# Patient Record
Sex: FEMALE | Race: WHITE | NOT HISPANIC OR LATINO | Employment: OTHER | URBAN - METROPOLITAN AREA
[De-identification: names, ages, dates, MRNs, and addresses within clinical notes are randomized per-mention and may not be internally consistent; named-entity substitution may affect disease eponyms.]

---

## 2017-01-23 ENCOUNTER — APPOINTMENT (OUTPATIENT)
Dept: LAB | Facility: CLINIC | Age: 77
End: 2017-01-23
Payer: MEDICARE

## 2017-01-23 ENCOUNTER — GENERIC CONVERSION - ENCOUNTER (OUTPATIENT)
Dept: OTHER | Facility: OTHER | Age: 77
End: 2017-01-23

## 2017-01-23 ENCOUNTER — TRANSCRIBE ORDERS (OUTPATIENT)
Dept: LAB | Facility: CLINIC | Age: 77
End: 2017-01-23

## 2017-01-23 DIAGNOSIS — I05.9 OTHER AND UNSPECIFIED MITRAL VALVE DISEASES: ICD-10-CM

## 2017-01-23 DIAGNOSIS — Z79.01 LONG TERM (CURRENT) USE OF ANTICOAGULANTS: Primary | ICD-10-CM

## 2017-01-23 DIAGNOSIS — Z95.2 HEART VALVE REPLACED BY TRANSPLANT: ICD-10-CM

## 2017-01-23 LAB
INR PPP: 2.14 (ref 0.86–1.16)
PROTHROMBIN TIME: 23.7 SECONDS (ref 12–14.3)

## 2017-01-23 PROCEDURE — 36415 COLL VENOUS BLD VENIPUNCTURE: CPT | Performed by: NURSE PRACTITIONER

## 2017-01-23 PROCEDURE — 85610 PROTHROMBIN TIME: CPT | Performed by: NURSE PRACTITIONER

## 2017-02-15 ENCOUNTER — ALLSCRIPTS OFFICE VISIT (OUTPATIENT)
Dept: OTHER | Facility: OTHER | Age: 77
End: 2017-02-15

## 2017-02-21 ENCOUNTER — GENERIC CONVERSION - ENCOUNTER (OUTPATIENT)
Dept: OTHER | Facility: OTHER | Age: 77
End: 2017-02-21

## 2017-02-21 ENCOUNTER — APPOINTMENT (OUTPATIENT)
Dept: LAB | Facility: CLINIC | Age: 77
End: 2017-02-21
Payer: MEDICARE

## 2017-02-21 ENCOUNTER — TRANSCRIBE ORDERS (OUTPATIENT)
Dept: LAB | Facility: CLINIC | Age: 77
End: 2017-02-21

## 2017-02-21 DIAGNOSIS — Z95.2 HEART VALVE REPLACED BY TRANSPLANT: ICD-10-CM

## 2017-02-21 DIAGNOSIS — Z79.01 LONG TERM (CURRENT) USE OF ANTICOAGULANTS: ICD-10-CM

## 2017-02-21 DIAGNOSIS — Z79.01 LONG TERM (CURRENT) USE OF ANTICOAGULANTS: Primary | ICD-10-CM

## 2017-02-21 DIAGNOSIS — I05.9 OTHER AND UNSPECIFIED MITRAL VALVE DISEASES: ICD-10-CM

## 2017-02-21 LAB
INR PPP: 2.23 (ref 0.86–1.16)
PROTHROMBIN TIME: 24.4 SECONDS (ref 12–14.3)

## 2017-02-21 PROCEDURE — 85610 PROTHROMBIN TIME: CPT

## 2017-02-21 PROCEDURE — 36415 COLL VENOUS BLD VENIPUNCTURE: CPT

## 2017-02-23 ENCOUNTER — ALLSCRIPTS OFFICE VISIT (OUTPATIENT)
Dept: OTHER | Facility: OTHER | Age: 77
End: 2017-02-23

## 2017-03-15 ENCOUNTER — ALLSCRIPTS OFFICE VISIT (OUTPATIENT)
Dept: OTHER | Facility: OTHER | Age: 77
End: 2017-03-15

## 2017-03-15 DIAGNOSIS — E03.9 HYPOTHYROIDISM: ICD-10-CM

## 2017-03-15 DIAGNOSIS — I48.20 CHRONIC ATRIAL FIBRILLATION (HCC): ICD-10-CM

## 2017-03-23 ENCOUNTER — GENERIC CONVERSION - ENCOUNTER (OUTPATIENT)
Dept: OTHER | Facility: OTHER | Age: 77
End: 2017-03-23

## 2017-03-23 ENCOUNTER — TRANSCRIBE ORDERS (OUTPATIENT)
Dept: LAB | Facility: CLINIC | Age: 77
End: 2017-03-23

## 2017-03-23 ENCOUNTER — APPOINTMENT (OUTPATIENT)
Dept: LAB | Facility: CLINIC | Age: 77
End: 2017-03-23
Payer: MEDICARE

## 2017-03-23 DIAGNOSIS — Z79.01 LONG TERM (CURRENT) USE OF ANTICOAGULANTS: Primary | ICD-10-CM

## 2017-03-23 DIAGNOSIS — I48.20 CHRONIC ATRIAL FIBRILLATION (HCC): ICD-10-CM

## 2017-03-23 DIAGNOSIS — I05.9 OTHER AND UNSPECIFIED MITRAL VALVE DISEASES: ICD-10-CM

## 2017-03-23 DIAGNOSIS — E03.9 HYPOTHYROIDISM: ICD-10-CM

## 2017-03-23 DIAGNOSIS — Z95.2 HEART VALVE REPLACED BY TRANSPLANT: ICD-10-CM

## 2017-03-23 LAB
ALBUMIN SERPL BCP-MCNC: 3.4 G/DL (ref 3.5–5)
ALP SERPL-CCNC: 74 U/L (ref 46–116)
ALT SERPL W P-5'-P-CCNC: 9 U/L (ref 12–78)
ANION GAP SERPL CALCULATED.3IONS-SCNC: 4 MMOL/L (ref 4–13)
AST SERPL W P-5'-P-CCNC: 18 U/L (ref 5–45)
BILIRUB SERPL-MCNC: 1.29 MG/DL (ref 0.2–1)
BUN SERPL-MCNC: 14 MG/DL (ref 5–25)
CALCIUM SERPL-MCNC: 9.4 MG/DL (ref 8.3–10.1)
CHLORIDE SERPL-SCNC: 104 MMOL/L (ref 100–108)
CHOLEST SERPL-MCNC: 163 MG/DL (ref 50–200)
CO2 SERPL-SCNC: 29 MMOL/L (ref 21–32)
CREAT SERPL-MCNC: 1.05 MG/DL (ref 0.6–1.3)
GFR SERPL CREATININE-BSD FRML MDRD: 50.8 ML/MIN/1.73SQ M
GLUCOSE P FAST SERPL-MCNC: 103 MG/DL (ref 65–99)
HDLC SERPL-MCNC: 60 MG/DL (ref 40–60)
INR PPP: 2.14 (ref 0.86–1.16)
LDLC SERPL CALC-MCNC: 78 MG/DL (ref 0–100)
POTASSIUM SERPL-SCNC: 4.4 MMOL/L (ref 3.5–5.3)
PROT SERPL-MCNC: 6.7 G/DL (ref 6.4–8.2)
PROTHROMBIN TIME: 23.7 SECONDS (ref 12–14.3)
SODIUM SERPL-SCNC: 137 MMOL/L (ref 136–145)
TRIGL SERPL-MCNC: 124 MG/DL
TSH SERPL DL<=0.05 MIU/L-ACNC: 2.83 UIU/ML (ref 0.36–3.74)

## 2017-03-23 PROCEDURE — 36415 COLL VENOUS BLD VENIPUNCTURE: CPT | Performed by: NURSE PRACTITIONER

## 2017-03-23 PROCEDURE — 84443 ASSAY THYROID STIM HORMONE: CPT

## 2017-03-23 PROCEDURE — 85610 PROTHROMBIN TIME: CPT | Performed by: NURSE PRACTITIONER

## 2017-03-23 PROCEDURE — 80061 LIPID PANEL: CPT

## 2017-03-23 PROCEDURE — 80053 COMPREHEN METABOLIC PANEL: CPT

## 2017-04-06 ENCOUNTER — GENERIC CONVERSION - ENCOUNTER (OUTPATIENT)
Dept: OTHER | Facility: OTHER | Age: 77
End: 2017-04-06

## 2017-04-07 ENCOUNTER — GENERIC CONVERSION - ENCOUNTER (OUTPATIENT)
Dept: OTHER | Facility: OTHER | Age: 77
End: 2017-04-07

## 2017-05-02 ENCOUNTER — TRANSCRIBE ORDERS (OUTPATIENT)
Dept: LAB | Facility: CLINIC | Age: 77
End: 2017-05-02

## 2017-05-02 ENCOUNTER — GENERIC CONVERSION - ENCOUNTER (OUTPATIENT)
Dept: OTHER | Facility: OTHER | Age: 77
End: 2017-05-02

## 2017-05-02 ENCOUNTER — APPOINTMENT (OUTPATIENT)
Dept: LAB | Facility: CLINIC | Age: 77
End: 2017-05-02
Payer: MEDICARE

## 2017-05-02 DIAGNOSIS — I48.91 ATRIAL FIBRILLATION, UNSPECIFIED TYPE (HCC): ICD-10-CM

## 2017-05-02 DIAGNOSIS — I25.10 ATHEROSCLEROSIS OF NATIVE CORONARY ARTERY OF NATIVE HEART WITHOUT ANGINA PECTORIS: Primary | ICD-10-CM

## 2017-05-02 LAB
ANION GAP SERPL CALCULATED.3IONS-SCNC: 6 MMOL/L (ref 4–13)
BUN SERPL-MCNC: 18 MG/DL (ref 5–25)
CALCIUM SERPL-MCNC: 9.2 MG/DL (ref 8.3–10.1)
CHLORIDE SERPL-SCNC: 103 MMOL/L (ref 100–108)
CO2 SERPL-SCNC: 27 MMOL/L (ref 21–32)
CREAT SERPL-MCNC: 1.02 MG/DL (ref 0.6–1.3)
GFR SERPL CREATININE-BSD FRML MDRD: 52.5 ML/MIN/1.73SQ M
GLUCOSE P FAST SERPL-MCNC: 104 MG/DL (ref 65–99)
INR PPP: 2.11 (ref 0.86–1.16)
POTASSIUM SERPL-SCNC: 4.5 MMOL/L (ref 3.5–5.3)
PROTHROMBIN TIME: 23.9 SECONDS (ref 12.1–14.4)
SODIUM SERPL-SCNC: 136 MMOL/L (ref 136–145)

## 2017-05-02 PROCEDURE — 80048 BASIC METABOLIC PNL TOTAL CA: CPT | Performed by: INTERNAL MEDICINE

## 2017-05-02 PROCEDURE — 85610 PROTHROMBIN TIME: CPT | Performed by: INTERNAL MEDICINE

## 2017-05-02 PROCEDURE — 36415 COLL VENOUS BLD VENIPUNCTURE: CPT | Performed by: INTERNAL MEDICINE

## 2017-05-08 ENCOUNTER — ALLSCRIPTS OFFICE VISIT (OUTPATIENT)
Dept: OTHER | Facility: OTHER | Age: 77
End: 2017-05-08

## 2017-06-08 ENCOUNTER — APPOINTMENT (OUTPATIENT)
Dept: LAB | Facility: CLINIC | Age: 77
End: 2017-06-08
Payer: MEDICARE

## 2017-06-08 ENCOUNTER — GENERIC CONVERSION - ENCOUNTER (OUTPATIENT)
Dept: OTHER | Facility: OTHER | Age: 77
End: 2017-06-08

## 2017-06-08 ENCOUNTER — TRANSCRIBE ORDERS (OUTPATIENT)
Dept: LAB | Facility: CLINIC | Age: 77
End: 2017-06-08

## 2017-06-08 DIAGNOSIS — I05.9 OTHER AND UNSPECIFIED MITRAL VALVE DISEASES: ICD-10-CM

## 2017-06-08 DIAGNOSIS — Z95.2 HEART VALVE REPLACED BY TRANSPLANT: ICD-10-CM

## 2017-06-08 DIAGNOSIS — Z79.01 LONG TERM (CURRENT) USE OF ANTICOAGULANTS: Primary | ICD-10-CM

## 2017-06-08 LAB
INR PPP: 2.75 (ref 0.86–1.16)
PROTHROMBIN TIME: 29.5 SECONDS (ref 12.1–14.4)

## 2017-06-08 PROCEDURE — 36415 COLL VENOUS BLD VENIPUNCTURE: CPT | Performed by: NURSE PRACTITIONER

## 2017-06-08 PROCEDURE — 85610 PROTHROMBIN TIME: CPT | Performed by: NURSE PRACTITIONER

## 2017-06-09 DIAGNOSIS — I05.9 RHEUMATIC MITRAL VALVE DISEASE: ICD-10-CM

## 2017-06-09 DIAGNOSIS — Z79.01 LONG TERM CURRENT USE OF ANTICOAGULANT: ICD-10-CM

## 2017-06-22 ENCOUNTER — APPOINTMENT (OUTPATIENT)
Dept: LAB | Facility: CLINIC | Age: 77
End: 2017-06-22
Payer: MEDICARE

## 2017-06-22 ENCOUNTER — TRANSCRIBE ORDERS (OUTPATIENT)
Dept: LAB | Facility: CLINIC | Age: 77
End: 2017-06-22

## 2017-06-22 DIAGNOSIS — Z79.01 LONG TERM CURRENT USE OF ANTICOAGULANT: ICD-10-CM

## 2017-06-22 DIAGNOSIS — I05.9 RHEUMATIC MITRAL VALVE DISEASE: ICD-10-CM

## 2017-06-22 LAB
INR PPP: 2.63 (ref 0.86–1.16)
PROTHROMBIN TIME: 28.4 SECONDS (ref 12.1–14.4)

## 2017-06-22 PROCEDURE — 36415 COLL VENOUS BLD VENIPUNCTURE: CPT

## 2017-06-22 PROCEDURE — 85610 PROTHROMBIN TIME: CPT

## 2017-06-23 ENCOUNTER — GENERIC CONVERSION - ENCOUNTER (OUTPATIENT)
Dept: OTHER | Facility: OTHER | Age: 77
End: 2017-06-23

## 2017-07-13 ENCOUNTER — TRANSCRIBE ORDERS (OUTPATIENT)
Dept: LAB | Facility: CLINIC | Age: 77
End: 2017-07-13

## 2017-07-13 ENCOUNTER — GENERIC CONVERSION - ENCOUNTER (OUTPATIENT)
Dept: OTHER | Facility: OTHER | Age: 77
End: 2017-07-13

## 2017-07-13 ENCOUNTER — APPOINTMENT (OUTPATIENT)
Dept: LAB | Facility: CLINIC | Age: 77
End: 2017-07-13
Payer: MEDICARE

## 2017-07-13 DIAGNOSIS — Z79.01 LONG TERM CURRENT USE OF ANTICOAGULANT: ICD-10-CM

## 2017-07-13 DIAGNOSIS — I05.9 RHEUMATIC MITRAL VALVE DISEASE: ICD-10-CM

## 2017-07-13 LAB
INR PPP: 2.51 (ref 0.86–1.16)
PROTHROMBIN TIME: 27.4 SECONDS (ref 12.1–14.4)

## 2017-07-13 PROCEDURE — 36415 COLL VENOUS BLD VENIPUNCTURE: CPT

## 2017-07-13 PROCEDURE — 85610 PROTHROMBIN TIME: CPT

## 2017-07-14 DIAGNOSIS — I05.9 RHEUMATIC MITRAL VALVE DISEASE: ICD-10-CM

## 2017-07-14 DIAGNOSIS — Z79.01 LONG TERM CURRENT USE OF ANTICOAGULANT: ICD-10-CM

## 2017-07-14 RX ORDER — FUROSEMIDE 20 MG/1
20 TABLET ORAL EVERY OTHER DAY
COMMUNITY
End: 2018-03-28

## 2017-07-14 RX ORDER — CARVEDILOL 3.12 MG/1
3.12 TABLET ORAL 2 TIMES DAILY WITH MEALS
COMMUNITY
End: 2018-07-02 | Stop reason: SDUPTHER

## 2017-07-14 RX ORDER — WARFARIN SODIUM 2.5 MG/1
TABLET ORAL
COMMUNITY
End: 2018-05-29 | Stop reason: SDUPTHER

## 2017-07-14 RX ORDER — CHOLECALCIFEROL (VITAMIN D3) 125 MCG
2000 CAPSULE ORAL EVERY MORNING
COMMUNITY

## 2017-07-14 RX ORDER — SPIRONOLACTONE 25 MG/1
25 TABLET ORAL EVERY MORNING
COMMUNITY
End: 2018-05-04 | Stop reason: SDUPTHER

## 2017-07-14 RX ORDER — PRIMIDONE 250 MG/1
25 TABLET ORAL
COMMUNITY
End: 2018-03-12

## 2017-07-14 RX ORDER — LEVOTHYROXINE SODIUM 0.03 MG/1
25 TABLET ORAL EVERY MORNING
COMMUNITY
End: 2018-02-27 | Stop reason: SDUPTHER

## 2017-07-14 RX ORDER — LISINOPRIL 10 MG/1
10 TABLET ORAL
COMMUNITY
End: 2018-03-29 | Stop reason: SDUPTHER

## 2017-07-14 RX ORDER — DONEPEZIL HYDROCHLORIDE 5 MG/1
5 TABLET, FILM COATED ORAL
COMMUNITY
End: 2018-01-29 | Stop reason: SDUPTHER

## 2017-07-14 RX ORDER — WARFARIN SODIUM 2 MG/1
TABLET ORAL
COMMUNITY
End: 2019-05-01

## 2017-07-17 ENCOUNTER — ANESTHESIA EVENT (OUTPATIENT)
Dept: GASTROENTEROLOGY | Facility: AMBULARY SURGERY CENTER | Age: 77
End: 2017-07-17
Payer: MEDICARE

## 2017-07-18 ENCOUNTER — HOSPITAL ENCOUNTER (OUTPATIENT)
Facility: AMBULARY SURGERY CENTER | Age: 77
Setting detail: OUTPATIENT SURGERY
Discharge: HOME | End: 2017-07-18
Attending: INTERNAL MEDICINE | Admitting: INTERNAL MEDICINE
Payer: MEDICARE

## 2017-07-18 ENCOUNTER — ANESTHESIA (OUTPATIENT)
Dept: GASTROENTEROLOGY | Facility: AMBULARY SURGERY CENTER | Age: 77
End: 2017-07-18
Payer: MEDICARE

## 2017-07-18 ENCOUNTER — LAB CONVERSION - ENCOUNTER (OUTPATIENT)
Dept: OTHER | Facility: OTHER | Age: 77
End: 2017-07-18

## 2017-07-18 VITALS
SYSTOLIC BLOOD PRESSURE: 125 MMHG | BODY MASS INDEX: 28.66 KG/M2 | WEIGHT: 172 LBS | HEIGHT: 65 IN | OXYGEN SATURATION: 98 % | RESPIRATION RATE: 18 BRPM | HEART RATE: 59 BPM | DIASTOLIC BLOOD PRESSURE: 61 MMHG | TEMPERATURE: 97.8 F

## 2017-07-18 DIAGNOSIS — Z86.010 HISTORY OF COLONIC POLYPS: ICD-10-CM

## 2017-07-18 PROCEDURE — 88305 TISSUE EXAM BY PATHOLOGIST: CPT | Performed by: INTERNAL MEDICINE

## 2017-07-18 RX ORDER — PROPOFOL 10 MG/ML
INJECTION, EMULSION INTRAVENOUS AS NEEDED
Status: DISCONTINUED | OUTPATIENT
Start: 2017-07-18 | End: 2017-07-18 | Stop reason: SURG

## 2017-07-18 RX ORDER — SODIUM CHLORIDE, SODIUM LACTATE, POTASSIUM CHLORIDE, CALCIUM CHLORIDE 600; 310; 30; 20 MG/100ML; MG/100ML; MG/100ML; MG/100ML
125 INJECTION, SOLUTION INTRAVENOUS CONTINUOUS
Status: DISCONTINUED | OUTPATIENT
Start: 2017-07-18 | End: 2017-07-18 | Stop reason: HOSPADM

## 2017-07-18 RX ADMIN — SODIUM CHLORIDE, SODIUM LACTATE, POTASSIUM CHLORIDE, AND CALCIUM CHLORIDE 125 ML/HR: .6; .31; .03; .02 INJECTION, SOLUTION INTRAVENOUS at 08:51

## 2017-07-18 RX ADMIN — LIDOCAINE HYDROCHLORIDE 60 MG: 20 INJECTION, SOLUTION INTRAVENOUS at 08:55

## 2017-07-18 RX ADMIN — PROPOFOL 30 MG: 10 INJECTION, EMULSION INTRAVENOUS at 09:00

## 2017-07-18 RX ADMIN — PROPOFOL 30 MG: 10 INJECTION, EMULSION INTRAVENOUS at 09:05

## 2017-07-18 RX ADMIN — PROPOFOL 30 MG: 10 INJECTION, EMULSION INTRAVENOUS at 09:11

## 2017-07-18 RX ADMIN — PROPOFOL 80 MG: 10 INJECTION, EMULSION INTRAVENOUS at 08:55

## 2017-07-31 ENCOUNTER — GENERIC CONVERSION - ENCOUNTER (OUTPATIENT)
Dept: OTHER | Facility: OTHER | Age: 77
End: 2017-07-31

## 2017-08-01 ENCOUNTER — GENERIC CONVERSION - ENCOUNTER (OUTPATIENT)
Dept: OTHER | Facility: OTHER | Age: 77
End: 2017-08-01

## 2017-08-02 ENCOUNTER — GENERIC CONVERSION - ENCOUNTER (OUTPATIENT)
Dept: OTHER | Facility: OTHER | Age: 77
End: 2017-08-02

## 2017-08-07 ENCOUNTER — ALLSCRIPTS OFFICE VISIT (OUTPATIENT)
Dept: OTHER | Facility: OTHER | Age: 77
End: 2017-08-07

## 2017-08-17 ENCOUNTER — ALLSCRIPTS OFFICE VISIT (OUTPATIENT)
Dept: OTHER | Facility: OTHER | Age: 77
End: 2017-08-17

## 2017-08-18 DIAGNOSIS — Z79.01 LONG TERM CURRENT USE OF ANTICOAGULANT: ICD-10-CM

## 2017-08-18 DIAGNOSIS — M25.572 PAIN IN LEFT ANKLE: ICD-10-CM

## 2017-08-18 DIAGNOSIS — I05.9 RHEUMATIC MITRAL VALVE DISEASE: ICD-10-CM

## 2017-08-22 ENCOUNTER — ALLSCRIPTS OFFICE VISIT (OUTPATIENT)
Dept: OTHER | Facility: OTHER | Age: 77
End: 2017-08-22

## 2017-08-23 ENCOUNTER — ALLSCRIPTS OFFICE VISIT (OUTPATIENT)
Dept: OTHER | Facility: OTHER | Age: 77
End: 2017-08-23

## 2017-08-23 ENCOUNTER — APPOINTMENT (OUTPATIENT)
Dept: RADIOLOGY | Facility: CLINIC | Age: 77
End: 2017-08-23
Payer: MEDICARE

## 2017-08-23 DIAGNOSIS — M25.572 PAIN IN LEFT ANKLE: ICD-10-CM

## 2017-08-23 PROCEDURE — 73610 X-RAY EXAM OF ANKLE: CPT

## 2017-08-24 ENCOUNTER — ALLSCRIPTS OFFICE VISIT (OUTPATIENT)
Dept: OTHER | Facility: OTHER | Age: 77
End: 2017-08-24

## 2017-09-19 ENCOUNTER — TRANSCRIBE ORDERS (OUTPATIENT)
Dept: LAB | Facility: CLINIC | Age: 77
End: 2017-09-19

## 2017-09-19 ENCOUNTER — GENERIC CONVERSION - ENCOUNTER (OUTPATIENT)
Dept: OTHER | Facility: OTHER | Age: 77
End: 2017-09-19

## 2017-09-19 ENCOUNTER — APPOINTMENT (OUTPATIENT)
Dept: LAB | Facility: CLINIC | Age: 77
End: 2017-09-19
Payer: MEDICARE

## 2017-09-19 DIAGNOSIS — Z79.01 LONG TERM CURRENT USE OF ANTICOAGULANT: ICD-10-CM

## 2017-09-19 DIAGNOSIS — I05.9 RHEUMATIC MITRAL VALVE DISEASE: ICD-10-CM

## 2017-09-19 LAB
INR PPP: 2.5 (ref 0.86–1.16)
PROTHROMBIN TIME: 27.3 SECONDS (ref 12.1–14.4)

## 2017-09-19 PROCEDURE — 85610 PROTHROMBIN TIME: CPT

## 2017-09-19 PROCEDURE — 36415 COLL VENOUS BLD VENIPUNCTURE: CPT

## 2017-09-20 ENCOUNTER — GENERIC CONVERSION - ENCOUNTER (OUTPATIENT)
Dept: OTHER | Facility: OTHER | Age: 77
End: 2017-09-20

## 2017-09-22 DIAGNOSIS — Z79.01 LONG TERM CURRENT USE OF ANTICOAGULANT: ICD-10-CM

## 2017-09-22 DIAGNOSIS — I05.9 RHEUMATIC MITRAL VALVE DISEASE: ICD-10-CM

## 2017-09-22 DIAGNOSIS — I48.20 CHRONIC ATRIAL FIBRILLATION (HCC): ICD-10-CM

## 2017-10-10 ENCOUNTER — TRANSCRIBE ORDERS (OUTPATIENT)
Dept: LAB | Facility: CLINIC | Age: 77
End: 2017-10-10

## 2017-10-10 ENCOUNTER — APPOINTMENT (OUTPATIENT)
Dept: LAB | Facility: CLINIC | Age: 77
End: 2017-10-10
Payer: MEDICARE

## 2017-10-10 DIAGNOSIS — Z79.01 LONG TERM CURRENT USE OF ANTICOAGULANT: ICD-10-CM

## 2017-10-10 DIAGNOSIS — I05.9 RHEUMATIC MITRAL VALVE DISEASE: ICD-10-CM

## 2017-10-10 LAB
INR PPP: 2.56 (ref 0.86–1.16)
PROTHROMBIN TIME: 27.8 SECONDS (ref 12.1–14.4)

## 2017-10-10 PROCEDURE — 36415 COLL VENOUS BLD VENIPUNCTURE: CPT

## 2017-10-10 PROCEDURE — 85610 PROTHROMBIN TIME: CPT

## 2017-10-11 ENCOUNTER — GENERIC CONVERSION - ENCOUNTER (OUTPATIENT)
Dept: OTHER | Facility: OTHER | Age: 77
End: 2017-10-11

## 2017-10-23 ENCOUNTER — APPOINTMENT (OUTPATIENT)
Dept: RADIOLOGY | Facility: CLINIC | Age: 77
End: 2017-10-23
Payer: MEDICARE

## 2017-10-23 ENCOUNTER — GENERIC CONVERSION - ENCOUNTER (OUTPATIENT)
Dept: OTHER | Facility: OTHER | Age: 77
End: 2017-10-23

## 2017-10-23 ENCOUNTER — TRANSCRIBE ORDERS (OUTPATIENT)
Dept: RADIOLOGY | Facility: CLINIC | Age: 77
End: 2017-10-23

## 2017-10-23 DIAGNOSIS — M54.50 LOW BACK PAIN: ICD-10-CM

## 2017-10-23 PROCEDURE — 72110 X-RAY EXAM L-2 SPINE 4/>VWS: CPT

## 2017-10-24 ENCOUNTER — GENERIC CONVERSION - ENCOUNTER (OUTPATIENT)
Dept: OTHER | Facility: OTHER | Age: 77
End: 2017-10-24

## 2017-10-26 ENCOUNTER — APPOINTMENT (OUTPATIENT)
Dept: PHYSICAL THERAPY | Facility: CLINIC | Age: 77
End: 2017-10-26
Payer: MEDICARE

## 2017-10-26 PROCEDURE — 97161 PT EVAL LOW COMPLEX 20 MIN: CPT

## 2017-10-26 PROCEDURE — G8979 MOBILITY GOAL STATUS: HCPCS

## 2017-10-26 PROCEDURE — 97110 THERAPEUTIC EXERCISES: CPT

## 2017-10-26 PROCEDURE — G8978 MOBILITY CURRENT STATUS: HCPCS

## 2017-10-27 ENCOUNTER — GENERIC CONVERSION - ENCOUNTER (OUTPATIENT)
Dept: OTHER | Facility: OTHER | Age: 77
End: 2017-10-27

## 2017-10-27 DIAGNOSIS — I48.20 CHRONIC ATRIAL FIBRILLATION (HCC): ICD-10-CM

## 2017-10-27 DIAGNOSIS — I05.9 RHEUMATIC MITRAL VALVE DISEASE: ICD-10-CM

## 2017-10-27 DIAGNOSIS — Z79.01 LONG TERM CURRENT USE OF ANTICOAGULANT: ICD-10-CM

## 2017-10-30 ENCOUNTER — APPOINTMENT (OUTPATIENT)
Dept: PHYSICAL THERAPY | Facility: CLINIC | Age: 77
End: 2017-10-30
Payer: MEDICARE

## 2017-10-30 PROCEDURE — 97110 THERAPEUTIC EXERCISES: CPT

## 2017-10-31 ENCOUNTER — TRANSCRIBE ORDERS (OUTPATIENT)
Dept: LAB | Facility: CLINIC | Age: 77
End: 2017-10-31

## 2017-10-31 ENCOUNTER — GENERIC CONVERSION - ENCOUNTER (OUTPATIENT)
Dept: OTHER | Facility: OTHER | Age: 77
End: 2017-10-31

## 2017-10-31 ENCOUNTER — APPOINTMENT (OUTPATIENT)
Dept: LAB | Facility: CLINIC | Age: 77
End: 2017-10-31
Payer: MEDICARE

## 2017-10-31 DIAGNOSIS — I48.20 CHRONIC ATRIAL FIBRILLATION (HCC): Primary | ICD-10-CM

## 2017-10-31 LAB
INR PPP: 2.97 (ref 0.86–1.16)
PROTHROMBIN TIME: 31.3 SECONDS (ref 12.1–14.4)

## 2017-10-31 PROCEDURE — 85610 PROTHROMBIN TIME: CPT | Performed by: INTERNAL MEDICINE

## 2017-10-31 PROCEDURE — 36415 COLL VENOUS BLD VENIPUNCTURE: CPT | Performed by: INTERNAL MEDICINE

## 2017-11-02 ENCOUNTER — APPOINTMENT (OUTPATIENT)
Dept: PHYSICAL THERAPY | Facility: CLINIC | Age: 77
End: 2017-11-02
Payer: MEDICARE

## 2017-11-02 PROCEDURE — 97110 THERAPEUTIC EXERCISES: CPT

## 2017-11-06 ENCOUNTER — APPOINTMENT (OUTPATIENT)
Dept: PHYSICAL THERAPY | Facility: CLINIC | Age: 77
End: 2017-11-06
Payer: MEDICARE

## 2017-11-06 PROCEDURE — 97110 THERAPEUTIC EXERCISES: CPT

## 2017-11-09 ENCOUNTER — APPOINTMENT (OUTPATIENT)
Dept: PHYSICAL THERAPY | Facility: CLINIC | Age: 77
End: 2017-11-09
Payer: MEDICARE

## 2017-11-09 PROCEDURE — 97110 THERAPEUTIC EXERCISES: CPT

## 2017-11-09 PROCEDURE — 97112 NEUROMUSCULAR REEDUCATION: CPT

## 2017-11-14 ENCOUNTER — APPOINTMENT (OUTPATIENT)
Dept: PHYSICAL THERAPY | Facility: CLINIC | Age: 77
End: 2017-11-14
Payer: MEDICARE

## 2017-11-14 PROCEDURE — 97112 NEUROMUSCULAR REEDUCATION: CPT

## 2017-11-14 PROCEDURE — 97110 THERAPEUTIC EXERCISES: CPT

## 2017-11-16 ENCOUNTER — APPOINTMENT (OUTPATIENT)
Dept: PHYSICAL THERAPY | Facility: CLINIC | Age: 77
End: 2017-11-16
Payer: MEDICARE

## 2017-11-16 PROCEDURE — 97110 THERAPEUTIC EXERCISES: CPT

## 2017-11-21 ENCOUNTER — APPOINTMENT (OUTPATIENT)
Dept: PHYSICAL THERAPY | Facility: CLINIC | Age: 77
End: 2017-11-21
Payer: MEDICARE

## 2017-11-28 ENCOUNTER — APPOINTMENT (OUTPATIENT)
Dept: PHYSICAL THERAPY | Facility: CLINIC | Age: 77
End: 2017-11-28
Payer: MEDICARE

## 2017-11-28 ENCOUNTER — GENERIC CONVERSION - ENCOUNTER (OUTPATIENT)
Dept: OTHER | Facility: OTHER | Age: 77
End: 2017-11-28

## 2017-11-28 ENCOUNTER — APPOINTMENT (OUTPATIENT)
Dept: LAB | Facility: CLINIC | Age: 77
End: 2017-11-28
Payer: MEDICARE

## 2017-11-28 ENCOUNTER — TRANSCRIBE ORDERS (OUTPATIENT)
Dept: LAB | Facility: CLINIC | Age: 77
End: 2017-11-28

## 2017-11-28 DIAGNOSIS — I48.20 CHRONIC ATRIAL FIBRILLATION (HCC): Primary | ICD-10-CM

## 2017-11-28 DIAGNOSIS — I48.20 CHRONIC ATRIAL FIBRILLATION (HCC): ICD-10-CM

## 2017-11-28 LAB
INR PPP: 2.91 (ref 0.86–1.16)
PROTHROMBIN TIME: 30.8 SECONDS (ref 12.1–14.4)

## 2017-11-28 PROCEDURE — 85610 PROTHROMBIN TIME: CPT

## 2017-11-28 PROCEDURE — 97112 NEUROMUSCULAR REEDUCATION: CPT

## 2017-11-28 PROCEDURE — 36415 COLL VENOUS BLD VENIPUNCTURE: CPT

## 2017-11-28 PROCEDURE — 97110 THERAPEUTIC EXERCISES: CPT

## 2017-11-30 ENCOUNTER — APPOINTMENT (OUTPATIENT)
Dept: PHYSICAL THERAPY | Facility: CLINIC | Age: 77
End: 2017-11-30
Payer: MEDICARE

## 2017-11-30 PROCEDURE — 97110 THERAPEUTIC EXERCISES: CPT

## 2017-12-01 DIAGNOSIS — I48.20 CHRONIC ATRIAL FIBRILLATION (HCC): ICD-10-CM

## 2017-12-01 DIAGNOSIS — I05.9 RHEUMATIC MITRAL VALVE DISEASE: ICD-10-CM

## 2017-12-01 DIAGNOSIS — Z79.01 LONG TERM CURRENT USE OF ANTICOAGULANT: ICD-10-CM

## 2017-12-01 DIAGNOSIS — R49.8 OTHER VOICE AND RESONANCE DISORDERS (CODE): ICD-10-CM

## 2017-12-01 DIAGNOSIS — R13.10 DYSPHAGIA: ICD-10-CM

## 2017-12-04 ENCOUNTER — TRANSCRIBE ORDERS (OUTPATIENT)
Dept: LAB | Facility: CLINIC | Age: 77
End: 2017-12-04

## 2017-12-04 ENCOUNTER — ALLSCRIPTS OFFICE VISIT (OUTPATIENT)
Dept: OTHER | Facility: OTHER | Age: 77
End: 2017-12-04

## 2017-12-04 ENCOUNTER — APPOINTMENT (OUTPATIENT)
Dept: PHYSICAL THERAPY | Facility: CLINIC | Age: 77
End: 2017-12-04
Payer: MEDICARE

## 2017-12-04 ENCOUNTER — APPOINTMENT (OUTPATIENT)
Dept: LAB | Facility: CLINIC | Age: 77
End: 2017-12-04
Payer: MEDICARE

## 2017-12-04 DIAGNOSIS — I50.22 CHRONIC SYSTOLIC HEART FAILURE (HCC): Primary | ICD-10-CM

## 2017-12-04 LAB — NT-PROBNP SERPL-MCNC: 1073 PG/ML

## 2017-12-04 PROCEDURE — 36415 COLL VENOUS BLD VENIPUNCTURE: CPT | Performed by: INTERNAL MEDICINE

## 2017-12-04 PROCEDURE — G8980 MOBILITY D/C STATUS: HCPCS

## 2017-12-04 PROCEDURE — 83880 ASSAY OF NATRIURETIC PEPTIDE: CPT | Performed by: INTERNAL MEDICINE

## 2017-12-04 PROCEDURE — 97110 THERAPEUTIC EXERCISES: CPT

## 2017-12-04 PROCEDURE — G8979 MOBILITY GOAL STATUS: HCPCS

## 2017-12-05 ENCOUNTER — GENERIC CONVERSION - ENCOUNTER (OUTPATIENT)
Dept: OTHER | Facility: OTHER | Age: 77
End: 2017-12-05

## 2017-12-05 NOTE — PROGRESS NOTES
Assessment  Assessed    1  Chronic atrial fibrillation (427 31) (I48 2)   2  Anticoagulant long-term use (V58 61) (Z79 01)   3  Chronic right-sided low back pain without sciatica (724 2,338 29) (M54 5,G89 29)   4  Dizziness (780 4) (R42)   5  Heart failure, left systolic, chronic (495 64,455 4) (I50 22)   6  History of mitral valve replacement (V43 3) (Z95 2)   7  Hypothyroidism (244 9) (E03 9)   8  Parkinson's disease (332 0) (G20)   9  Easy fatigability (780 79) (R53 83)   10  Generalized osteoarthritis (715 00) (M15 9)    Plan  Chronic atrial fibrillation    · EKG/ECG- POC; Status:Complete;   Done: 01OWZ9940   Perform: In Office; 419 [id number]; Last Updated By:Shaugfta Rankin; 12/4/2017 9:15:01 AM;Ordered; For:Chronic atrial fibrillation; Ordered By:Luis Daniel Perdomo;  Dizziness    · (1) CBC/ PLT (NO DIFF); Status:Active; Requested for:76Jws9274; Perform:[facility]; OVB:61MTO4868;GAGUADP;EFJXSTJ By:Steffany Perdomo;  Heart failure, left systolic, chronic    · (1) NT- BNP (PRO BRAIN NATRIURETIC PEPTIDE); Status:Active; Requestedfor:09Dsx8445;    Perform:[facility]; OBK:37WDK7088; Ordered;For:Heart failure, left systolic, chronic; Ordered By:Luis Daniel Perdomo;  Heart failure, left systolic, chronic, Hypothyroidism, Parkinson's disease    · (1) COMPREHENSIVE METABOLIC PANEL; Status:Active; Requested for:57Bqy1336; Perform:[facility]; XFY:36FLZ1246;RJDDGXD;ELEANOR:JFPXT failure, left systolic, chronic, Hypothyroidism, Parkinson's disease; Ordered By:Luis Daniel Perdomo;  Hypothyroidism    · (1) LIPID PANEL, FASTING; Status:Active; Requested for:78Yin9359; Perform:[facility]; YCI:64TMO8410;SMJGLXE;HFW:XMPYWMSKGEJCQC; Ordered By:Luis Daniel Perdomo;   · (1) TSH; Status:Active; Requested for:18Feb2018; Perform:[facility]; [facility] By:Steffany Perdomo;   1  Continue present medications   2   Monitor  home blood pressures when dizzy and if consistently low, notify us and we will modify doses of medications  3   Obtain NT-BNP soon and lipids, CBC, CMP, TSH with office visit and rhythm strip in approximately 4 months  Discussion/Summary  Cardiology Discussion Summary Free Text Note Form [facility]:     Resolved dehydration/hypotension and residual mild dizziness as well as resolution of acute kidney injury since 11/18/16  Compensated chronic systolic heart failure with 34% ejection fraction on 7/5/16 MUGA scan/underlying dilated cardiomyopathy but currently with exertional fatigue, exhaustion, and cold sweats, with slightly worsened exertional dyspnea, and occasional nocturnal wheezing   Chronic atrial fibrillation, controlledHistory of mechanical mitral valve replacement September, 1990, with very good oral anticoagulation with target INR of 2  5  Moderately frequent PVCs on otherwise benign Holter monitor December, 201315 6 pound weight loss in approximately 4 yearsDyslipidemiaMild obstructive sleep apneaHistory of asthmaHistory of GERDHistory of benign positional vertigoHistory of Lyme diseaseHypothyroidismHistory of hepatitis CHistory of Parkinson's disease/date dysfunctionPersistent nonproductive cough and postural dizzines , now improvedChronic fatigue and malaise, multifactorialDrug-induced insomniaHistory of fall with subsequent left distal tibial fracture and left ankle sprain with right periorbital ecchymoses and laceration and left wrist sprain on 7/30/17, with no recurrence, possibly related to transient drop in blood pressure with upright position in hot weather after prolonged sitting  Recently diagnosed osteoarthritis of hips and spine  Goals and Barriers: The patient has the current Goals: Optimize chronic heart failure and symptomsof future fall risk from cardiac standpoint  The patent has the current Barriers: Parkinson's diseaseotherwise  Patient's Capacity to Self-Care: Patient is able to Self-Care     Medication SE Review and Pt Understands Tx: Possible side effects of new medications were reviewed with the patient/guardian today  The treatment plan was reviewed with the patient/guardian  The patient/guardian understands and agrees with the treatment plan   Counseling Documentation With Imm: The patient, patient's family was counseled regarding diagnostic results,-- instructions for management,-- risk factor reductions,-- prognosis,-- patient and family education,-- impressions,-- risks and benefits of treatment options,-- importance of compliance with treatment  total time of encounter was 42 minutes minutes-- and-- 40 minutes was spent counseling  Self Referrals:   Self Referrals: Yes   Transitional Care: Co-manage care with PCP/Referring Provider  Chief Complaint  Chief Complaint Free Text Note Form: Patient here for her 4 month follow up visit, ekg and bnp panel  Patient feels tired and cold sweats with mild activity for the past month; mild dizziness when standing  ylm/ma      History of Present Illness  Cardiology HPI Free Text Note Form  [facility]:   79-year-old woman complains of mild lightheadedness and a 1 month history of easy fatigue, cold sweats, and exhaustion with normal activities such as trying to vacuum or making her bed  She also experienced a slight increase in exertional dyspnea and has been noted to have nocturnal wheezing, according to her   She was recently diagnosed as having osteoarthritis of her hips and spine and has been going to physical therapy for the past 1 and half months for that condition  Her therapy sessions are 35 minutes in duration and include 10 minutes on an arm and leg ergometer without undue difficulty  There have been no falls since her last visit  She takes no analgesics for her osteoarthritis and will rarely use a muscle relaxant for arthritic pain  Review of Systems  ROS Reviewed:   ROS reviewed    All other systems negative, except as noted in history of present illness  Active Problems  Problems    1  Anticoagulant long-term use (V58 61) (Z79 01)   2  Benign familial tremor (333 1) (G25 0)   3  Chronic atrial fibrillation (427 31) (I48 2)   4  Chronic hepatitis C (070 54) (B18 2)   5  Chronic right-sided low back pain without sciatica (724 2,338 29) (M54 5,G89 29)   6  Closed avulsion fracture of distal end of fibula, left, initial encounter   7  Closed nondisplaced segmental fracture of shaft of left fibula, initial encounter (823 21) (S82 465A)   8  Contusion of face, sequela (906 3) (S00 83XS)   9  Dizziness (780 4) (R42)   10  Drug induced insomnia (292 85,E980 5) (F19 982)   11  Dupuytren's contracture (728 6) (M72 0)   12  Edema (782 3) (R60 9)   13  Encounter for screening mammogram for malignant neoplasm of breast (V76 12)  (Z12 31)   14  Encounter for therapeutic drug monitoring (V58 83) (Z51 81)   15  Exposure to influenza (V01 79) (Z20 828)   16  Fall (E888 9) (W19 XXXA)   17  Heart failure, left systolic, chronic (814 09,038 0) (I50 22)   18  History of mitral valve replacement (V43 3) (Z95 2)   19  Hypothyroidism (244 9) (E03 9)   20  Left ankle pain (719 47) (M25 572)   21  Memory impairment (780 93) (R41 3)   22  Mitral valve disorder (424 0) (I05 9)   23  Need for influenza vaccination (V04 81) (Z23)   24  Need for prophylactic vaccination and inoculation against influenza (V04 81) (Z23)   25  Need for vaccination with 13-polyvalent pneumococcal conjugate vaccine (V03 82) (Z23)   26  Parkinson's disease (332 0) (Evangelista Finnegan)   27  Peripheral vascular disease (443 9) (I73 9)   28  Seasonal allergies (477 9) (J30 2)   29  Skin lesion (709 9) (L98 9)   30  Sleep disturbance (780 50) (G47 9)   31  Sprain of other ligament of left ankle, initial encounter (845 09) (S93 492A)   32  Tongue lump (784 2) (R22 0)   33  Transient ischemic attack (435 9) (G45 9)   34  Vitamin D insufficiency (268 9) (E55 9)   35   Well adult on routine health check (V70 0) (Z00 00)    Past Medical History  Problems    1  _ (V72 31)   2  Acute maxillary sinusitis (461 0) (J01 00)   3  Acute upper respiratory infection (465 9) (J06 9)   4  History of Acute upper respiratory infection (465 9) (J06 9)   5  Anal fissure (565 0) (K60 2)   6  Ankle pain, unspecified laterality   7  History of Asthma with acute exacerbation (493 92) (J45 901)   8  History of Black tarry stools (578 1) (K92 1)   9  Blepharitis, unspecified laterality   10  Difficulty in walking (719 7) (R26 2)   11  History of breast lump (V13 89) (Z87 898)   12  History of chalazion (V12 49) (Z86 69)   13  History of conjunctivitis (V12 49) (Z86 69)   14  History of upper respiratory infection (V12 09) (Z87 09)   15  Hordeolum externum, unspecified laterality (373 11) (H00 019)   16  Late Effect Of Burn Of Wrist And Hand (906 6)   17  Multiple Nonvenomous Insect Bites (919 4)   18  Rotator cuff tendinitis, unspecified laterality (726 10) (M75 80)   19  Screening for malignant neoplasm of cervix (V76 2) (Z12 4)  Active Problems And Past Medical History Reviewed: The active problems and past medical history were reviewed and updated today  Surgical History  Problems    1  History of Cataract Surgery   2  History of Cholecystectomy   3  History of Hysterectomy   4  History of Mitral Valve Replacement  Surgical History Reviewed: The surgical history was reviewed and updated today  Family History  Brother    1  Family history of arthritis (V17 7) (Z82 61)   2  Family history of Parkinson disease  Family History Reviewed: The family history was reviewed and updated today  Social History  Problems    · Former smoker (Y27 56) (B52 696)   · No alcohol use  Social History Reviewed: The social history was reviewed and is unchanged  Current Meds   1  Carbidopa-Levodopa-Entacapone -200 MG Oral Tablet; take 1 tablet by mouth four times a day;  Therapy: 46YYE7797 to (Evaluate:09Bpp7878)  Requested for: 14Sep2017; Last Rx: 72KXT8203 Ordered   2  Carvedilol 3 125 MG Oral Tablet; TAKE 1 TABLET TWICE DAILY WITH MEALS; Therapy: 92KON7707 to (Rich Chun)  Requested for: 41Mgn7199; Last Rx:61Uhh8373 Ordered   3  Cyclobenzaprine HCl - 10 MG Oral Tablet; 1 tab PO at bedtime as needed; Therapy: 48KVP6679 to (Last FL:58LCK6385)  Requested for: 23Oct2017 Ordered   4  Donepezil HCl - 5 MG Oral Tablet; TAKE 2 5 MG Daily; Therapy: 61FYS2221 to  Requested for: 10Cuf5323 Recorded   5  Furosemide 20 MG Oral Tablet; TAKE 1 TABLET ON MONDAY, WEDNESDAY, FRIDAY AND SUNDAY; Therapy: (Recorded:83Ttk7551) to Recorded   6  Levothyroxine Sodium 25 MCG Oral Tablet; 1 every morning; Therapy: 26GMA0814 to (Last Rx:65Grq5981)  Requested for: 11ERC9120 Ordered   7  Lisinopril 10 MG Oral Tablet; TAKE 1/2 TABLET DAILY; Therapy: 24MAR0962 to (Evaluate:48Oiz4918)  Requested for: 14Oct2017; Last Rx:13Oct2017 Ordered   8  Melatonin 5 MG Oral Capsule; Therapy: 18ALE7799 to (Last Rx:04Oct2017)  Requested for: 15CVH3227 Ordered   9  Primidone 50 MG Oral Tablet; TAKE HALF A TABLET DAILY; Therapy: 70DNG2069 to (Last Rx:16Mar2017)  Requested for: 17IRQ8951 Ordered   10  Spironolactone 25 MG Oral Tablet; Take 1 tablet daily; Therapy: 72JAY8050 to (Renew:19Nov2017)  Requested for: 77DCC0355; Last  Rx:97Sxi4168 Ordered   11  Stalevo 100 -200 MG Oral Tablet; TAKE 1 TABLET 4 TIMES DAILY; Therapy: 42DIL7230 to (Evaluate:81Nfj3429)  Requested for: 22Mar2017; Last  Rx:22Mar2017 Ordered   12  Vitamin D3 2000 UNIT Oral Tablet; TAKE 2000 units daily; Therapy: 14IEG0575 to Recorded   13  Warfarin Sodium 2 MG Oral Tablet; Take 1 tablet daily; Therapy: 92Cyo6135 to (Evaluate:26Mar2018)  Requested for: 24UYE6815; Last  Rx:98Tkh5162 Ordered   14  Warfarin Sodium 2 5 MG Oral Tablet; TAKE 1 TABLET DAILY AS DIRECTED; Therapy: 73CEP6104 to (Evaluate:14Jan2018)  Requested for: 17Aug2017; Last  Rx:17Aug2017 Ordered  Medication List Reviewed:    The medication list was reviewed and updated today  Allergies  Medication    1  No Known Drug Allergies    Vitals  Vital Signs    Recorded: 89BPX3376 09:04AM   Heart Rate 67, Apical   Systolic 400, LUE, Sitting   Diastolic 60, LUE, Sitting   Height 5 ft 5 5 in   Weight 176 lb    BMI Calculated 28 84   BSA Calculated 1 88   O2 Saturation 98, RA     3 1 lb weight loss in approximately 3 months and 15 6 lb weight loss in approximately 4 years   Physical Exam   Constitutional - General appearance: No acute distress, well appearing and well nourished  Eyes - Conjunctiva and Sclera examination: Conjunctiva pink, sclera anicteric  Neck - Neck: Abnormal  -- Prominent right-sided JVD caused by underlying common carotid artery buckling  Pulmonary - Respiratory effort: No signs of respiratory distress  -- Auscultation of lungs: Clear to auscultation  Cardiovascular - Auscultation of heart: Normal rate and rhythm, normal S1 and S2, no murmurs  -- Irregularly irregular cardiac rhythm with unvarying mitral prosthetic clicks and no murmur, gallop, rub -- Pedal pulses: Normal, 2+ bilaterally  -- Difficult to palpate in left foot  -- Examination of extremities for edema and/or varicosities: Normal    Abdomen - Soft  Musculoskeletal - Gait and station: Normal gait  Skin - Skin: Normal without rashes  Skin is warm and well perfused  -- Mild pallor noted  Neurologic - Speech normal  No focal deficits  Psychiatric - Orientation to person, place, and time: Normal       Results/Data  ECG Report: 12/04/2017  fibrillation with controlled ventricular rate  left bundle branch block with secondary ST-T abnormalitiessince 08/02/2017  (1) PT WITH INR 84OKB8501 09:11AM Kun [place] Order Number: [id number]     Test Name Result Flag Reference   INR 2 91 H 0 86-1 16   PT 30 8 seconds H 12 1-14 4     * XR SPINE LUMBAR MINIMUM 4 VIEWS NON INJURY 23Oct2017 11:37AM Shirley Massing Order Number: [id number]     Test Name Result Flag Reference   XR SPINE LUMBAR MINIMUM 4 VIEWS (Report)       LUMBAR SPINE   INDICATION: Lower back pain  Patient fell  COMPARISON: None   VIEWS: AP, lateral, bilateral oblique and coned down projections   IMAGES: 5   FINDINGS:   Alignment is unremarkable  There is no radiographic evidence of acute fracture or destructive osseous lesion  Mild spurring in the lumbar spine  Visualized soft tissues appear unremarkable  IMPRESSION:   Mild spurring  No fracture or malalignment  Workstation performed: [id number]   Signed by:  Estrella Bingham MD  10/24/17       Health Management  Encounter for screening mammogram for malignant neoplasm of breast   Digital Bilateral Screening Mammogram With CAD; every 1 year; Last 69UFR9934; Next FBN:66YXM9378; Overdue    Future Appointments    Date/Time Provider Specialty Site   12/14/2017 02:30 PM MARISELA Anderson   Neurology NEUROLOGY [facility]   Electronically signed by : MARISELA Jackson ; Dec  4 2017 11:46AM EST                       (Author)

## 2017-12-14 ENCOUNTER — GENERIC CONVERSION - ENCOUNTER (OUTPATIENT)
Dept: OTHER | Facility: OTHER | Age: 77
End: 2017-12-14

## 2017-12-20 ENCOUNTER — HOSPITAL ENCOUNTER (OUTPATIENT)
Dept: RADIOLOGY | Facility: HOSPITAL | Age: 77
Discharge: HOME | End: 2017-12-22
Attending: PSYCHIATRY & NEUROLOGY

## 2018-01-03 ENCOUNTER — GENERIC CONVERSION - ENCOUNTER (OUTPATIENT)
Dept: OTHER | Facility: OTHER | Age: 78
End: 2018-01-03

## 2018-01-03 ENCOUNTER — HOSPITAL ENCOUNTER (OUTPATIENT)
Dept: RADIOLOGY | Facility: HOSPITAL | Age: 78
Discharge: HOME | End: 2018-01-03
Attending: PSYCHIATRY & NEUROLOGY
Payer: MEDICARE

## 2018-01-03 DIAGNOSIS — R13.10 SWALLOWING DISORDER: ICD-10-CM

## 2018-01-03 PROCEDURE — 92611 MOTION FLUOROSCOPY/SWALLOW: CPT

## 2018-01-03 PROCEDURE — 74230 X-RAY XM SWLNG FUNCJ C+: CPT

## 2018-01-03 PROCEDURE — G8997 SWALLOW GOAL STATUS: HCPCS

## 2018-01-03 PROCEDURE — G8998 SWALLOW D/C STATUS: HCPCS

## 2018-01-03 PROCEDURE — G8996 SWALLOW CURRENT STATUS: HCPCS

## 2018-01-03 NOTE — PROCEDURES
Video Barium Swallow Study       Patient Name: [facility]  Today's Date: 1/3/2018        Past Medical History     Past Medical History:   Diagnosis Date    Arthritis     osteo joints    CHF, chronic ([address] )     Disease of thyroid gland     Fibromyalgia, primary     History of transfusion 1996    Hx of transient ischemic attack (TIA)     Infectious viral hepatitis     Hep C dx 1996     Lyme carditis     Murmur, cardiac     Parkinsons (HCC)     Seasonal allergies     Urinary frequency         Past Surgical History  Past Surgical History:   Procedure Laterality Date    BREAST SURGERY N/A     benign, calcium    CARDIAC SURGERY  1990    mitral valve replacement    CATARACT EXTRACTION Right 2015    CATARACT EXTRACTION Left 2014    CHEST TUBE INSERTION Right     post pleural effusion    CHOLECYSTECTOMY  2000    lap    HYSTERECTOMY  1973    partial    VA COLSC FLX W/RMVL OF TUMOR POLYP LESION SNARE TQ N/A 7/18/2017    Procedure: COLONOSCOPY and biopsy ;  Surgeon: Eugenie Brady MD;  Location: [place] [address] [facility] LAB; Service: Gastroenterology    SKIN BIOPSY      pre cancerous on face    TONSILLECTOMY             General Information: This 65 y o  female was seen today on an in/outpatient basis  She was referred for a video swallow study by her neurologist, Dr Destiny Munoz, due to abnormal sensation during the swallow  Ms  Bee Wiggins reported that she feels "a tightening" in her throat almost every time she swallows, and occasionally feels it even when she is not trying to swallow  She stated that she feels food and drink gets stuck in her throat, and that she must swallow many times and/or swallow forcefully to make food and drinks move past the "tightened" part  When asked to indicate where she feels the tightening, she pointed to her throat just above the hyoid bone  She denied pain, difficulty breathing, or coughing associated with this tightening sensation       Previous medical history is listed above  Ms Ammy Agustin has participated in speech therapy in the past for voice changes associated with Parkinson's Disease and felt that it was helpful  She eats a Regular/Thin Liquid diet at home, although she sometimes eats softer foods when fatigued       Textures assessed during this study include thin liquids, puree, and regular solids  The study was performed in lateral view        Oral Stage:  Within normal limits with all assessed textures       Pharyngeal Stage:  Hyolaryngeal elevation, epiglottic inversion, and pharyngeal contraction were all timely and coordinated, with adequate strength  With puree, regular solids, and single sips of thin liquids, the pharyngeal swallow was within normal limits, with no laryngeal penetration or aspiration observed  Rapid consecutive sips of thin liquids yielded transient laryngeal penetration during the swallow, with residual coating of the underside of the epiglottis and of the laryngeal vestibule after the swallow  This was successfully cleared with a cued throat clear and swallow  Esophageal Stage:  A cricopharyngeal bar was visualized below the the upper esophageal sphincter at the level of the C4-C5 vertebrae  With all trials of all assessed textures, the bar impeded bolus transit through the proximal esophagus, resulting in partial bolus retention in the proximal esophagus after the swallow  With all trials, the patient endorsed feeling the "tightening" sensation during and after the first swallow  The patient was instructed to continue to swallow until she felt the tightening sensation tamy  Two swallows were required to fully clear thin liquids boluses from the proximal esophagus, and three to five swallows were required to fully clear puree and regular solid boluses  Assessment Summary:  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, which results in partial bolus retention in the proximal esophagus after the swallow   This is likely the source of the patient's experience of a "tightening" sensation in the throat when eating and drinking  The patient was able to successfully clear boluses from the esophagus with the use of additional swallows  Oral and pharyngeal swallow function was grossly within normal limits; transient laryngeal penetration with rapid intake of liquids is a normal finding in a patient of this age  No aspiration was observed with any texture       Recommendations:  1  Continue Regular/Thin Liquid diet  Choose softer foods if desired for ease of intake  2  Sit upright during all intake by mouth  Chew all solids thoroughly  3  Swallow 2-3 times with each bolus to ensure that food and drink have moved past the cricopharyngeal bar  4  If inability to swallow, choking, or vomiting with PO intake occur at any point, follow up with primary care physician  5  Continue followup with neurology for ongoing management of Parkinson's Disease          Results and recommendations were discussed with the patient immediately following the study          Lien Rodriguez [place], [address] [facility]  Speech-Language Pathologist

## 2018-01-05 DIAGNOSIS — Z12.31 ENCOUNTER FOR SCREENING MAMMOGRAM FOR MALIGNANT NEOPLASM OF BREAST: ICD-10-CM

## 2018-01-05 DIAGNOSIS — I48.20 CHRONIC ATRIAL FIBRILLATION (HCC): ICD-10-CM

## 2018-01-05 DIAGNOSIS — M25.552 PAIN IN LEFT HIP: ICD-10-CM

## 2018-01-05 DIAGNOSIS — I05.9 RHEUMATIC MITRAL VALVE DISEASE: ICD-10-CM

## 2018-01-05 DIAGNOSIS — Z79.01 LONG TERM CURRENT USE OF ANTICOAGULANT: ICD-10-CM

## 2018-01-08 ENCOUNTER — ALLSCRIPTS OFFICE VISIT (OUTPATIENT)
Dept: OTHER | Facility: OTHER | Age: 78
End: 2018-01-08

## 2018-01-08 ENCOUNTER — APPOINTMENT (OUTPATIENT)
Dept: RADIOLOGY | Facility: CLINIC | Age: 78
End: 2018-01-08
Payer: MEDICARE

## 2018-01-08 ENCOUNTER — APPOINTMENT (OUTPATIENT)
Dept: LAB | Facility: CLINIC | Age: 78
End: 2018-01-08
Payer: MEDICARE

## 2018-01-08 ENCOUNTER — TRANSCRIBE ORDERS (OUTPATIENT)
Dept: RADIOLOGY | Facility: CLINIC | Age: 78
End: 2018-01-08

## 2018-01-08 ENCOUNTER — TRANSCRIBE ORDERS (OUTPATIENT)
Dept: LAB | Facility: CLINIC | Age: 78
End: 2018-01-08

## 2018-01-08 DIAGNOSIS — M25.552 PAIN IN LEFT HIP: ICD-10-CM

## 2018-01-08 DIAGNOSIS — I48.20 CHRONIC ATRIAL FIBRILLATION (HCC): Primary | ICD-10-CM

## 2018-01-08 DIAGNOSIS — I48.20 CHRONIC ATRIAL FIBRILLATION (HCC): ICD-10-CM

## 2018-01-08 LAB
INR PPP: 2.44 (ref 0.86–1.16)
PROTHROMBIN TIME: 26.8 SECONDS (ref 12.1–14.4)

## 2018-01-08 PROCEDURE — 36415 COLL VENOUS BLD VENIPUNCTURE: CPT

## 2018-01-08 PROCEDURE — 85610 PROTHROMBIN TIME: CPT

## 2018-01-08 PROCEDURE — 73502 X-RAY EXAM HIP UNI 2-3 VIEWS: CPT

## 2018-01-09 ENCOUNTER — GENERIC CONVERSION - ENCOUNTER (OUTPATIENT)
Dept: OTHER | Facility: OTHER | Age: 78
End: 2018-01-09

## 2018-01-09 ENCOUNTER — TRANSCRIBE ORDERS (OUTPATIENT)
Dept: ADMINISTRATIVE | Facility: HOSPITAL | Age: 78
End: 2018-01-09

## 2018-01-09 DIAGNOSIS — M25.552 LEFT HIP PAIN: Primary | ICD-10-CM

## 2018-01-09 NOTE — PROGRESS NOTES
Assessment   1  Hip pain, left (949 45) (P79 974)    Plan   Encounter for screening mammogram for malignant neoplasm of breast    · * MAMMO SCREENING BILATERAL W CAD; Status:Hold For - Scheduling; Requested    for:08Jan2018; Hip pain, left    · XR HIP/PELV 4+ VW LEFT W PELVIS IF PERFORMED; Status:Active; Requested    MICHA:57WSH0966;    · 1 - Bruce Bowman, [address] [facility]  *  Status: Hold For -    Scheduling  Requested for: 63ZVX9610  Care Summary provided  : Yes    Discussion/Summary      Rto prn  try to schedule karen with ortho ASAP  The treatment plan was reviewed with the patient/guardian  The patient/guardian understands and agrees with the treatment plan      Chief Complaint   Pt  c/o severe left hip pain can't bear weight on leg started on Saturday  ck/lpn      History of Present Illness   HPI: 65 y o f seen for acute onset of L hip pain anteriorly with standing or walking x 2 days, no injury reported, no pain at sitting or supine position, tried Tylenol with minimal help      Review of Systems        Constitutional: No fever, no chills, feels well, no tiredness, no recent weight gain or loss  Cardiovascular: no complaints of slow or fast heart rate, no chest pain, no palpitations, no leg claudication or lower extremity edema  Genitourinary: no complaints of dysuria, no incontinence, no pelvic pain, no dysmenorrhea, no vaginal discharge or abnormal vaginal bleeding  Musculoskeletal: as noted in HPI  Integumentary: no complaints of skin rash or lesion, no itching or dry skin, no skin wounds  Neurological: no complaints of headache, no confusion, no numbness or tingling, no dizziness or fainting  Active Problems   1  Anticoagulant long-term use (V58 61) (Z79 01)   2  Benign familial tremor (333 1) (G25 0)   3  Blepharitis (373 00) (H01 009)   4  Chronic atrial fibrillation (427 31) (I48 2)   5  Chronic hepatitis C (070 54) (B18 2)   6   Chronic right-sided low back pain without sciatica (724 2,338 29) (M54 5,G89 29)   7  Closed avulsion fracture of distal end of fibula, left, initial encounter   8  Closed nondisplaced segmental fracture of shaft of left fibula, initial encounter (823 21)     (S82 465A)   9  Contusion of face, sequela (906 3) (S00 83XS)   10  Dizziness (780 4) (R42)   11  Drooling (527 7) (K11 7)   12  Drug induced insomnia (292 85,E980 5) (F19 982)   13  Dupuytren's contracture (728 6) (M72 0)   14  Dysphagia (787 20) (R13 10)   15  Easy fatigability (780 79) (R53 83)   16  Edema (782 3) (R60 9)   17  Encounter for screening mammogram for malignant neoplasm of breast (V76 12)      (Z12 31)   18  Encounter for therapeutic drug monitoring (V58 83) (Z51 81)   19  Exposure to influenza (V01 79) (Z20 828)   20  Fall (E888 9) (W19 XXXA)   21  Generalized osteoarthritis (715 00) (M15 9)   22  Heart failure, left systolic, chronic (849 86,585 8) (I50 22)   23  History of mitral valve replacement (V43 3) (Z95 2)   24  Hypophonia (784 49) (R49 8)   25  Hypothyroidism (244 9) (E03 9)   26  Left ankle pain (719 47) (M25 572)   27  Memory impairment (780 93) (R41 3)   28  Mitral valve disorder (424 0) (I05 9)   29  Need for influenza vaccination (V04 81) (Z23)   30  Need for prophylactic vaccination and inoculation against influenza (V04 81) (Z23)   31  Need for vaccination with 13-polyvalent pneumococcal conjugate vaccine (V03 82) (Z23)   32  Parkinson's disease (332 0) (G20)   33  Peripheral vascular disease (443 9) (I73 9)   34  Seasonal allergies (477 9) (J30 2)   35  Skin lesion (709 9) (L98 9)   36  Sleep disturbance (780 50) (G47 9)   37  Sprain of other ligament of left ankle, initial encounter (845 09) (S93 492A)   38  Tongue lump (784 2) (R22 0)   39  Transient ischemic attack (435 9) (G45 9)   40  Vitamin D insufficiency (268 9) (E55 9)   41  Well adult on routine health check (V70 0) (Z00 00)    Family History   Brother    1   Family history of arthritis (V17 7) (Z82 61)   2  Family history of Parkinson disease    Social History    · Former smoker (I49 83) (T79 827)   · No alcohol use    Surgical History   1  History of Cataract Surgery   2  History of Cholecystectomy   3  History of Hysterectomy   4  History of Mitral Valve Replacement  Surgical History Reviewed: The surgical history was reviewed and updated today  Current Meds    1  Carbidopa-Levodopa-Entacapone -200 MG Oral Tablet; take 1 tablet by mouth     four times a day; Therapy: 50FGV7553 to (Evaluate:39Asd4515)  Requested for: 51Rei4287; Last     Rx:25Xqm8072 Ordered   2  Carvedilol 3 125 MG Oral Tablet; TAKE 1 TABLET TWICE DAILY WITH MEALS; Therapy: 54XAJ7497 to (Justo Deras)  Requested for: 20Zli9199; Last     Rx:64Hcu2377 Ordered   3  Cyclobenzaprine HCl - 10 MG Oral Tablet; 1 tab PO at bedtime as needed; Therapy: 69LYQ2397 to (Last NX:47VWC7573)  Requested for: 23Oct2017 Ordered   4  Donepezil HCl - 5 MG Oral Tablet; TAKE 2 5 MG Daily; Therapy: 69NFW7838 to  Requested for: 67Wzg1064 Recorded   5  Erythromycin 5 MG/GM Ophthalmic Ointment; APPLY 1 RIBBON Bedtime; Therapy: (Recorded:04Wry6225) to Recorded   6  Furosemide 20 MG Oral Tablet; TAKE 1 TABLET ON MONDAY, WEDNESDAY, FRIDAY     AND SUNDAY; Therapy: (Recorded:40Xqw8990) to Recorded   7  Glycopyrrolate 1 MG Oral Tablet; Take 1 tab in evening; Therapy: 64WUE4058 to (Last Rx:04Czt5062)  Requested for: 22BCS2691 Ordered   8  Levothyroxine Sodium 25 MCG Oral Tablet; 1 every morning; Therapy: 34FMZ2462 to (Last Rx:45Zse8756)  Requested for: 28LDI9846 Ordered   9  Lisinopril 10 MG Oral Tablet; TAKE 1/2 TABLET DAILY; Therapy: 13ARS6471 to (Evaluate:71Icf3878)  Requested for: 14Oct2017; Last     Rx:24Lgu8202 Ordered   10  Melatonin 5 MG Oral Capsule; Therapy: 22HRO7094 to (Last Rx:04Oct2017)  Requested for: 26DRV8402 Ordered   11  Primidone 50 MG Oral Tablet; TAKE HALF A TABLET DAILY;       Therapy: 54VMG4696 to (Last Rx:16Mar2017)  Requested for: 99CEW8586 Ordered   12  Spironolactone 25 MG Oral Tablet; Take 1 tablet daily; Therapy: 40GRC9564 to (Renew:19Nov2017)  Requested for: 70OAH3441; Last      Rx:03Lbe0790 Ordered   13  Vitamin D3 2000 UNIT Oral Tablet; TAKE 2000 units daily; Therapy: 08BSX5929 to Recorded   14  Warfarin Sodium 2 MG Oral Tablet; 1 tablet Tues /Thurs /Sat /Sun;      Therapy: (Recorded:87Tzn8163) to Recorded   15  Warfarin Sodium 2 5 MG Oral Tablet; 1 tablet Mon /Wed /Fri;      Therapy: (Recorded:02Kfg6585) to Recorded     The medication list was reviewed and updated today  Allergies   1  No Known Drug Allergies    Vitals    Recorded: 74NIT3907 11:43AM   Temperature 97 8 F, Tympanic   Heart Rate 72, L Radial   Pulse Quality Normal, L Radial   Systolic 912, LUE, Sitting   Diastolic 70, LUE, Sitting   Height 5 ft 5 5 in   Weight 175 lb    BMI Calculated 28 68   BSA Calculated 1 88     Physical Exam        Constitutional      General appearance: No acute distress, well appearing and well nourished  Musculoskeletal      Gait and station: Abnormal   Gait evaluation demonstrated non-weight bearing on the left-- and-- able to stand for a few sec w/ pain  Inspection/palpation of joints, bones, and muscles: Abnormal   Palpation - left hip-- and-- lateral and anterior aspect tenderness           Future Appointments      Date/Time Provider Specialty Site   01/09/2018 02:30 PM Rojas Jane DO Orthopedic Surgery [address] [facility]     Signatures    Electronically signed by : MARISELA Byrne ; Jan 8 2018 12:58PM EST                       (Author)

## 2018-01-09 NOTE — RESULT NOTES
Verified Results  (1) PT WITH INR 26Kne0566 09:36AM Letty Sosa     Test Name Result Flag Reference   INR 2 31 H 0 86-1 16   PT 25 1 seconds H 12 0-14 3       Discussion/Summary   cont current coumadin dosing   repeat in 4 weeks

## 2018-01-09 NOTE — RESULT NOTES
Verified Results  (1) PT WITH INR [id number] 09:18AM Remy Erasmo     Test Name Result Flag Reference   INR 2 11 H 0 86-1 16   PT 23 9 seconds H 12 1-14 4

## 2018-01-10 ENCOUNTER — HOSPITAL ENCOUNTER (OUTPATIENT)
Dept: RADIOLOGY | Facility: HOSPITAL | Age: 78
Discharge: HOME | End: 2018-01-10
Attending: ORTHOPAEDIC SURGERY
Payer: MEDICARE

## 2018-01-10 DIAGNOSIS — M25.552 PAIN IN LEFT HIP: ICD-10-CM

## 2018-01-10 PROCEDURE — 73721 MRI JNT OF LWR EXTRE W/O DYE: CPT

## 2018-01-10 NOTE — RESULT NOTES
Discussion/Summary   Same dose and recheck in one month     Verified Results  (1) PT WITH INR 70Jcf6900 08:06AM Christine Cramer Order Number: [id number]     Test Name Result Flag Reference   INR 2 51 H 0 86-1 16   PT 27 4 seconds H 12 1-14 4

## 2018-01-10 NOTE — RESULT NOTES
Verified Results  (1) PT WITH INR 23Jan2017 08:07AM Bautista Necessary     Test Name Result Flag Reference   INR 2 14 H 0 86-1 16   PT 23 7 seconds H 12 0-14 3       Discussion/Summary   cont current coumadin dose   INR in 4 weeks     Signatures   Electronically signed by : THONG Estrada; Jan 23 2017  1:56PM EST                       (Author)

## 2018-01-11 NOTE — RESULT NOTES
Verified Results  (1) PT WITH INR [id number] 09:39AM Sanjay Scherer     Test Name Result Flag Reference   INR 2 79 H 0 86-1 16   PT 28 9 seconds H 12 0-14 3       Discussion/Summary   cont current dose of coumadin recheck INR in 4 weeks     Signatures   Electronically signed by : THONG Maldonado; Dec  1 2016  4:41PM EST                       (Author)

## 2018-01-11 NOTE — RESULT NOTES
Discussion/Summary   Satisfactory INR  Follow-up as per protocol       Verified Results  (1) PT WITH INR 31Oct2017 09:53AM Clance Minder     Test Name Result Flag Reference   INR 2 97 H 0 86-1 16   PT 31 3 seconds H 12 1-14 4

## 2018-01-11 NOTE — RESULT NOTES
Verified Results  * XR SPINE LUMBAR MINIMUM 4 VIEWS NON INJURY 23Oct2017 11:37AM Shirley Massing Order Number: [id number]     Test Name Result Flag Reference   XR SPINE LUMBAR MINIMUM 4 VIEWS (Report)     LUMBAR SPINE     INDICATION: Lower back pain  Patient fell  COMPARISON: None     VIEWS: AP, lateral, bilateral oblique and coned down projections     IMAGES: 5     FINDINGS:     Alignment is unremarkable  There is no radiographic evidence of acute fracture or destructive osseous lesion  Mild spurring in the lumbar spine  Visualized soft tissues appear unremarkable  IMPRESSION:     Mild spurring  No fracture or malalignment         Workstation performed: [id number]     Signed by:   Celestino Paulino MD   10/24/17

## 2018-01-12 ENCOUNTER — GENERIC CONVERSION - ENCOUNTER (OUTPATIENT)
Dept: OTHER | Facility: OTHER | Age: 78
End: 2018-01-12

## 2018-01-12 VITALS
WEIGHT: 179.1 LBS | SYSTOLIC BLOOD PRESSURE: 120 MMHG | HEART RATE: 66 BPM | HEIGHT: 66 IN | OXYGEN SATURATION: 98 % | DIASTOLIC BLOOD PRESSURE: 80 MMHG | BODY MASS INDEX: 28.78 KG/M2

## 2018-01-12 VITALS
DIASTOLIC BLOOD PRESSURE: 66 MMHG | SYSTOLIC BLOOD PRESSURE: 106 MMHG | HEART RATE: 60 BPM | OXYGEN SATURATION: 99 % | HEIGHT: 65 IN | WEIGHT: 177 LBS | BODY MASS INDEX: 29.49 KG/M2

## 2018-01-12 NOTE — RESULT NOTES
Verified Results  (1) PT WITH INR [id number] 09:53AM Pegge Latimer     Test Name Result Flag Reference   INR 2 77 H 0 86-1 16   PT 27 8 seconds H 11 8-14 1

## 2018-01-12 NOTE — RESULT NOTES
Verified Results  (1) PT WITH INR [id number] 10:29AM [place] Line     Test Name Result Flag Reference   INR 2 73 H 0 86-1 16   PT 28 5 seconds H 12 0-14 3

## 2018-01-12 NOTE — MISCELLANEOUS
Message  Received telephone call on 8/1/17 from physician at [facility], where patient was admitted on 7/30/17 after a fall, resulting in ecchymoses in the right periorbital region and several small lacerations requiring suturing  No unexpected dysrhythmias were uncovered  INR was 2 6  No intracranial hemorrhage on CT  Patient discharged to short-term rehabilitation on 8/1/17, hopefully at a location near to her home  Spoke with Dr Anya Russell        Signatures   Electronically signed by : MARISELA Temple ; Aug  1 2017  8:53AM EST                       (Author)

## 2018-01-12 NOTE — RESULT NOTES
Verified Results  * MAMMO SCREENING BILATERAL W CAD [id number] 01:47PM Addis Landeros Order Number: [id number]    - Patient Instructions: To schedule this appointment, please contact Central Scheduling at [phone]  Do not wear any perfume, powder, lotion or deodorant on breast or underarm area  Please bring your doctors order, referral (if needed) and insurance information with you on the day of the test  Failure to bring this information may result in this test being rescheduled  Arrive 15 minutes prior to your appointment time to register  On the day of your test, please bring any prior mammogram or breast studies with you that were not performed at a [facility]  Failure to bring prior exams may result in your test needing to be rescheduled  Test Name Result Flag Reference   MAMMO SCREENING BILATERAL W CAD (Report)     Patient History:   Took hormonal contraceptives for 10 years beginning at age 24  Patient's BMI is 28 1  Reason for exam: screening (asymptomatic)  Screening     Mammo Screening Bilateral W CAD: November 21, 2016 - Check In #:    [de-identified]   Bilateral MLO, CC, and XCCL view(s) were taken  Technologist: RT Douglas(R)(M)   Prior study comparison: November 10, 2015, bilateral [facility]    screening mammo digital bilat performed at [address] [place] [address]  September 30, 2014, bilateral screening    mammogram, performed at [place] [address]  September 23, 2013, bilateral screening mammogram, performed at    [place] [address]  September 14, 2012, right    breast screening mammogram, performed at [place] [address]  August 25, 2011, bilateral screening mammogram,    performed at [place] [address]  August 5, 2010, bilateral screening mammogram, performed at [place] [address]   July 28, 2009, bilateral screening    mammogram, performed at [address]  The breast tissue is almost entirely fat  Bilateral digital    mammography is performed  No dominant soft tissue mass,    architectural distortion or suspicious calcifications are noted  The skin and nipple contours are within normal limits  Scattered benign appearing calcifications are noted  No evidence    of malignancy  No significant changes when compared to prior    studies  1  No evidence of malignancy  2  No significant change when compared with the prior study  ASSESSMENT: BiRad:2 - Benign     Recommendation:   Routine screening mammogram of both breasts in 1 year  A reminder letter will be scheduled   Analyzed by CAD     8-10% of cancers will be missed on mammography  Management of a    palpable abnormality must be based on clinical grounds  Patients   will be notified of their results via letter from our facility  Accredited by [employer] of Radiology and FDA       Transcription Location: [facility] 98: [id number]     Risk Value(s):   Tyrer-Cuzick 10 Year: 3 091%, Tyrer-Cuzick Lifetime: 3 091%,    Myriad Table: 1 5%, BECK 5 Year: 1 9%, NCI Lifetime: 3 7%   Signed by:   Edel Loera MD   11/21/16       Discussion/Summary   Your mammogram is normal    Rosalie Meneses

## 2018-01-12 NOTE — RESULT NOTES
Message   cont current dose   inr in 4 weeks     Verified Results  (1) PT WITH INR [id number] 08:11AM Moon Hernandez     Test Name Result Flag Reference   INR 2 30 H 0 86-1 16   PT 25 0 seconds H 12 0-14 3

## 2018-01-13 VITALS
TEMPERATURE: 97.8 F | DIASTOLIC BLOOD PRESSURE: 80 MMHG | HEART RATE: 68 BPM | SYSTOLIC BLOOD PRESSURE: 130 MMHG | RESPIRATION RATE: 16 BRPM

## 2018-01-13 VITALS
HEIGHT: 66 IN | DIASTOLIC BLOOD PRESSURE: 70 MMHG | WEIGHT: 179 LBS | BODY MASS INDEX: 28.77 KG/M2 | HEART RATE: 67 BPM | SYSTOLIC BLOOD PRESSURE: 109 MMHG

## 2018-01-13 NOTE — RESULT NOTES
Message   pt to hold coumadin today  decrease dose to 2mg every day   Repaet in 2 weeks,     Verified Results  (1) PT WITH INR [id number] 09:26AM [facility] Florien     Test Name Result Flag Reference   INR 3 29 H 0 86-1 16   PT 32 8 seconds H 12 0-14 3       Signatures   Electronically signed by : THONG Zamora; Sep  7 2016  4:58PM EST                       (Author)

## 2018-01-13 NOTE — RESULT NOTES
Verified Results  (1) PT WITH INR [id number] 09:53AM Adeel Farias     Test Name Result Flag Reference   INR 2 48 H 0 86-1 16   PT 25 6 seconds H 11 8-14 1

## 2018-01-13 NOTE — RESULT NOTES
Verified Results  (1) PT WITH INR [id number] 09:04AM Malaikakayce Wupatricia     Test Name Result Flag Reference   INR 2 36 H 0 86-1 16   PT 25 5 seconds H 12 0-14 3       Discussion/Summary   cont current dose   recheck inr in 1 month     Signatures   Electronically signed by : THONG Carrasco; Dec 14 2016  8:12PM EST                       (Author)

## 2018-01-14 VITALS
SYSTOLIC BLOOD PRESSURE: 100 MMHG | BODY MASS INDEX: 29.03 KG/M2 | DIASTOLIC BLOOD PRESSURE: 60 MMHG | TEMPERATURE: 97.4 F | HEIGHT: 65 IN | RESPIRATION RATE: 16 BRPM | HEART RATE: 84 BPM | WEIGHT: 174.25 LBS

## 2018-01-14 VITALS
BODY MASS INDEX: 29.11 KG/M2 | HEART RATE: 76 BPM | SYSTOLIC BLOOD PRESSURE: 120 MMHG | OXYGEN SATURATION: 93 % | DIASTOLIC BLOOD PRESSURE: 70 MMHG | WEIGHT: 174.7 LBS | HEIGHT: 65 IN

## 2018-01-14 VITALS
HEIGHT: 65 IN | HEART RATE: 65 BPM | BODY MASS INDEX: 29.32 KG/M2 | WEIGHT: 176 LBS | DIASTOLIC BLOOD PRESSURE: 70 MMHG | SYSTOLIC BLOOD PRESSURE: 101 MMHG

## 2018-01-14 VITALS
SYSTOLIC BLOOD PRESSURE: 106 MMHG | WEIGHT: 174 LBS | HEIGHT: 65 IN | BODY MASS INDEX: 28.99 KG/M2 | DIASTOLIC BLOOD PRESSURE: 67 MMHG | HEART RATE: 50 BPM

## 2018-01-14 NOTE — RESULT NOTES
Discussion/Summary   Same dose  Recheck one month as per protocol       Verified Results  (1) PT WITH INR [id number] 09:09AM Marcio [facility] Order Number: [id number]     Test Name Result Flag Reference   INR 2 56 H 0 86-1 16   PT 27 8 seconds H 12 1-14 4

## 2018-01-15 NOTE — RESULT NOTES
Verified Results  (1) PT WITH INR [id number] 09:11AM Beau Wolfgang Order Number: [id number]     Test Name Result Flag Reference   INR 2 91 H 0 86-1 16   PT 30 8 seconds H 12 1-14 4

## 2018-01-15 NOTE — RESULT NOTES
Verified Results  (1) PT WITH INR [id number] 09:27AM Steven Chemo Order Number: [id number]     Test Name Result Flag Reference   INR 2 50 H 0 86-1 16   PT 27 3 seconds H 12 1-14 4

## 2018-01-15 NOTE — RESULT NOTES
Verified Results  (1) PT WITH INR [id number] 08:47AM Regina Pastrana    Order Number: [id number]     Test Name Result Flag Reference   INR 2 87 H 0 86-1 16   Performing Comments: To be done weekly, biweekly, monthly and as directed for  calendar year 2016      Performing Comments: To be done weekly, biweekly, monthly and as directed for  calendar year 2016   PT 29 6 seconds H 12 0-14 3       Signatures   Electronically signed by : Holmes Schilder, APN;  Aug 23 2016  5:03PM EST                       (Author)

## 2018-01-15 NOTE — RESULT NOTES
Verified Results  (1) COMPREHENSIVE METABOLIC PANEL [id number] 70:74WS Toshia Brown    Order Number: [id number]     Test Name Result Flag Reference   SODIUM 137 mmol/L  136-145   POTASSIUM 4 4 mmol/L  3 5-5 3   CHLORIDE 104 mmol/L  100-108   CARBON DIOXIDE 29 mmol/L  21-32   ANION GAP (CALC) 4 mmol/L  4-13   BLOOD UREA NITROGEN 14 mg/dL  5-25   CREATININE 1 05 mg/dL  0 60-1 30   Standardized to IDMS reference method   CALCIUM 9 4 mg/dL  8 3-10 1   BILI, TOTAL 1 29 mg/dL H 0 20-1 00   ALK PHOSPHATAS 74 U/L     ALT (SGPT) 9 U/L L 12-78   AST(SGOT) 18 U/L  5-45   ALBUMIN 3 4 g/dL L 3 5-5 0   TOTAL PROTEIN 6 7 g/dL  6 4-8 2   eGFR Non-African American 50 8 ml/min/1 73sq m     - Patient Instructions: This is a fasting blood test  Water,black tea or black  coffee only after 9:00pm the night before test Drink 2 glasses of water the morning of test - Patient Instructions: This bloodwork is non-fasting  Please drink two glasses of   water morning of bloodwork  National Kidney Disease Education Program recommendations are as follows:  GFR calculation is accurate only with a steady state creatinine  Chronic Kidney disease less than 60 ml/min/1 73 sq  meters  Kidney failure less than 15 ml/min/1 73 sq  meters  GLUCOSE FASTING 103 mg/dL H 65-99     (1) LIPID PANEL, FASTING 23Mar2017 08:59AM Toshia Brown    Order Number: [id number]     Test Name Result Flag Reference   CHOLESTEROL 163 mg/dL     HDL,DIRECT 60 mg/dL  40-60   Specimen collection should occur prior to Metamizole administration due to the potential for falsely depressed results  LDL CHOLESTEROL CALCULATED 78 mg/dL  0-100   - Patient Instructions: This is a fasting blood test  Water,black tea or black  coffee only after 9:00pm the night before test   Drink 2 glasses of water the morning of test     - Patient Instructions:  This is a fasting blood test  Water,black tea or black  coffee only after 9:00pm the night before test Drink 2 glasses of water the morning of test - Patient Instructions: This bloodwork is non-fasting  Please drink two glasses of   water morning of bloodwork  Triglyceride:         Normal              <150 mg/dl       Borderline High    150-199 mg/dl       High               200-499 mg/dl       Very High          >499 mg/dl  Cholesterol:         Desirable        <200 mg/dl      Borderline High  200-239 mg/dl      High             >239 mg/dl  HDL Cholesterol:        High    >59 mg/dL      Low     <41 mg/dL  LDL CALCULATED:    This screening LDL is a calculated result  It does not have the accuracy of the Direct Measured LDL in the monitoring of patients with hyperlipidemia and/or statin therapy  Direct Measure LDL (DYR628) must be ordered separately in these patients  TRIGLYCERIDES 124 mg/dL  <=150   Specimen collection should occur prior to N-Acetylcysteine or Metamizole administration due to the potential for falsely depressed results  (1) TSH [id number] 08:59AM Tenzin Spangler    Order Number: [id number]     Test Name Result Flag Reference   TSH 2 830 uIU/mL  0 358-3 740   - Patient Instructions: This bloodwork is non-fasting  Please drink two glasses of water morning of bloodwork  - Patient Instructions: This is a fasting blood test  Water,black tea or black  coffee only after 9:00pm the night before test Drink 2 glasses of water the morning of test - Patient Instructions: This bloodwork is non-fasting  Please drink two glasses of   water morning of bloodwork  Patients undergoing fluorescein dye angiography may retain small amounts of fluorescein in the body for 48-72 hours post procedure  Samples containing fluorescein can produce falsely depressed TSH values  If the patient had this procedure,a specimen should be resubmitted post fluorescein clearance            The recommended reference ranges for TSH during pregnancy are as follows:  First trimester 0 1 to 2 5 uIU/mL  Second trimester  0 2 to 3 0 uIU/mL  Third trimester 0 3 to 3 0 uIU/m       Discussion/Summary   All labs are normal,   Dr Navarrete

## 2018-01-15 NOTE — RESULT NOTES
Verified Results  (1) PT WITH INR [id number] 10:20AM Helen Bella Order Number: [id number]     Test Name Result Flag Reference   INR 2 63 H 0 86-1 16   PT 28 4 seconds H 12 1-14 4

## 2018-01-16 NOTE — RESULT NOTES
Verified Results  (1) PT WITH INR [id number] 09:48AM Mili Abo     Test Name Result Flag Reference   INR 2 38 H 0 86-1 16   PT 24 8 seconds H 11 8-14 1

## 2018-01-16 NOTE — RESULT NOTES
Verified Results  (1) PT WITH INR 23Mar2017 08:59AM Jewels Bradshaw     Test Name Result Flag Reference   INR 2 14 H 0 86-1 16   PT 23 7 seconds H 12 0-14 3       Discussion/Summary   cont current coumadin dosing   INR in 4 weeks

## 2018-01-16 NOTE — RESULT NOTES
Verified Results  (1) PT WITH INR [id number] 10:19AM Hiral Charito     Test Name Result Flag Reference   INR 2 38 H 0 86-1 16   PT 24 8 seconds H 11 8-14 1

## 2018-01-16 NOTE — RESULT NOTES
Verified Results  Coumadin Flow Sheet [id number] 11:17AM Thomas Swenson     Test Name Result Flag Reference   INR 2 2     Current Dose      2MG DAILY, 2 5MG ON [facility]

## 2018-01-16 NOTE — MISCELLANEOUS
Message  Received telephone call from physician at [facility], Dr Cameron Wright, who stated that Steve Aragon was admitted following a fall with right orbital ecchymoses and superficial lacerations on 7/30/17  There were no unexpected cardiac dysrhythmias  INR was 2 6 with negative CT of brain for hemorrhage  There was no apparent syncope prior to her fall  The plan was to discharge her on 8/1/17 with physical therapy recommendation near her home  We will see her in follow-up        Signatures   Electronically signed by : MARISELA Mas ; Aug  2 2017  8:11AM EST                       (Author)

## 2018-01-17 NOTE — RESULT NOTES
Verified Results  (1) PT WITH INR [id number] 09:22AM [place] Dakins     Test Name Result Flag Reference   INR 2 77 H 0 86-1 16   PT 27 8 seconds H 11 8-14 1

## 2018-01-17 NOTE — RESULT NOTES
Discussion/Summary   cont current dose of coumadin   INR in 4 weeks     Verified Results  (1) PT WITH INR [id number] 09:41AM Leroy Bui     Test Name Result Flag Reference   INR 2 75 H 0 86-1 16   PT 29 5 seconds H 12 1-14 4       Signatures   Electronically signed by : THONG Chinchilla; Jun 8 2017  3:02PM EST                       (Author)

## 2018-01-18 NOTE — PROGRESS NOTES
Chief Complaint  Patient presents for Prevnar 13 injection  Administered into left deltoid without incident  nil/lpn      Active Problems    1  Anticoagulant long-term use (V58 61) (Z79 01)   2  Benign familial tremor (333 1) (G25 0)   3  Chronic hepatitis C (070 54) (B18 2)   4  Dupuytren's contracture (728 6) (M72 0)   5  Edema (782 3) (R60 9)   6  Encounter for screening mammogram for malignant neoplasm of breast (V76 12)   (Z12 31)   7  Encounter for therapeutic drug monitoring (V58 83) (Z51 81)   8  Exposure to influenza (V01 79) (Z20 828)   9  History of mitral valve replacement (V43 3) (Z95 2)   10  Hypothyroidism (244 9) (E03 9)   11  Mitral valve disorder (424 0) (I05 9)   12  Need for influenza vaccination (V04 81) (Z23)   13  Need for prophylactic vaccination and inoculation against influenza (V04 81) (Z23)   14  Parkinson's disease (332 0) (G20)   15  Peripheral vascular disease (443 9) (I73 9)   16  Skin lesion (709 9) (L98 9)   17  Tongue lump (784 2) (R22 0)   18  Transient ischemic attack (435 9) (G45 9)   19  Vitamin D insufficiency (268 9) (E55 9)   20  Well adult on routine health check (V70 0) (Z00 00)    Current Meds   1  Carbidopa-Levodopa-Entacapone -200 MG Oral Tablet; TAKE 1 TABLET 4 TIMES   DAILY; Therapy: 17XLO9431 to Recorded   2  Carvedilol 3 125 MG Oral Tablet; take 2 tabs twice daily with meals; Therapy: 37BPU2342 to Recorded   3  Furosemide 20 MG Oral Tablet; 1 every morning; Therapy: 00ZDK2176 to (Evaluate:73Cmn7257)  Requested for: 84Kvn5030; Last   Rx:94Lla6392 Ordered   4  Levothyroxine Sodium 25 MCG Oral Tablet; 1 every morning; Therapy: 24OTQ3237 to (Last Rx:39Ves9961)  Requested for: 11QXP8202 Ordered   5  Lisinopril 10 MG Oral Tablet; TAKE 1 TABLET AT BEDTIME; Therapy: 84VTU5592 to Recorded   6  Ocuvite Oral Tablet; TAKE 1 TABLET DAILY; Therapy: 40TWS0240 to Recorded   7  Primidone 50 MG Oral Tablet; TAKE HALF A TABLET DAILY;    Therapy: 14SWV7764 to (Last Rx:27Odu3851)  Requested for: 43Nvo2891 Ordered   8  Primidone 50 MG Oral Tablet; take half tab daily; Therapy: (Recorded:31Cmk7530) to Recorded   9  Spironolactone 25 MG Oral Tablet; TAKE 1 TABLET DAILY; Therapy: 35GVH6705 to Recorded   10  Stalevo 100 -200 MG Oral Tablet; TAKE 1 TABLET 4 TIMES DAILY; Therapy: 86IPB3383 to (Evaluate:56Cdh2576)  Requested for: 14Mdz1069; Last    Rx:72Yqa4456 Ordered   11  Vitamin D3 2000 UNIT Oral Tablet; TAKE 2000 units daily; Therapy: 75AXV2581 to Recorded   12  Warfarin Sodium 2 MG Oral Tablet; Take 1 tablet daily; Therapy: 95Cij4927 to (Evaluate:28Zvv0266)  Requested for: 71YMD3909; Last    Rx:09Vjc5602 Ordered   13  Warfarin Sodium 2 5 MG Oral Tablet; TAKE 1 TABLET DAILY AS DIRECTED; Therapy: 33BYZ6575 to (Evaluate:37Piw5588)  Requested for: 64SOK0947; Last    Rx:02Pdj0124 Ordered    Allergies    1  No Known Drug Allergies    Plan  Need for vaccination with 13-polyvalent pneumococcal conjugate vaccine    · Prevnar 13 Intramuscular Suspension    Future Appointments    Date/Time Provider Specialty Site   02/15/2017 10:30 AM MARISELA Figueroa   [facility]   Electronically signed by : Beauford Felty, M D ; Nov 21 2016  1:51PM EST                       (Author)

## 2018-01-18 NOTE — RESULT NOTES
Verified Results  (1) PT WITH INR 28Mar2016 01:36PM [place]     Test Name Result Flag Reference   INR 2 39 H 0 86-1 16   PT 24 9 seconds H 11 8-14 1

## 2018-01-18 NOTE — RESULT NOTES
Message   2 5mg today and every wed, 2 mg other days   INR in 2 weeks     Verified Results  (1) PT WITH INR 12Oct2016 09:03AM Racheal Reynolds     Test Name Result Flag Reference   INR 1 94 H 0 86-1 16   PT 22 0 seconds H 12 0-14 3       Signatures   Electronically signed by : THONG Valencia; Oct 12 2016  3:39PM EST                       (Author)

## 2018-01-18 NOTE — RESULT NOTES
Verified Results  (1) PT WITH INR [id number] 08:44AM Julia Bryan     Test Name Result Flag Reference   INR 2 23 H 0 86-1 16   PT 24 4 seconds H 12 0-14 3       Discussion/Summary   cont current coumadin dosing   INR in 4 weeks     Signatures   Electronically signed by : THONG Sanchez; Feb 21 2017  3:24PM EST                       (Author)

## 2018-01-20 ENCOUNTER — ANTICOAG VISIT (OUTPATIENT)
Dept: OTHER | Facility: HOSPITAL | Age: 78
End: 2018-01-20

## 2018-01-20 DIAGNOSIS — Z95.2 HISTORY OF PROSTHETIC HEART VALVE: ICD-10-CM

## 2018-01-20 DIAGNOSIS — I48.91 ATRIAL FIBRILLATION, UNSPECIFIED TYPE (HCC): ICD-10-CM

## 2018-01-22 ENCOUNTER — GENERIC CONVERSION - ENCOUNTER (OUTPATIENT)
Dept: OTHER | Facility: OTHER | Age: 78
End: 2018-01-22

## 2018-01-22 ENCOUNTER — TRANSCRIBE ORDERS (OUTPATIENT)
Dept: LAB | Facility: CLINIC | Age: 78
End: 2018-01-22

## 2018-01-22 ENCOUNTER — APPOINTMENT (OUTPATIENT)
Dept: LAB | Facility: CLINIC | Age: 78
End: 2018-01-22
Payer: MEDICARE

## 2018-01-22 VITALS
BODY MASS INDEX: 28.28 KG/M2 | DIASTOLIC BLOOD PRESSURE: 60 MMHG | OXYGEN SATURATION: 98 % | HEIGHT: 66 IN | SYSTOLIC BLOOD PRESSURE: 100 MMHG | WEIGHT: 176 LBS | HEART RATE: 67 BPM

## 2018-01-22 VITALS
SYSTOLIC BLOOD PRESSURE: 100 MMHG | WEIGHT: 176 LBS | DIASTOLIC BLOOD PRESSURE: 60 MMHG | BODY MASS INDEX: 28.28 KG/M2 | RESPIRATION RATE: 16 BRPM | TEMPERATURE: 97.3 F | HEIGHT: 66 IN | HEART RATE: 72 BPM

## 2018-01-22 VITALS
HEART RATE: 76 BPM | SYSTOLIC BLOOD PRESSURE: 99 MMHG | DIASTOLIC BLOOD PRESSURE: 65 MMHG | WEIGHT: 179.13 LBS | BODY MASS INDEX: 28.79 KG/M2 | HEIGHT: 66 IN

## 2018-01-22 VITALS
WEIGHT: 175 LBS | HEART RATE: 72 BPM | BODY MASS INDEX: 28.12 KG/M2 | DIASTOLIC BLOOD PRESSURE: 70 MMHG | HEIGHT: 66 IN | SYSTOLIC BLOOD PRESSURE: 120 MMHG | TEMPERATURE: 97.8 F

## 2018-01-22 DIAGNOSIS — I05.9 RHEUMATIC MITRAL VALVE DISEASE: ICD-10-CM

## 2018-01-22 DIAGNOSIS — Z79.01 LONG TERM CURRENT USE OF ANTICOAGULANT: ICD-10-CM

## 2018-01-22 LAB
INR PPP: 2.9 (ref 0.86–1.16)
INR PPP: 2.9 (ref 0.86–1.16)
PROTHROMBIN TIME: 30.7 SECONDS (ref 12.1–14.4)

## 2018-01-22 PROCEDURE — 36415 COLL VENOUS BLD VENIPUNCTURE: CPT

## 2018-01-22 PROCEDURE — 85610 PROTHROMBIN TIME: CPT

## 2018-01-23 NOTE — RESULT NOTES
Discussion/Summary   NT-PRO BNP was much improved compared to old results  Heart status is stable  Followup as per my note on 12/4       Verified Results  (1) NT- BNP (PRO BRAIN NATRIURETIC PEPTIDE) [id number] 11:04AM Earnesteen Jose De Jesus     Test Name Result Flag Reference   NT-PRO BNP 1073 pg/mL H <450

## 2018-01-23 NOTE — RESULT NOTES
Verified Results  (1) PT WITH INR [id number] 12:26PM Cait Mulligan     Test Name Result Flag Reference   INR 2 44 H 0 86-1 16   PT 26 8 seconds H 12 1-14 4

## 2018-01-24 VITALS
WEIGHT: 176.38 LBS | DIASTOLIC BLOOD PRESSURE: 84 MMHG | HEIGHT: 66 IN | HEART RATE: 70 BPM | SYSTOLIC BLOOD PRESSURE: 124 MMHG | BODY MASS INDEX: 28.34 KG/M2

## 2018-01-24 VITALS
HEIGHT: 66 IN | SYSTOLIC BLOOD PRESSURE: 116 MMHG | HEART RATE: 83 BPM | BODY MASS INDEX: 28.28 KG/M2 | DIASTOLIC BLOOD PRESSURE: 70 MMHG | WEIGHT: 176 LBS

## 2018-01-24 NOTE — RESULT NOTES
Verified Results  (1) PT WITH INR [id number] 11:17AM Amauri Line Order Number: [id number]     Test Name Result Flag Reference   INR 2 90 H 0 86-1 16   PT 30 7 seconds H 12 1-14 4

## 2018-01-29 DIAGNOSIS — R41.3 AMNESIA/MEMORY DISORDER: Primary | ICD-10-CM

## 2018-01-29 RX ORDER — DONEPEZIL HYDROCHLORIDE 10 MG/1
TABLET, FILM COATED ORAL
Qty: 30 TABLET | Refills: 4 | Status: SHIPPED | OUTPATIENT
Start: 2018-01-29 | End: 2018-07-22 | Stop reason: SDUPTHER

## 2018-01-31 ENCOUNTER — HOSPITAL ENCOUNTER (OUTPATIENT)
Dept: RADIOLOGY | Facility: CLINIC | Age: 78
Discharge: HOME | End: 2018-01-31
Payer: MEDICARE

## 2018-01-31 DIAGNOSIS — Z12.31 ENCOUNTER FOR SCREENING MAMMOGRAM FOR MALIGNANT NEOPLASM OF BREAST: ICD-10-CM

## 2018-01-31 PROCEDURE — 77067 SCR MAMMO BI INCL CAD: CPT

## 2018-02-05 ENCOUNTER — HOSPITAL ENCOUNTER (OUTPATIENT)
Dept: RADIOLOGY | Facility: HOSPITAL | Age: 78
Discharge: HOME | End: 2018-02-05
Attending: FAMILY MEDICINE
Payer: MEDICARE

## 2018-02-05 DIAGNOSIS — E03.9 HYPOTHYROIDISM, UNSPECIFIED TYPE: ICD-10-CM

## 2018-02-05 DIAGNOSIS — I10 ESSENTIAL HYPERTENSION: Primary | ICD-10-CM

## 2018-02-05 DIAGNOSIS — R92.8 ABNORMAL MAMMOGRAM OF RIGHT BREAST: ICD-10-CM

## 2018-02-05 DIAGNOSIS — R92.8 ABNORMAL MAMMOGRAM: ICD-10-CM

## 2018-02-05 PROCEDURE — 77065 DX MAMMO INCL CAD UNI: CPT

## 2018-02-05 PROCEDURE — 76642 ULTRASOUND BREAST LIMITED: CPT

## 2018-02-06 ENCOUNTER — ANTICOAG VISIT (OUTPATIENT)
Dept: CARDIOLOGY CLINIC | Facility: CLINIC | Age: 78
End: 2018-02-06

## 2018-02-06 ENCOUNTER — APPOINTMENT (OUTPATIENT)
Dept: LAB | Facility: CLINIC | Age: 78
End: 2018-02-06
Payer: MEDICARE

## 2018-02-06 ENCOUNTER — TRANSCRIBE ORDERS (OUTPATIENT)
Dept: LAB | Facility: CLINIC | Age: 78
End: 2018-02-06

## 2018-02-06 DIAGNOSIS — Z95.2 HISTORY OF PROSTHETIC HEART VALVE: ICD-10-CM

## 2018-02-06 DIAGNOSIS — Z79.01 LONG TERM CURRENT USE OF ANTICOAGULANT: ICD-10-CM

## 2018-02-06 DIAGNOSIS — I10 ESSENTIAL HYPERTENSION: ICD-10-CM

## 2018-02-06 DIAGNOSIS — I48.91 ATRIAL FIBRILLATION, UNSPECIFIED TYPE (HCC): ICD-10-CM

## 2018-02-06 DIAGNOSIS — I05.9 RHEUMATIC MITRAL VALVE DISEASE: ICD-10-CM

## 2018-02-06 DIAGNOSIS — E03.9 HYPOTHYROIDISM, UNSPECIFIED TYPE: ICD-10-CM

## 2018-02-06 LAB
INR PPP: 2.98 (ref 0.86–1.16)
PROTHROMBIN TIME: 31.4 SECONDS (ref 12.1–14.4)

## 2018-02-06 PROCEDURE — 85610 PROTHROMBIN TIME: CPT

## 2018-02-06 NOTE — PROGRESS NOTES
Gave dosage to spouse, pt will be having a biopsy and will call dr Anjali Dotson to see if she should hold warfarin  Retest inr in 4 wks

## 2018-02-13 ENCOUNTER — HOSPITAL ENCOUNTER (OUTPATIENT)
Dept: RADIOLOGY | Facility: HOSPITAL | Age: 78
Discharge: HOME | End: 2018-02-13
Payer: MEDICARE

## 2018-02-13 ENCOUNTER — HOSPITAL ENCOUNTER (OUTPATIENT)
Dept: RADIOLOGY | Facility: HOSPITAL | Age: 78
Discharge: HOME | End: 2018-02-13
Attending: FAMILY MEDICINE | Admitting: RADIOLOGY
Payer: MEDICARE

## 2018-02-13 ENCOUNTER — HOSPITAL ENCOUNTER (OUTPATIENT)
Dept: RADIOLOGY | Facility: HOSPITAL | Age: 78
Discharge: HOME | End: 2018-02-13
Attending: FAMILY MEDICINE

## 2018-02-13 VITALS
RESPIRATION RATE: 18 BRPM | SYSTOLIC BLOOD PRESSURE: 120 MMHG | DIASTOLIC BLOOD PRESSURE: 64 MMHG | OXYGEN SATURATION: 98 % | HEART RATE: 67 BPM

## 2018-02-13 DIAGNOSIS — N63.10 BREAST MASS, RIGHT: ICD-10-CM

## 2018-02-13 DIAGNOSIS — R92.8 ABNORMAL MAMMOGRAM OF RIGHT BREAST: ICD-10-CM

## 2018-02-13 PROCEDURE — 88342 IMHCHEM/IMCYTCHM 1ST ANTB: CPT | Performed by: PATHOLOGY

## 2018-02-13 PROCEDURE — 88305 TISSUE EXAM BY PATHOLOGIST: CPT | Performed by: RADIOLOGY

## 2018-02-13 PROCEDURE — 88305 TISSUE EXAM BY PATHOLOGIST: CPT | Performed by: PATHOLOGY

## 2018-02-13 PROCEDURE — 19083 BX BREAST 1ST LESION US IMAG: CPT

## 2018-02-13 PROCEDURE — 88342 IMHCHEM/IMCYTCHM 1ST ANTB: CPT | Performed by: RADIOLOGY

## 2018-02-13 PROCEDURE — 88313 SPECIAL STAINS GROUP 2: CPT | Performed by: PATHOLOGY

## 2018-02-13 PROCEDURE — 88313 SPECIAL STAINS GROUP 2: CPT | Performed by: RADIOLOGY

## 2018-02-13 PROCEDURE — 88341 IMHCHEM/IMCYTCHM EA ADD ANTB: CPT | Performed by: PATHOLOGY

## 2018-02-13 PROCEDURE — 88341 IMHCHEM/IMCYTCHM EA ADD ANTB: CPT | Performed by: RADIOLOGY

## 2018-02-13 RX ORDER — LIDOCAINE HYDROCHLORIDE 10 MG/ML
INJECTION, SOLUTION INFILTRATION; PERINEURAL CODE/TRAUMA/SEDATION MEDICATION
Status: COMPLETED | OUTPATIENT
Start: 2018-02-13 | End: 2018-02-13

## 2018-02-13 RX ORDER — LIDOCAINE HYDROCHLORIDE AND EPINEPHRINE 10; 10 MG/ML; UG/ML
INJECTION, SOLUTION INFILTRATION; PERINEURAL CODE/TRAUMA/SEDATION MEDICATION
Status: COMPLETED | OUTPATIENT
Start: 2018-02-13 | End: 2018-02-13

## 2018-02-13 RX ADMIN — LIDOCAINE HYDROCHLORIDE 5 ML: 10 INJECTION, SOLUTION INFILTRATION; PERINEURAL at 11:11

## 2018-02-13 RX ADMIN — LIDOCAINE HYDROCHLORIDE AND EPINEPHRINE 3 ML: 10; 10 INJECTION, SOLUTION INFILTRATION; PERINEURAL at 11:12

## 2018-02-13 NOTE — SEDATION DOCUMENTATION
Right breast biopsy done  Pt  Tolerated well  Clip marker placed,wing marker  Steristrips and DSD to site  Discharge instructions reviewed with pt  And copy to her  Ice pack provided  No bleeding at site  Area soft

## 2018-02-26 NOTE — MISCELLANEOUS
Message  Spoke with Landry Brennan re: her left hip MRI  We do not see any evidence of a stress fracture or acute pathology  Therefore, she may now bear weight on the left leg and take tylenol for pain  We would like to see her next week for follow up and clinical reevaluation        Signatures   Electronically signed by : Guilford Hammans, PAC; Jan 12 2018  3:49PM EST                       (Author)

## 2018-02-27 DIAGNOSIS — E03.9 HYPOTHYROIDISM, UNSPECIFIED TYPE: Primary | ICD-10-CM

## 2018-02-27 RX ORDER — LEVOTHYROXINE SODIUM 0.03 MG/1
25 TABLET ORAL EVERY MORNING
Qty: 90 TABLET | Refills: 2 | Status: SHIPPED | OUTPATIENT
Start: 2018-02-27 | End: 2018-11-19 | Stop reason: SDUPTHER

## 2018-03-02 DIAGNOSIS — G20 PARKINSON DISEASE (HCC): Primary | ICD-10-CM

## 2018-03-02 RX ORDER — CARBIDOPA, LEVODOPA AND ENTACAPONE 25; 200; 100 MG/1; MG/1; MG/1
TABLET, FILM COATED ORAL
Qty: 120 TABLET | Refills: 4 | Status: SHIPPED | OUTPATIENT
Start: 2018-03-02 | End: 2018-07-22 | Stop reason: SDUPTHER

## 2018-03-11 DIAGNOSIS — R25.1 TREMOR: Primary | ICD-10-CM

## 2018-03-12 RX ORDER — PRIMIDONE 50 MG/1
TABLET ORAL
Qty: 30 TABLET | Refills: 2 | Status: SHIPPED | OUTPATIENT
Start: 2018-03-12 | End: 2018-08-23 | Stop reason: SDUPTHER

## 2018-03-15 ENCOUNTER — APPOINTMENT (OUTPATIENT)
Dept: LAB | Facility: CLINIC | Age: 78
End: 2018-03-15
Payer: MEDICARE

## 2018-03-15 ENCOUNTER — TRANSCRIBE ORDERS (OUTPATIENT)
Dept: LAB | Facility: CLINIC | Age: 78
End: 2018-03-15

## 2018-03-15 DIAGNOSIS — I50.22 CHRONIC SYSTOLIC HEART FAILURE (HCC): ICD-10-CM

## 2018-03-15 DIAGNOSIS — I10 ESSENTIAL HYPERTENSION, MALIGNANT: Primary | ICD-10-CM

## 2018-03-15 DIAGNOSIS — E03.9 MYXEDEMA HEART DISEASE: ICD-10-CM

## 2018-03-15 DIAGNOSIS — I50.22 CHRONIC SYSTOLIC HEART FAILURE (HCC): Primary | ICD-10-CM

## 2018-03-15 DIAGNOSIS — I51.9 MYXEDEMA HEART DISEASE: ICD-10-CM

## 2018-03-15 DIAGNOSIS — I10 ESSENTIAL HYPERTENSION, MALIGNANT: ICD-10-CM

## 2018-03-15 DIAGNOSIS — D68.52 HETEROZYGOUS FOR PROTHROMBIN G20210A MUTATION (HCC): ICD-10-CM

## 2018-03-15 DIAGNOSIS — R42 DIZZINESS AND GIDDINESS: ICD-10-CM

## 2018-03-15 LAB
ALBUMIN SERPL BCP-MCNC: 3.6 G/DL (ref 3.5–5)
ALP SERPL-CCNC: 125 U/L (ref 46–116)
ALT SERPL W P-5'-P-CCNC: 21 U/L (ref 12–78)
ANION GAP SERPL CALCULATED.3IONS-SCNC: 5 MMOL/L (ref 4–13)
AST SERPL W P-5'-P-CCNC: 16 U/L (ref 5–45)
BILIRUB SERPL-MCNC: 0.64 MG/DL (ref 0.2–1)
BUN SERPL-MCNC: 22 MG/DL (ref 5–25)
CALCIUM SERPL-MCNC: 9.1 MG/DL (ref 8.3–10.1)
CHLORIDE SERPL-SCNC: 103 MMOL/L (ref 100–108)
CHOLEST SERPL-MCNC: 151 MG/DL (ref 50–200)
CO2 SERPL-SCNC: 29 MMOL/L (ref 21–32)
CREAT SERPL-MCNC: 1.07 MG/DL (ref 0.6–1.3)
ERYTHROCYTE [DISTWIDTH] IN BLOOD BY AUTOMATED COUNT: 14.2 % (ref 11.6–15.1)
GFR SERPL CREATININE-BSD FRML MDRD: 50 ML/MIN/1.73SQ M
GLUCOSE P FAST SERPL-MCNC: 94 MG/DL (ref 65–99)
HCT VFR BLD AUTO: 38.2 % (ref 34.8–46.1)
HDLC SERPL-MCNC: 56 MG/DL (ref 40–60)
HGB BLD-MCNC: 12.7 G/DL (ref 11.5–15.4)
LDLC SERPL CALC-MCNC: 70 MG/DL (ref 0–100)
MCH RBC QN AUTO: 32.1 PG (ref 26.8–34.3)
MCHC RBC AUTO-ENTMCNC: 33.2 G/DL (ref 31.4–37.4)
MCV RBC AUTO: 97 FL (ref 82–98)
PLATELET # BLD AUTO: 213 THOUSANDS/UL (ref 149–390)
PMV BLD AUTO: 11.9 FL (ref 8.9–12.7)
POTASSIUM SERPL-SCNC: 4.5 MMOL/L (ref 3.5–5.3)
PROT SERPL-MCNC: 6.9 G/DL (ref 6.4–8.2)
RBC # BLD AUTO: 3.96 MILLION/UL (ref 3.81–5.12)
SODIUM SERPL-SCNC: 137 MMOL/L (ref 136–145)
TRIGL SERPL-MCNC: 127 MG/DL
TSH SERPL DL<=0.05 MIU/L-ACNC: 4.47 UIU/ML (ref 0.36–3.74)
WBC # BLD AUTO: 6.53 THOUSAND/UL (ref 4.31–10.16)

## 2018-03-15 PROCEDURE — 80053 COMPREHEN METABOLIC PANEL: CPT

## 2018-03-15 PROCEDURE — 85027 COMPLETE CBC AUTOMATED: CPT

## 2018-03-15 PROCEDURE — 36415 COLL VENOUS BLD VENIPUNCTURE: CPT

## 2018-03-15 PROCEDURE — 84443 ASSAY THYROID STIM HORMONE: CPT

## 2018-03-15 PROCEDURE — 80061 LIPID PANEL: CPT

## 2018-03-19 ENCOUNTER — OFFICE VISIT (OUTPATIENT)
Dept: FAMILY MEDICINE CLINIC | Facility: CLINIC | Age: 78
End: 2018-03-19
Payer: MEDICARE

## 2018-03-19 ENCOUNTER — TRANSCRIBE ORDERS (OUTPATIENT)
Dept: ADMINISTRATIVE | Facility: HOSPITAL | Age: 78
End: 2018-03-19

## 2018-03-19 ENCOUNTER — APPOINTMENT (OUTPATIENT)
Dept: LAB | Facility: CLINIC | Age: 78
End: 2018-03-19
Payer: MEDICARE

## 2018-03-19 ENCOUNTER — ANTICOAG VISIT (OUTPATIENT)
Dept: CARDIOLOGY CLINIC | Facility: CLINIC | Age: 78
End: 2018-03-19

## 2018-03-19 ENCOUNTER — TRANSCRIBE ORDERS (OUTPATIENT)
Dept: LAB | Facility: CLINIC | Age: 78
End: 2018-03-19

## 2018-03-19 ENCOUNTER — HOSPITAL ENCOUNTER (OUTPATIENT)
Dept: RADIOLOGY | Facility: HOSPITAL | Age: 78
Discharge: HOME | End: 2018-03-19
Payer: MEDICARE

## 2018-03-19 VITALS
DIASTOLIC BLOOD PRESSURE: 70 MMHG | TEMPERATURE: 97.7 F | HEART RATE: 68 BPM | SYSTOLIC BLOOD PRESSURE: 118 MMHG | BODY MASS INDEX: 29.66 KG/M2 | WEIGHT: 178 LBS | HEIGHT: 65 IN

## 2018-03-19 DIAGNOSIS — Z95.2 HISTORY OF PROSTHETIC HEART VALVE: ICD-10-CM

## 2018-03-19 DIAGNOSIS — I48.20 CHRONIC ATRIAL FIBRILLATION (HCC): ICD-10-CM

## 2018-03-19 DIAGNOSIS — I48.20 CHRONIC ATRIAL FIBRILLATION (HCC): Primary | ICD-10-CM

## 2018-03-19 DIAGNOSIS — R05.9 COUGH: Primary | ICD-10-CM

## 2018-03-19 DIAGNOSIS — Z86.79 HISTORY OF CHF (CONGESTIVE HEART FAILURE): ICD-10-CM

## 2018-03-19 DIAGNOSIS — R05.9 COUGH: ICD-10-CM

## 2018-03-19 DIAGNOSIS — I48.91 ATRIAL FIBRILLATION, UNSPECIFIED TYPE (HCC): ICD-10-CM

## 2018-03-19 LAB
INR PPP: 2.05 (ref 0.86–1.16)
PROTHROMBIN TIME: 23.3 SECONDS (ref 12.1–14.4)

## 2018-03-19 PROCEDURE — 85610 PROTHROMBIN TIME: CPT

## 2018-03-19 PROCEDURE — 36415 COLL VENOUS BLD VENIPUNCTURE: CPT

## 2018-03-19 PROCEDURE — 99213 OFFICE O/P EST LOW 20 MIN: CPT | Performed by: NURSE PRACTITIONER

## 2018-03-19 PROCEDURE — 71046 X-RAY EXAM CHEST 2 VIEWS: CPT

## 2018-03-19 RX ORDER — CEFUROXIME AXETIL 250 MG/1
250 TABLET ORAL EVERY 12 HOURS SCHEDULED
Qty: 20 TABLET | Refills: 0 | Status: SHIPPED | OUTPATIENT
Start: 2018-03-19 | End: 2018-03-28

## 2018-03-19 RX ORDER — FUROSEMIDE 20 MG/1
40 TABLET ORAL ONCE
Qty: 2 TABLET | Refills: 0 | COMMUNITY
Start: 2018-03-19 | End: 2018-05-04 | Stop reason: SDUPTHER

## 2018-03-19 RX ORDER — BENZONATATE 200 MG/1
200 CAPSULE ORAL 3 TIMES DAILY PRN
Qty: 20 CAPSULE | Refills: 0 | Status: SHIPPED | OUTPATIENT
Start: 2018-03-19 | End: 2018-03-28

## 2018-03-19 NOTE — PATIENT INSTRUCTIONS
Acute Cough   AMBULATORY CARE:   An acute cough  can last up to 3 weeks  Common causes of an acute cough include a cold, allergies, or a lung infection  Seek care immediately if:   · You have trouble breathing or feel short of breath  · You cough up blood, or you see blood in your mucus  · You faint or feel weak or dizzy  · You have chest pain when you cough or take a deep breath  · You have new wheezing  Contact your healthcare provider if:   · You have a fever  · Your cough lasts longer than 4 weeks  · Your symptoms do not improve with treatment  · You have questions or concerns about your condition or care  Treatment:  An acute cough usually goes away on its own  Ask your healthcare provider about medicines you can take to decrease your cough  You may need medicine to stop the cough, decrease swelling in your airways, or help open your airways  Medicine may also be given to help you cough up mucus  If you have an infection caused by bacteria, you may need antibiotics  Manage your symptoms:   · Do not smoke and stay away from others who smoke  Nicotine and other chemicals in cigarettes and cigars can cause lung damage and make your cough worse  Ask your healthcare provider for information if you currently smoke and need help to quit  E-cigarettes or smokeless tobacco still contain nicotine  Talk to your healthcare provider before you use these products  · Drink extra liquids as directed  Liquids will help thin and loosen mucus so you can cough it up  Liquids will also help prevent dehydration  Examples of good liquids to drink include water, fruit juice, and broth  Do not drink liquids that contain caffeine  Caffeine can increase your risk for dehydration  Ask your healthcare provider how much liquid to drink each day  · Rest as directed  Do not do activities that make your cough worse, such as exercise  · Use a humidifier or vaporizer    Use a cool mist humidifier or a vaporizer to increase air moisture in your home  This may make it easier for you to breathe and help decrease your cough  · Eat 2 to 5 mL of honey 2 times each day  Honey can help thin mucus and decrease your cough  · Use cough drops or lozenges  These can help decrease throat irritation and your cough  Follow up with your healthcare provider as directed:  Write down your questions so you remember to ask them during your visits  © 2017 [facility] Information is for End User's use only and may not be sold, redistributed or otherwise used for commercial purposes  All illustrations and images included in CareNotes® are the copyrighted property of A D A M [employer] Inc  or Ralph Luis  The above information is an  only  It is not intended as medical advice for individual conditions or treatments  Talk to your doctor, nurse or pharmacist before following any medical regimen to see if it is safe and effective for you

## 2018-03-19 NOTE — PROGRESS NOTES
Assessment:      Cough    CXR r/o CAP, CHF  Plan:      Antibiotics per medication orders  Antitussives per medication orders  B-agonist inhaler  Call if shortness of breath worsens, blood in sputum, change in character of cough, development of fever or chills, inability to maintain nutrition and hydration  Avoid exposure to tobacco smoke and fumes  Chest x-ray  Follow-up in 3 days, or sooner as needed  Follow up with cardiologist  as scheduled on 3/21/18  Supportive care as discussed  Take extra lasix today  Patient advised to go the ER if symptoms progress as discussed  Subjective:       Dann Perez is a 65 y o  female here for evaluation of a cough  The cough is without wheezing, dyspnea or hemoptysis, productive of clear sputum, worsening over time and is aggravated by reclining position  Onset of symptoms was 2 weeks ago, gradually worsening since that time  Associated symptoms include fever, postnasal drip, sputum production and congestion  Patient does have a history of asthma  Patient has not had recent travel  Patient does not have a history of smoking  Patient  has had a previous chest x-ray  She has a history of CHF  "this feels different"  The following portions of the patient's history were reviewed and updated as appropriate: allergies, current medications, past family history, past medical history, past social history, past surgical history and problem list     Review of Systems  Pertinent items are noted in HPI         Objective:      Oxygen saturation 99% on room air  /70 (BP Location: Left arm, Patient Position: Sitting, Cuff Size: Adult)   Pulse 68   Temp 97 7 °F (36 5 °C)   Ht 5' 5" (1 651 m)   Wt 80 7 kg (178 lb)   BMI 29 62 kg/m²   General appearance: alert and oriented, in no acute distress  Head: Normocephalic, without obvious abnormality, sinuses nontender to percussion  Eyes: no conjunctival pallor  Ears: normal TM's and external ear canals both ears  Nose: clear discharge, turbinates red  Throat: abnormal findings: mild oropharyngeal erythema  Neck: no adenopathy, no carotid bruit, no JVD, supple, symmetrical, trachea midline and thyroid not enlarged, symmetric, no tenderness/mass/nodules  Lungs: Rales in RLL  Heart: irregularly irregular rhythm and normal rate  Abdomen: soft, non-tender; bowel sounds normal; no masses,  no organomegaly  Extremities: extremities normal, warm and well-perfused; no cyanosis, clubbing, or edema  Pulses: 2+ and symmetric  Skin: Skin color, texture, turgor normal  No rashes or lesions  Lymph nodes: Cervical adenopathy: present  Neurologic: Grossly normal

## 2018-03-20 ENCOUNTER — TELEPHONE (OUTPATIENT)
Dept: FAMILY MEDICINE CLINIC | Facility: CLINIC | Age: 78
End: 2018-03-20

## 2018-03-20 NOTE — TELEPHONE ENCOUNTER
Notified Cynthia Magallon that XR was negative for PNA, CHF  She is taking ABX, tesalina paul  Her question is r/t to antibiotic  Wanted to be sure it was as such "I did n't know if it were the same as levothyroxine " Questions answered   Cynthia Magallon verbalized understanding of same

## 2018-03-20 NOTE — TELEPHONE ENCOUNTER
Ambar Lind,     Patient is calling back because she saw you yesterday and wanted to know about her chest x-ray results  She also wanted to know about the medication that you are going to put her on  Please call patient and advise  Thank you

## 2018-03-22 ENCOUNTER — TELEPHONE (OUTPATIENT)
Dept: FAMILY MEDICINE CLINIC | Facility: CLINIC | Age: 78
End: 2018-03-22

## 2018-03-22 NOTE — TELEPHONE ENCOUNTER
Shima Crouch    Patient has been taking antibiotic benzonatate and cefroxineaxetio since Monday  She is still coughing, with  thick, clear mucus  Also her temperature is 100, her normal temp is 97 4  She is also sleeping a lot, very tired and achy  Please advise

## 2018-03-22 NOTE — TELEPHONE ENCOUNTER
Spoke with patient and was advised to come in tomorrow to be re evaluated    She was given an aap for tomorrow morning at 9:15 with Rosana Valdez

## 2018-03-23 ENCOUNTER — OFFICE VISIT (OUTPATIENT)
Dept: FAMILY MEDICINE CLINIC | Facility: CLINIC | Age: 78
End: 2018-03-23
Payer: MEDICARE

## 2018-03-23 VITALS
DIASTOLIC BLOOD PRESSURE: 70 MMHG | SYSTOLIC BLOOD PRESSURE: 120 MMHG | TEMPERATURE: 100.2 F | BODY MASS INDEX: 29.66 KG/M2 | OXYGEN SATURATION: 98 % | WEIGHT: 178 LBS | HEART RATE: 66 BPM | HEIGHT: 65 IN

## 2018-03-23 DIAGNOSIS — J45.21 MILD INTERMITTENT REACTIVE AIRWAY DISEASE WITH WHEEZING WITH ACUTE EXACERBATION: ICD-10-CM

## 2018-03-23 DIAGNOSIS — J06.9 URI, ACUTE: Primary | ICD-10-CM

## 2018-03-23 PROBLEM — I50.22 HEART FAILURE, LEFT SYSTOLIC, CHRONIC (HCC): Status: ACTIVE | Noted: 2017-02-23

## 2018-03-23 PROBLEM — M54.50 CHRONIC RIGHT-SIDED LOW BACK PAIN WITHOUT SCIATICA: Status: ACTIVE | Noted: 2017-10-23

## 2018-03-23 PROBLEM — G89.29 CHRONIC RIGHT-SIDED LOW BACK PAIN WITHOUT SCIATICA: Status: ACTIVE | Noted: 2017-10-23

## 2018-03-23 PROBLEM — M15.9 GENERALIZED OSTEOARTHRITIS: Status: ACTIVE | Noted: 2017-12-04

## 2018-03-23 PROBLEM — R41.3 MEMORY IMPAIRMENT: Status: ACTIVE | Noted: 2017-02-15

## 2018-03-23 PROBLEM — F19.982 DRUG INDUCED INSOMNIA (HCC): Status: ACTIVE | Noted: 2017-05-08

## 2018-03-23 PROCEDURE — 99213 OFFICE O/P EST LOW 20 MIN: CPT | Performed by: NURSE PRACTITIONER

## 2018-03-23 RX ORDER — ALBUTEROL SULFATE 90 UG/1
2 AEROSOL, METERED RESPIRATORY (INHALATION) EVERY 6 HOURS PRN
Qty: 1 INHALER | Refills: 0 | Status: SHIPPED | OUTPATIENT
Start: 2018-03-23 | End: 2018-07-12

## 2018-03-23 RX ORDER — FLUTICASONE PROPIONATE 50 MCG
2 SPRAY, SUSPENSION (ML) NASAL DAILY
Qty: 16 G | Refills: 0 | Status: SHIPPED | OUTPATIENT
Start: 2018-03-23 | End: 2018-03-28

## 2018-03-23 RX ORDER — AZITHROMYCIN 250 MG/1
TABLET, FILM COATED ORAL
Qty: 6 TABLET | Refills: 0 | Status: SHIPPED | OUTPATIENT
Start: 2018-03-23 | End: 2018-03-27

## 2018-03-23 NOTE — PATIENT INSTRUCTIONS
Upper Respiratory Infection   AMBULATORY CARE:   An upper respiratory infection  is also called a common cold  It can affect your nose, throat, ears, and sinuses  Common signs and symptoms include the following:  Cold symptoms are usually worst for the first 3 to 5 days  You may have any of the following:  · Runny or stuffy nose    · Sneezing and coughing    · Sore throat or hoarseness    · Red, watery, and sore eyes    · Fatigue     · Chills and fever    · Headache, body aches, or sore muscles  Seek care immediately if:   · You have chest pain or trouble breathing  Contact your healthcare provider if:   · You have a fever over 102ºF (39°C)  · Your sore throat gets worse or you see white or yellow spots in your throat  · Your symptoms get worse after 3 to 5 days or your cold is not better in 14 days  · You have a rash anywhere on your skin  · You have large, tender lumps in your neck  · You have thick, green or yellow drainage from your nose  · You cough up thick yellow, green, or bloody mucus  · You have vomiting for more than 24 hours and cannot keep fluids down  · You have a bad earache  · You have questions or concerns about your condition or care  Treatment for a cold: There is no cure for the common cold  Colds are caused by viruses and do not get better with antibiotics  Most people get better in 7 to 14 days  You may continue to cough for 2 to 3 weeks  The following may help decrease your symptoms:  · Decongestants  help reduce nasal congestion and help you breathe more easily  If you take decongestant pills, they may make you feel restless or not able to sleep  Do not use decongestant sprays for more than a few days  · Cough suppressants  help reduce coughing  Ask your healthcare provider which type of cough medicine is best for you  · NSAIDs , such as ibuprofen, help decrease swelling, pain, and fever   NSAIDs can cause stomach bleeding or kidney problems in certain people  If you take blood thinner medicine, always ask your healthcare provider if NSAIDs are safe for you  Always read the medicine label and follow directions  · Acetaminophen  decreases pain and fever  It is available without a doctor's order  Ask how much to take and how often to take it  Follow directions  Read the labels of all other medicines you are using to see if they also contain acetaminophen, or ask your doctor or pharmacist  Acetaminophen can cause liver damage if not taken correctly  Do not use more than 4 grams (4,000 milligrams) total of acetaminophen in one day  Manage your cold:   · Rest as much as possible  Slowly start to do more each day  · Drink more liquids as directed  Liquids will help thin and loosen mucus so you can cough it up  Liquids will also help prevent dehydration  Liquids that help prevent dehydration include water, fruit juice, and broth  Do not drink liquids that contain caffeine  Caffeine can increase your risk for dehydration  Ask your healthcare provider how much liquid to drink each day  · Soothe a sore throat  Gargle with warm salt water  This helps your sore throat feel better  Make salt water by dissolving ¼ teaspoon salt in 1 cup warm water  You may also suck on hard candy or throat lozenges  You may use a sore throat spray  · Use a humidifier or vaporizer  Use a cool mist humidifier or a vaporizer to increase air moisture in your home  This may make it easier for you to breathe and help decrease your cough  · Use saline nasal drops as directed  These help relieve congestion  · Apply petroleum-based jelly around the outside of your nostrils  This can decrease irritation from blowing your nose  · Do not smoke  Nicotine and other chemicals in cigarettes and cigars can make your symptoms worse  They can also cause infections such as bronchitis or pneumonia   Ask your healthcare provider for information if you currently smoke and need help to quit  E-cigarettes or smokeless tobacco still contain nicotine  Talk to your healthcare provider before you use these products  Prevent spreading your cold to others:   · Try to stay away from other people during the first 2 to 3 days of your cold when it is more easily spread  · Do not share food or drinks  · Do not share hand towels with household members  · Wash your hands often, especially after you blow your nose  Turn away from other people and cover your mouth and nose with a tissue when you sneeze or cough  Follow up with your healthcare provider as directed:  Write down your questions so you remember to ask them during your visits  © 2017 [facility] Information is for End User's use only and may not be sold, redistributed or otherwise used for commercial purposes  All illustrations and images included in CareNotes® are the copyrighted property of [employer]  or Ralph Luis  The above information is an  only  It is not intended as medical advice for individual conditions or treatments  Talk to your doctor, nurse or pharmacist before following any medical regimen to see if it is safe and effective for you

## 2018-03-23 NOTE — PROGRESS NOTES
Assessment:   URI   Cough  likely reactive airway   CXR negative for chf, pheumonia  Plan:   Antibiotics per medication orders  Antitussives per medication orders  B-agonist, anticholinergic inhaler  Dust proof home  Patient decline oral, inhaled steroid at this time  Call if shortness of breath worsens, blood in sputum, change in character of cough, development of fever or chills, inability to maintain nutrition and hydration  Avoid exposure to tobacco smoke and fumes  Follow-up in 3 days, or sooner as needed  Call on Monday with update if sxs  Follow up with cardiologist     as scheduled on 3/21/18  Supportive care as discussed  Patient advised to go the ER if symptoms progress as discussed  Subjective:       Sandy Raines is a 65 y o  female here for re- evaluation of a cough, uri  The cough is without dyspnea or hemoptysis, worsening since last visit and is aggravated by reclining position  + clear sputum, wheezing  Onset of symptoms was 2 weeks ago, gradually worsening since that time  Associated symptoms include fever, postnasal drip, sputum production and congestion  Patient does have a history of asthma  Patient has not had recent travel  Patient does not have a history of smoking  Patient  has had a previous chest x-ray  She has a history of CHF  "this feels different" Took extra lasix after ov on 3/19 as per recommendation  She noticed no improvement  Taking ceftin as prescribed  She reports recent home renovation which has left the home excessively robert  The following portions of the patient's history were reviewed and updated as appropriate: allergies, current medications, past family history, past medical history, past social history, past surgical history and problem list     Review of Systems  Pertinent items are noted in HPI         Objective:      Oxygen saturation 99% on room air  /70 (BP Location: Left arm, Patient Position: Sitting, Cuff Size: Adult)   Pulse 66   Temp 100 2 °F (37 9 °C) (Oral)   Ht 5' 5" (1 651 m)   Wt 80 7 kg (178 lb)   BMI 29 62 kg/m²   General appearance: alert and oriented, in no acute distress  Head: Normocephalic, without obvious abnormality, sinuses nontender to percussion  Eyes: no conjunctival pallor  Ears: normal TM's and external ear canals both ears  Nose: clear discharge, turbinates red  Throat: abnormal findings: mild oropharyngeal erythema  Neck: no adenopathy, no carotid bruit, no JVD, supple, symmetrical, trachea midline and thyroid not enlarged, symmetric, no tenderness/mass/nodules  Lungs: Rales in RLL  Heart: irregularly irregular rhythm and normal rate  Abdomen: soft, non-tender; bowel sounds normal; no masses,  no organomegaly  Extremities: extremities normal, warm and well-perfused; no cyanosis, clubbing, or edema  Pulses: 2+ and symmetric  Skin: Skin color, texture, turgor normal  No rashes or lesions  Lymph nodes: Cervical adenopathy: present  Neurologic: Grossly normal

## 2018-03-26 ENCOUNTER — TELEPHONE (OUTPATIENT)
Dept: FAMILY MEDICINE CLINIC | Facility: CLINIC | Age: 78
End: 2018-03-26

## 2018-03-28 ENCOUNTER — OFFICE VISIT (OUTPATIENT)
Dept: FAMILY MEDICINE CLINIC | Facility: CLINIC | Age: 78
End: 2018-03-28
Payer: MEDICARE

## 2018-03-28 VITALS
WEIGHT: 175 LBS | DIASTOLIC BLOOD PRESSURE: 60 MMHG | HEIGHT: 65 IN | TEMPERATURE: 97.8 F | BODY MASS INDEX: 29.16 KG/M2 | RESPIRATION RATE: 20 BRPM | OXYGEN SATURATION: 98 % | HEART RATE: 72 BPM | SYSTOLIC BLOOD PRESSURE: 110 MMHG

## 2018-03-28 DIAGNOSIS — R05.9 COUGH: Primary | ICD-10-CM

## 2018-03-28 PROCEDURE — 99213 OFFICE O/P EST LOW 20 MIN: CPT | Performed by: FAMILY MEDICINE

## 2018-03-28 RX ORDER — FLUTICASONE PROPIONATE 50 MCG
1 SPRAY, SUSPENSION (ML) NASAL DAILY
COMMUNITY
End: 2019-01-10

## 2018-03-28 RX ORDER — PREDNISONE 20 MG/1
40 TABLET ORAL DAILY
Qty: 10 TABLET | Refills: 0 | Status: SHIPPED | OUTPATIENT
Start: 2018-03-28 | End: 2018-04-02

## 2018-03-28 RX ORDER — BENZONATATE 200 MG/1
200 CAPSULE ORAL 3 TIMES DAILY PRN
Qty: 20 CAPSULE | Refills: 0 | Status: SHIPPED | OUTPATIENT
Start: 2018-03-28 | End: 2018-07-12

## 2018-03-28 NOTE — PROGRESS NOTES
Demarco Reyna is a 65 y o  female here for evaluation of a cough  Onset of symptoms was 2 weeks ago  Symptoms have been gradually improving since that time  The cough is dry, hoarse and nonproductive and is aggravated by nothing  Associated symptoms include: change in voice, postnasal drip and wheezing  Patient does not have a history of asthma  Patient does not have a history of environmental allergens  Patient has not traveled recently  Patient does not have a history of smoking  Patient has had a previous chest x-ray  was Tx with 2 rounds of Ab, rescue inhaler, cough meds -  Likely had a flu -  First day feeling somewhat better -  Had fever and fatigue before    The following portions of the patient's history were reviewed and updated as appropriate: allergies, current medications, past family history, past medical history, past social history, past surgical history and problem list     Review of Systems  Constitutional: positive for anorexia and fatigue  Ears, nose, mouth, throat, and face: positive for hoarseness and nasal congestion  Respiratory: negative for dyspnea on exertion, hemoptysis, pleurisy/chest pain and wheezing  Cardiovascular: negative  Gastrointestinal: negative  Objective       General appearance: alert and oriented, in no acute distress  Lungs: clear to auscultation bilaterally  Heart: regular rate and rhythm, S1, S2 normal, no murmur, click, rub or gallop  Assessment/Plan     Cough and Influenza  Explained lack of efficacy of antibiotics in viral disease  Antitussives per medication orders  Avoid exposure to tobacco smoke and fumes  Call if shortness of breath worsens, blood in sputum, change in character of cough, development of fever or chills, inability to maintain nutrition and hydration  Avoid exposure to tobacco smoke and fumes    trial of oral prednisone 40 mg a day x 5 days in am

## 2018-03-28 NOTE — PATIENT INSTRUCTIONS
Acute Cough   WHAT YOU NEED TO KNOW:   What is an acute cough? An acute cough can last up to 3 weeks  Common causes of an acute cough include a cold, allergies, or a lung infection  How is the cause of an acute cough diagnosed? Your healthcare provider will examine you and listen to your lungs  Tell your healthcare provider if you cough up any mucus, or have a fever or shortness of breath  Also tell your provider what makes the cough better or worse  Depending on your symptoms, you may need a chest x-ray  A sample of mucus may be collected and tested for infection  How is an acute cough treated? An acute cough usually goes away on its own  Ask your healthcare provider about medicines you can take to decrease your cough  You may need medicine to stop the cough, decrease swelling in your airways, or help open your airways  Medicine may also be given to help you cough up mucus  If you have an infection caused by bacteria, you may need antibiotics  What can I do to manage my cough? · Do not smoke and stay away from others who smoke  Nicotine and other chemicals in cigarettes and cigars can cause lung damage and make your cough worse  Ask your healthcare provider for information if you currently smoke and need help to quit  E-cigarettes or smokeless tobacco still contain nicotine  Talk to your healthcare provider before you use these products  · Drink extra liquids as directed  Liquids will help thin and loosen mucus so you can cough it up  Liquids will also help prevent dehydration  Examples of good liquids to drink include water, fruit juice, and broth  Do not drink liquids that contain caffeine  Caffeine can increase your risk for dehydration  Ask your healthcare provider how much liquid to drink each day  · Rest as directed  Do not do activities that make your cough worse, such as exercise  · Use a humidifier or vaporizer    Use a cool mist humidifier or a vaporizer to increase air moisture in your home  This may make it easier for you to breathe and help decrease your cough  · Eat 2 to 5 mL of honey 2 times each day  Honey can help thin mucus and decrease your cough  · Use cough drops or lozenges  These can help decrease throat irritation and your cough  When should I seek immediate care? · You have trouble breathing or feel short of breath  · You cough up blood, or you see blood in your mucus  · You faint or feel weak or dizzy  · You have chest pain when you cough or take a deep breath  · You have new wheezing  When should I contact my healthcare provider? · You have a fever  · Your cough lasts longer than 4 weeks  · Your symptoms do not improve with treatment  · You have questions or concerns about your condition or care  CARE AGREEMENT:   You have the right to help plan your care  Learn about your health condition and how it may be treated  Discuss treatment options with your caregivers to decide what care you want to receive  You always have the right to refuse treatment  The above information is an  only  It is not intended as medical advice for individual conditions or treatments  Talk to your doctor, nurse or pharmacist before following any medical regimen to see if it is safe and effective for you  © 2017 [employer] [employer]  Information is for End User's use only and may not be sold, redistributed or otherwise used for commercial purposes  All illustrations and images included in CareNotes® are the copyrighted property of A D A M [employer] Inc  or [employer]

## 2018-03-29 DIAGNOSIS — I10 ESSENTIAL HYPERTENSION: Primary | ICD-10-CM

## 2018-03-29 RX ORDER — LISINOPRIL 10 MG/1
TABLET ORAL
Qty: 15 TABLET | Refills: 5 | Status: SHIPPED | OUTPATIENT
Start: 2018-03-29 | End: 2018-06-26 | Stop reason: DRUGHIGH

## 2018-03-30 ENCOUNTER — TELEPHONE (OUTPATIENT)
Dept: FAMILY MEDICINE CLINIC | Facility: CLINIC | Age: 78
End: 2018-03-30

## 2018-03-30 NOTE — TELEPHONE ENCOUNTER
DR Nelson Loza PT WOULD LIKE A CALL BACK FEELING BETTER FROM FLU BUT TEMP IS GOING FROM 95 4- [phone] 2  ALSO PT WOULD LIKE BW RESULTS   NO MORE WHEEZING

## 2018-04-02 ENCOUNTER — APPOINTMENT (OUTPATIENT)
Dept: LAB | Facility: CLINIC | Age: 78
End: 2018-04-02
Payer: MEDICARE

## 2018-04-02 ENCOUNTER — TRANSCRIBE ORDERS (OUTPATIENT)
Dept: LAB | Facility: CLINIC | Age: 78
End: 2018-04-02

## 2018-04-02 DIAGNOSIS — I48.20 CHRONIC ATRIAL FIBRILLATION (HCC): Primary | ICD-10-CM

## 2018-04-02 DIAGNOSIS — I48.20 CHRONIC ATRIAL FIBRILLATION (HCC): ICD-10-CM

## 2018-04-02 LAB
INR PPP: 3.89 (ref 0.86–1.16)
PROTHROMBIN TIME: 38.8 SECONDS (ref 12.1–14.4)

## 2018-04-02 PROCEDURE — 85610 PROTHROMBIN TIME: CPT

## 2018-04-02 PROCEDURE — 36415 COLL VENOUS BLD VENIPUNCTURE: CPT

## 2018-04-03 ENCOUNTER — ANTICOAG VISIT (OUTPATIENT)
Dept: CARDIOLOGY CLINIC | Facility: CLINIC | Age: 78
End: 2018-04-03

## 2018-04-03 DIAGNOSIS — I48.91 ATRIAL FIBRILLATION, UNSPECIFIED TYPE (HCC): ICD-10-CM

## 2018-04-03 DIAGNOSIS — Z95.2 HISTORY OF PROSTHETIC HEART VALVE: ICD-10-CM

## 2018-04-04 ENCOUNTER — OFFICE VISIT (OUTPATIENT)
Dept: CARDIOLOGY CLINIC | Facility: CLINIC | Age: 78
End: 2018-04-04
Payer: MEDICARE

## 2018-04-04 VITALS
HEART RATE: 96 BPM | DIASTOLIC BLOOD PRESSURE: 56 MMHG | SYSTOLIC BLOOD PRESSURE: 100 MMHG | BODY MASS INDEX: 29.16 KG/M2 | HEIGHT: 65 IN | WEIGHT: 175 LBS | OXYGEN SATURATION: 99 %

## 2018-04-04 DIAGNOSIS — Z95.2 H/O MITRAL VALVE REPLACEMENT WITH MECHANICAL VALVE: ICD-10-CM

## 2018-04-04 DIAGNOSIS — I50.22 HEART FAILURE, LEFT SYSTOLIC, CHRONIC (HCC): ICD-10-CM

## 2018-04-04 DIAGNOSIS — I42.0 DILATED CARDIOMYOPATHY (HCC): ICD-10-CM

## 2018-04-04 DIAGNOSIS — E78.5 DYSLIPIDEMIA: ICD-10-CM

## 2018-04-04 DIAGNOSIS — I48.20 CHRONIC ATRIAL FIBRILLATION (HCC): Primary | ICD-10-CM

## 2018-04-04 DIAGNOSIS — M15.9 GENERALIZED OSTEOARTHRITIS: ICD-10-CM

## 2018-04-04 DIAGNOSIS — G20 PARKINSONS (HCC): ICD-10-CM

## 2018-04-04 DIAGNOSIS — E03.9 ACQUIRED HYPOTHYROIDISM: ICD-10-CM

## 2018-04-04 PROCEDURE — 99214 OFFICE O/P EST MOD 30 MIN: CPT | Performed by: INTERNAL MEDICINE

## 2018-04-04 PROCEDURE — 93000 ELECTROCARDIOGRAM COMPLETE: CPT | Performed by: INTERNAL MEDICINE

## 2018-04-04 NOTE — PROGRESS NOTES
Cardiology Progress Note    ASSESSMENT:    1  Compensated chronic systolic heart failure with 34% ejection fraction on 7/5/16 MUGA scan/underlying dilated cardiomyopathy but currently with exertional fatigue, exhaustion, and cold sweats, with slightly worsened exertional dyspnea, and occasional nocturnal wheezing  2  Resolved dehydration/hypotension and residual mild dizziness as well as resolution of acute kidney injury since 11/18/16  3  Chronic atrial fibrillation, controlled  4  History of mechanical mitral valve replacement September, 1990, with very good oral anticoagulation with target INR of 2 5  Latest INR 3 89   5  Moderately frequent PVCs on otherwise benign Holter monitor December, 2013  6  16 6 pound weight loss in approximately 4-1/3 years  7  Dyslipidemia  8  Mild obstructive sleep apnea  9  History of asthma  10  History of GERD  11  History of benign positional vertigo  12  History of Lyme disease  13  Hypothyroidism with slightly elevated TSH level  14  History of hepatitis C  15  History of Parkinson's disease/date dysfunction  16  Persistent nonproductive cough and postural dizzines , now improved, with recent flu-like illness over the past 3 weeks  17  Chronic fatigue and malaise, multifactorial  18  Drug-induced insomnia  19  History of fall with subsequent left distal tibial fracture and left ankle sprain with right periorbital ecchymoses and laceration and left wrist sprain on 7/30/17, with no recurrence, possibly related to transient drop in blood pressure with upright position in hot weather after prolonged sitting  20  Recently diagnosed osteoarthritis of hips and spine  Plan       Patient Instructions     1  Continue present medications  2   Cardiology follow-up in 6 months with full EKG but no labs  HPI    This 65 y o  female  denies new cardiopulmonary and medical symptoms  She still experiences easy fatigue with low level activity and cold sweats    She feels that her tremor associated with her Parkinson's disease may require some medication adjustment  She has not upcoming visit with Dr Ashlee Hart in May, 2018  The patient had a flu like illness approximately 3 weeks ago, for which she received treatment from [facility], including 5 days of prednisone, cough suppressant, now with improving symptoms  She and her  will be cruising to [place] in June, 2018  They also will be visiting [facility] in December, 2017, where her 69-year-old daughter is global  for a [employer]  Her daughter just recently moved to [facility]  Review of Systems    All other systems negative, except as noted in history of present illness    Historical Information   Past Medical History:   Diagnosis Date    Arthritis     osteo joints    CHF, chronic ([address] )     Disease of thyroid gland     Fibromyalgia, primary     History of transfusion 1996    Hx of transient ischemic attack (TIA)     Infectious viral hepatitis     Hep C dx 1996     Lyme carditis     Murmur, cardiac     Parkinsons (HCC)     Seasonal allergies     Urinary frequency      Past Surgical History:   Procedure Laterality Date    BREAST SURGERY N/A     benign, calcium    CARDIAC SURGERY  1990    mitral valve replacement    CATARACT EXTRACTION Right 2015    CATARACT EXTRACTION Left 2014    CHEST TUBE INSERTION Right     post pleural effusion    CHOLECYSTECTOMY  2000    lap    HYSTERECTOMY  1973    partial    MD COLSC FLX W/RMVL OF TUMOR POLYP LESION SNARE TQ N/A 7/18/2017    Procedure: COLONOSCOPY and biopsy ;  Surgeon: Laisha Jolley MD;  [facility];   Service: Gastroenterology    SKIN BIOPSY      pre cancerous on face    TONSILLECTOMY       History   Alcohol Use No     History   Drug Use No     History   Smoking Status    Former Smoker    Packs/day: 0 10    Years: 10 00    Quit date: 1970   Smokeless Tobacco    Never Used Family History:  Family History   Problem Relation Age of Onset    Heart disease Mother      murmur Cardiomegaly age 64    Pneumonia Father     Heart disease Brother      mitrsl valve    Parkinsonism Brother     Lupus Daughter     Diabetes Daughter     Depression Daughter          Meds/Allergies     Prior to Admission medications    Medication Sig Start Date End Date Taking?  Authorizing Provider   carbidopa-levodopa-entacapone (STALEVO) -200 MG per tablet take 1 tablet by mouth four times a day 3/2/18  Yes Hermelindo Benoit MD   carvedilol (COREG) 3 125 mg tablet Take 3 125 mg by mouth 2 (two) times a day with meals   Yes Historical Provider, MD   Cholecalciferol (VITAMIN D3) 2000 units TABS Take 2,000 Units by mouth every morning   Yes Historical Provider, MD   donepezil (ARICEPT) 10 mg tablet TAKE 1/2 TABLET (5MG) TABLET AT BED TIME FOR 4 WEEKS AND THEN IF TOLERATED TAKE 1/2 TABLET (5MG) TAB 1/29/18  Yes Hermelindo Benoit MD   furosemide (LASIX) 20 mg tablet Take 2 tablets (40 mg total) by mouth once for 1 dose 3/19/18  Yes STACY Crocker   levothyroxine 25 mcg tablet Take 1 tablet (25 mcg total) by mouth every morning 2/27/18  Yes Jaime Gutierrez MD   lisinopril (ZESTRIL) 10 mg tablet take 1/2 tablet by mouth once daily 3/29/18  Yes Patrica Heath MD   Multiple Vitamins-Minerals (OCUVITE ADULT 50+ PO) Take by mouth daily with breakfast   Yes Historical Provider, MD   primidone (MYSOLINE) 50 mg tablet take 1/2 tablet by mouth once daily 3/12/18  Yes Hermelindo Benoit MD   spironolactone (ALDACTONE) 25 mg tablet Take 25 mg by mouth every morning Takes half tab= 12 5mg each dose   Yes Historical Provider, MD   warfarin (COUMADIN) 2 mg tablet Take by mouth daily Tues, Thurs, Sat and Sun   Yes Historical Provider, MD   warfarin (COUMADIN) 2 5 mg tablet Take by mouth daily Mon Wed Fri   Yes Historical Provider, MD   albuterol (PROVENTIL HFA,VENTOLIN HFA) 90 mcg/act inhaler Inhale 2 puffs every 6 (six) hours as needed for wheezing 3/23/18   STACY Crocker   benzonatate (TESSALON) 200 MG capsule Take 1 capsule (200 mg total) by mouth 3 (three) times a day as needed for cough 3/28/18   Carlos Eduardo Goldberg MD   fluticasone (FLONASE) 50 mcg/act nasal spray 1 spray into each nostril daily    Historical Provider, MD   Melatonin 5 MG CAPS Take by mouth 10/4/17   Historical Provider, MD   Tiotropium Bromide-Olodaterol (STIOLTO RESPIMAT) 2 5-2 5 MCG/ACT AERS Inhale 2 puffs daily 3/23/18   STACY Salgado   carbidopa-levodopa (SINEMET)  mg per tablet Take 1 tablet by mouth 4 (four) times a day  3/2/18  Historical Provider, MD       No Known Allergies      Vitals:    04/04/18 0803   BP: 100/56   BP Location: Left arm   Patient Position: Sitting   Cuff Size: Standard   Pulse: 96   SpO2: 99%   Weight: 79 4 kg (175 lb)   Height: 5' 5" (1 651 m)       Body mass index is 29 12 kg/m²    1 pound weight loss in approximately 4 months    Physical Exam:    General Appearance:  Alert, cooperative, no distress, appears stated age   Head:  Normocephalic, without obvious abnormality, atraumatic   Eyes:  PERRL, conjunctiva/corneas clear, EOM's intact,   both eyes   Ears:  Normal TM's and external ear canals, both ears   Nose: Nares normal, septum midline, mucosa normal, no drainage or sinus tenderness   Throat: Lips, mucosa, and tongue normal; teeth and gums normal   Neck: Supple, symmetrical, trachea midline, no adenopathy, thyroid: not enlarged, symmetric, no tenderness/mass/nodules, no carotid bruit or JVD   Back:   Symmetric, no curvature, ROM normal, no CVA tenderness   Lungs:   Clear to auscultation bilaterally, respirations unlabored   Chest Wall:  No tenderness or deformity   Heart:  Irregularly irregular cardiac rhythm, under varying mitral valve prosthetic clicks coincident with S1, S2 normal, no murmur, rub or gallop   Abdomen:   Soft, non-tender, bowel sounds active all four quadrants,  no masses, no organomegaly   Extremities: Extremities normal, atraumatic, no cyanosis or edema   Pulses: 2+ and symmetric   Skin: Skin showed normal color, texture, turgor and no rashes or lesions   Lymph nodes: Cervical, supraclavicular, and axillary nodes normal   Neurologic: Normal         Cardiographics    ECG rhythm strip 04/04/2018:    Controlled atrial fibrillation with average ventricular rate 75-90 bpm  Left bundle branch block configuration  Similar to 12/04/2017    Imaging    Chest X-Ray :  Xr Chest Pa & Lateral    Result Date: 3/20/2018  Impression No acute cardiopulmonary disease   Workstation performed: [id number]             Lab Review       INR 04/02/2018:  3 89    TSH level 03/15/2018:  4 47    NT-pro BNP 12/04/2017:    1073, decreased from 2868 one year ago    Lab Results   Component Value Date     03/15/2018    K 4 5 03/15/2018     03/15/2018    CO2 29 03/15/2018    ANIONGAP 5 03/15/2018    BUN 22 03/15/2018    CREATININE 1 07 03/15/2018    GLUCOSE 97 12/14/2016    GLUF 94 03/15/2018    CALCIUM 9 1 03/15/2018    AST 16 03/15/2018    ALT 21 03/15/2018    ALKPHOS 125 (H) 03/15/2018    PROT 6 9 03/15/2018    BILITOT 0 64 03/15/2018    EGFR 50 03/15/2018       Lab Results   Component Value Date    CHOL 151 03/15/2018    CHOL 163 03/23/2017    CHOL 168 12/07/2015     Lab Results   Component Value Date    HDL 56 03/15/2018    HDL 60 03/23/2017    HDL 63 (H) 12/07/2015     Lab Results   Component Value Date    LDLCALC 70 03/15/2018    LDLCALC 78 03/23/2017    LDLCALC 90 12/07/2015     Lab Results   Component Value Date    TRIG 127 03/15/2018    TRIG 124 03/23/2017    TRIG 77 12/07/2015     No components found for: CHOLHDL    Lab Results   Component Value Date    GLUCOSE 97 12/14/2016    CALCIUM 9 1 03/15/2018     03/15/2018    K 4 5 03/15/2018    CO2 29 03/15/2018     03/15/2018    BUN 22 03/15/2018    CREATININE 1 07 03/15/2018           Natalia Burciaga MD

## 2018-04-05 ENCOUNTER — OFFICE VISIT (OUTPATIENT)
Dept: FAMILY MEDICINE CLINIC | Facility: CLINIC | Age: 78
End: 2018-04-05
Payer: MEDICARE

## 2018-04-05 VITALS
SYSTOLIC BLOOD PRESSURE: 114 MMHG | HEIGHT: 65 IN | HEART RATE: 64 BPM | TEMPERATURE: 97.7 F | WEIGHT: 173 LBS | DIASTOLIC BLOOD PRESSURE: 70 MMHG | BODY MASS INDEX: 28.82 KG/M2

## 2018-04-05 DIAGNOSIS — J02.9 PHARYNGITIS, UNSPECIFIED ETIOLOGY: Primary | ICD-10-CM

## 2018-04-05 LAB — S PYO AG THROAT QL: NEGATIVE

## 2018-04-05 PROCEDURE — 99213 OFFICE O/P EST LOW 20 MIN: CPT | Performed by: NURSE PRACTITIONER

## 2018-04-05 PROCEDURE — 87880 STREP A ASSAY W/OPTIC: CPT | Performed by: NURSE PRACTITIONER

## 2018-04-05 NOTE — PROGRESS NOTES
Assessment/Plan:   1  Pharyngitis, unspecified etiology    - POCT rapid strepA  Rapid strep test was negative in the office  Salt water gargles  Lozenges  Tylenol or Ibuprofen as needed  Fluids  Subjective:     Patient ID: Pastora Gordon is a 65 y o  female who presents for sore throat  Sore throat for 3 days  Temp max 98 9  Had flu a few weeks ago  Denies cough, nasal congestion  Did not take any otc meds  Review of Systems   Constitutional: Positive for fever (subjective with temp max 98 9)  HENT: Positive for sore throat  Negative for congestion, ear pain, postnasal drip, sinus pain and sinus pressure  Respiratory: Negative for cough  Neurological: Negative for dizziness and headaches  Objective:   4/5/18 1:16 PM      BP  114/70     Pulse  64     Temp  97 7 F (36 5 C)     Temp src  Temporal     Weight   78 5 kg (173 lb)     Height  5' 5" (1 651 m)         Physical Exam   Constitutional: She is oriented to person, place, and time  She appears well-developed and well-nourished  No distress  HENT:   TMS WNL  Turbinates inflamed  Oropharynx with no erythema or exudate  No sinus tenderness to palpation    (+) PND  Rapid strep negative   Cardiovascular: Normal rate, regular rhythm and normal heart sounds  Pulmonary/Chest: Effort normal and breath sounds normal    Lymphadenopathy:     She has cervical adenopathy (slightly enlarged and slightly tender nodes)  Neurological: She is alert and oriented to person, place, and time  Skin: Skin is warm and dry  Psychiatric: She has a normal mood and affect

## 2018-04-05 NOTE — PATIENT INSTRUCTIONS
Rapid strep test was negative in the office  Salt water gargles  Lozenges  Tylenol as needed  Fluids

## 2018-04-09 ENCOUNTER — ANTICOAG VISIT (OUTPATIENT)
Dept: CARDIOLOGY CLINIC | Facility: CLINIC | Age: 78
End: 2018-04-09

## 2018-04-09 ENCOUNTER — TRANSCRIBE ORDERS (OUTPATIENT)
Dept: LAB | Facility: CLINIC | Age: 78
End: 2018-04-09

## 2018-04-09 ENCOUNTER — APPOINTMENT (OUTPATIENT)
Dept: LAB | Facility: CLINIC | Age: 78
End: 2018-04-09
Payer: MEDICARE

## 2018-04-09 DIAGNOSIS — I48.20 CHRONIC ATRIAL FIBRILLATION (HCC): Primary | ICD-10-CM

## 2018-04-09 DIAGNOSIS — I48.91 ATRIAL FIBRILLATION, UNSPECIFIED TYPE (HCC): ICD-10-CM

## 2018-04-09 DIAGNOSIS — Z95.2 H/O MITRAL VALVE REPLACEMENT WITH MECHANICAL VALVE: ICD-10-CM

## 2018-04-09 LAB
INR PPP: 2.59 (ref 0.86–1.16)
PROTHROMBIN TIME: 28.1 SECONDS (ref 12.1–14.4)

## 2018-04-09 PROCEDURE — 85610 PROTHROMBIN TIME: CPT | Performed by: INTERNAL MEDICINE

## 2018-04-09 PROCEDURE — 36415 COLL VENOUS BLD VENIPUNCTURE: CPT | Performed by: INTERNAL MEDICINE

## 2018-04-23 ENCOUNTER — APPOINTMENT (OUTPATIENT)
Dept: LAB | Facility: CLINIC | Age: 78
End: 2018-04-23
Payer: MEDICARE

## 2018-04-23 ENCOUNTER — ANTICOAG VISIT (OUTPATIENT)
Dept: CARDIOLOGY CLINIC | Facility: CLINIC | Age: 78
End: 2018-04-23

## 2018-04-23 ENCOUNTER — TRANSCRIBE ORDERS (OUTPATIENT)
Dept: LAB | Facility: CLINIC | Age: 78
End: 2018-04-23

## 2018-04-23 DIAGNOSIS — I48.91 ATRIAL FIBRILLATION, UNSPECIFIED TYPE (HCC): ICD-10-CM

## 2018-04-23 DIAGNOSIS — Z95.2 H/O MITRAL VALVE REPLACEMENT WITH MECHANICAL VALVE: ICD-10-CM

## 2018-04-23 DIAGNOSIS — I48.20 CHRONIC ATRIAL FIBRILLATION (HCC): Primary | ICD-10-CM

## 2018-04-23 LAB
INR PPP: 2.66 (ref 0.86–1.16)
PROTHROMBIN TIME: 28.7 SECONDS (ref 12.1–14.4)

## 2018-04-23 PROCEDURE — 85610 PROTHROMBIN TIME: CPT | Performed by: INTERNAL MEDICINE

## 2018-04-23 PROCEDURE — 36415 COLL VENOUS BLD VENIPUNCTURE: CPT | Performed by: INTERNAL MEDICINE

## 2018-05-04 DIAGNOSIS — I50.42 CHRONIC COMBINED SYSTOLIC AND DIASTOLIC HEART FAILURE (HCC): Primary | ICD-10-CM

## 2018-05-04 RX ORDER — SPIRONOLACTONE 50 MG/1
TABLET, FILM COATED ORAL
Qty: 15 TABLET | Refills: 5 | Status: SHIPPED | OUTPATIENT
Start: 2018-05-04 | End: 2018-06-27 | Stop reason: ALTCHOICE

## 2018-05-04 RX ORDER — SPIRONOLACTONE 25 MG/1
TABLET ORAL
Qty: 30 TABLET | Refills: 5 | Status: CANCELLED | OUTPATIENT
Start: 2018-05-04

## 2018-05-04 RX ORDER — FUROSEMIDE 20 MG/1
TABLET ORAL
Qty: 16 TABLET | Refills: 5 | Status: SHIPPED | OUTPATIENT
Start: 2018-05-04 | End: 2018-11-19 | Stop reason: SDUPTHER

## 2018-05-14 ENCOUNTER — TRANSCRIBE ORDERS (OUTPATIENT)
Dept: LAB | Facility: CLINIC | Age: 78
End: 2018-05-14

## 2018-05-14 ENCOUNTER — ANTICOAG VISIT (OUTPATIENT)
Dept: CARDIOLOGY CLINIC | Facility: CLINIC | Age: 78
End: 2018-05-14

## 2018-05-14 ENCOUNTER — APPOINTMENT (OUTPATIENT)
Dept: LAB | Facility: CLINIC | Age: 78
End: 2018-05-14
Payer: MEDICARE

## 2018-05-14 DIAGNOSIS — I48.20 CHRONIC ATRIAL FIBRILLATION (HCC): Primary | ICD-10-CM

## 2018-05-14 DIAGNOSIS — I48.91 ATRIAL FIBRILLATION, UNSPECIFIED TYPE (HCC): ICD-10-CM

## 2018-05-14 DIAGNOSIS — Z95.2 H/O MITRAL VALVE REPLACEMENT WITH MECHANICAL VALVE: ICD-10-CM

## 2018-05-14 LAB
INR PPP: 2.83 (ref 0.86–1.16)
PROTHROMBIN TIME: 30.1 SECONDS (ref 12.1–14.4)

## 2018-05-14 PROCEDURE — 85610 PROTHROMBIN TIME: CPT | Performed by: INTERNAL MEDICINE

## 2018-05-14 PROCEDURE — 36415 COLL VENOUS BLD VENIPUNCTURE: CPT | Performed by: INTERNAL MEDICINE

## 2018-05-16 ENCOUNTER — OFFICE VISIT (OUTPATIENT)
Dept: NEUROLOGY | Facility: CLINIC | Age: 78
End: 2018-05-16
Payer: MEDICARE

## 2018-05-16 VITALS
DIASTOLIC BLOOD PRESSURE: 64 MMHG | HEIGHT: 65 IN | BODY MASS INDEX: 29.82 KG/M2 | SYSTOLIC BLOOD PRESSURE: 108 MMHG | WEIGHT: 179 LBS | HEART RATE: 52 BPM

## 2018-05-16 DIAGNOSIS — R20.2 NUMBNESS AND TINGLING IN RIGHT HAND: ICD-10-CM

## 2018-05-16 DIAGNOSIS — R20.0 NUMBNESS AND TINGLING IN RIGHT HAND: ICD-10-CM

## 2018-05-16 DIAGNOSIS — G20 PARKINSON DISEASE (HCC): Primary | ICD-10-CM

## 2018-05-16 DIAGNOSIS — R41.89 COGNITIVE IMPAIRMENT: ICD-10-CM

## 2018-05-16 PROCEDURE — 99215 OFFICE O/P EST HI 40 MIN: CPT | Performed by: PSYCHIATRY & NEUROLOGY

## 2018-05-16 RX ORDER — MEMANTINE HYDROCHLORIDE 10 MG/1
10 TABLET ORAL DAILY
Qty: 30 TABLET | Refills: 1 | Status: SHIPPED | OUTPATIENT
Start: 2018-06-16 | End: 2018-07-08 | Stop reason: SDUPTHER

## 2018-05-16 RX ORDER — MEMANTINE HYDROCHLORIDE 5 MG/1
5 TABLET ORAL DAILY
Qty: 30 TABLET | Refills: 0 | Status: SHIPPED | OUTPATIENT
Start: 2018-05-16 | End: 2018-07-12

## 2018-05-16 RX ORDER — TRIHEXYPHENIDYL HYDROCHLORIDE 2 MG/1
TABLET ORAL
Qty: 90 TABLET | Refills: 2 | Status: SHIPPED | OUTPATIENT
Start: 2018-05-16 | End: 2018-07-12

## 2018-05-16 NOTE — PROGRESS NOTES
Return NeuroOutpatient Note        Meron Castillo  [id number]  56 y o   1940       Parkinson's Disease        History obtained from:  Patient and      HPI/Subjective:  Mrs Latesha Valdez returns as follow up for Parkinson's disease  Per my previous reports, Her disease is manifested by masked facies, hypophonic voice, bradykinesia  She was diagnosed about 4 years ago  Today she says that over past 2-3 months, she has had more trouble with her balance  Her speech is slower now compared to prior  Her swallowing has also been affected  She feels there's a delay  She now uses cane  still walks without a cane unless if she knows she has long walks  She feels more exhausted  She had a fall about 2 weeks ago and when she tripped over something  She has noted that she' more clumsy now  She doesn't think she notices a difference if she misses a dose of sinemet  She tends to drop things out of her right hand  She can't  objects  She has noted some numbness in right hand  In terms of memory disturbance, she's stable  She says she can't read for more than few minutes  She used to read one book a day  She could only tolerate aricept 5mg daily   She had previously reported memory disturbance  She was placed on aricept in Feb  At 10mg dose, she was getting GI upset so was resumed on 5mg daily  She says her memory is stable  She denies any hallucinations  Denies REM sleep behavior  She also has essential tremors and is on low dose primidone 25mg qhs  She couldn't tolerate higher dose  She does report fatigue but she's on multiple medications that can contribute to fatigue  She's on coumadin for artificial valve since 1990's        Past Medical History:   Diagnosis Date    Arthritis     osteo joints    CHF, chronic (HCC)     Disease of thyroid gland     Drooling     Dysphagia     Fall 05/04/2018    Fibromyalgia, primary     History of transfusion 1996    Hx of transient ischemic attack (TIA)     Hypophonia     Infectious viral hepatitis     Hep C dx 1996     Lyme carditis     Murmur, cardiac     Parkinsons (HCC)     Seasonal allergies     Urinary frequency      Social History     Social History    Marital status: /Civil Union     Spouse name: N/A    Number of children: N/A    Years of education: N/A     Occupational History    Not on file       Social History Main Topics    Smoking status: Former Smoker     Packs/day: 0 10     Years: 10 00     Quit date: 1970    Smokeless tobacco: Never Used    Alcohol use No    Drug use: No    Sexual activity: Not on file     Other Topics Concern    Not on file     Social History Narrative    No narrative on file     Family History   Problem Relation Age of Onset    Heart disease Mother      murmur Cardiomegaly age 64    Pneumonia Father     Heart disease Brother      mitrsl valve    Parkinsonism Brother     Lupus Daughter     Diabetes Daughter     Depression Daughter      No Known Allergies  Current Outpatient Prescriptions on File Prior to Visit   Medication Sig Dispense Refill    albuterol (PROVENTIL HFA,VENTOLIN HFA) 90 mcg/act inhaler Inhale 2 puffs every 6 (six) hours as needed for wheezing 1 Inhaler 0    carbidopa-levodopa-entacapone (STALEVO) -200 MG per tablet take 1 tablet by mouth four times a day 120 tablet 4    carvedilol (COREG) 3 125 mg tablet Take 3 125 mg by mouth 2 (two) times a day with meals      Cholecalciferol (VITAMIN D3) 2000 units TABS Take 2,000 Units by mouth every morning      donepezil (ARICEPT) 10 mg tablet TAKE 1/2 TABLET (5MG) TABLET AT BED TIME FOR 4 WEEKS AND THEN IF TOLERATED TAKE 1/2 TABLET (5MG) TAB 30 tablet 4    furosemide (LASIX) 20 mg tablet take 1 tablet by mouth ON MONDAY, WEDNESDAY, FRIDAY, AND SUNDAY 16 tablet 5    levothyroxine 25 mcg tablet Take 1 tablet (25 mcg total) by mouth every morning 90 tablet 2    lisinopril (ZESTRIL) 10 mg tablet take 1/2 tablet by mouth once daily 15 tablet 5    Multiple Vitamins-Minerals (OCUVITE ADULT 50+ PO) Take by mouth daily with breakfast      primidone (MYSOLINE) 50 mg tablet take 1/2 tablet by mouth once daily 30 tablet 2    spironolactone (ALDACTONE) 50 mg tablet Take 1/2 tablet = 25 milligrams once daily 15 tablet 5    warfarin (COUMADIN) 2 mg tablet Take by mouth daily Tues, Thurs, and Sun       warfarin (COUMADIN) 2 5 mg tablet Take by mouth daily Mon Wed Fri and Sat   benzonatate (TESSALON) 200 MG capsule Take 1 capsule (200 mg total) by mouth 3 (three) times a day as needed for cough 20 capsule 0    fluticasone (FLONASE) 50 mcg/act nasal spray 1 spray into each nostril daily      Melatonin 5 MG CAPS Take by mouth      Tiotropium Bromide-Olodaterol (STIOLTO RESPIMAT) 2 5-2 5 MCG/ACT AERS Inhale 2 puffs daily 1 Inhaler 0    [DISCONTINUED] carbidopa-levodopa (SINEMET)  mg per tablet Take 1 tablet by mouth 4 (four) times a day       No current facility-administered medications on file prior to visit  Review of Systems   Refer to positive review of systems in HPI     Constitutional- No fever  Eyes- No visual change  ENT- Hearing normal  CV- No chest pain  Resp- No Shortness of breath  GI- No diarrhea  - Bladder normal  MS- No Arthritis   Skin- No rash  Psych- No depression  Endo- No DM  Heme- No nodes    Vitals:    05/16/18 1332   BP: 108/64   BP Location: Left arm   Patient Position: Sitting   Pulse: (!) 52   Weight: 81 2 kg (179 lb)   Height: 5' 5" (1 651 m)       PHYSICAL EXAM:  Appearance: No Acute Distress, masked facies*  Ophthalmoscopic: Disc Flat, Normal fundus  Mental status:  Orientation: Awake, Alert, and Orientedx3  Memory: Registation 3/3 Recall 2/3  Attention: normal  Knowledge: good  Language: No aphasia  Speech: No dysarthria  Cranial Nerves:  2 No Visual Defect on Confrontation, Pupils round, equal, reactive to light  3,4,6 Extraocular Movements Intact, no nystagmus  5 Facial Sensation Intact  7 No facial asymmetry  8 Intact hearing  9,10 Palate symmetric, normal gag  11 Good shoulder shrug  12 Tongue Midline  Gait: unsteady at first then can ambulate independently, feels more steady with cane   Coordination: essential tremor in right hand with finger to nose No ataxia with finger to nose testing, and heel to shin  Sensory: Intact, Symmetric to pinprick, light touch, vibration, and joint position  Muscle Tone: mild increase in rigidity in LUE, LLE, no rigidity in right side  Muscle exam:  Arm Right Left Leg Right Left   Deltoid 5/5 5/5 Iliopsoas 5/5 5/5   Biceps 5/5 5/5 Quads 5/5 5/5   Triceps 5/5 5/5 Hamstrings 5/5 5/5   Wrist Extension 5/5 5/5 Ankle Dorsi Flexion 5/5 5/5   Wrist Flexion 5/5 5/5 Ankle Plantar Flexion 5/5 5/5   Interossei 5/5 5/5 Ankle Eversion 5/5 5/5   APB 5/5 5/5 Ankle Inversion 5/5 5/5       Reflexes   RJ BJ TJ KJ AJ Plantars Ayala's   Right 2+ 2+ 2+ 2+ 1+ Downgoing Not present   Left 2+ 2+ 2+ 2+ 1+ Downgoing Not present     Personal review of  Labs:                    Diagnoses and all orders for this visit:      1  Parkinson disease (HCC)  trihexyphenidyl (ARTANE) 2 mg tablet   2  Cognitive impairment  memantine (NAMENDA) 5 mg tablet    memantine (NAMENDA) 10 mg tablet   3  Numbness and tingling in right hand  EMG 1 Limb       Patient's gait is more unsteady compared to previous visits  Will add artane to her regimen  She may resume stalevo qid as prior at this time  Will get EMG of RUE to r/o compressive neuropathy   For dementia, will try to add namenda if she can tolerate  Counseling Documentation:  The patient and/or patient's family were  counseled regarding diagnostic results  Instructions for management,risk factor reductions,prognosis of disease were discussed  Patient and family were educated regarding impressions,risks and benefits of treatment options,importance of compliance with treatment        Total time of encounter:  40 min  More than 50% of the time was used in counseling and/or coordination of care  Extent of counseling and/or coordination of care        MD Nia Mcduffie [facility]  [facility] [address]  [phone]

## 2018-05-18 ENCOUNTER — OFFICE VISIT (OUTPATIENT)
Dept: PODIATRY | Facility: CLINIC | Age: 78
End: 2018-05-18
Payer: MEDICARE

## 2018-05-18 ENCOUNTER — APPOINTMENT (OUTPATIENT)
Dept: RADIOLOGY | Facility: CLINIC | Age: 78
End: 2018-05-18
Payer: MEDICARE

## 2018-05-18 ENCOUNTER — OFFICE VISIT (OUTPATIENT)
Dept: OBGYN CLINIC | Facility: CLINIC | Age: 78
End: 2018-05-18
Payer: MEDICARE

## 2018-05-18 VITALS
HEART RATE: 59 BPM | BODY MASS INDEX: 29.66 KG/M2 | HEIGHT: 65 IN | DIASTOLIC BLOOD PRESSURE: 75 MMHG | RESPIRATION RATE: 16 BRPM | WEIGHT: 178 LBS | SYSTOLIC BLOOD PRESSURE: 120 MMHG

## 2018-05-18 VITALS
BODY MASS INDEX: 29.66 KG/M2 | DIASTOLIC BLOOD PRESSURE: 67 MMHG | HEIGHT: 65 IN | SYSTOLIC BLOOD PRESSURE: 100 MMHG | HEART RATE: 73 BPM | WEIGHT: 178 LBS

## 2018-05-18 DIAGNOSIS — S82.001A CLOSED NONDISPLACED FRACTURE OF RIGHT PATELLA, UNSPECIFIED FRACTURE MORPHOLOGY, INITIAL ENCOUNTER: ICD-10-CM

## 2018-05-18 DIAGNOSIS — M25.561 RIGHT KNEE PAIN, UNSPECIFIED CHRONICITY: ICD-10-CM

## 2018-05-18 DIAGNOSIS — G47.62 NOCTURNAL LEG CRAMPS: ICD-10-CM

## 2018-05-18 DIAGNOSIS — I70.213 ATHEROSCLEROSIS OF NATIVE ARTERY OF BOTH LOWER EXTREMITIES WITH INTERMITTENT CLAUDICATION (HCC): Primary | ICD-10-CM

## 2018-05-18 DIAGNOSIS — B35.1 ONYCHOMYCOSIS: ICD-10-CM

## 2018-05-18 DIAGNOSIS — M79.672 PAIN IN BOTH FEET: ICD-10-CM

## 2018-05-18 DIAGNOSIS — M25.561 RIGHT KNEE PAIN, UNSPECIFIED CHRONICITY: Primary | ICD-10-CM

## 2018-05-18 DIAGNOSIS — M79.671 PAIN IN BOTH FEET: ICD-10-CM

## 2018-05-18 DIAGNOSIS — M77.50 TENDINITIS OF FOOT: ICD-10-CM

## 2018-05-18 PROCEDURE — 99203 OFFICE O/P NEW LOW 30 MIN: CPT | Performed by: PODIATRIST

## 2018-05-18 PROCEDURE — 73562 X-RAY EXAM OF KNEE 3: CPT

## 2018-05-18 PROCEDURE — 99214 OFFICE O/P EST MOD 30 MIN: CPT | Performed by: ORTHOPAEDIC SURGERY

## 2018-05-18 NOTE — PROGRESS NOTES
Assessment/Plan:  1  Right knee pain, unspecified chronicity  XR knee 3 vw right non injury   2  Closed nondisplaced fracture of right patella, unspecified fracture morphology, initial encounter      Patient presents today 2 weeks status post fall in her home over some boxes  She had significant amount of knee pain that was continuing to resolve however she has persistent anterior knee pain that has not completely resolved  After thorough history, clinical exam, and review of her x-rays I do appreciate what appears to be a nondisplaced superior patella fracture of her right knee  She has no extensor mechanism deficit, her strength is 5/5 in her quad without pain reproduced over the patella and her range of motion is up to 120°  She does have significant tenderness palpation over this area of the superior patella with small amount of crepitance  These findings with a suspected radiolucency on the AP projection of her knee in the superior pole of patella suggest nondisplaced patella fracture  She is not require any surgical intervention  Her range of motion is excellent and relatively pain-free therefore I do not even feel the need for bracing at this 0 2 weeks after her injury  I recommended she may continue to be weight-bearing as tolerated, monitor her activities he continue to ambulate with a cane while using Tylenol and ice for pain control  She does have a cruise in 2 weeks and I asked her to modify her activities during her trip  I will see her back in 6 weeks time with repeat x-rays of her right knee  The patient may call the office at anytime if any questions or concerns should arise  Subjective:  Right knee pain    Patient ID: Michael Messer is a 65 y o  female  HPI  Patient is here for evaluation of ongoing anterior right knee pain that has been persistent since a fall onto the anterior portion of her knee approximately 2 weeks ago    She had more diffuse pain throughout her knee previously however this has dissipated and she is left with a significant amount of anterior knee pain  The pain is approximately 5/10 on this best day and is not completely resolved  The pain is present with direct palpation or touching of the anterior knee however she does not have pain with flexion of the knee  She denies any paresthesias down the knee  She denies any swelling within the knee but does have some swelling over the anterior portion of the knee  She is on Coumadin  She denies any other associated injuries after a fall  She is here to have her anterior knee pain evaluated  Review of Systems   Constitutional: Positive for activity change  HENT: Negative  Eyes: Negative  Respiratory: Negative  Cardiovascular: Negative  Gastrointestinal: Negative  Endocrine: Negative  Musculoskeletal: Positive for arthralgias  Skin: Negative  Neurological: Negative  Psychiatric/Behavioral: Negative            Past Medical History:   Diagnosis Date    Anal fissure     Arthritis     osteo joints    Asthma with acute exacerbation     Blepharitis     Breast lump     Chalazion     CHF, chronic (HCC)     Difficulty walking     Disease of thyroid gland     Drooling     Dysphagia     Fall 05/04/2018    Fibromyalgia, primary     History of transfusion 1996    Hordeolum externum     Hx of transient ischemic attack (TIA)     Hypophonia     Infectious viral hepatitis     Hep C dx 1996     Lyme carditis     Murmur, cardiac     Parkinsons (HCC)     Rotator cuff tendinitis     Seasonal allergies     Urinary frequency        Past Surgical History:   Procedure Laterality Date    BREAST SURGERY N/A     benign, calcium    CARDIAC SURGERY  1990    mitral valve replacement    CATARACT EXTRACTION Right 2015    CATARACT EXTRACTION Left 2014    CHEST TUBE INSERTION Right     post pleural effusion    CHOLECYSTECTOMY  2000    lap    HYSTERECTOMY  1973    partial    [place] COLSC FLX W/RMVL OF TUMOR POLYP LESION SNARE TQ N/A 7/18/2017    Procedure: COLONOSCOPY and biopsy ;  Surgeon: Shell Anderson MD;  Location: [facility]; Service: Gastroenterology    SKIN BIOPSY      pre cancerous on face    TONSILLECTOMY         Family History   Problem Relation Age of Onset    Heart disease Mother      murmur Cardiomegaly age 64    Pneumonia Father     Heart disease Brother      mitrsl valve    Parkinsonism Brother     Arthritis Brother     Lupus Daughter     Diabetes Daughter     Depression Daughter        Social History     Occupational History    Not on file       Social History Main Topics    Smoking status: Former Smoker     Packs/day: 0 10     Years: 10 00     Quit date: 1970    Smokeless tobacco: Never Used    Alcohol use No    Drug use: No    Sexual activity: Not on file         Current Outpatient Prescriptions:     albuterol (PROVENTIL HFA,VENTOLIN HFA) 90 mcg/act inhaler, Inhale 2 puffs every 6 (six) hours as needed for wheezing, Disp: 1 Inhaler, Rfl: 0    benzonatate (TESSALON) 200 MG capsule, Take 1 capsule (200 mg total) by mouth 3 (three) times a day as needed for cough, Disp: 20 capsule, Rfl: 0    carbidopa-levodopa-entacapone (STALEVO) -200 MG per tablet, take 1 tablet by mouth four times a day, Disp: 120 tablet, Rfl: 4    carvedilol (COREG) 3 125 mg tablet, Take 3 125 mg by mouth 2 (two) times a day with meals, Disp: , Rfl:     Cholecalciferol (VITAMIN D3) 2000 units TABS, Take 2,000 Units by mouth every morning, Disp: , Rfl:     donepezil (ARICEPT) 10 mg tablet, TAKE 1/2 TABLET (5MG) TABLET AT BED TIME FOR 4 WEEKS AND THEN IF TOLERATED TAKE 1/2 TABLET (5MG) TAB, Disp: 30 tablet, Rfl: 4    fluticasone (FLONASE) 50 mcg/act nasal spray, 1 spray into each nostril daily, Disp: , Rfl:     furosemide (LASIX) 20 mg tablet, take 1 tablet by mouth ON MONDAY, WEDNESDAY, FRIDAY, AND SUNDAY, Disp: 16 tablet, Rfl: 5    levothyroxine 25 mcg tablet, Take 1 tablet (25 mcg total) by mouth every morning, Disp: 90 tablet, Rfl: 2    lisinopril (ZESTRIL) 10 mg tablet, take 1/2 tablet by mouth once daily, Disp: 15 tablet, Rfl: 5    Melatonin 5 MG CAPS, Take by mouth, Disp: , Rfl:     [START ON 6/16/2018] memantine (NAMENDA) 10 mg tablet, Take 1 tablet (10 mg total) by mouth daily Starting June 16th , Disp: 30 tablet, Rfl: 1    memantine (NAMENDA) 5 mg tablet, Take 1 tablet (5 mg total) by mouth daily, Disp: 30 tablet, Rfl: 0    Multiple Vitamins-Minerals (OCUVITE ADULT 50+ PO), Take by mouth daily with breakfast, Disp: , Rfl:     primidone (MYSOLINE) 50 mg tablet, take 1/2 tablet by mouth once daily, Disp: 30 tablet, Rfl: 2    spironolactone (ALDACTONE) 50 mg tablet, Take 1/2 tablet = 25 milligrams once daily, Disp: 15 tablet, Rfl: 5    Tiotropium Bromide-Olodaterol (STIOLTO RESPIMAT) 2 5-2 5 MCG/ACT AERS, Inhale 2 puffs daily, Disp: 1 Inhaler, Rfl: 0    trihexyphenidyl (ARTANE) 2 mg tablet, Take 1 tab twice daily for one week and then take 1 tab three times daily  , Disp: 90 tablet, Rfl: 2    warfarin (COUMADIN) 2 mg tablet, Take by mouth daily Tues, Thurs, and Sun , Disp: , Rfl:     warfarin (COUMADIN) 2 5 mg tablet, Take by mouth daily Mon Wed Fri and Sat  , Disp: , Rfl:     No Known Allergies    Objective:  Vitals:    05/18/18 1354   BP: 100/67   Pulse: 73       Body mass index is 29 62 kg/m²  Right Knee Exam     Tenderness   The patient is experiencing tenderness in the patella  Range of Motion   Extension:  0 normal   Flexion:  120 normal     Tests   Varus: negative  Valgus: negative    Other   Erythema: absent  Scars: absent  Sensation: normal  Pulse: present  Swelling: mild  Other tests: no effusion present    Comments: There is no effusion of the knee however there is mild anterior knee swelling with resolving ecchymosis over the anterior portion of the knee  There is crepitance with palpation over the superior anterior portion of the patella   The crepitance is associated with significant pain on palpation over the anterior portion of the patella  Range of motion is from 0-120 degrees that is without pain over the anterior knee  Extensor mechanism is intact  No anterior knee pain with resisted knee extension, compartments soft, skin intact          Observations     Right Knee   Negative for effusion  Physical Exam   Constitutional: She is oriented to person, place, and time  She appears well-developed  HENT:   Head: Atraumatic  Eyes: EOM are normal    Neck: Neck supple  Cardiovascular: Normal rate  Pulmonary/Chest: Effort normal    Musculoskeletal:        Right knee: She exhibits no effusion  See orthopedic exam   Neurological: She is alert and oriented to person, place, and time  Skin: Skin is warm and dry  Psychiatric: She has a normal mood and affect  Nursing note and vitals reviewed  I have personally reviewed pertinent films in PACS  X-rays of the right knee obtained here in the office today demonstrate a radiolucency in the superior portion of the right patella that is in what appears to be a key still configuration  This is only seen on 1 projection and cannot be visualized on the lateral or sunrise  This may represent a nondisplaced superior patella fracture that is longitudinal in nature and may represent more of an impaction type of fracture  No knee effusion no other intra-articular deformity appreciated  There is mild chondrocalcinosis within the right knee  Karli CISNEROS    Division of Adult Reconstruction  Department of [facility]

## 2018-05-18 NOTE — PROGRESS NOTES
Assessment/Plan:    Discussed stretching  Patient will discuss with medical doctor nighttime cramps discussed possible side effect of Lasix  Discussed possible need for potassium supplementation  Patient does eat bananas frequently  Aseptic debridement and planning of nails x10 and manually and mechanically  Discussed importance of staying well hydrated, patient will take multivitamin  Discussed treatments for bunion hammertoes  Daily foot checks monitor for signs of infection  Follow-up 2 months     Diagnoses and all orders for this visit:    Atherosclerosis of native artery of both lower extremities with intermittent claudication (HCC)  -     VAS OMEGA & waveform analysis, multiple levels; Future    Nocturnal leg cramps    Onychomycosis    Pain in both feet    Tendinitis of foot          Subjective:      Patient ID: Pipo Nogueira is a 65 y o  female  Patient gets cramping the feet and legs once or twice a week  Has been having for the past several months  Often relieved with massage and walking  Patient has history of Parkinson's, CHF, atrial fibrillation  History of falls  History of left ankle sprain  Left ankle is mostly better  Patient also gets cramping in legs after walking more than 1 block relieved with 10 min of rest   Patient gets swelling in feet and lower legs minimal swelling in the rest of the leg  No calf tenderness  Patient has thick painful nails that hurt when walking and wearing shoes          Past Medical History:   Diagnosis Date    Anal fissure     Arthritis     osteo joints    Asthma with acute exacerbation     Blepharitis     Breast lump     Chalazion     CHF, chronic (HCC)     Difficulty walking     Disease of thyroid gland     Drooling     Dysphagia     Fall 05/04/2018    Fibromyalgia, primary     History of transfusion 1996    Hordeolum externum     Hx of transient ischemic attack (TIA)     Hypophonia     Infectious viral hepatitis     Hep C dx 1996     Lyme carditis     Murmur, cardiac     Parkinsons (HCC)     Rotator cuff tendinitis     Seasonal allergies     Urinary frequency          Current Outpatient Prescriptions:     albuterol (PROVENTIL HFA,VENTOLIN HFA) 90 mcg/act inhaler, Inhale 2 puffs every 6 (six) hours as needed for wheezing, Disp: 1 Inhaler, Rfl: 0    benzonatate (TESSALON) 200 MG capsule, Take 1 capsule (200 mg total) by mouth 3 (three) times a day as needed for cough, Disp: 20 capsule, Rfl: 0    carbidopa-levodopa-entacapone (STALEVO) -200 MG per tablet, take 1 tablet by mouth four times a day, Disp: 120 tablet, Rfl: 4    carvedilol (COREG) 3 125 mg tablet, Take 3 125 mg by mouth 2 (two) times a day with meals, Disp: , Rfl:     Cholecalciferol (VITAMIN D3) 2000 units TABS, Take 2,000 Units by mouth every morning, Disp: , Rfl:     donepezil (ARICEPT) 10 mg tablet, TAKE 1/2 TABLET (5MG) TABLET AT BED TIME FOR 4 WEEKS AND THEN IF TOLERATED TAKE 1/2 TABLET (5MG) TAB, Disp: 30 tablet, Rfl: 4    fluticasone (FLONASE) 50 mcg/act nasal spray, 1 spray into each nostril daily, Disp: , Rfl:     furosemide (LASIX) 20 mg tablet, take 1 tablet by mouth ON MONDAY, WEDNESDAY, FRIDAY, AND SUNDAY, Disp: 16 tablet, Rfl: 5    levothyroxine 25 mcg tablet, Take 1 tablet (25 mcg total) by mouth every morning, Disp: 90 tablet, Rfl: 2    lisinopril (ZESTRIL) 10 mg tablet, take 1/2 tablet by mouth once daily, Disp: 15 tablet, Rfl: 5    Melatonin 5 MG CAPS, Take by mouth, Disp: , Rfl:     [START ON 6/16/2018] memantine (NAMENDA) 10 mg tablet, Take 1 tablet (10 mg total) by mouth daily Starting June 16th , Disp: 30 tablet, Rfl: 1    memantine (NAMENDA) 5 mg tablet, Take 1 tablet (5 mg total) by mouth daily, Disp: 30 tablet, Rfl: 0    Multiple Vitamins-Minerals (OCUVITE ADULT 50+ PO), Take by mouth daily with breakfast, Disp: , Rfl:     primidone (MYSOLINE) 50 mg tablet, take 1/2 tablet by mouth once daily, Disp: 30 tablet, Rfl: 2    spironolactone (ALDACTONE) 50 mg tablet, Take 1/2 tablet = 25 milligrams once daily, Disp: 15 tablet, Rfl: 5    Tiotropium Bromide-Olodaterol (STIOLTO RESPIMAT) 2 5-2 5 MCG/ACT AERS, Inhale 2 puffs daily, Disp: 1 Inhaler, Rfl: 0    trihexyphenidyl (ARTANE) 2 mg tablet, Take 1 tab twice daily for one week and then take 1 tab three times daily  , Disp: 90 tablet, Rfl: 2    warfarin (COUMADIN) 2 mg tablet, Take by mouth daily Tues, Thurs, and Sun , Disp: , Rfl:     warfarin (COUMADIN) 2 5 mg tablet, Take by mouth daily Mon Wed Fri and Sat  , Disp: , Rfl:     Past Surgical History:   Procedure Laterality Date    BREAST SURGERY N/A     benign, calcium    CARDIAC SURGERY  1990    mitral valve replacement    CATARACT EXTRACTION Right 2015    CATARACT EXTRACTION Left 2014    CHEST TUBE INSERTION Right     post pleural effusion    CHOLECYSTECTOMY  2000    lap    HYSTERECTOMY  1973    partial    [place] COLSC FLX W/RMVL OF TUMOR POLYP LESION SNARE TQ N/A 7/18/2017    Procedure: COLONOSCOPY and biopsy ;  Surgeon: Ata Mauro MD;  Location: [facility]; Service: Gastroenterology    SKIN BIOPSY      pre cancerous on face    TONSILLECTOMY         No Known Allergies    Patient Active Problem List   Diagnosis    H/O mitral valve replacement with mechanical valve    Atrial fibrillation (HCC) [I48 91]    Benign familial tremor    Chronic [facility] C ([facility] [address] [facility]    Chronic right-sided low back pain without sciatica    Drug induced insomnia ([facility]    Dupuytren's contracture    Generalized osteoarthritis    Heart failure, left systolic, chronic (HCC)    Hypothyroidism    Memory impairment    Mitral valve disorder    Parkinsons ([address]    Peripheral vascular disease ([facility] [address] [facility]    Seasonal allergies    Transient ischemic attack    Vitamin D insufficiency       Review of Systems   Constitutional: Negative  HENT: Negative  Eyes: Negative  Respiratory: Negative  Cardiovascular: Negative  Gastrointestinal: Negative  Endocrine: Negative  Genitourinary: Negative  Musculoskeletal: Negative  Skin: Negative  Allergic/Immunologic: Negative  Neurological: Negative  Hematological: Negative  Psychiatric/Behavioral: Negative  Objective:      /75   Pulse 59   Resp 16   Ht 5' 5" (1 651 m)   Wt 80 7 kg (178 lb)   BMI 29 62 kg/m²          Physical Exam      DP pulses 1/4 bilateral, PT pulses nonpalpable, capillary refill time 6 sec times 10 temperature gradient increased bilateral   Plus one edema bilateral feet and ankles moderate venous stasis  All nails thick discolored dystrophic positive subungual debris positive pain on palpation  Significant bunion bilateral   Hammertoes 2 through 5 bilateral   There is some dorsal displacement of the 2nd and 3rd MPJ left worse than right secondary to bunion  Significant pes planus  Significant equinus bilateral   Muscle strength 5/5 all groups bilateral feet and ankles    No open wound or signs of infection  Vibratory sensation reduced bilateral monofilament sensation within normal limits

## 2018-05-24 ENCOUNTER — HOSPITAL ENCOUNTER (OUTPATIENT)
Dept: RADIOLOGY | Facility: HOSPITAL | Age: 78
Discharge: HOME | End: 2018-05-24
Attending: PODIATRIST
Payer: MEDICARE

## 2018-05-24 DIAGNOSIS — I70.213 ATHEROSCLEROSIS OF NATIVE ARTERY OF BOTH LOWER EXTREMITIES WITH INTERMITTENT CLAUDICATION (HCC): ICD-10-CM

## 2018-05-24 PROCEDURE — 93923 UPR/LXTR ART STDY 3+ LVLS: CPT

## 2018-05-25 PROCEDURE — 93923 UPR/LXTR ART STDY 3+ LVLS: CPT | Performed by: SURGERY

## 2018-05-29 ENCOUNTER — TELEPHONE (OUTPATIENT)
Dept: NEUROLOGY | Facility: CLINIC | Age: 78
End: 2018-05-29

## 2018-05-29 DIAGNOSIS — Z79.01 ANTICOAGULATED: Primary | ICD-10-CM

## 2018-05-29 RX ORDER — WARFARIN SODIUM 2.5 MG/1
TABLET ORAL
Qty: 30 TABLET | Refills: 4 | Status: SHIPPED | OUTPATIENT
Start: 2018-05-29 | End: 2018-09-10 | Stop reason: SDUPTHER

## 2018-05-29 NOTE — TELEPHONE ENCOUNTER
The patient had to discontinue Artane as it made her PD symptoms worse and had throat constriction  She was also walking into walls and "was out of it"

## 2018-06-18 ENCOUNTER — TRANSCRIBE ORDERS (OUTPATIENT)
Dept: LAB | Facility: CLINIC | Age: 78
End: 2018-06-18

## 2018-06-18 ENCOUNTER — ANTICOAG VISIT (OUTPATIENT)
Dept: CARDIOLOGY CLINIC | Facility: CLINIC | Age: 78
End: 2018-06-18

## 2018-06-18 ENCOUNTER — APPOINTMENT (OUTPATIENT)
Dept: LAB | Facility: CLINIC | Age: 78
End: 2018-06-18
Payer: MEDICARE

## 2018-06-18 DIAGNOSIS — I48.91 ATRIAL FIBRILLATION, UNSPECIFIED TYPE (HCC): ICD-10-CM

## 2018-06-18 DIAGNOSIS — Z95.2 H/O MITRAL VALVE REPLACEMENT WITH MECHANICAL VALVE: ICD-10-CM

## 2018-06-18 DIAGNOSIS — I48.20 CHRONIC ATRIAL FIBRILLATION (HCC): Primary | ICD-10-CM

## 2018-06-18 LAB
INR PPP: 2.77 (ref 0.86–1.17)
PROTHROMBIN TIME: 29.3 SECONDS (ref 11.8–14.2)

## 2018-06-18 PROCEDURE — 85610 PROTHROMBIN TIME: CPT | Performed by: INTERNAL MEDICINE

## 2018-06-18 PROCEDURE — 36415 COLL VENOUS BLD VENIPUNCTURE: CPT | Performed by: INTERNAL MEDICINE

## 2018-06-25 ENCOUNTER — TELEPHONE (OUTPATIENT)
Dept: CARDIOLOGY CLINIC | Facility: CLINIC | Age: 78
End: 2018-06-25

## 2018-06-25 DIAGNOSIS — I10 ESSENTIAL HYPERTENSION: Primary | ICD-10-CM

## 2018-06-26 ENCOUNTER — OFFICE VISIT (OUTPATIENT)
Dept: PODIATRY | Facility: CLINIC | Age: 78
End: 2018-06-26
Payer: MEDICARE

## 2018-06-26 VITALS
DIASTOLIC BLOOD PRESSURE: 67 MMHG | SYSTOLIC BLOOD PRESSURE: 117 MMHG | HEIGHT: 65 IN | BODY MASS INDEX: 29.66 KG/M2 | WEIGHT: 178 LBS

## 2018-06-26 DIAGNOSIS — G47.62 NOCTURNAL LEG CRAMPS: ICD-10-CM

## 2018-06-26 DIAGNOSIS — I70.213 ATHEROSCLEROSIS OF NATIVE ARTERY OF BOTH LOWER EXTREMITIES WITH INTERMITTENT CLAUDICATION (HCC): Primary | ICD-10-CM

## 2018-06-26 DIAGNOSIS — B35.1 ONYCHOMYCOSIS: ICD-10-CM

## 2018-06-26 PROCEDURE — 99213 OFFICE O/P EST LOW 20 MIN: CPT | Performed by: PODIATRIST

## 2018-06-26 NOTE — PROGRESS NOTES
Assessment/Plan:    Continue with stretching and multivitamin  Discussed importance of proper hydration  Patient will discuss with medical doctor discussed that cramping could be side effect of medication  Patient given copy arterial studies  Discussed that patient has slightly reduce pressure to the toes around 60 but metatarsal pressure is within normal limits and there is no significant blockage or significant atherosclerosis  Follow-up 2 months       Diagnoses and all orders for this visit:    Atherosclerosis of native artery of both lower extremities with intermittent claudication (HCC)    Nocturnal leg cramps    Onychomycosis          Subjective:      Patient ID: Marianna Enamorado is a 65 y o  female  Patient patient has been stretching and taking multivitamin  Cramps is improved but is still having between 1 and 3 attacks of nocturnal cramping every week          Past Medical History:   Diagnosis Date    Anal fissure     Arthritis     osteo joints    Asthma with acute exacerbation     Blepharitis     Breast lump     Chalazion     CHF, chronic (HCC)     Difficulty walking     Disease of thyroid gland     Drooling     Dysphagia     Fall 05/04/2018    Fibromyalgia, primary     History of transfusion 1996    Hordeolum externum     Hx of transient ischemic attack (TIA)     Hypophonia     Infectious viral hepatitis     Hep C dx 1996     Lyme carditis     Murmur, cardiac     Parkinsons (HCC)     Rotator cuff tendinitis     Seasonal allergies     Urinary frequency          Current Outpatient Prescriptions:     albuterol (PROVENTIL HFA,VENTOLIN HFA) 90 mcg/act inhaler, Inhale 2 puffs every 6 (six) hours as needed for wheezing, Disp: 1 Inhaler, Rfl: 0    benzonatate (TESSALON) 200 MG capsule, Take 1 capsule (200 mg total) by mouth 3 (three) times a day as needed for cough, Disp: 20 capsule, Rfl: 0    carbidopa-levodopa-entacapone (STALEVO) -200 MG per tablet, take 1 tablet by mouth four times a day, Disp: 120 tablet, Rfl: 4    carvedilol (COREG) 3 125 mg tablet, Take 3 125 mg by mouth 2 (two) times a day with meals, Disp: , Rfl:     Cholecalciferol (VITAMIN D3) 2000 units TABS, Take 2,000 Units by mouth every morning, Disp: , Rfl:     donepezil (ARICEPT) 10 mg tablet, TAKE 1/2 TABLET (5MG) TABLET AT BED TIME FOR 4 WEEKS AND THEN IF TOLERATED TAKE 1/2 TABLET (5MG) TAB, Disp: 30 tablet, Rfl: 4    fluticasone (FLONASE) 50 mcg/act nasal spray, 1 spray into each nostril daily, Disp: , Rfl:     furosemide (LASIX) 20 mg tablet, take 1 tablet by mouth ON MONDAY, WEDNESDAY, FRIDAY, AND SUNDAY, Disp: 16 tablet, Rfl: 5    levothyroxine 25 mcg tablet, Take 1 tablet (25 mcg total) by mouth every morning, Disp: 90 tablet, Rfl: 2    Melatonin 5 MG CAPS, Take by mouth, Disp: , Rfl:     memantine (NAMENDA) 10 mg tablet, Take 1 tablet (10 mg total) by mouth daily Starting June 16th , Disp: 30 tablet, Rfl: 1    memantine (NAMENDA) 5 mg tablet, Take 1 tablet (5 mg total) by mouth daily, Disp: 30 tablet, Rfl: 0    Multiple Vitamins-Minerals (OCUVITE ADULT 50+ PO), Take by mouth daily with breakfast, Disp: , Rfl:     primidone (MYSOLINE) 50 mg tablet, take 1/2 tablet by mouth once daily, Disp: 30 tablet, Rfl: 2    spironolactone (ALDACTONE) 50 mg tablet, Take 1/2 tablet = 25 milligrams once daily, Disp: 15 tablet, Rfl: 5    Tiotropium Bromide-Olodaterol (STIOLTO RESPIMAT) 2 5-2 5 MCG/ACT AERS, Inhale 2 puffs daily, Disp: 1 Inhaler, Rfl: 0    trihexyphenidyl (ARTANE) 2 mg tablet, Take 1 tab twice daily for one week and then take 1 tab three times daily  , Disp: 90 tablet, Rfl: 2    warfarin (COUMADIN) 2 mg tablet, Take by mouth daily Tues, Thurs, and Sun , Disp: , Rfl:     warfarin (COUMADIN) 2 5 mg tablet, take 1 tablet by mouth once daily as directed, Disp: 30 tablet, Rfl: 4    Past Surgical History:   Procedure Laterality Date    BREAST SURGERY N/A     benign, calcium    CARDIAC SURGERY 1990    mitral valve replacement    CATARACT EXTRACTION Right 2015    CATARACT EXTRACTION Left 2014    CHEST TUBE INSERTION Right     post pleural effusion    CHOLECYSTECTOMY  2000    lap    HYSTERECTOMY  1973    partial    [place] COLSC FLX W/RMVL OF TUMOR POLYP LESION SNARE TQ N/A 7/18/2017    Procedure: COLONOSCOPY and biopsy ;  Surgeon: Moni San MD;  Location: [place] [address] GI LAB; Service: Gastroenterology    SKIN BIOPSY      pre cancerous on face    TONSILLECTOMY         No Known Allergies    Patient Active Problem List   Diagnosis    H/O mitral valve replacement with mechanical valve    Atrial fibrillation (HCC) [I48 91]    Benign familial tremor    Chronic [facility] C ([facility] [address] [facility]    Chronic right-sided low back pain without sciatica    Drug induced insomnia ([facility] [address] [facility]    Dupuytren's contracture    Generalized osteoarthritis    Heart failure, left systolic, chronic (HCC)    Hypothyroidism    Memory impairment    Mitral valve disorder    Parkinsons ([facility] [address] [facility]    Peripheral vascular disease ([facility] [address] [facility]    Seasonal allergies    Transient ischemic attack    Vitamin D insufficiency       Review of Systems   Constitutional: Negative  HENT: Negative  Eyes: Negative  Respiratory: Negative  Cardiovascular: Negative  Gastrointestinal: Negative  Endocrine: Negative  Genitourinary: Negative  Musculoskeletal: Negative  Skin: Negative  Allergic/Immunologic: Negative  Neurological: Negative  Hematological: Negative  Psychiatric/Behavioral: Negative  Objective:      /67   Ht 5' 5" (1 651 m)   Wt 80 7 kg (178 lb)   BMI 29 62 kg/m²          Physical Exam      DP pulses 1/4 bilateral, PT pulses nonpalpable, capillary refill time 6 sec times 10 temperature gradient increased bilateral   Plus one edema bilateral feet and ankles moderate venous stasis  All nails thick discolored dystrophic positive subungual debris positive pain on palpation    Significant bunion bilateral   Hammertoes 2 through 5 bilateral   There is some dorsal displacement of the 2nd and 3rd MPJ left worse than right secondary to bunion  Significant pes planus  Significant equinus bilateral   Muscle strength 5/5 all groups bilateral feet and ankles  No open wound or signs of infection  Vibratory sensation reduced bilateral monofilament sensation within normal limits  Mild edema  Negative erythema no signs of infection

## 2018-06-27 DIAGNOSIS — I50.22 CHRONIC SYSTOLIC HEART FAILURE (HCC): Primary | ICD-10-CM

## 2018-06-27 RX ORDER — SPIRONOLACTONE 25 MG/1
TABLET ORAL
Qty: 15 TABLET | Refills: 5 | Status: SHIPPED | OUTPATIENT
Start: 2018-06-27 | End: 2018-12-03 | Stop reason: SDUPTHER

## 2018-06-27 RX ORDER — LISINOPRIL 5 MG/1
5 TABLET ORAL DAILY
Qty: 30 TABLET | Refills: 5 | Status: SHIPPED | OUTPATIENT
Start: 2018-06-27 | End: 2018-07-12

## 2018-06-28 ENCOUNTER — TELEPHONE (OUTPATIENT)
Dept: CARDIOLOGY CLINIC | Facility: CLINIC | Age: 78
End: 2018-06-28

## 2018-06-29 ENCOUNTER — APPOINTMENT (OUTPATIENT)
Dept: RADIOLOGY | Facility: CLINIC | Age: 78
End: 2018-06-29
Payer: MEDICARE

## 2018-06-29 ENCOUNTER — OFFICE VISIT (OUTPATIENT)
Dept: OBGYN CLINIC | Facility: CLINIC | Age: 78
End: 2018-06-29
Payer: MEDICARE

## 2018-06-29 VITALS
WEIGHT: 176.6 LBS | HEIGHT: 65 IN | DIASTOLIC BLOOD PRESSURE: 72 MMHG | SYSTOLIC BLOOD PRESSURE: 104 MMHG | HEART RATE: 62 BPM | BODY MASS INDEX: 29.42 KG/M2

## 2018-06-29 DIAGNOSIS — M70.51 SUPRAPATELLAR BURSITIS OF RIGHT KNEE: Primary | ICD-10-CM

## 2018-06-29 DIAGNOSIS — S82.001A CLOSED NONDISPLACED FRACTURE OF RIGHT PATELLA, UNSPECIFIED FRACTURE MORPHOLOGY, INITIAL ENCOUNTER: ICD-10-CM

## 2018-06-29 DIAGNOSIS — S80.01XD CONTUSION OF RIGHT PATELLA, SUBSEQUENT ENCOUNTER: ICD-10-CM

## 2018-06-29 PROCEDURE — 73560 X-RAY EXAM OF KNEE 1 OR 2: CPT

## 2018-06-29 PROCEDURE — 99214 OFFICE O/P EST MOD 30 MIN: CPT | Performed by: ORTHOPAEDIC SURGERY

## 2018-06-29 NOTE — PROGRESS NOTES
Assessment/Plan:  1  Suprapatellar bursitis of right knee     2  Contusion of right patella, subsequent encounter     3  Closed nondisplaced fracture of right patella, unspecified fracture morphology, initial encounter  XR knee 1 or 2 vw right     Patient is here for follow-up regarding her anterior right knee pain  She was recently scheduled for follow-up regarding a suspected nondisplaced patella fracture of the superior pole of patella however she presents today with what appears to be suprapatellar bursitis  It is aseptic in nature  Her x-rays were reviewed and I do not appreciate the radiolucencies in the superior pole that were appreciated on the previous image therefore the finding for likely projectional and she was presenting with a contusion of her patella  It is difficult to determine whether the contusion of her patella is resolving due to the superimposed suprapatellar bursitis  She is weight-bearing without difficulty and range of motion does not bother her  For her bursitis I recommended that she apply compressive Ace wrapping and an ice pack to help the swelling resolve  She may take Tylenol for any pain that she may having as she cannot take anti-inflammatories due to the use of Coumadin  I advised her that these issues generally resolve on their own with activity modification in the above conservative treatment  If her symptoms fail to resolve or in fact worsened she should return to the office for and aspiration of her bursa  I would like to treat this conservatively at this point as I want to avoid the risk of possible hematoma due to her use of Coumadin  She will return to the office on an as-needed basis if her symptoms worsen  The patient may call the office at anytime if any questions or concerns should arise  Subjective:  Right knee follow-up    Patient ID: Meron Castillo is a 65 y o  female  HPI  Patient is here for follow-up regarding her right knee complaints    She states that her pain has not increased however she does have some swelling over the anterior portion of her knee  She denies any recent or new trauma to her knee as she was being treated for a suspected fracture of the patella superior pole  She states she was able to go on her cruise and had minimal activity limitation due to knee pain and she was able to participate freely  She states the area of swelling does have some discomfort with deep palpation  She denies any redness or increased warmth from the area of swelling  She denies any paresthesias in the right lower extremity  Review of Systems   Constitutional: Negative  HENT: Negative  Eyes: Negative  Respiratory: Negative  Cardiovascular: Negative  Gastrointestinal: Negative  Endocrine: Negative  Musculoskeletal: Positive for arthralgias  Skin: Negative  Neurological: Negative  Psychiatric/Behavioral: Negative            Past Medical History:   Diagnosis Date    Anal fissure     Arthritis     osteo joints    Asthma with acute exacerbation     Blepharitis     Breast lump     Chalazion     CHF, chronic (HCC)     Difficulty walking     Disease of thyroid gland     Drooling     Dysphagia     Fall 05/04/2018    Fibromyalgia, primary     History of transfusion 1996    Hordeolum externum     Hx of transient ischemic attack (TIA)     Hypophonia     Infectious viral hepatitis     Hep C dx 1996     Lyme carditis     Murmur, cardiac     Parkinsons (HCC)     Rotator cuff tendinitis     Seasonal allergies     Urinary frequency        Past Surgical History:   Procedure Laterality Date    BREAST SURGERY N/A     benign, calcium    CARDIAC SURGERY  1990    mitral valve replacement    CATARACT EXTRACTION Right 2015    CATARACT EXTRACTION Left 2014    CHEST TUBE INSERTION Right     post pleural effusion    CHOLECYSTECTOMY  2000    lap    HYSTERECTOMY  1973    partial    MI COLSC FLX W/RMVL OF TUMOR POLYP LESION SNARE TQ N/A 7/18/2017    Procedure: COLONOSCOPY and biopsy ;  Surgeon: Tenzin Staples MD;  Location: [facility]; Service: Gastroenterology    SKIN BIOPSY      pre cancerous on face    TONSILLECTOMY         Family History   Problem Relation Age of Onset    Heart disease Mother         murmur Cardiomegaly age 64    Pneumonia Father     Heart disease Brother         mitrsl valve    Parkinsonism Brother     Arthritis Brother     Lupus Daughter     Diabetes Daughter     Depression Daughter        Social History     Occupational History    Not on file       Social History Main Topics    Smoking status: Former Smoker     Packs/day: 0 10     Years: 10 00     Quit date: 1970    Smokeless tobacco: Never Used    Alcohol use No    Drug use: No    Sexual activity: Not on file         Current Outpatient Prescriptions:     albuterol (PROVENTIL HFA,VENTOLIN HFA) 90 mcg/act inhaler, Inhale 2 puffs every 6 (six) hours as needed for wheezing, Disp: 1 Inhaler, Rfl: 0    benzonatate (TESSALON) 200 MG capsule, Take 1 capsule (200 mg total) by mouth 3 (three) times a day as needed for cough, Disp: 20 capsule, Rfl: 0    carbidopa-levodopa-entacapone (STALEVO) -200 MG per tablet, take 1 tablet by mouth four times a day, Disp: 120 tablet, Rfl: 4    carvedilol (COREG) 3 125 mg tablet, Take 3 125 mg by mouth 2 (two) times a day with meals, Disp: , Rfl:     Cholecalciferol (VITAMIN D3) 2000 units TABS, Take 2,000 Units by mouth every morning, Disp: , Rfl:     donepezil (ARICEPT) 10 mg tablet, TAKE 1/2 TABLET (5MG) TABLET AT BED TIME FOR 4 WEEKS AND THEN IF TOLERATED TAKE 1/2 TABLET (5MG) TAB, Disp: 30 tablet, Rfl: 4    fluticasone (FLONASE) 50 mcg/act nasal spray, 1 spray into each nostril daily, Disp: , Rfl:     furosemide (LASIX) 20 mg tablet, take 1 tablet by mouth ON MONDAY, WEDNESDAY, FRIDAY, AND SUNDAY, Disp: 16 tablet, Rfl: 5    levothyroxine 25 mcg tablet, Take 1 tablet (25 mcg total) by mouth every morning, Disp: 90 tablet, Rfl: 2    lisinopril (ZESTRIL) 5 mg tablet, Take 1 tablet (5 mg total) by mouth daily, Disp: 30 tablet, Rfl: 5    Melatonin 5 MG CAPS, Take by mouth, Disp: , Rfl:     memantine (NAMENDA) 10 mg tablet, Take 1 tablet (10 mg total) by mouth daily Starting June 16th , Disp: 30 tablet, Rfl: 1    memantine (NAMENDA) 5 mg tablet, Take 1 tablet (5 mg total) by mouth daily, Disp: 30 tablet, Rfl: 0    Multiple Vitamins-Minerals (OCUVITE ADULT 50+ PO), Take by mouth daily with breakfast, Disp: , Rfl:     primidone (MYSOLINE) 50 mg tablet, take 1/2 tablet by mouth once daily, Disp: 30 tablet, Rfl: 2    spironolactone (ALDACTONE) 25 mg tablet, Take 1/2 tablet by mouth daily  , Disp: 15 tablet, Rfl: 5    Tiotropium Bromide-Olodaterol (STIOLTO RESPIMAT) 2 5-2 5 MCG/ACT AERS, Inhale 2 puffs daily, Disp: 1 Inhaler, Rfl: 0    trihexyphenidyl (ARTANE) 2 mg tablet, Take 1 tab twice daily for one week and then take 1 tab three times daily  , Disp: 90 tablet, Rfl: 2    warfarin (COUMADIN) 2 mg tablet, Take by mouth daily Tues, [place], and Sun , Disp: , Rfl:     warfarin (COUMADIN) 2 5 mg tablet, take 1 tablet by mouth once daily as directed, Disp: 30 tablet, Rfl: 4    No Known Allergies    Objective:  Vitals:    06/29/18 1258   BP: 104/72   Pulse: 62       Body mass index is 29 39 kg/m²  Right Knee Exam     Tenderness   The patient is experiencing tenderness in the patella (Suprapatellar bursa)      Range of Motion   Extension:  0 normal   Flexion:  120 normal     Tests   Raysa:  Medial - negative Lateral - negative  Drawer:       Anterior - negative    Posterior - negative  Varus: negative  Valgus: negative  Patellar Apprehension: negative    Other   Erythema: absent  Scars: absent  Sensation: normal  Pulse: present  Swelling: mild  Other tests: no effusion present    Comments:  Crepitance on AROM and PROM-mild parapatellar  No increased warmth of knee  Knee is stable at 0, 30, 90 degrees  Mild swelling in the suprapatellar bursa with fluid weight present  No erythema or increased warmth around this area of swelling in the bursa  Mild tenderness palpation of the superior pole of patella deep to the bursa          Observations     Right Knee   Negative for effusion  Physical Exam   Constitutional: She is oriented to person, place, and time  She appears well-developed  HENT:   Head: Atraumatic  Eyes: EOM are normal    Neck: Neck supple  Cardiovascular: Normal rate  Pulmonary/Chest: Effort normal    Musculoskeletal:        Right knee: She exhibits no effusion  See orthopedic exam   Neurological: She is alert and oriented to person, place, and time  Skin: Skin is warm and dry  Psychiatric: She has a normal mood and affect  Nursing note and vitals reviewed  I have personally reviewed pertinent films in PACS  X-rays of the right knee reviewed today demonstrate no evidence of previously visualized superior pole radiolucencies  There is degenerative change throughout the knee and no supple anterior knee soft tissue swelling in the area suprapatellar bursa  No lytic or blastic lesions  Chiquita CISNEROS    Division of Adult Reconstruction  Department of [facility]

## 2018-07-02 DIAGNOSIS — I42.0 DILATED CARDIOMYOPATHY (HCC): Primary | ICD-10-CM

## 2018-07-02 RX ORDER — CARVEDILOL 3.12 MG/1
TABLET ORAL
Qty: 60 TABLET | Refills: 5 | Status: SHIPPED | OUTPATIENT
Start: 2018-07-02 | End: 2019-01-14 | Stop reason: SDUPTHER

## 2018-07-03 RX ORDER — LISINOPRIL 5 MG/1
5 TABLET ORAL DAILY
Qty: 30 TABLET | Refills: 5 | Status: SHIPPED | OUTPATIENT
Start: 2018-07-03 | End: 2018-08-14 | Stop reason: SDUPTHER

## 2018-07-06 ENCOUNTER — HOSPITAL ENCOUNTER (OUTPATIENT)
Dept: NEUROLOGY | Facility: HOSPITAL | Age: 78
Discharge: HOME | End: 2018-07-06
Attending: PSYCHIATRY & NEUROLOGY
Payer: MEDICARE

## 2018-07-06 DIAGNOSIS — R20.2 NUMBNESS AND TINGLING IN RIGHT HAND: ICD-10-CM

## 2018-07-06 DIAGNOSIS — M54.12 CERVICAL RADICULOPATHY: Primary | ICD-10-CM

## 2018-07-06 DIAGNOSIS — R20.0 NUMBNESS AND TINGLING IN RIGHT HAND: ICD-10-CM

## 2018-07-06 PROCEDURE — 95909 NRV CNDJ TST 5-6 STUDIES: CPT | Performed by: PSYCHIATRY & NEUROLOGY

## 2018-07-06 PROCEDURE — 95886 MUSC TEST DONE W/N TEST COMP: CPT | Performed by: PSYCHIATRY & NEUROLOGY

## 2018-07-08 DIAGNOSIS — R41.89 COGNITIVE IMPAIRMENT: ICD-10-CM

## 2018-07-09 RX ORDER — MEMANTINE HYDROCHLORIDE 10 MG/1
TABLET ORAL
Qty: 30 TABLET | Refills: 1 | Status: SHIPPED | OUTPATIENT
Start: 2018-07-09 | End: 2018-10-05 | Stop reason: SDUPTHER

## 2018-07-12 ENCOUNTER — APPOINTMENT (EMERGENCY)
Dept: RADIOLOGY | Facility: HOSPITAL | Age: 78
End: 2018-07-12
Payer: MEDICARE

## 2018-07-12 ENCOUNTER — OFFICE VISIT (OUTPATIENT)
Dept: FAMILY MEDICINE CLINIC | Facility: CLINIC | Age: 78
End: 2018-07-12
Payer: MEDICARE

## 2018-07-12 ENCOUNTER — HOSPITAL ENCOUNTER (EMERGENCY)
Facility: HOSPITAL | Age: 78
Discharge: HOME | End: 2018-07-12
Attending: EMERGENCY MEDICINE | Admitting: EMERGENCY MEDICINE
Payer: MEDICARE

## 2018-07-12 VITALS
WEIGHT: 176 LBS | DIASTOLIC BLOOD PRESSURE: 72 MMHG | SYSTOLIC BLOOD PRESSURE: 120 MMHG | RESPIRATION RATE: 16 BRPM | HEIGHT: 65 IN | HEART RATE: 70 BPM | BODY MASS INDEX: 29.32 KG/M2 | TEMPERATURE: 97.8 F

## 2018-07-12 VITALS
OXYGEN SATURATION: 99 % | SYSTOLIC BLOOD PRESSURE: 122 MMHG | TEMPERATURE: 98.7 F | RESPIRATION RATE: 18 BRPM | HEART RATE: 60 BPM | DIASTOLIC BLOOD PRESSURE: 67 MMHG

## 2018-07-12 DIAGNOSIS — R10.9 LEFT FLANK PAIN: Primary | ICD-10-CM

## 2018-07-12 DIAGNOSIS — R31.29 OTHER MICROSCOPIC HEMATURIA: ICD-10-CM

## 2018-07-12 DIAGNOSIS — R10.9 FLANK PAIN: Primary | ICD-10-CM

## 2018-07-12 LAB
ALBUMIN SERPL BCP-MCNC: 3.6 G/DL (ref 3.5–5)
ALP SERPL-CCNC: 95 U/L (ref 46–116)
ALT SERPL W P-5'-P-CCNC: 7 U/L (ref 12–78)
ANION GAP SERPL CALCULATED.3IONS-SCNC: 6 MMOL/L (ref 4–13)
AST SERPL W P-5'-P-CCNC: 13 U/L (ref 5–45)
BASOPHILS # BLD AUTO: 0.02 THOUSANDS/ΜL (ref 0–0.1)
BASOPHILS NFR BLD AUTO: 0 % (ref 0–1)
BILIRUB SERPL-MCNC: 1.1 MG/DL (ref 0.2–1)
BILIRUB UR QL STRIP: NEGATIVE
BUN SERPL-MCNC: 18 MG/DL (ref 5–25)
CALCIUM SERPL-MCNC: 9.4 MG/DL (ref 8.3–10.1)
CHLORIDE SERPL-SCNC: 104 MMOL/L (ref 100–108)
CLARITY UR: CLEAR
CO2 SERPL-SCNC: 28 MMOL/L (ref 21–32)
COLOR UR: ABNORMAL
CREAT SERPL-MCNC: 1.04 MG/DL (ref 0.6–1.3)
EOSINOPHIL # BLD AUTO: 0.02 THOUSAND/ΜL (ref 0–0.61)
EOSINOPHIL NFR BLD AUTO: 0 % (ref 0–6)
ERYTHROCYTE [DISTWIDTH] IN BLOOD BY AUTOMATED COUNT: 14 % (ref 11.6–15.1)
GFR SERPL CREATININE-BSD FRML MDRD: 52 ML/MIN/1.73SQ M
GLUCOSE SERPL-MCNC: 89 MG/DL (ref 65–140)
GLUCOSE UR STRIP-MCNC: NEGATIVE MG/DL
HCT VFR BLD AUTO: 40.3 % (ref 34.8–46.1)
HGB BLD-MCNC: 12.9 G/DL (ref 11.5–15.4)
HGB UR QL STRIP.AUTO: NEGATIVE
IMM GRANULOCYTES # BLD AUTO: 0.02 THOUSAND/UL (ref 0–0.2)
IMM GRANULOCYTES NFR BLD AUTO: 0 % (ref 0–2)
KETONES UR STRIP-MCNC: ABNORMAL MG/DL
LEUKOCYTE ESTERASE UR QL STRIP: NEGATIVE
LYMPHOCYTES # BLD AUTO: 1.58 THOUSANDS/ΜL (ref 0.6–4.47)
LYMPHOCYTES NFR BLD AUTO: 26 % (ref 14–44)
MCH RBC QN AUTO: 31.1 PG (ref 26.8–34.3)
MCHC RBC AUTO-ENTMCNC: 32 G/DL (ref 31.4–37.4)
MCV RBC AUTO: 97 FL (ref 82–98)
MONOCYTES # BLD AUTO: 0.69 THOUSAND/ΜL (ref 0.17–1.22)
MONOCYTES NFR BLD AUTO: 11 % (ref 4–12)
NEUTROPHILS # BLD AUTO: 3.76 THOUSANDS/ΜL (ref 1.85–7.62)
NEUTS SEG NFR BLD AUTO: 63 % (ref 43–75)
NITRITE UR QL STRIP: NEGATIVE
NRBC BLD AUTO-RTO: 0 /100 WBCS
PH UR STRIP.AUTO: 5 [PH] (ref 5–9)
PLATELET # BLD AUTO: 182 THOUSANDS/UL (ref 149–390)
PMV BLD AUTO: 10.9 FL (ref 8.9–12.7)
POTASSIUM SERPL-SCNC: 4.1 MMOL/L (ref 3.5–5.3)
PROT SERPL-MCNC: 7 G/DL (ref 6.4–8.2)
PROT UR STRIP-MCNC: NEGATIVE MG/DL
RBC # BLD AUTO: 4.15 MILLION/UL (ref 3.81–5.12)
SL AMB  POCT GLUCOSE, UA: ABNORMAL
SL AMB LEUKOCYTE ESTERASE,UA: 500
SL AMB POCT BILIRUBIN,UA: 1
SL AMB POCT BLOOD,UA: 50
SL AMB POCT CLARITY,UA: CLEAR
SL AMB POCT COLOR,UA: ABNORMAL
SL AMB POCT KETONES,UA: 15
SL AMB POCT NITRITE,UA: ABNORMAL
SL AMB POCT PH,UA: 5
SL AMB POCT SPECIFIC GRAVITY,UA: 1.02
SL AMB POCT URINE PROTEIN: ABNORMAL
SL AMB POCT UROBILINOGEN: 1
SODIUM SERPL-SCNC: 138 MMOL/L (ref 136–145)
SP GR UR STRIP.AUTO: 1.02 (ref 1–1.03)
UROBILINOGEN UR QL STRIP.AUTO: 0.2 E.U./DL
WBC # BLD AUTO: 6.09 THOUSAND/UL (ref 4.31–10.16)

## 2018-07-12 PROCEDURE — 85025 COMPLETE CBC W/AUTO DIFF WBC: CPT | Performed by: PHYSICIAN ASSISTANT

## 2018-07-12 PROCEDURE — 99214 OFFICE O/P EST MOD 30 MIN: CPT | Performed by: NURSE PRACTITIONER

## 2018-07-12 PROCEDURE — 81003 URINALYSIS AUTO W/O SCOPE: CPT | Performed by: PHYSICIAN ASSISTANT

## 2018-07-12 PROCEDURE — 99284 EMERGENCY DEPT VISIT MOD MDM: CPT

## 2018-07-12 PROCEDURE — 80053 COMPREHEN METABOLIC PANEL: CPT | Performed by: PHYSICIAN ASSISTANT

## 2018-07-12 PROCEDURE — 36415 COLL VENOUS BLD VENIPUNCTURE: CPT | Performed by: PHYSICIAN ASSISTANT

## 2018-07-12 PROCEDURE — 81003 URINALYSIS AUTO W/O SCOPE: CPT | Performed by: NURSE PRACTITIONER

## 2018-07-12 PROCEDURE — 74176 CT ABD & PELVIS W/O CONTRAST: CPT

## 2018-07-12 NOTE — DISCHARGE INSTRUCTIONS
Flank Pain   AMBULATORY CARE:   Flank pain  is felt in the area below your ribcage and above your hip bones, often in the lower back  Your pain may be dull or so severe that you cannot get comfortable  The pain may stay in one area or radiate to another area  It may worsen and lighten in waves  Flank pain is often a sign of problems with your urinary tract, such as a kidney stone or infection  Seek care immediately if:   · You have a fever  · Your heart is fluttering or jumping  · You see blood in your urine  · Your pain radiates into your lower abdomen and genital area  · You have intense pain in your low back next to your spine  · You are much more tired than usual and have no desire to eat  · You have a headache and your muscles jerk  Contact your healthcare provider if:   · You have an upset stomach and are vomiting  · You have to urinate more often, and with urgency  · Your pain worsens or does not improve, and you cannot get comfortable  · You pass a stone when you urinate  · You have questions or concerns about your condition or care  Treatment for flank pain  may include medicine to decrease pain or treat a bacterial infection  Follow up with your healthcare provider as directed:  Write down your questions so you remember to ask them during your visits  © 2017 [address] Information is for End User's use only and may not be sold, redistributed or otherwise used for commercial purposes  All illustrations and images included in CareNotes® are the copyrighted property of [employer]  or [employer]  The above information is an  only  It is not intended as medical advice for individual conditions or treatments  Talk to your doctor, nurse or pharmacist before following any medical regimen to see if it is safe and effective for you

## 2018-07-12 NOTE — PROGRESS NOTES
Chief Complaint   Patient presents with    Back Pain     left side back pain hurts to move around x 2 weeks ago         Patient ID: Marianna Enamorado is a 65 y o  female  New complaint onset two weeks ago patient reports having pain in the left flank area  She states there is also some mild associated pain on the right side of the mid back  She denies any injury prior to onset  She denies any unusual activities prior to onset  She denies any known past history of kidney stone or back problems  She denies any visible blood in the urine  She denies fever chills or stomach upset  She denies gyn or  symptoms of concern at the time of the visit  She has treated this at home with Tylenol without change in the complaint  The current pattern is static since onset  Past Medical History:   Diagnosis Date    Anal fissure     Arthritis     osteo joints    Asthma with acute exacerbation     Blepharitis     Breast lump     Chalazion     CHF, chronic (HCC)     Difficulty walking     Disease of thyroid gland     Drooling     Dysphagia     Fall 05/04/2018    Fibromyalgia, primary     History of transfusion 1996    Hordeolum externum     Hx of transient ischemic attack (TIA)     Hypophonia     Infectious viral hepatitis     Hep C dx 1996     Lyme carditis     Murmur, cardiac     Parkinsons (HCC)     Rotator cuff tendinitis     Seasonal allergies     Urinary frequency        Past Surgical History:   Procedure Laterality Date    BREAST SURGERY N/A     benign, calcium    CARDIAC SURGERY  1990    mitral valve replacement    CATARACT EXTRACTION Right 2015    CATARACT EXTRACTION Left 2014    CHEST TUBE INSERTION Right     post pleural effusion    CHOLECYSTECTOMY  2000    lap    HYSTERECTOMY  1973    partial    VA COLSC FLX W/RMVL OF TUMOR POLYP LESION SNARE TQ N/A 7/18/2017    Procedure: COLONOSCOPY and biopsy ;  Surgeon: Gerber Toure MD;  Location: [facility] GI [facility];   Service: Gastroenterology    SKIN BIOPSY      pre cancerous on face    TONSILLECTOMY         Patient Active Problem List   Diagnosis    H/O mitral valve replacement with mechanical valve    Atrial fibrillation ([facility] ) [I48 [id number]]    Benign familial tremor    Chronic hepatitis C ([facility] )    Chronic right-sided low back pain without sciatica    Drug induced insomnia ([facility] )    Dupuytren's contracture    Generalized osteoarthritis    Heart failure, left systolic, chronic (HCC)    Hypothyroidism    Memory impairment    Mitral valve disorder    Parkinsons ([facility] )    Peripheral vascular disease ([place] [address] )    Seasonal allergies    Transient ischemic attack    Vitamin D insufficiency    Suprapatellar bursitis of right knee    Numbness and tingling in right hand    Left flank pain    Other microscopic hematuria       Family History   Problem Relation Age of Onset    Heart disease Mother         murmur Cardiomegaly age 64    Pneumonia Father     Heart disease Brother         mitrsl valve    Parkinsonism Brother     Arthritis Brother     Lupus Daughter     Diabetes Daughter     Depression Daughter        Immunization History   Administered Date(s) Administered    H1N1, All Formulations 10/26/2009    Influenza Split High Dose Preservative Free IM 09/25/2014, 10/08/2015, 09/26/2016, 08/22/2017    Influenza TIV (IM) 10/04/2005, 10/31/2006, 10/25/2007, 09/13/2008, 09/17/2009, 09/24/2010, 09/17/2012, 10/01/2013    Pneumococcal Conjugate 13-Valent 11/21/2016    Pneumococcal Polysaccharide PPV23 03/19/2007, 09/03/2014    Tdap 06/17/2008       No Known Allergies    Current Outpatient Prescriptions   Medication Sig Dispense Refill    carbidopa-levodopa-entacapone (STALEVO) -200 MG per tablet take 1 tablet by mouth four times a day 120 tablet 4    carvedilol (COREG) 3 125 mg tablet take 1 tablet by mouth twice a day with MEALS 60 tablet 5    Cholecalciferol (VITAMIN D3) 2000 units TABS Take 2,000 Units by mouth every morning      donepezil (ARICEPT) 10 mg tablet TAKE 1/2 TABLET (5MG) TABLET AT BED TIME FOR 4 WEEKS AND THEN IF TOLERATED TAKE 1/2 TABLET (5MG) TAB 30 tablet 4    fluticasone (FLONASE) 50 mcg/act nasal spray 1 spray into each nostril daily      furosemide (LASIX) 20 mg tablet take 1 tablet by mouth ON MONDAY, WEDNESDAY, FRIDAY, AND SUNDAY 16 tablet 5    levothyroxine 25 mcg tablet Take 1 tablet (25 mcg total) by mouth every morning 90 tablet 2    lisinopril (ZESTRIL) 5 mg tablet Take 1 tablet (5 mg total) by mouth daily 30 tablet 5    memantine (NAMENDA) 10 mg tablet take 1 tablet by mouth once daily STARTING JUNE 16 30 tablet 1    Multiple Vitamins-Minerals (OCUVITE ADULT 50+ PO) Take by mouth daily with breakfast      primidone (MYSOLINE) 50 mg tablet take 1/2 tablet by mouth once daily 30 tablet 2    spironolactone (ALDACTONE) 25 mg tablet Take 1/2 tablet by mouth daily  15 tablet 5    warfarin (COUMADIN) 2 mg tablet Take by mouth daily Tues, Thurs, and Sun       warfarin (COUMADIN) 2 5 mg tablet take 1 tablet by mouth once daily as directed 30 tablet 4     No current facility-administered medications for this visit  Social History     Social History    Marital status: /Civil Union     Spouse name: N/A    Number of children: N/A    Years of education: N/A     Social History Main Topics    Smoking status: Former Smoker     Packs/day: 0 10     Years: 10 00     Quit date: 1970    Smokeless tobacco: Never Used    Alcohol use No    Drug use: No    Sexual activity: Not Asked     Other Topics Concern    None     Social History Narrative    None       Review of Systems   Constitutional: Negative  HENT: Negative  Eyes: Negative  Respiratory: Negative  Cardiovascular: Negative  Gastrointestinal: Negative  Genitourinary: Positive for flank pain  Negative for decreased urine volume, frequency, pelvic pain, urgency, vaginal bleeding, vaginal discharge and vaginal pain  Musculoskeletal: Positive for back pain  Objective:    /72 (BP Location: Left arm, Patient Position: Sitting, Cuff Size: Standard)   Pulse 70   Temp 97 8 °F (36 6 °C)   Resp 16   Ht 5' 5" (1 651 m)   Wt 79 8 kg (176 lb)   BMI 29 29 kg/m²        Physical Exam   Constitutional: She is oriented to person, place, and time  She appears well-developed and well-nourished  No distress  HENT:   Head: Normocephalic  Right Ear: External ear normal    Left Ear: External ear normal    Eyes: Conjunctivae are normal  No scleral icterus  Neck: No JVD present  Cardiovascular: Normal rate, regular rhythm and normal heart sounds  Pulmonary/Chest: Effort normal and breath sounds normal    Abdominal: Soft  Bowel sounds are normal  There is tenderness  Left flank and CVA tenderness  Musculoskeletal: She exhibits no edema  Neurological: She is alert and oriented to person, place, and time  Skin: Skin is warm and dry  Psychiatric: She has a normal mood and affect  Assessment/Plan:    No problem-specific Assessment & Plan notes found for this encounter  Diagnoses and all orders for this visit:    Left flank pain  -     POCT urine dip auto non-scope  -     Urine culture  -     Ambulatory Referral to Emergency Medicine; Future    Other microscopic hematuria  -     Urine culture  -     Ambulatory Referral to Emergency Medicine; Future        Unclear etiology for patient's pain with associated hematuria  This requires emergent referral for imaging and further evaluation to rule out renal etiology and/or kidney stone  Patient Instructions   With the flank pain and the blood in your urine you will need to go to the emergency room for further evaluation for a kidney stone or kidney problem    Make an appointment to see me next week after you are evaluated at the [facility]                     STACY Gar

## 2018-07-12 NOTE — PATIENT INSTRUCTIONS
With the flank pain and the blood in your urine you will need to go to the emergency room for further evaluation for a kidney stone or kidney problem    Make an appointment to see me next week after you are evaluated at the hospital

## 2018-07-12 NOTE — ED PROVIDER NOTES
History  Chief Complaint   Patient presents with    Flank Pain     pt presents to the ed with left sided flank pain x 2wks  pt states she has been having hematuria      66 y/o female presenting with left sided flank pain x 2 weeks with pain ranking 7/10 that is nonradiating ranking dull in nature  Worsened with twisting and movement  Though the pain was muscular however has not gone away  Sent from her PCP for hematuria  States she usually goes to the bathroom frequently which is her normal as she is on a diuretic  No changes in urination  Chills without fevers  Has not taken anything for pain today  Denies abdominal pain, weakness, saddle anaesthesia, urinary urgency, hematuria, nausea, vomiting, diarrhea, constipation  Prior to Admission Medications   Prescriptions Last Dose Informant Patient Reported? Taking?    Cholecalciferol (VITAMIN D3) 2000 units TABS  Self Yes No   Sig: Take 2,000 Units by mouth every morning   Multiple Vitamins-Minerals (OCUVITE ADULT 50+ PO)  Self Yes No   Sig: Take by mouth daily with breakfast   carbidopa-levodopa-entacapone (STALEVO) -200 MG per tablet  Self No No   Sig: take 1 tablet by mouth four times a day   carvedilol (COREG) 3 125 mg tablet   No No   Sig: take 1 tablet by mouth twice a day with MEALS   donepezil (ARICEPT) 10 mg tablet  Self No No   Sig: TAKE 1/2 TABLET (5MG) TABLET AT BED TIME FOR 4 WEEKS AND THEN IF TOLERATED TAKE 1/2 TABLET (5MG) TAB   fluticasone (FLONASE) 50 mcg/act nasal spray  Self Yes No   Si spray into each nostril daily   furosemide (LASIX) 20 mg tablet   No No   Sig: take 1 tablet by mouth ON MONDAY, WEDNESDAY, FRIDAY, AND    levothyroxine 25 mcg tablet  Self No No   Sig: Take 1 tablet (25 mcg total) by mouth every morning   lisinopril (ZESTRIL) 5 mg tablet   No No   Sig: Take 1 tablet (5 mg total) by mouth daily   memantine (NAMENDA) 10 mg tablet   No No   Sig: take 1 tablet by mouth once daily STARTING    primidone (MYSOLINE) 50 mg tablet  Self No No   Sig: take 1/2 tablet by mouth once daily   spironolactone (ALDACTONE) 25 mg tablet   No No   Sig: Take 1/2 tablet by mouth daily  warfarin (COUMADIN) 2 mg tablet  Self Yes No   Sig: Take by mouth daily Tues, Thurs, and Sun    warfarin (COUMADIN) 2 5 mg tablet   No No   Sig: take 1 tablet by mouth once daily as directed      Facility-Administered Medications: None       Past Medical History:   Diagnosis Date    Anal fissure     Arthritis     osteo joints    Asthma with acute exacerbation     Blepharitis     Breast lump     Chalazion     CHF, chronic ([facility] [address] [facility]     Difficulty walking     Disease of thyroid gland     Drooling     Dysphagia     Fall 05/04/2018    Fibromyalgia, primary     History of transfusion 1996    Hordeolum externum     Hx of transient ischemic attack (TIA)     Hypophonia     Infectious viral hepatitis     Hep C dx 1996     Lyme carditis     Murmur, cardiac     Parkinsons (HCC)     Rotator cuff tendinitis     Seasonal allergies     Urinary frequency        Past Surgical History:   Procedure Laterality Date    BREAST SURGERY N/A     benign, calcium    CARDIAC SURGERY  1990    mitral valve replacement    CATARACT EXTRACTION Right 2015    CATARACT EXTRACTION Left 2014    CHEST TUBE INSERTION Right     post pleural effusion    CHOLECYSTECTOMY  2000    lap    HYSTERECTOMY  1973    partial    NH COLSC FLX W/RMVL OF TUMOR POLYP LESION SNARE TQ N/A 7/18/2017    Procedure: COLONOSCOPY and biopsy ;  Surgeon: Clarence Graham MD;  Location: [facility] GI LAB;   Service: Gastroenterology    SKIN BIOPSY      pre cancerous on face    TONSILLECTOMY         Family History   Problem Relation Age of Onset    Heart disease Mother         murmur Cardiomegaly age 64    Pneumonia Father     Heart disease Brother         mitrsl valve    Parkinsonism Brother     Arthritis Brother     Lupus Daughter     Diabetes Daughter     Depression Daughter I have reviewed and agree with the history as documented  Social History   Substance Use Topics    Smoking status: Former Smoker     Packs/day: 0 10     Years: 10 00     Quit date: 1970    Smokeless tobacco: Never Used    Alcohol use No        Review of Systems   Constitutional: Negative  Negative for chills, fever and unexpected weight change  Able to walk without difficulty  Denies IV drug use     HENT: Negative  Respiratory: Negative  Negative for cough, chest tightness, shortness of breath and wheezing  Cardiovascular: Negative for chest pain and palpitations  Gastrointestinal: Negative for abdominal pain, constipation, diarrhea, nausea and vomiting  Genitourinary: Positive for flank pain, frequency and hematuria  Negative for difficulty urinating, dyspareunia, dysuria, enuresis, genital sores and urgency  Denies numbness, tingling in the groin  Musculoskeletal: Positive for back pain  Negative for arthralgias, neck pain and neck stiffness  Skin: Negative  Negative for color change  Neurological: Negative  Negative for dizziness, tremors, weakness, light-headedness, numbness and headaches  Denies numbness  Denies shooting pain down arms/ legs  All other systems reviewed and are negative  Physical Exam  Physical Exam   Constitutional: She is oriented to person, place, and time  She appears well-developed and well-nourished  No distress  HENT:   Head: Normocephalic and atraumatic  Right Ear: External ear normal    Left Ear: External ear normal    Nose: Nose normal    Mouth/Throat: Oropharynx is clear and moist    Eyes: Conjunctivae and EOM are normal  Pupils are equal, round, and reactive to light  Neck: Normal range of motion  Neck supple  Cardiovascular: Normal rate, regular rhythm, normal heart sounds and intact distal pulses  Exam reveals no gallop and no friction rub  No murmur heard    Pulmonary/Chest: Effort normal and breath sounds normal  No respiratory distress  She has no wheezes  She has no rales  She exhibits no tenderness  spo2 is 98% indicating adequate oxygenation  Abdominal: Soft  Bowel sounds are normal  She exhibits no distension and no mass  There is no tenderness  There is no rebound and no guarding  No hernia  No pulsations    Musculoskeletal:        Arms:  Neurological: She is alert and oriented to person, place, and time  Skin: Skin is warm and dry  Capillary refill takes less than 2 seconds  She is not diaphoretic  Psychiatric: She has a normal mood and affect  Nursing note and vitals reviewed        Vital Signs  ED Triage Vitals   Temperature Pulse Respirations Blood Pressure SpO2   07/12/18 1218 07/12/18 1218 07/12/18 1218 07/12/18 1218 07/12/18 1218   98 7 °F (37 1 °C) 66 18 116/63 98 %      Temp src Heart Rate Source Patient Position - Orthostatic VS BP Location FiO2 (%)   -- 07/12/18 1218 07/12/18 1445 07/12/18 1445 --    Monitor Lying Left arm       Pain Score       07/12/18 1445       No Pain           Vitals:    07/12/18 1218 07/12/18 1445   BP: 116/63 122/67   Pulse: 66 60   Patient Position - Orthostatic VS:  Lying       Visual Acuity      ED Medications  Medications - No data to display    Diagnostic Studies  Results Reviewed     Procedure Component Value Units Date/Time    UA w Reflex to Microscopic w Reflex to Culture [[id number]]  (Abnormal) Collected:  07/12/18 1257    Lab Status:  Final result Specimen:  Urine from Urine, Clean Catch Updated:  07/12/18 1337     Color, UA Pamella     Clarity, UA Clear     Specific Gravity, UA 1 025     pH, UA 5 0     Leukocytes, UA Negative     Nitrite, UA Negative     Protein, UA Negative mg/dl      Glucose, UA Negative mg/dl      Ketones, UA Trace (A) mg/dl      Urobilinogen, UA 0 2 E U /dl      Bilirubin, UA Negative     Blood, UA Negative    Comprehensive metabolic panel [[id number]]  (Abnormal) Collected:  07/12/18 1258    Lab Status:  Final result Specimen:  Blood from Arm, Right Updated:  07/12/18 1336     Sodium 138 mmol/L      Potassium 4 1 mmol/L      Chloride 104 mmol/L      CO2 28 mmol/L      Anion Gap 6 mmol/L      BUN 18 mg/dL      Creatinine 1 04 mg/dL      Glucose 89 mg/dL      Calcium 9 4 mg/dL      AST 13 U/L      ALT 7 (L) U/L      Alkaline Phosphatase 95 U/L      Total Protein 7 0 g/dL      Albumin 3 6 g/dL      Total Bilirubin 1 10 (H) mg/dL      eGFR 52 ml/min/1 73sq m     Narrative:         National Kidney Disease Education Program recommendations are as follows:  GFR calculation is accurate only with a steady state creatinine  Chronic Kidney disease less than 60 ml/min/1 73 sq  meters  Kidney failure less than 15 ml/min/1 73 sq  meters  CBC and differential [[id number]] Collected:  07/12/18 1257    Lab Status:  Final result Specimen:  Blood from Arm, Right Updated:  07/12/18 1319     WBC 6 09 Thousand/uL      RBC 4 15 Million/uL      Hemoglobin 12 9 g/dL      Hematocrit 40 3 %      MCV 97 fL      MCH 31 1 pg      MCHC 32 0 g/dL      RDW 14 0 %      MPV 10 9 fL      Platelets 635 Thousands/uL      nRBC 0 /100 WBCs      Neutrophils Relative 63 %      Immat GRANS % 0 %      Lymphocytes Relative 26 %      Monocytes Relative 11 %      Eosinophils Relative 0 %      Basophils Relative 0 %      Neutrophils Absolute 3 76 Thousands/µL      Immature Grans Absolute 0 02 Thousand/uL      Lymphocytes Absolute 1 58 Thousands/µL      Monocytes Absolute 0 69 Thousand/µL      Eosinophils Absolute 0 02 Thousand/µL      Basophils Absolute 0 02 Thousands/µL                  CT renal stone study abdomen pelvis wo contrast   Final Result by Brooke Berg DO (07/12 1402)   No CT evidence of renal colic  Incidental findings detailed above           Workstation performed: [id number]                    Procedures  Procedures       Phone Contacts  ED Phone Contact    ED Course                               MDM  Number of Diagnoses or Management Options  Flank pain:   Diagnosis management comments: I discussed with patient results of the lab tests and imaging studies  Patient does not want any medication for the pain  She generally is feeling better  Pain likely muscular  Patient is informed to return to the emergency department for worsening of symptoms and was given proper education regarding their diagnosis and symptoms  Otherwise the patient is informed to follow up with their primary care doctor for re-evaluation  The patient verbalizes understanding and agrees with above assessment and plan  All questions were answered  Please Note: Fluency Direct voice recognition software may have been used in the creation of this document  Wrong words or sound a like substitutions may have occurred due to the inherent limitations of the voice software  Amount and/or Complexity of Data Reviewed  Clinical lab tests: ordered and reviewed  Tests in the radiology section of CPT®: reviewed and ordered  Review and summarize past medical records: yes  Independent visualization of images, tracings, or specimens: yes      CritCare Time    Disposition  Final diagnoses:   Flank pain     Time reflects when diagnosis was documented in both MDM as applicable and the Disposition within this note     Time User Action Codes Description Comment    7/12/2018  2:59 PM Korina Villalta Add [R10 9] Flank pain       ED Disposition     ED Disposition Condition Comment    Discharge  Herminia Crawford discharge to home/self care      Condition at discharge: Good        Follow-up Information     Follow up With Specialties Details Why Contact Info Additional [address] Emergency Department Emergency Medicine Go to If symptoms worsen [address] [facility]  [phone] [facility] [address]    Pinky Herrera MD Family Medicine Schedule an appointment as soon as possible for a visit As needed [address] [facility] [phone] [id number]  [phone]             Discharge Medication List as of 7/12/2018  3:01 PM      CONTINUE these medications which have NOT CHANGED    Details   carbidopa-levodopa-entacapone (STALEVO) -200 MG per tablet take 1 tablet by mouth four times a day, Normal      carvedilol (COREG) 3 125 mg tablet take 1 tablet by mouth twice a day with MEALS, Normal      Cholecalciferol (VITAMIN D3) 2000 units TABS Take 2,000 Units by mouth every morning, Historical Med      donepezil (ARICEPT) 10 mg tablet TAKE 1/2 TABLET (5MG) TABLET AT BED TIME FOR 4 WEEKS AND THEN IF TOLERATED TAKE 1/2 TABLET (5MG) TAB, Normal      fluticasone (FLONASE) 50 mcg/act nasal spray 1 spray into each nostril daily, Historical Med      furosemide (LASIX) 20 mg tablet take 1 tablet by mouth ON MONDAY, WEDNESDAY, FRIDAY, AND SUNDAY, Normal      levothyroxine 25 mcg tablet Take 1 tablet (25 mcg total) by mouth every morning, Starting Tue 2/27/2018, Normal      lisinopril (ZESTRIL) 5 mg tablet Take 1 tablet (5 mg total) by mouth daily, Starting Tue 7/3/2018, Normal      memantine (NAMENDA) 10 mg tablet take 1 tablet by mouth once daily STARTING JUNE 16, Normal      Multiple Vitamins-Minerals (OCUVITE ADULT 50+ PO) Take by mouth daily with breakfast, Historical Med      primidone (MYSOLINE) 50 mg tablet take 1/2 tablet by mouth once daily, Normal      spironolactone (ALDACTONE) 25 mg tablet Take 1/2 tablet by mouth daily  , Normal      !! warfarin (COUMADIN) 2 mg tablet Take by mouth daily Tues, Thurs, and Sun , Historical Med      !! warfarin (COUMADIN) 2 5 mg tablet take 1 tablet by mouth once daily as directed, Normal       !! - Potential duplicate medications found  Please discuss with provider  No discharge procedures on file      ED Provider  Electronically Signed by           Amrit Naidu PA-C  07/12/18 1929

## 2018-07-13 ENCOUNTER — VBI (OUTPATIENT)
Dept: ADMINISTRATIVE | Facility: OTHER | Age: 78
End: 2018-07-13

## 2018-07-13 NOTE — TELEPHONE ENCOUNTER
Patient was sent to the ED from the office by Kitty Lowry - patient has a F/u appt scheduled on 7/19/18

## 2018-07-14 LAB
BACTERIA UR CULT: NORMAL
Lab: NO GROWTH

## 2018-07-16 ENCOUNTER — APPOINTMENT (OUTPATIENT)
Dept: LAB | Facility: CLINIC | Age: 78
End: 2018-07-16
Payer: MEDICARE

## 2018-07-16 ENCOUNTER — TRANSCRIBE ORDERS (OUTPATIENT)
Dept: LAB | Facility: CLINIC | Age: 78
End: 2018-07-16

## 2018-07-16 DIAGNOSIS — I48.20 CHRONIC ATRIAL FIBRILLATION (HCC): Primary | ICD-10-CM

## 2018-07-16 LAB
INR PPP: 2.49 (ref 0.86–1.17)
PROTHROMBIN TIME: 27 SECONDS (ref 11.8–14.2)

## 2018-07-16 PROCEDURE — 36415 COLL VENOUS BLD VENIPUNCTURE: CPT | Performed by: INTERNAL MEDICINE

## 2018-07-16 PROCEDURE — 85610 PROTHROMBIN TIME: CPT | Performed by: INTERNAL MEDICINE

## 2018-07-17 ENCOUNTER — ANTICOAG VISIT (OUTPATIENT)
Dept: CARDIOLOGY CLINIC | Facility: CLINIC | Age: 78
End: 2018-07-17

## 2018-07-17 DIAGNOSIS — Z95.2 H/O MITRAL VALVE REPLACEMENT WITH MECHANICAL VALVE: ICD-10-CM

## 2018-07-17 DIAGNOSIS — I48.91 ATRIAL FIBRILLATION, UNSPECIFIED TYPE (HCC): ICD-10-CM

## 2018-07-19 ENCOUNTER — OFFICE VISIT (OUTPATIENT)
Dept: FAMILY MEDICINE CLINIC | Facility: CLINIC | Age: 78
End: 2018-07-19
Payer: MEDICARE

## 2018-07-19 VITALS
BODY MASS INDEX: 29.66 KG/M2 | SYSTOLIC BLOOD PRESSURE: 120 MMHG | DIASTOLIC BLOOD PRESSURE: 70 MMHG | HEART RATE: 60 BPM | TEMPERATURE: 97.7 F | RESPIRATION RATE: 14 BRPM | WEIGHT: 178 LBS | HEIGHT: 65 IN

## 2018-07-19 DIAGNOSIS — M54.50 ACUTE LEFT-SIDED LOW BACK PAIN WITHOUT SCIATICA: Primary | ICD-10-CM

## 2018-07-19 DIAGNOSIS — R53.1 WEAKNESS: ICD-10-CM

## 2018-07-19 PROBLEM — R10.9 LEFT FLANK PAIN: Status: RESOLVED | Noted: 2018-07-12 | Resolved: 2018-07-19

## 2018-07-19 PROCEDURE — 99214 OFFICE O/P EST MOD 30 MIN: CPT | Performed by: FAMILY MEDICINE

## 2018-07-19 NOTE — PROGRESS NOTES
Chief Complaint   Patient presents with    Follow-up     E/R GENAROW        Patient ID: Lacey Mcgraw is a 65 y o  female  HPI  Pt came to the office 1 wk ago for L flank pain -  Initial UA was + for blood -  Was sent to ER to r/o kidney stone  -  Normal CT -  Pain was positional, feeling better, no therapy tried, pt is trying not to bend     The following portions of the patient's history were reviewed and updated as appropriate: allergies, current medications, past family history, past medical history, past social history, past surgical history and problem list     Review of Systems   Constitutional: Positive for activity change and fatigue  Negative for fever and unexpected weight change  Respiratory: Negative  Cardiovascular: Negative  Genitourinary: Negative  Musculoskeletal: Positive for back pain  Negative for gait problem  Skin: Negative  Neurological: Positive for weakness  Negative for light-headedness, numbness and headaches  Psychiatric/Behavioral: Negative          Current Outpatient Prescriptions   Medication Sig Dispense Refill    carbidopa-levodopa-entacapone (STALEVO) -200 MG per tablet take 1 tablet by mouth four times a day 120 tablet 4    carvedilol (COREG) 3 125 mg tablet take 1 tablet by mouth twice a day with MEALS 60 tablet 5    Cholecalciferol (VITAMIN D3) 2000 units TABS Take 2,000 Units by mouth every morning      donepezil (ARICEPT) 10 mg tablet TAKE 1/2 TABLET (5MG) TABLET AT BED TIME FOR 4 WEEKS AND THEN IF TOLERATED TAKE 1/2 TABLET (5MG) TAB 30 tablet 4    fluticasone (FLONASE) 50 mcg/act nasal spray 1 spray into each nostril daily      furosemide (LASIX) 20 mg tablet take 1 tablet by mouth ON MONDAY, WEDNESDAY, FRIDAY, AND SUNDAY 16 tablet 5    levothyroxine 25 mcg tablet Take 1 tablet (25 mcg total) by mouth every morning 90 tablet 2    lisinopril (ZESTRIL) 5 mg tablet Take 1 tablet (5 mg total) by mouth daily 30 tablet 5    memantine (NAMENDA) 10 mg tablet take 1 tablet by mouth once daily STARTING JUNE 16 30 tablet 1    Multiple Vitamins-Minerals (OCUVITE ADULT 50+ PO) Take by mouth daily with breakfast      primidone (MYSOLINE) 50 mg tablet take 1/2 tablet by mouth once daily 30 tablet 2    spironolactone (ALDACTONE) 25 mg tablet Take 1/2 tablet by mouth daily  15 tablet 5    warfarin (COUMADIN) 2 mg tablet Take by mouth daily Tues, Thurs, and Sun       warfarin (COUMADIN) 2 5 mg tablet take 1 tablet by mouth once daily as directed 30 tablet 4     No current facility-administered medications for this visit  Objective:    /70 (BP Location: Left arm, Patient Position: Sitting, Cuff Size: Standard)   Pulse 60   Temp 97 7 °F (36 5 °C) (Tympanic)   Resp 14   Ht 5' 5" (1 651 m)   Wt 80 7 kg (178 lb)   BMI 29 62 kg/m²        Physical Exam   Constitutional: No distress  Cardiovascular: Normal rate and regular rhythm  Pulmonary/Chest: No respiratory distress  Abdominal: There is no tenderness  Musculoskeletal: She exhibits tenderness  Lumbar back: She exhibits decreased range of motion and spasm  She exhibits no bony tenderness  Back:    Skin: No rash noted  Assessment/Plan:           Diagnoses and all orders for this visit:    Acute left-sided low back pain without sciatica  -     Ambulatory referral to Physical Therapy; Future    Weakness  -     Ambulatory referral to Physical Therapy;  Future      rto sarah Fry MD

## 2018-07-22 DIAGNOSIS — G20 PARKINSON DISEASE (HCC): ICD-10-CM

## 2018-07-22 DIAGNOSIS — R41.3 AMNESIA/MEMORY DISORDER: ICD-10-CM

## 2018-07-23 RX ORDER — DONEPEZIL HYDROCHLORIDE 10 MG/1
TABLET, FILM COATED ORAL
Qty: 30 TABLET | Refills: 3 | Status: SHIPPED | OUTPATIENT
Start: 2018-07-23 | End: 2019-01-10 | Stop reason: DRUGHIGH

## 2018-07-23 RX ORDER — CARBIDOPA, LEVODOPA AND ENTACAPONE 25; 200; 100 MG/1; MG/1; MG/1
TABLET, FILM COATED ORAL
Qty: 120 TABLET | Refills: 4 | Status: SHIPPED | OUTPATIENT
Start: 2018-07-23 | End: 2019-01-04 | Stop reason: SDUPTHER

## 2018-07-25 ENCOUNTER — OFFICE VISIT (OUTPATIENT)
Dept: PODIATRY | Facility: CLINIC | Age: 78
End: 2018-07-25
Payer: MEDICARE

## 2018-07-25 VITALS
SYSTOLIC BLOOD PRESSURE: 108 MMHG | HEIGHT: 65 IN | BODY MASS INDEX: 29.66 KG/M2 | WEIGHT: 178 LBS | DIASTOLIC BLOOD PRESSURE: 70 MMHG

## 2018-07-25 DIAGNOSIS — M79.672 PAIN IN BOTH FEET: ICD-10-CM

## 2018-07-25 DIAGNOSIS — B35.1 ONYCHOMYCOSIS: ICD-10-CM

## 2018-07-25 DIAGNOSIS — M79.671 PAIN IN BOTH FEET: ICD-10-CM

## 2018-07-25 DIAGNOSIS — I70.213 ATHEROSCLEROSIS OF NATIVE ARTERY OF BOTH LOWER EXTREMITIES WITH INTERMITTENT CLAUDICATION (HCC): Primary | ICD-10-CM

## 2018-07-25 DIAGNOSIS — G47.62 NOCTURNAL LEG CRAMPS: ICD-10-CM

## 2018-07-25 PROCEDURE — 99213 OFFICE O/P EST LOW 20 MIN: CPT | Performed by: PODIATRIST

## 2018-07-25 NOTE — PROGRESS NOTES
Assessment/Plan:    Aseptic debridement and planning of nails x10 and manually and mechanically  Continue with stretching and icing  Discussed physical therapy if not resolving    Follow-up 2 months       Diagnoses and all orders for this visit:    Atherosclerosis of native artery of both lower extremities with intermittent claudication (HCC)    Nocturnal leg cramps    Onychomycosis    Pain in both feet          Subjective:      Patient ID: Cb Vick is a 65 y o  female  Patient patient has been stretching and taking multivitamin  Cramps have mostly improved is usually having cramping once or twice a week and says it now only last about a minute and if she stretches her feet it goes away  Patient does have thick painful nails that hurt when wearing shoes  Patient has not noticed any redness or signs of infection          Past Medical History:   Diagnosis Date    Anal fissure     Arthritis     osteo joints    Asthma with acute exacerbation     Blepharitis     Breast lump     Chalazion     CHF, chronic (HCC)     Difficulty walking     Disease of thyroid gland     Drooling     Dysphagia     Fall 05/04/2018    Fibromyalgia, primary     History of transfusion 1996    Hordeolum externum     Hx of transient ischemic attack (TIA)     Hypophonia     Infectious viral hepatitis     Hep C dx 1996     Lyme carditis     Murmur, cardiac     Parkinsons (HCC)     Rotator cuff tendinitis     Seasonal allergies     Urinary frequency          Current Outpatient Prescriptions:     carbidopa-levodopa-entacapone (STALEVO) -200 MG per tablet, take 1 tablet by mouth four times a day, Disp: 120 tablet, Rfl: 4    carvedilol (COREG) 3 125 mg tablet, take 1 tablet by mouth twice a day with MEALS, Disp: 60 tablet, Rfl: 5    Cholecalciferol (VITAMIN D3) 2000 units TABS, Take 2,000 Units by mouth every morning, Disp: , Rfl:     donepezil (ARICEPT) 10 mg tablet, take 1 tablet by mouth at bedtime, Disp: 30 tablet, Rfl: 3    fluticasone (FLONASE) 50 mcg/act nasal spray, 1 spray into each nostril daily, Disp: , Rfl:     furosemide (LASIX) 20 mg tablet, take 1 tablet by mouth ON MONDAY, WEDNESDAY, FRIDAY, AND SUNDAY, Disp: 16 tablet, Rfl: 5    levothyroxine 25 mcg tablet, Take 1 tablet (25 mcg total) by mouth every morning, Disp: 90 tablet, Rfl: 2    lisinopril (ZESTRIL) 5 mg tablet, Take 1 tablet (5 mg total) by mouth daily, Disp: 30 tablet, Rfl: 5    memantine (NAMENDA) 10 mg tablet, take 1 tablet by mouth once daily STARTING JUNE 16, Disp: 30 tablet, Rfl: 1    Multiple Vitamins-Minerals (OCUVITE ADULT 50+ PO), Take by mouth daily with breakfast, Disp: , Rfl:     primidone (MYSOLINE) 50 mg tablet, take 1/2 tablet by mouth once daily, Disp: 30 tablet, Rfl: 2    spironolactone (ALDACTONE) 25 mg tablet, Take 1/2 tablet by mouth daily  , Disp: 15 tablet, Rfl: 5    warfarin (COUMADIN) 2 mg tablet, Take by mouth daily Tues, Thurs, and Sun , Disp: , Rfl:     warfarin (COUMADIN) 2 5 mg tablet, take 1 tablet by mouth once daily as directed, Disp: 30 tablet, Rfl: 4    Past Surgical History:   Procedure Laterality Date    BREAST SURGERY N/A     benign, calcium    CARDIAC SURGERY  1990    mitral valve replacement    CATARACT EXTRACTION Right 2015    CATARACT EXTRACTION Left 2014    CHEST TUBE INSERTION Right     post pleural effusion    CHOLECYSTECTOMY  2000    lap    HYSTERECTOMY  1973    partial    ND COLSC FLX W/RMVL OF TUMOR POLYP LESION SNARE TQ N/A 7/18/2017    Procedure: COLONOSCOPY and biopsy ;  Surgeon: Brady Sparks MD;  Location: [place] [address] GI LAB;   Service: Gastroenterology    SKIN BIOPSY      pre cancerous on face    TONSILLECTOMY         No Known Allergies    Patient Active Problem List   Diagnosis    H/O mitral valve replacement with mechanical valve    Atrial fibrillation (HCC) [I48 91]    Benign familial tremor    Chronic hepatitis C ([facility] [address]    Chronic right-sided low back pain without sciatica    Drug induced insomnia ([facility] [address]    Dupuytren's contracture    Generalized osteoarthritis    Heart failure, left systolic, chronic (HCC)    Hypothyroidism    Memory impairment    Mitral valve disorder    Parkinsons (HCC)    Peripheral vascular disease (HCC)    Seasonal allergies    Transient ischemic attack    Vitamin D insufficiency    Suprapatellar bursitis of right knee    Numbness and tingling in right hand    Other microscopic hematuria       Review of Systems   Constitutional: Negative  HENT: Negative  Eyes: Negative  Respiratory: Negative  Cardiovascular: Negative  Gastrointestinal: Negative  Endocrine: Negative  Genitourinary: Negative  Musculoskeletal: Negative  Skin: Negative  Allergic/Immunologic: Negative  Neurological: Negative  Hematological: Negative  Psychiatric/Behavioral: Negative  Objective:      /70   Ht 5' 5" (1 651 m)   Wt 80 7 kg (178 lb)   BMI 29 62 kg/m²          Physical Exam      DP pulses 1/4 bilateral, PT pulses nonpalpable, capillary refill time 6 sec times 10 temperature gradient increased bilateral   Plus one edema bilateral feet and ankles moderate venous stasis  All nails thick discolored dystrophic positive subungual debris positive pain on palpation  Significant bunion bilateral   Hammertoes 2 through 5 bilateral   There is some dorsal displacement of the 2nd and 3rd MPJ left worse than right secondary to bunion  Significant pes planus  Significant equinus bilateral   Muscle strength 5/5 all groups bilateral feet and ankles  No open wound or signs of infection  Vibratory sensation reduced bilateral monofilament sensation within normal limits  Mild edema  Negative erythema no signs of infection  Nails are thick discolored dystrophic, positive subungual debris, mild pain on palpation mild ingrown bilateral hallux  No erythema no signs of infection

## 2018-08-06 ENCOUNTER — APPOINTMENT (OUTPATIENT)
Dept: LAB | Facility: CLINIC | Age: 78
End: 2018-08-06
Payer: MEDICARE

## 2018-08-06 ENCOUNTER — TRANSCRIBE ORDERS (OUTPATIENT)
Dept: LAB | Facility: CLINIC | Age: 78
End: 2018-08-06

## 2018-08-06 ENCOUNTER — ANTICOAG VISIT (OUTPATIENT)
Dept: CARDIOLOGY CLINIC | Facility: CLINIC | Age: 78
End: 2018-08-06

## 2018-08-06 DIAGNOSIS — I48.20 CHRONIC ATRIAL FIBRILLATION (HCC): Primary | ICD-10-CM

## 2018-08-06 DIAGNOSIS — I48.91 ATRIAL FIBRILLATION, UNSPECIFIED TYPE (HCC): ICD-10-CM

## 2018-08-06 DIAGNOSIS — Z95.2 H/O MITRAL VALVE REPLACEMENT WITH MECHANICAL VALVE: ICD-10-CM

## 2018-08-06 LAB
INR PPP: 3 (ref 0.86–1.17)
PROTHROMBIN TIME: 31.2 SECONDS (ref 11.8–14.2)

## 2018-08-06 PROCEDURE — 36415 COLL VENOUS BLD VENIPUNCTURE: CPT | Performed by: INTERNAL MEDICINE

## 2018-08-06 PROCEDURE — 85610 PROTHROMBIN TIME: CPT | Performed by: INTERNAL MEDICINE

## 2018-08-14 DIAGNOSIS — I10 ESSENTIAL HYPERTENSION: ICD-10-CM

## 2018-08-14 RX ORDER — LISINOPRIL 5 MG/1
5 TABLET ORAL DAILY
Qty: 90 TABLET | Refills: 3 | Status: SHIPPED | OUTPATIENT
Start: 2018-08-14 | End: 2019-01-10 | Stop reason: ALTCHOICE

## 2018-08-23 DIAGNOSIS — R25.1 TREMOR: ICD-10-CM

## 2018-08-24 RX ORDER — PRIMIDONE 50 MG/1
25 TABLET ORAL DAILY
Qty: 30 TABLET | Refills: 1 | Status: SHIPPED | OUTPATIENT
Start: 2018-08-24 | End: 2018-12-03 | Stop reason: SDUPTHER

## 2018-08-27 ENCOUNTER — APPOINTMENT (OUTPATIENT)
Dept: LAB | Facility: CLINIC | Age: 78
End: 2018-08-27
Payer: MEDICARE

## 2018-08-27 ENCOUNTER — TRANSCRIBE ORDERS (OUTPATIENT)
Dept: LAB | Facility: CLINIC | Age: 78
End: 2018-08-27

## 2018-08-27 ENCOUNTER — ANTICOAG VISIT (OUTPATIENT)
Dept: CARDIOLOGY CLINIC | Facility: CLINIC | Age: 78
End: 2018-08-27

## 2018-08-27 DIAGNOSIS — I48.91 ATRIAL FIBRILLATION, UNSPECIFIED TYPE (HCC): ICD-10-CM

## 2018-08-27 DIAGNOSIS — I48.20 CHRONIC ATRIAL FIBRILLATION (HCC): Primary | ICD-10-CM

## 2018-08-27 DIAGNOSIS — Z95.2 H/O MITRAL VALVE REPLACEMENT WITH MECHANICAL VALVE: ICD-10-CM

## 2018-08-27 LAB
INR PPP: 2.35 (ref 0.86–1.17)
PROTHROMBIN TIME: 25.8 SECONDS (ref 11.8–14.2)

## 2018-08-27 PROCEDURE — 85610 PROTHROMBIN TIME: CPT | Performed by: INTERNAL MEDICINE

## 2018-08-27 PROCEDURE — 36415 COLL VENOUS BLD VENIPUNCTURE: CPT | Performed by: INTERNAL MEDICINE

## 2018-09-10 ENCOUNTER — APPOINTMENT (OUTPATIENT)
Dept: LAB | Facility: CLINIC | Age: 78
End: 2018-09-10
Payer: MEDICARE

## 2018-09-10 ENCOUNTER — CLINICAL SUPPORT (OUTPATIENT)
Dept: FAMILY MEDICINE CLINIC | Facility: CLINIC | Age: 78
End: 2018-09-10
Payer: MEDICARE

## 2018-09-10 ENCOUNTER — TRANSCRIBE ORDERS (OUTPATIENT)
Dept: LAB | Facility: CLINIC | Age: 78
End: 2018-09-10

## 2018-09-10 ENCOUNTER — ANTICOAG VISIT (OUTPATIENT)
Dept: CARDIOLOGY CLINIC | Facility: CLINIC | Age: 78
End: 2018-09-10

## 2018-09-10 DIAGNOSIS — I48.91 ATRIAL FIBRILLATION, UNSPECIFIED TYPE (HCC): ICD-10-CM

## 2018-09-10 DIAGNOSIS — Z23 NEED FOR INFLUENZA VACCINATION: Primary | ICD-10-CM

## 2018-09-10 DIAGNOSIS — I48.20 CHRONIC ATRIAL FIBRILLATION (HCC): Primary | ICD-10-CM

## 2018-09-10 DIAGNOSIS — Z95.2 S/P MVR (MITRAL VALVE REPLACEMENT): Primary | ICD-10-CM

## 2018-09-10 DIAGNOSIS — Z79.01 ANTICOAGULATED: ICD-10-CM

## 2018-09-10 DIAGNOSIS — Z95.2 H/O MITRAL VALVE REPLACEMENT WITH MECHANICAL VALVE: ICD-10-CM

## 2018-09-10 LAB
INR PPP: 2.94 (ref 0.86–1.17)
PROTHROMBIN TIME: 30.7 SECONDS (ref 11.8–14.2)

## 2018-09-10 PROCEDURE — 85610 PROTHROMBIN TIME: CPT | Performed by: INTERNAL MEDICINE

## 2018-09-10 PROCEDURE — 36415 COLL VENOUS BLD VENIPUNCTURE: CPT | Performed by: INTERNAL MEDICINE

## 2018-09-10 PROCEDURE — G0008 ADMIN INFLUENZA VIRUS VAC: HCPCS | Performed by: FAMILY MEDICINE

## 2018-09-10 PROCEDURE — 90662 IIV NO PRSV INCREASED AG IM: CPT | Performed by: FAMILY MEDICINE

## 2018-09-11 RX ORDER — WARFARIN SODIUM 2.5 MG/1
TABLET ORAL
Qty: 30 TABLET | Refills: 11 | Status: SHIPPED | OUTPATIENT
Start: 2018-09-11 | End: 2019-05-01

## 2018-09-26 ENCOUNTER — OFFICE VISIT (OUTPATIENT)
Dept: PODIATRY | Facility: CLINIC | Age: 78
End: 2018-09-26
Payer: MEDICARE

## 2018-09-26 VITALS — BODY MASS INDEX: 29.66 KG/M2 | HEIGHT: 65 IN | WEIGHT: 178 LBS

## 2018-09-26 DIAGNOSIS — M79.672 PAIN IN BOTH FEET: ICD-10-CM

## 2018-09-26 DIAGNOSIS — M79.671 PAIN IN BOTH FEET: ICD-10-CM

## 2018-09-26 DIAGNOSIS — I70.213 ATHEROSCLEROSIS OF NATIVE ARTERY OF BOTH LOWER EXTREMITIES WITH INTERMITTENT CLAUDICATION (HCC): Primary | ICD-10-CM

## 2018-09-26 DIAGNOSIS — B35.1 ONYCHOMYCOSIS: ICD-10-CM

## 2018-09-26 PROCEDURE — 11721 DEBRIDE NAIL 6 OR MORE: CPT | Performed by: PODIATRIST

## 2018-10-01 ENCOUNTER — ANTICOAG VISIT (OUTPATIENT)
Dept: CARDIOLOGY CLINIC | Facility: CLINIC | Age: 78
End: 2018-10-01

## 2018-10-01 ENCOUNTER — APPOINTMENT (OUTPATIENT)
Dept: LAB | Facility: CLINIC | Age: 78
End: 2018-10-01
Payer: MEDICARE

## 2018-10-01 ENCOUNTER — TRANSCRIBE ORDERS (OUTPATIENT)
Dept: LAB | Facility: CLINIC | Age: 78
End: 2018-10-01

## 2018-10-01 DIAGNOSIS — Z95.2 H/O MITRAL VALVE REPLACEMENT WITH MECHANICAL VALVE: ICD-10-CM

## 2018-10-01 DIAGNOSIS — Z95.2 S/P MITRAL VALVE REPLACEMENT: Primary | ICD-10-CM

## 2018-10-01 DIAGNOSIS — I48.91 ATRIAL FIBRILLATION, UNSPECIFIED TYPE (HCC): ICD-10-CM

## 2018-10-01 DIAGNOSIS — I48.20 CHRONIC ATRIAL FIBRILLATION (HCC): Primary | ICD-10-CM

## 2018-10-01 LAB
INR PPP: 3 (ref 0.86–1.17)
PROTHROMBIN TIME: 31.2 SECONDS (ref 11.8–14.2)

## 2018-10-01 PROCEDURE — 85610 PROTHROMBIN TIME: CPT | Performed by: INTERNAL MEDICINE

## 2018-10-01 PROCEDURE — 36415 COLL VENOUS BLD VENIPUNCTURE: CPT | Performed by: INTERNAL MEDICINE

## 2018-10-03 ENCOUNTER — OFFICE VISIT (OUTPATIENT)
Dept: CARDIOLOGY CLINIC | Facility: CLINIC | Age: 78
End: 2018-10-03
Payer: MEDICARE

## 2018-10-03 VITALS
HEART RATE: 66 BPM | WEIGHT: 178 LBS | SYSTOLIC BLOOD PRESSURE: 114 MMHG | HEIGHT: 65 IN | BODY MASS INDEX: 29.66 KG/M2 | OXYGEN SATURATION: 97 % | DIASTOLIC BLOOD PRESSURE: 70 MMHG

## 2018-10-03 DIAGNOSIS — I42.0 DILATED CARDIOMYOPATHY (HCC): ICD-10-CM

## 2018-10-03 DIAGNOSIS — R53.82 CHRONIC FATIGUE AND MALAISE: ICD-10-CM

## 2018-10-03 DIAGNOSIS — Z79.01 ON CONTINUOUS ORAL ANTICOAGULATION: ICD-10-CM

## 2018-10-03 DIAGNOSIS — G20 PARKINSON'S DISEASE (HCC): ICD-10-CM

## 2018-10-03 DIAGNOSIS — R53.81 CHRONIC FATIGUE AND MALAISE: ICD-10-CM

## 2018-10-03 DIAGNOSIS — I48.20 ATRIAL FIBRILLATION, CHRONIC (HCC): ICD-10-CM

## 2018-10-03 DIAGNOSIS — E03.9 ACQUIRED HYPOTHYROIDISM: ICD-10-CM

## 2018-10-03 DIAGNOSIS — I49.3 ASYMPTOMATIC PVCS: ICD-10-CM

## 2018-10-03 DIAGNOSIS — M16.0 PRIMARY OSTEOARTHRITIS OF BOTH HIPS: ICD-10-CM

## 2018-10-03 DIAGNOSIS — I50.22 CHRONIC SYSTOLIC HEART FAILURE (HCC): Primary | ICD-10-CM

## 2018-10-03 DIAGNOSIS — G47.33 OBSTRUCTIVE SLEEP APNEA: ICD-10-CM

## 2018-10-03 DIAGNOSIS — Z95.2 H/O PROSTHETIC MITRAL VALVE: ICD-10-CM

## 2018-10-03 DIAGNOSIS — K21.9 GERD WITHOUT ESOPHAGITIS: ICD-10-CM

## 2018-10-03 DIAGNOSIS — E78.5 DYSLIPIDEMIA: ICD-10-CM

## 2018-10-03 PROCEDURE — 99214 OFFICE O/P EST MOD 30 MIN: CPT | Performed by: INTERNAL MEDICINE

## 2018-10-03 PROCEDURE — 93000 ELECTROCARDIOGRAM COMPLETE: CPT | Performed by: INTERNAL MEDICINE

## 2018-10-03 NOTE — PATIENT INSTRUCTIONS
1   Obtain updated TSH, lipids, CMP, CBC in near future and call patient regarding results  2   See Dr Efrain Mauro for Parkinson's follow-up in discussion about sleep disordered breathing and possible sleep apnea screening or do this through primary care physician  3   If no other obvious cause is found for fatigue, consider trial of Entresto instead of current lisinopril to see if that helps her fatigue  4   Avoid use of diuretic on flight to [place] in December and rest for the 1st several days while there  5   Cardiology follow-up in 6 months with EKG

## 2018-10-03 NOTE — PROGRESS NOTES
Cardiology Progress Note    ASSESSMENT:    1  Compensated chronic systolic heart failure with 34% ejection fraction on 7/5/16 MUGA scan/underlying dilated cardiomyopathy but currently with worsening  exertional fatigue, exhaustion, and cold sweats, with slightly worsened exertional dyspnea, and occasional nocturnal wheezing  2  Resolved dehydration/hypotension and residual mild dizziness as well as resolution of acute kidney injury since 11/18/16  3  Chronic atrial fibrillation, controlled and chronic left bundle-branch block  4  History of mechanical mitral valve replacement September, 1990, with very good oral anticoagulation with target INR of 2 5  Latest INR 3 00   5  Moderately frequent PVCs on otherwise benign Holter monitor December, 2013  6  13 6 pound weight loss in approximately 4 83 years  7  Dyslipidemia  8  Mild obstructive sleep apnea is suspected  9  History of asthma  10  History of GERD  11  History of benign positional vertigo  12  History of Lyme disease  13  Hypothyroidism with slightly elevated TSH level, which could contribute to extreme fatigue  14  History of hepatitis C  15  History of Parkinson's disease/date dysfunction with slowly progressive symptoms  16  Resolved nonproductive cough and postural dizziness with  flu-like illness over the past 7 months ago  17  Chronic fatigue and malaise, multifactorial  18  Drug-induced insomnia  19  History of fall with subsequent left distal tibial fracture and left ankle sprain with right periorbital ecchymoses and laceration and left wrist sprain on 7/30/17, with no recurrence, possibly related to transient drop in blood pressure with upright position in hot weather after prolonged sitting  21  Previously diagnosed osteoarthritis of hips and spine and bursitis of right knee  Plan       Patient Instructions     1  Obtain updated TSH, lipids, CMP, CBC in near future and our office will call patient regarding results    2   See Dr Román Terry for Parkinson's follow-up and discussion about sleep disordered breathing and possible sleep apnea screening or do this through primary care physician  3   If no other obvious cause is found for fatigue, consider trial of Entresto instead of current lisinopril to see if that helps her fatigue  4   Avoid use of diuretic on flight to [place] in December and rest for the 1st several days while there  5   Cardiology follow-up in 6 months with EKG  HPI    This 65 y o  female  denies new cardiopulmonary and medical symptoms  She complains of exhaustion that she grades at 4 5-5/10 most days with occasional worse days where her exhaustion is 7/10 severity  She also complains of taking interrupted sleep, where she sleeps for several hours and then wakes up again for about 4 hours, falling back to sleep at 7:00 a m  She is unable to tell us if she has periods of apnea or nocturnal gasping for breath  Her  states that she does snore prominently when she is sleeping  She does complain of slowly progressive gait dysfunction, decreasing mobility of her face, and a lessening mental acuity  She attributes this to her known Parkinson's disease  She and her  will be visiting their daughter in [facility] with flight departing on 12/16/2018 and returning on 12/28/2018  This patient had an emergency department visit at [address] [place] [address] on 07/12/2018 with left flank pain for 2 weeks, felt to be musculoskeletal in origin  The patient was also evaluated on 06/29/2018 by Dr Meme Olson with diagnosis of right knee bursitis    Review of Systems    All other systems negative, except as noted in history of present illness    Historical Information   Past Medical History:   Diagnosis Date    Anal fissure     Arthritis     osteo joints    Asthma with acute exacerbation     Blepharitis     Breast lump     Chalazion     CHF, chronic (HCC)     Difficulty walking     Disease of thyroid gland     Drooling     Dysphagia     Fall 05/04/2018    Fibromyalgia, primary     History of transfusion 1996    Hordeolum externum     Hx of transient ischemic attack (TIA)     Hypophonia     Infectious viral hepatitis     Hep C dx 1996     Lyme carditis     Murmur, cardiac     Parkinsons (HCC)     Rotator cuff tendinitis     Seasonal allergies     Urinary frequency      Past Surgical History:   Procedure Laterality Date    BREAST SURGERY N/A     benign, calcium    CARDIAC SURGERY  1990    mitral valve replacement    CATARACT EXTRACTION Right 2015    CATARACT EXTRACTION Left 2014    CHEST TUBE INSERTION Right     post pleural effusion    CHOLECYSTECTOMY  2000    lap    HYSTERECTOMY  1973    partial    IN COLSC FLX W/RMVL OF TUMOR POLYP LESION SNARE TQ N/A 7/18/2017    Procedure: COLONOSCOPY and biopsy ;  Surgeon: Eric Orantes MD;  Location: [place] [facility] [address] [facility]; Service: Gastroenterology    SKIN BIOPSY      pre cancerous on face    TONSILLECTOMY       History   Alcohol Use No     History   Drug Use No     History   Smoking Status    Former Smoker    Packs/day: 0 10    Years: 10 00    Quit date: 1970   Smokeless Tobacco    Never Used       Family History:  Family History   Problem Relation Age of Onset    Heart disease Mother         murmur Cardiomegaly age 64    Pneumonia Father     Heart disease Brother         mitrsl valve    Parkinsonism Brother     Arthritis Brother     Lupus Daughter     Diabetes Daughter     Depression Daughter          Meds/Allergies     Prior to Admission medications    Medication Sig Start Date End Date Taking?  Authorizing Provider   carbidopa-levodopa-entacapone (STALEVO) -200 MG per tablet take 1 tablet by mouth four times a day 7/23/18  Yes Clemente Guzman MD   carvedilol (COREG) 3 125 mg tablet take 1 tablet by mouth twice a day with MEALS 7/2/18  Yes Chirag Barreto MD   Cholecalciferol (VITAMIN D3) 2000 units TABS Take 2,000 Units by mouth every morning Yes Historical Provider, MD   donepezil (ARICEPT) 10 mg tablet take 1 tablet by mouth at bedtime 7/23/18  Yes Bello Diego MD   fluticasone (FLONASE) 50 mcg/act nasal spray 1 spray into each nostril daily   Yes Historical Provider, MD   furosemide (LASIX) 20 mg tablet take 1 tablet by mouth ON MONDAY, WEDNESDAY, [address] [place] [address], AND SUNDAY 5/4/18  Yes Bren Forbes MD   levothyroxine 25 mcg tablet Take 1 tablet (25 mcg total) by mouth every morning 2/27/18  Yes Eliel Hoover MD   lisinopril (ZESTRIL) 5 mg tablet Take 1 tablet (5 mg total) by mouth daily 8/14/18  Yes Bren Forbes MD   memantine [facility]) 10 mg tablet take 1 tablet by mouth once daily STARTING JUNE 16 7/9/18  Yes Bello Diego MD   Multiple Vitamins-Minerals (OCUVITE ADULT 50+ PO) Take by mouth daily with breakfast   Yes Historical Provider, MD   primidone (MYSOLINE) 50 mg tablet Take 0 5 tablets (25 mg total) by mouth daily 8/24/18  Yes Bello Diego MD   spironolactone (ALDACTONE) 25 mg tablet Take 1/2 tablet by mouth daily  6/27/18  Yes Bren Forbes MD   warfarin (COUMADIN) 2 mg tablet Take by mouth daily Tues, Thurs, and Sun    Yes Historical Provider, MD   warfarin (COUMADIN) 2 5 mg tablet TAKE 1 TABLET DAILY BY MOUTH OR AS ORDERED BY PHYSICIAN  9/11/18  Yes Bren Forbes MD   carbidopa-levodopa (SINEMET)  mg per tablet Take 1 tablet by mouth 4 (four) times a day  3/2/18  Historical Provider, MD       No Known Allergies      Vitals:    10/03/18 1417   BP: 114/70   BP Location: Left arm   Patient Position: Sitting   Cuff Size: Standard   Pulse: 66   SpO2: 97%   Weight: 80 7 kg (178 lb)   Height: 5' 5" (1 651 m)       Body mass index is 29 62 kg/m²    3 pound weight gain in approximately 6 months    Physical Exam:    General Appearance:  Alert, cooperative, no distress, appears stated age   Head:  Normocephalic, without obvious abnormality, atraumatic   Eyes:  PERRL, conjunctiva/corneas clear, EOM's intact,   both eyes   Ears: Normal TM's and external ear canals, both ears   Nose: Nares normal, septum midline, mucosa normal, no drainage or sinus tenderness   Throat: Lips, mucosa, and tongue normal; teeth and gums normal   Neck: Supple, symmetrical, trachea midline, no adenopathy, thyroid: not enlarged, symmetric, no tenderness/mass/nodules, no carotid bruit or JVD   Back:   Symmetric, no curvature, ROM normal, no CVA tenderness   Lungs:   Clear to auscultation bilaterally, respirations unlabored   Chest Wall:  No tenderness or deformity   Heart:  Regular rate and rhythm, S1, S2 normal, no murmur, rub or gallop   Abdomen:   Soft, non-tender, bowel sounds active all four quadrants,  no masses, no organomegaly   Extremities: Extremities normal, atraumatic, no cyanosis or edema   Pulses: 2+ and symmetric   Skin: Skin showed normal color, texture, turgor and no rashes or lesions   Lymph nodes: Cervical, supraclavicular, and axillary nodes normal   Neurologic: Normal         Cardiographics    ECG 10/03/2018 :    Controlled atrial fibrillation  Left bundle branch block  No significant change from 12/04/2017    Imaging    Chest X-Ray :  Xr Chest Pa & Lateral    Result Date: 3/20/2018  Impression No acute cardiopulmonary disease   Workstation performed: [id number]             Lab Review     Summary of laboratory data:    INR 10/01/2018-3 00   TSH 03/15/2018 -4 47  Normal lipid panel 03/15/2018    07/12/2018-normal CBC, CMP, UA    Lab Results   Component Value Date     07/12/2018    K 4 1 07/12/2018     07/12/2018    CO2 28 07/12/2018    ANIONGAP 9 8 (L) 12/07/2015    BUN 18 07/12/2018    CREATININE 1 04 07/12/2018    GLUCOSE 91 12/07/2015    GLUF 94 03/15/2018    CALCIUM 9 4 07/12/2018    AST 13 07/12/2018    ALT 7 (L) 07/12/2018    ALKPHOS 95 07/12/2018    PROT 6 9 12/07/2015    BILITOT 1 6 (H) 12/07/2015    EGFR 52 07/12/2018       Lab Results   Component Value Date    CHOL 168 12/07/2015     Lab Results   Component Value Date    HDL 56 03/15/2018    HDL 60 03/23/2017    HDL 63 (H) 12/07/2015     Lab Results   Component Value Date    LDLCALC 70 03/15/2018    LDLCALC 78 03/23/2017    LDLCALC 90 12/07/2015     Lab Results   Component Value Date    TRIG 127 03/15/2018    TRIG 124 03/23/2017    TRIG 77 12/07/2015     No components found for: CHOLHDL    Lab Results   Component Value Date    GLUCOSE 91 12/07/2015    CALCIUM 9 4 07/12/2018     07/12/2018    K 4 1 07/12/2018    CO2 28 07/12/2018     07/12/2018    BUN 18 07/12/2018    CREATININE 1 04 07/12/2018           Yin Rubi MD

## 2018-10-05 DIAGNOSIS — R41.89 COGNITIVE IMPAIRMENT: ICD-10-CM

## 2018-10-05 RX ORDER — MEMANTINE HYDROCHLORIDE 10 MG/1
TABLET ORAL
Qty: 30 TABLET | Refills: 0 | Status: SHIPPED | OUTPATIENT
Start: 2018-10-05 | End: 2018-11-07 | Stop reason: SDUPTHER

## 2018-10-08 ENCOUNTER — TRANSCRIBE ORDERS (OUTPATIENT)
Dept: ADMINISTRATIVE | Facility: HOSPITAL | Age: 78
End: 2018-10-08

## 2018-10-08 ENCOUNTER — APPOINTMENT (OUTPATIENT)
Dept: LAB | Facility: CLINIC | Age: 78
End: 2018-10-08
Payer: MEDICARE

## 2018-10-08 DIAGNOSIS — E78.5 DYSLIPIDEMIA: ICD-10-CM

## 2018-10-08 DIAGNOSIS — R53.81 CHRONIC FATIGUE AND MALAISE: ICD-10-CM

## 2018-10-08 DIAGNOSIS — I50.22 CHRONIC SYSTOLIC HEART FAILURE (HCC): ICD-10-CM

## 2018-10-08 DIAGNOSIS — E03.9 ACQUIRED HYPOTHYROIDISM: ICD-10-CM

## 2018-10-08 DIAGNOSIS — R53.82 CHRONIC FATIGUE AND MALAISE: ICD-10-CM

## 2018-10-08 LAB
ALBUMIN SERPL BCP-MCNC: 3.5 G/DL (ref 3.5–5)
ALP SERPL-CCNC: 92 U/L (ref 46–116)
ALT SERPL W P-5'-P-CCNC: 8 U/L (ref 12–78)
ANION GAP SERPL CALCULATED.3IONS-SCNC: 5 MMOL/L (ref 4–13)
AST SERPL W P-5'-P-CCNC: 12 U/L (ref 5–45)
BILIRUB SERPL-MCNC: 1.29 MG/DL (ref 0.2–1)
BUN SERPL-MCNC: 18 MG/DL (ref 5–25)
CALCIUM SERPL-MCNC: 9.1 MG/DL (ref 8.3–10.1)
CHLORIDE SERPL-SCNC: 102 MMOL/L (ref 100–108)
CHOLEST SERPL-MCNC: 148 MG/DL (ref 50–200)
CO2 SERPL-SCNC: 27 MMOL/L (ref 21–32)
CREAT SERPL-MCNC: 0.96 MG/DL (ref 0.6–1.3)
ERYTHROCYTE [DISTWIDTH] IN BLOOD BY AUTOMATED COUNT: 14.1 % (ref 11.6–15.1)
GFR SERPL CREATININE-BSD FRML MDRD: 56 ML/MIN/1.73SQ M
GLUCOSE P FAST SERPL-MCNC: 95 MG/DL (ref 65–99)
HCT VFR BLD AUTO: 38.8 % (ref 34.8–46.1)
HDLC SERPL-MCNC: 50 MG/DL (ref 40–60)
HGB BLD-MCNC: 12.3 G/DL (ref 11.5–15.4)
LDLC SERPL CALC-MCNC: 66 MG/DL (ref 0–100)
MCH RBC QN AUTO: 31.5 PG (ref 26.8–34.3)
MCHC RBC AUTO-ENTMCNC: 31.7 G/DL (ref 31.4–37.4)
MCV RBC AUTO: 100 FL (ref 82–98)
NONHDLC SERPL-MCNC: 98 MG/DL
PLATELET # BLD AUTO: 189 THOUSANDS/UL (ref 149–390)
PMV BLD AUTO: 11.9 FL (ref 8.9–12.7)
POTASSIUM SERPL-SCNC: 4.4 MMOL/L (ref 3.5–5.3)
PROT SERPL-MCNC: 6.4 G/DL (ref 6.4–8.2)
RBC # BLD AUTO: 3.9 MILLION/UL (ref 3.81–5.12)
SODIUM SERPL-SCNC: 134 MMOL/L (ref 136–145)
TRIGL SERPL-MCNC: 162 MG/DL
TSH SERPL DL<=0.05 MIU/L-ACNC: 2.67 UIU/ML (ref 0.36–3.74)
WBC # BLD AUTO: 6.76 THOUSAND/UL (ref 4.31–10.16)

## 2018-10-08 PROCEDURE — 36415 COLL VENOUS BLD VENIPUNCTURE: CPT

## 2018-10-08 PROCEDURE — 84443 ASSAY THYROID STIM HORMONE: CPT

## 2018-10-08 PROCEDURE — 80053 COMPREHEN METABOLIC PANEL: CPT

## 2018-10-08 PROCEDURE — 80061 LIPID PANEL: CPT

## 2018-10-08 PROCEDURE — 85027 COMPLETE CBC AUTOMATED: CPT

## 2018-10-09 ENCOUNTER — TELEPHONE (OUTPATIENT)
Dept: CARDIOLOGY CLINIC | Facility: CLINIC | Age: 78
End: 2018-10-09

## 2018-10-29 ENCOUNTER — APPOINTMENT (OUTPATIENT)
Dept: LAB | Facility: CLINIC | Age: 78
End: 2018-10-29
Payer: MEDICARE

## 2018-10-30 ENCOUNTER — ANTICOAG VISIT (OUTPATIENT)
Dept: CARDIOLOGY CLINIC | Facility: CLINIC | Age: 78
End: 2018-10-30

## 2018-10-30 DIAGNOSIS — Z95.2 H/O MITRAL VALVE REPLACEMENT WITH MECHANICAL VALVE: ICD-10-CM

## 2018-10-30 DIAGNOSIS — I48.91 ATRIAL FIBRILLATION, UNSPECIFIED TYPE (HCC): ICD-10-CM

## 2018-11-07 DIAGNOSIS — R41.89 COGNITIVE IMPAIRMENT: ICD-10-CM

## 2018-11-07 RX ORDER — MEMANTINE HYDROCHLORIDE 10 MG/1
TABLET ORAL
Qty: 30 TABLET | Refills: 0 | Status: SHIPPED | OUTPATIENT
Start: 2018-11-07 | End: 2018-12-03 | Stop reason: SDUPTHER

## 2018-11-19 DIAGNOSIS — I50.42 CHRONIC COMBINED SYSTOLIC AND DIASTOLIC HEART FAILURE (HCC): ICD-10-CM

## 2018-11-19 DIAGNOSIS — E03.9 HYPOTHYROIDISM, UNSPECIFIED TYPE: ICD-10-CM

## 2018-11-19 RX ORDER — LEVOTHYROXINE SODIUM 0.03 MG/1
TABLET ORAL
Qty: 90 TABLET | Refills: 2 | Status: SHIPPED | OUTPATIENT
Start: 2018-11-19 | End: 2019-09-04 | Stop reason: SDUPTHER

## 2018-11-20 RX ORDER — FUROSEMIDE 20 MG/1
TABLET ORAL
Qty: 16 TABLET | Refills: 5 | Status: SHIPPED | OUTPATIENT
Start: 2018-11-20 | End: 2019-05-28 | Stop reason: SDUPTHER

## 2018-11-26 ENCOUNTER — APPOINTMENT (OUTPATIENT)
Dept: LAB | Facility: CLINIC | Age: 78
End: 2018-11-26
Payer: MEDICARE

## 2018-11-26 ENCOUNTER — ANTICOAG VISIT (OUTPATIENT)
Dept: CARDIOLOGY CLINIC | Facility: CLINIC | Age: 78
End: 2018-11-26

## 2018-11-26 ENCOUNTER — TRANSCRIBE ORDERS (OUTPATIENT)
Dept: ADMINISTRATIVE | Facility: HOSPITAL | Age: 78
End: 2018-11-26

## 2018-11-26 DIAGNOSIS — I05.9 RHEUMATIC MITRAL VALVE DISEASE: ICD-10-CM

## 2018-11-26 DIAGNOSIS — Z95.2 S/P MITRAL VALVE REPLACEMENT: ICD-10-CM

## 2018-11-26 DIAGNOSIS — I48.91 ATRIAL FIBRILLATION, UNSPECIFIED TYPE (HCC): ICD-10-CM

## 2018-11-26 DIAGNOSIS — Z95.2 H/O MITRAL VALVE REPLACEMENT WITH MECHANICAL VALVE: ICD-10-CM

## 2018-11-26 DIAGNOSIS — Z79.01 LONG TERM CURRENT USE OF ANTICOAGULANT: ICD-10-CM

## 2018-11-26 LAB
INR PPP: 2.83 (ref 0.86–1.17)
PROTHROMBIN TIME: 29.8 SECONDS (ref 11.8–14.2)

## 2018-11-26 PROCEDURE — 36415 COLL VENOUS BLD VENIPUNCTURE: CPT

## 2018-11-26 PROCEDURE — 85610 PROTHROMBIN TIME: CPT

## 2018-12-03 DIAGNOSIS — R25.1 TREMOR: ICD-10-CM

## 2018-12-03 DIAGNOSIS — R41.89 COGNITIVE IMPAIRMENT: ICD-10-CM

## 2018-12-03 DIAGNOSIS — I50.22 CHRONIC SYSTOLIC HEART FAILURE (HCC): ICD-10-CM

## 2018-12-03 RX ORDER — SPIRONOLACTONE 25 MG/1
TABLET ORAL
Qty: 15 TABLET | Refills: 5 | Status: SHIPPED | OUTPATIENT
Start: 2018-12-03 | End: 2019-01-18 | Stop reason: SDUPTHER

## 2018-12-03 RX ORDER — PRIMIDONE 50 MG/1
TABLET ORAL
Qty: 30 TABLET | Refills: 1 | Status: SHIPPED | OUTPATIENT
Start: 2018-12-03 | End: 2019-05-15 | Stop reason: SDUPTHER

## 2018-12-03 RX ORDER — MEMANTINE HYDROCHLORIDE 10 MG/1
TABLET ORAL
Qty: 30 TABLET | Refills: 0 | Status: SHIPPED | OUTPATIENT
Start: 2018-12-03 | End: 2019-01-04 | Stop reason: SDUPTHER

## 2018-12-05 ENCOUNTER — OFFICE VISIT (OUTPATIENT)
Dept: PODIATRY | Facility: CLINIC | Age: 78
End: 2018-12-05
Payer: MEDICARE

## 2018-12-05 DIAGNOSIS — B35.1 ONYCHOMYCOSIS: Primary | ICD-10-CM

## 2018-12-05 DIAGNOSIS — I70.213 ATHEROSCLEROSIS OF NATIVE ARTERY OF BOTH LOWER EXTREMITIES WITH INTERMITTENT CLAUDICATION (HCC): ICD-10-CM

## 2018-12-05 DIAGNOSIS — M79.671 PAIN IN BOTH FEET: ICD-10-CM

## 2018-12-05 DIAGNOSIS — M79.672 PAIN IN BOTH FEET: ICD-10-CM

## 2018-12-05 PROCEDURE — 11721 DEBRIDE NAIL 6 OR MORE: CPT | Performed by: PODIATRIST

## 2018-12-05 NOTE — PROGRESS NOTES
Assessment/Plan:    Aseptic debridement and planning of nails x10 and manually and mechanically    Daily foot checks monitor for signs of infection  Continue with stretching and multivitamin  Follow-up 2 months     Diagnoses and all orders for this visit:    Onychomycosis    Atherosclerosis of native artery of both lower extremities with intermittent claudication (HCC)    Pain in both feet          Subjective:      Patient ID: Daniel Marroquin is a 77 y o  female  Patient patient chief complaint of thick painful nails that hurt when walking wearing shoes    Patient has not noticed any redness or signs of infection        Past Medical History:   Diagnosis Date    Anal fissure     Arthritis     osteo joints    Asthma with acute exacerbation     Blepharitis     Breast lump     Chalazion     CHF, chronic (HCC)     Difficulty walking     Disease of thyroid gland     Drooling     Dysphagia     Fall 05/04/2018    Fibromyalgia, primary     History of transfusion 1996    Hordeolum externum     Hx of transient ischemic attack (TIA)     Hypophonia     Infectious viral hepatitis     Hep C dx 1996     Lyme carditis     Murmur, cardiac     Parkinsons (HCC)     Rotator cuff tendinitis     Seasonal allergies     Urinary frequency          Current Outpatient Prescriptions:     carbidopa-levodopa-entacapone (STALEVO) -200 MG per tablet, take 1 tablet by mouth four times a day, Disp: 120 tablet, Rfl: 4    carvedilol (COREG) 3 125 mg tablet, take 1 tablet by mouth twice a day with MEALS, Disp: 60 tablet, Rfl: 5    Cholecalciferol (VITAMIN D3) 2000 units TABS, Take 2,000 Units by mouth every morning, Disp: , Rfl:     donepezil (ARICEPT) 10 mg tablet, take 1 tablet by mouth at bedtime, Disp: 30 tablet, Rfl: 3    fluticasone (FLONASE) 50 mcg/act nasal spray, 1 spray into each nostril daily, Disp: , Rfl:     furosemide (LASIX) 20 mg tablet, take 1 tablet by mouth ON MONDAY, WEDNESDAY, FRIDAY AND SUNDAY, Disp: 16 tablet, Rfl: 5    levothyroxine 25 mcg tablet, take 1 tablet by mouth every morning, Disp: 90 tablet, Rfl: 2    lisinopril (ZESTRIL) 5 mg tablet, Take 1 tablet (5 mg total) by mouth daily, Disp: 90 tablet, Rfl: 3    memantine (NAMENDA) 10 mg tablet, take 1 tablet by mouth once daily, Disp: 30 tablet, Rfl: 0    Multiple Vitamins-Minerals (OCUVITE ADULT 50+ PO), Take by mouth daily with breakfast, Disp: , Rfl:     primidone (MYSOLINE) 50 mg tablet, TAKE 1/2 TABLET BY MOUTH DAILY, Disp: 30 tablet, Rfl: 1    spironolactone (ALDACTONE) 25 mg tablet, TAKE 1/2 TABLET BY MOUTH DAILY, Disp: 15 tablet, Rfl: 5    warfarin (COUMADIN) 2 mg tablet, Take by mouth daily Tues, Thurs, and Sun , Disp: , Rfl:     warfarin (COUMADIN) 2 5 mg tablet, TAKE 1 TABLET DAILY BY MOUTH OR AS ORDERED BY PHYSICIAN , Disp: 30 tablet, Rfl: 11    Past Surgical History:   Procedure Laterality Date    BREAST SURGERY N/A     benign, calcium    CARDIAC SURGERY  1990    mitral valve replacement    CATARACT EXTRACTION Right 2015    CATARACT EXTRACTION Left 2014    CHEST TUBE INSERTION Right     post pleural effusion    CHOLECYSTECTOMY  2000    lap    HYSTERECTOMY  1973    partial    FL COLSC FLX W/RMVL OF TUMOR POLYP LESION SNARE TQ N/A 7/18/2017    Procedure: COLONOSCOPY and biopsy ;  Surgeon: Marta Rock MD;  Location: [facility];   Service: Gastroenterology    SKIN BIOPSY      pre cancerous on face    TONSILLECTOMY      US GUIDED BREAST BIOPSY RIGHT COMPLETE Right 2/13/2018       No Known Allergies    Patient Active Problem List   Diagnosis    H/O mitral valve replacement with mechanical valve    Atrial fibrillation (HCC) [I48 91]    Benign familial tremor    Chronic hepatitis C ([address] )    Chronic right-sided low back pain without sciatica    Drug induced insomnia ([address] )    Dupuytren's contracture    Generalized osteoarthritis    Heart failure, left systolic, chronic ([address] )    Hypothyroidism    Memory impairment    Mitral valve disorder    Parkinsons (HCC)    Peripheral vascular disease (HCC)    Seasonal allergies    Transient ischemic attack    Vitamin D insufficiency    Suprapatellar bursitis of right knee    Numbness and tingling in right hand    Other microscopic hematuria       Review of Systems   Constitutional: Negative  HENT: Negative  Eyes: Negative  Respiratory: Negative  Cardiovascular: Negative  Gastrointestinal: Negative  Endocrine: Negative  Genitourinary: Negative  Musculoskeletal: Negative  Skin: Negative  Allergic/Immunologic: Negative  Neurological: Negative  Hematological: Negative  Psychiatric/Behavioral: Negative  Objective: There were no vitals taken for this visit  Physical Exam      DP pulses 1/4 bilateral, PT pulses nonpalpable, capillary refill time 6 sec times 10 temperature gradient increased bilateral   Plus one edema bilateral feet and ankles moderate venous stasis  All nails thick discolored dystrophic positive subungual debris positive pain on palpation  Significant bunion bilateral   Hammertoes 2 through 5 bilateral   There is some dorsal displacement of the 2nd and 3rd MPJ left worse than right secondary to bunion  Significant pes planus  Significant equinus bilateral   Muscle strength 5/5 all groups bilateral feet and ankles  No open wound or signs of infection  Vibratory sensation reduced bilateral monofilament sensation within normal limits  Mild edema  Negative erythema no signs of infection  Nails are thick discolored dystrophic, positive subungual debris, mild pain on palpation mild ingrown bilateral hallux  No erythema no signs of infection        Maceration resolved  Improving xerosis  No open wounds or signs of infection

## 2018-12-11 ENCOUNTER — ANTICOAG VISIT (OUTPATIENT)
Dept: CARDIOLOGY CLINIC | Facility: CLINIC | Age: 78
End: 2018-12-11

## 2018-12-11 ENCOUNTER — APPOINTMENT (OUTPATIENT)
Dept: LAB | Facility: CLINIC | Age: 78
End: 2018-12-11
Payer: MEDICARE

## 2018-12-11 ENCOUNTER — TRANSCRIBE ORDERS (OUTPATIENT)
Dept: LAB | Facility: CLINIC | Age: 78
End: 2018-12-11

## 2018-12-11 DIAGNOSIS — I48.91 ATRIAL FIBRILLATION, UNSPECIFIED TYPE (HCC): ICD-10-CM

## 2018-12-11 DIAGNOSIS — Z95.2 S/P MITRAL VALVE REPLACEMENT: ICD-10-CM

## 2018-12-11 DIAGNOSIS — Z95.2 H/O MITRAL VALVE REPLACEMENT WITH MECHANICAL VALVE: ICD-10-CM

## 2018-12-11 LAB
INR PPP: 1.51 (ref 0.86–1.17)
PROTHROMBIN TIME: 18.3 SECONDS (ref 11.8–14.2)

## 2018-12-11 PROCEDURE — 85610 PROTHROMBIN TIME: CPT

## 2018-12-11 PROCEDURE — 36415 COLL VENOUS BLD VENIPUNCTURE: CPT

## 2018-12-31 ENCOUNTER — APPOINTMENT (OUTPATIENT)
Dept: LAB | Facility: CLINIC | Age: 78
End: 2018-12-31
Payer: MEDICARE

## 2018-12-31 ENCOUNTER — OFFICE VISIT (OUTPATIENT)
Dept: FAMILY MEDICINE CLINIC | Facility: CLINIC | Age: 78
End: 2018-12-31
Payer: MEDICARE

## 2018-12-31 ENCOUNTER — TRANSCRIBE ORDERS (OUTPATIENT)
Dept: LAB | Facility: CLINIC | Age: 78
End: 2018-12-31

## 2018-12-31 VITALS
HEART RATE: 68 BPM | WEIGHT: 180 LBS | BODY MASS INDEX: 29.99 KG/M2 | SYSTOLIC BLOOD PRESSURE: 130 MMHG | RESPIRATION RATE: 18 BRPM | TEMPERATURE: 97.2 F | DIASTOLIC BLOOD PRESSURE: 70 MMHG | HEIGHT: 65 IN

## 2018-12-31 DIAGNOSIS — R26.89 BALANCE DISORDER: ICD-10-CM

## 2018-12-31 DIAGNOSIS — R06.02 SOB (SHORTNESS OF BREATH): ICD-10-CM

## 2018-12-31 DIAGNOSIS — R05.9 COUGH: Primary | ICD-10-CM

## 2018-12-31 LAB
ALBUMIN SERPL BCP-MCNC: 3.6 G/DL (ref 3.5–5)
ALP SERPL-CCNC: 94 U/L (ref 46–116)
ALT SERPL W P-5'-P-CCNC: 11 U/L (ref 12–78)
ANION GAP SERPL CALCULATED.3IONS-SCNC: 5 MMOL/L (ref 4–13)
AST SERPL W P-5'-P-CCNC: 14 U/L (ref 5–45)
BILIRUB SERPL-MCNC: 0.85 MG/DL (ref 0.2–1)
BUN SERPL-MCNC: 17 MG/DL (ref 5–25)
CALCIUM SERPL-MCNC: 9.4 MG/DL (ref 8.3–10.1)
CHLORIDE SERPL-SCNC: 105 MMOL/L (ref 100–108)
CO2 SERPL-SCNC: 29 MMOL/L (ref 21–32)
CREAT SERPL-MCNC: 1.11 MG/DL (ref 0.6–1.3)
ERYTHROCYTE [DISTWIDTH] IN BLOOD BY AUTOMATED COUNT: 13.4 % (ref 11.6–15.1)
GFR SERPL CREATININE-BSD FRML MDRD: 47 ML/MIN/1.73SQ M
GLUCOSE SERPL-MCNC: 88 MG/DL (ref 65–140)
HCT VFR BLD AUTO: 41.1 % (ref 34.8–46.1)
HGB BLD-MCNC: 13 G/DL (ref 11.5–15.4)
MCH RBC QN AUTO: 31.2 PG (ref 26.8–34.3)
MCHC RBC AUTO-ENTMCNC: 31.6 G/DL (ref 31.4–37.4)
MCV RBC AUTO: 99 FL (ref 82–98)
PLATELET # BLD AUTO: 183 THOUSANDS/UL (ref 149–390)
PMV BLD AUTO: 12.5 FL (ref 8.9–12.7)
POTASSIUM SERPL-SCNC: 4.1 MMOL/L (ref 3.5–5.3)
PROT SERPL-MCNC: 6.7 G/DL (ref 6.4–8.2)
RBC # BLD AUTO: 4.17 MILLION/UL (ref 3.81–5.12)
SODIUM SERPL-SCNC: 139 MMOL/L (ref 136–145)
TSH SERPL DL<=0.05 MIU/L-ACNC: 2.3 UIU/ML (ref 0.36–3.74)
WBC # BLD AUTO: 7.51 THOUSAND/UL (ref 4.31–10.16)

## 2018-12-31 PROCEDURE — 36415 COLL VENOUS BLD VENIPUNCTURE: CPT

## 2018-12-31 PROCEDURE — 80053 COMPREHEN METABOLIC PANEL: CPT

## 2018-12-31 PROCEDURE — 99214 OFFICE O/P EST MOD 30 MIN: CPT | Performed by: FAMILY MEDICINE

## 2018-12-31 PROCEDURE — 85027 COMPLETE CBC AUTOMATED: CPT

## 2018-12-31 PROCEDURE — 84443 ASSAY THYROID STIM HORMONE: CPT

## 2018-12-31 RX ORDER — CEPHALEXIN 500 MG/1
500 CAPSULE ORAL EVERY 8 HOURS SCHEDULED
Qty: 21 CAPSULE | Refills: 0 | Status: SHIPPED | OUTPATIENT
Start: 2018-12-31 | End: 2019-01-07

## 2019-01-01 NOTE — PROGRESS NOTES
Chief Complaint   Patient presents with   Derald Fulling Like Symptoms        Patient ID: Pamella Aguilera is a 77 y o  female  HPI  Pt is seeing for cold symptoms x 2 days, no fever, tried Robitussin with no help, feeling tired and more stiff recently -  Fell twice in 2 m period -  Did not see neurologist yet     The following portions of the patient's history were reviewed and updated as appropriate: allergies, current medications, past family history, past medical history, past social history, past surgical history and problem list     Review of Systems   Constitutional: Negative  HENT: Positive for congestion  Respiratory: Positive for cough  Negative for chest tightness, shortness of breath and wheezing  Gastrointestinal: Negative          Current Outpatient Prescriptions   Medication Sig Dispense Refill    carbidopa-levodopa-entacapone (STALEVO) -200 MG per tablet take 1 tablet by mouth four times a day 120 tablet 4    carvedilol (COREG) 3 125 mg tablet take 1 tablet by mouth twice a day with MEALS 60 tablet 5    Cholecalciferol (VITAMIN D3) 2000 units TABS Take 2,000 Units by mouth every morning      donepezil (ARICEPT) 10 mg tablet take 1 tablet by mouth at bedtime 30 tablet 3    fluticasone (FLONASE) 50 mcg/act nasal spray 1 spray into each nostril daily      furosemide (LASIX) 20 mg tablet take 1 tablet by mouth ON MONDAY, WEDNESDAY, FRIDAY AND SUNDAY 16 tablet 5    levothyroxine 25 mcg tablet take 1 tablet by mouth every morning 90 tablet 2    lisinopril (ZESTRIL) 5 mg tablet Take 1 tablet (5 mg total) by mouth daily 90 tablet 3    memantine (NAMENDA) 10 mg tablet take 1 tablet by mouth once daily 30 tablet 0    Multiple Vitamins-Minerals (OCUVITE ADULT 50+ PO) Take by mouth daily with breakfast      primidone (MYSOLINE) 50 mg tablet TAKE 1/2 TABLET BY MOUTH DAILY 30 tablet 1    spironolactone (ALDACTONE) 25 mg tablet TAKE 1/2 TABLET BY MOUTH DAILY 15 tablet 5    warfarin (COUMADIN) 2 mg tablet Take by mouth daily Tues, Thurs, and Sun       warfarin (COUMADIN) 2 5 mg tablet TAKE 1 TABLET DAILY BY MOUTH OR AS ORDERED BY PHYSICIAN  30 tablet 11     No current facility-administered medications for this visit  Objective:    /70 (BP Location: Left arm, Patient Position: Sitting, Cuff Size: Large)   Pulse 68   Temp (!) 97 2 °F (36 2 °C) (Tympanic)   Resp 18   Ht 5' 5" (1 651 m)   Wt 81 6 kg (180 lb)   BMI 29 95 kg/m²        Physical Exam   Constitutional: No distress  HENT:   Right Ear: Tympanic membrane normal    Left Ear: Tympanic membrane normal    Mouth/Throat: No oropharyngeal exudate  Pulmonary/Chest: Effort normal  No respiratory distress  She has no wheezes  She has no rales  Assessment/Plan:         Diagnoses and all orders for this visit:    Cough  -     cephalexin (KEFLEX) 500 mg capsule; Take 1 capsule (500 mg total) by mouth every 8 (eight) hours for 7 days    Balance disorder    Will see neurologist   SOB (shortness of breath)  -     Comprehensive metabolic panel; Future  -     CBC; Future  -     TSH, 3rd generation;  Future        rto prn                     Sukhjinder Whaley MD

## 2019-01-02 ENCOUNTER — TELEPHONE (OUTPATIENT)
Dept: FAMILY MEDICINE CLINIC | Facility: CLINIC | Age: 79
End: 2019-01-02

## 2019-01-02 NOTE — TELEPHONE ENCOUNTER
----- Message from Javan Valadez MD sent at 1/1/2019 10:38 AM EST -----  Pl, advise pt - all labs are normal    Patient advised

## 2019-01-04 ENCOUNTER — TRANSCRIBE ORDERS (OUTPATIENT)
Dept: LAB | Facility: CLINIC | Age: 79
End: 2019-01-04

## 2019-01-04 ENCOUNTER — APPOINTMENT (OUTPATIENT)
Dept: LAB | Facility: CLINIC | Age: 79
End: 2019-01-04
Payer: MEDICARE

## 2019-01-04 DIAGNOSIS — R41.89 COGNITIVE IMPAIRMENT: ICD-10-CM

## 2019-01-04 DIAGNOSIS — G20 PARKINSON DISEASE (HCC): ICD-10-CM

## 2019-01-04 DIAGNOSIS — Z95.2 S/P MITRAL VALVE REPLACEMENT: ICD-10-CM

## 2019-01-04 LAB
INR PPP: 3.02 (ref 0.86–1.17)
PROTHROMBIN TIME: 31.3 SECONDS (ref 11.8–14.2)

## 2019-01-04 PROCEDURE — 85610 PROTHROMBIN TIME: CPT

## 2019-01-04 PROCEDURE — 36415 COLL VENOUS BLD VENIPUNCTURE: CPT

## 2019-01-04 RX ORDER — CARBIDOPA, LEVODOPA AND ENTACAPONE 25; 200; 100 MG/1; MG/1; MG/1
1 TABLET, FILM COATED ORAL 4 TIMES DAILY
Qty: 120 TABLET | Refills: 3 | Status: SHIPPED | OUTPATIENT
Start: 2019-01-04 | End: 2019-05-15 | Stop reason: SDUPTHER

## 2019-01-04 RX ORDER — MEMANTINE HYDROCHLORIDE 10 MG/1
TABLET ORAL
Qty: 90 TABLET | Refills: 1 | Status: SHIPPED | OUTPATIENT
Start: 2019-01-04 | End: 2019-07-05 | Stop reason: SDUPTHER

## 2019-01-07 ENCOUNTER — ANTICOAG VISIT (OUTPATIENT)
Dept: CARDIOLOGY CLINIC | Facility: CLINIC | Age: 79
End: 2019-01-07

## 2019-01-07 DIAGNOSIS — I48.91 ATRIAL FIBRILLATION, UNSPECIFIED TYPE (HCC): ICD-10-CM

## 2019-01-07 DIAGNOSIS — Z95.2 H/O MITRAL VALVE REPLACEMENT WITH MECHANICAL VALVE: ICD-10-CM

## 2019-01-10 ENCOUNTER — TELEPHONE (OUTPATIENT)
Dept: CARDIOLOGY CLINIC | Facility: CLINIC | Age: 79
End: 2019-01-10

## 2019-01-10 ENCOUNTER — OFFICE VISIT (OUTPATIENT)
Dept: CARDIOLOGY CLINIC | Facility: CLINIC | Age: 79
End: 2019-01-10
Payer: MEDICARE

## 2019-01-10 VITALS
OXYGEN SATURATION: 99 % | BODY MASS INDEX: 30.16 KG/M2 | WEIGHT: 181 LBS | HEIGHT: 65 IN | SYSTOLIC BLOOD PRESSURE: 108 MMHG | DIASTOLIC BLOOD PRESSURE: 62 MMHG | HEART RATE: 67 BPM

## 2019-01-10 DIAGNOSIS — I49.3 ASYMPTOMATIC PVCS: ICD-10-CM

## 2019-01-10 DIAGNOSIS — G20 PARKINSON'S DISEASE (HCC): ICD-10-CM

## 2019-01-10 DIAGNOSIS — Z79.01 ON CONTINUOUS ORAL ANTICOAGULATION: ICD-10-CM

## 2019-01-10 DIAGNOSIS — I42.0 DILATED CARDIOMYOPATHY (HCC): ICD-10-CM

## 2019-01-10 DIAGNOSIS — I50.22 CHRONIC SYSTOLIC HEART FAILURE (HCC): ICD-10-CM

## 2019-01-10 DIAGNOSIS — R06.00 DYSPNEA ON EXERTION: Primary | ICD-10-CM

## 2019-01-10 DIAGNOSIS — E03.9 ACQUIRED HYPOTHYROIDISM: ICD-10-CM

## 2019-01-10 DIAGNOSIS — E78.2 MIXED DYSLIPIDEMIA: ICD-10-CM

## 2019-01-10 DIAGNOSIS — I48.20 CHRONIC ATRIAL FIBRILLATION (HCC): ICD-10-CM

## 2019-01-10 DIAGNOSIS — Z95.2 HISTORY OF MITRAL VALVE REPLACEMENT WITH MECHANICAL VALVE: ICD-10-CM

## 2019-01-10 PROCEDURE — 99214 OFFICE O/P EST MOD 30 MIN: CPT | Performed by: INTERNAL MEDICINE

## 2019-01-10 PROCEDURE — 93000 ELECTROCARDIOGRAM COMPLETE: CPT | Performed by: INTERNAL MEDICINE

## 2019-01-10 RX ORDER — DONEPEZIL HYDROCHLORIDE 10 MG/1
10 TABLET, FILM COATED ORAL
COMMUNITY
End: 2019-03-20 | Stop reason: SDUPTHER

## 2019-01-10 NOTE — PATIENT INSTRUCTIONS
1  Discontinue lisinopril  2  Begin Entresto  24-26 milligrams twice daily, using samples provided  3  Return in 2 weeks or less for adjustment of Entresto dosage  4  Continue current medications unchanged, otherwise

## 2019-01-10 NOTE — PROGRESS NOTES
Cardiology Progress Note    ASSESSMENT:    1  Suboptimum control of chronic systolic heart failure with 34% ejection fraction on 7/5/16 MUGA scan/underlying dilated cardiomyopathy but currently with worsening  exertional fatigue, dyspnea on exertion, exhaustion, and cold sweats,  and occasional nocturnal wheezing  2  Resolved dehydration/hypotension and residual mild dizziness as well as resolution of acute kidney injury since 11/18/16  3  Chronic atrial fibrillation, controlled and chronic left bundle-branch block  4  History of mechanical mitral valve replacement September, 1990, with very good oral anticoagulation with target INR of 2 5   Latest INR 3 02   5  Moderately frequent PVCs on otherwise benign Holter monitor December, 2013  6  10 6 pound weight loss in approximately 5 years  7  Dyslipidemia, mixed, and controlled  8  Mild obstructive sleep apnea is suspected  9  History of asthma  10  History of GERD  11  History of benign positional vertigo  12  History of Lyme disease  13  Hypothyroidism with slightly elevated TSH level, which could contribute to extreme fatigue  14  History of hepatitis C  15  History of Parkinson's disease/date dysfunction with slowly progressive symptoms  16  Resolved nonproductive cough and postural dizziness with  flu-like illness 10 months ago  17  Chronic fatigue and malaise, multifactorial  18  Drug-induced insomnia  19  History of fall with subsequent left distal tibial fracture and left ankle sprain with right periorbital ecchymoses and laceration and left wrist sprain on 7/30/17, with no recurrence, possibly related to transient drop in blood pressure with upright position in hot weather after prolonged sitting  21  Previously diagnosed osteoarthritis of hips and spine and bursitis of right knee  Plan       Patient Instructions     1  Discontinue lisinopril  2  Begin Entresto  24-26 milligrams twice daily, using samples provided    3  Return in 2 weeks or less for adjustment of Entresto dosage  4  Continue current medications unchanged, otherwise  5  Consider follow-up of NT-proBNP to compare with 12/04/2017 level of 1,073  HPI    This 77 y o  female  denies new cardiopulmonary and medical symptoms  She experiences dyspnea on exertion when walking 3/4 to 1 block, especially if there is a slight incline  There has been no chest pain, palpitations, syncope, edema, cough, wheezing, sputum production, fever, chills  Her trip to [place] in late December was unremarkable except for the above-mentioned exertional dyspnea  Review of Systems    All other systems negative, except as noted in history of present illness    Historical Information   Past Medical History:   Diagnosis Date    Anal fissure     Arthritis     osteo joints    Asthma with acute exacerbation     Blepharitis     Breast lump     Chalazion     CHF, chronic ([address] )     Difficulty walking     Disease of thyroid gland     Drooling     Dysphagia     Fall 05/04/2018    Fibromyalgia, primary     History of transfusion 1996    Hordeolum externum     Hx of transient ischemic attack (TIA)     Hypophonia     Infectious viral hepatitis     Hep C dx 1996     Lyme carditis     Murmur, cardiac     Parkinsons (HCC)     Rotator cuff tendinitis     Seasonal allergies     Urinary frequency      Past Surgical History:   Procedure Laterality Date    BREAST SURGERY N/A     benign, calcium    CARDIAC SURGERY  1990    mitral valve replacement    CATARACT EXTRACTION Right 2015    CATARACT EXTRACTION Left 2014    CHEST TUBE INSERTION Right     post pleural effusion    CHOLECYSTECTOMY  2000    lap    HYSTERECTOMY  1973    partial    DC COLSC FLX W/RMVL OF TUMOR POLYP LESION SNARE TQ N/A 7/18/2017    Procedure: COLONOSCOPY and biopsy ;  Surgeon: Sathya Brar MD;  Location: [facility];   Service: Gastroenterology    SKIN BIOPSY      pre cancerous on face    TONSILLECTOMY      US GUIDED BREAST BIOPSY RIGHT COMPLETE Right 2/13/2018     History   Alcohol Use No     History   Drug Use No     History   Smoking Status    Former Smoker    Packs/day: 0 10    Years: 10 00    Quit date: 1970   Smokeless Tobacco    Never Used       Family History:  Family History   Problem Relation Age of Onset    Heart disease Mother         murmur Cardiomegaly age 64    Pneumonia Father     Heart disease Brother         mitrsl valve    Parkinsonism Brother     Arthritis Brother     Lupus Daughter     Diabetes Daughter     Depression Daughter          Meds/Allergies     Prior to Admission medications    Medication Sig Start Date End Date Taking?  Authorizing Provider   carbidopa-levodopa-entacapone (STALEVO) -200 MG per tablet Take 1 tablet by mouth 4 (four) times a day 1/4/19  Yes Elías Garcia MD   carvedilol (COREG) 3 125 mg tablet take 1 tablet by mouth twice a day with MEALS 7/2/18  Yes Harman Emmanuel MD   Cholecalciferol (VITAMIN D3) 2000 units TABS Take 2,000 Units by mouth every morning   Yes Historical Provider, MD   donepezil (ARICEPT) 10 mg tablet Take 5 mg by mouth daily at bedtime   Yes Historical Provider, MD   furosemide (LASIX) 20 mg tablet take 1 tablet by mouth ON [place], FRIDAY AND SUNDAY 11/20/18  Yes Harman Emmanuel MD   levothyroxine 25 mcg tablet take 1 tablet by mouth every morning 11/19/18  Yes Nika Michelle MD   memantine (NAMENDA) 10 mg tablet take 1 tablet by mouth once daily 1/4/19  Yes Elías Garcia MD   Multiple Vitamins-Minerals (OCUVITE ADULT 50+ PO) Take by mouth daily with breakfast   Yes Historical Provider, MD   primidone (MYSOLINE) 50 mg tablet TAKE 1/2 TABLET BY MOUTH DAILY 12/3/18  Yes Elías Garcia MD   spironolactone (ALDACTONE) 25 mg tablet TAKE 1/2 TABLET BY MOUTH DAILY 12/3/18  Yes Harman Emmanuel MD   warfarin (COUMADIN) 2 mg tablet Take by mouth daily Tues, Thurs, and Sun    Yes Historical Provider, MD   warfarin (COUMADIN) 2 5 mg tablet TAKE 1 TABLET DAILY BY MOUTH OR AS ORDERED BY PHYSICIAN  9/11/18  Yes Susan Garcia MD   donepezil (ARICEPT) 10 mg tablet take 1 tablet by mouth at bedtime 7/23/18 1/10/19 Yes Vlad Willett MD   lisinopril (ZESTRIL) 5 mg tablet Take 1 tablet (5 mg total) by mouth daily 8/14/18 1/10/19 Yes Susan Garcia MD   sacubitril-valsartan (ENTRESTO) 24-26 MG TABS Take 1 tablet by mouth 2 (two) times a day 1/10/19   Susan Garcia MD   carbidopa-levodopa (SINEMET)  mg per tablet Take 1 tablet by mouth 4 (four) times a day  3/2/18  Historical Provider, MD   fluticasone (FLONASE) 50 mcg/act nasal spray 1 spray into each nostril daily  1/10/19  Historical Provider, MD       No Known Allergies      Vitals:    01/10/19 0801   BP: 108/62   BP Location: Right arm   Patient Position: Sitting   Cuff Size: Standard   Pulse: 67   SpO2: 99%   Weight: 82 1 kg (181 lb)   Height: 5' 4 5" (1 638 m)       Body mass index is 30 59 kg/m²    3 pound weight gain in 3+ months    Physical Exam:    General Appearance:  Alert, cooperative, no distress, appears stated age and is mildly obese   Head:  Normocephalic, without obvious abnormality, atraumatic   Eyes:  PERRL, conjunctiva/corneas clear, EOM's intact,   both eyes   Ears:  Normal TM's and external ear canals, both ears   Nose: Nares normal, septum midline, mucosa normal, no drainage or sinus tenderness   Throat: Lips, mucosa, and tongue normal; teeth and gums normal   Neck: Supple, symmetrical, trachea midline, no adenopathy, thyroid: not enlarged, symmetric, no tenderness/mass/nodules, no carotid bruit or JVD   Back:   Symmetric, no curvature, ROM normal, no CVA tenderness   Lungs:   Clear to auscultation bilaterally, respirations unlabored   Chest Wall:  No tenderness or deformity   Heart:  Irregularly irregular cardiac rhythm, S1, S2 normal, no murmur, rub or gallop   Abdomen:   Soft, non-tender, bowel sounds active all four quadrants,  no masses, no organomegaly and mildly obese Extremities: Extremities normal, atraumatic, no cyanosis or edema   Pulses: 2+ and symmetric   Skin: Skin showed normal color, texture, turgor and no rashes or lesions   Lymph nodes: Cervical, supraclavicular, and axillary nodes normal   Neurologic: Normal         Cardiographics    ECG 01/10/2019 :    Controlled atrial fibrillation  Left bundle branch block with secondary repolarization abnormalities  Similar to 10/03/2018    Imaging    Chest X-Ray :  Xr Chest Pa & Lateral    Result Date: 3/20/2018  Impression No acute cardiopulmonary disease   Workstation performed: [id number]             Lab Review       Lab Results   Component Value Date    SODIUM 139 12/31/2018    K 4 1 12/31/2018     12/31/2018    CO2 29 12/31/2018    ANIONGAP 9 8 (L) 12/07/2015    BUN 17 12/31/2018    CREATININE 1 11 12/31/2018    GLUCOSE 91 12/07/2015    GLUF 95 10/08/2018    CALCIUM 9 4 12/31/2018    AST 14 12/31/2018    ALT 11 (L) 12/31/2018    ALKPHOS 94 12/31/2018    PROT 6 9 12/07/2015    BILITOT 1 6 (H) 12/07/2015    EGFR 47 12/31/2018   INR 01/04/2019:  3 02  CBC 12/31/2018:  Normal  TSH 12/31/2018:  2 300  Lab Results   Component Value Date    CHOLESTEROL 148 10/08/2018    CHOLESTEROL 151 03/15/2018    CHOLESTEROL 163 03/23/2017     Lab Results   Component Value Date    HDL 50 10/08/2018    HDL 56 03/15/2018    HDL 60 03/23/2017     No results found for: LDLCHOLEST  Lab Results   Component Value Date    LDLCALC 66 10/08/2018    LDLCALC 70 03/15/2018    LDLCALC 78 03/23/2017     No components found for: [facility]  Lab Results   Component Value Date    TRIG 162 (H) 10/08/2018    TRIG 127 03/15/2018    TRIG 124 03/23/2017         Lab Results   Component Value Date    GLUCOSE 91 12/07/2015    CALCIUM 9 4 12/31/2018     12/07/2015    K 4 1 12/31/2018    CO2 29 12/31/2018     12/31/2018    BUN 17 12/31/2018    CREATININE 1 11 12/31/2018           Rachel Ta MD

## 2019-01-11 ENCOUNTER — TELEPHONE (OUTPATIENT)
Dept: FAMILY MEDICINE CLINIC | Facility: CLINIC | Age: 79
End: 2019-01-11

## 2019-01-11 DIAGNOSIS — Z12.31 ENCOUNTER FOR SCREENING MAMMOGRAM FOR MALIGNANT NEOPLASM OF BREAST: Primary | ICD-10-CM

## 2019-01-14 DIAGNOSIS — I42.0 DILATED CARDIOMYOPATHY (HCC): ICD-10-CM

## 2019-01-14 RX ORDER — CARVEDILOL 3.12 MG/1
TABLET ORAL
Qty: 60 TABLET | Refills: 5 | Status: SHIPPED | OUTPATIENT
Start: 2019-01-14 | End: 2019-02-28 | Stop reason: SINTOL

## 2019-01-15 ENCOUNTER — TELEPHONE (OUTPATIENT)
Dept: FAMILY MEDICINE CLINIC | Facility: CLINIC | Age: 79
End: 2019-01-15

## 2019-01-16 ENCOUNTER — OFFICE VISIT (OUTPATIENT)
Dept: NEUROLOGY | Facility: CLINIC | Age: 79
End: 2019-01-16
Payer: MEDICARE

## 2019-01-16 VITALS
BODY MASS INDEX: 29.99 KG/M2 | WEIGHT: 180 LBS | DIASTOLIC BLOOD PRESSURE: 64 MMHG | HEIGHT: 65 IN | HEART RATE: 57 BPM | SYSTOLIC BLOOD PRESSURE: 98 MMHG

## 2019-01-16 DIAGNOSIS — R41.89 COGNITIVE DECLINE: ICD-10-CM

## 2019-01-16 DIAGNOSIS — G20 PARKINSON DISEASE (HCC): Primary | ICD-10-CM

## 2019-01-16 PROCEDURE — 99214 OFFICE O/P EST MOD 30 MIN: CPT | Performed by: PSYCHIATRY & NEUROLOGY

## 2019-01-16 NOTE — PROGRESS NOTES
Return NeuroOutpatient Note        Gene Tee  [id number]  52 y o   1940       Parkinson's Disease        History obtained from:  Patient     HPI/Subjective:    Mrs Anne-Marie Ramirez returns as follow up for Parkinson's disease  Per my previous reports, her disease is manifested by masked facies, hypophonic voice, bradykinesia  She was diagnosed about 4 5 years ago  Her speech is slower now compared to prior  Her swallowing has been affected over time but is not significant  She now uses cane  still walks without a cane unless if she knows she has long walks  Since august of 2017, she hasn't had falls  In terms of memory disturbance, she's stable  She says she can't read for more than few minutes  She used to read one book a day  Some of it is also due to vision problems  She's now on aricept and namenda combination  Lately she has been having memory lapses as well where she couldn't think of some names       She denies any hallucinations  Denies REM sleep behavior  She also has essential tremors and was tried on primidone but couldn't tolerate it  Patient went to [place] during Dick time  She gets sob if has to walk a distance  After being placed Entresto she feels more energetic  In July she had EMG of RUE which was suggestive of mild median mononeuropathy and suggestive of cervical radiculopathy       Past Medical History:   Diagnosis Date    Anal fissure     Arthritis     osteo joints    Asthma with acute exacerbation     Blepharitis     Breast lump     Chalazion     CHF, chronic (HCC)     Difficulty walking     Disease of thyroid gland     Drooling     Dysphagia     Fall 05/04/2018    Fibromyalgia, primary     History of transfusion 1996    Hordeolum externum     Hx of transient ischemic attack (TIA)     Hypophonia     Infectious viral hepatitis     Hep C dx 1996     Lyme carditis     Murmur, cardiac     Parkinsons (HCC)     Rotator cuff tendinitis     Seasonal allergies  Urinary frequency      Social History     Social History    Marital status: /Civil Union     Spouse name: N/A    Number of children: N/A    Years of education: N/A     Occupational History    Not on file       Social History Main Topics    Smoking status: Former Smoker     Packs/day: 0 10     Years: 10 00     Quit date: 1970    Smokeless tobacco: Never Used    Alcohol use No    Drug use: No    Sexual activity: Not on file     Other Topics Concern    Not on file     Social History Narrative    No narrative on file     Family History   Problem Relation Age of Onset    Heart disease Mother         murmur Cardiomegaly age 64    Pneumonia Father     Heart disease Brother         mitrsl valve    Parkinsonism Brother     Arthritis Brother     Lupus Daughter     Diabetes Daughter     Depression Daughter      No Known Allergies  Current Outpatient Prescriptions on File Prior to Visit   Medication Sig Dispense Refill    carbidopa-levodopa-entacapone (STALEVO) -200 MG per tablet Take 1 tablet by mouth 4 (four) times a day 120 tablet 3    carvedilol (COREG) 3 125 mg tablet take 1 tablet by mouth twice a day with meals 60 tablet 5    Cholecalciferol (VITAMIN D3) 2000 units TABS Take 2,000 Units by mouth every morning      donepezil (ARICEPT) 10 mg tablet Take 10 mg by mouth daily at bedtime        furosemide (LASIX) 20 mg tablet take 1 tablet by mouth ON MONDAY, WEDNESDAY, FRIDAY AND SUNDAY 16 tablet 5    levothyroxine 25 mcg tablet take 1 tablet by mouth every morning 90 tablet 2    memantine (NAMENDA) 10 mg tablet take 1 tablet by mouth once daily 90 tablet 1    Multiple Vitamins-Minerals (OCUVITE ADULT 50+ PO) Take by mouth daily with breakfast      sacubitril-valsartan (ENTRESTO) 24-26 MG TABS Take 1 tablet by mouth 2 (two) times a day 28 tablet 0    spironolactone (ALDACTONE) 25 mg tablet TAKE 1/2 TABLET BY MOUTH DAILY 15 tablet 5    warfarin (COUMADIN) 2 5 mg tablet TAKE 1 TABLET DAILY BY MOUTH OR AS ORDERED BY PHYSICIAN  (Patient taking differently: Take 2 5 mg by mouth daily TAKE 1 TABLET DAILY BY MOUTH OR AS ORDERED BY PHYSICIAN  ) 30 tablet 11    primidone (MYSOLINE) 50 mg tablet TAKE 1/2 TABLET BY MOUTH DAILY (Patient not taking: Reported on 1/16/2019) 30 tablet 1    warfarin (COUMADIN) 2 mg tablet Take by mouth daily Tues, Thurs, and Sun       [DISCONTINUED] carbidopa-levodopa (SINEMET)  mg per tablet Take 1 tablet by mouth 4 (four) times a day       No current facility-administered medications on file prior to visit  Review of Systems   Refer to positive review of systems in HPI  Review of Systems    Constitutional- No fever  Eyes- No visual change  ENT- Hearing normal  CV- No chest pain  Resp- No Shortness of breath  GI- No diarrhea  - Bladder normal  MS- No Arthritis   Skin- No rash  Psych- No depression  Endo- No DM  Heme- No nodes    Vitals:    01/16/19 1040   BP: 98/64   BP Location: Left arm   Patient Position: Sitting   Cuff Size: Standard   Pulse: 57   Weight: 81 6 kg (180 lb)   Height: 5' 4 5" (1 638 m)       PHYSICAL EXAM:  Appearance: No Acute Distress, masked facies*  Ophthalmoscopic: Disc Flat, Normal fundus  Mental status:  Orientation: Awake, Alert, and Orientedx3  Memory: Registation 3/3 Recall 2/3  Attention: normal  Knowledge: good  Language: No aphasia  Speech: No dysarthria  Cranial Nerves:  2 No Visual Defect on Confrontation, Pupils round, equal, reactive to light  3,4,6 Extraocular Movements Intact, no nystagmus  5 Facial Sensation Intact  7 No facial asymmetry  8 Intact hearing  9,10 Palate symmetric, normal gag  11 Good shoulder shrug  12 Tongue Midline  Gait: uses cane, hesitant walking without holding onto something   Coordination: essential tremor in right hand     Sensory: Intact, Symmetric to pinprick, light touch, vibration, and joint position  Muscle Tone: increased rigidity in LUE and both legs              Muscle exam:  Arm Right Left Leg Right Left   Deltoid 5/5 5/5 Iliopsoas 5/5 5/5   Biceps 5/5 5/5 Quads 5/5 5/5   Triceps 5/5 5/5 Hamstrings 5/5 5/5   Wrist Extension 5/5 5/5 Ankle Dorsi Flexion 5/5 5/5   Wrist Flexion 5/5 5/5 Ankle Plantar Flexion 5/5 5/5   Interossei 5/5 5/5 Ankle Eversion 5/5 5/5   APB 5/5 5/5 Ankle Inversion 5/5 5/5       Reflexes   RJ BJ TJ KJ AJ Plantars Ayala's   Right 2+ 2+ 2+ 2+ 2+ Downgoing Not present   Left 2+ 2+ 2+ 2+ 2+ Downgoing Not present     Personal review of  Labs:                  Diagnoses and all orders for this visit:        1  Parkinson disease [facility])  Ambulatory referral to Physical Therapy   2  Cognitive decline           Patient appears stable but slowly declining  Discussed typical course of disease     Will refer her to PT  Will resume stalevo 4x daily  Will resume aricept/namenda            Total time of encounter:  30 min  More than 50% of the time was used in counseling and/or coordination of care  Extent of counseling and/or coordination of care        Eden Farfan MD  [facility]  [address]  [address]  [phone]

## 2019-01-18 ENCOUNTER — OFFICE VISIT (OUTPATIENT)
Dept: CARDIOLOGY CLINIC | Facility: CLINIC | Age: 79
End: 2019-01-18
Payer: MEDICARE

## 2019-01-18 VITALS
DIASTOLIC BLOOD PRESSURE: 60 MMHG | BODY MASS INDEX: 30.21 KG/M2 | HEART RATE: 56 BPM | SYSTOLIC BLOOD PRESSURE: 100 MMHG | HEIGHT: 65 IN | OXYGEN SATURATION: 99 % | WEIGHT: 181.3 LBS

## 2019-01-18 DIAGNOSIS — Z95.2 H/O MITRAL VALVE REPLACEMENT WITH MECHANICAL VALVE: ICD-10-CM

## 2019-01-18 DIAGNOSIS — I49.3 ASYMPTOMATIC PVCS: ICD-10-CM

## 2019-01-18 DIAGNOSIS — I50.42 CHRONIC COMBINED SYSTOLIC AND DIASTOLIC HEART FAILURE (HCC): Primary | ICD-10-CM

## 2019-01-18 DIAGNOSIS — I42.0 NONISCHEMIC DILATED CARDIOMYOPATHY (HCC): ICD-10-CM

## 2019-01-18 DIAGNOSIS — I50.22 CHRONIC SYSTOLIC HEART FAILURE (HCC): ICD-10-CM

## 2019-01-18 DIAGNOSIS — I44.7 LEFT BUNDLE BRANCH BLOCK (LBBB): ICD-10-CM

## 2019-01-18 DIAGNOSIS — Z79.01 ON CONTINUOUS ORAL ANTICOAGULATION: ICD-10-CM

## 2019-01-18 DIAGNOSIS — I48.20 ATRIAL FIBRILLATION, CHRONIC (HCC): ICD-10-CM

## 2019-01-18 DIAGNOSIS — E78.2 MIXED DYSLIPIDEMIA: ICD-10-CM

## 2019-01-18 PROCEDURE — 99214 OFFICE O/P EST MOD 30 MIN: CPT | Performed by: INTERNAL MEDICINE

## 2019-01-18 RX ORDER — SPIRONOLACTONE 25 MG/1
12.5 TABLET ORAL DAILY
Qty: 15 TABLET | Refills: 5 | Status: SHIPPED | OUTPATIENT
Start: 2019-01-18 | End: 2019-02-16 | Stop reason: SDUPTHER

## 2019-01-18 NOTE — PATIENT INSTRUCTIONS
1  Patient to run through remaining Entresto 24-26 milligrams b i d  for 5 days and then switch to 49-51 milligram b i d   2  Patient was given samples of Entresto 49-51 milligrams with 2 week supply provided  3  Follow-up will be in about 1 and half weeks for up titration of Entresto  4  For recurrent near-syncope, patient was instructed to call, at which time we will consider possible discontinuation or reduction in dose of  furosemide, spironolactone, carvedilol, as well as possible discontinuation or reduction in dose of Entresto

## 2019-01-18 NOTE — PROGRESS NOTES
Cardiology Progress Note    ASSESSMENT:    1  Suboptimum control of chronic systolic heart failure with 34% ejection fraction on 7/5/16 MUGA scan/underlying dilated cardiomyopathy but currently with worsening  exertional fatigue, dyspnea on exertion, exhaustion, and cold sweats,  and occasional nocturnal wheezing  Initially improved on starting dose of Entresto, but had vasovagal type near syncope on 01/18/2019  Cannot rule out hypotensive adverse drug effect, but not confirmed by examination  2  Resolved dehydration/hypotension and residual mild dizziness as well as resolution of acute kidney injury since 11/18/16  3  Chronic atrial fibrillation, controlled and chronic left bundle-branch block  4  History of mechanical mitral valve replacement September, 1990, with very good oral anticoagulation with target INR of 2 5   Latest INR 3 02   5  Moderately frequent PVCs on otherwise benign Holter monitor December, 2013  6  10 6 pound weight loss in approximately 5 years  7  Dyslipidemia, mixed, and controlled  8  Mild obstructive sleep apnea is suspected  9  History of asthma  10  History of GERD  11  History of benign positional vertigo  12  History of Lyme disease  13  Hypothyroidism with slightly elevated TSH level, which could contribute to extreme fatigue  14  History of hepatitis C  15  History of Parkinson's disease/date dysfunction with slowly progressive symptoms  16  Resolved nonproductive cough and postural dizziness with  flu-like illness 10 months ago  17  Chronic fatigue and malaise, multifactorial  18  Drug-induced insomnia  19  History of fall with subsequent left distal tibial fracture and left ankle sprain with right periorbital ecchymoses and laceration and left wrist sprain on 7/30/17, with no recurrence, possibly related to transient drop in blood pressure with upright position in hot weather after prolonged sitting    20  Previously diagnosed osteoarthritis of hips and spine and bursitis of right knee        Plan       Patient Instructions     1  Patient to run through remaining Entresto 24-26 milligrams b i d  for 5 days and then switch to 49-51 milligram b i d   2  Patient was given samples of Entresto 49-51 milligrams with 2 week supply provided  3  Follow-up will be in about 1 and half weeks for up titration of Entresto  4  For recurrent near-syncope, patient was instructed to call, at which time we will consider possible discontinuation or reduction in dose of  furosemide, spironolactone, carvedilol, as well as possible discontinuation or reduction in dose of Entresto  HPI    This 77 y o  female  initially did very well with the low-dose Entresto begun on 01/10/2018 with increased energy level, less fatigue and dyspnea  Upon awakening today, she felt unwell and shaky  She took a warm shower using a shower chair and approximately 0 5 hour later while in the kitchen using a crock pot, she experienced near syncope with nausea and diaphoresis as well as pallor  She was assisted by her daughter, who prevented her from falling and eased her to his sofa, where her blood pressure was 108/60  She has had no similar episodes prior to today      Review of Systems    All other systems negative, except as noted in history of present illness    Historical Information   Past Medical History:   Diagnosis Date    Anal fissure     Arthritis     osteo joints    Asthma with acute exacerbation     Blepharitis     Breast lump     Chalazion     CHF, chronic (HCC)     Difficulty walking     Disease of thyroid gland     Drooling     Dysphagia     Fall 05/04/2018    Fibromyalgia, primary     History of transfusion 1996    Hordeolum externum     Hx of transient ischemic attack (TIA)     Hypophonia     Infectious viral hepatitis     Hep C dx 1996     Lyme carditis     Murmur, cardiac     Parkinsons (HCC)     Rotator cuff tendinitis     Seasonal allergies     Urinary frequency      Past Surgical History:   Procedure Laterality Date    BREAST SURGERY N/A     benign, calcium    CARDIAC SURGERY  1990    mitral valve replacement    CATARACT EXTRACTION Right 2015    CATARACT EXTRACTION Left 2014    CHEST TUBE INSERTION Right     post pleural effusion    CHOLECYSTECTOMY  2000    lap    HYSTERECTOMY  1973    partial    MN COLSC FLX W/RMVL OF TUMOR POLYP LESION SNARE TQ N/A 7/18/2017    Procedure: COLONOSCOPY and biopsy ;  Surgeon: Flor Torres MD;  Location: [place] [address] [facility]; Service: Gastroenterology    SKIN BIOPSY      pre cancerous on face    TONSILLECTOMY      US GUIDED BREAST BIOPSY RIGHT COMPLETE Right 2/13/2018     History   Alcohol Use No     History   Drug Use No     History   Smoking Status    Former Smoker    Packs/day: 0 10    Years: 10 00    Quit date: 1970   Smokeless Tobacco    Never Used       Family History:  Family History   Problem Relation Age of Onset    Heart disease Mother         murmur Cardiomegaly age 64    Pneumonia Father     Heart disease Brother         mitrsl valve    Parkinsonism Brother     Arthritis Brother     Lupus Daughter     Diabetes Daughter     Depression Daughter          Meds/Allergies     Prior to Admission medications    Medication Sig Start Date End Date Taking?  Authorizing Provider   carbidopa-levodopa-entacapone (STALEVO) -200 MG per tablet Take 1 tablet by mouth 4 (four) times a day 1/4/19  Yes Lloyd Romano MD   carvedilol (COREG) 3 125 mg tablet take 1 tablet by mouth twice a day with meals 1/14/19  Yes Noris Fletcher MD   Cholecalciferol (VITAMIN D3) 2000 units TABS Take 2,000 Units by mouth every morning   Yes Historical Provider, MD   donepezil (ARICEPT) 10 mg tablet Take 10 mg by mouth daily at bedtime     Yes Historical Provider, MD   furosemide (LASIX) 20 mg tablet take 1 tablet by mouth ON [place] , FRIDAY AND SUNDAY 11/20/18  Yes Noris Fletcher MD   levothyroxine 25 mcg tablet take 1 tablet by mouth every morning 11/19/18  Yes Cintia Arango MD   memantine [facility]) 10 mg tablet take 1 tablet by mouth once daily 1/4/19  Yes Teddy Palma MD   Multiple Vitamins-Minerals (OCUVITE ADULT 50+ PO) Take by mouth daily with breakfast   Yes Historical Provider, MD   primidone (MYSOLINE) 50 mg tablet TAKE 1/2 TABLET BY MOUTH DAILY 12/3/18  Yes Teddy Palma MD   sacubitril-valsartan (ENTRESTO) 24-26 MG TABS Take 1 tablet by mouth 2 (two) times a day 1/10/19  Yes Olvin Moore MD   spironolactone (ALDACTONE) 25 mg tablet Take 0 5 tablets (12 5 mg total) by mouth daily 1/18/19  Yes Olvin Moore MD   warfarin (COUMADIN) 2 5 mg tablet TAKE 1 TABLET DAILY BY MOUTH OR AS ORDERED BY PHYSICIAN  Patient taking differently: Take 2 5 mg by mouth daily TAKE 1 TABLET DAILY BY MOUTH OR AS ORDERED BY PHYSICIAN  9/11/18  Yes Olvin Moore MD   spironolactone (ALDACTONE) 25 mg tablet TAKE 1/2 TABLET BY MOUTH DAILY 12/3/18 1/18/19 Yes Olvin Moore MD   warfarin (COUMADIN) 2 mg tablet Take by mouth daily Sudhir Hanson and Sun     Historical Provider, MD   carbidopa-levodopa (SINEMET)  mg per tablet Take 1 tablet by mouth 4 (four) times a day  3/2/18  Historical Provider, MD       No Known Allergies      Vitals:    01/18/19 1346   BP: 100/60   BP Location: Right arm   Patient Position: Sitting   Cuff Size: Large   Pulse: 56   SpO2: 99%   Weight: 82 2 kg (181 lb 4 8 oz)   Height: 5' 4 5" (1 638 m)       Body mass index is 30 64 kg/m²    Standing blood pressure, right upper extremity, standard cuff:  /60 at end of exam    Physical Exam:    General Appearance:  Alert, cooperative, no distress, appears stated age   Head:  Normocephalic, without obvious abnormality, atraumatic   Eyes:  PERRL, conjunctiva/corneas clear, EOM's intact,   both eyes   Ears:  Normal TM's and external ear canals, both ears   Nose: Nares normal, septum midline, mucosa normal, no drainage or sinus tenderness   Throat: Lips, mucosa, and tongue normal; teeth and gums normal   Neck: Supple, symmetrical, trachea midline, no adenopathy, thyroid: not enlarged, symmetric, no tenderness/mass/nodules, no carotid bruit or JVD   Back:   Symmetric, no curvature, ROM normal, no CVA tenderness   Lungs:   Clear to auscultation bilaterally, respirations unlabored   Chest Wall:  No tenderness or deformity   Heart:  Regular rate and rhythm, S1, S2 normal, no murmur, rub or gallop   Abdomen:   Soft, non-tender, bowel sounds active all four quadrants,  no masses, no organomegaly   Extremities: Extremities normal, atraumatic, no cyanosis or edema   Pulses: 2+ and symmetric   Skin: Skin showed normal color, texture, turgor and no rashes or lesions   Lymph nodes: Cervical, supraclavicular, and axillary nodes normal   Neurologic: Normal         Cardiographics    ECG  :  Not applicable    Imaging    Chest X-Ray :  Xr Chest Pa & Lateral    Result Date: 3/20/2018  Impression No acute cardiopulmonary disease   Workstation performed: [id number]             Lab Review       Lab Results   Component Value Date    SODIUM 139 12/31/2018    K 4 1 12/31/2018     12/31/2018    CO2 29 12/31/2018    ANIONGAP 9 8 (L) 12/07/2015    BUN 17 12/31/2018    CREATININE 1 11 12/31/2018    GLUCOSE 91 12/07/2015    GLUF 95 10/08/2018    CALCIUM 9 4 12/31/2018    AST 14 12/31/2018    ALT 11 (L) 12/31/2018    ALKPHOS 94 12/31/2018    PROT 6 9 12/07/2015    BILITOT 1 6 (H) 12/07/2015    EGFR 47 12/31/2018       Lab Results   Component Value Date    CHOLESTEROL 148 10/08/2018    CHOLESTEROL 151 03/15/2018    CHOLESTEROL 163 03/23/2017     Lab Results   Component Value Date    HDL 50 10/08/2018    HDL 56 03/15/2018    HDL 60 03/23/2017     No results found for: LDLCHOLEST  Lab Results   Component Value Date    LDLCALC 66 10/08/2018    LDLCALC 70 03/15/2018    LDLCALC 78 03/23/2017     No components found for: [facility]  Lab Results   Component Value Date    TRIG 162 (H) 10/08/2018    TRIG 127 03/15/2018    TRIG 124 03/23/2017         Lab Results   Component Value Date    GLUCOSE 91 12/07/2015    CALCIUM 9 4 12/31/2018     12/07/2015    K 4 1 12/31/2018    CO2 29 12/31/2018     12/31/2018    BUN 17 12/31/2018    CREATININE 1 11 12/31/2018           Shaila Viramontes MD

## 2019-01-30 ENCOUNTER — EVALUATION (OUTPATIENT)
Dept: PHYSICAL THERAPY | Facility: CLINIC | Age: 79
End: 2019-01-30
Payer: MEDICARE

## 2019-01-30 DIAGNOSIS — G20 PARKINSON DISEASE (HCC): Primary | ICD-10-CM

## 2019-01-30 DIAGNOSIS — Z95.2 H/O MITRAL VALVE REPLACEMENT WITH MECHANICAL VALVE: ICD-10-CM

## 2019-01-30 PROCEDURE — G8978 MOBILITY CURRENT STATUS: HCPCS | Performed by: PHYSICAL THERAPIST

## 2019-01-30 PROCEDURE — 97112 NEUROMUSCULAR REEDUCATION: CPT | Performed by: PHYSICAL THERAPIST

## 2019-01-30 PROCEDURE — G8979 MOBILITY GOAL STATUS: HCPCS | Performed by: PHYSICAL THERAPIST

## 2019-01-30 PROCEDURE — 97163 PT EVAL HIGH COMPLEX 45 MIN: CPT

## 2019-01-30 NOTE — PROGRESS NOTES
PT Evaluation     Today's date: 2019  Patient name: Quincy Christy  : 1940  MRN: [id number]  Referring provider: Eric Bustamante MD  Dx:   Encounter Diagnosis     ICD-10-CM    1  Parkinson disease ([address] ) Yesica Saleh Ambulatory referral to Physical Therapy   2  H/O mitral valve replacement with mechanical valve Z95 2                   Assessment  Assessment details: Patient is a 77 y o  female who presents to skilled PT for with gait and balance deficits secondary to diagnosis of Parkinsons  Patient displays decreased LE muscle strength with overall grade of 3+/5  Patient balance scores are as follows: 25/56 LAUREANO, 27 94 seconds TUG with SPC, 10 Meter walk test 1 72 ft/sec with SPC with overall results noting high risk for falls per  Patient endurance scores are as follows; 174 feet with 2 minute walk test with SPC, 45 seconds with 5 x sit to stand test with overall results noting decrease functional endurance and cardio capacity per  Patient subjective report notes the following functional limitation with dressing and performing ADLs such as cooking  Patient will benefit from skilled PT to address noted impairments and functional limitations they are causing with overall goal to return patient to highest level possible with reduced risk for falls  Please contact me if you have any questions or recommendations  Thank you for the referral and the opportunity to share in [id number]  Patient verbalized understanding of POC  Impairments: abnormal gait, activity intolerance, impaired balance and impaired physical strength  Understanding of Dx/Px/POC: excellent   Prognosis: good    Goals  ST weeks  1  Patient will improve her overall LE strength from 3+/5 to to 4/5   2  Patient will improve her overall LAUREANO score from 25/56 to 31/56  3  Patient will improve her TUG score from 27 94 seconds to 27 35 seconds  4  Patient will improve her 5 x Sit to Stand score from 45 seconds to 42 7 seconds      LT weeks  1  Patient will be independent with her HEP  2  Patient will be able to independently put her shoes on in the morning  3  Patient will be able to walk 660 feet during 6 Minute Walk Test to be able to walk 1 block  Plan  Patient would benefit from: PT eval and skilled physical therapy  Planned therapy interventions: abdominal trunk stabilization, balance, gait training, home exercise program, therapeutic exercise, therapeutic activities, strengthening, stretching, patient education, postural training, neuromuscular re-education and manual therapy  Frequency: 2x week  Plan of Care beginning date: 2019  Plan of Care expiration date: 3/27/2019  Treatment plan discussed with: patient        Subjective Evaluation    History of Present Illness  Mechanism of injury: Patient is a 77 y o  female who arrives to outpatient PT with a diagnosis of Parkinson's  Patient notes that she is beginning to shuffle and takes more hesitant steps  She reports that she has Congestive Heart Disease that is being treated with Entresto    Pain  No pain reported    Social Support  Steps to enter house: no  Stairs in house: no   Lives in: condominium  Lives with: spouse (Temporarily adult daughter)    Employment status: not working  Treatments  No previous or current treatments  Patient Goals  Patient goals for therapy: increased strength, improved balance, increased motion and independence with ADLs/IADLs          Objective     Static Posture     Comments  Static Balance Testing      LAUREANO/56    Strength/Myotome Testing     Left Hip   Planes of Motion   Flexion: 3+  Extension: 3+  Abduction: 3+  Adduction: 3+    Right Hip   Planes of Motion   Flexion: 3+  Extension: 3+  Abduction: 3+  Adduction: 3+    Left Knee   Flexion: 4-  Prone flexion: 4-    Right Knee   Flexion: 4-  Prone flexion: 4    Left Ankle/Foot   Dorsiflexion: 3+  Plantar flexion: 3+    Right Ankle/Foot   Dorsiflexion: 3+  Plantar flexion: 3+    Ambulation Comments   Dynamic Endurance Testing    2 Minute Walk Test  174 feet (with SPC)    Dynamic Balance/Falls Risk Testing    TUG  27 94 seconds (with SPC)    10 Meter Walk Test  33 ft/19 2 sec = 1 72 ft/sec (with SPC)      Gait Deviations: shuffling gait, freezing episodes  Decrease stride length, step height and [place]  Functional Assessment        Comments  Endurance:  5x Sit to Stand:45 seconds      Flowsheet Rows      Most Recent Value   PT/OT G-Codes   Current Score  40   Projected Score  0          Precautions:  has a past medical history of Anal fissure; Arthritis; Asthma with acute exacerbation; Blepharitis; Breast lump; Chalazion; CHF, chronic ([address] ); Difficulty walking; Disease of thyroid gland; Drooling; Dysphagia; Fall (05/04/2018); Fibromyalgia, primary; History of transfusion (1996); Hordeolum externum; transient ischemic attack (TIA); Hypophonia; Infectious viral hepatitis; Lyme carditis; Murmur, cardiac; Parkinsons ([facility] [address] [facility] ); Rotator cuff tendinitis; Seasonal allergies; and Urinary frequency  Daily Treatment Diary     Manual  1/30                                                                               NMR/TE  Standing hip abd, flexion and ext 3 sec iso hold end range, 2 sets, 10 reps  FTEC: eye closed 30 seconds

## 2019-01-31 ENCOUNTER — OFFICE VISIT (OUTPATIENT)
Dept: CARDIOLOGY CLINIC | Facility: CLINIC | Age: 79
End: 2019-01-31
Payer: MEDICARE

## 2019-01-31 VITALS
SYSTOLIC BLOOD PRESSURE: 90 MMHG | OXYGEN SATURATION: 98 % | DIASTOLIC BLOOD PRESSURE: 50 MMHG | WEIGHT: 180 LBS | BODY MASS INDEX: 29.99 KG/M2 | HEART RATE: 64 BPM | HEIGHT: 65 IN

## 2019-01-31 DIAGNOSIS — I44.7 LEFT BUNDLE BRANCH BLOCK (LBBB): ICD-10-CM

## 2019-01-31 DIAGNOSIS — Z95.2 H/O MITRAL VALVE REPLACEMENT WITH MECHANICAL VALVE: ICD-10-CM

## 2019-01-31 DIAGNOSIS — I50.42 CHRONIC COMBINED SYSTOLIC AND DIASTOLIC HEART FAILURE (HCC): Primary | ICD-10-CM

## 2019-01-31 DIAGNOSIS — I49.3 ASYMPTOMATIC PVCS: ICD-10-CM

## 2019-01-31 DIAGNOSIS — E78.2 MIXED DYSLIPIDEMIA: ICD-10-CM

## 2019-01-31 DIAGNOSIS — Z79.01 ON CONTINUOUS ORAL ANTICOAGULATION: ICD-10-CM

## 2019-01-31 DIAGNOSIS — I42.0 NONISCHEMIC DILATED CARDIOMYOPATHY (HCC): ICD-10-CM

## 2019-01-31 DIAGNOSIS — E03.9 ACQUIRED HYPOTHYROIDISM: ICD-10-CM

## 2019-01-31 DIAGNOSIS — I48.20 ATRIAL FIBRILLATION, CHRONIC (HCC): ICD-10-CM

## 2019-01-31 PROCEDURE — 99214 OFFICE O/P EST MOD 30 MIN: CPT | Performed by: INTERNAL MEDICINE

## 2019-01-31 NOTE — PROGRESS NOTES
Cardiology Progress Note    ASSESSMENT:    1  Improving control of chronic systolic heart failure with 34% ejection fraction on 7/5/16 MUGA scan/underlying dilated cardiomyopathy but currently with lessening exertional fatigue, dyspnea on exertion, and resolution currently of exhaustion and cold sweats,  as well as occasional nocturnal wheezing  Initially improved on starting dose of Entresto, but had vasovagal type near syncope on 01/18/2019 with no recurrence  Cannot rule out hypotensive adverse drug effect, but not confirmed by examination  2  Resolved dehydration/hypotension and residual mild dizziness as well as resolution of acute kidney injury since 11/18/16  3  Chronic atrial fibrillation, controlled and chronic left bundle-branch block  4  History of mechanical mitral valve replacement September, 1990, with very good oral anticoagulation with target INR of 2 5   Latest INR 3 02   5  Moderately frequent PVCs on otherwise benign Holter monitor December, 2013  6  11 9 pound weight loss in approximately 5 years  7  Dyslipidemia, mixed, and controlled  8  Mild obstructive sleep apnea is suspected  9  History of asthma  10  History of GERD  11  History of benign positional vertigo  12  History of Lyme disease  13  Hypothyroidism with slightly elevated TSH level, which could contribute to extreme fatigue  14  History of hepatitis C  15  History of Parkinson's disease/date dysfunction with slowly progressive symptoms and now just starting physical therapy for this  16  Resolved nonproductive cough and postural dizziness with  flu-like illness 10-1/2 months ago  17  Chronic fatigue and malaise, multifactorial  18  Drug-induced insomnia  19   History of fall with subsequent left distal tibial fracture and left ankle sprain with right periorbital ecchymoses and laceration and left wrist sprain on 7/30/17, with no recurrence, possibly related to transient drop in blood pressure with upright position in hot weather after prolonged sitting  20  Previously diagnosed osteoarthritis of hips and spine and bursitis of right knee  Plan       Patient Instructions     1  Trial of  Entresto  milligrams b i d  with 10 day prescription in to refill sent to local pharmacy  2  To contact us if excessive lightheadedness occurs and will consider reduction in concomitant medication or in Entresto dose  3  Follow-up in approximately 1 month with BMP  4  Patient will double dose of Entresto 49-51 milligrams b i d , until those tablets are gone  HPI    This 77 y o  female  denies new cardiopulmonary and medical symptoms  She has a much better energy level on Entresto and has had no further near-syncope but is unable to stand for prolonged periods  She has had no further nausea, diaphoresis, or pallor  Her  notices that her color has improved  She is just starting physical therapy for her Parkinson's disease        Review of Systems    All other systems negative, except as noted in history of present illness    Historical Information   Past Medical History:   Diagnosis Date    Anal fissure     Arthritis     osteo joints    Asthma with acute exacerbation     Blepharitis     Breast lump     Chalazion     CHF, chronic (HCC)     Difficulty walking     Disease of thyroid gland     Drooling     Dysphagia     Fall 05/04/2018    Fibromyalgia, primary     History of transfusion 1996    Hordeolum externum     Hx of transient ischemic attack (TIA)     Hypophonia     Infectious viral hepatitis     Hep C dx 1996     Lyme carditis     Murmur, cardiac     Parkinsons (HCC)     Rotator cuff tendinitis     Seasonal allergies     Urinary frequency      Past Surgical History:   Procedure Laterality Date    BREAST SURGERY N/A     benign, calcium    CARDIAC SURGERY  1990    mitral valve replacement    CATARACT EXTRACTION Right 2015    CATARACT EXTRACTION Left 2014    CHEST TUBE INSERTION Right post pleural effusion    CHOLECYSTECTOMY  2000    lap    HYSTERECTOMY  1973    partial    AR COLSC FLX W/RMVL OF TUMOR POLYP LESION SNARE TQ N/A 7/18/2017    Procedure: COLONOSCOPY and biopsy ;  Surgeon: Brenda Troy MD;  Location: [facility] [place] [facility]; Service: Gastroenterology    SKIN BIOPSY      pre cancerous on face    TONSILLECTOMY      US GUIDED BREAST BIOPSY RIGHT COMPLETE Right 2/13/2018     History   Alcohol Use No     History   Drug Use No     History   Smoking Status    Former Smoker    Packs/day: 0 10    Years: 10 00    Quit date: 1970   Smokeless Tobacco    Never Used       Family History:  Family History   Problem Relation Age of Onset    Heart disease Mother         murmur Cardiomegaly age 64    Pneumonia Father     Heart disease Brother         mitrsl valve    Parkinsonism Brother     Arthritis Brother     Lupus Daughter     Diabetes Daughter     Depression Daughter          Meds/Allergies     Prior to Admission medications    Medication Sig Start Date End Date Taking?  Authorizing Provider   carbidopa-levodopa-entacapone (STALEVO) -200 MG per tablet Take 1 tablet by mouth 4 (four) times a day 1/4/19  Yes Jeff King MD   carvedilol (COREG) 3 125 mg tablet take 1 tablet by mouth twice a day with meals 1/14/19  Yes Capo Jaquez MD   Cholecalciferol (VITAMIN D3) 2000 units TABS Take 2,000 Units by mouth every morning   Yes Historical Provider, MD   donepezil (ARICEPT) 10 mg tablet Take 10 mg by mouth daily at bedtime     Yes Historical Provider, MD   furosemide (LASIX) 20 mg tablet take 1 tablet by mouth ON Electa Holes, FRIDAY AND SUNDAY 11/20/18  Yes Capo Jaquez MD   levothyroxine 25 mcg tablet take 1 tablet by mouth every morning 11/19/18  Yes Sarai Mcmahon MD   memantine [facility]) 10 mg tablet take 1 tablet by mouth once daily 1/4/19  Yes Jeff King MD   Multiple Vitamins-Minerals (OCUVITE ADULT 50+ PO) Take by mouth daily with breakfast   Yes Historical Provider, MD   primidone (MYSOLINE) 50 mg tablet TAKE 1/2 TABLET BY MOUTH DAILY 12/3/18  Yes Trice Obrien MD   spironolactone (ALDACTONE) 25 mg tablet Take 0 5 tablets (12 5 mg total) by mouth daily 1/18/19  Yes Megan Short MD   warfarin (COUMADIN) 2 mg tablet Take by mouth daily Tues, Thurs, and Sun    Yes Historical Provider, MD   warfarin (COUMADIN) 2 5 mg tablet TAKE 1 TABLET DAILY BY MOUTH OR AS ORDERED BY PHYSICIAN  Patient taking differently: Take 2 5 mg by mouth daily TAKE 1 TABLET DAILY BY MOUTH OR AS ORDERED BY PHYSICIAN  9/11/18  Yes Megan Short MD   sacubitril-valsartan (ENTRESTO) 49-51 MG TABS Take 1 tablet by mouth 2 (two) times a day  1/31/19 Yes Historical Provider, MD   sacubitril-valsartan (ENTRESTO)  MG TABS Take 1 tablet by mouth 2 (two) times a day for 10 days 1/31/19 2/10/19  Megan Short MD   carbidopa-levodopa (SINEMET)  mg per tablet Take 1 tablet by mouth 4 (four) times a day  3/2/18  Historical Provider, MD   sacubitril-valsartan (ENTRESTO) 24-26 MG TABS Take 1 tablet by mouth 2 (two) times a day  Patient not taking: Reported on 1/31/2019  1/10/19 1/31/19  Megan Short MD       No Known Allergies      Vitals:    01/31/19 1359 01/31/19 1401   BP: (!) 88/58 90/50   BP Location: Left arm Right arm   Patient Position: Sitting Sitting   Cuff Size: Standard Standard   Pulse:  64   SpO2: 98%    Weight: 81 6 kg (180 lb)    Height: 5' 4 5" (1 638 m)        Body mass index is 30 42 kg/m²    1 3 pound weight loss compared to last visit    Physical Exam:    General Appearance:  Alert, cooperative, no distress, appears stated age and is mildly obese   Head:  Normocephalic, without obvious abnormality, atraumatic   Eyes:  PERRL, conjunctiva/corneas clear, EOM's intact,   both eyes   Ears:  Normal TM's and external ear canals, both ears   Nose: Nares normal, septum midline, mucosa normal, no drainage or sinus tenderness   Throat: Lips, mucosa, and tongue normal; teeth and gums normal   Neck: Supple, symmetrical, trachea midline, no adenopathy, thyroid: not enlarged, symmetric, no tenderness/mass/nodules, no carotid bruit or JVD   Back:   Symmetric, no curvature, ROM normal, no CVA tenderness   Lungs:   Clear to auscultation bilaterally, respirations unlabored   Chest Wall:  No tenderness or deformity   Heart:  Regular rate and rhythm, S1, S2 normal, no murmur, rub or gallop with unvarying mitral valve prosthetic clicks   Abdomen:   Soft, non-tender, bowel sounds active all four quadrants,  no masses, no organomegaly and mildly obese   Extremities: Extremities normal, atraumatic, no cyanosis or edema   Pulses: 2+ and symmetric   Skin: Skin showed normal color, texture, turgor and no rashes or lesions   Lymph nodes: Cervical, supraclavicular, and axillary nodes normal   Neurologic: Normal         Cardiographics    ECG  :  Not applicable    Imaging    Chest X-Ray :  Xr Chest Pa & Lateral    Result Date: 3/20/2018  Impression No acute cardiopulmonary disease   Workstation performed: [id number]             Lab Review       Lab Results   Component Value Date    SODIUM 139 12/31/2018    K 4 1 12/31/2018     12/31/2018    CO2 29 12/31/2018    ANIONGAP 9 8 (L) 12/07/2015    BUN 17 12/31/2018    CREATININE 1 11 12/31/2018    GLUCOSE 91 12/07/2015    GLUF 95 10/08/2018    CALCIUM 9 4 12/31/2018    AST 14 12/31/2018    ALT 11 (L) 12/31/2018    ALKPHOS 94 12/31/2018    PROT 6 9 12/07/2015    BILITOT 1 6 (H) 12/07/2015    EGFR 47 12/31/2018       Lab Results   Component Value Date    CHOLESTEROL 148 10/08/2018    CHOLESTEROL 151 03/15/2018    CHOLESTEROL 163 03/23/2017     Lab Results   Component Value Date    HDL 50 10/08/2018    HDL 56 03/15/2018    HDL 60 03/23/2017     No results found for: LDLCHOLEST  Lab Results   Component Value Date    LDLCALC 66 10/08/2018    LDLCALC 70 03/15/2018    LDLCALC 78 03/23/2017     No components found for: [facility]  Lab Results   Component Value Date    TRIG 162 (H) 10/08/2018    TRIG 127 03/15/2018    TRIG 124 03/23/2017         Lab Results   Component Value Date    GLUCOSE 91 12/07/2015    CALCIUM 9 4 12/31/2018     12/07/2015    K 4 1 12/31/2018    CO2 29 12/31/2018     12/31/2018    BUN 17 12/31/2018    CREATININE 1 11 12/31/2018           Ayesha Fitzgerald MD

## 2019-01-31 NOTE — PATIENT INSTRUCTIONS
1  Trial of  Entresto  milligrams b i d  with 10 day prescription in to refill sent to local pharmacy  2  To contact us if excessive lightheadedness occurs and will consider reduction in concomitant medication or in Entresto dose  3  Follow-up in approximately 1 month with GENE

## 2019-02-04 ENCOUNTER — OFFICE VISIT (OUTPATIENT)
Dept: PHYSICAL THERAPY | Facility: CLINIC | Age: 79
End: 2019-02-04
Payer: MEDICARE

## 2019-02-04 DIAGNOSIS — G20 PARKINSON DISEASE (HCC): Primary | ICD-10-CM

## 2019-02-04 PROCEDURE — 97112 NEUROMUSCULAR REEDUCATION: CPT | Performed by: PHYSICAL THERAPIST

## 2019-02-04 NOTE — PROGRESS NOTES
Daily Note     Today's date: 2019  Patient name: Georgeana Medicine  : 1940  MRN: [id number]  Referring provider: Nadine Wood MD  Dx:   Encounter Diagnosis     ICD-10-CM    1  Parkinson disease ([facility] [address] ) G20                   Subjective: Patient reports that she has been feeling "pretty good" after her last session  She arrives to PT today using Rollator  Objective: See treatment diary below    BIG Exercises  - Floor to Ceiling: 10 reps  - Side to Side Reach: 10 reps each  - Forward Step (adapted): 10 reps each  - Side Step (adapted): 10 reps each  - Backward Step (adapted): 10 reps each  - Rock and Reach (adapted): 10 reps each  - Big Twist (adapted): 10 reps each  - Sit to Stand: 10 reps    HEP: BIG exercises (19)    Assessment: Patient was able to tolerate treatment session well today with initiation of exercise program  She was challenged with exercises and required use of adapted exercises to complete them  Focus on decreased intensity due to increase in fatigue frequently  She demonstrated decreased weight shift anteriorly and posteriorly during rock and reach that improved with PT verbal cues  Patient will continue to benefit from skilled outpatient PT services to improve her strength, balance, and mobility in order to maximize her function  Plan: Continue per plan of care

## 2019-02-06 ENCOUNTER — OFFICE VISIT (OUTPATIENT)
Dept: PHYSICAL THERAPY | Facility: CLINIC | Age: 79
End: 2019-02-06
Payer: MEDICARE

## 2019-02-06 DIAGNOSIS — G20 PARKINSON DISEASE (HCC): Primary | ICD-10-CM

## 2019-02-06 PROCEDURE — 97110 THERAPEUTIC EXERCISES: CPT | Performed by: PHYSICAL THERAPIST

## 2019-02-06 PROCEDURE — 97112 NEUROMUSCULAR REEDUCATION: CPT | Performed by: PHYSICAL THERAPIST

## 2019-02-06 NOTE — PROGRESS NOTES
Daily Note     Today's date: 2019  Patient name: Iris Bhatt  : 1940  MRN: [id number]  Referring provider: Paco Taylor MD  Dx:   Encounter Diagnosis     ICD-10-CM    1  Parkinson disease ([facility] [address] ) G20                   Subjective: Patient reports that she was very tired after her session yesterday and notes "I couldn't move I was so tired "      Objective: See treatment diary below    BIG Exercises  - Floor to Ceiling: 10 reps  - Side to Side Reach: 10 reps each  - Sit to Stand: 10 reps    TE  - Standing Hip 3 Way: 10 reps each, 3 sec hold  - Heel/Toe Raises: 2 sets, 10 reps  - 6" Hurdles (2 UE support): Fwd, Lat 2 cycles each        Not Performed This Session  - Forward Step (adapted): 10 reps each  - Side Step (adapted): 10 reps each  - Backward Step (adapted): 10 reps each  - Rock and Reach (adapted): 10 reps each  - Big Twist (adapted): 10 reps each    HEP: BIG exercises (19)    Assessment: Patient was able to tolerate treatment session fair today due to increased fatigue upon arrival  She required verbal and tactile cues during hip 3 way exercise and rukhsana negotiation to avoid compensatory lateral lean and hip ER  Continued focus on decreased intensity and more rest breaks due to increase in fatigue frequently  She demonstrated use of momentum during heel/toe raises that improved with PT verbal cues  Patient will continue to benefit from skilled outpatient PT services to improve her strength, balance, and mobility in order to maximize her function  Plan: Continue per plan of care  Add more standing TE's

## 2019-02-11 ENCOUNTER — OFFICE VISIT (OUTPATIENT)
Dept: PHYSICAL THERAPY | Facility: CLINIC | Age: 79
End: 2019-02-11
Payer: MEDICARE

## 2019-02-11 DIAGNOSIS — G20 PARKINSON DISEASE (HCC): Primary | ICD-10-CM

## 2019-02-11 PROCEDURE — 97112 NEUROMUSCULAR REEDUCATION: CPT | Performed by: PHYSICAL THERAPIST

## 2019-02-11 PROCEDURE — 97110 THERAPEUTIC EXERCISES: CPT | Performed by: PHYSICAL THERAPIST

## 2019-02-11 NOTE — PROGRESS NOTES
Daily Note     Today's date: 2019  Patient name: Pamela Reyna  : 1940  MRN: [id number]  Referring provider: Evie Fernando MD  Dx:   Encounter Diagnosis     ICD-10-CM    1  Parkinson disease ([facility] [address] ) G20                   Subjective: Patient reports that she was very tired after her session yesterday and notes "I couldn't move I was so tired "      Objective: See treatment diary below    BIG Exercises  - Floor to Ceiling: 10 reps  - Side to Side Reach: 2 sets, 5 reps  - Sit to Stand: 10 reps     TE  - Standing Hip 3 Way: 10 reps each, 3 sec hold  - Heel/Toe Raises: 2 sets, 10 reps    - 6" Hurdles (2 UE support): Fwd, Lat 2 cycles each        Not Performed This Session  - Forward Step (adapted): 10 reps each  - Side Step (adapted): 10 reps each  - Backward Step (adapted): 10 reps each  - Rock and Reach (adapted): 10 reps each  - Big Twist (adapted): 10 reps each    HEP: BIG exercises (19)    Assessment: Patient was able to tolerate treatment session fair today due to increased fatigue upon arrival  She required verbal and tactile cues during hip 3 way exercise and rukhsana negotiation to avoid compensatory lateral lean and hip ER  Continued focus on decreased intensity and more rest breaks due to increase in fatigue frequently  She demonstrated use of momentum during heel/toe raises that improved with PT verbal cues  Patient will continue to benefit from skilled outpatient PT services to improve her strength, balance, and mobility in order to maximize her function  Plan: Continue per plan of care  Add more standing TE's

## 2019-02-13 ENCOUNTER — OFFICE VISIT (OUTPATIENT)
Dept: PODIATRY | Facility: CLINIC | Age: 79
End: 2019-02-13
Payer: MEDICARE

## 2019-02-13 ENCOUNTER — OFFICE VISIT (OUTPATIENT)
Dept: PHYSICAL THERAPY | Facility: CLINIC | Age: 79
End: 2019-02-13
Payer: MEDICARE

## 2019-02-13 ENCOUNTER — ANTICOAG VISIT (OUTPATIENT)
Dept: CARDIOLOGY CLINIC | Facility: CLINIC | Age: 79
End: 2019-02-13

## 2019-02-13 ENCOUNTER — TRANSCRIBE ORDERS (OUTPATIENT)
Dept: ADMINISTRATIVE | Facility: HOSPITAL | Age: 79
End: 2019-02-13

## 2019-02-13 ENCOUNTER — APPOINTMENT (OUTPATIENT)
Dept: LAB | Facility: CLINIC | Age: 79
End: 2019-02-13
Payer: MEDICARE

## 2019-02-13 DIAGNOSIS — G20 PARKINSON DISEASE (HCC): Primary | ICD-10-CM

## 2019-02-13 DIAGNOSIS — Z95.2 H/O MITRAL VALVE REPLACEMENT WITH MECHANICAL VALVE: ICD-10-CM

## 2019-02-13 DIAGNOSIS — I48.91 ATRIAL FIBRILLATION, UNSPECIFIED TYPE (HCC): ICD-10-CM

## 2019-02-13 DIAGNOSIS — Z95.2 HEART VALVE REPLACED BY TRANSPLANT: Primary | ICD-10-CM

## 2019-02-13 DIAGNOSIS — M79.672 PAIN IN BOTH FEET: ICD-10-CM

## 2019-02-13 DIAGNOSIS — M79.671 PAIN IN BOTH FEET: ICD-10-CM

## 2019-02-13 DIAGNOSIS — B35.1 ONYCHOMYCOSIS: Primary | ICD-10-CM

## 2019-02-13 DIAGNOSIS — I70.213 ATHEROSCLEROSIS OF NATIVE ARTERY OF BOTH LOWER EXTREMITIES WITH INTERMITTENT CLAUDICATION (HCC): ICD-10-CM

## 2019-02-13 DIAGNOSIS — Z95.2 HEART VALVE REPLACED BY TRANSPLANT: ICD-10-CM

## 2019-02-13 LAB
INR PPP: 3.05 (ref 0.86–1.17)
PROTHROMBIN TIME: 31.6 SECONDS (ref 11.8–14.2)

## 2019-02-13 PROCEDURE — 85610 PROTHROMBIN TIME: CPT

## 2019-02-13 PROCEDURE — 97110 THERAPEUTIC EXERCISES: CPT | Performed by: PHYSICAL THERAPIST

## 2019-02-13 PROCEDURE — 97112 NEUROMUSCULAR REEDUCATION: CPT | Performed by: PHYSICAL THERAPIST

## 2019-02-13 PROCEDURE — 11721 DEBRIDE NAIL 6 OR MORE: CPT | Performed by: PODIATRIST

## 2019-02-13 PROCEDURE — 36415 COLL VENOUS BLD VENIPUNCTURE: CPT

## 2019-02-13 NOTE — PROGRESS NOTES
Daily Note     Today's date: 2019  Patient name: Joe Rodriguez  : 1940  MRN: [id number]  Referring provider: Isabella Julio MD  Dx:   Encounter Diagnosis     ICD-10-CM    1  Parkinson disease ([address] ) G20    2  H/O mitral valve replacement with mechanical valve Z95 2      Start Time: 1015  Stop Time: 1100  Total time in clinic (min): 45 minutes     Subjective:  She reports "I feel alert not tired today  I just feel off"  She states that she felt good after last session however when she took her nap she fell asleep for a long period of time  Objective: See treatment diary below     - 91/57 mmHg L arm seated at the start of treatment session     BIG Exercises  - Floor to Ceiling: 10 reps  - Side to Side Reach: 2 sets, 5 reps  - Sit to Stand: 2 sets, 5 reps   - Forward Step (adapted; in Solo step): 10 reps each   - Side Step (adapted; in Solo step): 10 reps each  - Backward Step (adapted; Solo step): 10 reps each  -Forward walking in Solo Step 75 feet x2; verbal cues for picking up feet     Not Performed This Session:    - Rock and Reach (adapted): 10 reps each  - Big Twist (adapted): 10 reps each  - Standing Hip 3 Way: 10 reps each, 3 sec hold  - Heel/Toe Raises: 2 sets, 10 reps  - 6" Hurdles (2 UE support): Fwd, Lat 2 cycles each    HEP: BIG exercises (19)    Assessment: Patient was able to tolerate treatment session fair today and reported fatigue at the arrival to her session today  She was able to complete the addition of standing balance exercises in the Solo-Step with intermittent loss of balance but was able to regain using a stepping balance reaction  Will continue to focus on increasing exercise tolerance to improve overall endurance which will allow Renée Liu to navigate her environment safety  Patient did well with ambulating in the Solo Step with no AD and benefited from verbal cue of picking up her feet for better toe clearance   Patient will continue to benefit from skilled outpatient PT services to improve her strength, balance, and mobility in order to maximize her function  Plan: Continue per plan of care  Continue to progress per patient is able

## 2019-02-13 NOTE — PROGRESS NOTES
Assessment/Plan:    Aseptic debridement and planning of nails x10 and manually and mechanically    Daily foot checks monitor for signs of infection    Follow-up 3 months     Diagnoses and all orders for this visit:    Onychomycosis    Atherosclerosis of native artery of both lower extremities with intermittent claudication (HCC)    Pain in both feet          Subjective:      Patient ID: Delroy Whatley is a 77 y o  female  Patient patient chief complaint of thick painful nails that hurt when walking wearing shoes    Patient has not noticed any redness or signs of infection      Past Medical History:   Diagnosis Date    Anal fissure     Arthritis     osteo joints    Asthma with acute exacerbation     Blepharitis     Breast lump     Chalazion     CHF, chronic (HCC)     Difficulty walking     Disease of thyroid gland     Drooling     Dysphagia     Fall 05/04/2018    Fibromyalgia, primary     History of transfusion 1996    Hordeolum externum     Hx of transient ischemic attack (TIA)     Hypophonia     Infectious viral hepatitis     Hep C dx 1996     Lyme carditis     Murmur, cardiac     Parkinsons (HCC)     Rotator cuff tendinitis     Seasonal allergies     Urinary frequency          Current Outpatient Medications:     carbidopa-levodopa-entacapone (STALEVO) -200 MG per tablet, Take 1 tablet by mouth 4 (four) times a day, Disp: 120 tablet, Rfl: 3    carvedilol (COREG) 3 125 mg tablet, take 1 tablet by mouth twice a day with meals, Disp: 60 tablet, Rfl: 5    Cholecalciferol (VITAMIN D3) 2000 units TABS, Take 2,000 Units by mouth every morning, Disp: , Rfl:     donepezil (ARICEPT) 10 mg tablet, Take 10 mg by mouth daily at bedtime  , Disp: , Rfl:     furosemide (LASIX) 20 mg tablet, take 1 tablet by mouth ON MONDAY, WEDNESDAY, FRIDAY AND SUNDAY, Disp: 16 tablet, Rfl: 5    levothyroxine 25 mcg tablet, take 1 tablet by mouth every morning, Disp: 90 tablet, Rfl: 2    memantine (NAMENDA) 10 mg tablet, take 1 tablet by mouth once daily, Disp: 90 tablet, Rfl: 1    Multiple Vitamins-Minerals (OCUVITE ADULT 50+ PO), Take by mouth daily with breakfast, Disp: , Rfl:     primidone (MYSOLINE) 50 mg tablet, TAKE 1/2 TABLET BY MOUTH DAILY, Disp: 30 tablet, Rfl: 1    sacubitril-valsartan (ENTRESTO)  MG TABS, Take 1 tablet by mouth 2 (two) times a day for 10 days, Disp: 20 tablet, Rfl: 2    spironolactone (ALDACTONE) 25 mg tablet, Take 0 5 tablets (12 5 mg total) by mouth daily, Disp: 15 tablet, Rfl: 5    warfarin (COUMADIN) 2 mg tablet, Take by mouth daily Tues, Thurs, and Sun , Disp: , Rfl:     warfarin (COUMADIN) 2 5 mg tablet, TAKE 1 TABLET DAILY BY MOUTH OR AS ORDERED BY PHYSICIAN  (Patient taking differently: Take 2 5 mg by mouth daily TAKE 1 TABLET DAILY BY MOUTH OR AS ORDERED BY PHYSICIAN  ), Disp: 30 tablet, Rfl: 11    Past Surgical History:   Procedure Laterality Date    BREAST SURGERY N/A     benign, calcium    CARDIAC SURGERY  1990    mitral valve replacement    CATARACT EXTRACTION Right 2015    CATARACT EXTRACTION Left 2014    CHEST TUBE INSERTION Right     post pleural effusion    CHOLECYSTECTOMY  2000    lap    HYSTERECTOMY  1973    partial    GA COLSC FLX W/RMVL OF TUMOR POLYP LESION SNARE TQ N/A 7/18/2017    Procedure: COLONOSCOPY and biopsy ;  Surgeon: Anjelica Colon MD;  Location: [facility];   Service: Gastroenterology    SKIN BIOPSY      pre cancerous on face    TONSILLECTOMY      US GUIDED BREAST BIOPSY RIGHT COMPLETE Right 2/13/2018       No Known Allergies    Patient Active Problem List   Diagnosis    H/O mitral valve replacement with mechanical valve    Atrial fibrillation (HCC) [I48 91]    Benign familial tremor    Chronic hepatitis C ([address] )    Chronic right-sided low back pain without sciatica    Drug induced insomnia ([facility] [address] )    Dupuytren's contracture    Generalized osteoarthritis    Heart failure, left systolic, chronic (HCC)    Hypothyroidism    Memory impairment    Mitral valve disorder    Parkinsons ([facility] [address] )    Peripheral vascular disease ([facility] )    Seasonal allergies    Transient ischemic attack    Vitamin D insufficiency    Suprapatellar bursitis of right knee    Numbness and tingling in right hand    Other microscopic hematuria       Review of Systems   Constitutional: Negative  HENT: Negative  Eyes: Negative  Respiratory: Negative  Cardiovascular: Negative  Gastrointestinal: Negative  Endocrine: Negative  Genitourinary: Negative  Musculoskeletal: Negative  Skin: Negative  Allergic/Immunologic: Negative  Neurological: Negative  Hematological: Negative  Psychiatric/Behavioral: Negative  Objective: There were no vitals taken for this visit  Physical Exam      DP pulses 1/4 bilateral, PT pulses nonpalpable, capillary refill time 6 sec times 10 temperature gradient increased bilateral   Plus one edema bilateral feet and ankles moderate venous stasis  All nails thick discolored dystrophic positive subungual debris positive pain on palpation  Significant bunion bilateral   Hammertoes 2 through 5 bilateral   There is some dorsal displacement of the 2nd and 3rd MPJ left worse than right secondary to bunion  Significant pes planus  Significant equinus bilateral   Muscle strength 5/5 all groups bilateral feet and ankles  No open wound or signs of infection  Vibratory sensation reduced bilateral monofilament sensation within normal limits  Mild edema  Negative erythema no signs of infection  Nails are thick discolored dystrophic, positive subungual debris, mild pain on palpation mild ingrown bilateral hallux  No erythema no signs of infection       Skin is clear no redness or signs of infection

## 2019-02-15 ENCOUNTER — APPOINTMENT (OUTPATIENT)
Dept: LAB | Facility: CLINIC | Age: 79
End: 2019-02-15
Payer: MEDICARE

## 2019-02-15 DIAGNOSIS — I50.42 CHRONIC COMBINED SYSTOLIC AND DIASTOLIC HEART FAILURE (HCC): ICD-10-CM

## 2019-02-15 LAB
ANION GAP SERPL CALCULATED.3IONS-SCNC: 5 MMOL/L (ref 4–13)
BUN SERPL-MCNC: 22 MG/DL (ref 5–25)
CALCIUM SERPL-MCNC: 9.6 MG/DL (ref 8.3–10.1)
CHLORIDE SERPL-SCNC: 108 MMOL/L (ref 100–108)
CO2 SERPL-SCNC: 25 MMOL/L (ref 21–32)
CREAT SERPL-MCNC: 1.05 MG/DL (ref 0.6–1.3)
GFR SERPL CREATININE-BSD FRML MDRD: 51 ML/MIN/1.73SQ M
GLUCOSE P FAST SERPL-MCNC: 105 MG/DL (ref 65–99)
POTASSIUM SERPL-SCNC: 4.2 MMOL/L (ref 3.5–5.3)
SODIUM SERPL-SCNC: 138 MMOL/L (ref 136–145)

## 2019-02-15 PROCEDURE — 80048 BASIC METABOLIC PNL TOTAL CA: CPT

## 2019-02-15 PROCEDURE — 36415 COLL VENOUS BLD VENIPUNCTURE: CPT

## 2019-02-16 DIAGNOSIS — I50.22 CHRONIC SYSTOLIC HEART FAILURE (HCC): ICD-10-CM

## 2019-02-18 ENCOUNTER — OFFICE VISIT (OUTPATIENT)
Dept: PHYSICAL THERAPY | Facility: CLINIC | Age: 79
End: 2019-02-18
Payer: MEDICARE

## 2019-02-18 DIAGNOSIS — G20 PARKINSON DISEASE (HCC): Primary | ICD-10-CM

## 2019-02-18 PROCEDURE — 97112 NEUROMUSCULAR REEDUCATION: CPT | Performed by: PHYSICAL THERAPIST

## 2019-02-18 PROCEDURE — 97110 THERAPEUTIC EXERCISES: CPT | Performed by: PHYSICAL THERAPIST

## 2019-02-18 RX ORDER — SPIRONOLACTONE 25 MG/1
TABLET ORAL
Qty: 45 TABLET | Refills: 3 | Status: SHIPPED | OUTPATIENT
Start: 2019-02-18 | End: 2019-02-28 | Stop reason: SINTOL

## 2019-02-18 NOTE — PROGRESS NOTES
Daily Note     Today's date: 2019  Patient name: Pamella Aguilera  : 1940  MRN: [id number]  Referring provider: Hadley Skiff, MD  Dx:   Encounter Diagnosis     ICD-10-CM    1  Parkinson disease ([address] ) G20      Start Time: 101  Stop Time: 1104  Total time in clinic (min): 45 minutes     Subjective:  She reports feeling fine after her last treatment session with no complaints of soreness  She reports she will be visiting her cardiologist on   Objective: See treatment diary below     - 111/68 mmHg R arm seated at the start of treatment session   - 96/57 mmHg R arm seated at the start of treatment session     BIG Exercises  - Floor to Ceiling: 10 reps  - Side to Side Reach: 2 sets, 5 reps  - Sit to Stand: 10 reps   - Forward Step (adapted; in Solo step): 10 reps each side   - Side Step (adapted; in Solo step): 10 reps each side   - Backward Step (adapted; Solo step): 10 reps each side   - Rock and Reach (adapted; in [employer] Step): 12 reps each    -Forward walking in Solo Step x175 ft total; verbal cues for picking up feet   -Backwards walking in Solo Step 12 ft x3    Not Performed This Session:      - Big Twist (adapted): 10 reps each  - Standing Hip 3 Way: 10 reps each, 3 sec hold  - Heel/Toe Raises: 2 sets, 10 reps  - 6" Hurdles (2 UE support): Fwd, Lat 2 cycles each      Assessment: Patient was able to tolerated treatment session well today with minimal reports of fatigue  She was able to complete 2 standing exercises in a row with the Solo Step prior to requiring a sitting rest break demonstrating improving endurance  Minimal loss of balance with forward/backward/lateral stepping exercises in Solo Step  Minimal verbal cues required today for foot clearance during ambulation in Solo Step  Took patient's blood pressure at the conclusion of today's session with slight drop in BP noted   Patient had no complaints of fatigue or dizziness and verbalized agreement that she felt comfortable walking with her rollator to her car  Patient will continue to benefit from skilled outpatient PT services to improve her strength, balance, and mobility in order to maximize her function  Plan: Continue per plan of care  Continue to progress per patient is able

## 2019-02-19 ENCOUNTER — TELEPHONE (OUTPATIENT)
Dept: CARDIOLOGY CLINIC | Facility: CLINIC | Age: 79
End: 2019-02-19

## 2019-02-19 NOTE — TELEPHONE ENCOUNTER
I spoke to patient  She is doing very well on this new medications however blood pressure has been between   She is on minimal other medication like low-dose Coreg, low-dose Aldactone  One option can be that we can discontinue Coreg and start her on metoprolol 12 5 twice a day  I will not do it quickly let Dr darden gave come back he can review the chart and decide on it  I spoke to the patient for now she will continue same medications

## 2019-02-20 ENCOUNTER — TELEPHONE (OUTPATIENT)
Dept: CARDIOLOGY CLINIC | Facility: CLINIC | Age: 79
End: 2019-02-20

## 2019-02-20 ENCOUNTER — OFFICE VISIT (OUTPATIENT)
Dept: PHYSICAL THERAPY | Facility: CLINIC | Age: 79
End: 2019-02-20
Payer: MEDICARE

## 2019-02-20 ENCOUNTER — HOSPITAL ENCOUNTER (OUTPATIENT)
Dept: RADIOLOGY | Facility: HOSPITAL | Age: 79
Discharge: HOME | End: 2019-02-20
Attending: FAMILY MEDICINE
Payer: MEDICARE

## 2019-02-20 DIAGNOSIS — G20 PARKINSON DISEASE (HCC): Primary | ICD-10-CM

## 2019-02-20 DIAGNOSIS — Z12.31 ENCOUNTER FOR SCREENING MAMMOGRAM FOR MALIGNANT NEOPLASM OF BREAST: ICD-10-CM

## 2019-02-20 PROCEDURE — 97110 THERAPEUTIC EXERCISES: CPT | Performed by: PHYSICAL THERAPIST

## 2019-02-20 PROCEDURE — 77067 SCR MAMMO BI INCL CAD: CPT

## 2019-02-20 PROCEDURE — 97112 NEUROMUSCULAR REEDUCATION: CPT | Performed by: PHYSICAL THERAPIST

## 2019-02-20 NOTE — PROGRESS NOTES
Daily Note     Today's date: 2019  Patient name: Eliel Reyes  : 1940  MRN: [id number]  Referring provider: Sara Armstrong MD  Dx:   Encounter Diagnosis     ICD-10-CM    1  Parkinson disease ([facility] [address] ) G20                  Subjective:  Pt reports doing well today but notes continues BP issues at times  Objective: See treatment diary below    - 104/61 mmhg seated start of session   - 107 / 66 mmhg seated post half of session   - 103 / 64 mmhg Seated post session  BIG Exercises  - Floor to Ceiling: 10 reps  - Side to Side Reach: 2 sets, 5 reps  - Sit to Stand: 10 reps   - Forward Step : 10 reps each side   - Side Step  10 reps each side   - Backward Step 10 reps each side   - Rock and Reach : 12 reps each  - big twist 10 reps each     - Gait with focus on arm swing 150 feet no loss of balance           Assessment: Patient was able to tolerated treatment session well today with minimal reports of fatigue  BP stayed around 104 to 110 / 64 to 66  Improvement in tolerance to exercises this date and better recall    Patient will continue to benefit from skilled outpatient PT services to improve her strength, balance, and mobility in order to maximize her function  Plan: Continue per plan of care  Continue to progress per patient is able

## 2019-02-21 ENCOUNTER — TELEPHONE (OUTPATIENT)
Dept: FAMILY MEDICINE CLINIC | Facility: CLINIC | Age: 79
End: 2019-02-21

## 2019-02-21 NOTE — TELEPHONE ENCOUNTER
Patient advised to HOLD Carvedilol and will follow up with a progress; will keep a follow up appointment

## 2019-02-21 NOTE — TELEPHONE ENCOUNTER
----- Message from Girma Ferrera MD sent at 2/21/2019  9:07 AM EST -----  Pl, advise pt -  Normal mammogram

## 2019-02-25 ENCOUNTER — OFFICE VISIT (OUTPATIENT)
Dept: PHYSICAL THERAPY | Facility: CLINIC | Age: 79
End: 2019-02-25
Payer: MEDICARE

## 2019-02-25 ENCOUNTER — TELEPHONE (OUTPATIENT)
Dept: CARDIOLOGY CLINIC | Facility: CLINIC | Age: 79
End: 2019-02-25

## 2019-02-25 DIAGNOSIS — G20 PARKINSON DISEASE (HCC): Primary | ICD-10-CM

## 2019-02-25 PROCEDURE — 97530 THERAPEUTIC ACTIVITIES: CPT | Performed by: PHYSICAL THERAPIST

## 2019-02-25 PROCEDURE — 97110 THERAPEUTIC EXERCISES: CPT | Performed by: PHYSICAL THERAPIST

## 2019-02-25 NOTE — TELEPHONE ENCOUNTER
Spoke with patient; BP continues to run low at home Auto BP read 80/57 before PT and 99/63 with auto read at PT today  Holding the Carvedilol as instructed  Some lightheadedness  Appt  Thursday

## 2019-02-25 NOTE — PROGRESS NOTES
Daily Note     Today's date: 2019  Patient name: Gene Tee  : 1940  MRN: [id number]  Referring provider: Nader Dutta MD  Dx:   Encounter Diagnosis     ICD-10-CM    1  Parkinson disease ([facility] [address] ) G20                  Subjective:  Pt reports feeling very light headed today, felt good over the weekend  Objective: See treatment diary below    - 80/57 initial BP  - 93/59 BP after leg lifts reclined supine  - 103 / 63 BP after Kegel   - 108/ 65 post abdominal binder   - 99 /63 Post walking to and from lobby 300 feet with abdominal binder     Supine exercises:  Reclined at 45 deg  Lag ift 20 reps BL   kegel 15 reps 5 sec hold 2 sets   straight leg raise 2 sets, 10 reps   Bridges 2 sets, 10 reps     - Gait with focus on arm swing 150 feet x 2no loss of balance           Assessment: Pt tolerated session fair with focus on BP checking due to severe drop  Lowest was 80/57 with of 108/65 with abdominal binder  education on using binder and leg and arm raises to increase BP  As well as increase in blood volume  Patient will continue to benefit from skilled outpatient PT services to improve her strength, balance, and mobility in order to maximize her function  Plan: Continue per plan of care  Continue to progress per patient is able

## 2019-02-27 ENCOUNTER — OFFICE VISIT (OUTPATIENT)
Dept: PHYSICAL THERAPY | Facility: CLINIC | Age: 79
End: 2019-02-27
Payer: MEDICARE

## 2019-02-27 DIAGNOSIS — G20 PARKINSON DISEASE (HCC): Primary | ICD-10-CM

## 2019-02-27 PROCEDURE — 97110 THERAPEUTIC EXERCISES: CPT | Performed by: PHYSICAL THERAPIST

## 2019-02-27 PROCEDURE — 97530 THERAPEUTIC ACTIVITIES: CPT | Performed by: PHYSICAL THERAPIST

## 2019-02-27 NOTE — PROGRESS NOTES
Daily Note     Today's date: 2019  Patient name: Jakob Reeder  : 1940  MRN: [id number]  Referring provider: Aziza Farah MD  Dx:   Encounter Diagnosis     ICD-10-CM    1  Parkinson disease ([facility] [address] ) G20        Start Time: 1005  Stop Time:   Total time in clinic (min): 42 minutes     Subjective:  Pt reports mild lightheadedness  She has an appointment with her cardiologist tomorrow  Objective: See treatment diary below    -101/60 mmHg L arm, 64 bpm, 100% O2 saturation seated start of session with abdominal binder on   - 103/61 mmHg L arm, after seated exercises   - 93/63 mmHg L arm, at the conclusion of session today    BIG Exercises  - Floor to Ceiling: 10 reps  - Side to Side Reach: 2 sets, 5 reps    *leg pumps between*    - Sit to Stand: 10 reps   - Forward Step: 10 reps each side   - Side Step: 10 reps each side   - Backward Step 10 reps each side   - Rock and Reach : 12 reps each  - Big twist 10 reps each       Assessment: Patient was able to tolerate treatment session well today  She required intermittent rest breaks throughout treatment session due to fatigue  Her BP stayed around 100-93 today as it was monitored throughout session  She was able to recall the exercises well today requiring verbal cues for from only 25% of the time  She displayed the most difficulty with rotation in standing to right and left side  Patient will continue to benefit from skilled outpatient PT services to improve her strength, balance, and mobility in order to maximize her function  Plan: Continue per plan of care  Continue to progress per patient is able

## 2019-02-28 ENCOUNTER — OFFICE VISIT (OUTPATIENT)
Dept: CARDIOLOGY CLINIC | Facility: CLINIC | Age: 79
End: 2019-02-28
Payer: MEDICARE

## 2019-02-28 VITALS
WEIGHT: 184 LBS | HEART RATE: 69 BPM | OXYGEN SATURATION: 99 % | HEIGHT: 65 IN | DIASTOLIC BLOOD PRESSURE: 54 MMHG | SYSTOLIC BLOOD PRESSURE: 92 MMHG | BODY MASS INDEX: 30.66 KG/M2

## 2019-02-28 DIAGNOSIS — Z79.01 ON CONTINUOUS ORAL ANTICOAGULATION: ICD-10-CM

## 2019-02-28 DIAGNOSIS — I42.0 NONISCHEMIC DILATED CARDIOMYOPATHY (HCC): ICD-10-CM

## 2019-02-28 DIAGNOSIS — I50.22 CHRONIC SYSTOLIC HEART FAILURE (HCC): ICD-10-CM

## 2019-02-28 DIAGNOSIS — E78.2 MIXED DYSLIPIDEMIA: ICD-10-CM

## 2019-02-28 DIAGNOSIS — I50.42 CHRONIC COMBINED SYSTOLIC AND DIASTOLIC HEART FAILURE (HCC): Primary | ICD-10-CM

## 2019-02-28 DIAGNOSIS — Z95.2 H/O MITRAL VALVE REPLACEMENT WITH MECHANICAL VALVE: ICD-10-CM

## 2019-02-28 DIAGNOSIS — E03.9 ACQUIRED HYPOTHYROIDISM: ICD-10-CM

## 2019-02-28 DIAGNOSIS — I44.7 LEFT BUNDLE BRANCH BLOCK (LBBB): ICD-10-CM

## 2019-02-28 DIAGNOSIS — I49.3 ASYMPTOMATIC PVCS: ICD-10-CM

## 2019-02-28 DIAGNOSIS — I48.20 ATRIAL FIBRILLATION, CHRONIC (HCC): ICD-10-CM

## 2019-02-28 PROCEDURE — 99214 OFFICE O/P EST MOD 30 MIN: CPT | Performed by: INTERNAL MEDICINE

## 2019-02-28 NOTE — PROGRESS NOTES
Cardiology Progress Note    ASSESSMENT:       1  Improving control of chronic combined systolic and diastolic heart failure with 34% ejection fraction on 7/5/16 MUGA scan/underlying dilated cardiomyopathy but currently with lessening exertional fatigue, dyspnea on exertion, and resolution currently of exhaustion and cold sweats,  as well as occasional nocturnal wheezing   Initially improved on starting dose of Entresto, but had vasovagal type near syncope on 01/18/2019 with no recurrence  Patient with frequent lightheadedness and somewhat low blood pressures in the 90s  2  Resolved dehydration/hypotension and residual mild dizziness as well as resolution of acute kidney injury since 11/18/16  3  Chronic atrial fibrillation, controlled and chronic left bundle-branch block  4  History of mechanical mitral valve replacement September, 1990, with very good oral anticoagulation with target INR of 2 5   Latest INR 3 05   5  Moderately frequent PVCs on otherwise benign Holter monitor December, 2013  6  7 9 pound weight loss in approximately 5 years  7  Dyslipidemia, mixed, and controlled  8  Mild obstructive sleep apnea is suspected  9  History of asthma  10  History of GERD  11  History of benign positional vertigo  12  History of Lyme disease  13  Hypothyroidism with slightly elevated TSH level, which could contribute to extreme fatigue  14  History of hepatitis C  15  History of Parkinson's disease/date dysfunction with slowly progressive symptoms and now just starting physical therapy for this  16  Resolved nonproductive cough and postural dizziness with  flu-like illness 10-1/2 months ago  17  Chronic fatigue and malaise, multifactorial  18  Drug-induced insomnia  19   History of fall with subsequent left distal tibial fracture and left ankle sprain with right periorbital ecchymoses and laceration and left wrist sprain on 7/30/17, with no recurrence, possibly related to transient drop in blood pressure with upright position in hot weather after prolonged sitting  20  Previously diagnosed osteoarthritis of hips and spine and bursitis of right knee  Plan       Patient Instructions     1  Discontinue spironolactone  2  Stay off carvedilol  3  Continue Entresto 97/103 milligrams twice a day with 2 week supply and refills sent to local pharmacy  4  Consider decrease in dose of Entresto to 49-51 milligrams b i d  if the above measures do not resolve the lightheadedness  5  Recommended measuring blood pressure with an anaeroid sphygmomanometer and stethoscope, using arm elevation, clenching and unclenching of fist, pumping cuff up to 250 mmHg and then lowering arm to heart level  to measure blood pressure more accurately  HPI    This 77 y o  female  has a much better energy level on Entresto but is having frequent episodes of lightheadedness and some correlation with low blood pressure readings  At a physical therapy session on 02/25/2019 her initial blood pressure was 80/57 with follow-up blood pressures of 93/59 after leg lifts, 1 108/65 after abdominal binder and 99/63 after walking to and from lobby with abdominal binder  The patient has been holding carvedilol for the past week or so  She denies any cardiopulmonary symptoms        Review of Systems    All other systems negative, except as noted in history of present illness    Historical Information   Past Medical History:   Diagnosis Date    Anal fissure     Arthritis     osteo joints    Asthma with acute exacerbation     Blepharitis     Breast lump     Chalazion     CHF, chronic (HCC)     Difficulty walking     Disease of thyroid gland     Drooling     Dysphagia     Fall 05/04/2018    Fibromyalgia, primary     History of transfusion 1996    Hordeolum externum     Hx of transient ischemic attack (TIA)     Hypophonia     Infectious viral hepatitis     Hep C dx 1996     Lyme carditis     Murmur, cardiac     Parkinsons ([address] )     Rotator cuff tendinitis     Seasonal allergies     Urinary frequency      Past Surgical History:   Procedure Laterality Date    BREAST BIOPSY Left 2018    benign    BREAST SURGERY N/A     benign, calcium    CARDIAC SURGERY      mitral valve replacement    CATARACT EXTRACTION Right 2015    CATARACT EXTRACTION Left 2014    CHEST TUBE INSERTION Right     post pleural effusion    CHOLECYSTECTOMY  2000    lap    HYSTERECTOMY  1973    partial    WI COLSC FLX W/RMVL OF TUMOR POLYP LESION SNARE TQ N/A 2017    Procedure: COLONOSCOPY and biopsy ;  Surgeon: Alvaro Kohli MD;  Location: [facility]; Service: Gastroenterology    SKIN BIOPSY      pre cancerous on face    TONSILLECTOMY      US GUIDED BREAST BIOPSY RIGHT COMPLETE Right 2018     Social History     Substance and Sexual Activity   Alcohol Use No     Social History     Substance and Sexual Activity   Drug Use No     Social History     Tobacco Use   Smoking Status Former Smoker    Packs/day: 0 10    Years: 10 00    Pack years: 1 00    Last attempt to quit: Halina Martinez Years since quittin 1   Smokeless Tobacco Never Used       Family History:  Family History   Problem Relation Age of Onset    Heart disease Mother         murmur Cardiomegaly age 64    Pneumonia Father     Heart disease Brother         mitrsl valve    Parkinsonism Brother     Arthritis Brother     Lupus Daughter     Diabetes Daughter     Depression Daughter          Meds/Allergies     Prior to Admission medications    Medication Sig Start Date End Date Taking?  Authorizing Provider   carbidopa-levodopa-entacapone (STALEVO) -200 MG per tablet Take 1 tablet by mouth 4 (four) times a day 19  Yes Rach Harkins MD   Cholecalciferol (VITAMIN D3) 2000 units TABS Take 2,000 Units by mouth every morning   Yes Historical Provider, MD   donepezil (ARICEPT) 10 mg tablet Take 10 mg by mouth daily at bedtime     Yes Historical Provider, MD   furosemide (LASIX) 20 mg tablet take 1 tablet by mouth ON , FRIDAY AND 18  Yes Sebastian Mai MD   levothyroxine 25 mcg tablet take 1 tablet by mouth every morning 18  Yes Ken Corea MD   memantine [facility]) 10 mg tablet take 1 tablet by mouth once daily 19  Yes Haily Montilla MD   Multiple Vitamins-Minerals (OCUVITE ADULT 50+ PO) Take by mouth daily with breakfast   Yes Historical Provider, MD   primidone (MYSOLINE) 50 mg tablet TAKE 1/2 TABLET BY MOUTH DAILY 12/3/18  Yes Haily Montilla MD   sacubitril-valsartan (ENTRESTO)  MG TABS Take 1 tablet by mouth 2 (two) times a day 19  Yes Sebastian Mai MD   warfarin (COUMADIN) 2 5 mg tablet TAKE 1 TABLET DAILY BY MOUTH OR AS ORDERED BY PHYSICIAN  Patient taking differently: Take 2 5 mg by mouth daily TAKE 1 TABLET DAILY BY MOUTH OR AS ORDERED BY PHYSICIAN  18  Yes Sebastian Mai MD   sacubitril-valsartan (ENTRESTO)  MG TABS Take 1 tablet by mouth 2 (two) times a day for 10 days 19 Yes Sebastian Mai MD   spironolactone (ALDACTONE) 25 mg tablet TAKE 1/2 TABLET BY MOUTH ONCE DAILY 19 Yes Sebastian Mai MD   warfarin (COUMADIN) 2 mg tablet Take by mouth daily [place], [place], and [place]     Historical Provider, MD   carbidopa-levodopa (SINEMET)  mg per tablet Take 1 tablet by mouth 4 (four) times a day  3/2/18  Historical Provider, MD   carvedilol (COREG) 3 125 mg tablet take 1 tablet by mouth twice a day with meals  Patient not taking: Reported on 2019  Sebastian Mai MD       No Known Allergies      Vitals:    19 1300   BP: 92/54   BP Location: Left arm   Patient Position: Sitting   Cuff Size: Standard   Pulse: 69   SpO2: 99%   Weight: 83 5 kg (184 lb)   Height: 5' 4 5" (1 638 m)       Body mass index is 31 1 kg/m²    4 pound weight gain in 1 month  End of exam right upper extremity manual blood pressure with standard cuff, standin/73    Physical Exam:    General Appearance:  Alert, cooperative, no distress, appears stated age   Head:  Normocephalic, without obvious abnormality, atraumatic   Eyes:  PERRL, conjunctiva/corneas clear, EOM's intact,   both eyes   Ears:  Normal TM's and external ear canals, both ears   Nose: Nares normal, septum midline, mucosa normal, no drainage or sinus tenderness   Throat: Lips, mucosa, and tongue normal; teeth and gums normal   Neck: Supple, symmetrical, trachea midline, no adenopathy, thyroid: not enlarged, symmetric, no tenderness/mass/nodules, no carotid bruit or JVD   Back:   Symmetric, no curvature, ROM normal, no CVA tenderness   Lungs:   Clear to auscultation bilaterally, respirations unlabored   Chest Wall:  No tenderness or deformity   Heart:  Irregularly irregular cardiac rhythm, S1, S2 normal, no murmur, rub or gallop; prosthetic mitral valve closure sounds heard on a consistent basis  Abdomen:   Soft, non-tender, bowel sounds active all four quadrants,  no masses, no organomegaly   Extremities: Extremities normal, atraumatic, no cyanosis or edema   Pulses: 2+ and symmetric   Skin: Skin showed normal color, texture, turgor and no rashes or lesions   Lymph nodes: Cervical, supraclavicular, and axillary nodes normal   Neurologic: Normal         Cardiographics    ECG  :  Not applicable    Imaging    Chest X-Ray :  Xr Chest Pa & Lateral    Result Date: 3/20/2018  Impression No acute cardiopulmonary disease   Workstation performed: [id number]             Lab Review       Lab Results   Component Value Date    SODIUM 138 02/15/2019    K 4 2 02/15/2019     02/15/2019    CO2 25 02/15/2019    ANIONGAP 9 8 (L) 12/07/2015    BUN 22 02/15/2019    CREATININE 1 05 02/15/2019    GLUCOSE 91 12/07/2015    GLUF 105 (H) 02/15/2019    CALCIUM 9 6 02/15/2019    AST 14 12/31/2018    ALT 11 (L) 12/31/2018    ALKPHOS 94 12/31/2018    PROT 6 9 12/07/2015    BILITOT 1 6 (H) 12/07/2015    EGFR 51 02/15/2019       Lab Results Component Value Date    CHOLESTEROL 148 10/08/2018    CHOLESTEROL 151 03/15/2018    CHOLESTEROL 163 03/23/2017     Lab Results   Component Value Date    HDL 50 10/08/2018    HDL 56 03/15/2018    HDL 60 03/23/2017     No results found for: LDLCHOLEST  Lab Results   Component Value Date    LDLCALC 66 10/08/2018    LDLCALC 70 03/15/2018    LDLCALC 78 03/23/2017     No components found for: [facility]  Lab Results   Component Value Date    TRIG 162 (H) 10/08/2018    TRIG 127 03/15/2018    TRIG 124 03/23/2017         Lab Results   Component Value Date    GLUCOSE 91 12/07/2015    CALCIUM 9 6 02/15/2019     12/07/2015    K 4 2 02/15/2019    CO2 25 02/15/2019     02/15/2019    BUN 22 02/15/2019    CREATININE 1 05 02/15/2019           Capo Jaquez MD

## 2019-02-28 NOTE — PATIENT INSTRUCTIONS
1  Discontinue spironolactone  2  Stay off carvedilol  3  Continue Entresto 97/103 milligrams twice a day with 2 week supply and refills sent to local pharmacy  4  Consider decrease in dose of Entresto to 49-51 milligrams b i d  If the above measures do not resolve the lightheadedness  5  Recommended measuring blood pressure with an anaeroid sphygmomanometer and stethoscope, using arm elevation, clenching and unclenching of fist, pumping cuff up to 250 mmHg and then lowering arm to heart level  to measure blood pressure more accurately

## 2019-03-04 ENCOUNTER — APPOINTMENT (OUTPATIENT)
Dept: PHYSICAL THERAPY | Facility: CLINIC | Age: 79
End: 2019-03-04
Payer: MEDICARE

## 2019-03-05 ENCOUNTER — OFFICE VISIT (OUTPATIENT)
Dept: PHYSICAL THERAPY | Facility: CLINIC | Age: 79
End: 2019-03-05
Payer: MEDICARE

## 2019-03-05 DIAGNOSIS — G20 PARKINSON DISEASE (HCC): Primary | ICD-10-CM

## 2019-03-05 PROCEDURE — 97110 THERAPEUTIC EXERCISES: CPT | Performed by: PHYSICAL THERAPIST

## 2019-03-05 PROCEDURE — 97530 THERAPEUTIC ACTIVITIES: CPT | Performed by: PHYSICAL THERAPIST

## 2019-03-05 NOTE — PROGRESS NOTES
Daily Note     Today's date: 3/5/2019  Patient name: Stoney Castro  : 1940  MRN: [id number]  Referring provider: Latoya Jeter MD  Dx:   Encounter Diagnosis     ICD-10-CM    1  Parkinson disease ([address] ) [address]        Start Time: 1433  Stop Time: 1514  Total time in clinic (min): 41 minutes  Subjective:  Patient states she visited her cardiologist and they have adjusted her medications  She reports he would like the therapist's to take her blood pressure manually due to her hx of chronic a-fib  Objective: See treatment diary below    -100/55 mmHg L arm, seated start of session (manual)   - 105/55 mmHg L arm, standing at start of session (manual)   -120/55 mmHg L arm, seated at the conclusion of the session (manual)     BIG like Exercises  - Floor to Ceiling: 10 reps each side   - Side to Side Reach: 10 reps each side  - Sit to Stand: 10 reps     - Forward Step with 6" rukhsana: 10 reps each side   - Side Step with 6" rukhsana: 10 reps each side   - Backward Step 10 reps each side   - Big twist 10 reps each     -Big stepping 100 ft continuous with SBG-CGA for safety     Assessment: Patient was able to tolerate progression of using a rukhsana with BIG like exercises today! She continues to require intermittment rest breaks between exercises due to fatigue  She was able to complete big walking for 100 ft continuously today with excellent reciprocal arm swing  Patient stated that she felt momentum helped to keep her going  No complaints at the conclusion of session  Patient will continue to benefit from skilled outpatient PT services to improve her strength, balance, and mobility in order to maximize her function  Plan: Continue per plan of care  Continue to progress per patient is able

## 2019-03-07 ENCOUNTER — OFFICE VISIT (OUTPATIENT)
Dept: PHYSICAL THERAPY | Facility: CLINIC | Age: 79
End: 2019-03-07
Payer: MEDICARE

## 2019-03-07 DIAGNOSIS — G20 PARKINSON DISEASE (HCC): Primary | ICD-10-CM

## 2019-03-07 PROCEDURE — 97110 THERAPEUTIC EXERCISES: CPT | Performed by: PHYSICAL THERAPIST

## 2019-03-07 PROCEDURE — 97112 NEUROMUSCULAR REEDUCATION: CPT | Performed by: PHYSICAL THERAPIST

## 2019-03-07 NOTE — PROGRESS NOTES
Daily Note     Today's date: 3/7/2019  Patient name: Delroy hWatley  : 1940  MRN: [id number]  Referring provider: Wilner Mann MD  Dx:   Encounter Diagnosis     ICD-10-CM    1  Parkinson disease ([facility] [address] ) Yamile Singh        Start Time: 1103  Stop Time: 1146  Total time in clinic (min): 43 minutes     Subjective: Patient states she felt well after last session  No complaints of lightheadedness  Objective: See treatment diary below    -100/50 mmHg L arm, seated start of session (manual)   - 101/57 mmHg L arm, standing at start of session (manual)   -108/57 mmHg L arm, seated at the conclusion of the session (manual)     BIG like Exercises  - Floor to Ceiling: 10 reps each side   - Side to Side Reach: 10 reps each side  - Sit to Stand: 10 reps     - Forward Step with 6" rukhsana: 10 reps each side   - Side Step with 6" rukhsana: 10 reps each side   - Backward Step 10 reps each side       -Big stepping 18 ftx4 continuous with 1# at wrists and therapist assisting with canes to assist with reciprocal arm swing     Assessment: Patient did well with her session today and tolerated increased time in standing position with limited reports of lightheadedness  She did report feeling slightly more off-balance then previously  Trialed reciprocal arm swing with #1 weights at wrists with big steps however patient had difficulty coordinating opposite arm and leg  Benefited from utilization of canes and therapist aiding with sequencing of arm swing  No complaints at the conclusion of session  Patient will continue to benefit from skilled outpatient PT services to improve her strength, balance, and mobility in order to maximize her function  Plan: Continue per plan of care  Trial more standing breaks next visit instead of seated

## 2019-03-12 ENCOUNTER — APPOINTMENT (OUTPATIENT)
Dept: PHYSICAL THERAPY | Facility: CLINIC | Age: 79
End: 2019-03-12
Payer: MEDICARE

## 2019-03-14 ENCOUNTER — OFFICE VISIT (OUTPATIENT)
Dept: PHYSICAL THERAPY | Facility: CLINIC | Age: 79
End: 2019-03-14
Payer: MEDICARE

## 2019-03-14 DIAGNOSIS — G20 PARKINSON DISEASE (HCC): Primary | ICD-10-CM

## 2019-03-14 PROCEDURE — 97110 THERAPEUTIC EXERCISES: CPT | Performed by: PHYSICAL THERAPIST

## 2019-03-14 PROCEDURE — 97112 NEUROMUSCULAR REEDUCATION: CPT | Performed by: PHYSICAL THERAPIST

## 2019-03-14 NOTE — PROGRESS NOTES
Daily Note     Today's date: 3/14/2019  Patient name: Anais Pradhan  : 1940  MRN: [id number]  Referring provider: Alisha Alicea MD  Dx:   Encounter Diagnosis     ICD-10-CM    1  Parkinson disease ([facility] [address] ) G20      Start Time: 1100  Stop Time: 1143  Total time in clinic (min): 43 minutes    Subjective: Patient states she is feeling better but has spend the past few days seated more then usual  Patient expressed that she would like to increase her endurance with walking and stair negotiation tolerance to prepare for her trip to [place] in August      Objective: See treatment diary below    -109/55 mmHg L arm, seated start of session (manual)   - 110/55 mmHg L arm, standing at start of session (manual)   -115/57 mmHg L arm, seated at the conclusion of the session (manual)     BIG like Exercises  - Floor to Ceiling: 10 reps each side   - Side to Side Reach: 10 reps each side  - Sit to Stand: 10 reps     - Forward Step with 6" rukhsana: 10 reps each side   - Side Step with 6" rukhsana: 10 reps each side   - Backward Step 10 reps each side     -Stair negotiation up with Big like Stepping (reciprocal arm swing) 2x3 cycles   -Big stepping across agility ladder x6 cycles; heavy verbal cues for sequencing reciprocal arm swing   -Cone stacking with trunk rotation x9 R to L x9 L to R; trialed standing on foam however regressed to firm    Assessment:   Patient did great during her session today and continues to tolerate increased time with standing exercises  No reports of light-headedness but minor complaints of feeling off-balance  Continues to require heavy verbal cues for reciprocal arm swing with walking however no cues required for stepping up stairs  Good trunk rotation and balance noted with cone stacking activity  Patient will continue to benefit from skilled outpatient PT services to improve her strength, balance, and mobility in order to maximize her function  Plan: Continue per plan of care   Continue more standing breaks next visit instead of seated

## 2019-03-19 ENCOUNTER — OFFICE VISIT (OUTPATIENT)
Dept: PHYSICAL THERAPY | Facility: CLINIC | Age: 79
End: 2019-03-19
Payer: MEDICARE

## 2019-03-19 ENCOUNTER — TRANSCRIBE ORDERS (OUTPATIENT)
Dept: ADMINISTRATIVE | Facility: HOSPITAL | Age: 79
End: 2019-03-19

## 2019-03-19 ENCOUNTER — APPOINTMENT (OUTPATIENT)
Dept: LAB | Facility: CLINIC | Age: 79
End: 2019-03-19
Payer: MEDICARE

## 2019-03-19 ENCOUNTER — ANTICOAG VISIT (OUTPATIENT)
Dept: CARDIOLOGY CLINIC | Facility: CLINIC | Age: 79
End: 2019-03-19

## 2019-03-19 DIAGNOSIS — G20 PARKINSON DISEASE (HCC): Primary | ICD-10-CM

## 2019-03-19 DIAGNOSIS — I48.91 ATRIAL FIBRILLATION, UNSPECIFIED TYPE (HCC): ICD-10-CM

## 2019-03-19 DIAGNOSIS — Z95.2 H/O MITRAL VALVE REPLACEMENT WITH MECHANICAL VALVE: ICD-10-CM

## 2019-03-19 PROCEDURE — 97112 NEUROMUSCULAR REEDUCATION: CPT | Performed by: PHYSICAL THERAPIST

## 2019-03-19 PROCEDURE — G8979 MOBILITY GOAL STATUS: HCPCS | Performed by: PHYSICAL THERAPIST

## 2019-03-19 PROCEDURE — G8978 MOBILITY CURRENT STATUS: HCPCS | Performed by: PHYSICAL THERAPIST

## 2019-03-19 PROCEDURE — 97110 THERAPEUTIC EXERCISES: CPT | Performed by: PHYSICAL THERAPIST

## 2019-03-19 PROCEDURE — 97164 PT RE-EVAL EST PLAN CARE: CPT | Performed by: PHYSICAL THERAPIST

## 2019-03-19 NOTE — PROGRESS NOTES
PT Re-Evaluation     Today's date: 3/19/2019  Patient name: Ghislaine Mariee  : 1940  MRN: [id number]  Referring provider: Blue Machado MD  Dx:   Encounter Diagnosis     ICD-10-CM    1  Parkinson disease ([facility] [address] ) G20                   Assessment  Assessment details: Patient is a 77 y o  female who presents to skilled PT for with gait and balance deficits secondary to diagnosis of Parkinsons  Patient displays increase LE muscle strength with overall grade of 4-/5  Patient balance scores are as follows: 37/56 LAUREANO, 12 seconds TUG with SPC, 10 Meter walk test 3 3ft/sec with SPC with overall results of higher risk for falls per LAUREANO and Gait speed, low risk per TUG  Patient endurance scores are as follows; 650 feet with 6 MWT, 19 seconds with 5 x sit to stand test with overall results noting decrease functional endurance and cardio capacity per 6 minute walk test and 5 x sit to stand test but measure improvement  Patient subjective continues to note challenge with longer duration amb and dynamic balance  Patient will continue to benefit from skilled PT to enhance functional mobility to highest level possible  Good recalls with current trial of LSVT exercises but unable to tolerate full program   Impairments: abnormal gait, activity intolerance, impaired balance and impaired physical strength  Understanding of Dx/Px/POC: excellent   Prognosis: good    Goals  ST weeks  1  Patient will improve her overall LE strength from 3+/5 to to 4/5  Partly MET   2  Patient will improve her overall LAUREANO score from 25/56 to 31/56  MET   3  Patient will improve her TUG score from 27 94 seconds to 27 35 seconds  MET   4  Patient will improve her 5 x Sit to Stand score from 45 seconds to 42 7 seconds  MET     LT weeks  1  Patient will be independent with her HEP  MET  2  Patient will be able to independently put her shoes on in the morning    3  Patient will be able to walk 660 feet during 6 Minute Walk Test to be able to walk 1 block MET  Plan  Patient would benefit from: PT eval and skilled physical therapy  Planned therapy interventions: abdominal trunk stabilization, balance, gait training, home exercise program, therapeutic exercise, therapeutic activities, strengthening, stretching, patient education, postural training, neuromuscular re-education and manual therapy  Frequency: 2x week  Plan of Care beginning date: 3/19/2019  Plan of Care expiration date: 2019  Treatment plan discussed with: patient        Subjective Evaluation    History of Present Illness  Mechanism of injury: Patient is a 77 y o  female who arrives to outpatient PT with a diagnosis of Parkinson's  Patient notes that she is beginning to shuffle and takes more hesitant steps  She reports that she has Congestive Heart Disease that is being treated with Entresto    Pain  No pain reported    Social Support  Steps to enter house: no  Stairs in house: no   Lives in: condominium  Lives with: spouse (Temporarily adult daughter)    Employment status: not working  Treatments  No previous or current treatments  Patient Goals  Patient goals for therapy: increased strength, improved balance, increased motion and independence with ADLs/IADLs          Objective     Static Posture     Comments    LAUREANO/56    IL: 37/56    Strength/Myotome Testing     Left Hip   Planes of Motion   Flexion: 3+  Extension: 3+  Abduction: 3+  Adduction: 3+    Right Hip   Planes of Motion   Flexion: 3+  Extension: 3+  Abduction: 3+  Adduction: 3+    Left Knee   Flexion: 4-  Prone flexion: 4-    Right Knee   Flexion: 4-  Prone flexion: 4    Left Ankle/Foot   Dorsiflexion: 3+  Plantar flexion: 3+    Right Ankle/Foot   Dorsiflexion: 3+  Plantar flexion: 3+    Ambulation     Comments   Dynamic Endurance Testing    2 Minute Walk Test  174 feet (with SPC)    Rollator: 650 feet     Dynamic Balance/Falls Risk Testing    TUG  27 94 seconds (with SPC)    IL: 12 seconds     10 Meter Walk Test  33 ft/19 2 sec = 1 72 ft/sec (with SPC)    PA: 33/10 seconds= 3 3 ft    Gait Deviations: shuffling gait, freezing episodes  Decrease stride length, step height and [place]  Functional Assessment        Comments  Endurance:  5x Sit to Stand:45 seconds    PA:  19 seconds           Precautions:  has a past medical history of Anal fissure, Arthritis, Asthma with acute exacerbation, Blepharitis, Breast lump, Chalazion, CHF, chronic ([facility] [address] ), Difficulty walking, Disease of thyroid gland, Drooling, Dysphagia, Fall (05/04/2018), Fibromyalgia, primary, History of transfusion (1996), Hordeolum externum, transient ischemic attack (TIA), Hypophonia, Infectious viral hepatitis, Lyme carditis, Murmur, cardiac, Parkinsons (HCC), Rotator cuff tendinitis, Seasonal allergies, and Urinary frequency      Daily Treatment Diary     BIG like Exercises  - Floor to Ceiling: 10 reps each side   - Side to Side Reach: 10 reps each side  - Sit to Stand: 10 reps     - Forward Step with 6" rukhsana: 10 reps each side   - Side Step with 6" rukhsana: 10 reps each side   - Backward Step 10 reps each side

## 2019-03-20 DIAGNOSIS — G30.9 ALZHEIMER'S DEMENTIA WITHOUT BEHAVIORAL DISTURBANCE, UNSPECIFIED TIMING OF DEMENTIA ONSET (HCC): Primary | ICD-10-CM

## 2019-03-20 DIAGNOSIS — F02.80 ALZHEIMER'S DEMENTIA WITHOUT BEHAVIORAL DISTURBANCE, UNSPECIFIED TIMING OF DEMENTIA ONSET (HCC): Primary | ICD-10-CM

## 2019-03-20 RX ORDER — DONEPEZIL HYDROCHLORIDE 5 MG/1
5 TABLET, FILM COATED ORAL
Qty: 90 TABLET | Refills: 1 | Status: SHIPPED | OUTPATIENT
Start: 2019-03-20 | End: 2019-09-12 | Stop reason: SDUPTHER

## 2019-03-21 ENCOUNTER — OFFICE VISIT (OUTPATIENT)
Dept: PHYSICAL THERAPY | Facility: CLINIC | Age: 79
End: 2019-03-21
Payer: MEDICARE

## 2019-03-21 DIAGNOSIS — G20 PARKINSON DISEASE (HCC): Primary | ICD-10-CM

## 2019-03-21 PROCEDURE — 97112 NEUROMUSCULAR REEDUCATION: CPT

## 2019-03-21 PROCEDURE — 97530 THERAPEUTIC ACTIVITIES: CPT

## 2019-03-21 NOTE — PROGRESS NOTES
Daily Note     Today's date: 3/21/2019  Patient name: Josselyn Moulton  : 1940  MRN: [id number]  Referring provider: Tati Bond MD  Dx:   Encounter Diagnosis     ICD-10-CM    1  Parkinson disease ([facility] [address] ) Dre Members      Start Time:   Stop Time: 161  Total time in clinic (min): 45 minutes    Subjective: Patient states she is feeling better but has spend the past few days seated more then usual  Patient expressed that she would like to increase her endurance with walking and stair negotiation tolerance to prepare for her trip to [place] in August      Objective: See treatment diary below    -110/62 mmHg L arm, seated start of session (manual), beginning of session  -115/57 mmHg L arm, seated at the conclusion of the session (manual)     BIG like Exercises  - Floor to Ceiling: 10 reps each side, 10 second hold   - Side to Side Reach: 10 reps each side, 10 second hold  - Sit to Stand: 10 reps     - Forward Step with 9" rukhsana: 10 reps each side   - Side Step with 9" rukhsana: 10 reps each side   - Backward Step with 9" rukhsana: 10 reps each side     -Stair negotiation up with Large amplitude stepping, reciprocal arm movement, 2 minutes on, 2 minutes off, 7 cycles, 6 cycles, 6 cycles      Assessment:   Patient did great during her session today, continues to present with fatigue during sessions and requires seated rest breaks  Patient attempted interval training for large amplitude walking today and ascending and descending steps today, patient challenged with endurance today, noted consistent performance with activities today  Patient improved but also had challenges with stepping over rukhsana due to decreases in step height and amplitude  Patient will continue to benefit from skilled outpatient PT services to improve her strength, balance, and mobility in order to maximize her function  Plan: Continue per plan of care  Continue more standing breaks next visit instead of seated

## 2019-03-26 ENCOUNTER — OFFICE VISIT (OUTPATIENT)
Dept: PHYSICAL THERAPY | Facility: CLINIC | Age: 79
End: 2019-03-26
Payer: MEDICARE

## 2019-03-26 DIAGNOSIS — G20 PARKINSON DISEASE (HCC): Primary | ICD-10-CM

## 2019-03-26 PROCEDURE — 97110 THERAPEUTIC EXERCISES: CPT | Performed by: PHYSICAL THERAPIST

## 2019-03-26 PROCEDURE — 97112 NEUROMUSCULAR REEDUCATION: CPT | Performed by: PHYSICAL THERAPIST

## 2019-03-26 NOTE — PROGRESS NOTES
Daily Note     Today's date: 3/26/2019  Patient name: Jakob Reeder  : 1940  MRN: [id number]  Referring provider: Aziza Farah MD  Dx:   Encounter Diagnosis     ICD-10-CM    1  Parkinson disease ([facility] [address] ) G20                 Subjective: Patient states here dizziness is better and feels it's not an issue  Wants to improve standing endurance and mobility due to trip in Aug to [place]        Objective: See treatment diary below    -111/63 mmHg L arm, seated start of session (manual), beginning of session  -113/59 mmHg L arm, seated at the conclusion of the session (manual)        PWR Core Four movements- Standing  -PWR Up- 5 reps   -PWR Rock- 5 reps bilaterally   -PWR Twist- 5 reps bilaterally   -PWR Step- 5 reps bilaterally with drumming on floor with cones     Flow: PWR Up, PWR Rock, PWR Twist, PWR Step 3 in a row with cone hitting     PWR Core Four movements- seated:  PWR Up- 5 reps   PWR Rock- 5 reps BL   PWR Twist - 5 BL  PWR Step - 5 reps BL with drumming on floor with cones     Flow: PWR Up, Rock Twist and step 3 in a row with cone hitting     Rocking and swaying with towel toss with right to left and left to right  10 feet 5 cycles       Assessment:   Patient continues to tolerate progression in POC with attempting PWR program today  Patient demonstrated an increase in amplitude in standing today as well as with cuing  Trunk rotation limited today in all positions  patient requires continuous cuing to improve posture as well as stepping amplitude  challenged with towel grab with rocking/swaying with loss of balance 2 times  Patient would benefit from continued skilled therapy to progress LSVT BIG and PWR program and progress balance and coordination during functional tasks      Plan: Continue per plan of care  Continue more standing breaks next visit instead of seated

## 2019-04-01 ENCOUNTER — OFFICE VISIT (OUTPATIENT)
Dept: PHYSICAL THERAPY | Facility: CLINIC | Age: 79
End: 2019-04-01
Payer: MEDICARE

## 2019-04-01 DIAGNOSIS — G20 PARKINSON DISEASE (HCC): Primary | ICD-10-CM

## 2019-04-01 PROCEDURE — 97110 THERAPEUTIC EXERCISES: CPT | Performed by: PHYSICAL THERAPIST

## 2019-04-01 PROCEDURE — 97112 NEUROMUSCULAR REEDUCATION: CPT | Performed by: PHYSICAL THERAPIST

## 2019-04-04 ENCOUNTER — OFFICE VISIT (OUTPATIENT)
Dept: PHYSICAL THERAPY | Facility: CLINIC | Age: 79
End: 2019-04-04
Payer: MEDICARE

## 2019-04-04 DIAGNOSIS — G20 PARKINSON DISEASE (HCC): Primary | ICD-10-CM

## 2019-04-04 PROCEDURE — 97110 THERAPEUTIC EXERCISES: CPT | Performed by: PHYSICAL THERAPIST

## 2019-04-04 PROCEDURE — 97112 NEUROMUSCULAR REEDUCATION: CPT | Performed by: PHYSICAL THERAPIST

## 2019-04-05 ENCOUNTER — OFFICE VISIT (OUTPATIENT)
Dept: CARDIOLOGY CLINIC | Facility: CLINIC | Age: 79
End: 2019-04-05
Payer: MEDICARE

## 2019-04-05 VITALS
SYSTOLIC BLOOD PRESSURE: 108 MMHG | DIASTOLIC BLOOD PRESSURE: 64 MMHG | WEIGHT: 186 LBS | HEIGHT: 65 IN | BODY MASS INDEX: 30.99 KG/M2 | OXYGEN SATURATION: 98 % | HEART RATE: 80 BPM

## 2019-04-05 DIAGNOSIS — I48.20 ATRIAL FIBRILLATION, CHRONIC (HCC): ICD-10-CM

## 2019-04-05 DIAGNOSIS — I95.1 ORTHOSTATIC HYPOTENSION: ICD-10-CM

## 2019-04-05 DIAGNOSIS — E03.9 ACQUIRED HYPOTHYROIDISM: ICD-10-CM

## 2019-04-05 DIAGNOSIS — Z95.2 HISTORY OF MITRAL VALVE REPLACEMENT WITH MECHANICAL VALVE: ICD-10-CM

## 2019-04-05 DIAGNOSIS — R53.82 CHRONIC FATIGUE AND MALAISE: ICD-10-CM

## 2019-04-05 DIAGNOSIS — I50.42 CHRONIC COMBINED SYSTOLIC AND DIASTOLIC HEART FAILURE (HCC): Primary | ICD-10-CM

## 2019-04-05 DIAGNOSIS — E78.2 MIXED DYSLIPIDEMIA: ICD-10-CM

## 2019-04-05 DIAGNOSIS — I44.7 LEFT BUNDLE BRANCH BLOCK (LBBB): ICD-10-CM

## 2019-04-05 DIAGNOSIS — I42.0 NONISCHEMIC DILATED CARDIOMYOPATHY (HCC): ICD-10-CM

## 2019-04-05 DIAGNOSIS — I49.3 ASYMPTOMATIC PVCS: ICD-10-CM

## 2019-04-05 DIAGNOSIS — G47.33 OBSTRUCTIVE SLEEP APNEA: ICD-10-CM

## 2019-04-05 DIAGNOSIS — Z79.01 ON CONTINUOUS ORAL ANTICOAGULATION: ICD-10-CM

## 2019-04-05 DIAGNOSIS — G20 PARKINSON'S DISEASE (HCC): ICD-10-CM

## 2019-04-05 DIAGNOSIS — R53.81 CHRONIC FATIGUE AND MALAISE: ICD-10-CM

## 2019-04-05 PROCEDURE — 99214 OFFICE O/P EST MOD 30 MIN: CPT | Performed by: INTERNAL MEDICINE

## 2019-04-08 ENCOUNTER — OFFICE VISIT (OUTPATIENT)
Dept: PHYSICAL THERAPY | Facility: CLINIC | Age: 79
End: 2019-04-08
Payer: MEDICARE

## 2019-04-08 DIAGNOSIS — G20 PARKINSON DISEASE (HCC): Primary | ICD-10-CM

## 2019-04-08 PROCEDURE — 97110 THERAPEUTIC EXERCISES: CPT | Performed by: PHYSICAL THERAPIST

## 2019-04-08 PROCEDURE — 97112 NEUROMUSCULAR REEDUCATION: CPT | Performed by: PHYSICAL THERAPIST

## 2019-04-11 ENCOUNTER — OFFICE VISIT (OUTPATIENT)
Dept: PHYSICAL THERAPY | Facility: CLINIC | Age: 79
End: 2019-04-11
Payer: MEDICARE

## 2019-04-11 DIAGNOSIS — G20 PARKINSON DISEASE (HCC): Primary | ICD-10-CM

## 2019-04-11 PROCEDURE — 97116 GAIT TRAINING THERAPY: CPT

## 2019-04-11 PROCEDURE — 97112 NEUROMUSCULAR REEDUCATION: CPT

## 2019-04-11 PROCEDURE — 97110 THERAPEUTIC EXERCISES: CPT

## 2019-04-12 ENCOUNTER — APPOINTMENT (OUTPATIENT)
Dept: LAB | Facility: CLINIC | Age: 79
End: 2019-04-12
Payer: MEDICARE

## 2019-04-15 ENCOUNTER — OFFICE VISIT (OUTPATIENT)
Dept: PHYSICAL THERAPY | Facility: CLINIC | Age: 79
End: 2019-04-15
Payer: MEDICARE

## 2019-04-15 ENCOUNTER — ANTICOAG VISIT (OUTPATIENT)
Dept: CARDIOLOGY CLINIC | Facility: CLINIC | Age: 79
End: 2019-04-15

## 2019-04-15 DIAGNOSIS — G20 PARKINSON DISEASE (HCC): Primary | ICD-10-CM

## 2019-04-15 DIAGNOSIS — I48.91 ATRIAL FIBRILLATION, UNSPECIFIED TYPE (HCC): ICD-10-CM

## 2019-04-15 DIAGNOSIS — Z95.2 H/O MITRAL VALVE REPLACEMENT WITH MECHANICAL VALVE: ICD-10-CM

## 2019-04-15 PROCEDURE — 97112 NEUROMUSCULAR REEDUCATION: CPT

## 2019-04-18 ENCOUNTER — OFFICE VISIT (OUTPATIENT)
Dept: PHYSICAL THERAPY | Facility: CLINIC | Age: 79
End: 2019-04-18
Payer: MEDICARE

## 2019-04-18 DIAGNOSIS — G20 PARKINSON DISEASE (HCC): Primary | ICD-10-CM

## 2019-04-18 PROCEDURE — 97112 NEUROMUSCULAR REEDUCATION: CPT

## 2019-04-22 ENCOUNTER — OFFICE VISIT (OUTPATIENT)
Dept: PHYSICAL THERAPY | Facility: CLINIC | Age: 79
End: 2019-04-22
Payer: MEDICARE

## 2019-04-22 DIAGNOSIS — G20 PARKINSON DISEASE (HCC): Primary | ICD-10-CM

## 2019-04-22 PROCEDURE — G8978 MOBILITY CURRENT STATUS: HCPCS

## 2019-04-22 PROCEDURE — G8979 MOBILITY GOAL STATUS: HCPCS

## 2019-04-22 PROCEDURE — 97164 PT RE-EVAL EST PLAN CARE: CPT

## 2019-04-24 ENCOUNTER — OFFICE VISIT (OUTPATIENT)
Dept: PODIATRY | Facility: CLINIC | Age: 79
End: 2019-04-24
Payer: MEDICARE

## 2019-04-24 VITALS — SYSTOLIC BLOOD PRESSURE: 116 MMHG | RESPIRATION RATE: 16 BRPM | HEART RATE: 57 BPM | DIASTOLIC BLOOD PRESSURE: 77 MMHG

## 2019-04-24 DIAGNOSIS — I70.213 ATHEROSCLEROSIS OF NATIVE ARTERY OF BOTH LOWER EXTREMITIES WITH INTERMITTENT CLAUDICATION (HCC): ICD-10-CM

## 2019-04-24 DIAGNOSIS — B35.1 ONYCHOMYCOSIS: Primary | ICD-10-CM

## 2019-04-24 DIAGNOSIS — M79.672 PAIN IN BOTH FEET: ICD-10-CM

## 2019-04-24 DIAGNOSIS — M79.671 PAIN IN BOTH FEET: ICD-10-CM

## 2019-04-24 PROCEDURE — 11721 DEBRIDE NAIL 6 OR MORE: CPT | Performed by: PODIATRIST

## 2019-04-25 ENCOUNTER — OFFICE VISIT (OUTPATIENT)
Dept: PHYSICAL THERAPY | Facility: CLINIC | Age: 79
End: 2019-04-25
Payer: MEDICARE

## 2019-04-25 DIAGNOSIS — G20 PARKINSON DISEASE (HCC): Primary | ICD-10-CM

## 2019-04-25 PROCEDURE — 97112 NEUROMUSCULAR REEDUCATION: CPT

## 2019-04-25 PROCEDURE — 97530 THERAPEUTIC ACTIVITIES: CPT

## 2019-04-25 PROCEDURE — 97110 THERAPEUTIC EXERCISES: CPT

## 2019-04-26 ENCOUNTER — ANTICOAG VISIT (OUTPATIENT)
Dept: CARDIOLOGY CLINIC | Facility: CLINIC | Age: 79
End: 2019-04-26

## 2019-04-26 ENCOUNTER — APPOINTMENT (OUTPATIENT)
Dept: LAB | Facility: CLINIC | Age: 79
End: 2019-04-26
Payer: MEDICARE

## 2019-04-26 DIAGNOSIS — Z95.2 H/O MITRAL VALVE REPLACEMENT WITH MECHANICAL VALVE: ICD-10-CM

## 2019-04-26 DIAGNOSIS — I48.91 ATRIAL FIBRILLATION, UNSPECIFIED TYPE (HCC): ICD-10-CM

## 2019-04-29 ENCOUNTER — APPOINTMENT (OUTPATIENT)
Dept: PHYSICAL THERAPY | Facility: CLINIC | Age: 79
End: 2019-04-29
Payer: MEDICARE

## 2019-05-01 DIAGNOSIS — Z79.01 ANTICOAGULATED: ICD-10-CM

## 2019-05-01 DIAGNOSIS — Z95.2 S/P MVR (MITRAL VALVE REPLACEMENT): ICD-10-CM

## 2019-05-01 DIAGNOSIS — I50.42 CHRONIC COMBINED SYSTOLIC AND DIASTOLIC HEART FAILURE (HCC): ICD-10-CM

## 2019-05-01 RX ORDER — WARFARIN SODIUM 2.5 MG/1
TABLET ORAL
Qty: 135 TABLET | Refills: 3 | Status: SHIPPED | OUTPATIENT
Start: 2019-05-01 | End: 2020-01-06 | Stop reason: SDUPTHER

## 2019-05-02 ENCOUNTER — OFFICE VISIT (OUTPATIENT)
Dept: PHYSICAL THERAPY | Facility: CLINIC | Age: 79
End: 2019-05-02
Payer: MEDICARE

## 2019-05-02 DIAGNOSIS — G20 PARKINSON DISEASE (HCC): Primary | ICD-10-CM

## 2019-05-02 PROCEDURE — 97530 THERAPEUTIC ACTIVITIES: CPT

## 2019-05-02 PROCEDURE — 97110 THERAPEUTIC EXERCISES: CPT

## 2019-05-02 PROCEDURE — 97112 NEUROMUSCULAR REEDUCATION: CPT

## 2019-05-07 ENCOUNTER — OFFICE VISIT (OUTPATIENT)
Dept: PHYSICAL THERAPY | Facility: CLINIC | Age: 79
End: 2019-05-07
Payer: MEDICARE

## 2019-05-07 DIAGNOSIS — G20 PARKINSON DISEASE (HCC): Primary | ICD-10-CM

## 2019-05-07 PROCEDURE — 97112 NEUROMUSCULAR REEDUCATION: CPT

## 2019-05-07 PROCEDURE — 97530 THERAPEUTIC ACTIVITIES: CPT

## 2019-05-07 PROCEDURE — 97110 THERAPEUTIC EXERCISES: CPT

## 2019-05-09 ENCOUNTER — OFFICE VISIT (OUTPATIENT)
Dept: OBGYN CLINIC | Facility: CLINIC | Age: 79
End: 2019-05-09
Payer: MEDICARE

## 2019-05-09 ENCOUNTER — OFFICE VISIT (OUTPATIENT)
Dept: PHYSICAL THERAPY | Facility: CLINIC | Age: 79
End: 2019-05-09
Payer: MEDICARE

## 2019-05-09 ENCOUNTER — APPOINTMENT (OUTPATIENT)
Dept: RADIOLOGY | Facility: CLINIC | Age: 79
End: 2019-05-09
Payer: MEDICARE

## 2019-05-09 VITALS
BODY MASS INDEX: 31.16 KG/M2 | HEART RATE: 79 BPM | WEIGHT: 187 LBS | SYSTOLIC BLOOD PRESSURE: 98 MMHG | DIASTOLIC BLOOD PRESSURE: 63 MMHG | HEIGHT: 65 IN

## 2019-05-09 DIAGNOSIS — S83.412A SPRAIN OF MEDIAL COLLATERAL LIGAMENT OF LEFT KNEE, INITIAL ENCOUNTER: ICD-10-CM

## 2019-05-09 DIAGNOSIS — G20 PARKINSON DISEASE (HCC): Primary | ICD-10-CM

## 2019-05-09 DIAGNOSIS — M25.562 LEFT KNEE PAIN, UNSPECIFIED CHRONICITY: ICD-10-CM

## 2019-05-09 DIAGNOSIS — M70.52 PES ANSERINUS BURSITIS OF LEFT KNEE: ICD-10-CM

## 2019-05-09 DIAGNOSIS — M25.562 LEFT KNEE PAIN, UNSPECIFIED CHRONICITY: Primary | ICD-10-CM

## 2019-05-09 DIAGNOSIS — M17.12 PRIMARY OSTEOARTHRITIS OF LEFT KNEE: ICD-10-CM

## 2019-05-09 PROCEDURE — 73562 X-RAY EXAM OF KNEE 3: CPT

## 2019-05-09 PROCEDURE — 99214 OFFICE O/P EST MOD 30 MIN: CPT | Performed by: ORTHOPAEDIC SURGERY

## 2019-05-09 PROCEDURE — 97112 NEUROMUSCULAR REEDUCATION: CPT

## 2019-05-10 ENCOUNTER — ANTICOAG VISIT (OUTPATIENT)
Dept: CARDIOLOGY CLINIC | Facility: CLINIC | Age: 79
End: 2019-05-10

## 2019-05-10 ENCOUNTER — TELEPHONE (OUTPATIENT)
Dept: OBGYN CLINIC | Facility: HOSPITAL | Age: 79
End: 2019-05-10

## 2019-05-10 ENCOUNTER — APPOINTMENT (OUTPATIENT)
Dept: LAB | Facility: CLINIC | Age: 79
End: 2019-05-10
Payer: MEDICARE

## 2019-05-10 DIAGNOSIS — I48.91 ATRIAL FIBRILLATION, UNSPECIFIED TYPE (HCC): ICD-10-CM

## 2019-05-10 DIAGNOSIS — Z95.2 H/O MITRAL VALVE REPLACEMENT WITH MECHANICAL VALVE: ICD-10-CM

## 2019-05-14 ENCOUNTER — APPOINTMENT (OUTPATIENT)
Dept: PHYSICAL THERAPY | Facility: CLINIC | Age: 79
End: 2019-05-14
Payer: MEDICARE

## 2019-05-15 DIAGNOSIS — R25.1 TREMOR: ICD-10-CM

## 2019-05-15 DIAGNOSIS — G20 PARKINSON DISEASE (HCC): ICD-10-CM

## 2019-05-15 RX ORDER — CARBIDOPA, LEVODOPA AND ENTACAPONE 25; 200; 100 MG/1; MG/1; MG/1
TABLET, FILM COATED ORAL
Qty: 120 TABLET | Refills: 0 | Status: SHIPPED | OUTPATIENT
Start: 2019-05-15 | End: 2019-05-16 | Stop reason: SDUPTHER

## 2019-05-15 RX ORDER — PRIMIDONE 50 MG/1
TABLET ORAL
Qty: 45 TABLET | Refills: 0 | Status: SHIPPED | OUTPATIENT
Start: 2019-05-15 | End: 2019-05-16 | Stop reason: SDUPTHER

## 2019-05-16 ENCOUNTER — OFFICE VISIT (OUTPATIENT)
Dept: PHYSICAL THERAPY | Facility: CLINIC | Age: 79
End: 2019-05-16
Payer: MEDICARE

## 2019-05-16 ENCOUNTER — OFFICE VISIT (OUTPATIENT)
Dept: NEUROLOGY | Facility: CLINIC | Age: 79
End: 2019-05-16
Payer: MEDICARE

## 2019-05-16 VITALS
HEART RATE: 70 BPM | HEIGHT: 65 IN | WEIGHT: 187 LBS | SYSTOLIC BLOOD PRESSURE: 112 MMHG | BODY MASS INDEX: 31.16 KG/M2 | DIASTOLIC BLOOD PRESSURE: 62 MMHG

## 2019-05-16 DIAGNOSIS — M25.562 CHRONIC PAIN OF LEFT KNEE: ICD-10-CM

## 2019-05-16 DIAGNOSIS — G25.0 TREMOR, ESSENTIAL: ICD-10-CM

## 2019-05-16 DIAGNOSIS — R25.1 TREMOR: ICD-10-CM

## 2019-05-16 DIAGNOSIS — G47.52 SLEEP BEHAVIOR DISORDER, REM: ICD-10-CM

## 2019-05-16 DIAGNOSIS — G20 PARKINSON DISEASE (HCC): Primary | ICD-10-CM

## 2019-05-16 DIAGNOSIS — G89.29 CHRONIC PAIN OF LEFT KNEE: ICD-10-CM

## 2019-05-16 DIAGNOSIS — G31.84 AMNESTIC MCI (MILD COGNITIVE IMPAIRMENT WITH MEMORY LOSS): ICD-10-CM

## 2019-05-16 PROCEDURE — 99214 OFFICE O/P EST MOD 30 MIN: CPT | Performed by: PSYCHIATRY & NEUROLOGY

## 2019-05-16 PROCEDURE — 97112 NEUROMUSCULAR REEDUCATION: CPT

## 2019-05-16 RX ORDER — CARBIDOPA, LEVODOPA AND ENTACAPONE 25; 200; 100 MG/1; MG/1; MG/1
1 TABLET, FILM COATED ORAL 4 TIMES DAILY
Qty: 120 TABLET | Refills: 3 | Status: SHIPPED | OUTPATIENT
Start: 2019-05-16 | End: 2019-10-18 | Stop reason: SDUPTHER

## 2019-05-16 RX ORDER — PRIMIDONE 50 MG/1
25 TABLET ORAL DAILY
Qty: 45 TABLET | Refills: 1 | Status: SHIPPED | OUTPATIENT
Start: 2019-05-16 | End: 2020-02-06 | Stop reason: SDUPTHER

## 2019-05-20 ENCOUNTER — OFFICE VISIT (OUTPATIENT)
Dept: PHYSICAL THERAPY | Facility: CLINIC | Age: 79
End: 2019-05-20
Payer: MEDICARE

## 2019-05-20 DIAGNOSIS — G20 PARKINSON DISEASE (HCC): Primary | ICD-10-CM

## 2019-05-20 PROCEDURE — 97112 NEUROMUSCULAR REEDUCATION: CPT

## 2019-05-22 ENCOUNTER — OFFICE VISIT (OUTPATIENT)
Dept: OBGYN CLINIC | Facility: CLINIC | Age: 79
End: 2019-05-22
Payer: MEDICARE

## 2019-05-22 VITALS
WEIGHT: 188 LBS | HEIGHT: 65 IN | BODY MASS INDEX: 31.32 KG/M2 | DIASTOLIC BLOOD PRESSURE: 69 MMHG | SYSTOLIC BLOOD PRESSURE: 110 MMHG | HEART RATE: 85 BPM

## 2019-05-22 DIAGNOSIS — M25.462 EFFUSION OF LEFT KNEE: Primary | ICD-10-CM

## 2019-05-22 DIAGNOSIS — M25.562 ACUTE PAIN OF LEFT KNEE: ICD-10-CM

## 2019-05-22 DIAGNOSIS — M17.12 PRIMARY OSTEOARTHRITIS OF LEFT KNEE: ICD-10-CM

## 2019-05-22 PROCEDURE — 99213 OFFICE O/P EST LOW 20 MIN: CPT | Performed by: ORTHOPAEDIC SURGERY

## 2019-05-22 PROCEDURE — 20610 DRAIN/INJ JOINT/BURSA W/O US: CPT | Performed by: ORTHOPAEDIC SURGERY

## 2019-05-22 RX ORDER — TRIAMCINOLONE ACETONIDE 40 MG/ML
80 INJECTION, SUSPENSION INTRA-ARTICULAR; INTRAMUSCULAR
Status: COMPLETED | OUTPATIENT
Start: 2019-05-22 | End: 2019-05-22

## 2019-05-22 RX ORDER — BUPIVACAINE HYDROCHLORIDE 5 MG/ML
6 INJECTION, SOLUTION EPIDURAL; INTRACAUDAL
Status: COMPLETED | OUTPATIENT
Start: 2019-05-22 | End: 2019-05-22

## 2019-05-22 RX ADMIN — BUPIVACAINE HYDROCHLORIDE 6 ML: 5 INJECTION, SOLUTION EPIDURAL; INTRACAUDAL at 11:29

## 2019-05-22 RX ADMIN — TRIAMCINOLONE ACETONIDE 80 MG: 40 INJECTION, SUSPENSION INTRA-ARTICULAR; INTRAMUSCULAR at 11:29

## 2019-05-23 ENCOUNTER — OFFICE VISIT (OUTPATIENT)
Dept: PHYSICAL THERAPY | Facility: CLINIC | Age: 79
End: 2019-05-23
Payer: MEDICARE

## 2019-05-23 DIAGNOSIS — G20 PARKINSON DISEASE (HCC): Primary | ICD-10-CM

## 2019-05-23 PROCEDURE — 97112 NEUROMUSCULAR REEDUCATION: CPT | Performed by: PHYSICAL THERAPIST

## 2019-05-23 PROCEDURE — G8979 MOBILITY GOAL STATUS: HCPCS | Performed by: PHYSICAL THERAPIST

## 2019-05-23 PROCEDURE — G8978 MOBILITY CURRENT STATUS: HCPCS | Performed by: PHYSICAL THERAPIST

## 2019-05-28 ENCOUNTER — OFFICE VISIT (OUTPATIENT)
Dept: PHYSICAL THERAPY | Facility: CLINIC | Age: 79
End: 2019-05-28
Payer: MEDICARE

## 2019-05-28 DIAGNOSIS — I50.42 CHRONIC COMBINED SYSTOLIC AND DIASTOLIC HEART FAILURE (HCC): ICD-10-CM

## 2019-05-28 DIAGNOSIS — G20 PARKINSON DISEASE (HCC): Primary | ICD-10-CM

## 2019-05-28 PROCEDURE — 97112 NEUROMUSCULAR REEDUCATION: CPT | Performed by: PHYSICAL THERAPIST

## 2019-05-28 RX ORDER — FUROSEMIDE 20 MG/1
20 TABLET ORAL
Qty: 48 TABLET | Refills: 3 | Status: SHIPPED | OUTPATIENT
Start: 2019-05-28 | End: 2019-05-29 | Stop reason: SDUPTHER

## 2019-05-29 DIAGNOSIS — I50.42 CHRONIC COMBINED SYSTOLIC AND DIASTOLIC HEART FAILURE (HCC): ICD-10-CM

## 2019-05-29 RX ORDER — FUROSEMIDE 20 MG/1
TABLET ORAL
Qty: 51 TABLET | Refills: 3 | Status: SHIPPED | OUTPATIENT
Start: 2019-05-29 | End: 2019-07-09 | Stop reason: DRUGHIGH

## 2019-05-30 ENCOUNTER — APPOINTMENT (OUTPATIENT)
Dept: PHYSICAL THERAPY | Facility: CLINIC | Age: 79
End: 2019-05-30
Payer: MEDICARE

## 2019-05-30 DIAGNOSIS — G20 PARKINSON DISEASE (HCC): Primary | ICD-10-CM

## 2019-05-31 ENCOUNTER — APPOINTMENT (OUTPATIENT)
Dept: LAB | Facility: CLINIC | Age: 79
End: 2019-05-31
Payer: MEDICARE

## 2019-05-31 ENCOUNTER — ANTICOAG VISIT (OUTPATIENT)
Dept: CARDIOLOGY CLINIC | Facility: CLINIC | Age: 79
End: 2019-05-31

## 2019-05-31 ENCOUNTER — TELEPHONE (OUTPATIENT)
Dept: OBGYN CLINIC | Facility: CLINIC | Age: 79
End: 2019-05-31

## 2019-05-31 DIAGNOSIS — I48.91 ATRIAL FIBRILLATION, UNSPECIFIED TYPE (HCC): ICD-10-CM

## 2019-05-31 DIAGNOSIS — Z95.2 H/O MITRAL VALVE REPLACEMENT WITH MECHANICAL VALVE: ICD-10-CM

## 2019-06-03 ENCOUNTER — APPOINTMENT (OUTPATIENT)
Dept: PHYSICAL THERAPY | Facility: CLINIC | Age: 79
End: 2019-06-03
Payer: MEDICARE

## 2019-06-05 ENCOUNTER — OFFICE VISIT (OUTPATIENT)
Dept: OBGYN CLINIC | Facility: CLINIC | Age: 79
End: 2019-06-05
Payer: MEDICARE

## 2019-06-05 VITALS
HEIGHT: 65 IN | WEIGHT: 183.4 LBS | BODY MASS INDEX: 30.56 KG/M2 | DIASTOLIC BLOOD PRESSURE: 69 MMHG | HEART RATE: 67 BPM | SYSTOLIC BLOOD PRESSURE: 103 MMHG

## 2019-06-05 DIAGNOSIS — M25.562 CHRONIC PAIN OF LEFT KNEE: ICD-10-CM

## 2019-06-05 DIAGNOSIS — M17.12 PRIMARY OSTEOARTHRITIS OF LEFT KNEE: Primary | ICD-10-CM

## 2019-06-05 DIAGNOSIS — G89.29 CHRONIC PAIN OF LEFT KNEE: ICD-10-CM

## 2019-06-05 PROCEDURE — 99213 OFFICE O/P EST LOW 20 MIN: CPT | Performed by: ORTHOPAEDIC SURGERY

## 2019-06-06 ENCOUNTER — OFFICE VISIT (OUTPATIENT)
Dept: CARDIOLOGY CLINIC | Facility: CLINIC | Age: 79
End: 2019-06-06
Payer: MEDICARE

## 2019-06-06 ENCOUNTER — OFFICE VISIT (OUTPATIENT)
Dept: PHYSICAL THERAPY | Facility: CLINIC | Age: 79
End: 2019-06-06
Payer: MEDICARE

## 2019-06-06 VITALS
HEART RATE: 64 BPM | WEIGHT: 186 LBS | SYSTOLIC BLOOD PRESSURE: 100 MMHG | DIASTOLIC BLOOD PRESSURE: 72 MMHG | BODY MASS INDEX: 30.99 KG/M2 | OXYGEN SATURATION: 99 % | HEIGHT: 65 IN

## 2019-06-06 DIAGNOSIS — R53.81 CHRONIC FATIGUE AND MALAISE: ICD-10-CM

## 2019-06-06 DIAGNOSIS — G20 PARKINSON'S DISEASE (HCC): ICD-10-CM

## 2019-06-06 DIAGNOSIS — I49.3 ASYMPTOMATIC PVCS: ICD-10-CM

## 2019-06-06 DIAGNOSIS — I44.7 LEFT BUNDLE BRANCH BLOCK (LBBB): ICD-10-CM

## 2019-06-06 DIAGNOSIS — E03.9 ACQUIRED HYPOTHYROIDISM: ICD-10-CM

## 2019-06-06 DIAGNOSIS — I42.0 NONISCHEMIC DILATED CARDIOMYOPATHY (HCC): ICD-10-CM

## 2019-06-06 DIAGNOSIS — G20 PARKINSON DISEASE (HCC): Primary | ICD-10-CM

## 2019-06-06 DIAGNOSIS — R53.82 CHRONIC FATIGUE AND MALAISE: ICD-10-CM

## 2019-06-06 DIAGNOSIS — Z95.2 H/O MITRAL VALVE REPLACEMENT WITH MECHANICAL VALVE: ICD-10-CM

## 2019-06-06 DIAGNOSIS — Z79.01 ON CONTINUOUS ORAL ANTICOAGULATION: ICD-10-CM

## 2019-06-06 DIAGNOSIS — M17.12 PRIMARY OSTEOARTHRITIS OF LEFT KNEE: ICD-10-CM

## 2019-06-06 DIAGNOSIS — E78.2 MIXED DYSLIPIDEMIA: ICD-10-CM

## 2019-06-06 DIAGNOSIS — I50.42 CHRONIC COMBINED SYSTOLIC AND DIASTOLIC HEART FAILURE (HCC): Primary | ICD-10-CM

## 2019-06-06 DIAGNOSIS — I48.20 ATRIAL FIBRILLATION, CHRONIC (HCC): ICD-10-CM

## 2019-06-06 PROCEDURE — 99214 OFFICE O/P EST MOD 30 MIN: CPT | Performed by: INTERNAL MEDICINE

## 2019-06-06 PROCEDURE — 97112 NEUROMUSCULAR REEDUCATION: CPT | Performed by: PHYSICAL THERAPIST

## 2019-06-06 RX ORDER — SENNOSIDES 8.6 MG
650 CAPSULE ORAL 3 TIMES DAILY
Qty: 100 TABLET | Refills: 0
Start: 2019-06-06

## 2019-06-07 ENCOUNTER — APPOINTMENT (OUTPATIENT)
Dept: LAB | Facility: CLINIC | Age: 79
End: 2019-06-07
Payer: MEDICARE

## 2019-06-07 ENCOUNTER — TELEPHONE (OUTPATIENT)
Dept: CARDIOLOGY CLINIC | Facility: CLINIC | Age: 79
End: 2019-06-07

## 2019-06-07 ENCOUNTER — ANTICOAG VISIT (OUTPATIENT)
Dept: CARDIOLOGY CLINIC | Facility: CLINIC | Age: 79
End: 2019-06-07

## 2019-06-07 DIAGNOSIS — Z95.2 H/O MITRAL VALVE REPLACEMENT WITH MECHANICAL VALVE: ICD-10-CM

## 2019-06-07 DIAGNOSIS — I50.42 CHRONIC COMBINED SYSTOLIC AND DIASTOLIC HEART FAILURE (HCC): ICD-10-CM

## 2019-06-07 DIAGNOSIS — I48.91 ATRIAL FIBRILLATION, UNSPECIFIED TYPE (HCC): ICD-10-CM

## 2019-06-07 DIAGNOSIS — E78.2 MIXED DYSLIPIDEMIA: ICD-10-CM

## 2019-06-07 LAB
ALBUMIN SERPL BCP-MCNC: 3.6 G/DL (ref 3.5–5)
ALP SERPL-CCNC: 111 U/L (ref 46–116)
ALT SERPL W P-5'-P-CCNC: 8 U/L (ref 12–78)
ANION GAP SERPL CALCULATED.3IONS-SCNC: 4 MMOL/L (ref 4–13)
AST SERPL W P-5'-P-CCNC: 14 U/L (ref 5–45)
BILIRUB SERPL-MCNC: 0.83 MG/DL (ref 0.2–1)
BUN SERPL-MCNC: 23 MG/DL (ref 5–25)
CALCIUM SERPL-MCNC: 9.6 MG/DL (ref 8.3–10.1)
CHLORIDE SERPL-SCNC: 111 MMOL/L (ref 100–108)
CHOLEST SERPL-MCNC: 168 MG/DL (ref 50–200)
CO2 SERPL-SCNC: 29 MMOL/L (ref 21–32)
CREAT SERPL-MCNC: 0.96 MG/DL (ref 0.6–1.3)
GFR SERPL CREATININE-BSD FRML MDRD: 56 ML/MIN/1.73SQ M
GLUCOSE P FAST SERPL-MCNC: 94 MG/DL (ref 65–99)
HDLC SERPL-MCNC: 59 MG/DL (ref 40–60)
LDLC SERPL CALC-MCNC: 85 MG/DL (ref 0–100)
NONHDLC SERPL-MCNC: 109 MG/DL
POTASSIUM SERPL-SCNC: 4.1 MMOL/L (ref 3.5–5.3)
PROT SERPL-MCNC: 6.3 G/DL (ref 6.4–8.2)
SODIUM SERPL-SCNC: 144 MMOL/L (ref 136–145)
TRIGL SERPL-MCNC: 119 MG/DL

## 2019-06-07 PROCEDURE — 80061 LIPID PANEL: CPT

## 2019-06-07 PROCEDURE — 80053 COMPREHEN METABOLIC PANEL: CPT

## 2019-06-10 ENCOUNTER — OFFICE VISIT (OUTPATIENT)
Dept: PHYSICAL THERAPY | Facility: CLINIC | Age: 79
End: 2019-06-10
Payer: MEDICARE

## 2019-06-10 DIAGNOSIS — G20 PARKINSON DISEASE (HCC): Primary | ICD-10-CM

## 2019-06-10 PROCEDURE — 97110 THERAPEUTIC EXERCISES: CPT | Performed by: PHYSICAL THERAPIST

## 2019-06-10 PROCEDURE — 97112 NEUROMUSCULAR REEDUCATION: CPT | Performed by: PHYSICAL THERAPIST

## 2019-06-13 ENCOUNTER — OFFICE VISIT (OUTPATIENT)
Dept: PHYSICAL THERAPY | Facility: CLINIC | Age: 79
End: 2019-06-13
Payer: MEDICARE

## 2019-06-13 DIAGNOSIS — G20 PARKINSON DISEASE (HCC): Primary | ICD-10-CM

## 2019-06-13 PROCEDURE — 97112 NEUROMUSCULAR REEDUCATION: CPT | Performed by: PHYSICAL THERAPIST

## 2019-06-13 PROCEDURE — 97110 THERAPEUTIC EXERCISES: CPT | Performed by: PHYSICAL THERAPIST

## 2019-06-17 ENCOUNTER — APPOINTMENT (OUTPATIENT)
Dept: PHYSICAL THERAPY | Facility: CLINIC | Age: 79
End: 2019-06-17
Payer: MEDICARE

## 2019-06-21 ENCOUNTER — ANTICOAG VISIT (OUTPATIENT)
Dept: CARDIOLOGY CLINIC | Facility: CLINIC | Age: 79
End: 2019-06-21

## 2019-06-21 ENCOUNTER — APPOINTMENT (OUTPATIENT)
Dept: LAB | Facility: CLINIC | Age: 79
End: 2019-06-21
Payer: MEDICARE

## 2019-06-21 ENCOUNTER — OFFICE VISIT (OUTPATIENT)
Dept: PHYSICAL THERAPY | Facility: CLINIC | Age: 79
End: 2019-06-21
Payer: MEDICARE

## 2019-06-21 DIAGNOSIS — G20 PARKINSON DISEASE (HCC): Primary | ICD-10-CM

## 2019-06-21 DIAGNOSIS — Z95.2 H/O MITRAL VALVE REPLACEMENT WITH MECHANICAL VALVE: ICD-10-CM

## 2019-06-21 DIAGNOSIS — I48.91 ATRIAL FIBRILLATION, UNSPECIFIED TYPE (HCC): ICD-10-CM

## 2019-06-21 PROCEDURE — 97112 NEUROMUSCULAR REEDUCATION: CPT | Performed by: PHYSICAL THERAPIST

## 2019-06-22 ENCOUNTER — HOSPITAL ENCOUNTER (OUTPATIENT)
Dept: NON INVASIVE DIAGNOSTICS | Facility: HOSPITAL | Age: 79
Discharge: HOME | End: 2019-06-22
Attending: INTERNAL MEDICINE
Payer: MEDICARE

## 2019-06-22 DIAGNOSIS — I44.7 LEFT BUNDLE BRANCH BLOCK (LBBB): ICD-10-CM

## 2019-06-22 DIAGNOSIS — I50.42 CHRONIC COMBINED SYSTOLIC AND DIASTOLIC HEART FAILURE (HCC): ICD-10-CM

## 2019-06-22 DIAGNOSIS — I42.0 NONISCHEMIC DILATED CARDIOMYOPATHY (HCC): ICD-10-CM

## 2019-06-22 DIAGNOSIS — I48.20 ATRIAL FIBRILLATION, CHRONIC (HCC): ICD-10-CM

## 2019-06-22 DIAGNOSIS — Z95.2 H/O MITRAL VALVE REPLACEMENT WITH MECHANICAL VALVE: ICD-10-CM

## 2019-06-22 PROCEDURE — 93306 TTE W/DOPPLER COMPLETE: CPT

## 2019-06-24 ENCOUNTER — OFFICE VISIT (OUTPATIENT)
Dept: PHYSICAL THERAPY | Facility: CLINIC | Age: 79
End: 2019-06-24
Payer: MEDICARE

## 2019-06-24 DIAGNOSIS — G20 PARKINSON DISEASE (HCC): Primary | ICD-10-CM

## 2019-06-24 PROCEDURE — 93306 TTE W/DOPPLER COMPLETE: CPT | Performed by: INTERNAL MEDICINE

## 2019-06-24 PROCEDURE — G8979 MOBILITY GOAL STATUS: HCPCS

## 2019-06-24 PROCEDURE — G8978 MOBILITY CURRENT STATUS: HCPCS

## 2019-06-24 PROCEDURE — 97530 THERAPEUTIC ACTIVITIES: CPT

## 2019-06-26 ENCOUNTER — TELEPHONE (OUTPATIENT)
Dept: CARDIOLOGY CLINIC | Facility: CLINIC | Age: 79
End: 2019-06-26

## 2019-06-27 ENCOUNTER — OFFICE VISIT (OUTPATIENT)
Dept: PHYSICAL THERAPY | Facility: CLINIC | Age: 79
End: 2019-06-27
Payer: MEDICARE

## 2019-06-27 DIAGNOSIS — G20 PARKINSON DISEASE (HCC): Primary | ICD-10-CM

## 2019-06-27 PROCEDURE — 97112 NEUROMUSCULAR REEDUCATION: CPT

## 2019-06-28 ENCOUNTER — APPOINTMENT (OUTPATIENT)
Dept: LAB | Facility: CLINIC | Age: 79
End: 2019-06-28
Payer: MEDICARE

## 2019-06-28 ENCOUNTER — ANTICOAG VISIT (OUTPATIENT)
Dept: CARDIOLOGY CLINIC | Facility: CLINIC | Age: 79
End: 2019-06-28

## 2019-06-28 DIAGNOSIS — Z95.2 H/O MITRAL VALVE REPLACEMENT WITH MECHANICAL VALVE: ICD-10-CM

## 2019-06-28 DIAGNOSIS — I48.91 ATRIAL FIBRILLATION, UNSPECIFIED TYPE (HCC): ICD-10-CM

## 2019-06-28 LAB
INR PPP: 2.67 (ref 0.84–1.19)
PROTHROMBIN TIME: 27.9 SECONDS (ref 11.6–14.5)

## 2019-06-28 PROCEDURE — 36415 COLL VENOUS BLD VENIPUNCTURE: CPT

## 2019-06-28 PROCEDURE — 85610 PROTHROMBIN TIME: CPT

## 2019-07-02 ENCOUNTER — OFFICE VISIT (OUTPATIENT)
Dept: PHYSICAL THERAPY | Facility: CLINIC | Age: 79
End: 2019-07-02
Payer: MEDICARE

## 2019-07-02 DIAGNOSIS — G20 PARKINSON DISEASE (HCC): Primary | ICD-10-CM

## 2019-07-02 PROCEDURE — 97112 NEUROMUSCULAR REEDUCATION: CPT

## 2019-07-02 NOTE — PROGRESS NOTES
Daily Note     Today's date: 2019  Patient name: Britney Jaramillo  : 1940  MRN: [id number]  Referring provider: Radha Salazar MD  Dx:   Encounter Diagnosis     ICD-10-CM    1  Parkinson disease ([address] ) Evangelista Finnegan        Start Time:   Stop Time: 1209  Total time in clinic (min): 40 minutes     Subjective: Pt reports to skilled PT with rollator walker, desires HEP for home  Objective: See treatment diary below  LSVT Maximal daily exercises  -Forward Reach- 10 reps of 10 second holds bilaterally performed  -Sideways Reach- 10 reps of 10 second holds bilaterally performed  -Forward Step- 10 reps No UE support bilaterally performed  -Sideways Step- 10 reps No UE support bilaterally performed  -Backwards Step- 10 reps No UE support bilaterally performed  -Rock and Reach- 10 reps No UE support bilaterally performed  -Reach and Twist- 10 reps No UE support bilaterally performed  -Sit to Stand, no UE- 20 reps, big amplitude, with scarves       Assessment:   Pt was able to progress to No UE support this visit, with LOB noted, use of wall behind her to maintain balance  Patient tolerated session well today, re demonstrating LSVT exercises with following of manual and PT cues  Patient challenged with memory of exercises and required viewing of manual as well as her abilities to mirror treating therapist  Patient required verbal and tactile cuing 50% of the time for performance  Patient instructed to continue to perform program 1x/day with 1 UE support, and patient instructed to not perform exercises at home if she feels unsafe  Patient verbalized understanding  Patient requires skilled PT to maximize function with her DX of PD  Plan: Continue per plan of care

## 2019-07-05 ENCOUNTER — APPOINTMENT (OUTPATIENT)
Dept: LAB | Facility: CLINIC | Age: 79
End: 2019-07-05
Payer: MEDICARE

## 2019-07-05 ENCOUNTER — ANTICOAG VISIT (OUTPATIENT)
Dept: CARDIOLOGY CLINIC | Facility: CLINIC | Age: 79
End: 2019-07-05

## 2019-07-05 ENCOUNTER — TRANSCRIBE ORDERS (OUTPATIENT)
Dept: LAB | Facility: CLINIC | Age: 79
End: 2019-07-05

## 2019-07-05 DIAGNOSIS — Z95.2 H/O MITRAL VALVE REPLACEMENT WITH MECHANICAL VALVE: ICD-10-CM

## 2019-07-05 DIAGNOSIS — I48.91 ATRIAL FIBRILLATION, UNSPECIFIED TYPE (HCC): ICD-10-CM

## 2019-07-05 DIAGNOSIS — R41.89 COGNITIVE IMPAIRMENT: ICD-10-CM

## 2019-07-05 RX ORDER — MEMANTINE HYDROCHLORIDE 10 MG/1
10 TABLET ORAL DAILY
Qty: 90 TABLET | Refills: 1 | Status: SHIPPED | OUTPATIENT
Start: 2019-07-05 | End: 2020-01-06 | Stop reason: SDUPTHER

## 2019-07-09 ENCOUNTER — OFFICE VISIT (OUTPATIENT)
Dept: CARDIOLOGY CLINIC | Facility: CLINIC | Age: 79
End: 2019-07-09
Payer: MEDICARE

## 2019-07-09 ENCOUNTER — OFFICE VISIT (OUTPATIENT)
Dept: PHYSICAL THERAPY | Facility: CLINIC | Age: 79
End: 2019-07-09
Payer: MEDICARE

## 2019-07-09 VITALS
HEIGHT: 65 IN | OXYGEN SATURATION: 98 % | HEART RATE: 82 BPM | DIASTOLIC BLOOD PRESSURE: 68 MMHG | WEIGHT: 183 LBS | BODY MASS INDEX: 30.49 KG/M2 | SYSTOLIC BLOOD PRESSURE: 104 MMHG

## 2019-07-09 DIAGNOSIS — I44.7 LEFT BUNDLE BRANCH BLOCK (LBBB): ICD-10-CM

## 2019-07-09 DIAGNOSIS — G20 PARKINSON DISEASE (HCC): Primary | ICD-10-CM

## 2019-07-09 DIAGNOSIS — I42.0 NONISCHEMIC DILATED CARDIOMYOPATHY (HCC): ICD-10-CM

## 2019-07-09 DIAGNOSIS — Z79.01 ON CONTINUOUS ORAL ANTICOAGULATION: ICD-10-CM

## 2019-07-09 DIAGNOSIS — R53.82 CHRONIC FATIGUE AND MALAISE: ICD-10-CM

## 2019-07-09 DIAGNOSIS — I50.42 CHRONIC COMBINED SYSTOLIC AND DIASTOLIC HEART FAILURE (HCC): Primary | ICD-10-CM

## 2019-07-09 DIAGNOSIS — I49.3 ASYMPTOMATIC PVCS: ICD-10-CM

## 2019-07-09 DIAGNOSIS — I48.91 ATRIAL FIBRILLATION, UNSPECIFIED TYPE (HCC): ICD-10-CM

## 2019-07-09 DIAGNOSIS — R60.0 EDEMA OF LEFT LOWER EXTREMITY: ICD-10-CM

## 2019-07-09 DIAGNOSIS — E03.9 ACQUIRED HYPOTHYROIDISM: ICD-10-CM

## 2019-07-09 DIAGNOSIS — M17.12 PRIMARY OSTEOARTHRITIS OF LEFT KNEE: ICD-10-CM

## 2019-07-09 DIAGNOSIS — E78.2 MIXED DYSLIPIDEMIA: ICD-10-CM

## 2019-07-09 DIAGNOSIS — R53.81 CHRONIC FATIGUE AND MALAISE: ICD-10-CM

## 2019-07-09 DIAGNOSIS — G20 PARKINSON'S DISEASE (HCC): ICD-10-CM

## 2019-07-09 DIAGNOSIS — Z95.2 H/O MITRAL VALVE REPLACEMENT WITH MECHANICAL VALVE: ICD-10-CM

## 2019-07-09 PROCEDURE — 99214 OFFICE O/P EST MOD 30 MIN: CPT | Performed by: INTERNAL MEDICINE

## 2019-07-09 PROCEDURE — 97112 NEUROMUSCULAR REEDUCATION: CPT | Performed by: PHYSICAL THERAPIST

## 2019-07-09 RX ORDER — FUROSEMIDE 40 MG/1
40 TABLET ORAL DAILY
Qty: 90 TABLET | Refills: 3 | Status: SHIPPED | OUTPATIENT
Start: 2019-07-09 | End: 2019-10-17 | Stop reason: DRUGHIGH

## 2019-07-09 NOTE — PATIENT INSTRUCTIONS
1  Patient to have venous duplex scan with reflux assessment for exclusion of venous reflux in DVT of left lower extremity  2  Refill for new strength of furosemide 40 milligrams daily was sent to local pharmacy with 90 day supply and 3 refills  3  Recommended left lower extremity support hosiery and elevation, especially during air or prolonged auto travel  4  Continue other medication as currently using  5  Cardiology follow-up approximately 3 months with EKG  Will also check BMP at that time

## 2019-07-09 NOTE — PROGRESS NOTES
Cardiology Progress Note    ASSESSMENT:       1  Improved bilateral pretibial/ankle/pedal edema with onset approximately 6 weeks ago without change in weight or other evidence of heart failure  Residual left ankle and pedal edema despite diuresis  Consider left lower extremity venous reflux or DVT  2  Chronic combined systolic and diastolic heart failure with 50-55% LVEF and normal MVR function on 06/22/2019 transthoracic echocardiogram   34% ejection fraction on 7/5/16 MUGA scan/underlying nonischemic dilated cardiomyopathy but currently with less exertional fatigue, dyspnea on exertion, and resolution currently of exhaustion and cold sweats,  as well as occasional nocturnal wheezing   Initially improved on starting dose of Entresto, but had vasovagal type near syncope on 01/18/2019 with no recurrence   Patient with less frequent lightheadedness and  low blood pressures in the 90s-100's  2  Resolved dehydration/hypotension and residual mild dizziness as well as resolution of acute kidney injury since 11/18/16  3  Chronic atrial fibrillation, controlled, and chronic left bundle-branch block  4  History of mechanical mitral valve replacement September, 1990, with very good oral anticoagulation with target INR of 2 5   Latest INR 2 66 on 07/05/2019   5  Moderately frequent asymptomatic PVCs on otherwise benign Holter monitor December, 2013  6  8 9 pound weight loss in approximately 5+ years  7  Controlled mixed dyslipidemia  8  Mild obstructive sleep apnea previously suspected  9  History of asthma  10  History of GERD  11  History of benign positional vertigo  12  History of Lyme disease  13  Hypothyroidism with slightly elevated TSH level, which could contribute to previous extreme fatigue  14  History of hepatitis C  15  History of Parkinson's disease/gait dysfunction with slowly progressive symptoms and now on  physical therapy plus home exercise for this    16  Resolved nonproductive cough and postural dizziness with  flu-like illness nearly 1 year ago  17  Chronic fatigue and malaise, multifactorial, improving  18  Drug-induced insomnia  19  History of fall with subsequent left distal tibial fracture and left ankle sprain with right periorbital ecchymoses and laceration and left wrist sprain on 7/30/17, with no recurrence, possibly related to transient drop in blood pressure with upright position in hot weather after prolonged sitting  Plan       Patient Instructions     1  Patient to have venous duplex scan with reflux assessment for exclusion of venous reflux in DVT of left lower extremity  2  Refill for new strength of furosemide 40 milligrams daily was sent to local pharmacy with 90 day supply and 3 refills  3  Recommended left lower extremity support hosiery and elevation, especially during air or prolonged auto travel  4  Continue other medication as currently using  5  Cardiology follow-up approximately 3 months with EKG  Will also check BMP at that time  HPI    This 77 y o  female  denies new cardiopulmonary and medical symptoms  The patient and her  will be traveling from 08/18 through 08/29/2019 to [place] to tour and visit with her daughter  She complains of residual left ankle and pedal edema despite using 40 milligrams of furosemide on a daily basis  Her right ankle and pedal edema has largely resolved  She is seen in follow-up of bilateral pretibial ankle and pedal edema, chronic combined systolic and diastolic heart failure with nonischemic dilated cardiomyopathy, chronic atrial fibrillation, mechanical mitral valve replacement, oral anticoagulation, asymptomatic PVCs, mixed dyslipidemia        Review of Systems    All other systems negative, except as noted in history of present illness    Historical Information   Past Medical History:   Diagnosis Date    Anal fissure     Arthritis     osteo joints    Asthma with acute exacerbation     Blepharitis     Breast lump     Chalazion     CHF, chronic (HCC)     Difficulty walking     Disease of thyroid gland     Drooling     Dysphagia     Fall 2018    Fibromyalgia, primary     History of transfusion     Hordeolum externum     Hx of transient ischemic attack (TIA)     Hypophonia     Infectious viral hepatitis     Hep C dx      Lyme carditis     Murmur, cardiac     Parkinsons (HCC)     Rotator cuff tendinitis     Seasonal allergies     Urinary frequency      Past Surgical History:   Procedure Laterality Date    BREAST BIOPSY Left 2018    benign    BREAST SURGERY N/A     benign, calcium    CARDIAC SURGERY      mitral valve replacement    CATARACT EXTRACTION Right 2015    CATARACT EXTRACTION Left 2014    CHEST TUBE INSERTION Right     post pleural effusion    CHOLECYSTECTOMY  2000    lap    HYSTERECTOMY  1973    partial    NH COLSC FLX W/RMVL OF TUMOR POLYP LESION SNARE TQ N/A 2017    Procedure: COLONOSCOPY and biopsy ;  Surgeon: Devante Souza MD;  Location: [place] [address] [facility];   Service: Gastroenterology    SKIN BIOPSY      pre cancerous on face    TONSILLECTOMY      US GUIDED BREAST BIOPSY RIGHT COMPLETE Right 2018     Social History     Substance and Sexual Activity   Alcohol Use No     Social History     Substance and Sexual Activity   Drug Use No     Social History     Tobacco Use   Smoking Status Former Smoker    Packs/day: 0 10    Years: 10 00    Pack years: 1 00    Last attempt to quit: Minerva Reese Years since quittin 5   Smokeless Tobacco Never Used       Family History:  Family History   Problem Relation Age of Onset    Heart disease Mother         murmur Cardiomegaly age 64    Pneumonia Father     Heart disease Brother         mitrsl valve    Parkinsonism Brother     Arthritis Brother     Lupus Daughter     Diabetes Daughter     Depression Daughter          Meds/Allergies     Prior to Admission medications    Medication Sig Start Date End Date Taking?  Authorizing Provider   acetaminophen (TYLENOL) 650 mg CR tablet Take 1 tablet (650 mg total) by mouth 3 (three) times a day 6/6/19  Yes Fanny Galvez MD   carbidopa-levodopa-entacapone (STALEVO) -200 MG per tablet Take 1 tablet by mouth 4 (four) times a day 5/16/19  Yes Kaylah Charlton MD   Cholecalciferol (VITAMIN D3) 2000 units TABS Take 2,000 Units by mouth every morning   Yes Historical Provider, MD   donepezil (ARICEPT) 5 mg tablet Take 1 tablet (5 mg total) by mouth daily at bedtime 3/20/19  Yes Kaylah Charlton MD   levothyroxine 25 mcg tablet take 1 tablet by mouth every morning 11/19/18  Yes Soraya Cherry MD   memantine (NAMENDA) 10 mg tablet Take 1 tablet (10 mg total) by mouth daily 7/5/19  Yes Kaylah Charlton MD   Multiple Vitamins-Minerals (OCUVITE ADULT 50+ PO) Take by mouth daily with breakfast   Yes Historical Provider, MD   primidone (MYSOLINE) 50 mg tablet Take 0 5 tablets (25 mg total) by mouth daily 5/16/19  Yes Kaylah Charlton MD   sacubitril-valsartan (ENTRESTO)  MG TABS Take 1 tablet by mouth 2 (two) times a day 5/1/19  Yes Fanny Galvez MD   warfarin (COUMADIN) 2 5 mg tablet TAKE 1 TO 1 1/2 TABLETS  DAILY BY MOUTH OR AS ORDERED BY PHYSICIAN  5/1/19  Yes Fanny Galvez MD   furosemide (LASIX) 20 mg tablet TAKE 1 TABLET BY MOUTH FOUR TIMES A WEEK(MONDAY, WEDNESDAY, FRIDAY, SUNDAY)  Patient taking differently: 40 mg daily 5/29/19 7/9/19 Yes Fanny Galvez MD   diclofenac sodium (VOLTAREN) 1 % Apply 2 g topically 4 (four) times a day  Patient not taking: Reported on 5/22/2019 5/9/19   Edson Pendleton DO   furosemide (LASIX) 40 mg tablet Take 1 tablet (40 mg total) by mouth daily 7/9/19   Fanny Galvez MD   carbidopa-levodopa (SINEMET)  mg per tablet Take 1 tablet by mouth 4 (four) times a day  3/2/18  Historical Provider, MD       No Known Allergies      Vitals:    07/09/19 0854   BP: 104/68   BP Location: Left arm   Patient Position: Sitting   Cuff Size: Standard Pulse: 82   SpO2: 98%   Weight: 83 kg (183 lb)   Height: 5' 4 5" (1 638 m)       Body mass index is 30 93 kg/m²  3 pound weight loss in just over 1 month  Physical Exam:    General Appearance:  Alert, cooperative, no distress, appears stated age and is mildly obese   Head:  Normocephalic, without obvious abnormality, atraumatic   Eyes:  PERRL, conjunctiva/corneas clear, EOM's intact,   both eyes   Ears:  Normal TM's and external ear canals, both ears   Nose: Nares normal, septum midline, mucosa normal, no drainage or sinus tenderness   Throat: Lips, mucosa, and tongue normal; teeth and gums normal   Neck: Supple, symmetrical, trachea midline, no adenopathy, thyroid: not enlarged, symmetric, no tenderness/mass/nodules, no carotid bruit or JVD   Back:   Symmetric, no curvature, ROM normal, no CVA tenderness   Lungs:   Clear to auscultation bilaterally, respirations unlabored   Chest Wall:  No tenderness or deformity   Heart:  Irregularly irregular cardiac rhythm, S1 coincides with mechanical mitral valve opening click with S2 normal, no murmur, rub or gallop   Abdomen:   Soft, non-tender, bowel sounds active all four quadrants,  no masses, no organomegaly and mildly obese   Extremities: Extremities normal, atraumatic, no cyanosis with 1-2 + left ankle and pedal and trace right ankle and pedal edema   Pulses: 2+ and symmetric   Skin: Skin showed normal color, texture, turgor and no rashes or lesions   Lymph nodes: Cervical, supraclavicular, and axillary nodes normal   Neurologic: Normal         Cardiographics    ECG  :  Not applicable    Imaging    Chest X-Ray :  No Chest XR results available for this patient      Transthoracic echocardiogram 06/22/2019:    50-55% LVEF with mild LVH  Moderate to marked LAE  Normal mechanical mitral valve prosthesis with trace to 1+ MR  1+ AI  1+ TR with normal PA systolic pressure      Lab Review       Lab Results   Component Value Date    SODIUM 144 06/07/2019    K 4 1 06/07/2019  (H) 06/07/2019    CO2 29 06/07/2019    ANIONGAP 9 8 (L) 12/07/2015    BUN 23 06/07/2019    CREATININE 0 96 06/07/2019    GLUCOSE 91 12/07/2015    GLUF 94 06/07/2019    CALCIUM 9 6 06/07/2019    AST 14 06/07/2019    ALT 8 (L) 06/07/2019    ALKPHOS 111 06/07/2019    PROT 6 9 12/07/2015    BILITOT 1 6 (H) 12/07/2015    EGFR 56 06/07/2019   INR 07/05/2019:  2 66    Lab Results   Component Value Date    CHOLESTEROL 168 06/07/2019    CHOLESTEROL 148 10/08/2018    CHOLESTEROL 151 03/15/2018     Lab Results   Component Value Date    HDL 59 06/07/2019    HDL 50 10/08/2018    HDL 56 03/15/2018     No results found for: LDLCHOLEST  Lab Results   Component Value Date    LDLCALC 85 06/07/2019    LDLCALC 66 10/08/2018    LDLCALC 70 03/15/2018     No components found for: [facility]  Lab Results   Component Value Date    TRIG 119 06/07/2019    TRIG 162 (H) 10/08/2018    TRIG 127 03/15/2018         Lab Results   Component Value Date    GLUCOSE 91 12/07/2015    CALCIUM 9 6 06/07/2019     12/07/2015    K 4 1 06/07/2019    CO2 29 06/07/2019     (H) 06/07/2019    BUN 23 06/07/2019    CREATININE 0 96 06/07/2019           Bj Carrillo MD

## 2019-07-10 NOTE — PROGRESS NOTES
Daily Note     Today's date: 19  Patient name: Adam Truong  : 1940  MRN: [id number]  Referring provider: Lazaro Kirk MD  Dx:   Encounter Diagnosis     ICD-10-CM    1  Parkinson disease ([address] ) G20                    Subjective: Pt reports to skilled PT with rollator walker, desires HEP for home  Objective: See treatment diary below  LSVT Maximal daily exercises  -Forward Reach- 10 reps of 10 second holds bilaterally performed  -Sideways Reach- 10 reps of 10 second holds bilaterally performed  -Forward Step- 10 reps No UE support bilaterally performed  -Sideways Step- 10 reps No UE support bilaterally performed  -Backwards Step- 10 reps No UE support bilaterally performed  -Rock and Reach- 10 reps No UE support bilaterally performed  -Reach and Twist- 10 reps No UE support bilaterally performed  -Sit to Stand, no UE- 20 reps, big amplitude, with scarves     Speed walking 40 feet counting steps 30 steps to 24 steps FWD 6 laps   Speed walking backwards 26 steps final 3 times       Assessment:   Pt had improvement in gait speed training with no AD  Pt would like to focus on that and continue to included that    Patient verbalized understanding  Patient requires skilled PT to maximize function with her DX of PD  Plan: Continue per plan of care    Speed walking with counting steps

## 2019-07-11 ENCOUNTER — OFFICE VISIT (OUTPATIENT)
Dept: PHYSICAL THERAPY | Facility: CLINIC | Age: 79
End: 2019-07-11
Payer: MEDICARE

## 2019-07-11 DIAGNOSIS — G20 PARKINSON DISEASE (HCC): Primary | ICD-10-CM

## 2019-07-11 PROCEDURE — 97112 NEUROMUSCULAR REEDUCATION: CPT

## 2019-07-11 NOTE — PROGRESS NOTES
Daily Note     Today's date: 19  Patient name: Hector Cm  : 1940  MRN: [id number]  Referring provider: Kvng Fong MD  Dx:   Encounter Diagnosis     ICD-10-CM    1  Parkinson disease ([facility] [address] ) Sharifa Persaud        Start Time: 46  Stop Time: 1200  Total time in clinic (min): 46 minutes     Subjective: Pt reports to skilled PT with rollator walker, desires HEP for home  Reports no updates upon arrival       Objective: See treatment diary below  LSVT Maximal daily exercises  -Forward Reach- 10 reps of 10 second holds bilaterally performed  -Sideways Reach- 10 reps of 10 second holds bilaterally performed  -Forward Step- 10 reps No UE support bilaterally performed  -Sideways Step- 10 reps No UE support bilaterally performed  -Backwards Step- 10 reps No UE support bilaterally performed  -Rock and Reach- 10 reps No UE support bilaterally performed  -Reach and Twist- 10 reps No UE support bilaterally performed  -Sit to Stand, no UE- 20 reps, big amplitude, with scarves     Speed walking 40 feet counting steps- forward- 22 steps, 20 steps, 19 steps, 19 steps  Speed walking backwards- 40 feet- 50 steps, 38 steps, 36 steps, 35 steps      Assessment:   Patient tolerated treatment well, able to redemonstrate her LSVT exercises well today with 25% cuing today  Patient improving step height today with her activities  Patient continues to demonstrate retropulsion today with posterior stepping and walking, close supervision required to prevent posterior LOB  Patient requires skilled PT to maximize function with her DX of PD  Plan: Continue per plan of care    Speed walking with counting steps

## 2019-07-16 ENCOUNTER — HOSPITAL ENCOUNTER (OUTPATIENT)
Dept: RADIOLOGY | Facility: HOSPITAL | Age: 79
Discharge: HOME | End: 2019-07-16
Attending: INTERNAL MEDICINE
Payer: MEDICARE

## 2019-07-16 ENCOUNTER — APPOINTMENT (OUTPATIENT)
Dept: PHYSICAL THERAPY | Facility: CLINIC | Age: 79
End: 2019-07-16
Payer: MEDICARE

## 2019-07-16 DIAGNOSIS — R60.0 EDEMA OF LEFT LOWER EXTREMITY: ICD-10-CM

## 2019-07-16 PROCEDURE — 93971 EXTREMITY STUDY: CPT

## 2019-07-17 ENCOUNTER — TELEPHONE (OUTPATIENT)
Dept: CARDIOLOGY CLINIC | Facility: CLINIC | Age: 79
End: 2019-07-17

## 2019-07-17 PROCEDURE — 93971 EXTREMITY STUDY: CPT | Performed by: SURGERY

## 2019-07-17 NOTE — TELEPHONE ENCOUNTER
----- Message from Terri Francisco MD sent at 7/17/2019  4:40 PM EDT -----  Venous Doppler study did not show reflux or clots in the left leg and was normal   Suggest support hosiery and leg elevation when possible  Please call the patient and let her know

## 2019-07-17 NOTE — TELEPHONE ENCOUNTER
S/w pt made aware - will get the hosiery and elevated her legs    She still would like to understand why her legs are swelling - advise

## 2019-07-18 ENCOUNTER — OFFICE VISIT (OUTPATIENT)
Dept: PHYSICAL THERAPY | Facility: CLINIC | Age: 79
End: 2019-07-18
Payer: MEDICARE

## 2019-07-18 DIAGNOSIS — G20 PARKINSON DISEASE (HCC): Primary | ICD-10-CM

## 2019-07-18 PROCEDURE — 97112 NEUROMUSCULAR REEDUCATION: CPT

## 2019-07-18 NOTE — PROGRESS NOTES
Daily Note     Today's date: 19  Patient name: Pastora Gordon  : 1940  MRN: [id number]  Referring provider: Anthony Lobo MD  Dx:   Encounter Diagnosis     ICD-10-CM    1  Parkinson disease ([facility] [address] ) Nick Swan        Start Time: 1115  Stop Time: 1145  Total time in clinic (min): 30 minutes     Subjective: Pt reports to skilled PT with rollator walker, desires HEP for home  Reports no updates upon arrival       Objective: See treatment diary below  LSVT Maximal daily exercises  -Forward Reach- 10 reps of 10 second holds bilaterally performed  -Sideways Reach- 10 reps of 10 second holds bilaterally performed  -Forward Step- 10 reps No UE support bilaterally performed  -Sideways Step- 10 reps No UE support bilaterally performed  -Backwards Step- 10 reps No UE support bilaterally performed  -Rock and Reach- 10 reps No UE support bilaterally performed  -Reach and Twist- 10 reps No UE support bilaterally performed  -Sit to Stand, no UE- 20 reps, big amplitude, with scarves     Speed walking 40 feet counting steps- forward- 23 steps, 22 steps, 19 steps, 18 steps  Speed walking backwards- 40 feet- 45 steps, 37 steps, 35 steps, 34 steps    4 square stepping- 6" hurdles- 10 cycles CW, 10 cycles  Colored [place] tapping- 5 minutes to music      Assessment:   Patient tolerated treatment well, able to redemonstrate her LSVT exercises well today with 10% cuing from treating therapist  Patient able to perform exercises independently with usage of Exercise packet  Anticipated discharge next session  Patient requires skilled PT to maximize function with her DX of PD  Plan: Continue per plan of care   Discharge Next session

## 2019-07-18 NOTE — TELEPHONE ENCOUNTER
Diagnosis by exclusion is lymphedema  She can do look that up on the Internet for more detailed explanation  Let her know

## 2019-07-19 ENCOUNTER — APPOINTMENT (OUTPATIENT)
Dept: LAB | Facility: CLINIC | Age: 79
End: 2019-07-19
Payer: MEDICARE

## 2019-07-19 ENCOUNTER — ANTICOAG VISIT (OUTPATIENT)
Dept: CARDIOLOGY CLINIC | Facility: CLINIC | Age: 79
End: 2019-07-19

## 2019-07-19 DIAGNOSIS — Z95.2 H/O MITRAL VALVE REPLACEMENT WITH MECHANICAL VALVE: ICD-10-CM

## 2019-07-19 DIAGNOSIS — I48.91 ATRIAL FIBRILLATION, UNSPECIFIED TYPE (HCC): ICD-10-CM

## 2019-07-23 ENCOUNTER — APPOINTMENT (OUTPATIENT)
Dept: PHYSICAL THERAPY | Facility: CLINIC | Age: 79
End: 2019-07-23
Payer: MEDICARE

## 2019-07-25 ENCOUNTER — APPOINTMENT (OUTPATIENT)
Dept: PHYSICAL THERAPY | Facility: CLINIC | Age: 79
End: 2019-07-25
Payer: MEDICARE

## 2019-07-25 ENCOUNTER — OFFICE VISIT (OUTPATIENT)
Dept: PHYSICAL THERAPY | Facility: CLINIC | Age: 79
End: 2019-07-25
Payer: MEDICARE

## 2019-07-25 DIAGNOSIS — G20 PARKINSON DISEASE (HCC): Primary | ICD-10-CM

## 2019-07-25 PROCEDURE — G8979 MOBILITY GOAL STATUS: HCPCS

## 2019-07-25 PROCEDURE — G8980 MOBILITY D/C STATUS: HCPCS

## 2019-07-25 PROCEDURE — 97530 THERAPEUTIC ACTIVITIES: CPT

## 2019-07-25 NOTE — PROGRESS NOTES
PT Re-Evaluation/Discharge    Today's date: 2019  Patient name: Cb Vick  : 1940  MRN: [id number]  Referring provider: Jr Michael MD  Dx:   Encounter Diagnosis     ICD-10-CM    1  Parkinson disease ([facility] [address] ) G20        Start Time: 1400  Stop Time: 1430  Total time in clinic (min): 30 minutes     Assessment  Assessment details: Patient is a 77 y o  female who presents to skilled PT for with gait and balance deficits secondary to diagnosis of Parkinsons  Patient has reached her maximal level of function, improving all outcome measures assessed with her least restrictive assistive device, advised to continue to use rollator in the community  Patient challenged with her endurance, but improving in her outcome measures  Patient advised to continue her LSVT exercises today and into the future  Patient will be discharged from skilled PT today due to her functional status  Prognosis: good    Goals  ST weeks  1  Patient will improve her overall LE strength from 3+/5 to to 4/5  MET   2  Patient will improve her overall LAUREANO score from 25/56 to 31/56  MET   3  Patient will improve her TUG score from 27 94 seconds to 27 35 seconds  MET   4  Patient will improve her 5 x Sit to Stand score from 45 seconds to 42 7 seconds  MET     LT weeks  1  Patient will be independent with her HEP  MET  2  Patient will be able to independently put her shoes on in the morning - MET  3  Patient will be able to walk 660 feet during 6 Minute Walk Test to be able to walk 1 block- MET    New Goals- Established 2019  1  Patient will complete a full mCTSIB in 8 weeks in order to formally assess her static balance on multiple surfaces  MET  2  Patient will score a 45/56 or better on the LAUREANO balance test to improve her static balance capabilities  MET  3  Patient will improve her DGI score to 19/24 or better to improve her dynamic balance capabilities  NOT MET  4   Patient will complete all balance assessments without SPC in order to improve her confidence and endurance  MET  5  Patient will improve her 6 minute walk test distance by 100 feet with rollator in 8 weeks in order to maximize her community ambulation distance and endurance  MET    Plan  Discharge today  Treatment plan discussed with: patient      Subjective Evaluation    History of Present Illness  Mechanism of injury: Patient reports that she has been noting that her physical status has been feeling "pretty good"  States that her L knee is back to normal  Patient states that she is ready for a home exercise program, planned d/c in two weeks  Patient reports she is feeling pretty good about her function       Pain  L knee pain/discomfort     Social Support  Steps to enter house: no  Stairs in house: no   Lives in: condominium  Lives with: spouse (Temporarily adult daughter)    Employment status: not working  Treatments  No previous or current treatments  Patient Goals  Patient goals for therapy: increased strength, improved balance, increased motion and independence with ADLs/IADLs        Objective     Static Posture     Comments    LAUREANO/56    AZ: 37/56    Progress Note: 2019    Laureano/56  Dynamic Gait Index:     Progress Noted 19   Koki Forrester: 42/56  CTSIB: with close supervision   Firm, eyes open: 30 seconds   Firm, eyes closed: 16 seconds   Foam, eyes open: 25 seconds  Foam, eyes closed: 12 seconds      Strength/Myotome Testing     Left Hip   Planes of Motion   Flexion: 4  Extension: 4  Abduction: 4  Adduction: 4    Right Hip   Planes of Motion   Flexion: 4  Extension: 4  Abduction: 4  Adduction: 4    Left Knee   Flexion: 4  Prone flexion: 4    Right Knee   Flexion: 4  Prone flexion: 4    Left Ankle/Foot   Dorsiflexion: 4  Plantar flexion: 4    Right Ankle/Foot   Dorsiflexion: 4  Plantar flexion: 4    Ambulation     Comments   Dynamic Endurance Testing    2 Minute Walk Test  174 feet (with SPC)    Rollator: 650 feet     Dynamic Balance/Falls Risk Testing    TUG  27 94 seconds (with SPC)    VA: 12 seconds     10 Meter Walk Test  33 ft/19 2 sec = 1 72 ft/sec (with SPC)    VA: 33/10 seconds= 3 3 ft  Gait Deviations: shuffling gait, freezing episodes  Decrease stride length, step height and [place]  Progress Note- 2019    2 minute walk test- 250 feet  6 minute walk test- 800 feet    Tug Test- 10 52 seconds with SPC, 10 88 seconds no AD    10 meter walk test- 10 meters/8 35 seconds with SPC= 1 20 meters/second  10 meter walk test- 10 meters/9 88 seconds without SPC= 1 01 meters/second    Gait Deviations: shuffling gait, freezing episodes  Decrease stride length, step height and [place]  Progress Note (19)    -2 minute walk test: 300 ft   -6 minute walk test: 800 ft   Gait Deviations: improvement in stride length and step height, still noted [place]    Tug Test- 16 seconds with SPC     10 meter walk test- 10 meters/12 1  seconds with SPC=   82 meters/second  10 meter walk test- 10 meters/10 2 seconds without rollator =  98 meters/second    Progress Note (19)    -2 minute walk test: 400 ft with SPC  -6 minute walk test: 900 ft with SPC  Gait Deviations: improvement in stride length and step height, still noted [place]    Tug Test- 8 46 seconds with SPC  Tug Test- 9 65 seconds with rollator  Tug Test- 8 85 seconds with no AD     10 meter walk test- 10 meters/ 9 88 seconds with SPC= 1 01 meters/second  10 meter walk test- 10 meters/ 10 05 seconds without AD=   99 meters/second    Functional Assessment    Comments  Endurance:  5x Sit to Stand:45 seconds    VA:  19 seconds     Progress note: 2019  5x sit to stand- 12 63 seconds  30 second sit to stand- 12 reps     Progress note: 19  5x sit to stand- 13 seconds  30 second sit to stand- 10 reps     2019  5x sit to stand- 11 2 seconds  30 second sit to stand- 13 reps     2019- Static and dynamic balance  Kumar/56  MCTSIB: with close supervision   Firm, eyes open: 30 seconds   Firm, eyes closed: 20 seconds   Foam, eyes open: 30 seconds  Foam, eyes closed: 20 seconds  DGI- 2019- Static and dynamic balance  Kumar/56  MCTSIB: with close supervision   Firm, eyes open: 30 seconds   Firm, eyes closed: 30 seconds   Foam, eyes open: 30 seconds  Foam, eyes closed: 30 seconds  I-     5x sit to stand- 8 79 seconds  30 second sit to stand- 15 reps     Tug Test- 8 33 seconds with SPC  Tug Test- 9 15 seconds with rollator  Tug Test- 8 89 seconds with no AD     10 meter walk test- 10 meters/ 9 15 seconds with SPC= 1 09 meters/second  10 meter walk test- 10 meters/ 9 85 seconds without AD= 1 02 meters/second       -2 minute walk test: 500 ft with SPC  -6 minute walk test: 1000 ft with SPC  Gait Deviations: improvement in stride length and step height, still noted [place]      Precautions:  has a past medical history of Anal fissure, Arthritis, Asthma with acute exacerbation, Blepharitis, Breast lump, Chalazion, CHF, chronic ([address] ), Difficulty walking, Disease of thyroid gland, Drooling, Dysphagia, Fall (2018), Fibromyalgia, primary, History of transfusion (), Hordeolum externum, transient ischemic attack (TIA), Hypophonia, Infectious viral hepatitis, Lyme carditis, Murmur, cardiac, Parkinsons (HCC), Rotator cuff tendinitis, Seasonal allergies, and Urinary frequency      Daily Treatment Diary

## 2019-07-30 ENCOUNTER — APPOINTMENT (OUTPATIENT)
Dept: PHYSICAL THERAPY | Facility: CLINIC | Age: 79
End: 2019-07-30
Payer: MEDICARE

## 2019-08-07 ENCOUNTER — OFFICE VISIT (OUTPATIENT)
Dept: PODIATRY | Facility: CLINIC | Age: 79
End: 2019-08-07
Payer: MEDICARE

## 2019-08-07 DIAGNOSIS — M79.672 PAIN IN BOTH FEET: ICD-10-CM

## 2019-08-07 DIAGNOSIS — I70.213 ATHEROSCLEROSIS OF NATIVE ARTERY OF BOTH LOWER EXTREMITIES WITH INTERMITTENT CLAUDICATION (HCC): Primary | ICD-10-CM

## 2019-08-07 DIAGNOSIS — M79.671 PAIN IN BOTH FEET: ICD-10-CM

## 2019-08-07 DIAGNOSIS — B35.1 ONYCHOMYCOSIS: ICD-10-CM

## 2019-08-07 PROCEDURE — 11721 DEBRIDE NAIL 6 OR MORE: CPT | Performed by: PODIATRIST

## 2019-08-07 NOTE — PROGRESS NOTES
Assessment/Plan:    Aseptic debridement and planning of nails x10 and manually and mechanically    Patient to apply antifungal powder between toes daily    Daily foot checks monitor for signs of infection    Follow-up 3 months     Diagnoses and all orders for this visit:    Atherosclerosis of native artery of both lower extremities with intermittent claudication (HCC)    Onychomycosis    Pain in both feet          Subjective:      Patient ID: Caitlin Jansen is a 77 y o  female  Patient patient chief complaint of thick painful nails that hurt when walking wearing shoes    Patient has not noticed any redness or signs of infection        Past Medical History:   Diagnosis Date    Anal fissure     Arthritis     osteo joints    Asthma with acute exacerbation     Blepharitis     Breast lump     Chalazion     CHF, chronic (HCC)     Difficulty walking     Disease of thyroid gland     Drooling     Dysphagia     Fall 05/04/2018    Fibromyalgia, primary     History of transfusion 1996    Hordeolum externum     Hx of transient ischemic attack (TIA)     Hypophonia     Infectious viral hepatitis     Hep C dx 1996     Lyme carditis     Murmur, cardiac     Parkinsons (HCC)     Rotator cuff tendinitis     Seasonal allergies     Urinary frequency          Current Outpatient Medications:     acetaminophen (TYLENOL) 650 mg CR tablet, Take 1 tablet (650 mg total) by mouth 3 (three) times a day, Disp: 100 tablet, Rfl: 0    carbidopa-levodopa-entacapone (STALEVO) -200 MG per tablet, Take 1 tablet by mouth 4 (four) times a day, Disp: 120 tablet, Rfl: 3    Cholecalciferol (VITAMIN D3) 2000 units TABS, Take 2,000 Units by mouth every morning, Disp: , Rfl:     diclofenac sodium (VOLTAREN) 1 %, Apply 2 g topically 4 (four) times a day (Patient not taking: Reported on 5/22/2019), Disp: 1 Tube, Rfl: 1    donepezil (ARICEPT) 5 mg tablet, Take 1 tablet (5 mg total) by mouth daily at bedtime, Disp: 90 tablet, Rfl: 1    furosemide (LASIX) 40 mg tablet, Take 1 tablet (40 mg total) by mouth daily, Disp: 90 tablet, Rfl: 3    levothyroxine 25 mcg tablet, take 1 tablet by mouth every morning, Disp: 90 tablet, Rfl: 2    memantine (NAMENDA) 10 mg tablet, Take 1 tablet (10 mg total) by mouth daily, Disp: 90 tablet, Rfl: 1    Multiple Vitamins-Minerals (OCUVITE ADULT 50+ PO), Take by mouth daily with breakfast, Disp: , Rfl:     primidone (MYSOLINE) 50 mg tablet, Take 0 5 tablets (25 mg total) by mouth daily, Disp: 45 tablet, Rfl: 1    sacubitril-valsartan (ENTRESTO)  MG TABS, Take 1 tablet by mouth 2 (two) times a day, Disp: 60 tablet, Rfl: 5    warfarin (COUMADIN) 2 5 mg tablet, TAKE 1 TO 1 1/2 TABLETS  DAILY BY MOUTH OR AS ORDERED BY PHYSICIAN , Disp: 135 tablet, Rfl: 3    Past Surgical History:   Procedure Laterality Date    BREAST BIOPSY Left 2018    benign    BREAST SURGERY N/A     benign, calcium    CARDIAC SURGERY  1990    mitral valve replacement    CATARACT EXTRACTION Right 2015    CATARACT EXTRACTION Left 2014    CHEST TUBE INSERTION Right     post pleural effusion    CHOLECYSTECTOMY  2000    lap    HYSTERECTOMY  1973    partial    NM COLSC FLX W/RMVL OF TUMOR POLYP LESION SNARE TQ N/A 7/18/2017    Procedure: COLONOSCOPY and biopsy ;  Surgeon: Sekou Alvarado MD;  Location: [facility];   Service: Gastroenterology    SKIN BIOPSY      pre cancerous on face    TONSILLECTOMY      US GUIDED BREAST BIOPSY RIGHT COMPLETE Right 2/13/2018       No Known Allergies    Patient Active Problem List   Diagnosis    H/O mitral valve replacement with mechanical valve    Atrial fibrillation (HCC) [I48 91]    Benign familial tremor    Chronic [facility]    Chronic right-sided low back pain without sciatica    Drug induced insomnia ([facility]    Dupuytren's contracture    Generalized osteoarthritis    Heart failure, left systolic, chronic (HCC)    Hypothyroidism    Memory impairment    Mitral valve disorder    Parkinsons ([facility]    Peripheral vascular disease ([facility] )    Seasonal allergies    Transient ischemic attack    Vitamin D insufficiency    Suprapatellar bursitis of right knee    Numbness and tingling in right hand    Other microscopic hematuria       Review of Systems   Constitutional: Negative  HENT: Negative  Eyes: Negative  Respiratory: Negative  Cardiovascular: Negative  Gastrointestinal: Negative  Endocrine: Negative  Genitourinary: Negative  Musculoskeletal: Negative  Skin: Negative  Allergic/Immunologic: Negative  Neurological: Negative  Hematological: Negative  Psychiatric/Behavioral: Negative  Objective: There were no vitals taken for this visit  Physical Exam      DP pulses 1/4 bilateral, PT pulses nonpalpable, capillary refill time 6 sec times 10 temperature gradient increased bilateral   Plus one edema bilateral feet and ankles moderate venous stasis  All nails thick discolored dystrophic positive subungual debris positive pain on palpation  Significant bunion bilateral   Hammertoes 2 through 5 bilateral   There is some dorsal displacement of the 2nd and 3rd MPJ left worse than right secondary to bunion  Significant pes planus  Significant equinus bilateral   Muscle strength 5/5 all groups bilateral feet and ankles  No open wound or signs of infection  Vibratory sensation reduced bilateral monofilament sensation within normal limits  Mild edema  Negative erythema no signs of infection  Nails are thick discolored dystrophic, positive subungual debris, mild pain on palpatio  Patient has a slow an mildly abducted gait    No instability on gait exam  Moderate xerosis plantar feet bilat

## 2019-08-09 ENCOUNTER — ANTICOAG VISIT (OUTPATIENT)
Dept: CARDIOLOGY CLINIC | Facility: CLINIC | Age: 79
End: 2019-08-09

## 2019-08-09 ENCOUNTER — APPOINTMENT (OUTPATIENT)
Dept: LAB | Facility: CLINIC | Age: 79
End: 2019-08-09
Payer: MEDICARE

## 2019-08-09 DIAGNOSIS — Z95.2 H/O MITRAL VALVE REPLACEMENT WITH MECHANICAL VALVE: ICD-10-CM

## 2019-08-09 DIAGNOSIS — I48.91 ATRIAL FIBRILLATION, UNSPECIFIED TYPE (HCC): ICD-10-CM

## 2019-08-30 ENCOUNTER — APPOINTMENT (OUTPATIENT)
Dept: RADIOLOGY | Facility: CLINIC | Age: 79
End: 2019-08-30
Payer: MEDICARE

## 2019-08-30 ENCOUNTER — TELEPHONE (OUTPATIENT)
Dept: FAMILY MEDICINE CLINIC | Facility: CLINIC | Age: 79
End: 2019-08-30

## 2019-08-30 ENCOUNTER — OFFICE VISIT (OUTPATIENT)
Dept: FAMILY MEDICINE CLINIC | Facility: CLINIC | Age: 79
End: 2019-08-30
Payer: MEDICARE

## 2019-08-30 VITALS
HEART RATE: 80 BPM | RESPIRATION RATE: 16 BRPM | SYSTOLIC BLOOD PRESSURE: 108 MMHG | TEMPERATURE: 97.9 F | DIASTOLIC BLOOD PRESSURE: 68 MMHG | HEIGHT: 65 IN | WEIGHT: 192 LBS | BODY MASS INDEX: 31.99 KG/M2

## 2019-08-30 DIAGNOSIS — R05.9 COUGH: Primary | ICD-10-CM

## 2019-08-30 DIAGNOSIS — J40 BRONCHITIS: ICD-10-CM

## 2019-08-30 DIAGNOSIS — R22.43 LOCALIZED SWELLING OF BOTH LOWER LEGS: ICD-10-CM

## 2019-08-30 PROCEDURE — 71046 X-RAY EXAM CHEST 2 VIEWS: CPT

## 2019-08-30 PROCEDURE — 99214 OFFICE O/P EST MOD 30 MIN: CPT | Performed by: FAMILY MEDICINE

## 2019-08-30 RX ORDER — LEVOFLOXACIN 500 MG/1
500 TABLET, FILM COATED ORAL EVERY 24 HOURS
Qty: 10 TABLET | Refills: 0 | Status: SHIPPED | OUTPATIENT
Start: 2019-08-30 | End: 2019-09-09

## 2019-08-30 RX ORDER — ALBUTEROL SULFATE 90 UG/1
2 AEROSOL, METERED RESPIRATORY (INHALATION) EVERY 4 HOURS PRN
Qty: 1 INHALER | Refills: 0 | Status: SHIPPED | OUTPATIENT
Start: 2019-08-30 | End: 2020-01-31 | Stop reason: ALTCHOICE

## 2019-08-30 NOTE — TELEPHONE ENCOUNTER
Spoke with patient, advised her that CXR showed no pneumonia but she needs to continue with the antibiotic  Patient given Warfarin dose of 2 5mg every other day per Dr Tracy Harding  Also patient advised to stop taking the Furosemide   Pharmacy called to say that Levoquin interacts with Warfarin, Dr Tracy Harding approved to take both medications as directed    ----- Message from Elaine Kincaid MD sent at 8/30/2019  3:08 PM EDT -----  Pl, advise pt -  CXR did not show pneumonia -  Cont with AB as discussed at the visit

## 2019-08-30 NOTE — PROGRESS NOTES
Chief Complaint   Patient presents with   Carlena Cease Like Symptoms        Patient ID: Ibrahima Luo is a 77 y o  female  HPI  Pt is seeing for cough, chest congestion, wheezing, worsening at night, started 1 wk ago while visiting [place] -  Was seeing by a doctor there and Dx with " early pneumonia" clinically -  Started on Penicillin 4 times a day  -  Minimally better -  Able to eat, no N/V, has chronic legs swelling over last few months -  Was seeing by cardiologist -  On lasix 40 mg a a day and using compression stocking -  Not better  -  Negative Doppler few m ago     The following portions of the patient's history were reviewed and updated as appropriate: allergies, current medications, past family history, past medical history, past social history, past surgical history and problem list     Review of Systems   Constitutional: Positive for activity change and fatigue  Negative for appetite change, chills and fever  HENT: Negative  Respiratory: Positive for cough, shortness of breath (with exertion ) and wheezing  Negative for chest tightness  Cardiovascular: Positive for leg swelling  Negative for chest pain and palpitations  Gastrointestinal: Negative  Genitourinary: Negative  Psychiatric/Behavioral: Negative          Current Outpatient Medications   Medication Sig Dispense Refill    acetaminophen (TYLENOL) 650 mg CR tablet Take 1 tablet (650 mg total) by mouth 3 (three) times a day 100 tablet 0    carbidopa-levodopa-entacapone (STALEVO) -200 MG per tablet Take 1 tablet by mouth 4 (four) times a day 120 tablet 3    Cholecalciferol (VITAMIN D3) 2000 units TABS Take 2,000 Units by mouth every morning      donepezil (ARICEPT) 5 mg tablet Take 1 tablet (5 mg total) by mouth daily at bedtime 90 tablet 1    furosemide (LASIX) 40 mg tablet Take 1 tablet (40 mg total) by mouth daily 90 tablet 3    levothyroxine 25 mcg tablet take 1 tablet by mouth every morning 90 tablet 2    memantine (NAMENDA) 10 mg tablet Take 1 tablet (10 mg total) by mouth daily 90 tablet 1    Multiple Vitamins-Minerals (OCUVITE ADULT 50+ PO) Take by mouth daily with breakfast      primidone (MYSOLINE) 50 mg tablet Take 0 5 tablets (25 mg total) by mouth daily 45 tablet 1    sacubitril-valsartan (ENTRESTO)  MG TABS Take 1 tablet by mouth 2 (two) times a day 60 tablet 5    warfarin (COUMADIN) 2 5 mg tablet TAKE 1 TO 1 1/2 TABLETS  DAILY BY MOUTH OR AS ORDERED BY PHYSICIAN  135 tablet 3    diclofenac sodium (VOLTAREN) 1 % Apply 2 g topically 4 (four) times a day (Patient not taking: Reported on 5/22/2019) 1 Tube 1     No current facility-administered medications for this visit  Objective:    /68 (BP Location: Right arm, Patient Position: Sitting, Cuff Size: Extra-Large)   Pulse 80   Temp 97 9 °F (36 6 °C) (Tympanic)   Resp 16   Ht 5' 4 5" (1 638 m)   Wt 87 1 kg (192 lb)   BMI 32 45 kg/m²        Physical Exam   Constitutional: No distress  Cardiovascular: Normal rate and regular rhythm  No murmur heard  Pulmonary/Chest: Effort normal  No respiratory distress  She has wheezes in the right upper field, the right middle field, the right lower field, the left upper field, the left middle field and the left lower field  She has no rales  Musculoskeletal: She exhibits edema (+ 3 edema b/l LE )  Using a walker                Assessment/Plan:         Diagnoses and all orders for this visit:    Cough  -     levofloxacin (LEVAQUIN) 500 mg tablet; Take 1 tablet (500 mg total) by mouth every 24 hours for 10 days  -     albuterol (PROVENTIL HFA,VENTOLIN HFA) 90 mcg/act inhaler; Inhale 2 puffs every 4 (four) hours as needed for wheezing or shortness of breath  -     XR chest pa & lateral; Future  Was advised to decrease Coumadin to 10 mg a wk -  2 5 mg every other day for the duration of Levaquin therapy   Bronchitis  -     levofloxacin (LEVAQUIN) 500 mg tablet;  Take 1 tablet (500 mg total) by mouth every 24 hours for 10 days  -     albuterol (PROVENTIL HFA,VENTOLIN HFA) 90 mcg/act inhaler; Inhale 2 puffs every 4 (four) hours as needed for wheezing or shortness of breath  -     XR chest pa & lateral; Future    Localized swelling of both lower legs  -     Ambulatory referral to Vascular Surgery; Future            BMI Counseling: Body mass index is 32 45 kg/m²  Discussed the patient's BMI with her  The BMI is above average  BMI counseling and education was provided to the patient  Nutrition recommendations include reducing portion sizes, decreasing overall calorie intake, 3-5 servings of fruits/vegetables daily, reducing fast food intake, consuming healthier snacks and decreasing soda and/or juice intake  Exercise recommendations include exercising 3-5 times per week         rto in 4 days           Grayson Runner, MD

## 2019-09-03 ENCOUNTER — OFFICE VISIT (OUTPATIENT)
Dept: FAMILY MEDICINE CLINIC | Facility: CLINIC | Age: 79
End: 2019-09-03
Payer: MEDICARE

## 2019-09-03 VITALS
WEIGHT: 192 LBS | HEIGHT: 65 IN | TEMPERATURE: 98 F | HEART RATE: 88 BPM | RESPIRATION RATE: 18 BRPM | SYSTOLIC BLOOD PRESSURE: 124 MMHG | BODY MASS INDEX: 31.99 KG/M2 | DIASTOLIC BLOOD PRESSURE: 68 MMHG

## 2019-09-03 DIAGNOSIS — M79.89 LEG SWELLING: ICD-10-CM

## 2019-09-03 DIAGNOSIS — R05.9 COUGH: Primary | ICD-10-CM

## 2019-09-03 PROCEDURE — 99214 OFFICE O/P EST MOD 30 MIN: CPT | Performed by: FAMILY MEDICINE

## 2019-09-04 ENCOUNTER — HOSPITAL ENCOUNTER (OUTPATIENT)
Dept: RADIOLOGY | Facility: HOSPITAL | Age: 79
Discharge: HOME | End: 2019-09-04
Attending: FAMILY MEDICINE
Payer: MEDICARE

## 2019-09-04 ENCOUNTER — TELEPHONE (OUTPATIENT)
Dept: FAMILY MEDICINE CLINIC | Facility: CLINIC | Age: 79
End: 2019-09-04

## 2019-09-04 DIAGNOSIS — E03.9 HYPOTHYROIDISM, UNSPECIFIED TYPE: ICD-10-CM

## 2019-09-04 DIAGNOSIS — M79.89 LEG SWELLING: ICD-10-CM

## 2019-09-04 DIAGNOSIS — I50.42 CHRONIC COMBINED SYSTOLIC AND DIASTOLIC HEART FAILURE (HCC): ICD-10-CM

## 2019-09-04 PROCEDURE — 93971 EXTREMITY STUDY: CPT | Performed by: SURGERY

## 2019-09-04 PROCEDURE — 93971 EXTREMITY STUDY: CPT

## 2019-09-04 RX ORDER — SACUBITRIL AND VALSARTAN 97; 103 MG/1; MG/1
TABLET, FILM COATED ORAL
Qty: 180 TABLET | Refills: 3 | Status: SHIPPED | OUTPATIENT
Start: 2019-09-04 | End: 2020-09-08 | Stop reason: SDUPTHER

## 2019-09-04 RX ORDER — LEVOTHYROXINE SODIUM 0.03 MG/1
TABLET ORAL
Qty: 90 TABLET | Refills: 0 | Status: SHIPPED | OUTPATIENT
Start: 2019-09-04 | End: 2020-02-11 | Stop reason: SDUPTHER

## 2019-09-04 NOTE — PROGRESS NOTES
Chief Complaint   Patient presents with    Follow-up    Cough        Patient ID: Gerald Narvaez is a 77 y o  female  HPI  Pt is seeing for f/u Bronchitis - CXR is negative - started on Levaquin and Ventolin  -  Chest congestion resolved, cough improved, now has more sinus congestion     The following portions of the patient's history were reviewed and updated as appropriate: allergies, current medications, past family history, past medical history, past social history, past surgical history and problem list     Review of Systems   Constitutional: Positive for fatigue  Negative for chills and fever  HENT: Positive for congestion and sinus pressure  Negative for sore throat  Respiratory: Negative  Cardiovascular: Positive for leg swelling (worsening after recent trip to [facility] -  on Coumadin -  had negative venous doppler 2 m ago - L leg swelling is worse than R, does not use compression stockings)  Negative for chest pain and palpitations  Gastrointestinal: Negative          Current Outpatient Medications   Medication Sig Dispense Refill    acetaminophen (TYLENOL) 650 mg CR tablet Take 1 tablet (650 mg total) by mouth 3 (three) times a day 100 tablet 0    albuterol (PROVENTIL HFA,VENTOLIN HFA) 90 mcg/act inhaler Inhale 2 puffs every 4 (four) hours as needed for wheezing or shortness of breath 1 Inhaler 0    carbidopa-levodopa-entacapone (STALEVO) -200 MG per tablet Take 1 tablet by mouth 4 (four) times a day 120 tablet 3    Cholecalciferol (VITAMIN D3) 2000 units TABS Take 2,000 Units by mouth every morning      diclofenac sodium (VOLTAREN) 1 % Apply 2 g topically 4 (four) times a day 1 Tube 1    donepezil (ARICEPT) 5 mg tablet Take 1 tablet (5 mg total) by mouth daily at bedtime 90 tablet 1    furosemide (LASIX) 40 mg tablet Take 1 tablet (40 mg total) by mouth daily 90 tablet 3    levofloxacin (LEVAQUIN) 500 mg tablet Take 1 tablet (500 mg total) by mouth every 24 hours for 10 days 10 tablet 0    levothyroxine 25 mcg tablet take 1 tablet by mouth every morning 90 tablet 2    memantine (NAMENDA) 10 mg tablet Take 1 tablet (10 mg total) by mouth daily 90 tablet 1    Multiple Vitamins-Minerals (OCUVITE ADULT 50+ PO) Take by mouth daily with breakfast      primidone (MYSOLINE) 50 mg tablet Take 0 5 tablets (25 mg total) by mouth daily 45 tablet 1    sacubitril-valsartan (ENTRESTO)  MG TABS Take 1 tablet by mouth 2 (two) times a day 60 tablet 5    warfarin (COUMADIN) 2 5 mg tablet TAKE 1 TO 1 1/2 TABLETS  DAILY BY MOUTH OR AS ORDERED BY PHYSICIAN  135 tablet 3     No current facility-administered medications for this visit  Objective:    /68 (BP Location: Right arm, Patient Position: Sitting, Cuff Size: Large)   Pulse 88   Temp 98 °F (36 7 °C) (Tympanic)   Resp 18   Ht 5' 4 5" (1 638 m)   Wt 87 1 kg (192 lb)   BMI 32 45 kg/m²        Physical Exam   Cardiovascular: Normal rate and regular rhythm  Pulmonary/Chest: Effort normal and breath sounds normal  No respiratory distress  She has no wheezes  She has no rales  Musculoskeletal: She exhibits edema (+ 3 LLE up to mid caf level, + 2 edema RLE to distal calf level )  Assessment/Plan:         Diagnoses and all orders for this visit:    Cough  Was advised to finish Levaquin  Improved   Leg swelling  -     VAS lower limb venous duplex study, unilateral/limited;  Future  Has f/u with vascular surgeon in 3 wks     will restarted Lasix  ( ws holding b/o fatigue)     rto prn                     Bernie Menendez MD

## 2019-09-04 NOTE — TELEPHONE ENCOUNTER
----- Message from Silvino Zepeda MD sent at 9/4/2019 12:07 PM EDT -----  Pl, advise pt -  Normal doppler test -  Cont with Furosemide and compression stockings

## 2019-09-05 ENCOUNTER — APPOINTMENT (OUTPATIENT)
Dept: LAB | Facility: CLINIC | Age: 79
End: 2019-09-05
Payer: MEDICARE

## 2019-09-05 ENCOUNTER — ANTICOAG VISIT (OUTPATIENT)
Dept: CARDIOLOGY CLINIC | Facility: CLINIC | Age: 79
End: 2019-09-05

## 2019-09-05 DIAGNOSIS — I48.91 ATRIAL FIBRILLATION, UNSPECIFIED TYPE (HCC): ICD-10-CM

## 2019-09-05 DIAGNOSIS — Z95.2 H/O MITRAL VALVE REPLACEMENT WITH MECHANICAL VALVE: ICD-10-CM

## 2019-09-09 ENCOUNTER — APPOINTMENT (OUTPATIENT)
Dept: LAB | Facility: CLINIC | Age: 79
End: 2019-09-09
Payer: MEDICARE

## 2019-09-10 ENCOUNTER — ANTICOAG VISIT (OUTPATIENT)
Dept: CARDIOLOGY CLINIC | Facility: CLINIC | Age: 79
End: 2019-09-10

## 2019-09-10 DIAGNOSIS — Z95.2 H/O MITRAL VALVE REPLACEMENT WITH MECHANICAL VALVE: ICD-10-CM

## 2019-09-10 DIAGNOSIS — I48.91 ATRIAL FIBRILLATION, UNSPECIFIED TYPE (HCC): ICD-10-CM

## 2019-09-12 ENCOUNTER — TELEPHONE (OUTPATIENT)
Dept: CARDIOLOGY CLINIC | Facility: CLINIC | Age: 79
End: 2019-09-12

## 2019-09-12 DIAGNOSIS — G30.9 ALZHEIMER'S DEMENTIA WITHOUT BEHAVIORAL DISTURBANCE, UNSPECIFIED TIMING OF DEMENTIA ONSET (HCC): ICD-10-CM

## 2019-09-12 DIAGNOSIS — F02.80 ALZHEIMER'S DEMENTIA WITHOUT BEHAVIORAL DISTURBANCE, UNSPECIFIED TIMING OF DEMENTIA ONSET (HCC): ICD-10-CM

## 2019-09-12 DIAGNOSIS — I50.42 CHRONIC COMBINED SYSTOLIC AND DIASTOLIC HEART FAILURE (HCC): Primary | ICD-10-CM

## 2019-09-12 RX ORDER — DONEPEZIL HYDROCHLORIDE 5 MG/1
TABLET, FILM COATED ORAL
Qty: 90 TABLET | Refills: 0 | Status: SHIPPED | OUTPATIENT
Start: 2019-09-12 | End: 2019-12-11 | Stop reason: SDUPTHER

## 2019-09-12 NOTE — TELEPHONE ENCOUNTER
Spoke to patient by telephone  Ordered BMP to be done no later than tomorrow morning and asked patient to hold Entresto for the rest of today and to resume it tomorrow morning if she feels better  Also recommended an earlier than scheduled appointment with me if she is still not feeling well by tomorrow

## 2019-09-12 NOTE — TELEPHONE ENCOUNTER
Patient calling for this refill  Please review and sign if appropriate  She states she has enough medication for tonight only

## 2019-09-12 NOTE — TELEPHONE ENCOUNTER
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

## 2019-09-12 NOTE — TELEPHONE ENCOUNTER
James from [address] [place] [address] called to f/u on aricept refill request  Rx pending   Pls sign off       thanks

## 2019-09-13 ENCOUNTER — APPOINTMENT (OUTPATIENT)
Dept: LAB | Facility: CLINIC | Age: 79
End: 2019-09-13
Payer: MEDICARE

## 2019-09-13 DIAGNOSIS — I50.42 CHRONIC COMBINED SYSTOLIC AND DIASTOLIC HEART FAILURE (HCC): ICD-10-CM

## 2019-09-13 LAB
ANION GAP SERPL CALCULATED.3IONS-SCNC: 5 MMOL/L (ref 4–13)
BUN SERPL-MCNC: 17 MG/DL (ref 5–25)
CALCIUM SERPL-MCNC: 9.6 MG/DL (ref 8.3–10.1)
CHLORIDE SERPL-SCNC: 110 MMOL/L (ref 100–108)
CO2 SERPL-SCNC: 25 MMOL/L (ref 21–32)
CREAT SERPL-MCNC: 1.02 MG/DL (ref 0.6–1.3)
GFR SERPL CREATININE-BSD FRML MDRD: 52 ML/MIN/1.73SQ M
GLUCOSE P FAST SERPL-MCNC: 103 MG/DL (ref 65–99)
POTASSIUM SERPL-SCNC: 4 MMOL/L (ref 3.5–5.3)
SODIUM SERPL-SCNC: 140 MMOL/L (ref 136–145)

## 2019-09-13 PROCEDURE — 36415 COLL VENOUS BLD VENIPUNCTURE: CPT

## 2019-09-13 PROCEDURE — 80048 BASIC METABOLIC PNL TOTAL CA: CPT

## 2019-09-16 ENCOUNTER — TELEPHONE (OUTPATIENT)
Dept: CARDIOLOGY CLINIC | Facility: CLINIC | Age: 79
End: 2019-09-16

## 2019-09-16 NOTE — TELEPHONE ENCOUNTER
----- Message from Ricardo Springer MD sent at 9/13/2019  4:46 PM EDT -----  Notify patient that basic metabolic panel today was satisfactory with no significant changes from before

## 2019-09-26 ENCOUNTER — TELEPHONE (OUTPATIENT)
Dept: VASCULAR SURGERY | Facility: CLINIC | Age: 79
End: 2019-09-26

## 2019-09-26 NOTE — TELEPHONE ENCOUNTER
[x] 10/25/19 9:00 AM [address], [place] [[id number]] VAS [facility] [[id number]] CONSULT [id number]       Left message for patient to reschedule   Provider out

## 2019-09-27 ENCOUNTER — ANTICOAG VISIT (OUTPATIENT)
Dept: CARDIOLOGY CLINIC | Facility: CLINIC | Age: 79
End: 2019-09-27

## 2019-09-27 ENCOUNTER — APPOINTMENT (OUTPATIENT)
Dept: LAB | Facility: CLINIC | Age: 79
End: 2019-09-27
Payer: MEDICARE

## 2019-09-27 DIAGNOSIS — I48.91 ATRIAL FIBRILLATION, UNSPECIFIED TYPE (HCC): ICD-10-CM

## 2019-09-27 DIAGNOSIS — Z95.2 H/O MITRAL VALVE REPLACEMENT WITH MECHANICAL VALVE: ICD-10-CM

## 2019-10-01 NOTE — TELEPHONE ENCOUNTER
sw pt she would like to cx her appointment at this time as  She has had a second opinion and feels there is no need

## 2019-10-02 ENCOUNTER — CLINICAL SUPPORT (OUTPATIENT)
Dept: FAMILY MEDICINE CLINIC | Facility: CLINIC | Age: 79
End: 2019-10-02
Payer: MEDICARE

## 2019-10-02 DIAGNOSIS — Z23 NEEDS FLU SHOT: Primary | ICD-10-CM

## 2019-10-02 PROCEDURE — G0008 ADMIN INFLUENZA VIRUS VAC: HCPCS

## 2019-10-02 PROCEDURE — 90662 IIV NO PRSV INCREASED AG IM: CPT

## 2019-10-03 ENCOUNTER — OFFICE VISIT (OUTPATIENT)
Dept: NEUROLOGY | Facility: CLINIC | Age: 79
End: 2019-10-03
Payer: MEDICARE

## 2019-10-03 VITALS
DIASTOLIC BLOOD PRESSURE: 64 MMHG | BODY MASS INDEX: 30.66 KG/M2 | HEART RATE: 72 BPM | SYSTOLIC BLOOD PRESSURE: 98 MMHG | HEIGHT: 65 IN | WEIGHT: 184 LBS

## 2019-10-03 DIAGNOSIS — G20 PARKINSON'S DISEASE (HCC): Primary | ICD-10-CM

## 2019-10-03 DIAGNOSIS — R53.81 PHYSICAL DECONDITIONING: ICD-10-CM

## 2019-10-03 DIAGNOSIS — G31.84 MCI (MILD COGNITIVE IMPAIRMENT): ICD-10-CM

## 2019-10-03 PROCEDURE — 99214 OFFICE O/P EST MOD 30 MIN: CPT | Performed by: PSYCHIATRY & NEUROLOGY

## 2019-10-03 RX ORDER — TRIHEXYPHENIDYL HYDROCHLORIDE 2 MG/1
2 TABLET ORAL
Qty: 90 TABLET | Refills: 2 | Status: SHIPPED | OUTPATIENT
Start: 2019-10-03 | End: 2019-11-20 | Stop reason: ALTCHOICE

## 2019-10-03 NOTE — PROGRESS NOTES
Return NeuroOutpatient Note        Donald Ruiz  [id number]  26 y o   1940       Parkinson's Disease (Voice change, drooling, trouble swallowing) and Leg Swelling        History obtained from:  Patient     HPI/Subjective:    Mrs Samantha Machado returns as follow up for Parkinson's disease  Per my previous reports, her disease is manifested by masked facies, hypophonic voice, bradykinesia  She was diagnosed about 6 years ago  Her speech does get slower each visit  Her swallowing has been affected over time but is not significant  She's on stalevo 1 tab qid  In terms of memory disturbance, she's stable  She says she can't read for more than few minutes  She used to read one book a day  Some of it is also due to vision problems  She's now on aricept and namenda combination  Her memory has remained stable  Over all, patient's balance is getting worse  She can mange with cane  At last visit, knee pain was affecting her stability  She takes only tylenol prn  Her voice is getting softer  She doesn't qualify for PT until next year  Denies falls  Patient does report drooling but only at night and it's not affecting as much  She denies any hallucinations  Denies delusions  She does now flail a little in her sleep for past 2 months  She also has essential tremors, she is on primidone 50mg nightly  After being placed Entresto she feels more energetic  In July she had EMG of RUE which was suggestive of mild median mononeuropathy and suggestive of cervical radiculopathy           Past Medical History:   Diagnosis Date    Anal fissure     Arthritis     osteo joints    Asthma with acute exacerbation     Blepharitis     Breast lump     Chalazion     CHF, chronic (HCC)     Difficulty walking     Disease of thyroid gland     Drooling     Dysphagia     Fall 05/04/2018    Fibromyalgia, primary     History of transfusion 1996    Hordeolum externum     Hx of transient ischemic attack (TIA)     Hypophonia     Infectious viral hepatitis     Hep C dx      Lyme carditis     Murmur, cardiac     Parkinsons (HCC)     Rotator cuff tendinitis     Seasonal allergies     Urinary frequency      Social History     Socioeconomic History    Marital status: /Civil Union     Spouse name: Not on file    Number of children: Not on file    Years of education: Not on file    Highest education level: Not on file   Occupational History    Not on file   Social Needs    Financial resource strain: Not on file    Food insecurity:     Worry: Not on file     Inability: Not on file    Transportation needs:     Medical: Not on file     Non-medical: Not on file   Tobacco Use    Smoking status: Former Smoker     Packs/day: 0 10     Years: 10 00     Pack years: 1 00     Last attempt to quit: 1970     Years since quittin 7    Smokeless tobacco: Never Used   Substance and Sexual Activity    Alcohol use: No    Drug use: No    Sexual activity: Not on file   Lifestyle    Physical activity:     Days per week: Not on file     Minutes per session: Not on file    Stress: Not on file   Relationships    Social connections:     Talks on phone: Not on file     Gets together: Not on file     Attends [employer] service: Not on file     Active member of club or organization: Not on file     Attends meetings of clubs or organizations: Not on file     Relationship status: Not on file    Intimate partner violence:     Fear of current or ex partner: Not on file     Emotionally abused: Not on file     Physically abused: Not on file     Forced sexual activity: Not on file   Other Topics Concern    Not on file   Social History Narrative    Not on file     Family History   Problem Relation Age of Onset    Heart disease Mother         murmur Cardiomegaly age 64    Pneumonia Father     Heart disease Brother         mitrsl valve    Parkinsonism Brother     Arthritis Brother     Lupus Daughter     Diabetes Daughter  Depression Daughter      No Known Allergies  Current Outpatient Medications on File Prior to Visit   Medication Sig Dispense Refill    acetaminophen (TYLENOL) 650 mg CR tablet Take 1 tablet (650 mg total) by mouth 3 (three) times a day (Patient taking differently: Take 650 mg by mouth every 8 (eight) hours as needed ) 100 tablet 0    carbidopa-levodopa-entacapone (STALEVO) -200 MG per tablet Take 1 tablet by mouth 4 (four) times a day 120 tablet 3    Cholecalciferol (VITAMIN D3) 2000 units TABS Take 2,000 Units by mouth every morning      donepezil (ARICEPT) 5 mg tablet TAKE 1 TABLET BY MOUTH EVERY NIGHT AT BEDTIME 90 tablet 0    ENTRESTO  MG TABS TAKE 1 TABLET BY MOUTH TWICE DAILY 180 tablet 3    furosemide (LASIX) 40 mg tablet Take 1 tablet (40 mg total) by mouth daily (Patient taking differently: Take 40 mg by mouth as needed ) 90 tablet 3    levothyroxine 25 mcg tablet TAKE 1 TABLET BY MOUTH EVERY MORNING 90 tablet 0    memantine (NAMENDA) 10 mg tablet Take 1 tablet (10 mg total) by mouth daily 90 tablet 1    Multiple Vitamins-Minerals (OCUVITE ADULT 50+ PO) Take by mouth daily with breakfast      primidone (MYSOLINE) 50 mg tablet Take 0 5 tablets (25 mg total) by mouth daily 45 tablet 1    warfarin (COUMADIN) 2 5 mg tablet TAKE 1 TO 1 1/2 TABLETS  DAILY BY MOUTH OR AS ORDERED BY PHYSICIAN  135 tablet 3    albuterol (PROVENTIL HFA,VENTOLIN HFA) 90 mcg/act inhaler Inhale 2 puffs every 4 (four) hours as needed for wheezing or shortness of breath (Patient not taking: Reported on 10/3/2019) 1 Inhaler 0    diclofenac sodium (VOLTAREN) 1 % Apply 2 g topically 4 (four) times a day (Patient not taking: Reported on 10/3/2019) 1 Tube 1    [DISCONTINUED] carbidopa-levodopa (SINEMET)  mg per tablet Take 1 tablet by mouth 4 (four) times a day       No current facility-administered medications on file prior to visit  Review of Systems   Refer to positive review of systems in HPI  Review of Systems   Musculoskeletal: Positive for arthralgias and joint swelling  Neurological: Positive for weakness  All other systems reviewed and are negative  Vitals:    10/03/19 1538   BP: 98/64   BP Location: Left arm   Patient Position: Sitting   Cuff Size: Adult   Pulse: 72   Weight: 83 5 kg (184 lb)   Height: 5' 4 5" (1 638 m)       PHYSICAL EXAM:  Appearance: No Acute Distress, masked facies*  Ophthalmoscopic: Disc Flat, Normal fundus  Mental status:  Orientation: Awake, Alert, and Orientedx3  Memory: Registation 3/3 Recall 2/3  Attention: normal  Knowledge: good  Language: No aphasia  Speech: No dysarthria  Cranial Nerves:  2 No Visual Defect on Confrontation, Pupils round, equal, reactive to light  3,4,6 Extraocular Movements Intact, no nystagmus  5 Facial Sensation Intact  7 No facial asymmetry  8 Intact hearing  9,10 Palate symmetric, normal gag  11 Good shoulder shrug  12 Tongue Midline  Gait: uses cane, hesitant walking without holding onto something   Coordination: essential tremor in right hand  Sensory: Intact, Symmetric to pinprick, light touch, vibration, and joint position  Muscle Tone: mild increase in rigidity in RLE  Muscle exam:  Arm Right Left Leg Right Left   Deltoid 5/5 5/5 Iliopsoas 5/5 5/5   Biceps 5/5 5/5 Quads 5/5 5/5   Triceps 5/5 5/5 Hamstrings 5/5 5/5   Wrist Extension 5/5 5/5 Ankle Dorsi Flexion 5/5 5/5   Wrist Flexion 5/5 5/5 Ankle Plantar Flexion 5/5 5/5   Interossei 5/5 5/5 Ankle Eversion 5/5 5/5   APB 5/5 5/5 Ankle Inversion 5/5 5/5       Reflexes   RJ BJ TJ KJ AJ Plantars Ayala's   Right 2+ 2+ 2+ 2+ 2+ Downgoing Not present   Left 2+ 2+ 2+ 2+ 2+ Downgoing Not present     Personal review of  Labs:                  Diagnoses and all orders for this visit:        1  Parkinson's disease ([address] )  trihexyphenidyl (ARTANE) 2 mg tablet       Clinically patient doesn't appear worse compared to previous visit  She has however declined over past year  Patient is still social and communicates well but disease is slowly progressive  Will add artane to her regimen to see if it improves her mobility  Will resume stalevo 4x daily  Will resume aricept/namenda  She's on 12 5mg primidone nightly for ET  Encourage activity               Total time of encounter:  30 min  More than 50% of the time was used in counseling and/or coordination of care  Extent of counseling and/or coordination of care        Timmy Gonzalez MD  [facility]  [address] [place] [address]  [phone]

## 2019-10-11 ENCOUNTER — ANTICOAG VISIT (OUTPATIENT)
Dept: CARDIOLOGY CLINIC | Facility: CLINIC | Age: 79
End: 2019-10-11

## 2019-10-11 ENCOUNTER — TRANSCRIBE ORDERS (OUTPATIENT)
Dept: ADMINISTRATIVE | Facility: HOSPITAL | Age: 79
End: 2019-10-11

## 2019-10-11 ENCOUNTER — APPOINTMENT (OUTPATIENT)
Dept: LAB | Facility: CLINIC | Age: 79
End: 2019-10-11
Payer: MEDICARE

## 2019-10-11 DIAGNOSIS — I48.91 ATRIAL FIBRILLATION, UNSPECIFIED TYPE (HCC): ICD-10-CM

## 2019-10-11 DIAGNOSIS — Z95.2 HEART VALVE REPLACED BY TRANSPLANT: ICD-10-CM

## 2019-10-11 DIAGNOSIS — Z95.2 HEART VALVE REPLACED BY TRANSPLANT: Primary | ICD-10-CM

## 2019-10-11 DIAGNOSIS — Z95.2 H/O MITRAL VALVE REPLACEMENT WITH MECHANICAL VALVE: ICD-10-CM

## 2019-10-11 LAB
INR PPP: 3.72 (ref 0.84–1.19)
PROTHROMBIN TIME: 36.3 SECONDS (ref 11.6–14.5)

## 2019-10-11 PROCEDURE — 36415 COLL VENOUS BLD VENIPUNCTURE: CPT

## 2019-10-11 PROCEDURE — 85610 PROTHROMBIN TIME: CPT

## 2019-10-15 ENCOUNTER — TELEPHONE (OUTPATIENT)
Dept: NEUROLOGY | Facility: CLINIC | Age: 79
End: 2019-10-15

## 2019-10-15 NOTE — TELEPHONE ENCOUNTER
Called [facility]- Spoke with Trent Benson- he ran medication and it went through  I called patient to advise her the medication has been approved and the pharmacy is aware

## 2019-10-15 NOTE — TELEPHONE ENCOUNTER
Prior Auth  For  Artane 2mg started on CMM  Sent to [facility]  Medication has been Approved  10/15/19-10/14/2020  Form scanned into Media

## 2019-10-17 ENCOUNTER — OFFICE VISIT (OUTPATIENT)
Dept: CARDIOLOGY CLINIC | Facility: CLINIC | Age: 79
End: 2019-10-17
Payer: MEDICARE

## 2019-10-17 VITALS
HEART RATE: 72 BPM | OXYGEN SATURATION: 100 % | BODY MASS INDEX: 31.99 KG/M2 | WEIGHT: 192 LBS | DIASTOLIC BLOOD PRESSURE: 58 MMHG | SYSTOLIC BLOOD PRESSURE: 110 MMHG | HEIGHT: 65 IN

## 2019-10-17 DIAGNOSIS — I50.42 CHRONIC COMBINED SYSTOLIC AND DIASTOLIC HEART FAILURE (HCC): ICD-10-CM

## 2019-10-17 DIAGNOSIS — G20 PARKINSON'S DISEASE (HCC): ICD-10-CM

## 2019-10-17 DIAGNOSIS — Z79.01 ON CONTINUOUS ORAL ANTICOAGULATION: ICD-10-CM

## 2019-10-17 DIAGNOSIS — R60.0 EDEMA OF BOTH LEGS: Primary | ICD-10-CM

## 2019-10-17 DIAGNOSIS — I42.0 NONISCHEMIC DILATED CARDIOMYOPATHY (HCC): ICD-10-CM

## 2019-10-17 DIAGNOSIS — I44.7 LEFT BUNDLE BRANCH BLOCK (LBBB): ICD-10-CM

## 2019-10-17 DIAGNOSIS — I48.20 CHRONIC ATRIAL FIBRILLATION (HCC): ICD-10-CM

## 2019-10-17 DIAGNOSIS — I42.0 CARDIOMYOPATHY, DILATED, NONISCHEMIC (HCC): ICD-10-CM

## 2019-10-17 DIAGNOSIS — Z95.2 H/O MITRAL VALVE REPLACEMENT WITH MECHANICAL VALVE: ICD-10-CM

## 2019-10-17 PROCEDURE — 99214 OFFICE O/P EST MOD 30 MIN: CPT | Performed by: INTERNAL MEDICINE

## 2019-10-17 PROCEDURE — 93000 ELECTROCARDIOGRAM COMPLETE: CPT | Performed by: INTERNAL MEDICINE

## 2019-10-17 RX ORDER — FUROSEMIDE 40 MG/1
TABLET ORAL
Qty: 90 TABLET | Refills: 3
Start: 2019-10-17 | End: 2020-05-19 | Stop reason: SDUPTHER

## 2019-10-17 RX ORDER — FUROSEMIDE 40 MG/1
20 TABLET ORAL DAILY
COMMUNITY
End: 2019-10-17 | Stop reason: DRUGHIGH

## 2019-10-17 RX ORDER — FUROSEMIDE 40 MG/1
20 TABLET ORAL DAILY
Qty: 90 TABLET | Refills: 3
Start: 2019-10-17 | End: 2019-10-17 | Stop reason: DRUGHIGH

## 2019-10-17 NOTE — PATIENT INSTRUCTIONS
1  Increase furosemide to 40 milligrams dosage and take one tablet by mouth daily until weight loss of 5-10 lbs as occurred, then can resume use 4-5 days per week  Schedule administration to be done when patient can be near toilet facilities for 4-6 hours after taking furosemide  2  Let us know if weight loss does not occur  3  Continue all other medication as previously used  4  Cardiology follow-up approximately six weeks to follow-up on weight and edema

## 2019-10-17 NOTE — PROGRESS NOTES
Office Cardiology Progress Note  Josue Aguirre [de-identified] y o  female MRN: [id number]  10/17/19  11:55 AM      ASSESSMENT:       1  Worsened  bilateral pretibial/ankle/pedal edema with onset approximately three months  ago with 9 pound weight gain in 3+ months with no other evidence of heart failure  This coincides with reduction in maintenance dose of furosemide    2  Chronic combined systolic and diastolic heart failure with 50-55% LVEF and normal MVR function on 06/22/2019 transthoracic echocardiogram   34% ejection fraction on 7/5/16 MUGA scan/underlying nonischemic dilated cardiomyopathy but currently with less exertional fatigue, dyspnea on exertion, and resolution currently of exhaustion and cold sweats,  as well as occasional nocturnal wheezing   Initially improved on starting dose of Entresto, but had vasovagal type near syncope on 01/18/2019 with no recurrence   Patient with less frequent lightheadedness and  low blood pressures in the 90s-100's  2  Resolved dehydration/hypotension and residual mild dizziness as well as resolution of acute kidney injury since 11/18/16  3  Chronic atrial fibrillation, controlled, and chronic left bundle-branch block with no EKG change today  4  History of mechanical mitral valve replacement September, 1990, with very good oral anticoagulation with target INR of 2 5   Latest INR 3 72 on  10/11/2019   5  Moderately frequent asymptomatic PVCs on otherwise benign Holter monitor December, 2013  6  8 9 pound weight loss in approximately 5+ years, but recent weight gain of 9 lbs in 3+ months  7  Controlled mixed dyslipidemia  8  Mild obstructive sleep apnea previously suspected  9  History of asthma  10  History of GERD  11  History of benign positional vertigo  12  History of Lyme disease  13  Hypothyroidism with slightly elevated TSH level, which could contribute to previous extreme fatigue  14  History of hepatitis C  15   History of Parkinson's disease/gait dysfunction with slowly progressive symptoms and previously on  physical therapy plus home exercise for this  16  Resolved nonproductive cough and postural dizziness with  flu-like illness nearly 1-1/4 years ago  17  Chronic fatigue and malaise, multifactorial, improving  18  Drug-induced insomnia  19  History of fall with subsequent left distal tibial fracture and left ankle sprain with right periorbital ecchymoses and laceration and left wrist sprain on 7/30/17, with no recurrence, possibly related to transient drop in blood pressure with upright position in hot weather after prolonged sitting  Plan       Patient Instructions     1  Increase furosemide to 40 milligrams dosage and take one tablet by mouth daily until weight loss of 5-10 lbs has occurred, then can resume use 4-5 days per week  Schedule administration to be done when patient can be near toilet facilities for 4-6 hours after taking furosemide  2  Let us know if weight loss does not occur  3  Continue all other medication as previously used  4  Cardiology follow-up approximately six weeks to follow-up on weight and edema  HPI    This [de-identified] y o  female  denies new  medical symptoms  Beginning approximately three months ago, the patient had noted a gradual increase in weight two about 186 lbs followed more recently by a rapid increase in weight to current level with associated worsening of bilateral lower extremity edema  She complains of slowly progressive Parkinson's disease symptoms but denies dyspnea, orthopnea, paroxysmal nocturnal dyspnea, palpitations, chest pain, syncope, dizziness, cough, wheezing, sputum production, fever, chills  Her daughter is moving out of their home after a 10 months stay, as she has now expanded her client base  as a free rory  sufficiently to obtain her own residence  The patient and her  do admit to eating out occasionally, especially [employer] food, but otherwise use a low-sodium diet at home      The patient is seen in follow-up of the below listed diagnoses  Encounter Diagnoses   Name Primary?  Edema of both legs Yes    Chronic combined systolic and diastolic heart failure (HCC)     Cardiomyopathy, dilated, nonischemic (HCC)     Atrial fibrillation, unspecified type ([facility] )     H/O mitral valve replacement with mechanical valve     Nonischemic dilated cardiomyopathy (HCC)     Left bundle branch block (LBBB)     On continuous oral anticoagulation     Parkinson's disease ([facility] )         Review of Systems    All other systems negative, except as noted in history of present illness    Historical Information   Past Medical History:   Diagnosis Date    Anal fissure     Arthritis     osteo joints    Asthma with acute exacerbation     Blepharitis     Breast lump     Chalazion     CHF, chronic ([facility] )     Difficulty walking     Disease of thyroid gland     Drooling     Dysphagia     Fall 05/04/2018    Fibromyalgia, primary     History of transfusion 1996    Hordeolum externum     Hx of transient ischemic attack (TIA)     Hypophonia     Infectious viral hepatitis     Hep C dx 1996     Lyme carditis     Murmur, cardiac     Parkinsons (HCC)     Rotator cuff tendinitis     Seasonal allergies     Urinary frequency      Past Surgical History:   Procedure Laterality Date    BREAST BIOPSY Left 2018    benign    BREAST SURGERY N/A     benign, calcium    CARDIAC SURGERY  1990    mitral valve replacement    CATARACT EXTRACTION Right 2015    CATARACT EXTRACTION Left 2014    CHEST TUBE INSERTION Right     post pleural effusion    CHOLECYSTECTOMY  2000    lap    HYSTERECTOMY  1973    partial    AL COLSC FLX W/RMVL OF TUMOR POLYP LESION SNARE TQ N/A 7/18/2017    Procedure: COLONOSCOPY and biopsy ;  Surgeon: Melo Guerrero MD;  [facility];   Service: Gastroenterology    SKIN BIOPSY      pre cancerous on face    TONSILLECTOMY      US GUIDED BREAST BIOPSY RIGHT COMPLETE Right 2018     Social History     Substance and Sexual Activity   Alcohol Use No     Social History     Substance and Sexual Activity   Drug Use No     Social History     Tobacco Use   Smoking Status Former Smoker    Packs/day: 0 10    Years: 10 00    Pack years: 1 00    Last attempt to quit: [place] Years since quittin 8   Smokeless Tobacco Never Used       Family History:  Family History   Problem Relation Age of Onset    Heart disease Mother         murmur Cardiomegaly age 64    Pneumonia Father     Heart disease Brother         mitrsl valve    Parkinsonism Brother     Arthritis Brother     Lupus Daughter     Diabetes Daughter     Depression Daughter          Meds/Allergies     Prior to Admission medications    Medication Sig Start Date End Date Taking? Authorizing Provider   acetaminophen (TYLENOL) 650 mg CR tablet Take 1 tablet (650 mg total) by mouth 3 (three) times a day  Patient taking differently: Take 650 mg by mouth every 8 (eight) hours as needed  19  Yes Dariela Mckoy MD   albuterol (PROVENTIL HFA,VENTOLIN HFA) 90 mcg/act inhaler Inhale 2 puffs every 4 (four) hours as needed for wheezing or shortness of breath 19  Yes Gama Mcclure MD   carbidopa-levodopa-entacapone (STALEVO) -200 MG per tablet Take 1 tablet by mouth 4 (four) times a day 19  Yes Glo Jiménez MD   Cholecalciferol (VITAMIN D3) 2000 units TABS Take 2,000 Units by mouth every morning   Yes Historical Provider, MD   donepezil (ARICEPT) 5 mg tablet TAKE 1 TABLET BY MOUTH EVERY NIGHT AT BEDTIME 19  Yes Amy Ayala PA-C   ENTRESTO  MG TABS TAKE 1 TABLET BY MOUTH TWICE DAILY 19  Yes Dariela Mckoy MD   furosemide (LASIX) 40 mg tablet Take one tablet by mouth daily until weight loss of 5-10 lbs as occurred, then can resume use 4-5 days per week  Schedule administration to be done when patient can be near toilet facilities for 4-6 hours   10/17/19  Yes Dariela Mckoy MD levothyroxine 25 mcg tablet TAKE 1 TABLET BY MOUTH EVERY MORNING 9/4/19  Yes Krzysztof Guillermo MD   memantine (NAMENDA) 10 mg tablet Take 1 tablet (10 mg total) by mouth daily 7/5/19  Yes Rojas Lizama MD   Multiple Vitamins-Minerals (OCUVITE ADULT 50+ PO) Take by mouth daily with breakfast   Yes Historical Provider, MD   primidone (MYSOLINE) 50 mg tablet Take 0 5 tablets (25 mg total) by mouth daily 5/16/19  Yes Rojas Lizama MD   warfarin (COUMADIN) 2 5 mg tablet TAKE 1 TO 1 1/2 TABLETS  DAILY BY MOUTH OR AS ORDERED BY PHYSICIAN  5/1/19  Yes Alvaro Turner MD   furosemide (LASIX) 40 mg tablet Take 1 tablet (40 mg total) by mouth daily  Patient taking differently: Take 40 mg by mouth as needed  7/9/19 10/17/19 Yes Alvaro Turner MD   furosemide (LASIX) 40 mg tablet Take 20 mg by mouth daily  10/17/19 Yes Historical Provider, MD   furosemide (LASIX) 40 mg tablet Take 0 5 tablets (20 mg total) by mouth daily 10/17/19 10/17/19 Yes Alvaro Turner MD   trihexyphenidyl (ARTANE) 2 mg tablet Take 1 tablet (2 mg total) by mouth 3 (three) times a day with meals  Patient not taking: Reported on 10/17/2019 10/3/19   Rojas Lizama MD   carbidopa-levodopa (SINEMET)  mg per tablet Take 1 tablet by mouth 4 (four) times a day  3/2/18  Historical Provider, MD   diclofenac sodium (VOLTAREN) 1 % Apply 2 g topically 4 (four) times a day  Patient not taking: Reported on 10/3/2019 5/9/19 10/17/19  Liv Barrera, DO       No Known Allergies      Vitals:    10/17/19 1053   BP: 110/58   BP Location: Left arm   Patient Position: Sitting   Cuff Size: Standard   Pulse: 72   SpO2: 100%   Weight: 87 1 kg (192 lb)   Height: 5' 4 5" (1 638 m)       Body mass index is 32 45 kg/m²  9 pound weight gain in 3+ months      Physical Exam:    General Appearance:  Alert, cooperative, no distress, appears stated age   Head:  Normocephalic, without obvious abnormality, atraumatic   Eyes:  PERRL, conjunctiva/corneas clear, EOM's intact,   both eyes   Ears:  Normal TM's and external ear canals, both ears   Nose: Nares normal, septum midline, mucosa normal, no drainage or sinus tenderness   Throat: Lips, mucosa, and tongue normal; teeth and gums normal   Neck: Supple, symmetrical, trachea midline, no adenopathy, thyroid: not enlarged, symmetric, no tenderness/mass/nodules, no carotid bruit or JVD   Back:   Symmetric, no curvature, ROM normal, no CVA tenderness   Lungs:   Clear to auscultation bilaterally, respirations unlabored   Chest Wall:  No tenderness or deformity   Heart:  Irregularly irregular cardiac rhythm, S1, S2 normal, no murmur, rub or gallop  Unvarying mitral valve prosthetic clicks are heard  Abdomen:   Soft, non-tender, bowel sounds active all four quadrants,  no masses, no organomegaly with mild obesity noted  Extremities: Extremities normal, atraumatic, no cyanosis with 2+ bilateral pitting pedal ankle and pretibial edema   Pulses: 2+ and symmetric   Skin: Skin showed normal color, texture, turgor and no rashes or lesions   Lymph nodes: Cervical, supraclavicular, and axillary nodes normal   Neurologic: Normal         Cardiographics    ECG 10/17/19:    Controlled atrial fibrillation  Left bundle branch block with secondary repolarization changes  Low QRS voltage  No significant change from 01/10/2019    IMAGING:    Xr Chest Pa & Lateral    Result Date: 8/30/2019  Impression No acute cardiopulmonary disease   Workstation performed: [id number]     Venous duplex scan 09/04/2019:     No evidence of acute or chronic DVT or superficial venous thrombosis  Normal responses to augmentation  Enlarged lymph node in left inguinal area  Pulsatile waveforms in venous system      LAB REVIEW:      Lab Results   Component Value Date    SODIUM 140 09/13/2019    K 4 0 09/13/2019     (H) 09/13/2019    CO2 25 09/13/2019    ANIONGAP 9 8 (L) 12/07/2015    BUN 17 09/13/2019    CREATININE 1 02 09/13/2019    GLUCOSE 91 12/07/2015    GLUF 103 (H) 09/13/2019    CALCIUM 9 6 09/13/2019    AST 14 06/07/2019    ALT 8 (L) 06/07/2019    ALKPHOS 111 06/07/2019    PROT 6 9 12/07/2015    BILITOT 1 6 (H) 12/07/2015    EGFR 52 09/13/2019     Lab Results   Component Value Date    CHOLESTEROL 168 06/07/2019    CHOLESTEROL 148 10/08/2018    CHOLESTEROL 151 03/15/2018     Lab Results   Component Value Date    HDL 59 06/07/2019    HDL 50 10/08/2018    HDL 56 03/15/2018     No results found for: LDLCHOLEST  Lab Results   Component Value Date    LDLCALC 85 06/07/2019    LDLCALC 66 10/08/2018    LDLCALC 70 03/15/2018     No components found for: [facility]  Lab Results   Component Value Date    TRIG 119 06/07/2019    TRIG 162 (H) 10/08/2018    TRIG 127 03/15/2018     INR 10/11/2019:  3 72          Michele Chester MD

## 2019-10-18 DIAGNOSIS — G20 PARKINSON DISEASE (HCC): ICD-10-CM

## 2019-10-18 RX ORDER — CARBIDOPA, LEVODOPA AND ENTACAPONE 25; 200; 100 MG/1; MG/1; MG/1
1 TABLET, FILM COATED ORAL 4 TIMES DAILY
Qty: 120 TABLET | Refills: 3 | Status: SHIPPED | OUTPATIENT
Start: 2019-10-18 | End: 2019-10-22 | Stop reason: SDUPTHER

## 2019-10-22 DIAGNOSIS — G20 PARKINSON DISEASE (HCC): ICD-10-CM

## 2019-10-22 RX ORDER — CARBIDOPA, LEVODOPA AND ENTACAPONE 25; 200; 100 MG/1; MG/1; MG/1
TABLET, FILM COATED ORAL
Qty: 360 TABLET | Refills: 3 | Status: SHIPPED | OUTPATIENT
Start: 2019-10-22 | End: 2020-02-24 | Stop reason: SDUPTHER

## 2019-10-25 ENCOUNTER — TRANSCRIBE ORDERS (OUTPATIENT)
Dept: LAB | Facility: CLINIC | Age: 79
End: 2019-10-25

## 2019-10-25 ENCOUNTER — APPOINTMENT (OUTPATIENT)
Dept: LAB | Facility: CLINIC | Age: 79
End: 2019-10-25
Payer: MEDICARE

## 2019-10-25 ENCOUNTER — ANTICOAG VISIT (OUTPATIENT)
Dept: CARDIOLOGY CLINIC | Facility: CLINIC | Age: 79
End: 2019-10-25

## 2019-10-25 DIAGNOSIS — Z95.2 H/O MITRAL VALVE REPLACEMENT WITH MECHANICAL VALVE: ICD-10-CM

## 2019-10-25 DIAGNOSIS — I48.20 CHRONIC ATRIAL FIBRILLATION (HCC): ICD-10-CM

## 2019-10-25 DIAGNOSIS — Z95.2 HEART VALVE REPLACED BY TRANSPLANT: Primary | ICD-10-CM

## 2019-10-25 LAB
INR PPP: 5.03 (ref 0.84–1.19)
PROTHROMBIN TIME: 46.1 SECONDS (ref 11.6–14.5)

## 2019-10-25 PROCEDURE — 36415 COLL VENOUS BLD VENIPUNCTURE: CPT | Performed by: INTERNAL MEDICINE

## 2019-10-25 PROCEDURE — 85610 PROTHROMBIN TIME: CPT | Performed by: INTERNAL MEDICINE

## 2019-10-30 ENCOUNTER — APPOINTMENT (OUTPATIENT)
Dept: LAB | Facility: CLINIC | Age: 79
End: 2019-10-30
Payer: MEDICARE

## 2019-10-30 ENCOUNTER — TRANSCRIBE ORDERS (OUTPATIENT)
Dept: ADMINISTRATIVE | Facility: HOSPITAL | Age: 79
End: 2019-10-30

## 2019-10-30 ENCOUNTER — ANTICOAG VISIT (OUTPATIENT)
Dept: CARDIOLOGY CLINIC | Facility: CLINIC | Age: 79
End: 2019-10-30

## 2019-10-30 DIAGNOSIS — Z95.2 HEART VALVE REPLACED BY TRANSPLANT: ICD-10-CM

## 2019-10-30 DIAGNOSIS — Z95.2 H/O MITRAL VALVE REPLACEMENT WITH MECHANICAL VALVE: ICD-10-CM

## 2019-10-30 DIAGNOSIS — Z95.2 HEART VALVE REPLACED BY TRANSPLANT: Primary | ICD-10-CM

## 2019-10-30 DIAGNOSIS — I48.20 CHRONIC ATRIAL FIBRILLATION (HCC): ICD-10-CM

## 2019-10-30 LAB
INR PPP: 3.05 (ref 0.84–1.19)
PROTHROMBIN TIME: 31 SECONDS (ref 11.6–14.5)

## 2019-10-30 PROCEDURE — 36415 COLL VENOUS BLD VENIPUNCTURE: CPT

## 2019-10-30 PROCEDURE — 85610 PROTHROMBIN TIME: CPT

## 2019-11-06 ENCOUNTER — ANTICOAG VISIT (OUTPATIENT)
Dept: CARDIOLOGY CLINIC | Facility: CLINIC | Age: 79
End: 2019-11-06

## 2019-11-06 ENCOUNTER — TRANSCRIBE ORDERS (OUTPATIENT)
Dept: ADMINISTRATIVE | Facility: HOSPITAL | Age: 79
End: 2019-11-06

## 2019-11-06 ENCOUNTER — APPOINTMENT (OUTPATIENT)
Dept: LAB | Facility: CLINIC | Age: 79
End: 2019-11-06
Payer: MEDICARE

## 2019-11-06 DIAGNOSIS — Z95.2 H/O MITRAL VALVE REPLACEMENT WITH MECHANICAL VALVE: ICD-10-CM

## 2019-11-06 DIAGNOSIS — Z95.2 HEART VALVE REPLACED BY TRANSPLANT: Primary | ICD-10-CM

## 2019-11-06 DIAGNOSIS — Z95.2 HEART VALVE REPLACED BY TRANSPLANT: ICD-10-CM

## 2019-11-06 DIAGNOSIS — I48.20 CHRONIC ATRIAL FIBRILLATION (HCC): ICD-10-CM

## 2019-11-06 LAB
INR PPP: 2.37 (ref 0.84–1.19)
PROTHROMBIN TIME: 25.4 SECONDS (ref 11.6–14.5)

## 2019-11-06 PROCEDURE — 36415 COLL VENOUS BLD VENIPUNCTURE: CPT

## 2019-11-06 PROCEDURE — 85610 PROTHROMBIN TIME: CPT

## 2019-11-13 DIAGNOSIS — I50.42 CHRONIC COMBINED SYSTOLIC AND DIASTOLIC HEART FAILURE (HCC): ICD-10-CM

## 2019-11-14 RX ORDER — SACUBITRIL AND VALSARTAN 97; 103 MG/1; MG/1
TABLET, FILM COATED ORAL
Qty: 180 TABLET | Refills: 3 | Status: SHIPPED | OUTPATIENT
Start: 2019-11-14 | End: 2019-11-20 | Stop reason: SDUPTHER

## 2019-11-20 ENCOUNTER — OFFICE VISIT (OUTPATIENT)
Dept: CARDIOLOGY CLINIC | Facility: CLINIC | Age: 79
End: 2019-11-20
Payer: MEDICARE

## 2019-11-20 ENCOUNTER — TRANSCRIBE ORDERS (OUTPATIENT)
Dept: ADMINISTRATIVE | Facility: HOSPITAL | Age: 79
End: 2019-11-20

## 2019-11-20 ENCOUNTER — APPOINTMENT (OUTPATIENT)
Dept: LAB | Facility: CLINIC | Age: 79
End: 2019-11-20
Payer: MEDICARE

## 2019-11-20 ENCOUNTER — ANTICOAG VISIT (OUTPATIENT)
Dept: CARDIOLOGY CLINIC | Facility: CLINIC | Age: 79
End: 2019-11-20

## 2019-11-20 VITALS
HEIGHT: 65 IN | HEART RATE: 78 BPM | DIASTOLIC BLOOD PRESSURE: 62 MMHG | BODY MASS INDEX: 31.82 KG/M2 | WEIGHT: 191 LBS | OXYGEN SATURATION: 99 % | SYSTOLIC BLOOD PRESSURE: 108 MMHG

## 2019-11-20 DIAGNOSIS — Z95.2 H/O MITRAL VALVE REPLACEMENT WITH MECHANICAL VALVE: ICD-10-CM

## 2019-11-20 DIAGNOSIS — I49.3 ASYMPTOMATIC PVCS: ICD-10-CM

## 2019-11-20 DIAGNOSIS — I48.20 CHRONIC ATRIAL FIBRILLATION (HCC): ICD-10-CM

## 2019-11-20 DIAGNOSIS — R60.0 EDEMA OF BOTH LEGS: Primary | ICD-10-CM

## 2019-11-20 DIAGNOSIS — Z79.01 ON CONTINUOUS ORAL ANTICOAGULATION: ICD-10-CM

## 2019-11-20 DIAGNOSIS — E78.2 MIXED DYSLIPIDEMIA: ICD-10-CM

## 2019-11-20 DIAGNOSIS — I44.7 LEFT BUNDLE BRANCH BLOCK (LBBB): ICD-10-CM

## 2019-11-20 DIAGNOSIS — Z95.2 HEART VALVE REPLACED BY TRANSPLANT: ICD-10-CM

## 2019-11-20 DIAGNOSIS — I50.42 CHRONIC COMBINED SYSTOLIC AND DIASTOLIC HEART FAILURE (HCC): ICD-10-CM

## 2019-11-20 DIAGNOSIS — G20 PARKINSON'S DISEASE (HCC): ICD-10-CM

## 2019-11-20 DIAGNOSIS — E03.9 ACQUIRED HYPOTHYROIDISM: ICD-10-CM

## 2019-11-20 DIAGNOSIS — I42.0 CARDIOMYOPATHY, DILATED, NONISCHEMIC (HCC): ICD-10-CM

## 2019-11-20 DIAGNOSIS — Z95.2 HEART VALVE REPLACED BY TRANSPLANT: Primary | ICD-10-CM

## 2019-11-20 LAB
INR PPP: 3.65 (ref 0.84–1.19)
PROTHROMBIN TIME: 35.8 SECONDS (ref 11.6–14.5)

## 2019-11-20 PROCEDURE — 36415 COLL VENOUS BLD VENIPUNCTURE: CPT

## 2019-11-20 PROCEDURE — 85610 PROTHROMBIN TIME: CPT

## 2019-11-20 PROCEDURE — 99214 OFFICE O/P EST MOD 30 MIN: CPT | Performed by: INTERNAL MEDICINE

## 2019-11-20 NOTE — PATIENT INSTRUCTIONS
1  For now, continue current management  2  We discussed the possible risks of trying to eliminate the lower extremity swelling  3  In the absence of any respiratory distress, will continue current management  4  Next office follow-up will be approximately two months with BMP

## 2019-11-20 NOTE — PROGRESS NOTES
Office Cardiology Progress Note  Tom Rock [de-identified] y o  female MRN: [id number]  11/20/19  12:18 PM      ASSESSMENT:    1  Slightly improved   bilateral pretibial/ankle/pedal edema with onset approximately 4 months  ago with 8 pound weight gain in 4+ months with no other evidence of heart failure  This coincides with reduction in maintenance dose of furosemide, which is now being taken daily       2  Chronic combined systolic and diastolic heart failure LDAN 24-94% LVEF and normal MVR function on 06/22/2019 transthoracic echocardiogram   34% ejection fraction on 7/5/16 MUGA scan/underlying nonischemic dilated cardiomyopathy but currently with less exertional fatigue, dyspnea on exertion, and recent recurrence of exhaustion and exertional cold sweats,  with suppression of occasional nocturnal wheezing   Initially improved on starting dose of Entresto, but had vasovagal type near syncope on 01/18/2019 with no recurrence   Patient with less frequent lightheadedness and  low blood pressures in the 90s-100's  2  Resolved dehydration/hypotension and residual mild dizziness as well as resolution of acute kidney injury since 11/18/16  3  Chronic atrial fibrillation, controlled, and chronic left bundle-branch block with no EKG change today  4  History of mechanical mitral valve replacement September, 1990, with very good oral anticoagulation with target INR of 2 5   Latest INR 2 37 on 11/06/2019   5  Moderately frequent asymptomatic PVCs on otherwise benign Holter monitor December, 2013   6  9 9 pound weight loss in approximately 5+ years, but recent weight gain of 8 lbs in 4+ months  7  Controlled mixed dyslipidemia  8  Mild obstructive sleep apnea previously suspected  9  History of asthma  10  History of GERD  11  History of benign positional vertigo with recurrence last month and again three days ago  12  History of Lyme disease    15  Hypothyroidism with slightly elevated TSH level, which could contribute to previous extreme fatigue  14  History of hepatitis C   15  History of Parkinson's disease/gait dysfunction with slowly progressive symptoms and previously on  physical therapy plus home exercise for this  16  Resolved nonproductive cough and postural dizziness with  flu-like illness nearly 1-1/4 years ago  17  Chronic fatigue and malaise, multifactorial, improving  18  Drug-induced insomnia  19  History of fall with subsequent left distal tibial fracture and left ankle sprain with right periorbital ecchymoses and laceration and left wrist sprain on 7/30/17, with no recurrence, possibly related to transient drop in blood pressure with upright position in hot weather after prolonged sitting  Plan       Patient Instructions     1  For now, continue current management  2  We discussed the possible risks of trying to eliminate the lower extremity swelling  3  In the absence of any respiratory distress, will continue current management  4  Next office follow-up will be approximately two months with GENE MORENO    This [de-identified] y o  female  denies new cardiopulmonary and medical symptoms  She states she is feeling pretty well but admits to continued lower extremity edema  She is now taking her furosemide on a daily basis  Her  had noticed recent onset of exertional cold sweats  There has been no dyspnea, dizziness, syncope, palpitations, chest discomfort, PND, orthopnea, cough, sputum, wheezing, fever, chills  The patient is also seen in follow-up of the below listed diagnoses  Encounter Diagnoses   Name Primary?     Edema of both legs Yes    Chronic combined systolic and diastolic heart failure (HCC)     Cardiomyopathy, dilated, nonischemic (HCC)     H/O mitral valve replacement with mechanical valve     Chronic atrial fibrillation     Left bundle branch block (LBBB)     On continuous oral anticoagulation     Parkinson's disease ([address] )     Asymptomatic PVCs     Mixed dyslipidemia     Acquired hypothyroidism         Review of Systems    All other systems negative, except as noted in history of present illness    Historical Information   Past Medical History:   Diagnosis Date    Anal fissure     Arthritis     osteo joints    Asthma with acute exacerbation     Blepharitis     Breast lump     Chalazion     CHF, chronic ([address] )     Difficulty walking     Disease of thyroid gland     Drooling     Dysphagia     Fall 2018    Fibromyalgia, primary     History of transfusion     Hordeolum externum     Hx of transient ischemic attack (TIA)     Hypophonia     Infectious viral hepatitis     Hep C dx      Lyme carditis     Murmur, cardiac     Parkinsons (HCC)     Rotator cuff tendinitis     Seasonal allergies     Urinary frequency      Past Surgical History:   Procedure Laterality Date    BREAST BIOPSY Left 2018    benign    BREAST SURGERY N/A     benign, calcium    CARDIAC SURGERY      mitral valve replacement    CATARACT EXTRACTION Right 2015    CATARACT EXTRACTION Left 2014    CHEST TUBE INSERTION Right     post pleural effusion    CHOLECYSTECTOMY      lap    HYSTERECTOMY  1973    partial    RI COLSC FLX W/RMVL OF TUMOR POLYP LESION SNARE TQ N/A 2017    Procedure: COLONOSCOPY and biopsy ;  Surgeon: Janelle Welch MD;  Location: [facility];   Service: Gastroenterology    SKIN BIOPSY      pre cancerous on face    TONSILLECTOMY      US GUIDED BREAST BIOPSY RIGHT COMPLETE Right 2018     Social History     Substance and Sexual Activity   Alcohol Use No     Social History     Substance and Sexual Activity   Drug Use No     Social History     Tobacco Use   Smoking Status Former Smoker    Packs/day: 0 10    Years: 10 00    Pack years: 1 00    Last attempt to quit: Randi Mcginnis Years since quittin 9   Smokeless Tobacco Never Used       Family History:  Family History   Problem Relation Age of Onset    Heart disease Mother         murmur Cardiomegaly age 64    Pneumonia Father     Heart disease Brother         mitrsl valve    Parkinsonism Brother     Arthritis Brother     Lupus Daughter     Diabetes Daughter     Depression Daughter          Meds/Allergies     Prior to Admission medications    Medication Sig Start Date End Date Taking? Authorizing Provider   acetaminophen (TYLENOL) 650 mg CR tablet Take 1 tablet (650 mg total) by mouth 3 (three) times a day  Patient taking differently: Take 650 mg by mouth every 8 (eight) hours as needed  6/6/19  Yes Selena Agarwal MD   albuterol (PROVENTIL HFA,VENTOLIN HFA) 90 mcg/act inhaler Inhale 2 puffs every 4 (four) hours as needed for wheezing or shortness of breath 8/30/19  Yes Nick Dorantes MD   carbidopa-levodopa-entacapone (STALEVO) -200 MG per tablet TAKE 1 TABLET BY MOUTH FOUR TIMES DAILY 10/22/19  Yes Smitha Carlin MD   Cholecalciferol (VITAMIN D3) 2000 units TABS Take 2,000 Units by mouth every morning   Yes Historical Provider, MD   donepezil (ARICEPT) 5 mg tablet TAKE 1 TABLET BY MOUTH EVERY NIGHT AT BEDTIME 9/12/19  Yes Amy Ayala PA-C   ENTRESTO  MG TABS TAKE 1 TABLET BY MOUTH TWICE DAILY 9/4/19  Yes Selena Agarwal MD   furosemide (LASIX) 40 mg tablet Take one tablet by mouth daily until weight loss of 5-10 lbs as occurred, then can resume use 4-5 days per week  Schedule administration to be done when patient can be near toilet facilities for 4-6 hours   10/17/19  Yes Selena Agarwal MD   levothyroxine 25 mcg tablet TAKE 1 TABLET BY MOUTH EVERY MORNING 9/4/19  Yes Nick Dorantes MD   memantine [facility]) 10 mg tablet Take 1 tablet (10 mg total) by mouth daily 7/5/19  Yes Smitha Carlin MD   Multiple Vitamins-Minerals (OCUVITE ADULT 50+ PO) Take by mouth daily with breakfast   Yes Historical Provider, MD   primidone (MYSOLINE) 50 mg tablet Take 0 5 tablets (25 mg total) by mouth daily 5/16/19  Yes Smitha Carlin MD   warfarin (COUMADIN) 2 5 mg tablet TAKE 1 TO 1 1/2 TABLETS  DAILY BY MOUTH OR AS ORDERED BY PHYSICIAN  5/1/19  Yes Martha Clark MD   carbidopa-levodopa (SINEMET)  mg per tablet Take 1 tablet by mouth 4 (four) times a day  3/2/18  Historical Provider, MD   ENTRESTO  MG TABS TAKE 1 TABLET BY MOUTH TWICE DAILY  Patient not taking: Reported on 11/20/2019 11/14/19 11/20/19  Martha Clark MD   trihexyphenidyl (ARTANE) 2 mg tablet Take 1 tablet (2 mg total) by mouth 3 (three) times a day with meals  Patient not taking: Reported on 10/17/2019 10/3/19 11/20/19  Carolyn Chamberlain MD       No Known Allergies      Vitals:    11/20/19 0924   BP: 108/62   BP Location: Left arm   Patient Position: Sitting   Cuff Size: Standard   Pulse: 78   SpO2: 99%   Weight: 86 6 kg (191 lb)   Height: 5' 4 5" (1 638 m)       Body mass index is 32 28 kg/m²  1 pound weight loss in approximately one month  Physical Exam:    General Appearance:  Alert, cooperative, no distress, appears stated age and is mildly obese     Head:  Normocephalic, without obvious abnormality, atraumatic   Eyes:  PERRL, conjunctiva/corneas clear, EOM's intact,   both eyes   Ears:  Normal TM's and external ear canals, both ears   Nose: Nares normal, septum midline, mucosa normal, no drainage or sinus tenderness   Throat: Lips, mucosa, and tongue normal; teeth and gums normal   Neck: Supple, symmetrical, trachea midline, no adenopathy, thyroid: not enlarged, symmetric, no tenderness/mass/nodules, no carotid bruit or JVD   Back:   Symmetric, no curvature, ROM normal, no CVA tenderness   Lungs:   Clear to auscultation bilaterally, respirations unlabored   Chest Wall:  No tenderness or deformity   Heart:  Irregularly irregular cardiac  rhythm,  S2 normal, no murmur, rub or gallop with appropriate mitral prosthetic valve clicks coinciding with S1    Abdomen:   Soft, non-tender, bowel sounds active all four quadrants,  no masses, no organomegaly and mildly obese   Extremities: Extremities normal, atraumatic, no cyanosis with left greater than right pretibial edema, estimated at 2+  Pulses: 1+ and symmetric   Skin: Skin showed normal color, texture, turgor and no rashes or lesions   Lymph nodes: Cervical, supraclavicular, and axillary nodes normal   Neurologic: Normal         Cardiographics    ECG:    Not applicable    IMAGING:    Xr Chest Pa & Lateral    Result Date: 8/30/2019  Impression No acute cardiopulmonary disease   Workstation performed: [id number]             LAB REVIEW:      Lab Results   Component Value Date    SODIUM 140 09/13/2019    K 4 0 09/13/2019     (H) 09/13/2019    CO2 25 09/13/2019    ANIONGAP 9 8 (L) 12/07/2015    BUN 17 09/13/2019    CREATININE 1 02 09/13/2019    GLUCOSE 91 12/07/2015    GLUF 103 (H) 09/13/2019    CALCIUM 9 6 09/13/2019    AST 14 06/07/2019    ALT 8 (L) 06/07/2019    ALKPHOS 111 06/07/2019    PROT 6 9 12/07/2015    BILITOT 1 6 (H) 12/07/2015    EGFR 52 09/13/2019     Lab Results   Component Value Date    CHOLESTEROL 168 06/07/2019    CHOLESTEROL 148 10/08/2018    CHOLESTEROL 151 03/15/2018     Lab Results   Component Value Date    HDL 59 06/07/2019    HDL 50 10/08/2018    HDL 56 03/15/2018       Lab Results   Component Value Date    LDLCALC 85 06/07/2019    LDLCALC 66 10/08/2018    LDLCALC 70 03/15/2018       Lab Results   Component Value Date    TRIG 119 06/07/2019    TRIG 162 (H) 10/08/2018    TRIG 127 03/15/2018               Jayda Gonzalez MD

## 2019-12-05 ENCOUNTER — APPOINTMENT (OUTPATIENT)
Dept: LAB | Facility: CLINIC | Age: 79
End: 2019-12-05
Payer: MEDICARE

## 2019-12-05 ENCOUNTER — TRANSCRIBE ORDERS (OUTPATIENT)
Dept: ADMINISTRATIVE | Facility: HOSPITAL | Age: 79
End: 2019-12-05

## 2019-12-05 DIAGNOSIS — Z95.2 HEART VALVE REPLACED BY TRANSPLANT: ICD-10-CM

## 2019-12-05 DIAGNOSIS — Z95.2 HEART VALVE REPLACED BY TRANSPLANT: Primary | ICD-10-CM

## 2019-12-05 LAB
INR PPP: 2.55 (ref 0.84–1.19)
PROTHROMBIN TIME: 26.9 SECONDS (ref 11.6–14.5)

## 2019-12-05 PROCEDURE — 36415 COLL VENOUS BLD VENIPUNCTURE: CPT

## 2019-12-05 PROCEDURE — 85610 PROTHROMBIN TIME: CPT

## 2019-12-06 ENCOUNTER — ANTICOAG VISIT (OUTPATIENT)
Dept: CARDIOLOGY CLINIC | Facility: CLINIC | Age: 79
End: 2019-12-06

## 2019-12-06 DIAGNOSIS — Z95.2 H/O MITRAL VALVE REPLACEMENT WITH MECHANICAL VALVE: ICD-10-CM

## 2019-12-06 DIAGNOSIS — I48.20 CHRONIC ATRIAL FIBRILLATION (HCC): ICD-10-CM

## 2019-12-11 DIAGNOSIS — F02.80 ALZHEIMER'S DEMENTIA WITHOUT BEHAVIORAL DISTURBANCE, UNSPECIFIED TIMING OF DEMENTIA ONSET (HCC): ICD-10-CM

## 2019-12-11 DIAGNOSIS — G30.9 ALZHEIMER'S DEMENTIA WITHOUT BEHAVIORAL DISTURBANCE, UNSPECIFIED TIMING OF DEMENTIA ONSET (HCC): ICD-10-CM

## 2019-12-11 RX ORDER — DONEPEZIL HYDROCHLORIDE 5 MG/1
5 TABLET, FILM COATED ORAL
Qty: 90 TABLET | Refills: 0 | Status: SHIPPED | OUTPATIENT
Start: 2019-12-11 | End: 2020-03-05 | Stop reason: SDUPTHER

## 2019-12-11 NOTE — TELEPHONE ENCOUNTER
Patient called RX line requesting a refill on her Donepezil 5 mg for a 90 day supply  Patient is schedule to see you on 1/15/2020  Please sign of if agreeable

## 2019-12-13 ENCOUNTER — OFFICE VISIT (OUTPATIENT)
Dept: FAMILY MEDICINE CLINIC | Facility: CLINIC | Age: 79
End: 2019-12-13
Payer: MEDICARE

## 2019-12-13 VITALS
RESPIRATION RATE: 18 BRPM | HEART RATE: 72 BPM | TEMPERATURE: 97.4 F | WEIGHT: 188.6 LBS | DIASTOLIC BLOOD PRESSURE: 50 MMHG | OXYGEN SATURATION: 97 % | BODY MASS INDEX: 31.42 KG/M2 | HEIGHT: 65 IN | SYSTOLIC BLOOD PRESSURE: 100 MMHG

## 2019-12-13 DIAGNOSIS — J06.9 ACUTE URI: Primary | ICD-10-CM

## 2019-12-13 PROCEDURE — 99213 OFFICE O/P EST LOW 20 MIN: CPT | Performed by: NURSE PRACTITIONER

## 2019-12-13 RX ORDER — GUAIFENESIN 400 MG/1
400 TABLET ORAL EVERY 8 HOURS PRN
COMMUNITY
Start: 2019-12-13 | End: 2020-01-15 | Stop reason: ALTCHOICE

## 2019-12-13 NOTE — PROGRESS NOTES
Assessment:      viral upper respiratory illness    The case discussed with patient using patient centered shared decision making  The patient was counseled regarding instructions for management,-- risk factor reductions,-- prognosis,-- impressions,-- risks and benefits of treatment options,-- importance of compliance with treatment  I have reviewed the instructions with the patient, answering all questions to her satisfaction  Plan:      Discussed diagnosis and treatment of URI  Discussed the importance of avoiding unnecessary antibiotic therapy  Suggested symptomatic OTC remedies  Nasal saline spray for congestion  Follow up as needed  Call in 4 days if symptoms aren't resolving  Follow up in 1 week or as needed  Subjective:       History was provided by the patient  Twan Khan is a [de-identified] y o  female who presents for evaluation of symptoms of a URI  Symptoms include coryza, dry cough, post nasal drip and sinus and nasal congestion  Onset of symptoms was 1 days ago, unchanged since that time  Associated symptoms include lightheadedness, low grade fever, purulent nasal discharge, shortness of breath, vertigo and wheezing  She is drinking moderate amounts of fluids  Evaluation to date: none  Treatment to date: none  The following portions of the patient's history were reviewed and updated as appropriate: allergies, current medications, past family history, past medical history, past social history, past surgical history and problem list     Review of Systems  Pertinent items are noted in HPI        Objective:      /50 (BP Location: Left arm, Patient Position: Sitting, Cuff Size: Large)   Pulse 72   Temp (!) 97 4 °F (36 3 °C)   Resp 18   Ht 5' 4 5" (1 638 m)   Wt 85 5 kg (188 lb 9 6 oz)   SpO2 97%   BMI 31 87 kg/m²   General appearance: alert and oriented, in no acute distress  Head: Normocephalic, without obvious abnormality, atraumatic, sinuses nontender to percussion  Ears: normal TM's and external ear canals both ears  Nose: no discharge, turbinates red  Throat: lips, mucosa, and tongue normal; teeth and gums normal  Lungs: clear to auscultation bilaterally  Heart: regular rate and rhythm, S1, S2 normal, no murmur, click, rub or gallop  Pulses: 2+ and symmetric  Lymph nodes: Cervical, supraclavicular, and axillary nodes normal

## 2019-12-13 NOTE — PATIENT INSTRUCTIONS
Upper Respiratory Infection   AMBULATORY CARE:   An upper respiratory infection  is also called a common cold  It can affect your nose, throat, ears, and sinuses  Common signs and symptoms include the following:  Cold symptoms are usually worst for the first 3 to 5 days  You may have any of the following:  · Runny or stuffy nose    · Sneezing and coughing    · Sore throat or hoarseness    · Red, watery, and sore eyes    · Fatigue     · Chills and fever    · Headache, body aches, or sore muscles  Seek care immediately if:   · You have chest pain or trouble breathing  Contact your healthcare provider if:   · You have a fever over 102ºF (39°C)  · Your sore throat gets worse or you see white or yellow spots in your throat  · Your symptoms get worse after 3 to 5 days or your cold is not better in 14 days  · You have a rash anywhere on your skin  · You have large, tender lumps in your neck  · You have thick, green or yellow drainage from your nose  · You cough up thick yellow, green, or bloody mucus  · You have vomiting for more than 24 hours and cannot keep fluids down  · You have a bad earache  · You have questions or concerns about your condition or care  Treatment for a cold: There is no cure for the common cold  Colds are caused by viruses and do not get better with antibiotics  Most people get better in 7 to 14 days  You may continue to cough for 2 to 3 weeks  The following may help decrease your symptoms:  · Decongestants  help reduce nasal congestion and help you breathe more easily  If you take decongestant pills, they may make you feel restless or not able to sleep  Do not use decongestant sprays for more than a few days  · Cough suppressants  help reduce coughing  Ask your healthcare provider which type of cough medicine is best for you  · NSAIDs , such as ibuprofen, help decrease swelling, pain, and fever   NSAIDs can cause stomach bleeding or kidney problems in certain people  If you take blood thinner medicine, always ask your healthcare provider if NSAIDs are safe for you  Always read the medicine label and follow directions  · Acetaminophen  decreases pain and fever  It is available without a doctor's order  Ask how much to take and how often to take it  Follow directions  Read the labels of all other medicines you are using to see if they also contain acetaminophen, or ask your doctor or pharmacist  Acetaminophen can cause liver damage if not taken correctly  Do not use more than 4 grams (4,000 milligrams) total of acetaminophen in one day  Manage your cold:   · Rest as much as possible  Slowly start to do more each day  · Drink more liquids as directed  Liquids will help thin and loosen mucus so you can cough it up  Liquids will also help prevent dehydration  Liquids that help prevent dehydration include water, fruit juice, and broth  Do not drink liquids that contain caffeine  Caffeine can increase your risk for dehydration  Ask your healthcare provider how much liquid to drink each day  · Soothe a sore throat  Gargle with warm salt water  This helps your sore throat feel better  Make salt water by dissolving ¼ teaspoon salt in 1 cup warm water  You may also suck on hard candy or throat lozenges  You may use a sore throat spray  · Use a humidifier or vaporizer  Use a cool mist humidifier or a vaporizer to increase air moisture in your home  This may make it easier for you to breathe and help decrease your cough  · Use saline nasal drops as directed  These help relieve congestion  · Apply petroleum-based jelly around the outside of your nostrils  This can decrease irritation from blowing your nose  · Do not smoke  Nicotine and other chemicals in cigarettes and cigars can make your symptoms worse  They can also cause infections such as bronchitis or pneumonia   Ask your healthcare provider for information if you currently smoke and need help to quit  E-cigarettes or smokeless tobacco still contain nicotine  Talk to your healthcare provider before you use these products  Prevent spreading your cold to others:   · Try to stay away from other people during the first 2 to 3 days of your cold when it is more easily spread  · Do not share food or drinks  · Do not share hand towels with household members  · Wash your hands often, especially after you blow your nose  Turn away from other people and cover your mouth and nose with a tissue when you sneeze or cough  Follow up with your healthcare provider as directed:  Write down your questions so you remember to ask them during your visits  © 2017 [facility] Information is for End User's use only and may not be sold, redistributed or otherwise used for commercial purposes  All illustrations and images included in CareNotes® are the copyrighted property of [employer]  or [employer]  The above information is an  only  It is not intended as medical advice for individual conditions or treatments  Talk to your doctor, nurse or pharmacist before following any medical regimen to see if it is safe and effective for you

## 2019-12-19 ENCOUNTER — TRANSCRIBE ORDERS (OUTPATIENT)
Dept: ADMINISTRATIVE | Facility: HOSPITAL | Age: 79
End: 2019-12-19

## 2019-12-19 ENCOUNTER — APPOINTMENT (OUTPATIENT)
Dept: LAB | Facility: CLINIC | Age: 79
End: 2019-12-19
Payer: MEDICARE

## 2019-12-19 DIAGNOSIS — Z95.2 HEART VALVE REPLACED BY TRANSPLANT: ICD-10-CM

## 2019-12-19 DIAGNOSIS — Z95.2 HEART VALVE REPLACED BY TRANSPLANT: Primary | ICD-10-CM

## 2019-12-19 LAB
INR PPP: 3.71 (ref 0.84–1.19)
PROTHROMBIN TIME: 36.2 SECONDS (ref 11.6–14.5)

## 2019-12-19 PROCEDURE — 85610 PROTHROMBIN TIME: CPT

## 2019-12-19 PROCEDURE — 36415 COLL VENOUS BLD VENIPUNCTURE: CPT

## 2019-12-20 ENCOUNTER — ANTICOAG VISIT (OUTPATIENT)
Dept: CARDIOLOGY CLINIC | Facility: CLINIC | Age: 79
End: 2019-12-20

## 2019-12-20 DIAGNOSIS — I48.20 CHRONIC ATRIAL FIBRILLATION (HCC): ICD-10-CM

## 2019-12-20 DIAGNOSIS — Z95.2 H/O MITRAL VALVE REPLACEMENT WITH MECHANICAL VALVE: ICD-10-CM

## 2020-01-02 ENCOUNTER — TRANSCRIBE ORDERS (OUTPATIENT)
Dept: ADMINISTRATIVE | Facility: HOSPITAL | Age: 80
End: 2020-01-02

## 2020-01-02 ENCOUNTER — APPOINTMENT (OUTPATIENT)
Dept: LAB | Facility: CLINIC | Age: 80
End: 2020-01-02
Payer: MEDICARE

## 2020-01-02 ENCOUNTER — ANTICOAG VISIT (OUTPATIENT)
Dept: CARDIOLOGY CLINIC | Facility: CLINIC | Age: 80
End: 2020-01-02

## 2020-01-02 DIAGNOSIS — Z95.2 HEART VALVE REPLACED BY TRANSPLANT: Primary | ICD-10-CM

## 2020-01-02 DIAGNOSIS — Z95.2 H/O MITRAL VALVE REPLACEMENT WITH MECHANICAL VALVE: ICD-10-CM

## 2020-01-02 DIAGNOSIS — Z95.2 HEART VALVE REPLACED BY TRANSPLANT: ICD-10-CM

## 2020-01-02 DIAGNOSIS — I48.20 CHRONIC ATRIAL FIBRILLATION (HCC): ICD-10-CM

## 2020-01-02 LAB
INR PPP: 2.84 (ref 0.84–1.19)
PROTHROMBIN TIME: 29.3 SECONDS (ref 11.6–14.5)

## 2020-01-02 PROCEDURE — 85610 PROTHROMBIN TIME: CPT

## 2020-01-02 PROCEDURE — 36415 COLL VENOUS BLD VENIPUNCTURE: CPT

## 2020-01-06 ENCOUNTER — TELEPHONE (OUTPATIENT)
Dept: CARDIOLOGY CLINIC | Facility: CLINIC | Age: 80
End: 2020-01-06

## 2020-01-06 DIAGNOSIS — Z79.01 ANTICOAGULATED: ICD-10-CM

## 2020-01-06 DIAGNOSIS — Z95.2 S/P MVR (MITRAL VALVE REPLACEMENT): Primary | ICD-10-CM

## 2020-01-06 DIAGNOSIS — R41.89 COGNITIVE IMPAIRMENT: ICD-10-CM

## 2020-01-06 RX ORDER — MEMANTINE HYDROCHLORIDE 10 MG/1
10 TABLET ORAL DAILY
Qty: 90 TABLET | Refills: 1 | Status: SHIPPED | OUTPATIENT
Start: 2020-01-06 | End: 2020-07-06 | Stop reason: SDUPTHER

## 2020-01-06 NOTE — TELEPHONE ENCOUNTER
Patient called the med refill line requesting a refill on her Namenda 10mg which she is taking 1 tab daily  Has a f/u appointment schedule with Dr Langston on 1/15/2020

## 2020-01-07 RX ORDER — WARFARIN SODIUM 2.5 MG/1
TABLET ORAL
Qty: 125 TABLET | Refills: 3 | Status: SHIPPED | OUTPATIENT
Start: 2020-01-07 | End: 2020-09-08 | Stop reason: SDUPTHER

## 2020-01-15 ENCOUNTER — OFFICE VISIT (OUTPATIENT)
Dept: NEUROLOGY | Facility: CLINIC | Age: 80
End: 2020-01-15
Payer: MEDICARE

## 2020-01-15 VITALS
SYSTOLIC BLOOD PRESSURE: 100 MMHG | DIASTOLIC BLOOD PRESSURE: 56 MMHG | BODY MASS INDEX: 30.49 KG/M2 | HEART RATE: 72 BPM | WEIGHT: 183 LBS | HEIGHT: 65 IN

## 2020-01-15 DIAGNOSIS — G20 PARKINSON'S DISEASE (HCC): Primary | ICD-10-CM

## 2020-01-15 DIAGNOSIS — K11.7 DROOLING: ICD-10-CM

## 2020-01-15 DIAGNOSIS — R25.9 MIXED ACTION AND RESTING TREMOR: ICD-10-CM

## 2020-01-15 DIAGNOSIS — R49.8 HYPOPHONIA: ICD-10-CM

## 2020-01-15 DIAGNOSIS — R13.12 DYSPHAGIA, OROPHARYNGEAL PHASE: ICD-10-CM

## 2020-01-15 PROCEDURE — 99215 OFFICE O/P EST HI 40 MIN: CPT | Performed by: PSYCHIATRY & NEUROLOGY

## 2020-01-15 RX ORDER — AMANTADINE HYDROCHLORIDE 100 MG/1
CAPSULE, GELATIN COATED ORAL
Qty: 60 CAPSULE | Refills: 3 | Status: SHIPPED | OUTPATIENT
Start: 2020-01-15 | End: 2020-01-31 | Stop reason: ALTCHOICE

## 2020-01-15 RX ORDER — GLYCOPYRROLATE 1 MG/1
1 TABLET ORAL DAILY
Qty: 30 TABLET | Refills: 2 | Status: SHIPPED | OUTPATIENT
Start: 2020-01-15 | End: 2020-01-31 | Stop reason: ALTCHOICE

## 2020-01-15 NOTE — PROGRESS NOTES
Return NeuroOutpatient Note        Ashley Romero  [id number]  19 y o   1940       Parkinson's Disease; Voice change (swallowing issues); Difficulty Walking; and Fatigue        History obtained from:  Patient and      HPI/Subjective:  Mrs Jeremi Lovelace returns as follow up for Parkinson's disease  Per my previous reports, her disease is manifested by masked facies, hypophonic voice, bradykinesia  Patient's speech was first affected  She was diagnosed about 7 years ago  Patient was initially following with Dr Ky Walker  Her speech does get slower each visit  Her swallowing has been affected over time but is not significant  She's on stalevo 1 tab qid  Patient has been slowly declining over past 2 years  Lately she has been noticing more drooling from excessive secretion  She ambulates with cane  She becomes very unsteady but hasn't had a fall  In terms of memory disturbance, she's stable  She says she can't read for more than few minutes  She used to read one book a day  Some of it is also due to vision problems  She's now on aricept and namenda combination  Her memory has remained stable  Still  denies any hallucinations  Denies delusions  She does now flail a little in her sleep for past 2 months  For essential tremors, she is on primidone 50mg nightly        After being placed Entresto she feels more energetic       In July she had EMG of RUE which was suggestive of mild median mononeuropathy and suggestive of cervical radiculopathy         Past Medical History:   Diagnosis Date    Anal fissure     Arthritis     osteo joints    Asthma with acute exacerbation     Blepharitis     Breast lump     Chalazion     CHF, chronic (HCC)     Difficulty walking     Disease of thyroid gland     Drooling     Dysphagia     Fall 05/04/2018    Fibromyalgia, primary     History of transfusion 1996    Hordeolum externum     Hx of transient ischemic attack (TIA)     Hypophonia     Infectious viral hepatitis     Hep C dx      Lyme carditis     Murmur, cardiac     Parkinsons (HCC)     Rotator cuff tendinitis     Seasonal allergies     Urinary frequency      Social History     Socioeconomic History    Marital status: /Civil Union     Spouse name: Not on file    Number of children: Not on file    Years of education: Not on file    Highest education level: Not on file   Occupational History    Not on file   Social Needs    Financial resource strain: Not on file    Food insecurity:     Worry: Not on file     Inability: Not on file    Transportation needs:     Medical: Not on file     Non-medical: Not on file   Tobacco Use    Smoking status: Former Smoker     Packs/day: 0 10     Years: 10 00     Pack years: 1 00     Last attempt to quit: 1970     Years since quittin 0    Smokeless tobacco: Never Used   Substance and Sexual Activity    Alcohol use: No    Drug use: No    Sexual activity: Not on file   Lifestyle    Physical activity:     Days per week: Not on file     Minutes per session: Not on file    Stress: Not on file   Relationships    Social connections:     Talks on phone: Not on file     Gets together: Not on file     Attends Alevism service: Not on file     Active member of club or organization: Not on file     Attends meetings of clubs or organizations: Not on file     Relationship status: Not on file    Intimate partner violence:     Fear of current or ex partner: Not on file     Emotionally abused: Not on file     Physically abused: Not on file     Forced sexual activity: Not on file   Other Topics Concern    Not on file   Social History Narrative    Not on file     Family History   Problem Relation Age of Onset    Heart disease Mother         murmur Cardiomegaly age 64    Pneumonia Father     Heart disease Brother         mitrsl valve    Parkinsonism Brother     Arthritis Brother     Lupus Daughter     Diabetes Daughter     Depression Daughter Allergies   Allergen Reactions    Artane [Trihexyphenidyl] Other (See Comments)     Felt "disconnected", "out if it", evelyn  Did not feel right      Current Outpatient Medications on File Prior to Visit   Medication Sig Dispense Refill    acetaminophen (TYLENOL) 650 mg CR tablet Take 1 tablet (650 mg total) by mouth 3 (three) times a day (Patient taking differently: Take 650 mg by mouth every 8 (eight) hours as needed ) 100 tablet 0    carbidopa-levodopa-entacapone (STALEVO) -200 MG per tablet TAKE 1 TABLET BY MOUTH FOUR TIMES DAILY 360 tablet 3    Cholecalciferol (VITAMIN D3) 2000 units TABS Take 2,000 Units by mouth every morning      donepezil (ARICEPT) 5 mg tablet Take 1 tablet (5 mg total) by mouth daily at bedtime 90 tablet 0    ENTRESTO  MG TABS TAKE 1 TABLET BY MOUTH TWICE DAILY 180 tablet 3    furosemide (LASIX) 40 mg tablet Take one tablet by mouth daily until weight loss of 5-10 lbs as occurred, then can resume use 4-5 days per week  Schedule administration to be done when patient can be near toilet facilities for 4-6 hours  90 tablet 3    levothyroxine 25 mcg tablet TAKE 1 TABLET BY MOUTH EVERY MORNING 90 tablet 0    memantine (NAMENDA) 10 mg tablet Take 1 tablet (10 mg total) by mouth daily 90 tablet 1    Multiple Vitamins-Minerals (OCUVITE ADULT 50+ PO) Take by mouth daily with breakfast      primidone (MYSOLINE) 50 mg tablet Take 0 5 tablets (25 mg total) by mouth daily 45 tablet 1    warfarin (COUMADIN) 2 5 mg tablet TAKE 1 TO 1 1/2 TABLETS  DAILY BY MOUTH OR AS ORDERED BY PHYSICIAN   125 tablet 3    albuterol (PROVENTIL HFA,VENTOLIN HFA) 90 mcg/act inhaler Inhale 2 puffs every 4 (four) hours as needed for wheezing or shortness of breath (Patient not taking: Reported on 12/13/2019) 1 Inhaler 0    [DISCONTINUED] carbidopa-levodopa (SINEMET)  mg per tablet Take 1 tablet by mouth 4 (four) times a day      [DISCONTINUED] guaiFENesin 400 mg Take 1 tablet (400 mg total) by mouth every 8 (eight) hours as needed for cough (Patient not taking: Reported on 1/15/2020)       No current facility-administered medications on file prior to visit  Review of Systems   Refer to positive review of systems in HPI     Review of Systems    Constitutional- No fever  Eyes- No visual change  ENT- Hearing normal  CV- No chest pain  Resp- No Shortness of breath  GI- No diarrhea  - Bladder normal  MS- No Arthritis   Skin- No rash  Psych- No depression  Endo- No DM  Heme- No nodes    Vitals:    01/15/20 1125   BP: 100/56   BP Location: Right arm   Patient Position: Sitting   Cuff Size: Adult   Pulse: 72   Weight: 83 kg (183 lb)   Height: 5' 4 5" (1 638 m)       PHYSICAL EXAM:  Appearance: No Acute Distress, +masked facies  Ophthalmoscopic: Disc Flat, Normal fundus  Mental status:  Orientation: Awake, Alert, and Orientedx3  Memory: Registation 3/3 Recall 2/3  Attention: normal  Knowledge: good  Language: No aphasia  Speech: + hypophonia   Cranial Nerves:  2 No Visual Defect on Confrontation, Pupils round, equal, reactive to light  3,4,6 Extraocular Movements Intact, no nystagmus  5 Facial Sensation Intact  7 No facial asymmetry  8 Intact hearing  9,10 Palate symmetric, normal gag  11 Good shoulder shrug  12 Tongue Midline  Gait: uses cane, bradykinesia,  turning makes her unsteady, mary,retropulsion+  Coordination: No ataxia with finger to nose testing, and difficulty with heel to shin  Chin tremor+  Sensory: Intact, Symmetric to pinprick, light touch, vibration, and joint position  Muscle Tone: mild rigidity in LUE, LE heavy but rigidity is rather mild              Muscle exam:  Arm Right Left Leg Right Left   Deltoid 5/5 5/5 Iliopsoas 5/5 5/5   Biceps 5/5 5/5 Quads 5/5 5/5   Triceps 5/5 5/5 Hamstrings 5/5 5/5   Wrist Extension 5/5 5/5 Ankle Dorsi Flexion 5/5 5/5   Wrist Flexion 5/5 5/5 Ankle Plantar Flexion 5/5 5/5   Interossei 5/5 5/5 Ankle Eversion 5/5 5/5   APB 5/5 5/5 Ankle Inversion 5/5 5/5       Reflexes:  Grossly intact       Personal review of  Labs:                  Diagnoses and all orders for this visit:        1  Parkinson's disease ([address] )  amantadine (SYMMETREL) 100 mg capsule    FL barium swallow video w speech    Ambulatory referral to Neurology   2  Drooling  glycopyrrolate (ROBINUL) 1 mg tablet    FL barium swallow video w speech   3  Hypophonia  Ambulatory referral to Speech Therapy   4  Mixed action and resting tremor     5  Dysphagia, oropharyngeal phase   FL barium swallow video w speech         Patient is slowly deteriorating  This could be typical progression of her disease but will try to make some adjustments to medications to see if her mobility improves  Discussed that available medications do not work well on non motor symptoms  She is to resume stalevo qid  Will add amantadine upto 100mg bid  For drooling, will add robinul  Will obtain MBBS  She will likely need modified diet consistency  Will refer her for speech therapy  Her cognition is rather intact but there is slight decline compared to her prior baseline  She's still doing well at game scrabble               Total time of encounter:  40 min  More than 50% of the time was used in counseling and/or coordination of care  Extent of counseling and/or coordination of care        MD [facility]  [address]  [address]  [phone]

## 2020-01-17 ENCOUNTER — TELEPHONE (OUTPATIENT)
Dept: NEUROLOGY | Facility: CLINIC | Age: 80
End: 2020-01-17

## 2020-01-17 NOTE — TELEPHONE ENCOUNTER
Fyi:     Pt called and states that she plan to start amantadine today and she will start robinul in 1 week  States that she does not want to start it at the same time in case she develops side effects

## 2020-01-20 NOTE — TELEPHONE ENCOUNTER
FYI:    Patient informed of message  Patient states that she has not started glycopyrrolate and will wait a couple days before starting that medication

## 2020-01-20 NOTE — TELEPHONE ENCOUNTER
Patient started on amantadine 2 days ago and states she believe she is getting a reaction  She states that she is very dizzy and shaky and that believes that her mouth is swollen  She states she is having trouble talking, but there is no issues with enunciating over the phone  Patient states that she is having dry mouth as well  States she is "shaky"  She states she started with some slight dizziness and shakiness yesterday, but today symptoms were increased  Patient took BP while on phone with nurse  BP Sitting 102/68 pulse 62 Standing 84/50 pulse 78    Patient appears to have some orthostatic hypotension at this time  Advised patient to hold medication until I speak with you  Please advise      CB# [phone]

## 2020-01-24 ENCOUNTER — APPOINTMENT (OUTPATIENT)
Dept: LAB | Facility: CLINIC | Age: 80
End: 2020-01-24
Payer: MEDICARE

## 2020-01-24 DIAGNOSIS — I50.42 CHRONIC COMBINED SYSTOLIC AND DIASTOLIC HEART FAILURE (HCC): ICD-10-CM

## 2020-01-24 LAB
ANION GAP SERPL CALCULATED.3IONS-SCNC: 1 MMOL/L (ref 4–13)
BUN SERPL-MCNC: 24 MG/DL (ref 5–25)
CALCIUM SERPL-MCNC: 9.6 MG/DL (ref 8.3–10.1)
CHLORIDE SERPL-SCNC: 108 MMOL/L (ref 100–108)
CO2 SERPL-SCNC: 29 MMOL/L (ref 21–32)
CREAT SERPL-MCNC: 1.07 MG/DL (ref 0.6–1.3)
GFR SERPL CREATININE-BSD FRML MDRD: 49 ML/MIN/1.73SQ M
GLUCOSE P FAST SERPL-MCNC: 103 MG/DL (ref 65–99)
POTASSIUM SERPL-SCNC: 3.8 MMOL/L (ref 3.5–5.3)
SODIUM SERPL-SCNC: 138 MMOL/L (ref 136–145)

## 2020-01-24 PROCEDURE — 80048 BASIC METABOLIC PNL TOTAL CA: CPT

## 2020-01-24 PROCEDURE — 36415 COLL VENOUS BLD VENIPUNCTURE: CPT

## 2020-01-28 ENCOUNTER — HOSPITAL ENCOUNTER (OUTPATIENT)
Dept: RADIOLOGY | Facility: HOSPITAL | Age: 80
Discharge: HOME | End: 2020-01-28
Attending: PSYCHIATRY & NEUROLOGY
Payer: MEDICARE

## 2020-01-28 DIAGNOSIS — G20 PARKINSON'S DISEASE (HCC): ICD-10-CM

## 2020-01-28 DIAGNOSIS — K11.7 DROOLING: ICD-10-CM

## 2020-01-28 DIAGNOSIS — R13.12 DYSPHAGIA, OROPHARYNGEAL PHASE: ICD-10-CM

## 2020-01-28 PROCEDURE — 74230 X-RAY XM SWLNG FUNCJ C+: CPT

## 2020-01-28 PROCEDURE — 92611 MOTION FLUOROSCOPY/SWALLOW: CPT

## 2020-01-28 NOTE — PROCEDURES
Video Swallow Study      Patient Name: Elzbieta Pineda  OKFBF'I Date: 1/28/2020        Past Medical History  Past Medical History:   Diagnosis Date    Anal fissure     Arthritis     osteo joints    Asthma with acute exacerbation     Blepharitis     Breast lump     Chalazion     CHF, chronic ([address] )     Difficulty walking     Disease of thyroid gland     Drooling     Dysphagia     Fall 05/04/2018    Fibromyalgia, primary     History of transfusion 1996    Hordeolum externum     Hx of transient ischemic attack (TIA)     Hypophonia     Infectious viral hepatitis     Hep C dx 1996     Lyme carditis     Murmur, cardiac     Parkinsons (HCC)     Rotator cuff tendinitis     Seasonal allergies     Urinary frequency         Past Surgical History  Past Surgical History:   Procedure Laterality Date    BREAST BIOPSY Left 2018    benign    BREAST SURGERY N/A     benign, calcium    CARDIAC SURGERY  1990    mitral valve replacement    CATARACT EXTRACTION Right 2015    CATARACT EXTRACTION Left 2014    CHEST TUBE INSERTION Right     post pleural effusion    CHOLECYSTECTOMY  2000    lap    HYSTERECTOMY  1973    partial    UT COLSC FLX W/RMVL OF TUMOR POLYP LESION SNARE TQ N/A 7/18/2017    Procedure: COLONOSCOPY and biopsy ;  Surgeon: Sadaf Pavon MD;  Location: [facility]; Service: Gastroenterology    SKIN BIOPSY      pre cancerous on face    TONSILLECTOMY      US GUIDED BREAST BIOPSY RIGHT COMPLETE Right 2/13/2018         General Information:    [de-identified] yo female referred to [facility]  for a VBS by Dr Andria Moses for dysphagia w/  c/o progressive decline over past 2 years per MD, increased drooling from excessive secretions  Pt stated she was started on a medication for the drooling, and although the drooling has lessened, she has other side effects such as bloody noses and constipation which she will talk to Dr Andria Moses about     Pt reported occas choking on liquids and "hesitation" after swallowing before good goes down  Pt also c/o a "thickness" in her throat, but does have a hx of GERD  Cognition:  WNL    Speech/Swallow Mech: Oral motor movements appeared  WFL; Dentition was  natural; Respiratory Status: WFL on RA;   Current diet: regular w/ thin liquids, several meds w/ water  Prior VBS 1/3/18  (c/o similar symptoms as currently): 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, which results in partial bolus retention in the proximal esophagus after the swallow  This is likely the source of the patient's experience of a "tightening" sensation in the throat when eating and drinking  The patient was able to successfully clear boluses from the esophagus with the use of additional swallows  Oral and pharyngeal swallow function was grossly within normal limits; transient laryngeal penetration with rapid intake of liquids is a normal finding in a patient of this age  No aspiration was observed with any texture  Pt was seen in radiology for a Video Barium Swallow Study, seated in the upright position and viewed laterally with the following consistencies: puree, soft/solid, hard solid, HTL, NTL, thin liquids, barium pill w/ water  Results are as follows:     **Images are available for review on PACS          Oral Stage: WNL   pt w/  Adequate bolus retrieval of all consistencies; mastication, manipulation and transfer were WNL  Good oral control of liquids  No oral residue noted   Pharyngeal Stage: WNL   Swallow initiation was timely with complete epiglottic inversion, airway closure, and hyolaryngeal excursion  No pharyngeal retention, laryngeal penetration or aspiration was observed  Esophageal Stage:   informally assessed  A cricopharyngeal bar was not present, however there was delayed clearance from the cervical esophagus w/ foods and thick liquids moreso than thin liquids, ? Intermittent esophageal spasm  Otherwise, no overt abnormality was noted and all materials cleared into the stomach w/o incident  Assessment Summary:   Oral and pharyngeal stages of swallowing appeared WNL  Questionable esophageal spasm in the cervical esophagus with delayed clearance of foods and thick liquids       Diagnosis/Prognosis:            Recommendations:   cont Regular diet w/ thin liquids   chew food thoroughly, eat slowly  Alternate food and liquids  meds as tolerated     April Ghanshyam MAYKEL Atkins CCC-SLP  Speech Patholgist  [place] license # [facility] [id number]  [address] [place] [address] license # [id number]  Available via [employer]

## 2020-01-28 NOTE — PROGRESS NOTES
Speech Language Pathology Evaluation  Today's date: 2020  Patient name: Jareth Horton    : 1940        Referring provider: Dax Olivas MD  Dx:   Encounter Diagnosis     ICD-10-CM    1  Parkinson's disease ([address] ) Silvia Barros        Start Time:   Stop Time: 353  Total time in clinic (min): 60 minutes    Subjective Comments: The patient is an [de-identified]year old female seen for a LSVT voice evaluation  The patient reports periods of low voicing and difficulty sustaining voice in noisy environments  She was diagnosed with Parkinson's disease approximately 7 years ago  Her goal for therapy is to speak louder and with greater clarity  Of note, a recent MBS was normal  Her , Didi Emery, attended today's evaluation  Safety Measures: Fall risk secondary to Parkinson's     Reason for Referral:Change in vocal quality  Prior Functional Status:Communication effective and appropriate in all situations  Medical History significant for:   Past Medical History:   Diagnosis Date    Anal fissure     Arthritis     osteo joints    Asthma with acute exacerbation     Blepharitis     Breast lump     Chalazion     CHF, chronic (HCC)     Difficulty walking     Disease of thyroid gland     Drooling     Dysphagia     Fall 2018    Fibromyalgia, primary     History of transfusion     Hordeolum externum     Hx of transient ischemic attack (TIA)     Hypophonia     Infectious viral hepatitis     Hep C dx      Lyme carditis     Murmur, cardiac     Parkinsons (HCC)     Rotator cuff tendinitis     Seasonal allergies     Urinary frequency      Clinically Complex Situations:Degerative medical condition - Parkinson's     Hearing:Not Tested  Vision:Other Wears glasses     Medication List:   Current Outpatient Medications   Medication Sig Dispense Refill    acetaminophen (TYLENOL) 650 mg CR tablet Take 1 tablet (650 mg total) by mouth 3 (three) times a day (Patient taking differently: Take 650 mg by mouth every 8 (eight) hours as needed ) 100 tablet 0    albuterol (PROVENTIL HFA,VENTOLIN HFA) 90 mcg/act inhaler Inhale 2 puffs every 4 (four) hours as needed for wheezing or shortness of breath (Patient not taking: Reported on 12/13/2019) 1 Inhaler 0    amantadine (SYMMETREL) 100 mg capsule Start with 1 capsule daily for 2 weeks and if tolerated take 1 capsule twice daily  60 capsule 3    carbidopa-levodopa-entacapone (STALEVO) -200 MG per tablet TAKE 1 TABLET BY MOUTH FOUR TIMES DAILY 360 tablet 3    Cholecalciferol (VITAMIN D3) 2000 units TABS Take 2,000 Units by mouth every morning      donepezil (ARICEPT) 5 mg tablet Take 1 tablet (5 mg total) by mouth daily at bedtime 90 tablet 0    ENTRESTO  MG TABS TAKE 1 TABLET BY MOUTH TWICE DAILY 180 tablet 3    furosemide (LASIX) 40 mg tablet Take one tablet by mouth daily until weight loss of 5-10 lbs as occurred, then can resume use 4-5 days per week  Schedule administration to be done when patient can be near toilet facilities for 4-6 hours  90 tablet 3    glycopyrrolate (ROBINUL) 1 mg tablet Take 1 tablet (1 mg total) by mouth daily 30 tablet 2    levothyroxine 25 mcg tablet TAKE 1 TABLET BY MOUTH EVERY MORNING 90 tablet 0    memantine (NAMENDA) 10 mg tablet Take 1 tablet (10 mg total) by mouth daily 90 tablet 1    Multiple Vitamins-Minerals (OCUVITE ADULT 50+ PO) Take by mouth daily with breakfast      primidone (MYSOLINE) 50 mg tablet Take 0 5 tablets (25 mg total) by mouth daily 45 tablet 1    warfarin (COUMADIN) 2 5 mg tablet TAKE 1 TO 1 1/2 TABLETS  DAILY BY MOUTH OR AS ORDERED BY PHYSICIAN  125 tablet 3     No current facility-administered medications for this visit  Allergies: Allergies   Allergen Reactions    Artane [Trihexyphenidyl] Other (See Comments)     Felt "disconnected", "out if it", shaky   Did not feel right      Primary Language: English  Preferred Language: [address] at home with   Current Education status: Not in school  Highest Level of Education: college  Current / Prior Services being received: Speech Therapy Outpatient rehab - remote past, greater than 4 years ago  Mental Status: Alert  Behavior Status:Cooperative  Communication Modalities: Verbal  Recent Speech/ Language therapy:None  Rehabilitation Prognosis:Good rehab potential to reach the established goals      [facility] (LSVT) LOUD assessment:    Sound Level Meter-to-Mouth Distance: 50 cm throughout testing    Maximum Duration of Sustained Vowel Phonation (/ah/):   Average MDP 5 3 sec    Average Intensity 62 9 dB SPL       Maximum Fundamental Frequency Range:   Highest Pitch 494 0 Hz - 65 2 dB SPL   Lowest Pitch 188 0 Hz - 64 9 dB SLP   Average Intensity 65 05  dB SPL     Reading of Words:   Average Intensity  63 3 dB SPL     Reading of Phrases:   Average Intensity  63 7 dB SPL     Conversational Monologue:   Average Intensity  63 3 dB SPL     *Data gathered from reading and conversation tasks revealed vocal SPL levels that may significantly reduce patients speech intelligibility and communicative effectiveness in her functional living environment  **STIMULABILITY TESTING TO [address] LOUD! **    Maximum Duration of Sustained Vowel Phonation (/ah/):   Average MDP 14 7 sec    Average Intensity 68 7 dB SPL       Maximum Fundamental Frequency Range:   Highest Pitch 407 7 Hz - 70 7   Lowest Pitch 180 0 Hz - 68 3   Average Intensity 69 5 dB SPL     Reading of Phrases:   Average Intensity  68 5 dB SPL     *Patient demonstrated vocal improvement with loudness and quality in all tasks when stimulated  Voice Handicap Index (VHI): The VHI is a list of 30 statements that many people have used to describe their voices and the effects of their voices on their lives   Patient indicated how frequently she has the same experience using a rating point scale (never = 0, almost never = 1, sometimes = 2, almost always = 3, and always = 4)  Results were as follows:    Subscale: Score: Self-Perceived Impairment Level:   Physical 21/40    Emotional 8/40    Functional 19/40         TOTAL 48/120 Moderate       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, characterized by reduced loudness, monotone pitch, and a hoarse vocal quality, which contributed to an overall decrease in speech intelligibility  During conversation, speech intelligibility is reduced 35% of the time  Based on stimulability testing, patient is an excellent candidate for the LSVT LOUD program  Articulation was excellent throughout  Vocal quality was good and the patient did not manifest vocal hyperfunction duirng any tasks  Significant mandibular termor noted during prolongation of "ah" and during high and low pitch exercises  Pitch was variable due to termor, which was featured prominently during vocal prolongation exercises but  was not present during reading or conversational speech  The patient was noticeably SOB when reading tasks were combined with prompts to "go loud " This was largely mitigated by giving the patient breaks when reading  Patient would benefit from skilled speech/voice therapy (LSVT LOUD program) in the outpatient setting 4x/week for 4 weeks (consisting of 16 sessions)  Prognosis for improvement is good based on motivation, stimulability, and family support  Goals  Goal 1:   Patient will use strategies and techniques associated with LOUD program with 90% accuracy or higher    Goal 2:   Patient will produce words and phrases with appropriate vocal volume (70db) with 90% accuracy or higher  Goal 3:   Patient will read paragraphs with appropriate vocal volume with 90% accuracy or higher  Goal 4:   Patient will engage in conversational speech with appropriate vocal volume (70db)  with 90% accuracy or higher        Long Term Goals:       1   Improve vocal volume   2  Improve intelligibility of speech        Impressions/ Recommendations  Impressions:  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, characterized by reduced loudness, monotone pitch, and a hoarse vocal quality, which contributed to an overall decrease in speech intelligibility  During conversation, speech intelligibility is reduced 35% of the time  Based on stimulability testing, patient is an excellent candidate for the LSVT LOUD program  Articulation was excellent throughout  Vocal quality was good and the patient did not manifest vocal hyperfunction duirng any tasks  Significant mandibular termor noted during prolongation of "ah" and during high and low pitch exercises  Pitch was variable due to termor, which  was  featured prominently during vocal prolongation exercises but was not present during reading or conversational speech  The patient was noticeably SOB when reading tasks were combined with prompts to "go loud " This was largely mitigated by giving the patient breaks when reading  Patient advised of difficulty scheduling at present due to high clinic volume and patient limitations in terms of days and times  Patient is willing to wait until therapy slots are available  Will set plan of care for 3 months in order to reflect possibility of delayed start  Recommendations:   Patients would benefit from: LSVT LOUD program   Frequency:4x weekly   Duration:Other 3 months  Intervention certification VZTM:1940  Intervention certification UA:31/22/1854  Intervention Comments: Majo Orta participated well with today's evaluation

## 2020-01-29 ENCOUNTER — ANTICOAG VISIT (OUTPATIENT)
Dept: CARDIOLOGY CLINIC | Facility: CLINIC | Age: 80
End: 2020-01-29

## 2020-01-29 ENCOUNTER — APPOINTMENT (OUTPATIENT)
Dept: LAB | Facility: CLINIC | Age: 80
End: 2020-01-29
Payer: MEDICARE

## 2020-01-29 ENCOUNTER — TRANSCRIBE ORDERS (OUTPATIENT)
Dept: ADMINISTRATIVE | Facility: HOSPITAL | Age: 80
End: 2020-01-29

## 2020-01-29 ENCOUNTER — EVALUATION (OUTPATIENT)
Dept: SPEECH THERAPY | Facility: CLINIC | Age: 80
End: 2020-01-29
Payer: MEDICARE

## 2020-01-29 DIAGNOSIS — Z95.2 HEART VALVE REPLACED BY TRANSPLANT: Primary | ICD-10-CM

## 2020-01-29 DIAGNOSIS — G20 PARKINSON'S DISEASE (HCC): Primary | ICD-10-CM

## 2020-01-29 DIAGNOSIS — Z95.2 H/O MITRAL VALVE REPLACEMENT WITH MECHANICAL VALVE: ICD-10-CM

## 2020-01-29 DIAGNOSIS — I48.20 CHRONIC ATRIAL FIBRILLATION (HCC): ICD-10-CM

## 2020-01-29 DIAGNOSIS — Z95.2 HEART VALVE REPLACED BY TRANSPLANT: ICD-10-CM

## 2020-01-29 LAB
INR PPP: 2.94 (ref 0.84–1.19)
PROTHROMBIN TIME: 30.1 SECONDS (ref 11.6–14.5)

## 2020-01-29 PROCEDURE — 36415 COLL VENOUS BLD VENIPUNCTURE: CPT

## 2020-01-29 PROCEDURE — 85610 PROTHROMBIN TIME: CPT

## 2020-01-29 PROCEDURE — 92524 BEHAVRAL QUALIT ANALYS VOICE: CPT | Performed by: SPEECH-LANGUAGE PATHOLOGIST

## 2020-01-31 ENCOUNTER — OFFICE VISIT (OUTPATIENT)
Dept: CARDIOLOGY CLINIC | Facility: CLINIC | Age: 80
End: 2020-01-31
Payer: MEDICARE

## 2020-01-31 VITALS
DIASTOLIC BLOOD PRESSURE: 62 MMHG | WEIGHT: 185 LBS | HEIGHT: 65 IN | SYSTOLIC BLOOD PRESSURE: 116 MMHG | BODY MASS INDEX: 30.82 KG/M2 | HEART RATE: 68 BPM | OXYGEN SATURATION: 97 %

## 2020-01-31 DIAGNOSIS — Z79.01 ON CONTINUOUS ORAL ANTICOAGULATION: ICD-10-CM

## 2020-01-31 DIAGNOSIS — R60.0 EDEMA OF BOTH LEGS: ICD-10-CM

## 2020-01-31 DIAGNOSIS — I49.3 ASYMPTOMATIC PVCS: ICD-10-CM

## 2020-01-31 DIAGNOSIS — I48.20 CHRONIC ATRIAL FIBRILLATION (HCC): ICD-10-CM

## 2020-01-31 DIAGNOSIS — E78.2 MIXED DYSLIPIDEMIA: ICD-10-CM

## 2020-01-31 DIAGNOSIS — Z95.2 H/O MITRAL VALVE REPLACEMENT WITH MECHANICAL VALVE: ICD-10-CM

## 2020-01-31 DIAGNOSIS — I44.7 LEFT BUNDLE BRANCH BLOCK (LBBB): ICD-10-CM

## 2020-01-31 DIAGNOSIS — I50.42 CHRONIC COMBINED SYSTOLIC AND DIASTOLIC HEART FAILURE (HCC): Primary | ICD-10-CM

## 2020-01-31 DIAGNOSIS — E03.9 ACQUIRED HYPOTHYROIDISM: ICD-10-CM

## 2020-01-31 DIAGNOSIS — G20 PARKINSON'S DISEASE (HCC): ICD-10-CM

## 2020-01-31 DIAGNOSIS — I42.0 CARDIOMYOPATHY, DILATED, NONISCHEMIC (HCC): ICD-10-CM

## 2020-01-31 PROCEDURE — 99214 OFFICE O/P EST MOD 30 MIN: CPT | Performed by: INTERNAL MEDICINE

## 2020-01-31 NOTE — PROGRESS NOTES
Office Cardiology Progress Note  Becki Varela [de-identified] y o  female MRN: [id number]  01/31/20  8:44 AM      ASSESSMENT:    1  Continued improvement in  bilateral pretibial/ankle/pedal edema with onset approximately 6+ months  ago with 8 pound weight gain in 4+ months and now with 6 pound weight loss in approximately 2+ months with no other evidence of heart failure   This coincides with increase in dose of furosemide, which is now being taken daily       2  Chronic combined systolic and diastolic heart failure LQFZ 05-93% LVEF and normal MVR function on 06/22/2019 transthoracic echocardiogram   34% ejection fraction on 7/5/16 MUGA scan/underlying nonischemic dilated cardiomyopathy but currently with less exertional fatigue, dyspnea on exertion, and recent resolution  of exhaustion and exertional cold sweats,  with suppression of occasional nocturnal wheezing   Initially improved on starting dose of Entresto, but had vasovagal type near syncope on 01/18/2019 with no recurrence   Patient with less frequent lightheadedness and  low blood pressures in the 90s-100's  2  Resolved dehydration/hypotension and residual mild dizziness as well as resolution of acute kidney injury since 11/18/16  3  Chronic atrial fibrillation, controlled, and chronic left bundle-branch block with no EKG change previously  4  History of mechanical mitral valve replacement September, 1990, with very good oral anticoagulation with target INR of 2 5   Latest INR 2 94 on 01/29/2020   5  Moderately frequent asymptomatic PVCs on otherwise benign Holter monitor December, 2013   6  15 9 pound weight loss in approximately 5 2+ years, but recent weight loss of  six  lbs in 2+ months  7  Controlled mixed dyslipidemia  8  Mild obstructive sleep apnea previously suspected  9  History of asthma  10  History of GERD  11  History of benign positional vertigo with recurrence 3 months ago and again 2+ months ago  12  History of Lyme disease    13  Hypothyroidism with slightly elevated TSH level, which could contribute to previous extreme fatigue  14  History of hepatitis C   15  History of Parkinson's disease/gait dysfunction with slowly progressive symptoms and previously on  physical therapy plus home exercise for this  16  Resolved nonproductive cough and postural dizziness with  flu-like illness nearly 1 45 years ago  17  Chronic fatigue and malaise, multifactorial, improving  18  Drug-induced insomnia  19  History of fall with subsequent left distal tibial fracture and left ankle sprain with right periorbital ecchymoses and laceration and left wrist sprain on 7/30/17, with no recurrence, possibly related to transient drop in blood pressure with upright position in hot weather after prolonged sitting  20  Mildly impaired fasting glucose  Plan       Patient Instructions     1  Continue present medications  2  Cardiology follow-up in 3-4 months with EKG, BMP  HPI    This [de-identified] y o  female  denies new cardiopulmonary and medical symptoms  Overall, she is feeling quite well  She has noted improvement in her leg edema and is no longer experiencing cold sweats with exertion  She is using her furosemide daily  She and her  have encouraged some unexpected expenses because of a sprinkler system of leak, need for new dental implants, and air conditioning repairs  Her  keeps busy  building model ships  He also tutors twice a week at [employer]  in writing  She is seen in follow-up of the below listed diagnoses  Encounter Diagnoses   Name Primary?     Chronic combined systolic and diastolic heart failure (HCC) Yes    Chronic atrial fibrillation     H/O mitral valve replacement with mechanical valve     On continuous oral anticoagulation     Edema of both legs     Cardiomyopathy, dilated, nonischemic (HCC)     Left bundle branch block (LBBB)     Parkinson's disease ([facility] [address] )     Mixed dyslipidemia     Asymptomatic PVCs     Acquired hypothyroidism         Review of Systems    All other systems negative, except as noted in history of present illness    Historical Information   Past Medical History:   Diagnosis Date    Anal fissure     Arthritis     osteo joints    Asthma with acute exacerbation     Blepharitis     Breast lump     Chalazion     CHF, chronic (HCC)     Difficulty walking     Disease of thyroid gland     Drooling     Dysphagia     Fall 2018    Fibromyalgia, primary     History of transfusion     Hordeolum externum     Hx of transient ischemic attack (TIA)     Hypophonia     Infectious viral hepatitis     Hep C dx      Lyme carditis     Murmur, cardiac     Parkinsons (HCC)     Rotator cuff tendinitis     Seasonal allergies     Urinary frequency      Past Surgical History:   Procedure Laterality Date    BREAST BIOPSY Left 2018    benign    BREAST SURGERY N/A     benign, calcium    CARDIAC SURGERY      mitral valve replacement    CATARACT EXTRACTION Right 2015    CATARACT EXTRACTION Left 2014    CHEST TUBE INSERTION Right     post pleural effusion    CHOLECYSTECTOMY      lap    HYSTERECTOMY  1973    partial    WI COLSC FLX W/RMVL OF TUMOR POLYP LESION SNARE TQ N/A 2017    Procedure: COLONOSCOPY and biopsy ;  Surgeon: Arnold Langford MD;  Location: [facility] GI LAB;   Service: Gastroenterology    SKIN BIOPSY      pre cancerous on face    TONSILLECTOMY      US GUIDED BREAST BIOPSY RIGHT COMPLETE Right 2018     Social History     Substance and Sexual Activity   Alcohol Use No     Social History     Substance and Sexual Activity   Drug Use No     Social History     Tobacco Use   Smoking Status Former Smoker    Packs/day: 0 10    Years: 10 00    Pack years: 1 00    Last attempt to quit: Artemio Johnson Years since quittin 1   Smokeless Tobacco Never Used       Family History:  Family History   Problem Relation Age of Onset    Heart disease Mother         murmur Cardiomegaly age 64    Pneumonia Father     Heart disease Brother         mitrsl valve    Parkinsonism Brother     Arthritis Brother     Lupus Daughter     Diabetes Daughter     Depression Daughter          Meds/Allergies     Prior to Admission medications    Medication Sig Start Date End Date Taking? Authorizing Provider   acetaminophen (TYLENOL) 650 mg CR tablet Take 1 tablet (650 mg total) by mouth 3 (three) times a day  Patient taking differently: Take 650 mg by mouth every 8 (eight) hours as needed  6/6/19  Yes Michele Lux MD   carbidopa-levodopa-entacapone (STALEVO) -200 MG per tablet TAKE 1 TABLET BY MOUTH FOUR TIMES DAILY 10/22/19  Yes Natanael Nevarez MD   Cholecalciferol (VITAMIN D3) 2000 units TABS Take 2,000 Units by mouth every morning   Yes Historical Provider, MD   donepezil (ARICEPT) 5 mg tablet Take 1 tablet (5 mg total) by mouth daily at bedtime 12/11/19  Yes Natanael Nevarez MD   ENTRESTO  MG TABS TAKE 1 TABLET BY MOUTH TWICE DAILY 9/4/19  Yes Michele Lux MD   furosemide (LASIX) 40 mg tablet Take one tablet by mouth daily until weight loss of 5-10 lbs as occurred, then can resume use 4-5 days per week  Schedule administration to be done when patient can be near toilet facilities for 4-6 hours   10/17/19  Yes Michele Lux MD   levothyroxine 25 mcg tablet TAKE 1 TABLET BY MOUTH EVERY MORNING 9/4/19  Yes Indiana Henderson MD   [facility]) 10 mg tablet Take 1 tablet (10 mg total) by mouth daily 1/6/20  Yes Natanael Nevarez MD   Multiple Vitamins-Minerals (OCUVITE ADULT 50+ PO) Take by mouth daily with breakfast   Yes Historical Provider, MD   primidone (MYSOLINE) 50 mg tablet Take 0 5 tablets (25 mg total) by mouth daily 5/16/19  Yes Natanael Nevarez MD   warfarin (COUMADIN) 2 5 mg tablet TAKE 1 TO 1 1/2 TABLETS  DAILY BY MOUTH OR AS ORDERED BY PHYSICIAN  1/7/20  Yes Michele Lux MD   albuterol (PROVENTIL HFA,VENTOLIN HFA) 90 mcg/act inhaler Inhale 2 puffs every 4 (four) hours as needed for wheezing or shortness of breath  Patient not taking: Reported on 1/31/2020 8/30/19 1/31/20  Clarence Rowe MD   amantadine (SYMMETREL) 100 mg capsule Start with 1 capsule daily for 2 weeks and if tolerated take 1 capsule twice daily  Patient not taking: Reported on 1/31/2020 1/15/20 1/31/20  David Doe MD   carbidopa-levodopa (SINEMET)  mg per tablet Take 1 tablet by mouth 4 (four) times a day  3/2/18  Historical Provider, MD   glycopyrrolate (ROBINUL) 1 mg tablet Take 1 tablet (1 mg total) by mouth daily  Patient not taking: Reported on 1/31/2020 1/15/20 1/31/20  David Doe MD       Allergies   Allergen Reactions    Amantadine Tongue Swelling    Artane [Trihexyphenidyl] Other (See Comments)     Felt "disconnected", "out if it", shaky  Did not feel right     Glycopyrrolate      Nose bleeds         Vitals:    01/31/20 0759   BP: 116/62   BP Location: Left arm   Patient Position: Sitting   Cuff Size: Standard   Pulse: 68   SpO2: 97%   Weight: 83 9 kg (185 lb)   Height: 5' 4 5" (1 638 m)       Body mass index is 31 26 kg/m²    6 pound weight loss in approximately 2+ months    Physical Exam:    General Appearance:  Alert, cooperative, no distress, appears stated age   Head:  Normocephalic, without obvious abnormality, atraumatic   Eyes:  PERRL, conjunctiva/corneas clear, EOM's intact,   both eyes   Ears:  Normal TM's and external ear canals, both ears   Nose: Nares normal, septum midline, mucosa normal, no drainage or sinus tenderness   Throat: Lips, mucosa, and tongue normal; teeth and gums normal   Neck: Supple, symmetrical, trachea midline, no adenopathy, thyroid: not enlarged, symmetric, no tenderness/mass/nodules, no carotid bruit or JVD   Back:   Symmetric, no curvature, ROM normal, no CVA tenderness   Lungs:   Clear to auscultation bilaterally, respirations unlabored   Chest Wall:  No tenderness or deformity   Heart:  Irregularly irregular cardiac rhythm, [id number] with mitral valve prosthetic clicks, S2 normal, no murmur, rub or gallop   Abdomen:   Soft, non-tender, bowel sounds active all four quadrants,  no masses, no organomegaly   Extremities: Extremities normal, atraumatic, no cyanosis with 1+ left more than right pretibial edema and no significant pitting edema of ankle or feet  edema   Pulses: 2+ and symmetric   Skin: Skin showed normal color, texture, turgor and no rashes or lesions   Lymph nodes: Cervical, supraclavicular, and axillary nodes normal   Neurologic: Normal         Cardiographics    ECG:    Not applicable    IMAGING:    Xr Chest Pa & Lateral    Result Date: 8/30/2019  Impression No acute cardiopulmonary disease   Workstation performed: [id number]             LAB REVIEW:      Lab Results   Component Value Date    SODIUM 138 01/24/2020    K 3 8 01/24/2020     01/24/2020    CO2 29 01/24/2020    ANIONGAP 9 8 (L) 12/07/2015    BUN 24 01/24/2020    CREATININE 1 07 01/24/2020    GLUCOSE 91 12/07/2015    GLUF 103 (H) 01/24/2020    CALCIUM 9 6 01/24/2020    AST 14 06/07/2019    ALT 8 (L) 06/07/2019    ALKPHOS 111 06/07/2019    PROT 6 9 12/07/2015    BILITOT 1 6 (H) 12/07/2015    EGFR 49 01/24/2020     Lab Results   Component Value Date    CHOLESTEROL 168 06/07/2019    CHOLESTEROL 148 10/08/2018    CHOLESTEROL 151 03/15/2018     Lab Results   Component Value Date    HDL 59 06/07/2019    HDL 50 10/08/2018    HDL 56 03/15/2018     No results found for: LDLCHOLEST  Lab Results   Component Value Date    LDLCALC 85 06/07/2019    LDLCALC 66 10/08/2018    LDLCALC 70 03/15/2018     No components found for: [facility]  Lab Results   Component Value Date    TRIG 119 06/07/2019    TRIG 162 (H) 10/08/2018    TRIG 127 03/15/2018     INR 01/29/2020:  2 94          Dewayne Rodriguez MD no

## 2020-02-06 DIAGNOSIS — R25.1 TREMOR: ICD-10-CM

## 2020-02-06 RX ORDER — PRIMIDONE 50 MG/1
25 TABLET ORAL DAILY
Qty: 45 TABLET | Refills: 1 | Status: SHIPPED | OUTPATIENT
Start: 2020-02-06 | End: 2020-08-03

## 2020-02-06 NOTE — TELEPHONE ENCOUNTER
Medication refill check list    Correct patient? yes   Correct medication name, dose, and pill size? yes   Correct provider? yes   Last and Next appt  scheduled? Yes, last date 01/15/20 & next date 05/20/20   Right pharmacy listed? yes   Correct quantity for 30 or 90 days? yes   Is the patient out of refills? When was it last prescribed? Yes, last date 05/16/19   Directions match what the patient says they are taking?  yes   Enough refills? (none for controlled substances, 1 year for routine medications) yes

## 2020-02-11 DIAGNOSIS — E03.9 HYPOTHYROIDISM, UNSPECIFIED TYPE: ICD-10-CM

## 2020-02-11 RX ORDER — LEVOTHYROXINE SODIUM 0.03 MG/1
25 TABLET ORAL EVERY MORNING
Qty: 90 TABLET | Refills: 0 | Status: SHIPPED | OUTPATIENT
Start: 2020-02-11 | End: 2020-05-06

## 2020-02-12 ENCOUNTER — TELEPHONE (OUTPATIENT)
Dept: FAMILY MEDICINE CLINIC | Facility: CLINIC | Age: 80
End: 2020-02-12

## 2020-02-12 ENCOUNTER — TRANSCRIBE ORDERS (OUTPATIENT)
Dept: ADMINISTRATIVE | Facility: HOSPITAL | Age: 80
End: 2020-02-12

## 2020-02-12 DIAGNOSIS — Z12.31 SCREENING MAMMOGRAM, ENCOUNTER FOR: Primary | ICD-10-CM

## 2020-02-12 NOTE — TELEPHONE ENCOUNTER
Dr Sydnie Abel pt would like to have a script for mammo pls put in pt chart   Pt is having done at [facility]

## 2020-02-24 DIAGNOSIS — G20 PARKINSON DISEASE (HCC): ICD-10-CM

## 2020-02-24 RX ORDER — CARBIDOPA, LEVODOPA AND ENTACAPONE 25; 200; 100 MG/1; MG/1; MG/1
1 TABLET, FILM COATED ORAL 4 TIMES DAILY
Qty: 360 TABLET | Refills: 3 | Status: SHIPPED | OUTPATIENT
Start: 2020-02-24 | End: 2021-03-15

## 2020-02-24 NOTE — TELEPHONE ENCOUNTER
Pt called RX line for med refill request for carbidopa-levodopa-entacapone -200 mg  Pt takes 1 tab po 4x a day  Rx to be sent to [facility]   Pt last ov 1/15/2020 next f/u 5/20/2020

## 2020-02-26 ENCOUNTER — HOSPITAL ENCOUNTER (OUTPATIENT)
Dept: RADIOLOGY | Facility: HOSPITAL | Age: 80
Discharge: HOME | End: 2020-02-26
Attending: FAMILY MEDICINE
Payer: MEDICARE

## 2020-02-26 DIAGNOSIS — Z12.31 SCREENING MAMMOGRAM, ENCOUNTER FOR: ICD-10-CM

## 2020-02-26 PROCEDURE — 77067 SCR MAMMO BI INCL CAD: CPT

## 2020-02-27 ENCOUNTER — ANTICOAG VISIT (OUTPATIENT)
Dept: CARDIOLOGY CLINIC | Facility: CLINIC | Age: 80
End: 2020-02-27

## 2020-02-27 ENCOUNTER — APPOINTMENT (OUTPATIENT)
Dept: LAB | Facility: CLINIC | Age: 80
End: 2020-02-27
Payer: MEDICARE

## 2020-02-27 ENCOUNTER — TRANSCRIBE ORDERS (OUTPATIENT)
Dept: ADMINISTRATIVE | Facility: HOSPITAL | Age: 80
End: 2020-02-27

## 2020-02-27 ENCOUNTER — TELEPHONE (OUTPATIENT)
Dept: FAMILY MEDICINE CLINIC | Facility: CLINIC | Age: 80
End: 2020-02-27

## 2020-02-27 DIAGNOSIS — Z95.2 HEART VALVE REPLACED BY TRANSPLANT: Primary | ICD-10-CM

## 2020-02-27 DIAGNOSIS — Z95.2 H/O MITRAL VALVE REPLACEMENT WITH MECHANICAL VALVE: ICD-10-CM

## 2020-02-27 DIAGNOSIS — I48.20 CHRONIC ATRIAL FIBRILLATION (HCC): ICD-10-CM

## 2020-02-27 DIAGNOSIS — Z95.2 HEART VALVE REPLACED BY TRANSPLANT: ICD-10-CM

## 2020-02-27 LAB
INR PPP: 2.62 (ref 0.84–1.19)
PROTHROMBIN TIME: 27.5 SECONDS (ref 11.6–14.5)

## 2020-02-27 PROCEDURE — 36415 COLL VENOUS BLD VENIPUNCTURE: CPT

## 2020-02-27 PROCEDURE — 85610 PROTHROMBIN TIME: CPT

## 2020-02-27 NOTE — TELEPHONE ENCOUNTER
----- Message from Anita Mederos MD sent at 2/27/2020  8:24 AM EST -----  Pl, advise pt -  Normal mammogram

## 2020-03-05 DIAGNOSIS — F02.80 ALZHEIMER'S DEMENTIA WITHOUT BEHAVIORAL DISTURBANCE, UNSPECIFIED TIMING OF DEMENTIA ONSET (HCC): ICD-10-CM

## 2020-03-05 DIAGNOSIS — G30.9 ALZHEIMER'S DEMENTIA WITHOUT BEHAVIORAL DISTURBANCE, UNSPECIFIED TIMING OF DEMENTIA ONSET (HCC): ICD-10-CM

## 2020-03-05 RX ORDER — DONEPEZIL HYDROCHLORIDE 5 MG/1
5 TABLET, FILM COATED ORAL
Qty: 90 TABLET | Refills: 0 | Status: SHIPPED | OUTPATIENT
Start: 2020-03-05 | End: 2020-06-02 | Stop reason: SDUPTHER

## 2020-04-03 ENCOUNTER — TELEPHONE (OUTPATIENT)
Dept: CARDIOLOGY CLINIC | Facility: CLINIC | Age: 80
End: 2020-04-03

## 2020-04-04 DIAGNOSIS — I50.42 CHRONIC COMBINED SYSTOLIC AND DIASTOLIC HEART FAILURE (HCC): ICD-10-CM

## 2020-04-06 RX ORDER — FUROSEMIDE 20 MG/1
TABLET ORAL
Qty: 16 TABLET | Refills: 5 | Status: SHIPPED | OUTPATIENT
Start: 2020-04-06 | End: 2020-07-02

## 2020-04-08 ENCOUNTER — APPOINTMENT (OUTPATIENT)
Dept: LAB | Facility: CLINIC | Age: 80
End: 2020-04-08
Payer: MEDICARE

## 2020-04-08 ENCOUNTER — ANTICOAG VISIT (OUTPATIENT)
Dept: CARDIOLOGY CLINIC | Facility: CLINIC | Age: 80
End: 2020-04-08

## 2020-04-08 ENCOUNTER — TRANSCRIBE ORDERS (OUTPATIENT)
Dept: LAB | Facility: CLINIC | Age: 80
End: 2020-04-08

## 2020-04-08 DIAGNOSIS — I50.42 CHRONIC COMBINED SYSTOLIC AND DIASTOLIC HEART FAILURE (HCC): ICD-10-CM

## 2020-04-08 DIAGNOSIS — Z95.2 H/O MITRAL VALVE REPLACEMENT WITH MECHANICAL VALVE: ICD-10-CM

## 2020-04-08 DIAGNOSIS — Z95.2 HEART VALVE REPLACED BY TRANSPLANT: Primary | ICD-10-CM

## 2020-04-08 DIAGNOSIS — I48.20 CHRONIC ATRIAL FIBRILLATION (HCC): ICD-10-CM

## 2020-04-08 LAB
ANION GAP SERPL CALCULATED.3IONS-SCNC: 4 MMOL/L (ref 4–13)
BUN SERPL-MCNC: 22 MG/DL (ref 5–25)
CALCIUM SERPL-MCNC: 9.5 MG/DL (ref 8.3–10.1)
CHLORIDE SERPL-SCNC: 107 MMOL/L (ref 100–108)
CO2 SERPL-SCNC: 28 MMOL/L (ref 21–32)
CREAT SERPL-MCNC: 1.11 MG/DL (ref 0.6–1.3)
GFR SERPL CREATININE-BSD FRML MDRD: 47 ML/MIN/1.73SQ M
GLUCOSE P FAST SERPL-MCNC: 104 MG/DL (ref 65–99)
INR PPP: 3.4 (ref 0.84–1.19)
POTASSIUM SERPL-SCNC: 4.1 MMOL/L (ref 3.5–5.3)
PROTHROMBIN TIME: 33.8 SECONDS (ref 11.6–14.5)
SODIUM SERPL-SCNC: 139 MMOL/L (ref 136–145)

## 2020-04-08 PROCEDURE — 36415 COLL VENOUS BLD VENIPUNCTURE: CPT

## 2020-04-08 PROCEDURE — 85610 PROTHROMBIN TIME: CPT

## 2020-04-08 PROCEDURE — 80048 BASIC METABOLIC PNL TOTAL CA: CPT

## 2020-04-10 ENCOUNTER — TELEPHONE (OUTPATIENT)
Dept: CARDIOLOGY CLINIC | Facility: CLINIC | Age: 80
End: 2020-04-10

## 2020-05-06 DIAGNOSIS — E03.9 HYPOTHYROIDISM, UNSPECIFIED TYPE: ICD-10-CM

## 2020-05-06 RX ORDER — LEVOTHYROXINE SODIUM 0.03 MG/1
TABLET ORAL
Qty: 90 TABLET | Refills: 0 | Status: SHIPPED | OUTPATIENT
Start: 2020-05-06 | End: 2020-07-31 | Stop reason: SDUPTHER

## 2020-05-13 ENCOUNTER — APPOINTMENT (OUTPATIENT)
Dept: LAB | Facility: CLINIC | Age: 80
End: 2020-05-13
Payer: MEDICARE

## 2020-05-13 ENCOUNTER — TRANSCRIBE ORDERS (OUTPATIENT)
Dept: LAB | Facility: CLINIC | Age: 80
End: 2020-05-13

## 2020-05-13 ENCOUNTER — ANTICOAG VISIT (OUTPATIENT)
Dept: CARDIOLOGY CLINIC | Facility: CLINIC | Age: 80
End: 2020-05-13

## 2020-05-13 DIAGNOSIS — Z95.2 H/O MITRAL VALVE REPLACEMENT WITH MECHANICAL VALVE: ICD-10-CM

## 2020-05-13 DIAGNOSIS — I48.20 CHRONIC ATRIAL FIBRILLATION (HCC): ICD-10-CM

## 2020-05-13 DIAGNOSIS — Z95.2 S/P MVR (MITRAL VALVE REPLACEMENT): Primary | ICD-10-CM

## 2020-05-13 LAB
INR PPP: 3.63 (ref 0.84–1.19)
PROTHROMBIN TIME: 35.6 SECONDS (ref 11.6–14.5)

## 2020-05-13 PROCEDURE — 36415 COLL VENOUS BLD VENIPUNCTURE: CPT

## 2020-05-13 PROCEDURE — 85610 PROTHROMBIN TIME: CPT

## 2020-05-19 ENCOUNTER — TELEPHONE (OUTPATIENT)
Dept: CARDIOLOGY CLINIC | Facility: CLINIC | Age: 80
End: 2020-05-19

## 2020-05-19 DIAGNOSIS — I50.42 CHRONIC COMBINED SYSTOLIC AND DIASTOLIC HEART FAILURE (HCC): ICD-10-CM

## 2020-05-19 DIAGNOSIS — R60.0 EDEMA OF BOTH LEGS: ICD-10-CM

## 2020-05-19 RX ORDER — FUROSEMIDE 40 MG/1
TABLET ORAL
Qty: 90 TABLET | Refills: 3
Start: 2020-05-19 | End: 2020-05-21 | Stop reason: SDUPTHER

## 2020-05-20 ENCOUNTER — TELEMEDICINE (OUTPATIENT)
Dept: NEUROLOGY | Facility: CLINIC | Age: 80
End: 2020-05-20
Payer: MEDICARE

## 2020-05-20 VITALS
BODY MASS INDEX: 31.84 KG/M2 | SYSTOLIC BLOOD PRESSURE: 84 MMHG | DIASTOLIC BLOOD PRESSURE: 48 MMHG | WEIGHT: 188.4 LBS | HEART RATE: 73 BPM

## 2020-05-20 DIAGNOSIS — G20 PARKINSON'S DISEASE (HCC): ICD-10-CM

## 2020-05-20 DIAGNOSIS — G47.9 SLEEP DISTURBANCES: Primary | ICD-10-CM

## 2020-05-20 DIAGNOSIS — G31.84 MCI (MILD COGNITIVE IMPAIRMENT): ICD-10-CM

## 2020-05-20 DIAGNOSIS — I95.1 ORTHOSTATIC HYPOTENSION: ICD-10-CM

## 2020-05-20 PROCEDURE — 99443 PR PHYS/QHP TELEPHONE EVALUATION 21-30 MIN: CPT | Performed by: PSYCHIATRY & NEUROLOGY

## 2020-05-21 ENCOUNTER — TELEPHONE (OUTPATIENT)
Dept: CARDIOLOGY CLINIC | Facility: CLINIC | Age: 80
End: 2020-05-21

## 2020-05-21 DIAGNOSIS — R60.0 EDEMA OF BOTH LEGS: ICD-10-CM

## 2020-05-21 DIAGNOSIS — I50.42 CHRONIC COMBINED SYSTOLIC AND DIASTOLIC HEART FAILURE (HCC): ICD-10-CM

## 2020-05-21 RX ORDER — FUROSEMIDE 40 MG/1
TABLET ORAL
Qty: 90 TABLET | Refills: 3 | Status: CANCELLED
Start: 2020-05-21

## 2020-05-21 RX ORDER — FUROSEMIDE 40 MG/1
TABLET ORAL
Qty: 90 TABLET | Refills: 1 | Status: SHIPPED | OUTPATIENT
Start: 2020-05-21 | End: 2020-05-21 | Stop reason: SDUPTHER

## 2020-05-21 RX ORDER — FUROSEMIDE 40 MG/1
40 TABLET ORAL DAILY
Qty: 90 TABLET | Refills: 1 | Status: SHIPPED | OUTPATIENT
Start: 2020-05-21 | End: 2021-05-04

## 2020-05-22 ENCOUNTER — OFFICE VISIT (OUTPATIENT)
Dept: CARDIOLOGY CLINIC | Facility: CLINIC | Age: 80
End: 2020-05-22
Payer: MEDICARE

## 2020-05-22 VITALS
OXYGEN SATURATION: 99 % | TEMPERATURE: 97.6 F | HEART RATE: 74 BPM | WEIGHT: 189 LBS | SYSTOLIC BLOOD PRESSURE: 120 MMHG | HEIGHT: 65 IN | BODY MASS INDEX: 31.49 KG/M2 | DIASTOLIC BLOOD PRESSURE: 80 MMHG

## 2020-05-22 DIAGNOSIS — G45.9 TRANSIENT ISCHEMIC ATTACK: ICD-10-CM

## 2020-05-22 DIAGNOSIS — I48.20 CHRONIC ATRIAL FIBRILLATION (HCC): ICD-10-CM

## 2020-05-22 DIAGNOSIS — I50.42 CHRONIC COMBINED SYSTOLIC AND DIASTOLIC HEART FAILURE (HCC): ICD-10-CM

## 2020-05-22 DIAGNOSIS — Z95.2 H/O MITRAL VALVE REPLACEMENT WITH MECHANICAL VALVE: ICD-10-CM

## 2020-05-22 DIAGNOSIS — G20 PARKINSONS (HCC): ICD-10-CM

## 2020-05-22 DIAGNOSIS — E03.9 ACQUIRED HYPOTHYROIDISM: ICD-10-CM

## 2020-05-22 PROCEDURE — 4040F PNEUMOC VAC/ADMIN/RCVD: CPT | Performed by: INTERNAL MEDICINE

## 2020-05-22 PROCEDURE — 3074F SYST BP LT 130 MM HG: CPT | Performed by: INTERNAL MEDICINE

## 2020-05-22 PROCEDURE — 3079F DIAST BP 80-89 MM HG: CPT | Performed by: INTERNAL MEDICINE

## 2020-05-22 PROCEDURE — 99214 OFFICE O/P EST MOD 30 MIN: CPT | Performed by: INTERNAL MEDICINE

## 2020-05-22 PROCEDURE — 1160F RVW MEDS BY RX/DR IN RCRD: CPT | Performed by: INTERNAL MEDICINE

## 2020-05-22 PROCEDURE — 93000 ELECTROCARDIOGRAM COMPLETE: CPT | Performed by: INTERNAL MEDICINE

## 2020-05-22 PROCEDURE — 1036F TOBACCO NON-USER: CPT | Performed by: INTERNAL MEDICINE

## 2020-05-22 PROCEDURE — 3008F BODY MASS INDEX DOCD: CPT | Performed by: INTERNAL MEDICINE

## 2020-05-27 ENCOUNTER — APPOINTMENT (OUTPATIENT)
Dept: LAB | Facility: CLINIC | Age: 80
End: 2020-05-27
Payer: MEDICARE

## 2020-05-27 ENCOUNTER — ANTICOAG VISIT (OUTPATIENT)
Dept: CARDIOLOGY CLINIC | Facility: CLINIC | Age: 80
End: 2020-05-27

## 2020-05-27 DIAGNOSIS — G20 PARKINSONS (HCC): ICD-10-CM

## 2020-05-27 DIAGNOSIS — I48.20 CHRONIC ATRIAL FIBRILLATION (HCC): ICD-10-CM

## 2020-05-27 DIAGNOSIS — Z95.2 H/O MITRAL VALVE REPLACEMENT WITH MECHANICAL VALVE: ICD-10-CM

## 2020-05-27 DIAGNOSIS — E03.9 ACQUIRED HYPOTHYROIDISM: ICD-10-CM

## 2020-05-27 DIAGNOSIS — I50.42 CHRONIC COMBINED SYSTOLIC AND DIASTOLIC HEART FAILURE (HCC): ICD-10-CM

## 2020-05-27 DIAGNOSIS — G45.9 TRANSIENT ISCHEMIC ATTACK: ICD-10-CM

## 2020-06-01 ENCOUNTER — TELEPHONE (OUTPATIENT)
Dept: NEUROLOGY | Facility: CLINIC | Age: 80
End: 2020-06-01

## 2020-06-02 DIAGNOSIS — G30.9 ALZHEIMER'S DEMENTIA WITHOUT BEHAVIORAL DISTURBANCE, UNSPECIFIED TIMING OF DEMENTIA ONSET (HCC): ICD-10-CM

## 2020-06-02 DIAGNOSIS — F02.80 ALZHEIMER'S DEMENTIA WITHOUT BEHAVIORAL DISTURBANCE, UNSPECIFIED TIMING OF DEMENTIA ONSET (HCC): ICD-10-CM

## 2020-06-02 RX ORDER — DONEPEZIL HYDROCHLORIDE 5 MG/1
5 TABLET, FILM COATED ORAL
Qty: 90 TABLET | Refills: 1 | Status: SHIPPED | OUTPATIENT
Start: 2020-06-02 | End: 2020-10-19 | Stop reason: SDUPTHER

## 2020-06-03 ENCOUNTER — OFFICE VISIT (OUTPATIENT)
Dept: NEUROLOGY | Facility: CLINIC | Age: 80
End: 2020-06-03
Payer: MEDICARE

## 2020-06-03 VITALS
DIASTOLIC BLOOD PRESSURE: 60 MMHG | HEIGHT: 65 IN | HEART RATE: 63 BPM | SYSTOLIC BLOOD PRESSURE: 119 MMHG | WEIGHT: 189.5 LBS | TEMPERATURE: 98.2 F | BODY MASS INDEX: 31.57 KG/M2

## 2020-06-03 DIAGNOSIS — G20 PARKINSON'S DISEASE (HCC): ICD-10-CM

## 2020-06-03 DIAGNOSIS — G20 PARKINSONS (HCC): Primary | ICD-10-CM

## 2020-06-03 PROCEDURE — 4040F PNEUMOC VAC/ADMIN/RCVD: CPT | Performed by: PSYCHIATRY & NEUROLOGY

## 2020-06-03 PROCEDURE — 3078F DIAST BP <80 MM HG: CPT | Performed by: PSYCHIATRY & NEUROLOGY

## 2020-06-03 PROCEDURE — 3008F BODY MASS INDEX DOCD: CPT | Performed by: PSYCHIATRY & NEUROLOGY

## 2020-06-03 PROCEDURE — 3074F SYST BP LT 130 MM HG: CPT | Performed by: PSYCHIATRY & NEUROLOGY

## 2020-06-03 PROCEDURE — 1160F RVW MEDS BY RX/DR IN RCRD: CPT | Performed by: PSYCHIATRY & NEUROLOGY

## 2020-06-03 PROCEDURE — 99215 OFFICE O/P EST HI 40 MIN: CPT | Performed by: PSYCHIATRY & NEUROLOGY

## 2020-06-03 PROCEDURE — 1036F TOBACCO NON-USER: CPT | Performed by: PSYCHIATRY & NEUROLOGY

## 2020-06-08 ENCOUNTER — ANTICOAG VISIT (OUTPATIENT)
Dept: CARDIOLOGY CLINIC | Facility: CLINIC | Age: 80
End: 2020-06-08

## 2020-06-08 ENCOUNTER — TRANSCRIBE ORDERS (OUTPATIENT)
Dept: LAB | Facility: CLINIC | Age: 80
End: 2020-06-08

## 2020-06-08 ENCOUNTER — LAB (OUTPATIENT)
Dept: LAB | Facility: CLINIC | Age: 80
End: 2020-06-08
Payer: MEDICARE

## 2020-06-08 DIAGNOSIS — Z95.2 S/P MVR (MITRAL VALVE REPLACEMENT): ICD-10-CM

## 2020-06-08 DIAGNOSIS — I50.42 CHRONIC COMBINED SYSTOLIC AND DIASTOLIC HEART FAILURE (HCC): ICD-10-CM

## 2020-06-08 DIAGNOSIS — I51.9 MYXEDEMA HEART DISEASE: ICD-10-CM

## 2020-06-08 DIAGNOSIS — I48.20 CHRONIC ATRIAL FIBRILLATION (HCC): ICD-10-CM

## 2020-06-08 DIAGNOSIS — I50.42 CHRONIC COMBINED SYSTOLIC AND DIASTOLIC HEART FAILURE (HCC): Primary | ICD-10-CM

## 2020-06-08 DIAGNOSIS — G45.9 INTERMITTENT CEREBRAL ISCHEMIA: ICD-10-CM

## 2020-06-08 DIAGNOSIS — D68.52 HETEROZYGOUS FOR PROTHROMBIN G20210A MUTATION (HCC): ICD-10-CM

## 2020-06-08 DIAGNOSIS — Z95.2 H/O MITRAL VALVE REPLACEMENT WITH MECHANICAL VALVE: ICD-10-CM

## 2020-06-08 DIAGNOSIS — Z95.2 HEART VALVE REPLACED BY TRANSPLANT: ICD-10-CM

## 2020-06-08 DIAGNOSIS — E03.9 MYXEDEMA HEART DISEASE: ICD-10-CM

## 2020-06-08 LAB
ANION GAP SERPL CALCULATED.3IONS-SCNC: 2 MMOL/L (ref 4–13)
BUN SERPL-MCNC: 17 MG/DL (ref 5–25)
CALCIUM SERPL-MCNC: 9.7 MG/DL (ref 8.3–10.1)
CHLORIDE SERPL-SCNC: 109 MMOL/L (ref 100–108)
CO2 SERPL-SCNC: 30 MMOL/L (ref 21–32)
CREAT SERPL-MCNC: 1.13 MG/DL (ref 0.6–1.3)
GFR SERPL CREATININE-BSD FRML MDRD: 46 ML/MIN/1.73SQ M
GLUCOSE P FAST SERPL-MCNC: 113 MG/DL (ref 65–99)
INR PPP: 2.81 (ref 0.84–1.19)
POTASSIUM SERPL-SCNC: 4.3 MMOL/L (ref 3.5–5.3)
PROTHROMBIN TIME: 29.1 SECONDS (ref 11.6–14.5)
SODIUM SERPL-SCNC: 141 MMOL/L (ref 136–145)

## 2020-06-08 PROCEDURE — 85610 PROTHROMBIN TIME: CPT

## 2020-06-08 PROCEDURE — 80048 BASIC METABOLIC PNL TOTAL CA: CPT

## 2020-06-08 PROCEDURE — 36415 COLL VENOUS BLD VENIPUNCTURE: CPT

## 2020-06-09 ENCOUNTER — TELEPHONE (OUTPATIENT)
Dept: CARDIOLOGY CLINIC | Facility: CLINIC | Age: 80
End: 2020-06-09

## 2020-06-29 ENCOUNTER — ANTICOAG VISIT (OUTPATIENT)
Dept: CARDIOLOGY CLINIC | Facility: CLINIC | Age: 80
End: 2020-06-29

## 2020-06-29 ENCOUNTER — APPOINTMENT (OUTPATIENT)
Dept: LAB | Facility: CLINIC | Age: 80
End: 2020-06-29
Payer: MEDICARE

## 2020-06-29 DIAGNOSIS — I48.20 CHRONIC ATRIAL FIBRILLATION (HCC): ICD-10-CM

## 2020-06-29 DIAGNOSIS — Z95.2 H/O MITRAL VALVE REPLACEMENT WITH MECHANICAL VALVE: ICD-10-CM

## 2020-07-02 ENCOUNTER — OFFICE VISIT (OUTPATIENT)
Dept: FAMILY MEDICINE CLINIC | Facility: CLINIC | Age: 80
End: 2020-07-02
Payer: MEDICARE

## 2020-07-02 VITALS
HEART RATE: 72 BPM | SYSTOLIC BLOOD PRESSURE: 110 MMHG | DIASTOLIC BLOOD PRESSURE: 70 MMHG | BODY MASS INDEX: 31.32 KG/M2 | WEIGHT: 188 LBS | RESPIRATION RATE: 16 BRPM | TEMPERATURE: 98.2 F | HEIGHT: 65 IN

## 2020-07-02 DIAGNOSIS — Z78.0 POSTMENOPAUSAL: ICD-10-CM

## 2020-07-02 DIAGNOSIS — I48.20 CHRONIC ATRIAL FIBRILLATION (HCC): ICD-10-CM

## 2020-07-02 DIAGNOSIS — Z00.00 MEDICARE ANNUAL WELLNESS VISIT, SUBSEQUENT: ICD-10-CM

## 2020-07-02 DIAGNOSIS — I50.22 HEART FAILURE, LEFT SYSTOLIC, CHRONIC (HCC): ICD-10-CM

## 2020-07-02 DIAGNOSIS — E03.9 HYPOTHYROIDISM, UNSPECIFIED TYPE: Primary | ICD-10-CM

## 2020-07-02 DIAGNOSIS — R73.01 IMPAIRED FASTING GLUCOSE: ICD-10-CM

## 2020-07-02 DIAGNOSIS — R60.9 EDEMA, UNSPECIFIED TYPE: ICD-10-CM

## 2020-07-02 PROCEDURE — 1160F RVW MEDS BY RX/DR IN RCRD: CPT | Performed by: FAMILY MEDICINE

## 2020-07-02 PROCEDURE — G0439 PPPS, SUBSEQ VISIT: HCPCS | Performed by: FAMILY MEDICINE

## 2020-07-02 PROCEDURE — 3078F DIAST BP <80 MM HG: CPT | Performed by: FAMILY MEDICINE

## 2020-07-02 PROCEDURE — 99214 OFFICE O/P EST MOD 30 MIN: CPT | Performed by: FAMILY MEDICINE

## 2020-07-02 PROCEDURE — 1170F FXNL STATUS ASSESSED: CPT | Performed by: FAMILY MEDICINE

## 2020-07-02 PROCEDURE — 3074F SYST BP LT 130 MM HG: CPT | Performed by: FAMILY MEDICINE

## 2020-07-02 PROCEDURE — 1123F ACP DISCUSS/DSCN MKR DOCD: CPT | Performed by: FAMILY MEDICINE

## 2020-07-02 PROCEDURE — 4040F PNEUMOC VAC/ADMIN/RCVD: CPT | Performed by: FAMILY MEDICINE

## 2020-07-02 PROCEDURE — 1036F TOBACCO NON-USER: CPT | Performed by: FAMILY MEDICINE

## 2020-07-02 PROCEDURE — 1125F AMNT PAIN NOTED PAIN PRSNT: CPT | Performed by: FAMILY MEDICINE

## 2020-07-02 PROCEDURE — 3008F BODY MASS INDEX DOCD: CPT | Performed by: FAMILY MEDICINE

## 2020-07-02 NOTE — PROGRESS NOTES
Chief Complaint   Patient presents with    Medicare Wellness Visit    Follow-up     chronic conditions         Patient ID: Angela Napoles is a [de-identified] y o  female  HPI      The following portions of the patient's history were reviewed and updated as appropriate: allergies, current medications, past family history, past medical history, past social history, past surgical history and problem list     Review of Systems   Constitutional: Positive for fatigue (chronic)  Negative for activity change, fever and unexpected weight change  Respiratory: Negative  Cardiovascular: Positive for leg swelling  Negative for chest pain and palpitations  Genitourinary: Negative  Musculoskeletal: Positive for back pain  Negative for gait problem  Using a cane    Skin: Negative  Neurological: Positive for weakness  Negative for light-headedness, numbness and headaches  Psychiatric/Behavioral: Negative  Current Outpatient Medications   Medication Sig Dispense Refill    acetaminophen (TYLENOL) 650 mg CR tablet Take 1 tablet (650 mg total) by mouth 3 (three) times a day (Patient taking differently: Take 650 mg by mouth every 8 (eight) hours as needed ) 100 tablet 0    carbidopa-levodopa-entacapone (STALEVO) -200 MG per tablet Take 1 tablet by mouth 4 (four) times a day 360 tablet 3    Cholecalciferol (VITAMIN D3) 2000 units TABS Take 2,000 Units by mouth every morning      donepezil (ARICEPT) 5 mg tablet Take 1 tablet (5 mg total) by mouth daily at bedtime 90 tablet 1    ENTRESTO  MG TABS TAKE 1 TABLET BY MOUTH TWICE DAILY 180 tablet 3    furosemide (LASIX) 40 mg tablet Take 1 tablet (40 mg total) by mouth daily Take one tablet by mouth daily until weight loss of 5-10 lbs as occurred, then can resume use 4-5 days per week  Schedule administration to be done when patient can be near toilet facilities for 4-6 hours   90 tablet 1    levothyroxine 25 mcg tablet take 1 tablet by mouth every morning 90 tablet 0    memantine (NAMENDA) 10 mg tablet Take 1 tablet (10 mg total) by mouth daily 90 tablet 1    Multiple Vitamins-Minerals (OCUVITE ADULT 50+ PO) Take by mouth daily with breakfast      primidone (MYSOLINE) 50 mg tablet Take 0 5 tablets (25 mg total) by mouth daily 45 tablet 1    warfarin (COUMADIN) 2 5 mg tablet TAKE 1 TO 1 1/2 TABLETS  DAILY BY MOUTH OR AS ORDERED BY PHYSICIAN  125 tablet 3     No current facility-administered medications for this visit  Objective:    /70 (BP Location: Right arm, Patient Position: Sitting, Cuff Size: Large)   Pulse 72   Temp 98 2 °F (36 8 °C) (Tympanic)   Resp 16   Ht 5' 4 5" (1 638 m)   Wt 85 3 kg (188 lb)   BMI 31 77 kg/m²        Physical Exam   Constitutional: She is oriented to person, place, and time  No distress  Cardiovascular: Normal rate and regular rhythm  No murmur heard  Pulmonary/Chest: Effort normal and breath sounds normal  No respiratory distress  She has no wheezes  She has no rales  Abdominal: Soft  There is no tenderness  Musculoskeletal: She exhibits edema  Neurological: She is alert and oriented to person, place, and time  No cranial nerve deficit  Skin: No pallor  Psychiatric: She has a normal mood and affect  Labs in chart were reviewed  Assessment/Plan:         Diagnoses and all orders for this visit:    Hypothyroidism, unspecified type  -     Lipid panel; Future  -     TSH, 3rd generation; Future    Heart failure, left systolic, chronic (HCC)  -     Lipid panel; Future  F/u with cardiologist   Chronic atrial fibrillation ([facility] [address] )  -     Lipid panel; Future    Impaired fasting glucose  -     Comprehensive metabolic panel; Future  -     Hemoglobin A1C; Future    Postmenopausal  -     DXA bone density spine hip and pelvis; Future    Edema, unspecified type    Medicare annual wellness visit, subsequent        Shingrix advised     BMI Counseling: Body mass index is 31 77 kg/m²   Discussed the patient's BMI with her  The BMI is above normal  Nutrition recommendations include reducing portion sizes, decreasing overall calorie intake, 3-5 servings of fruits/vegetables daily and reducing fast food intake  Exercise recommendations include exercising 3-5 times per week       rto in 6 m             America Olson MD

## 2020-07-02 NOTE — PATIENT INSTRUCTIONS
Medicare Preventive Visit Patient Instructions  Thank you for completing your Welcome to Medicare Visit or Medicare Annual Wellness Visit today  Your next wellness visit will be due in one year (7/2/2021)  The screening/preventive services that you may require over the next 5-10 years are detailed below  Some tests may not apply to you based off risk factors and/or age  Screening tests ordered at today's visit but not completed yet may show as past due  Also, please note that scanned in results may not display below  Preventive Screenings:  Service Recommendations Previous Testing/Comments   Colorectal Cancer Screening  * Colonoscopy    * Fecal Occult Blood Test (FOBT)/Fecal Immunochemical Test (FIT)  * Fecal DNA/Cologuard Test  * Flexible Sigmoidoscopy Age: 54-65 years old   Colonoscopy: every 10 years (may be performed more frequently if at higher risk)  OR  FOBT/FIT: every 1 year  OR  Cologuard: every 3 years  OR  Sigmoidoscopy: every 5 years  Screening may be recommended earlier than age 48 if at higher risk for colorectal cancer  Also, an individualized decision between you and your healthcare provider will decide whether screening between the ages of 74-80 would be appropriate  Colonoscopy: Not on file  FOBT/FIT: Not on file  Cologuard: Not on file  Sigmoidoscopy: Not on file         Breast Cancer Screening Age: 36 years old  Frequency: every 1-2 years  Not required if history of left and right mastectomy Mammogram: 02/26/2020       Cervical Cancer Screening Between the ages of 21-29, pap smear recommended once every 3 years  Between the ages of 33-67, can perform pap smear with HPV co-testing every 5 years     Recommendations may differ for women with a history of total hysterectomy, cervical cancer, or abnormal pap smears in past  Pap Smear: Not on file       Hepatitis C Screening Once for adults born between [facility]  More frequently in patients at high risk for Hepatitis C Hep C Antibody: Not on file       Diabetes Screening 1-2 times per year if you're at risk for diabetes or have pre-diabetes Fasting glucose: 113 mg/dL   A1C: No results in last 5 years       Cholesterol Screening Once every 5 years if you don't have a lipid disorder  May order more often based on risk factors  Lipid panel: 06/07/2019         Other Preventive Screenings Covered by Medicare:  1  Abdominal Aortic Aneurysm (AAA) Screening: covered once if your at risk  You're considered to be at risk if you have a family history of AAA  2  Lung Cancer Screening: covers low dose CT scan once per year if you meet all of the following conditions: (1) Age 50-69; (2) No signs or symptoms of lung cancer; (3) Current smoker or have quit smoking within the last 15 years; (4) You have a tobacco smoking history of at least 30 pack years (packs per day multiplied by number of years you smoked); (5) You get a written order from a healthcare provider  3  Glaucoma Screening: covered annually if you're considered high risk: (1) You have diabetes OR (2) Family history of glaucoma OR (3)  aged 48 and older OR (3)  American aged 72 and older  3  Osteoporosis Screening: covered every 2 years if you meet one of the following conditions: (1) You're estrogen deficient and at risk for osteoporosis based off medical history and other findings; (2) Have a vertebral abnormality; (3) On glucocorticoid therapy for more than 3 months; (4) Have primary hyperparathyroidism; (5) On osteoporosis medications and need to assess response to drug therapy  · Last bone density test (DXA Scan): Not on file  5  HIV Screening: covered annually if you're between the age of 12-76  Also covered annually if you are younger than 13 and older than 72 with risk factors for HIV infection  For pregnant patients, it is covered up to 3 times per pregnancy      Immunizations:  Immunization Recommendations   Influenza Vaccine Annual influenza vaccination during flu season is recommended for all persons aged >= 6 months who do not have contraindications   Pneumococcal Vaccine (Prevnar and Pneumovax)  * Prevnar = PCV13  * Pneumovax = PPSV23   Adults 25-60 years old: 1-3 doses may be recommended based on certain risk factors  Adults 72 years old: Prevnar (PCV13) vaccine recommended followed by Pneumovax (PPSV23) vaccine  If already received PPSV23 since turning 65, then PCV13 recommended at least one year after PPSV23 dose  Hepatitis B Vaccine 3 dose series if at intermediate or high risk (ex: diabetes, end stage renal disease, liver disease)   Tetanus (Td) Vaccine - COST NOT COVERED BY MEDICARE PART B Following completion of primary series, a booster dose should be given every 10 years to maintain immunity against tetanus  Td may also be given as tetanus wound prophylaxis  Tdap Vaccine - COST NOT COVERED BY MEDICARE PART B Recommended at least once for all adults  For pregnant patients, recommended with each pregnancy  Shingles Vaccine (Shingrix) - COST NOT COVERED BY MEDICARE PART B  2 shot series recommended in those aged 48 and above     Health Maintenance Due:      Topic Date Due    Hepatitis C Screening  Discontinued     Immunizations Due:      Topic Date Due    Influenza Vaccine  07/01/2020     Advance Directives   What are advance directives? Advance directives are legal documents that state your wishes and plans for medical care  These plans are made ahead of time in case you lose your ability to make decisions for yourself  Advance directives can apply to any medical decision, such as the treatments you want, and if you want to donate organs  What are the types of advance directives? There are many types of advance directives, and each state has rules about how to use them  You may choose a combination of any of the following:  · Living will: This is a written record of the treatment you want   You can also choose which treatments you do not want, which to limit, and which to stop at a certain time  This includes surgery, medicine, IV fluid, and tube feedings  · Durable power of  for healthcare [facility]): This is a written record that states who you want to make healthcare choices for you when you are unable to make them for yourself  This person, called a proxy, is usually a family member or a friend  You may choose more than 1 proxy  · Do not resuscitate (DNR) order:  A DNR order is used in case your heart stops beating or you stop breathing  It is a request not to have certain forms of treatment, such as CPR  A DNR order may be included in other types of advance directives  · Medical directive: This covers the care that you want if you are in a coma, near death, or unable to make decisions for yourself  You can list the treatments you want for each condition  Treatment may include pain medicine, surgery, blood transfusions, dialysis, IV or tube feedings, and a ventilator (breathing machine)  · Values history: This document has questions about your views, beliefs, and how you feel and think about life  This information can help others choose the care that you would choose  Why are advance directives important? An advance directive helps you control your care  Although spoken wishes may be used, it is better to have your wishes written down  Spoken wishes can be misunderstood, or not followed  Treatments may be given even if you do not want them  An advance directive may make it easier for your family to make difficult choices about your care  Weight Management   Why it is important to manage your weight:  Being overweight increases your risk of health conditions such as heart disease, high blood pressure, type 2 diabetes, and certain types of cancer  It can also increase your risk for osteoarthritis, sleep apnea, and other respiratory problems  Aim for a slow, steady weight loss   Even a small amount of weight loss can lower your risk of health problems  How to lose weight safely:  A safe and healthy way to lose weight is to eat fewer calories and get regular exercise  You can lose up about 1 pound a week by decreasing the number of calories you eat by 500 calories each day  Healthy meal plan for weight management:  A healthy meal plan includes a variety of foods, contains fewer calories, and helps you stay healthy  A healthy meal plan includes the following:  · Eat whole-grain foods more often  A healthy meal plan should contain fiber  Fiber is the part of grains, fruits, and vegetables that is not broken down by your body  Whole-grain foods are healthy and provide extra fiber in your diet  Some examples of whole-grain foods are whole-wheat breads and pastas, oatmeal, brown rice, and bulgur  · Eat a variety of vegetables every day  Include dark, leafy greens such as spinach, kale, gaston greens, and mustard greens  Eat yellow and orange vegetables such as carrots, sweet potatoes, and winter squash  · Eat a variety of fruits every day  Choose fresh or canned fruit (canned in its own juice or light syrup) instead of juice  Fruit juice has very little or no fiber  · Eat low-fat dairy foods  Drink fat-free (skim) milk or 1% milk  Eat fat-free yogurt and low-fat cottage cheese  Try low-fat cheeses such as mozzarella and other reduced-fat cheeses  · Choose meat and other protein foods that are low in fat  Choose beans or other legumes such as split peas or lentils  Choose fish, skinless poultry (chicken or turkey), or lean cuts of red meat (beef or pork)  Before you cook meat or poultry, cut off any visible fat  · Use less fat and oil  Try baking foods instead of frying them  Add less fat, such as margarine, sour cream, regular salad dressing and mayonnaise to foods  Eat fewer high-fat foods  Some examples of high-fat foods include french fries, doughnuts, ice cream, and cakes  · Eat fewer sweets    Limit foods and drinks that are high in sugar  This includes candy, cookies, regular soda, and sweetened drinks  Exercise:  Exercise at least 30 minutes per day on most days of the week  Some examples of exercise include walking, biking, dancing, and swimming  You can also fit in more physical activity by taking the stairs instead of the elevator or parking farther away from stores  Ask your healthcare provider about the best exercise plan for you  © Copyright [employer] 2018 Information is for End User's use only and may not be sold, redistributed or otherwise used for commercial purposes   All illustrations and images included in CareNotes® are the copyrighted property of [employer]  or [address] [place] [address]

## 2020-07-02 NOTE — PROGRESS NOTES
Assessment and Plan:     Problem List Items Addressed This Visit        Endocrine    Hypothyroidism - Primary    Relevant Orders    Lipid panel    TSH, 3rd generation       Cardiovascular and Mediastinum    Atrial fibrillation ([facility] [address] ) [I48 91]    Relevant Orders    Lipid panel    Heart failure, left systolic, chronic (HCC)    Relevant Orders    Lipid panel      Other Visit Diagnoses     Impaired fasting glucose        Relevant Orders    Comprehensive metabolic panel    Hemoglobin A1C    Postmenopausal        Relevant Orders    DXA bone density spine hip and pelvis    Edema, unspecified type               Preventive health issues were discussed with patient, and age appropriate screening tests were ordered as noted in patient's After Visit Summary  Personalized health advice and appropriate referrals for health education or preventive services given if needed, as noted in patient's After Visit Summary       History of Present Illness:     Patient presents for Medicare Annual Wellness visit    Patient Care Team:  Beauford Felty, MD as PCP - General  Beauford Felty, MD Therisa Gunner, MD Merritt Delton, MD Arlena Brush, MD Arlena Brush, MD as Endoscopist     Problem List:     Patient Active Problem List   Diagnosis    H/O mitral valve replacement with mechanical valve    Atrial fibrillation ([address] ) [I48 91]    Chronic hepatitis C ([address] )    Chronic right-sided low back pain without sciatica    Drug induced insomnia ([address] )    Dupuytren's contracture    Generalized osteoarthritis    Heart failure, left systolic, chronic ([address] )    Hypothyroidism    Memory impairment    Mitral valve disorder    Parkinsons ([address] )    Peripheral vascular disease ([address] )    Seasonal allergies    Transient ischemic attack    Vitamin D insufficiency    Suprapatellar bursitis of right knee    Numbness and tingling in right hand    Other microscopic hematuria      Past Medical and Surgical History:     Past Medical History:   Diagnosis Date    Anal fissure     Arthritis     osteo joints    Asthma with acute exacerbation     Blepharitis     Breast lump     Chalazion     CHF, chronic (HCC)     Difficulty walking     Disease of thyroid gland     Drooling     Dysphagia     Fall 05/04/2018    Fibromyalgia, primary     Glaucoma     Normal pressure    History of transfusion 1996    Hordeolum externum     Hx of transient ischemic attack (TIA)     Hypophonia     Infectious viral hepatitis     Hep C dx 1996     Lyme carditis     Murmur, cardiac     Parkinsons (HCC)     Rotator cuff tendinitis     Seasonal allergies     Urinary frequency      Past Surgical History:   Procedure Laterality Date    BREAST BIOPSY Right 2018    benign    BREAST CYST EXCISION Left     benign    BREAST SURGERY N/A     benign, calcium    CARDIAC SURGERY  1990    mitral valve replacement    CATARACT EXTRACTION Right 2015    CATARACT EXTRACTION Left 2014    CHEST TUBE INSERTION Right     post pleural effusion    CHOLECYSTECTOMY  2000    lap    HYSTERECTOMY  1973    partial    KS COLSC FLX W/RMVL OF TUMOR POLYP LESION SNARE TQ N/A 7/18/2017    Procedure: COLONOSCOPY and biopsy ;  Surgeon: Marei Pope MD;  Location: [place] [address] GI LAB;   Service: Gastroenterology    SKIN BIOPSY      pre cancerous on face    TONSILLECTOMY      US GUIDED BREAST BIOPSY RIGHT COMPLETE Right 2/13/2018      Family History:     Family History   Problem Relation Age of Onset    Heart disease Mother         murmur Cardiomegaly age 64    Pneumonia Father     Heart disease Brother         mitrsl valve    Parkinsonism Brother     Arthritis Brother     Lupus Daughter     Diabetes Daughter     Depression Daughter       Social History:        Social History     Socioeconomic History    Marital status: /Civil Union     Spouse name: Not on file    Number of children: Not on file    Years of education: Not on file    Highest education level: Not on file   Occupational History    Not on file   Social Needs    Financial resource strain: Not on file    Food insecurity:     Worry: Not on file     Inability: Not on file    Transportation needs:     Medical: Not on file     Non-medical: Not on file   Tobacco Use    Smoking status: Former Smoker     Packs/day: 0 10     Years: 10 00     Pack years: 1 00     Last attempt to quit: 1970     Years since quittin 5    Smokeless tobacco: Never Used   Substance and Sexual Activity    Alcohol use: No    Drug use: No    Sexual activity: Not on file   Lifestyle    Physical activity:     Days per week: Not on file     Minutes per session: Not on file    Stress: Not on file   Relationships    Social connections:     Talks on phone: Not on file     Gets together: Not on file     Attends Denominational service: Not on file     Active member of club or organization: Not on file     Attends meetings of clubs or organizations: Not on file     Relationship status: Not on file    Intimate partner violence:     Fear of current or ex partner: Not on file     Emotionally abused: Not on file     Physically abused: Not on file     Forced sexual activity: Not on file   Other Topics Concern    Not on file   Social History Narrative    Not on file      Medications and Allergies:     Current Outpatient Medications   Medication Sig Dispense Refill    acetaminophen (TYLENOL) 650 mg CR tablet Take 1 tablet (650 mg total) by mouth 3 (three) times a day (Patient taking differently: Take 650 mg by mouth every 8 (eight) hours as needed ) 100 tablet 0    carbidopa-levodopa-entacapone (STALEVO) -200 MG per tablet Take 1 tablet by mouth 4 (four) times a day 360 tablet 3    Cholecalciferol (VITAMIN D3) 2000 units TABS Take 2,000 Units by mouth every morning      donepezil (ARICEPT) 5 mg tablet Take 1 tablet (5 mg total) by mouth daily at bedtime 90 tablet 1    ENTRESTO  MG TABS TAKE 1 TABLET BY MOUTH TWICE DAILY 180 tablet 3    furosemide (LASIX) 40 mg tablet Take 1 tablet (40 mg total) by mouth daily Take one tablet by mouth daily until weight loss of 5-10 lbs as occurred, then can resume use 4-5 days per week  Schedule administration to be done when patient can be near toilet facilities for 4-6 hours  90 tablet 1    levothyroxine 25 mcg tablet take 1 tablet by mouth every morning 90 tablet 0    memantine (NAMENDA) 10 mg tablet Take 1 tablet (10 mg total) by mouth daily 90 tablet 1    Multiple Vitamins-Minerals (OCUVITE ADULT 50+ PO) Take by mouth daily with breakfast      primidone (MYSOLINE) 50 mg tablet Take 0 5 tablets (25 mg total) by mouth daily 45 tablet 1    warfarin (COUMADIN) 2 5 mg tablet TAKE 1 TO 1 1/2 TABLETS  DAILY BY MOUTH OR AS ORDERED BY PHYSICIAN  125 tablet 3     No current facility-administered medications for this visit  Allergies   Allergen Reactions    Amantadine Tongue Swelling    Artane [Trihexyphenidyl] Other (See Comments)     Felt "disconnected", "out if it", shaky   Did not feel right     Glycopyrrolate      Nose bleeds    Pollen Extract       Immunizations:     Immunization History   Administered Date(s) Administered    H1N1, All Formulations 10/26/2009    Influenza Split High Dose Preservative Free IM 09/25/2014, 10/08/2015, 09/26/2016, 08/22/2017    Influenza TIV (IM) 10/04/2005, 10/31/2006, 10/25/2007, 09/13/2008, 09/17/2009, 09/24/2010, 09/17/2012, 10/01/2013    Influenza, high dose seasonal 0 5 mL 09/10/2018, 10/02/2019    Pneumococcal Conjugate 13-Valent 11/21/2016    Pneumococcal Polysaccharide PPV23 03/19/2007, 09/03/2014    Tdap 06/17/2008      Health Maintenance:         Topic Date Due    Hepatitis C Screening  Discontinued         Topic Date Due    Influenza Vaccine  07/01/2020      Medicare Health Risk Assessment:     /70 (BP Location: Right arm, Patient Position: Sitting, Cuff Size: Large)   Pulse 72   Temp 98 2 °F (36 8 °C) (Tympanic)   Resp 16   Ht 5' 4 5" (1 638 m)   Wt 85 3 kg (188 lb)   BMI 31 77 kg/m²      Margo Chambers is here for her Subsequent Wellness visit  Health Risk Assessment:   Patient rates overall health as fair  Patient feels that their physical health rating is slightly worse  Eyesight was rated as slightly worse  Hearing was rated as same  Patient feels that their emotional and mental health rating is slightly worse  Pain experienced in the last 7 days has been some  Patient's pain rating has been 5/10  Patient states that she has experienced no weight loss or gain in last 6 months  Depression Screening:   PHQ-2 Score: 2      Fall Risk Screening: In the past year, patient has experienced: no history of falling in past year      Urinary Incontinence Screening:   Patient has leaked urine accidently in the last six months  Home Safety:  Patient has trouble with stairs inside or outside of their home  Patient has working smoke alarms and has working carbon monoxide detector  Home safety hazards include: none  Nutrition:   Current diet is Regular and Limited junk food  Medications:   Patient is currently taking over-the-counter supplements  OTC medications include: see medication list  Patient is able to manage medications  Activities of Daily Living (ADLs)/Instrumental Activities of Daily Living (IADLs):   Walk and transfer into and out of bed and chair?: Yes  Dress and groom yourself?: Yes    Bathe or shower yourself?: Yes    Feed yourself? Yes  Do your laundry/housekeeping?: Yes  Manage your money, pay your bills and track your expenses?: Yes  Make your own meals?: Yes    Do your own shopping?: No    Previous Hospitalizations:   Any hospitalizations or ED visits within the last 12 months?: No      Advance Care Planning:   Living will: Yes    Durable POA for healthcare:  Yes    Advanced directive: Yes      Cognitive Screening:   Provider or family/friend/caregiver concerned regarding cognition?: No    PREVENTIVE SCREENINGS Cardiovascular Screening:    General: Screening Current    Due for: Lipid Panel      Diabetes Screening:     General: Screening Current    Due for: Blood Glucose      Colorectal Cancer Screening:     General: Screening Not Indicated      Breast Cancer Screening:     General: Screening Current      Cervical Cancer Screening:    General: Screening Not Indicated      Osteoporosis Screening:      Due for: DXA Axial      Abdominal Aortic Aneurysm (AAA) Screening:        General: Screening Not Indicated      Lung Cancer Screening:     General: Screening Not Indicated      Hepatitis C Screening:    General: Screening Not Indicated and History Hepatitis C      Ken Corea MD

## 2020-07-06 ENCOUNTER — OFFICE VISIT (OUTPATIENT)
Dept: CARDIOLOGY CLINIC | Facility: CLINIC | Age: 80
End: 2020-07-06
Payer: MEDICARE

## 2020-07-06 VITALS
TEMPERATURE: 98.2 F | OXYGEN SATURATION: 99 % | DIASTOLIC BLOOD PRESSURE: 52 MMHG | SYSTOLIC BLOOD PRESSURE: 108 MMHG | HEIGHT: 65 IN | HEART RATE: 71 BPM | BODY MASS INDEX: 31.49 KG/M2 | WEIGHT: 189 LBS

## 2020-07-06 DIAGNOSIS — I49.3 ASYMPTOMATIC PVCS: ICD-10-CM

## 2020-07-06 DIAGNOSIS — E78.2 MIXED DYSLIPIDEMIA: ICD-10-CM

## 2020-07-06 DIAGNOSIS — Z95.2 S/P MVR (MITRAL VALVE REPLACEMENT): ICD-10-CM

## 2020-07-06 DIAGNOSIS — I48.20 CHRONIC ATRIAL FIBRILLATION (HCC): ICD-10-CM

## 2020-07-06 DIAGNOSIS — Z79.01 ON CONTINUOUS ORAL ANTICOAGULATION: ICD-10-CM

## 2020-07-06 DIAGNOSIS — I44.7 LEFT BUNDLE BRANCH BLOCK (LBBB): ICD-10-CM

## 2020-07-06 DIAGNOSIS — I42.0 CARDIOMYOPATHY, DILATED, NONISCHEMIC (HCC): ICD-10-CM

## 2020-07-06 DIAGNOSIS — E03.9 ACQUIRED HYPOTHYROIDISM: ICD-10-CM

## 2020-07-06 DIAGNOSIS — R60.0 EDEMA OF BOTH LEGS: ICD-10-CM

## 2020-07-06 DIAGNOSIS — G20 PARKINSON'S DISEASE (HCC): ICD-10-CM

## 2020-07-06 DIAGNOSIS — R41.89 COGNITIVE IMPAIRMENT: ICD-10-CM

## 2020-07-06 DIAGNOSIS — I50.42 CHRONIC COMBINED SYSTOLIC AND DIASTOLIC HEART FAILURE (HCC): Primary | ICD-10-CM

## 2020-07-06 PROCEDURE — 1036F TOBACCO NON-USER: CPT | Performed by: INTERNAL MEDICINE

## 2020-07-06 PROCEDURE — 3074F SYST BP LT 130 MM HG: CPT | Performed by: INTERNAL MEDICINE

## 2020-07-06 PROCEDURE — 3008F BODY MASS INDEX DOCD: CPT | Performed by: INTERNAL MEDICINE

## 2020-07-06 PROCEDURE — 93000 ELECTROCARDIOGRAM COMPLETE: CPT | Performed by: INTERNAL MEDICINE

## 2020-07-06 PROCEDURE — 1170F FXNL STATUS ASSESSED: CPT | Performed by: INTERNAL MEDICINE

## 2020-07-06 PROCEDURE — 4040F PNEUMOC VAC/ADMIN/RCVD: CPT | Performed by: INTERNAL MEDICINE

## 2020-07-06 PROCEDURE — 99214 OFFICE O/P EST MOD 30 MIN: CPT | Performed by: INTERNAL MEDICINE

## 2020-07-06 PROCEDURE — 3078F DIAST BP <80 MM HG: CPT | Performed by: INTERNAL MEDICINE

## 2020-07-06 PROCEDURE — 1160F RVW MEDS BY RX/DR IN RCRD: CPT | Performed by: INTERNAL MEDICINE

## 2020-07-06 RX ORDER — MEMANTINE HYDROCHLORIDE 10 MG/1
10 TABLET ORAL DAILY
Qty: 90 TABLET | Refills: 3 | Status: SHIPPED | OUTPATIENT
Start: 2020-07-06 | End: 2021-06-29

## 2020-07-06 NOTE — PROGRESS NOTES
Office Cardiology Progress Note  Dilshad Zuñiga [de-identified] y o  female MRN: [id number]  07/06/20  3:25 PM      ASSESSMENT:    1  Continued improvement in  bilateral pretibial/ankle/pedal edema with onset approximately 6+ months  ago with 12 pound weight gain in 9+ months and now with 2 pound weight loss in approximately 7+ months with no other evidence of heart failure   This coincides with decrease in dose of furosemide, which is now being taken four days a week       2  Chronic combined systolic and diastolic heart failure MPHV 57-16% LVEF and normal MVR function on 06/22/2019 transthoracic echocardiogram   34% ejection fraction on 7/5/16 MUGA scan/underlying nonischemic dilated cardiomyopathy but currently with less exertional fatigue, dyspnea on exertion, and recent resolution  of exhaustion and exertional cold sweats,  with suppression of occasional nocturnal wheezing   Initially improved on starting dose of Entresto, but had vasovagal type near syncope on 01/18/2019 with no recurrence   Patient with less frequent lightheadedness and  low blood pressures in the 90s-100's  2  Resolved dehydration/hypotension and residual mild dizziness as well as resolution of acute kidney injury since 11/18/16  3  Chronic atrial fibrillation, controlled, and chronic left bundle-branch block with no EKG changes in approximately nine months  4  History of mechanical mitral valve replacement September, 1990, with very good oral anticoagulation with target INR of 2 5-3 5   Latest INR 3 36 on 06/29/2020   5  Moderately frequent asymptomatic PVCs and otherwise benign Holter monitor December, 2013   6  11 9 pound weight loss in approximately 5 6+ years, but recent weight loss of  2  lbs in 7+ months  7  Controlled mixed dyslipidemia  8  Mild obstructive sleep apnea, previously suspected  9  History of asthma  10  History of GERD  11  History of benign positional vertigo with recurrence 8 months ago and again 7+ months ago    12  History of Lyme disease  15  Hypothyroidism with slightly elevated TSH level, which could contribute to previous extreme fatigue  14  History of hepatitis C   15  History of Parkinson's disease/gait dysfunction with slowly progressive symptoms and previously on  physical therapy plus home exercise for this  16  Resolved nonproductive cough and postural dizziness with  flu-like illness nearly 1 85 years ago  17  Chronic fatigue and malaise, multifactorial, improving  18  Drug-induced insomnia  19  History of fall with subsequent left distal tibial fracture and left ankle sprain with right periorbital ecchymoses and laceration and left wrist sprain on 7/30/17, with no recurrence, possibly related to transient drop in blood pressure with upright position in hot weather after prolonged sitting  20  Mildly impaired fasting glucose  Plan       Patient Instructions     1  Continue current regimen  2  Cardiology follow-up approximately four months with rhythm strip, lipids, CMP  HPI    This [de-identified] y o  female  denies new cardiopulmonary and medical symptoms  Her leg swelling is stable with no increase in dyspnea and occasional dizziness associated with low blood pressure  None of this has changed significantly  She saw Dr Fernie Leblanc of [address] [place] [address] Neurology for a 2nd opinion regarding her Parkinson's disease on 06/03/2020 and will be following up with him on 08/03/2020  She has been relatively sedentary and has been staying at home during the pandemic  She is seen in follow-up of the below listed diagnoses  Encounter Diagnoses   Name Primary?     Chronic combined systolic and diastolic heart failure (HCC) Yes    Cardiomyopathy, dilated, nonischemic (HCC)     S/P MVR (mitral valve replacement)     Chronic atrial fibrillation (HCC)     Acquired hypothyroidism     Edema of both legs     On continuous oral anticoagulation     Left bundle branch block (LBBB)     [facility]     Mixed dyslipidemia     Asymptomatic PVCs         Review of Systems    All other systems negative, except as noted in history of present illness    Historical Information   Past Medical History:   Diagnosis Date    Anal fissure     Arthritis     osteo joints    Asthma with acute exacerbation     Blepharitis     Breast lump     Chalazion     CHF, chronic (HCC)     Difficulty walking     Disease of thyroid gland     Drooling     Dysphagia     Fall 2018    Fibromyalgia, primary     Glaucoma     Normal pressure    History of transfusion     Hordeolum externum     Hx of transient ischemic attack (TIA)     Hypophonia     Infectious viral hepatitis     Hep C dx      Lyme carditis     Murmur, cardiac     Parkinsons (HCC)     Rotator cuff tendinitis     Seasonal allergies     Urinary frequency      Past Surgical History:   Procedure Laterality Date    BREAST BIOPSY Right 2018    benign    BREAST CYST EXCISION Left     benign    BREAST SURGERY N/A     benign, calcium    CARDIAC SURGERY      mitral valve replacement    CATARACT EXTRACTION Right 2015    CATARACT EXTRACTION Left 2014    CHEST TUBE INSERTION Right     post pleural effusion    CHOLECYSTECTOMY      lap    HYSTERECTOMY  1973    partial    ME COLSC FLX W/RMVL OF TUMOR POLYP LESION SNARE TQ N/A 2017    Procedure: COLONOSCOPY and biopsy ;  Surgeon: Brandin Burch MD;  Location: [place] [address] [facility] LAB;   Service: Gastroenterology    SKIN BIOPSY      pre cancerous on face    TONSILLECTOMY      US GUIDED BREAST BIOPSY RIGHT COMPLETE Right 2018     Social History     Substance and Sexual Activity   Alcohol Use No     Social History     Substance and Sexual Activity   Drug Use No     Social History     Tobacco Use   Smoking Status Former Smoker    Packs/day: 0 10    Years: 10 00    Pack years: 1 00    Last attempt to quit: Jameel Hartley Years since quittin 5   Smokeless Tobacco Never Used       Family History:  Family History   Problem Relation Age of Onset    Heart disease Mother         murmur Cardiomegaly age 64    Pneumonia Father     Heart disease Brother         mitrsl valve    Parkinsonism Brother     Arthritis Brother     Lupus Daughter     Diabetes Daughter     Depression Daughter          Meds/Allergies     Prior to Admission medications    Medication Sig Start Date End Date Taking? Authorizing Provider   acetaminophen (TYLENOL) 650 mg CR tablet Take 1 tablet (650 mg total) by mouth 3 (three) times a day  Patient taking differently: Take 650 mg by mouth every 8 (eight) hours as needed  6/6/19  Yes Olga Mcintyre MD   carbidopa-levodopa-entacapone (STALEVO) -200 MG per tablet Take 1 tablet by mouth 4 (four) times a day 2/24/20  Yes Lucy Adair MD   Cholecalciferol (VITAMIN D3) 2000 units TABS Take 2,000 Units by mouth every morning   Yes Historical Provider, MD   donepezil (ARICEPT) 5 mg tablet Take 1 tablet (5 mg total) by mouth daily at bedtime 6/2/20  Yes STACY Choi   ENTRESTO  MG TABS TAKE 1 TABLET BY MOUTH TWICE DAILY 9/4/19  Yes Olga Mcintyre MD   furosemide (LASIX) 40 mg tablet Take 1 tablet (40 mg total) by mouth daily Take one tablet by mouth daily until weight loss of 5-10 lbs as occurred, then can resume use 4-5 days per week  Schedule administration to be done when patient can be near toilet facilities for 4-6 hours   5/21/20  Yes Pau Bradford MD   levothyroxine 25 mcg tablet take 1 tablet by mouth every morning 5/6/20  Yes Ke Davey MD   memantine [facility]) 10 mg tablet Take 1 tablet (10 mg total) by mouth daily 7/6/20  Yes Ángel Tovar MD   Multiple Vitamins-Minerals (OCUVITE ADULT 50+ PO) Take by mouth daily with breakfast   Yes Historical Provider, MD   warfarin (COUMADIN) 2 5 mg tablet TAKE 1 TO 1 1/2 TABLETS  DAILY BY MOUTH OR AS ORDERED BY PHYSICIAN  1/7/20  Yes Olga Mcintyre MD   primidone (MYSOLINE) 50 mg tablet Take 0 5 tablets (25 mg total) by mouth daily  Patient not taking: Reported on 7/6/2020 2/6/20   Lloyd Romano MD   carbidopa-levodopa (SINEMET)  mg per tablet Take 1 tablet by mouth 4 (four) times a day  3/2/18  Historical Provider, MD   memantine (NAMENDA) 10 mg tablet Take 1 tablet (10 mg total) by mouth daily 1/6/20 7/6/20  Lloyd Romano MD       Allergies   Allergen Reactions    Amantadine Tongue Swelling    Artane [Trihexyphenidyl] Other (See Comments)     Felt "disconnected", "out if it", shaky  Did not feel right     Glycopyrrolate      Nose bleeds    Pollen Extract          Vitals:    07/06/20 1420   BP: 108/52   BP Location: Left arm   Patient Position: Sitting   Cuff Size: Large   Pulse: 71   Temp: 98 2 °F (36 8 °C)   TempSrc: Temporal   SpO2: 99%   Weight: 85 7 kg (189 lb)   Height: 5' 4 5" (1 638 m)       Body mass index is 31 94 kg/m²    4 pound weight gain in approximately 5+ months but no change since 05/22/2020    Physical Exam:    General Appearance:  Alert, cooperative, no distress, appears stated age   Head:  Normocephalic, without obvious abnormality, atraumatic   Eyes:  PERRL, conjunctiva/corneas clear, EOM's intact,   both eyes   Ears:  Normal TM's and external ear canals, both ears   Nose: Nares normal, septum midline, mucosa normal, no drainage or sinus tenderness   Throat: Lips, mucosa, and tongue normal; teeth and gums normal   Neck: Supple, symmetrical, trachea midline, no adenopathy, thyroid: not enlarged, symmetric, no tenderness/mass/nodules, no carotid bruit or JVD   Back:   Symmetric, no curvature, ROM normal, no CVA tenderness   Lungs:   Clear to auscultation bilaterally, respirations unlabored   Chest Wall:  No tenderness or deformity   Heart:  Regular rate and rhythm, S1, S2 normal, no murmur, rub or gallop   Abdomen:   Soft, non-tender, bowel sounds active all four quadrants,  no masses, no organomegaly   Extremities: Extremities normal, atraumatic, no cyanosis with trace-1+ bilateral ankle edema and no significant pedal or pretibial edema  Pulses: 1+ and symmetric   Skin: Skin showed normal color, texture, turgor and no rashes or lesions   Lymph nodes: Cervical, supraclavicular, and axillary nodes normal   Neurologic: Normal         Cardiographics    ECG 07/06/20:    Controlled atrial fibrillation with average ventricular rate 71 bpm  Left bundle branch block with secondary ST-T abnormalities  Unchanged from 10/17/2019    IMAGING:    Xr Chest Pa & Lateral    Result Date: 8/30/2019  Impression No acute cardiopulmonary disease   Workstation performed: [id number]             LAB REVIEW:      Lab Results   Component Value Date    SODIUM 141 06/08/2020    K 4 3 06/08/2020     (H) 06/08/2020    CO2 30 06/08/2020    ANIONGAP 9 8 (L) 12/07/2015    BUN 17 06/08/2020    CREATININE 1 13 06/08/2020    GLUCOSE 91 12/07/2015    GLUF 113 (H) 06/08/2020    CALCIUM 9 7 06/08/2020    AST 14 06/07/2019    ALT 8 (L) 06/07/2019    ALKPHOS 111 06/07/2019    PROT 6 9 12/07/2015    BILITOT 1 6 (H) 12/07/2015    EGFR 46 06/08/2020     Lab Results   Component Value Date    CHOLESTEROL 168 06/07/2019    CHOLESTEROL 148 10/08/2018    CHOLESTEROL 151 03/15/2018     Lab Results   Component Value Date    HDL 59 06/07/2019    HDL 50 10/08/2018    HDL 56 03/15/2018     No results found for: LDLCHOLEST  Lab Results   Component Value Date    LDLCALC 85 06/07/2019    LDLCALC 66 10/08/2018    LDLCALC 70 03/15/2018     No components found for: [facility]  Lab Results   Component Value Date    TRIG 119 06/07/2019    TRIG 162 (H) 10/08/2018    TRIG 127 03/15/2018       INR 06/29/2020:  3 36        Olga Mcintyre MD

## 2020-07-06 NOTE — TELEPHONE ENCOUNTER
Patient called medication refill line requesting 90 day supply of Namenda 10 mg, last filled 1/6/2020  Last visit 6/3/2020 and next visit 8/3/2020  Reorder entered for your review, thank you

## 2020-07-06 NOTE — PATIENT INSTRUCTIONS
1  Continue current regimen  2  Cardiology follow-up approximately four months with rhythm strip, lipids, CMP

## 2020-07-20 ENCOUNTER — APPOINTMENT (OUTPATIENT)
Dept: LAB | Facility: CLINIC | Age: 80
End: 2020-07-20
Payer: MEDICARE

## 2020-07-20 ENCOUNTER — ANTICOAG VISIT (OUTPATIENT)
Dept: CARDIOLOGY CLINIC | Facility: CLINIC | Age: 80
End: 2020-07-20

## 2020-07-20 DIAGNOSIS — I48.20 CHRONIC ATRIAL FIBRILLATION (HCC): ICD-10-CM

## 2020-07-20 DIAGNOSIS — R73.01 IMPAIRED FASTING GLUCOSE: ICD-10-CM

## 2020-07-20 DIAGNOSIS — E03.9 HYPOTHYROIDISM, UNSPECIFIED TYPE: ICD-10-CM

## 2020-07-20 DIAGNOSIS — Z95.2 H/O MITRAL VALVE REPLACEMENT WITH MECHANICAL VALVE: ICD-10-CM

## 2020-07-20 DIAGNOSIS — I50.22 HEART FAILURE, LEFT SYSTOLIC, CHRONIC (HCC): ICD-10-CM

## 2020-07-20 LAB
ALBUMIN SERPL BCP-MCNC: 3.4 G/DL (ref 3.5–5)
ALP SERPL-CCNC: 118 U/L (ref 46–116)
ALT SERPL W P-5'-P-CCNC: 10 U/L (ref 12–78)
ANION GAP SERPL CALCULATED.3IONS-SCNC: 5 MMOL/L (ref 4–13)
AST SERPL W P-5'-P-CCNC: 16 U/L (ref 5–45)
BILIRUB SERPL-MCNC: 0.86 MG/DL (ref 0.2–1)
BUN SERPL-MCNC: 17 MG/DL (ref 5–25)
CALCIUM SERPL-MCNC: 9.8 MG/DL (ref 8.3–10.1)
CHLORIDE SERPL-SCNC: 108 MMOL/L (ref 100–108)
CHOLEST SERPL-MCNC: 195 MG/DL (ref 50–200)
CO2 SERPL-SCNC: 27 MMOL/L (ref 21–32)
CREAT SERPL-MCNC: 0.92 MG/DL (ref 0.6–1.3)
EST. AVERAGE GLUCOSE BLD GHB EST-MCNC: 123 MG/DL
GFR SERPL CREATININE-BSD FRML MDRD: 59 ML/MIN/1.73SQ M
GLUCOSE P FAST SERPL-MCNC: 106 MG/DL (ref 65–99)
HBA1C MFR BLD: 5.9 %
HDLC SERPL-MCNC: 53 MG/DL
LDLC SERPL CALC-MCNC: 115 MG/DL (ref 0–100)
NONHDLC SERPL-MCNC: 142 MG/DL
POTASSIUM SERPL-SCNC: 4.2 MMOL/L (ref 3.5–5.3)
PROT SERPL-MCNC: 6.6 G/DL (ref 6.4–8.2)
SODIUM SERPL-SCNC: 140 MMOL/L (ref 136–145)
TRIGL SERPL-MCNC: 137 MG/DL
TSH SERPL DL<=0.05 MIU/L-ACNC: 4.8 UIU/ML (ref 0.36–3.74)

## 2020-07-20 PROCEDURE — 83036 HEMOGLOBIN GLYCOSYLATED A1C: CPT

## 2020-07-20 PROCEDURE — 80061 LIPID PANEL: CPT

## 2020-07-20 PROCEDURE — 84443 ASSAY THYROID STIM HORMONE: CPT

## 2020-07-20 PROCEDURE — 80053 COMPREHEN METABOLIC PANEL: CPT

## 2020-07-31 DIAGNOSIS — E03.9 HYPOTHYROIDISM, UNSPECIFIED TYPE: ICD-10-CM

## 2020-07-31 RX ORDER — LEVOTHYROXINE SODIUM 0.05 MG/1
25 TABLET ORAL EVERY MORNING
Qty: 30 TABLET | Refills: 3 | Status: SHIPPED | OUTPATIENT
Start: 2020-07-31 | End: 2020-07-31 | Stop reason: SDUPTHER

## 2020-07-31 RX ORDER — LEVOTHYROXINE SODIUM 0.05 MG/1
50 TABLET ORAL EVERY MORNING
Qty: 30 TABLET | Refills: 3 | Status: SHIPPED | OUTPATIENT
Start: 2020-07-31 | End: 2020-12-28

## 2020-08-03 ENCOUNTER — ANTICOAG VISIT (OUTPATIENT)
Dept: CARDIOLOGY CLINIC | Facility: CLINIC | Age: 80
End: 2020-08-03

## 2020-08-03 ENCOUNTER — APPOINTMENT (OUTPATIENT)
Dept: LAB | Facility: CLINIC | Age: 80
End: 2020-08-03
Payer: MEDICARE

## 2020-08-03 ENCOUNTER — OFFICE VISIT (OUTPATIENT)
Dept: NEUROLOGY | Facility: CLINIC | Age: 80
End: 2020-08-03
Payer: MEDICARE

## 2020-08-03 VITALS
TEMPERATURE: 97.6 F | DIASTOLIC BLOOD PRESSURE: 68 MMHG | BODY MASS INDEX: 31.59 KG/M2 | WEIGHT: 189.6 LBS | HEIGHT: 65 IN | SYSTOLIC BLOOD PRESSURE: 90 MMHG

## 2020-08-03 DIAGNOSIS — Z95.2 H/O MITRAL VALVE REPLACEMENT WITH MECHANICAL VALVE: ICD-10-CM

## 2020-08-03 DIAGNOSIS — I48.20 CHRONIC ATRIAL FIBRILLATION (HCC): ICD-10-CM

## 2020-08-03 DIAGNOSIS — G20 PARKINSON'S DISEASE (HCC): Primary | ICD-10-CM

## 2020-08-03 PROCEDURE — 3078F DIAST BP <80 MM HG: CPT | Performed by: PSYCHIATRY & NEUROLOGY

## 2020-08-03 PROCEDURE — 4040F PNEUMOC VAC/ADMIN/RCVD: CPT | Performed by: PSYCHIATRY & NEUROLOGY

## 2020-08-03 PROCEDURE — 1036F TOBACCO NON-USER: CPT | Performed by: PSYCHIATRY & NEUROLOGY

## 2020-08-03 PROCEDURE — 1160F RVW MEDS BY RX/DR IN RCRD: CPT | Performed by: PSYCHIATRY & NEUROLOGY

## 2020-08-03 PROCEDURE — 3008F BODY MASS INDEX DOCD: CPT | Performed by: PSYCHIATRY & NEUROLOGY

## 2020-08-03 PROCEDURE — 3074F SYST BP LT 130 MM HG: CPT | Performed by: PSYCHIATRY & NEUROLOGY

## 2020-08-03 PROCEDURE — 99214 OFFICE O/P EST MOD 30 MIN: CPT | Performed by: PSYCHIATRY & NEUROLOGY

## 2020-08-03 NOTE — PATIENT INSTRUCTIONS
Parkinson's Disease Resources     Helpful web sites   -  www DooBop  org   -  www parkinson  org ([employer])   -  www [employer] ([address] [place]) - Order the "Every Victory Counts" augustina under the "resources" tab  Or visit [facility] org/EV or call [phone]  - [facility]  org/resources/parkinsons-exercise-essentials   - Contact the [employer] for an "Aware in care kit" @ [phone] (this is a kit to take with you if you ever have to go to the hospital)   - www pmdalliance  org  -  parkinsons  carolyn edu/exercise_videos  html  - [facility] Exercise helpline: [phone]) [phone]  - [employer]    - Check out "The [address]" for help paying for your medications: www tafcares  org

## 2020-08-03 NOTE — PROGRESS NOTES
Assessment/Plan:    Parkinson's disease [facility])  23-year-old woman with Parkinson's disease presents in follow-up  She did not notice any change in her symptoms with the discontinuation of primidone  We discussed different options regarding medication management but agreed to keep her medicines unchanged for the time being  The patient has low blood pressure which will likely limit our ability to go up on her dopaminergic medications  We discussed the possibility of trying an MAO B inhibitor or Kathy Fritter however they are hesitant regarding financial toxicity  Provided them with resources for online Parkinson's exercises  Suggested they get back in touch with speech therapy for possible online Loud program        Diagnoses and all orders for this visit:    Parkinson's disease ([facility] [address] )        Subjective:     Patient ID: Ghislaine Mariee is a [de-identified] y o  female  I had the pleasure of seeing your patient, Ghislaine Mariee in the 2020 [facility] at the [facility]  Ghislaine Mariee is an [de-identified]year-old woman with hep C, heart failure and afib who presents to establish care for Parkinson's disease, symptom onset around 2012 with reduced range in her singing voice  She is levodopa responsive  Symptoms are left>right  Previously followed with Dr Trevor Arboleda  Current medications and timing:  - stalevo 25/100/200 1 tab 4 times per day 7 11 3 QHS  - aricept 5mg    - memantine 10mg daily     Prior trials:  - Primidone not effective   - artane: very poor reactive     Interval history:  Overall things seem about the same  Balance is getting worse  Voice is softer  Signed up for [employer] but it was canceled  Some close calls but no falls  Usually uses a cane, walker for distances  Driving issues: hasnt driven in years  POA: Filed, getting updated now       Regarding medication complications:  Wearing off: no   Dyskinesias: no       The following portions of the patient's history were reviewed and updated as appropriate: allergies, current medications, past family history, past medical history, past social history, past surgical history and problem list       Objective:  BP 90/68   Temp 97 6 °F (36 4 °C)   Ht 5' 4 5"   Wt 86 kg (189 lb 9 6 oz)   BMI 32 04 kg/m²     Physical Exam    Neurological Exam     GENERAL MEDICAL EXAMINATION:  General appearance: alert, in no apparent distress  Appropriately dressed and groomed  Conversing and interacting appropriately  Eyes: Sclera are non-injected  Ears, nose, Mouth, Throat: Mucous membranes are moist    Resp: Breathing comfortably on RA   Musculoskeletal: No evidence of deformities  No contractures  No Edema  Skin: No visible rashes  Warm and well perfused  Psych: normal and appropriate affect     Mental Status:  Alert and oriented to person place and time  Able to relate history without difficulty  Attentive to conversation  Language is fluent and appropriate with normal prosody  There were no paraphasic errors  Speech was not dysarthric  Able to follow both midline and appendicular commands       General Neurology examination:   UPDRS motor:                              Time since last dose:  2     Speech  0     Facial Expression  1     Rigidity - Neck  0     Rigidity - Upper Extremity (Right)  0     Rigidity - Upper Extremity (Left)   1     Rigidity - Lower Extremity (Right)  0     Rigidity - Lower Extremity (Left)   0     Finger Taps (Right)   1     Finger Taps (Left)   2     Hand Movement (Right)  1     Hand Movement (Left)   3     Pronation/Supination (Right)  1     Pronation/Supination (Left)   3     Toe Tapping (Right) 0     Toe Tapping (Left) 2     Leg Agility (Right)  0     Leg Agility (Left)   1     Arising from Chair   2     Gait   2 Off balance appearing     Freezing of Gait 0     Postural Stability        Posture 1     Global spontaneity of movement 0     Postural Tremor (Right) 0     Postural Tremor (Left) 0     Kinetic Tremor (Right)  0 Kinetic Tremor (Left)  1     Rest tremor amplitude RUE 0     Rest tremor amplitude LUE 0     Rest tremor amplitude RLE 0     Reset tremor amplitude LLE 0     Lip/Jaw Tremor  0     Consistency of tremor 0     Motor Exam Total:          Dysmetria: none on FNF  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   Dystonia: left foot when walking     Stance: narrow-based and stable   Stride: slowed speed, reduced and assymetric stride length, step height  Can walk with no assistive device  Arm swing: reduced on the right   Turn: stable, 4-5 steps       Review of Systems   Constitutional: Negative  Negative for appetite change and fever  HENT: Positive for trouble swallowing (at times)  Negative for hearing loss, tinnitus and voice change  Eyes: Negative  Negative for photophobia and pain  Respiratory: Negative  Negative for shortness of breath  Cardiovascular: Negative  Negative for palpitations  Gastrointestinal: Negative  Negative for nausea and vomiting  Endocrine: Negative  Negative for cold intolerance  Genitourinary: Negative  Negative for dysuria, frequency and urgency  Musculoskeletal: Positive for gait problem (balance and difficulty walking)  Negative for myalgias and neck pain  Fingers locking in place   Skin: Negative  Negative for rash  Neurological: Positive for tremors, speech difficulty (difficulty with speech ), weakness and numbness (at times)  Negative for dizziness, seizures, syncope, facial asymmetry, light-headedness and headaches  Hematological: Negative  Does not bruise/bleed easily  Psychiatric/Behavioral: Negative  Negative for confusion, hallucinations and sleep disturbance  The above ROS was reviewed and updated       Maicol Horvath MD  Medical Director   [facility]  Movement and Memory Specialist       Current Outpatient Medications on File Prior to Visit   Medication Sig Dispense Refill    acetaminophen (TYLENOL) 650 mg CR tablet Take 1 tablet (650 mg total) by mouth 3 (three) times a day (Patient taking differently: Take 650 mg by mouth every 8 (eight) hours as needed ) 100 tablet 0    carbidopa-levodopa-entacapone (STALEVO) -200 MG per tablet Take 1 tablet by mouth 4 (four) times a day 360 tablet 3    Cholecalciferol (VITAMIN D3) 2000 units TABS Take 2,000 Units by mouth every morning      donepezil (ARICEPT) 5 mg tablet Take 1 tablet (5 mg total) by mouth daily at bedtime 90 tablet 1    ENTRESTO  MG TABS TAKE 1 TABLET BY MOUTH TWICE DAILY 180 tablet 3    furosemide (LASIX) 40 mg tablet Take 1 tablet (40 mg total) by mouth daily Take one tablet by mouth daily until weight loss of 5-10 lbs as occurred, then can resume use 4-5 days per week  Schedule administration to be done when patient can be near toilet facilities for 4-6 hours  90 tablet 1    levothyroxine 50 mcg tablet Take 1 tablet (50 mcg total) by mouth every morning 30 tablet 3    memantine (NAMENDA) 10 mg tablet Take 1 tablet (10 mg total) by mouth daily 90 tablet 3    Multiple Vitamins-Minerals (OCUVITE ADULT 50+ PO) Take by mouth daily with breakfast      warfarin (COUMADIN) 2 5 mg tablet TAKE 1 TO 1 1/2 TABLETS  DAILY BY MOUTH OR AS ORDERED BY PHYSICIAN  125 tablet 3    [DISCONTINUED] carbidopa-levodopa (SINEMET)  mg per tablet Take 1 tablet by mouth 4 (four) times a day      [DISCONTINUED] primidone (MYSOLINE) 50 mg tablet Take 0 5 tablets (25 mg total) by mouth daily (Patient not taking: Reported on 7/6/2020) 45 tablet 1     No current facility-administered medications on file prior to visit

## 2020-08-03 NOTE — ASSESSMENT & PLAN NOTE
79-year-old woman with Parkinson's disease presents in follow-up  She did not notice any change in her symptoms with the discontinuation of primidone  We discussed different options regarding medication management but agreed to keep her medicines unchanged for the time being  The patient has low blood pressure which will likely limit our ability to go up on her dopaminergic medications  We discussed the possibility of trying an MAO B inhibitor or Florette Riling however they are hesitant regarding financial toxicity  Provided them with resources for online Parkinson's exercises    Suggested they get back in touch with speech therapy for possible online Loud program

## 2020-08-13 ENCOUNTER — HOSPITAL ENCOUNTER (OUTPATIENT)
Dept: RADIOLOGY | Facility: HOSPITAL | Age: 80
Discharge: HOME | End: 2020-08-13
Attending: FAMILY MEDICINE
Payer: MEDICARE

## 2020-08-13 DIAGNOSIS — Z78.0 POSTMENOPAUSAL: ICD-10-CM

## 2020-08-13 PROCEDURE — 77080 DXA BONE DENSITY AXIAL: CPT

## 2020-08-17 ENCOUNTER — APPOINTMENT (OUTPATIENT)
Dept: LAB | Facility: CLINIC | Age: 80
End: 2020-08-17
Payer: MEDICARE

## 2020-08-17 ENCOUNTER — ANTICOAG VISIT (OUTPATIENT)
Dept: CARDIOLOGY CLINIC | Facility: CLINIC | Age: 80
End: 2020-08-17

## 2020-08-17 DIAGNOSIS — Z95.2 H/O MITRAL VALVE REPLACEMENT WITH MECHANICAL VALVE: ICD-10-CM

## 2020-08-17 DIAGNOSIS — I48.20 CHRONIC ATRIAL FIBRILLATION (HCC): ICD-10-CM

## 2020-08-19 ENCOUNTER — TELEMEDICINE (OUTPATIENT)
Dept: FAMILY MEDICINE CLINIC | Facility: CLINIC | Age: 80
End: 2020-08-19
Payer: MEDICARE

## 2020-08-19 DIAGNOSIS — G89.29 CHRONIC LOW BACK PAIN, UNSPECIFIED BACK PAIN LATERALITY, UNSPECIFIED WHETHER SCIATICA PRESENT: ICD-10-CM

## 2020-08-19 DIAGNOSIS — M85.80 OSTEOPENIA WITH HIGH RISK OF FRACTURE: Primary | ICD-10-CM

## 2020-08-19 DIAGNOSIS — M54.50 CHRONIC LOW BACK PAIN, UNSPECIFIED BACK PAIN LATERALITY, UNSPECIFIED WHETHER SCIATICA PRESENT: ICD-10-CM

## 2020-08-19 PROCEDURE — 1036F TOBACCO NON-USER: CPT | Performed by: FAMILY MEDICINE

## 2020-08-19 PROCEDURE — 1160F RVW MEDS BY RX/DR IN RCRD: CPT | Performed by: FAMILY MEDICINE

## 2020-08-19 PROCEDURE — 3078F DIAST BP <80 MM HG: CPT | Performed by: FAMILY MEDICINE

## 2020-08-19 PROCEDURE — G2012 BRIEF CHECK IN BY MD/QHP: HCPCS | Performed by: FAMILY MEDICINE

## 2020-08-19 PROCEDURE — 3074F SYST BP LT 130 MM HG: CPT | Performed by: FAMILY MEDICINE

## 2020-08-19 PROCEDURE — 4040F PNEUMOC VAC/ADMIN/RCVD: CPT | Performed by: FAMILY MEDICINE

## 2020-08-19 RX ORDER — ALENDRONATE SODIUM 70 MG/1
70 TABLET ORAL
Qty: 12 TABLET | Refills: 3 | Status: SHIPPED | OUTPATIENT
Start: 2020-08-19 | End: 2021-07-20

## 2020-08-19 NOTE — PROGRESS NOTES
Virtual Brief Visit    Assessment/Plan:    Problem List Items Addressed This Visit     None      Visit Diagnoses     Osteopenia with high risk of fracture    -  Primary    Relevant Medications    alendronate (Fosamax) 70 mg tablet    Chronic low back pain, unspecified back pain laterality, unspecified whether sciatica present        Relevant Orders    Ambulatory referral to Orthopedic Surgery                Reason for visit is   Chief Complaint   Patient presents with    Follow-up     DXA    Back Pain        Encounter provider America Olson MD    Provider located at [address] [place] [id number]    Recent Visits  No visits were found meeting these conditions  Showing recent visits within past 7 days and meeting all other requirements     Today's Visits  Date Type Provider Dept   08/19/20 Telemedicine America Olson  [facility] today's visits and meeting all other requirements     Future Appointments  No visits were found meeting these conditions  Showing future appointments within next 150 days and meeting all other requirements        After connecting through telephone, the patient was identified by name and date of birth  Dann Perez was informed that this is a telemedicine visit and that the visit is being conducted through telephone  My office door was closed  No one else was in the room     She acknowledged consent and understanding of privacy and security of the platform  The patient has agreed to participate and understands she can discontinue the visit at any time  It was my intent to perform this visit via video technology but the patient was not able to do a video connection so the visit was completed via audio telephone only  Patient is aware this is a billable service  Subjective    Dann Perez is a [de-identified] y o  female     Pt is seeing for abnormal DXA scan -  Showed osteopenia with high risk fracture - never been Tx with Fosamax in the past - also states has low back pain for several months -  Worsening recently -  No injury        Past Medical History:   Diagnosis Date    Anal fissure     Arthritis     osteo joints    Asthma with acute exacerbation     Blepharitis     Breast lump     Chalazion     CHF, chronic (HCC)     Difficulty walking     Disease of thyroid gland     Drooling     Dysphagia     Fall 05/04/2018    Fibromyalgia, primary     Glaucoma     Normal pressure    History of transfusion 1996    Hordeolum externum     Hx of transient ischemic attack (TIA)     Hypophonia     Infectious viral hepatitis     Hep C dx 1996     Lyme carditis     Murmur, cardiac     Parkinsons (HCC)     Rotator cuff tendinitis     Seasonal allergies     Urinary frequency        Past Surgical History:   Procedure Laterality Date    BREAST BIOPSY Right 2018    benign    BREAST CYST EXCISION Left     benign    BREAST SURGERY N/A     benign, calcium    CARDIAC SURGERY  1990    mitral valve replacement    CATARACT EXTRACTION Right 2015    CATARACT EXTRACTION Left 2014    CHEST TUBE INSERTION Right     post pleural effusion    CHOLECYSTECTOMY  2000    lap    HYSTERECTOMY  1973    partial    AR COLSC FLX W/RMVL OF TUMOR POLYP LESION SNARE TQ N/A 7/18/2017    Procedure: COLONOSCOPY and biopsy ;  Surgeon: Rigoberto Valdez MD;  Location: [facility];   Service: Gastroenterology    SKIN BIOPSY      pre cancerous on face    TONSILLECTOMY      US GUIDED BREAST BIOPSY RIGHT COMPLETE Right 2/13/2018       Current Outpatient Medications   Medication Sig Dispense Refill    acetaminophen (TYLENOL) 650 mg CR tablet Take 1 tablet (650 mg total) by mouth 3 (three) times a day (Patient taking differently: Take 650 mg by mouth every 8 (eight) hours as needed ) 100 tablet 0    alendronate (Fosamax) 70 mg tablet Take 1 tablet (70 mg total) by mouth every 7 days 12 tablet 3    carbidopa-levodopa-entacapone (STALEVO) -200 MG per tablet Take 1 tablet by mouth 4 (four) times a day 360 tablet 3    Cholecalciferol (VITAMIN D3) 2000 units TABS Take 2,000 Units by mouth every morning      donepezil (ARICEPT) 5 mg tablet Take 1 tablet (5 mg total) by mouth daily at bedtime 90 tablet 1    ENTRESTO  MG TABS TAKE 1 TABLET BY MOUTH TWICE DAILY 180 tablet 3    furosemide (LASIX) 40 mg tablet Take 1 tablet (40 mg total) by mouth daily Take one tablet by mouth daily until weight loss of 5-10 lbs as occurred, then can resume use 4-5 days per week  Schedule administration to be done when patient can be near toilet facilities for 4-6 hours  90 tablet 1    levothyroxine 50 mcg tablet Take 1 tablet (50 mcg total) by mouth every morning 30 tablet 3    memantine (NAMENDA) 10 mg tablet Take 1 tablet (10 mg total) by mouth daily 90 tablet 3    Multiple Vitamins-Minerals (OCUVITE ADULT 50+ PO) Take by mouth daily with breakfast      warfarin (COUMADIN) 2 5 mg tablet TAKE 1 TO 1 1/2 TABLETS  DAILY BY MOUTH OR AS ORDERED BY PHYSICIAN  125 tablet 3     No current facility-administered medications for this visit  Allergies   Allergen Reactions    Amantadine Tongue Swelling    Artane [Trihexyphenidyl] Other (See Comments)     Felt "disconnected", "out if it", shaky  Did not feel right     Glycopyrrolate      Nose bleeds    Pollen Extract        Review of Systems   Constitutional: Positive for fatigue  Respiratory: Negative  Cardiovascular: Negative  Gastrointestinal: Negative  Genitourinary: Negative  Musculoskeletal: Positive for back pain and gait problem  There were no vitals filed for this visit  I spent 5 minutes directly with the patient during this visit    VIRTUAL VISIT DISCLAIMER    Shara Cherry acknowledges that she has consented to an online visit or consultation   She understands that the online visit is based solely on information provided by her, and that, in the absence of a face-to-face physical evaluation by the physician, the diagnosis she receives is both limited and provisional in terms of accuracy and completeness  This is not intended to replace a full medical face-to-face evaluation by the physician  Quincy Christy understands and accepts these terms

## 2020-08-21 ENCOUNTER — OFFICE VISIT (OUTPATIENT)
Dept: OBGYN CLINIC | Facility: CLINIC | Age: 80
End: 2020-08-21
Payer: MEDICARE

## 2020-08-21 ENCOUNTER — APPOINTMENT (OUTPATIENT)
Dept: RADIOLOGY | Facility: CLINIC | Age: 80
End: 2020-08-21
Payer: MEDICARE

## 2020-08-21 VITALS
TEMPERATURE: 98 F | HEART RATE: 67 BPM | BODY MASS INDEX: 31.25 KG/M2 | SYSTOLIC BLOOD PRESSURE: 106 MMHG | DIASTOLIC BLOOD PRESSURE: 66 MMHG | WEIGHT: 187.6 LBS | HEIGHT: 65 IN

## 2020-08-21 DIAGNOSIS — M54.50 LUMBOSACRAL PAIN: ICD-10-CM

## 2020-08-21 DIAGNOSIS — M25.552 LEFT HIP PAIN: ICD-10-CM

## 2020-08-21 DIAGNOSIS — M53.3 CHRONIC LEFT SI JOINT PAIN: Primary | ICD-10-CM

## 2020-08-21 DIAGNOSIS — G89.29 CHRONIC LEFT SI JOINT PAIN: Primary | ICD-10-CM

## 2020-08-21 PROCEDURE — 3074F SYST BP LT 130 MM HG: CPT | Performed by: ORTHOPAEDIC SURGERY

## 2020-08-21 PROCEDURE — 99214 OFFICE O/P EST MOD 30 MIN: CPT | Performed by: ORTHOPAEDIC SURGERY

## 2020-08-21 PROCEDURE — 3008F BODY MASS INDEX DOCD: CPT | Performed by: ORTHOPAEDIC SURGERY

## 2020-08-21 PROCEDURE — 1160F RVW MEDS BY RX/DR IN RCRD: CPT | Performed by: ORTHOPAEDIC SURGERY

## 2020-08-21 PROCEDURE — 4040F PNEUMOC VAC/ADMIN/RCVD: CPT | Performed by: ORTHOPAEDIC SURGERY

## 2020-08-21 PROCEDURE — 1036F TOBACCO NON-USER: CPT | Performed by: ORTHOPAEDIC SURGERY

## 2020-08-21 PROCEDURE — 73502 X-RAY EXAM HIP UNI 2-3 VIEWS: CPT

## 2020-08-21 PROCEDURE — 3078F DIAST BP <80 MM HG: CPT | Performed by: ORTHOPAEDIC SURGERY

## 2020-08-21 NOTE — PROGRESS NOTES
Assessment/Plan:  1  Chronic left SI joint pain  XR hip/pelv 2-3 vws left if performed    Ambulatory referral to Physical Therapy   2  Lumbosacral pain  Ambulatory referral to Orthopedic Surgery    Ambulatory referral to Physical Therapy     Patient is here today for evaluation of left hip pain  On thorough history, clinical exam, review of her x-rays I do not feel that this is a hip problem but rather an issue with her lumbar sacral spine and her left SI joint  She does have some degenerative changes in the partially visualized lumbosacral spine and SI joints of her pelvis  We discussed treatment options for this  I recommended that she pursue physical therapy to address her lumbosacral spine and her left SI joint  She should continue using a cane  If this fails to provide significant relief of her discomfort in addition to the use of Tylenol we will make a referral to our spine and pain colleagues to address her lumbosacral spine pain and her left SI joint pain  She is agreeable to this  Will see her back in approximately 3 months to follow-up on her progress    Subjective:  Left hip pain    Patient ID: Elías Chiara is a [de-identified] y o  female  HPI  Patient is 79-year-old female here for evaluation of ongoing intermittent the posterior left hip pain  She states that she gets a pain over the left posterior hip in the region of her SI joint if she is standing for more than 10 minutes  She states that the pain is dull in nature and actually causes her to feel nauseous  The pain limits her and causes her to have to sit down  When she sits down the rest the pain release  The pain is exacerbated by bending backwards  She denies any radiation to the buttocks or down the leg  She denies any weakness in the lower extremities  She denies any recent fall or injury  Pain can reach 8/10 on the pain scale    The pain is not constant she has taken Tylenol to help relieve the discomfort when she does experience a period she is currently using a cane for ambulation  Review of Systems   Constitutional: Positive for activity change  HENT: Negative  Eyes: Negative  Respiratory: Negative  Cardiovascular: Negative  Gastrointestinal: Negative  Endocrine: Negative  Musculoskeletal: Positive for arthralgias, back pain and gait problem  Skin: Negative  Psychiatric/Behavioral: Negative  Past Medical History:   Diagnosis Date    Anal fissure     Arthritis     osteo joints    Asthma with acute exacerbation     Blepharitis     Breast lump     Chalazion     CHF, chronic (HCC)     Difficulty walking     Disease of thyroid gland     Drooling     Dysphagia     Fall 05/04/2018    Fibromyalgia, primary     Glaucoma     Normal pressure    History of transfusion 1996    Hordeolum externum     Hx of transient ischemic attack (TIA)     Hypophonia     Infectious viral hepatitis     Hep C dx 1996     Lyme carditis     Murmur, cardiac     Parkinsons (HCC)     Rotator cuff tendinitis     Seasonal allergies     Urinary frequency        Past Surgical History:   Procedure Laterality Date    BREAST BIOPSY Right 2018    benign    BREAST CYST EXCISION Left     benign    BREAST SURGERY N/A     benign, calcium    CARDIAC SURGERY  1990    mitral valve replacement    CATARACT EXTRACTION Right 2015    CATARACT EXTRACTION Left 2014    CHEST TUBE INSERTION Right     post pleural effusion    CHOLECYSTECTOMY  2000    lap    HYSTERECTOMY  1973    partial    FL COLSC FLX W/RMVL OF TUMOR POLYP LESION SNARE TQ N/A 7/18/2017    Procedure: COLONOSCOPY and biopsy ;  Surgeon: Branden Peterson MD;  Location: [place] [address] GI [facility];   Service: Gastroenterology    SKIN BIOPSY      pre cancerous on face    TONSILLECTOMY      US GUIDED BREAST BIOPSY RIGHT COMPLETE Right 2/13/2018       Family History   Problem Relation Age of Onset    Heart disease Mother         murmur Cardiomegaly age 64    Pneumonia Father  Heart disease Brother         mitrsl valve    Parkinsonism Brother     Arthritis Brother     Lupus Daughter     Diabetes Daughter     Depression Daughter        Social History     Occupational History    Not on file   Tobacco Use    Smoking status: Former Smoker     Packs/day: 0 10     Years: 10 00     Pack years: 1 00     Last attempt to quit: 1970     Years since quittin 6    Smokeless tobacco: Never Used   Substance and Sexual Activity    Alcohol use: No    Drug use: No    Sexual activity: Not Currently         Current Outpatient Medications:     acetaminophen (TYLENOL) 650 mg CR tablet, Take 1 tablet (650 mg total) by mouth 3 (three) times a day (Patient taking differently: Take 650 mg by mouth every 8 (eight) hours as needed ), Disp: 100 tablet, Rfl: 0    alendronate (Fosamax) 70 mg tablet, Take 1 tablet (70 mg total) by mouth every 7 days, Disp: 12 tablet, Rfl: 3    carbidopa-levodopa-entacapone (STALEVO) -200 MG per tablet, Take 1 tablet by mouth 4 (four) times a day, Disp: 360 tablet, Rfl: 3    Cholecalciferol (VITAMIN D3) 2000 units TABS, Take 2,000 Units by mouth every morning, Disp: , Rfl:     donepezil (ARICEPT) 5 mg tablet, Take 1 tablet (5 mg total) by mouth daily at bedtime, Disp: 90 tablet, Rfl: 1    ENTRESTO  MG TABS, TAKE 1 TABLET BY MOUTH TWICE DAILY, Disp: 180 tablet, Rfl: 3    furosemide (LASIX) 40 mg tablet, Take 1 tablet (40 mg total) by mouth daily Take one tablet by mouth daily until weight loss of 5-10 lbs as occurred, then can resume use 4-5 days per week  Schedule administration to be done when patient can be near toilet facilities for 4-6 hours  , Disp: 90 tablet, Rfl: 1    levothyroxine 50 mcg tablet, Take 1 tablet (50 mcg total) by mouth every morning, Disp: 30 tablet, Rfl: 3    memantine (NAMENDA) 10 mg tablet, Take 1 tablet (10 mg total) by mouth daily, Disp: 90 tablet, Rfl: 3    Multiple Vitamins-Minerals (OCUVITE ADULT 50+ PO), Take by mouth daily with breakfast, Disp: , Rfl:     warfarin (COUMADIN) 2 5 mg tablet, TAKE 1 TO 1 1/2 TABLETS  DAILY BY MOUTH OR AS ORDERED BY PHYSICIAN , Disp: 125 tablet, Rfl: 3    Allergies   Allergen Reactions    Amantadine Tongue Swelling    Artane [Trihexyphenidyl] Other (See Comments)     Felt "disconnected", "out if it", shaky  Did not feel right     Glycopyrrolate      Nose bleeds    Pollen Extract        Objective:  Vitals:    08/21/20 0904   BP: 106/66   Pulse: 67   Temp: 98 °F (36 7 °C)       Body mass index is 31 7 kg/m²  Left Hip Exam   Left hip exam is normal     Comments:  Tenderness palpation left SI joint left-sided lumbar paraspinals  Pain is exacerbated the lumbosacral spine as well as in the left SI joint on extension  No significant pain with forward flexion of the lumbar spine  Positive pelvic compression test to the left SI joint  Physical Exam  Vitals signs and nursing note reviewed  Constitutional:       Appearance: She is well-developed  HENT:      Head: Atraumatic  Neck:      Musculoskeletal: Neck supple  Cardiovascular:      Rate and Rhythm: Normal rate  Pulmonary:      Effort: Pulmonary effort is normal    Musculoskeletal:      Comments: See orthopedic exam   Skin:     General: Skin is warm and dry  Neurological:      Mental Status: She is alert and oriented to person, place, and time  I have personally reviewed pertinent films in PACS  X-rays obtained here today of the left hip and pelvis demonstrate mild osteoarthritis of left hip  There is still greater than 50% joint space remaining  In the partially visualized lumbar spine she does have degenerative changes present  Joseph CISNEROS    Division of Adult Reconstruction  Department of Orthopaedic Surgery  [facility]

## 2020-09-02 ENCOUNTER — EVALUATION (OUTPATIENT)
Dept: PHYSICAL THERAPY | Facility: CLINIC | Age: 80
End: 2020-09-02
Payer: MEDICARE

## 2020-09-02 DIAGNOSIS — M53.3 CHRONIC LEFT SI JOINT PAIN: Primary | ICD-10-CM

## 2020-09-02 DIAGNOSIS — G89.29 CHRONIC LEFT SI JOINT PAIN: Primary | ICD-10-CM

## 2020-09-02 DIAGNOSIS — G20 PARKINSON'S DISEASE (HCC): ICD-10-CM

## 2020-09-02 DIAGNOSIS — M54.50 LUMBOSACRAL PAIN: ICD-10-CM

## 2020-09-02 PROCEDURE — 97163 PT EVAL HIGH COMPLEX 45 MIN: CPT

## 2020-09-02 NOTE — PROGRESS NOTES
PT Evaluation     Today's date: 2020  Patient name: Elías Guadarrama  : 1940  MRN: [id number]  Referring provider: Anika Munoz DO  Dx:   Encounter Diagnosis     ICD-10-CM    1  Chronic left SI joint pain  M53 3 PT plan of care cert/re-cert    E55 22    2  Lumbosacral pain  M54 5 PT plan of care cert/re-cert   3  Parkinson's disease ([address] )  G20 PT plan of care cert/re-cert       Start Time: 845  Stop Time: 930  Total time in clinic (min): 45 minutes    Assessment  Assessment details: Patient is a [de-identified] y o  female who presents to skilled PT for with gait and balance deficits secondary to diagnosis of Parkinsons as well as generations of pain from low back pain  Patient displays decreased LE muscle strength with overall grade of 3+/5 grossly  Patient balance scores are indicative for high risk for falls per LAUREANO, TUG, Gait speed, 5x sit to stand  Patient endurance scores are indicative of decreased cardiovascular endurance with overall results noting decrease functional endurance and cardio capacity per 6 minute walk test and 30 second sit to stand test  Patient subjective report notes the following functional limitation with dressing and performing ADLs such as cooking due to generation of low back pain  Patient's back and sacral pain may be from her sedentary life style as well as decrease in muscular strength, patient also reports a PMH of osteopenia  Patient will benefit from skilled PT to address noted impairments and functional limitations they are causing with overall goal to return patient to highest level possible with reduced risk for falls  Please contact me if you have any questions or recommendations  Thank you for the referral and the opportunity to share in [facility]      Patient verbalized understanding of POC  Impairments: abnormal coordination, abnormal gait, abnormal or restricted ROM, abnormal movement, activity intolerance, impaired balance, impaired physical strength, lacks appropriate home exercise program, pain with function and poor body mechanics  Understanding of Dx/Px/POC: excellent   Prognosis: good    Goals  ST weeks  1  Patient will improve her overall LE strength from 3+/5 to to 4/5   2  Patient will improve her overall LAUREANO score from 46/56  3  Patient will improve her TUG score and improve by 10 seconds  4  Patient will improve her 5 x Sit to Stand score and improve by 10 secondsseconds  LT weeks  1  Patient will be independent with her HEP  2  Patient will be able to independently put her shoes on in the morning  3  Patient will be able to walk 1000 feet during 6 Minute Walk Test to be able to walk 1 block  4  Patient will report a decrease in her low back pain and overall improvement in function  Plan  Patient would benefit from: PT eval and skilled physical therapy  Planned therapy interventions: abdominal trunk stabilization, balance, gait training, home exercise program, therapeutic exercise, therapeutic activities, strengthening, stretching, patient education, postural training, neuromuscular re-education and manual therapy  Frequency: 2x week  Duration in weeks: 12  Plan of Care beginning date: 2020  Plan of Care expiration date: 2020  Treatment plan discussed with: patient        Subjective Evaluation    History of Present Illness  Mechanism of injury: Patient is a [de-identified] y o  female who arrives to outpatient PT with a diagnosis of Parkinson's with PMH noted  Patient also reports an increase in hip and low back pain  Patient notes that she is beginning to shuffle and takes more hesitant steps  She reports that she has Congestive Heart Disease with medication  Patient is seeing Dr Eyad Francois, notes changes with her symptoms, near falls noted  Using rollator most of the time  Has SPC at home             Recurrent probem    Quality of life: excellent    Pain  Current pain ratin  At best pain rating: 3  At worst pain ratin  Location: Lower back pain  Quality: dull ache  Relieving factors: rest, relaxation, support and change in position  Aggravating factors: standing, walking, running and lifting  Progression: worsening    Social Support  Steps to enter house: no  Stairs in house: no   Lives in: condominium  Lives with: spouse (Temporarily adult daughter)    Employment status: not working  Hand dominance: right      Diagnostic Tests  No diagnostic tests performed  Treatments  No previous or current treatments  Patient Goals  Patient goals for therapy: increased strength, improved balance, increased motion and independence with ADLs/IADLs  Patient goal: Patient's goal is to improve her back pain and overall function  Objective     Strength/Myotome Testing     Left Hip   Planes of Motion   Flexion: 3+  Extension: 3+  Abduction: 3+  Adduction: 3+  External rotation: 3+  Internal rotation: 3+    Right Hip   Planes of Motion   Flexion: 3+  Extension: 3+  Abduction: 3+  Adduction: 3+    Left Knee   Flexion: 4-  Prone flexion: 4-  Extension: 4-    Right Knee   Flexion: 4-  Prone flexion: 4  Extension: 4-    Left Ankle/Foot   Dorsiflexion: 3+  Plantar flexion: 3+    Right Ankle/Foot   Dorsiflexion: 3+  Plantar flexion: 3+    Ambulation     Comments         Gait Deviations: shuffling gait, freezing episodes  Decrease stride length, step height and [place]       Functional Assessment        Comments  Outcome measures  LAUREANO second sit to stand- 7 reps no UE  5x Sit to Stand: 18 25 no UE  TUG- 20 84 seconds with Rollator, 17 05 seconds without Rollator  Gait Speed- 10 meters/9 85 with rollator= 1 01 meters/second  6 minute walk test- 750 feet with rollator   mCTSIB- FTEO firm- 30 seconds, FTEC firm- 2 5 seconds, FTEO Foam- 3 seconds, FTEC Foam- 1 seconds  Limits of stability testing- LOB immediately with anterior and posterior LOB      Flowsheet Rows      Most Recent Value   PT/OT G-Codes   Current Score  40   Projected Score  54 Precautions:  has a past medical history of Anal fissure, Arthritis, Asthma with acute exacerbation, Blepharitis, Breast lump, Chalazion, CHF, chronic ([address] ), Difficulty walking, Disease of thyroid gland, Drooling, Dysphagia, Fall (05/04/2018), Fibromyalgia, primary, Glaucoma, History of transfusion (1996), Hordeolum externum, transient ischemic attack (TIA), Hypophonia, Infectious viral hepatitis, Lyme carditis, Murmur, cardiac, Parkinsons ([facility] [address] ), Rotator cuff tendinitis, Seasonal allergies, and Urinary frequency  Daily Treatment Diary     Manual  1/30                                                                               NMR/TE  Standing hip abd, flexion and ext 3 sec iso hold end range, 2 sets, 10 reps  FTEC: eye closed 30 seconds

## 2020-09-04 ENCOUNTER — OFFICE VISIT (OUTPATIENT)
Dept: PHYSICAL THERAPY | Facility: CLINIC | Age: 80
End: 2020-09-04
Payer: MEDICARE

## 2020-09-04 DIAGNOSIS — G89.29 CHRONIC LEFT SI JOINT PAIN: Primary | ICD-10-CM

## 2020-09-04 DIAGNOSIS — M53.3 CHRONIC LEFT SI JOINT PAIN: Primary | ICD-10-CM

## 2020-09-04 DIAGNOSIS — G20 PARKINSON'S DISEASE (HCC): ICD-10-CM

## 2020-09-04 DIAGNOSIS — M54.50 LUMBOSACRAL PAIN: ICD-10-CM

## 2020-09-04 PROCEDURE — 97530 THERAPEUTIC ACTIVITIES: CPT

## 2020-09-04 PROCEDURE — 97110 THERAPEUTIC EXERCISES: CPT

## 2020-09-04 NOTE — PROGRESS NOTES
Daily Note     Today's date: 2020  Patient name: Nitza Shelton  : 1940  MRN: [id number]  Referring provider: Bry Ghosh MD  Dx:   Encounter Diagnosis     ICD-10-CM    1  Chronic left SI joint pain  M53 3     G89 29    2  Lumbosacral pain  M54 5    3  Parkinson's disease ([facility] [address] )  G20        Start Time: 830  Stop Time: 915  Total time in clinic (min): 45 minutes    Subjective: Patient challenged with being winded, patient reported that she felt better after activity for her IE  Objective: See treatment diary below  -LSVT Forward reach  -LVST Lateral reach  -LSVT Forward step  -LSVT Lateral step    -Hip abduction- 20 reps, 3 second holds  -Hip Extension- 20 reps, 3 second holds  -Heel raises- 20 reps, 3 second holds  -Step Taps- Forward and laterally- 20 reps, 3 second holds  -Step Ups- 8" steps- 20 reps, 3 second holds, 2 UE supports forward and laterally     Assessment: Patient tolerated treatment well today, patient challenged with her endurance today and bradykinesia of each intervention  Patient reported minimal low back pain today during session  Plan: Continue per plan of care  Progress treatment as tolerated

## 2020-09-08 ENCOUNTER — APPOINTMENT (OUTPATIENT)
Dept: LAB | Facility: CLINIC | Age: 80
End: 2020-09-08
Payer: MEDICARE

## 2020-09-08 ENCOUNTER — ANTICOAG VISIT (OUTPATIENT)
Dept: CARDIOLOGY CLINIC | Facility: CLINIC | Age: 80
End: 2020-09-08

## 2020-09-08 DIAGNOSIS — Z95.2 H/O MITRAL VALVE REPLACEMENT WITH MECHANICAL VALVE: ICD-10-CM

## 2020-09-08 DIAGNOSIS — I50.42 CHRONIC COMBINED SYSTOLIC AND DIASTOLIC HEART FAILURE (HCC): ICD-10-CM

## 2020-09-08 DIAGNOSIS — I48.20 CHRONIC ATRIAL FIBRILLATION (HCC): ICD-10-CM

## 2020-09-08 DIAGNOSIS — Z95.2 S/P MVR (MITRAL VALVE REPLACEMENT): Primary | ICD-10-CM

## 2020-09-08 DIAGNOSIS — Z79.01 ANTICOAGULATED: ICD-10-CM

## 2020-09-08 RX ORDER — WARFARIN SODIUM 2.5 MG/1
TABLET ORAL
Qty: 90 TABLET | Refills: 3 | Status: SHIPPED | OUTPATIENT
Start: 2020-09-08 | End: 2021-10-07

## 2020-09-08 RX ORDER — SACUBITRIL AND VALSARTAN 97; 103 MG/1; MG/1
1 TABLET, FILM COATED ORAL 2 TIMES DAILY
Qty: 180 TABLET | Refills: 3 | Status: SHIPPED | OUTPATIENT
Start: 2020-09-08 | End: 2021-09-14

## 2020-09-09 ENCOUNTER — OFFICE VISIT (OUTPATIENT)
Dept: PHYSICAL THERAPY | Facility: CLINIC | Age: 80
End: 2020-09-09
Payer: MEDICARE

## 2020-09-09 DIAGNOSIS — G20 PARKINSON'S DISEASE (HCC): ICD-10-CM

## 2020-09-09 DIAGNOSIS — G89.29 CHRONIC LEFT SI JOINT PAIN: Primary | ICD-10-CM

## 2020-09-09 DIAGNOSIS — M54.50 LUMBOSACRAL PAIN: ICD-10-CM

## 2020-09-09 DIAGNOSIS — M53.3 CHRONIC LEFT SI JOINT PAIN: Primary | ICD-10-CM

## 2020-09-09 PROCEDURE — 97530 THERAPEUTIC ACTIVITIES: CPT

## 2020-09-09 PROCEDURE — 97110 THERAPEUTIC EXERCISES: CPT

## 2020-09-09 NOTE — PROGRESS NOTES
Daily Note     Today's date: 2020  Patient name: Delbert Marcos  : 1940  MRN: [id number]  Referring provider: Pasha Koo MD  Dx:   Encounter Diagnosis     ICD-10-CM    1  Chronic left SI joint pain  M53 3     G89 29    2  Lumbosacral pain  M54 5    3  Parkinson's disease ([facility] [address] )  Gissell Vaughn        Start Time: 46  Stop Time: 0930  Total time in clinic (min): 45 minutes    Subjective: Patient reported that she was sore after her first appointment  Went for a 1/3 mile walk  Objective: See treatment diary below  Each LSVT intervention  10 reps for 10 second holds bilaterally     -LSVT Forward reach  -LVST Lateral reach  -LSVT Forward step  -LSVT Lateral step  -LSVT Backward Step   -LSVT Rock and reach forward    -Hip abduction- 20 reps, 3 second holds  -Hip Extension- 20 reps, 3 second holds  -Heel raises- 20 reps, 3 second holds  -Hip Flexion- 20 reps, 3 second holds  -Step Taps- Forward and laterally- 20 reps, 3 second holds  -Step Ups- 8" steps- 20 reps, 3 second holds, 2 UE supports forward and laterally     Assessment: Patient tolerated treatment well today, patient challenged with her endurance today and bradykinesia of each intervention, progressed interventions and endurance with added LSVT Interventions  Patient reported minimal low back pain today during session  Plan: Continue per plan of care  Progress treatment as tolerated

## 2020-09-11 ENCOUNTER — OFFICE VISIT (OUTPATIENT)
Dept: PHYSICAL THERAPY | Facility: CLINIC | Age: 80
End: 2020-09-11
Payer: MEDICARE

## 2020-09-11 DIAGNOSIS — G89.29 CHRONIC LEFT SI JOINT PAIN: Primary | ICD-10-CM

## 2020-09-11 DIAGNOSIS — M53.3 CHRONIC LEFT SI JOINT PAIN: Primary | ICD-10-CM

## 2020-09-11 DIAGNOSIS — M54.50 LUMBOSACRAL PAIN: ICD-10-CM

## 2020-09-11 DIAGNOSIS — G20 PARKINSON'S DISEASE (HCC): ICD-10-CM

## 2020-09-11 PROCEDURE — 97110 THERAPEUTIC EXERCISES: CPT

## 2020-09-11 PROCEDURE — 97530 THERAPEUTIC ACTIVITIES: CPT

## 2020-09-11 PROCEDURE — 97112 NEUROMUSCULAR REEDUCATION: CPT

## 2020-09-11 NOTE — PROGRESS NOTES
Daily Note     Today's date: 2020  Patient name: Joe Rodriguez  : 1940  MRN: [id number]  Referring provider: Isabella Julio MD  Dx:   Encounter Diagnosis     ICD-10-CM    1  Chronic left SI joint pain  M53 3     G89 29    2  Lumbosacral pain  M54 5    3  Parkinson's disease ([facility] [address] )  G20        Start Time: 830  Stop Time: 915  Total time in clinic (min): 45 minutes    Subjective: Patient reported that she was sore after her first appointment  Went for a 1/3 mile walk  Objective: See treatment diary below  Each LSVT intervention  10 reps for 10 second holds bilaterally     2" hurdles to step over for each  -LSVT Forward reach  -LVST Lateral reach  -LSVT Forward step  -LSVT Lateral step  -LSVT Backward Step   -LSVT Rock and reach forward  -LSVT Twist and Reach  -BIG sit to stand    -Hip abduction- 20 reps, 3 second holds  -Hip Extension- 20 reps, 3 second holds, 2 UE support  -Heel raises- 20 reps, 3 second holds  -Hip Flexion- 20 reps, 3 second holds  -Step Taps- Forward and laterally- 20 reps, 3 second holds, 12"  -Step Ups- 8" steps- 20 reps, 3 second holds, 2 UE supports forward and laterally   -Hurdles- forward- 6" hurdles, 10 cycles of 10 feet    Vitals  BP- 101/65  SPO2- 98%  Pulse- 78 bpm    Assessment: Patient tolerated treatment well today, patient was slightly fatigued during her session today and required rest breaks, vitals above, patient educated on taking rest today and to seek medical attention if she feels she is not improving, patient verbalized understanding  Patient progressed today with her overall interventions today  Patient reported minimal low back pain today during session  Plan: Continue per plan of care  Progress treatment as tolerated

## 2020-09-16 ENCOUNTER — OFFICE VISIT (OUTPATIENT)
Dept: PHYSICAL THERAPY | Facility: CLINIC | Age: 80
End: 2020-09-16
Payer: MEDICARE

## 2020-09-16 DIAGNOSIS — M54.50 LUMBOSACRAL PAIN: ICD-10-CM

## 2020-09-16 DIAGNOSIS — M53.3 CHRONIC LEFT SI JOINT PAIN: ICD-10-CM

## 2020-09-16 DIAGNOSIS — G20 PARKINSON'S DISEASE (HCC): Primary | ICD-10-CM

## 2020-09-16 DIAGNOSIS — G89.29 CHRONIC LEFT SI JOINT PAIN: ICD-10-CM

## 2020-09-16 PROCEDURE — 97530 THERAPEUTIC ACTIVITIES: CPT

## 2020-09-16 PROCEDURE — 97112 NEUROMUSCULAR REEDUCATION: CPT

## 2020-09-16 PROCEDURE — 97110 THERAPEUTIC EXERCISES: CPT

## 2020-09-16 NOTE — PROGRESS NOTES
Daily Note     Today's date: 2020  Patient name: Tony Granados  : 1940  MRN: [id number]  Referring provider: Iraida Pearson MD  Dx:   Encounter Diagnosis     ICD-10-CM    1  Parkinson's disease ([facility] [address] )  G20    2  Chronic left SI joint pain  M53 3     G89 29    3  Lumbosacral pain  M54 5        Start Time: 0845  Stop Time: 09  Total time in clinic (min): 45 minutes    Subjective: Patient reports that she is feeling much better today, patient challenged with endurance and continues to report an increase in back pain  Objective: See treatment diary below  Each LSVT intervention  10 reps for 10 second holds bilaterally, use of HEP sheet today    2" hurdles to step over for each  -LSVT Forward reach  -LVST Lateral reach  -LSVT Forward step  -LSVT Lateral step  -LSVT Backward Step   -LSVT Rock and reach forward  -LSVT Twist and Reach  -BIG sit to stand    Performed independently today, no rukhsana for home performance    -Hip abduction- 20 reps, 3 second holds, foam underneath  -Hip Extension- 20 reps, 3 second holds, 2 UE support, foam underneath  -Heel raises- 20 reps, 3 second holds, foam underneath   -Hip Flexion- 20 reps, 3 second holds, foam underneath  -Step Taps- Forward and laterally- 20 reps, 3 second holds, 12", from foam   -Step Ups- 8" steps- 20 reps, 3 second holds, 2 UE supports forward and laterally from foam   -Hurdles- forward- 6" hurdles, 10 cycles of 10 feet    Assessment: Patient tolerated treatment well today, progressed with foam  Patient progressed today with her overall interventions today  Patient tolerated LSVT exercises well, patient challenged with posterior walking and movements, patient given HEP of LSVT, patient advised to monitor movements and safety  Patient reported minimal low back pain today during session  Plan: Continue per plan of care  Progress treatment as tolerated

## 2020-09-18 ENCOUNTER — OFFICE VISIT (OUTPATIENT)
Dept: PHYSICAL THERAPY | Facility: CLINIC | Age: 80
End: 2020-09-18
Payer: MEDICARE

## 2020-09-18 ENCOUNTER — EVALUATION (OUTPATIENT)
Dept: SPEECH THERAPY | Facility: CLINIC | Age: 80
End: 2020-09-18
Payer: MEDICARE

## 2020-09-18 DIAGNOSIS — R49.9 VOICE DISORDER: ICD-10-CM

## 2020-09-18 DIAGNOSIS — G20 PARKINSON'S DISEASE (HCC): Primary | ICD-10-CM

## 2020-09-18 PROCEDURE — 97112 NEUROMUSCULAR REEDUCATION: CPT

## 2020-09-18 PROCEDURE — 97116 GAIT TRAINING THERAPY: CPT

## 2020-09-18 PROCEDURE — 92524 BEHAVRAL QUALIT ANALYS VOICE: CPT | Performed by: SPEECH-LANGUAGE PATHOLOGIST

## 2020-09-18 NOTE — PROGRESS NOTES
Speech Language Pathology Evaluation  Today's date: 2020  Patient name: Eliel Reyes    : 1940        Referring provider: Muna Mathias MD  Dx: No diagnosis found  Subjective Comments: The patient is an [de-identified]year old female seen for a LSVT voice evaluation  The patient reports periods of low voicing and difficulty sustaining voice in noisy environments  She was diagnosed with Parkinson's disease approximately 7 years ago  Her goal for therapy is to speak louder and with greater clarity  Safety Measures: Parkinson's disease, at risk for falls, attends PT for balance issues       Reason for Referral:Change in vocal quality  Prior Functional Status:Communication effective and appropriate in all situations  Medical History significant for:   Past Medical History:   Diagnosis Date    Anal fissure     Arthritis     osteo joints    Asthma with acute exacerbation     Blepharitis     Breast lump     Chalazion     CHF, chronic (HCC)     Difficulty walking     Disease of thyroid gland     Drooling     Dysphagia     Fall 2018    Fibromyalgia, primary     Glaucoma     Normal pressure    History of transfusion     Hordeolum externum     Hx of transient ischemic attack (TIA)     Hypophonia     Infectious viral hepatitis     Hep C dx      Lyme carditis     Murmur, cardiac     Parkinsons (HCC)     Rotator cuff tendinitis     Seasonal allergies     Urinary frequency      Clinically Complex Situations:Previous therapy to address similar deficits    Hearing:Not Tested  Vision:Other Wears glasses  Medication List:   Current Outpatient Medications   Medication Sig Dispense Refill    acetaminophen (TYLENOL) 650 mg CR tablet Take 1 tablet (650 mg total) by mouth 3 (three) times a day (Patient taking differently: Take 650 mg by mouth every 8 (eight) hours as needed ) 100 tablet 0    alendronate (Fosamax) 70 mg tablet Take 1 tablet (70 mg total) by mouth every 7 days 12 tablet 3    carbidopa-levodopa-entacapone (STALEVO) -200 MG per tablet Take 1 tablet by mouth 4 (four) times a day 360 tablet 3    Cholecalciferol (VITAMIN D3) 2000 units TABS Take 2,000 Units by mouth every morning      donepezil (ARICEPT) 5 mg tablet Take 1 tablet (5 mg total) by mouth daily at bedtime 90 tablet 1    furosemide (LASIX) 40 mg tablet Take 1 tablet (40 mg total) by mouth daily Take one tablet by mouth daily until weight loss of 5-10 lbs as occurred, then can resume use 4-5 days per week  Schedule administration to be done when patient can be near toilet facilities for 4-6 hours  90 tablet 1    levothyroxine 50 mcg tablet Take 1 tablet (50 mcg total) by mouth every morning 30 tablet 3    memantine (NAMENDA) 10 mg tablet Take 1 tablet (10 mg total) by mouth daily 90 tablet 3    Multiple Vitamins-Minerals (OCUVITE ADULT 50+ PO) Take by mouth daily with breakfast      sacubitril-valsartan (Entresto)  MG TABS Take 1 tablet by mouth 2 (two) times a day 180 tablet 3    warfarin (COUMADIN) 2 5 mg tablet TAKE 1/2 TO 1 TABLET  DAILY BY MOUTH OR AS ORDERED BY PHYSICIAN  90 tablet 3     No current facility-administered medications for this visit  Allergies: Allergies   Allergen Reactions    Amantadine Tongue Swelling    Artane [Trihexyphenidyl] Other (See Comments)     Felt "disconnected", "out if it", shaky  Did not feel right     Glycopyrrolate      Nose bleeds    Pollen Extract      Primary Language: English  Preferred Language: [id number] at home with   Current Education status: Not in school     Highest Level of Education: college  Current / Prior Services being received: Speech Therapy Outpatient rehab - remote past, greater than 4 years ago       Mental Status: Alert  Behavior Status:Cooperative  Communication Modalities: Verbal  Recent Speech/ Language therapy:None  Rehabilitation Prognosis:Good rehab potential to reach the established goals    [facility] Voice Treatment (VT) LOUD assessment:    Sound Level Meter-to-Mouth Distance: 50 cm throughout testing    Maximum Duration of Sustained Vowel Phonation (/ah/):   Average MDP 7 9 sec    Average Intensity 63 9 dB SPL       Maximum Fundamental Frequency Range:   Highest Pitch 429 0 Hz   Lowest Pitch 195 5 Hz   Average Intensity 65 5 dB SPL for High Pitch  63 6 dB SPL for Low Pitch     Reading of Words:   Average Intensity 63 6 dB SPL     Reading of Phrases:   Average Intensity 63 9 dB SPL     Conversational Monologue:   Average Intensity 65 0 dB SPL     *Data gathered from reading and conversation tasks revealed vocal SPL levels that may significantly reduce patients speech intelligibility and communicative effectiveness in her functional living environment  **STIMULABILITY TESTING TO [address] LOUD! **    Maximum Duration of Sustained Vowel Phonation (/ah/):   Average MDP 9 3 sec    Average Intensity 69 3 dB SPL       Maximum Fundamental Frequency Range:   Highest Pitch 374 5 Hz   Lowest Pitch 178 3 Hz   Average Intensity 71 2 dB SPL for Highest Pitch  69 9 db SPL for Lowest Pitch     Reading of Phrases:   Average Intensity 69 9 dB SPL     *Patient demonstrated vocal improvement with loudness and quality in all tasks when stimulated  Voice Handicap Index (VHI): The VHI is a list of 30 statements that many people have used to describe their voices and the effects of their voices on their lives  Patient indicated how frequently she has the same experience using a rating point scale (never = 0, almost never = 1, sometimes = 2, almost always = 3, and always = 4)    Results were as follows:    Subscale: Score: Self-Perceived Impairment Level:   Physical 19/40 Moderate   Functional 17/40 Moderate   Emotional 6/40 Mild        TOTAL 42/120 Moderate     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, characterized by reduced loudness, monotone pitch, which contributed to an overall decrease in speech intelligibility  During conversation, speech intelligibility is reduced 35% of the time  Based on stimulability testing, patient is an good candidate for the LSVT LOUD program  Articulation was excellent throughout  Vocal quality was good and the patient did not manifest vocal hyperfunction duirng any tasks  The patient was mildly  SOB when reading tasks were combined with prompts to "go loud " This was largely mitigated by giving the patient breaks when reading        Patient would benefit from skilled speech/voice therapy (LSVT LOUD program) in the outpatient setting 4x/week for 4 weeks (consisting of 16 sessions)  However, due to the patients concerns with COVD 19, she will be seen 1 to 2 times per week, with visits consisting of in-person sessions and virtual visits  Prognosis for improvement is good based on motivation, stimulability, and family support  Goals  Goal 1:   Patient will use strategies and techniques similar to LOUD program with 90% accuracy or higher    Goal 2:   Patient will produce words and phrases with appropriate vocal volume (70db) with 90% accuracy or higher  Goal 3:   Patient will read paragraphs with appropriate vocal volume with 90% accuracy or higher  Goal 4:   Patient will engage in conversational speech with appropriate vocal volume (70db)  with 90% accuracy or higher       Recommendations:   Patients would benefit from: LSVT LOUD program   Frequency:1-2x weekly   Duration:Other 3 months     Intervention certification [id number]  Intervention certification DZ:63/[id number]  Intervention Comments: Leonides Salazar participated well with today's assessment

## 2020-09-18 NOTE — PROGRESS NOTES
Daily Note     Today's date: 2020  Patient name: Aaron Cody  : 1940  MRN: [id number]  Referring provider: Angela Laurent MD  Dx:   Encounter Diagnosis     ICD-10-CM    1  Parkinson's disease ([facility] [address] )  G20                   Subjective: Patient reports her back has been feeling good "so far" this morning  She stated today "is just a little unsteady morning " She continues to report she struggles with her endurance  Objective: See treatment diary below  Each LSVT intervention  10 reps for 10 second holds bilaterally, use of HEP sheet today  Intensity: 8/10    6" hurdles to step over for each  -LSVT Forward reach  -LVST Lateral reach  -LSVT Forward step - 1 UE on chair for support  -LSVT Lateral step - 1 UE on chair for support  -LSVT Backward Step - 1 UE on chair for support  -LSVT Rock and reach forward - 1 UE on chair for support  -LSVT Twist and Reach - 1 UE on chair for support  -BIG sit to stand    No rukhsana for home performance    - BIG walking w/ Rollator: 8 laps, 40 ft  - Fwd/bwd ambulation w/ Rollator to sit in chair: 3 reps        Not Today:  -Hip abduction- 20 reps, 3 second holds, foam underneath  -Hip Extension- 20 reps, 3 second holds, 2 UE support, foam underneath  -Heel raises- 20 reps, 3 second holds, foam underneath   -Hip Flexion- 20 reps, 3 second holds, foam underneath  -Step Taps- Forward and laterally- 20 reps, 3 second holds, 12", from foam   -Step Ups- 8" steps- 20 reps, 3 second holds, 2 UE supports forward and laterally from foam   -Hurdles- forward- 6" hurdles, 10 cycles of 10 feet    Assessment: Patient able to tolerate treatment session well today with progression of rukhsana height to 6"  Patient with instability noted during standing exercises and performed all with 1 UE support on chair  She reported minimal back pain throughout session today   She will continue to benefit from skilled outpatient PT in order to maximize her function and promote improved safety with ambulation  Plan: Continue per plan of care  Progress treatment as tolerated

## 2020-09-23 ENCOUNTER — OFFICE VISIT (OUTPATIENT)
Dept: PHYSICAL THERAPY | Facility: CLINIC | Age: 80
End: 2020-09-23
Payer: MEDICARE

## 2020-09-23 DIAGNOSIS — G20 PARKINSON'S DISEASE (HCC): Primary | ICD-10-CM

## 2020-09-23 DIAGNOSIS — G89.29 CHRONIC LEFT SI JOINT PAIN: ICD-10-CM

## 2020-09-23 DIAGNOSIS — M54.50 LUMBOSACRAL PAIN: ICD-10-CM

## 2020-09-23 DIAGNOSIS — M53.3 CHRONIC LEFT SI JOINT PAIN: ICD-10-CM

## 2020-09-23 PROCEDURE — 97112 NEUROMUSCULAR REEDUCATION: CPT

## 2020-09-23 PROCEDURE — 97110 THERAPEUTIC EXERCISES: CPT

## 2020-09-23 NOTE — PROGRESS NOTES
Daily Note     Today's date: 2020  Patient name: Jennifer Osei  : 1940  MRN: [id number]  Referring provider: Alfonzo Case MD  Dx:   Encounter Diagnosis     ICD-10-CM    1  Parkinson's disease ([facility] [address] )  G20    2  Chronic left SI joint pain  M53 3     G89 29    3  Lumbosacral pain  M54 5        Start Time: 0845  Stop Time: 09  Total time in clinic (min): 45 minutes    Subjective: Patient reports her back has been feeling good "so far" this morning  She stated today "is just a little unsteady morning " She continues to report she struggles with her endurance  Objective: See treatment diary below  Each LSVT intervention  10 reps for 10 second holds bilaterally, use of HEP sheet today  Intensity: 8/10      -LSVT Forward reach  -LVST Lateral reach  -LSVT Forward step - 1 UE on chair for support  -LSVT Lateral step - 1 UE on chair for support  -LSVT Backward Step - 1 UE on chair for support  -LSVT Rock and reach forward - 1 UE on chair for support  -LSVT Twist and Reach - 1 UE on chair for support  -BIG sit to stand    Performed with UE support  -Hip abduction- 20 reps, 3 second holds, foam underneath   -Hip Extension- 20 reps, 3 second holds, 2 UE support, foam underneath  -Heel raises- 20 reps, 3 second holds, foam underneath   -Hip Flexion- 20 reps, 3 second holds, foam underneath  -Step Taps- Forward and laterally- 20 reps, 3 second holds, 12", from foam   -Step Ups- 8" steps- 20 reps, 3 second holds, 2 UE supports forward and laterally from foam   -Hurdles- forward- 6" hurdles, 10 cycles of 10 feet    Assessment: Patient required guarding with rocking and reaching movements  She demo good activity tolerance throughout  Fatigue noted      Plan: Continue per plan of care  Progress treatment as tolerated

## 2020-09-25 ENCOUNTER — APPOINTMENT (OUTPATIENT)
Dept: PHYSICAL THERAPY | Facility: CLINIC | Age: 80
End: 2020-09-25
Payer: MEDICARE

## 2020-09-28 ENCOUNTER — ANTICOAG VISIT (OUTPATIENT)
Dept: CARDIOLOGY CLINIC | Facility: CLINIC | Age: 80
End: 2020-09-28

## 2020-09-28 ENCOUNTER — APPOINTMENT (OUTPATIENT)
Dept: LAB | Facility: CLINIC | Age: 80
End: 2020-09-28
Payer: MEDICARE

## 2020-09-28 DIAGNOSIS — I48.20 CHRONIC ATRIAL FIBRILLATION (HCC): ICD-10-CM

## 2020-09-28 DIAGNOSIS — Z95.2 H/O MITRAL VALVE REPLACEMENT WITH MECHANICAL VALVE: ICD-10-CM

## 2020-09-29 ENCOUNTER — LAB (OUTPATIENT)
Dept: LAB | Facility: CLINIC | Age: 80
End: 2020-09-29
Payer: MEDICARE

## 2020-09-29 DIAGNOSIS — E03.9 HYPOTHYROIDISM, UNSPECIFIED TYPE: ICD-10-CM

## 2020-09-29 LAB — TSH SERPL DL<=0.05 MIU/L-ACNC: 2.12 UIU/ML (ref 0.36–3.74)

## 2020-09-29 PROCEDURE — 84443 ASSAY THYROID STIM HORMONE: CPT

## 2020-09-29 PROCEDURE — 36415 COLL VENOUS BLD VENIPUNCTURE: CPT

## 2020-09-30 ENCOUNTER — OFFICE VISIT (OUTPATIENT)
Dept: PHYSICAL THERAPY | Facility: CLINIC | Age: 80
End: 2020-09-30
Payer: MEDICARE

## 2020-09-30 DIAGNOSIS — G20 PARKINSON'S DISEASE (HCC): Primary | ICD-10-CM

## 2020-09-30 PROCEDURE — 97112 NEUROMUSCULAR REEDUCATION: CPT | Performed by: PHYSICAL THERAPIST

## 2020-09-30 NOTE — PROGRESS NOTES
Daily Note   IE: 2020  Visit 7/10    Today's date: 2020  Patient name: Dwayne Sanches  : 1940  MRN: [id number]  Referring provider: Abdulkadir Christina MD  Dx:   Encounter Diagnosis     ICD-10-CM    1  Parkinson's disease ([facility] [address] )  G20                   Subjective: Patient reports she had her flu shot yesterday so LUE feels heavy/sore  Objective: See treatment diary below  Each LSVT intervention  10 reps for 10 second holds bilaterally, use of HEP sheet today  Intensity: 8/10    2# ankle weights for all:  -LSVT Forward reach  -LVST Lateral reach  -LSVT Forward step - 0 UE support   -LSVT Lateral step - 0 UE support  -LSVT Backward Step - 0 UE support   -LSVT Rock and reach forward - 0 UE support  -LSVT Twist and Reach - 1 UE support on chair  -BIG sit to stand    Performed with 1 UE support:  -Hip abduction- 20 reps, 3 second holds, foam underneath   -Hip Extension- 20 reps, 3 second holds, foam underneath  -Heel raises- 20 reps, 3 second holds, foam underneath   -Hip Flexion- 20 reps, 3 second holds, foam underneath  -Step Taps- Forward and laterally- 20 reps, 3 second holds, 12", from foam      Assessment: Added 2# ankle weights today for all exercises  Pt able to perform all LSVT exercises without UE support, but therapist was guarding for all standing ones  Requires 1 UE support for foam exercises  She demo good activity tolerance throughout  Fatigue noted post session  She requires continued skilled PT to reduce fall risk and maximize function  Plan: Continue per plan of care  Progress treatment as tolerated

## 2020-10-02 ENCOUNTER — OFFICE VISIT (OUTPATIENT)
Dept: PHYSICAL THERAPY | Facility: CLINIC | Age: 80
End: 2020-10-02
Payer: MEDICARE

## 2020-10-02 DIAGNOSIS — G20 PARKINSON'S DISEASE (HCC): Primary | ICD-10-CM

## 2020-10-02 PROCEDURE — 97112 NEUROMUSCULAR REEDUCATION: CPT | Performed by: PHYSICAL THERAPIST

## 2020-10-02 PROCEDURE — 97530 THERAPEUTIC ACTIVITIES: CPT | Performed by: PHYSICAL THERAPIST

## 2020-10-05 ENCOUNTER — APPOINTMENT (OUTPATIENT)
Dept: LAB | Facility: CLINIC | Age: 80
End: 2020-10-05
Payer: MEDICARE

## 2020-10-06 ENCOUNTER — ANTICOAG VISIT (OUTPATIENT)
Dept: CARDIOLOGY CLINIC | Facility: CLINIC | Age: 80
End: 2020-10-06

## 2020-10-06 DIAGNOSIS — Z95.2 H/O MITRAL VALVE REPLACEMENT WITH MECHANICAL VALVE: ICD-10-CM

## 2020-10-06 DIAGNOSIS — I48.20 CHRONIC ATRIAL FIBRILLATION (HCC): ICD-10-CM

## 2020-10-07 ENCOUNTER — OFFICE VISIT (OUTPATIENT)
Dept: PHYSICAL THERAPY | Facility: CLINIC | Age: 80
End: 2020-10-07
Payer: MEDICARE

## 2020-10-07 DIAGNOSIS — M53.3 CHRONIC LEFT SI JOINT PAIN: ICD-10-CM

## 2020-10-07 DIAGNOSIS — M54.50 LUMBOSACRAL PAIN: ICD-10-CM

## 2020-10-07 DIAGNOSIS — G20 PARKINSON'S DISEASE (HCC): Primary | ICD-10-CM

## 2020-10-07 DIAGNOSIS — G89.29 CHRONIC LEFT SI JOINT PAIN: ICD-10-CM

## 2020-10-07 PROCEDURE — 97530 THERAPEUTIC ACTIVITIES: CPT

## 2020-10-07 PROCEDURE — 97112 NEUROMUSCULAR REEDUCATION: CPT

## 2020-10-09 ENCOUNTER — OFFICE VISIT (OUTPATIENT)
Dept: PHYSICAL THERAPY | Facility: CLINIC | Age: 80
End: 2020-10-09
Payer: MEDICARE

## 2020-10-09 DIAGNOSIS — G89.29 CHRONIC LEFT SI JOINT PAIN: ICD-10-CM

## 2020-10-09 DIAGNOSIS — M54.50 LUMBOSACRAL PAIN: ICD-10-CM

## 2020-10-09 DIAGNOSIS — M53.3 CHRONIC LEFT SI JOINT PAIN: ICD-10-CM

## 2020-10-09 DIAGNOSIS — G20 PARKINSON'S DISEASE (HCC): Primary | ICD-10-CM

## 2020-10-09 PROCEDURE — 97530 THERAPEUTIC ACTIVITIES: CPT

## 2020-10-09 PROCEDURE — 97110 THERAPEUTIC EXERCISES: CPT

## 2020-10-09 PROCEDURE — 97112 NEUROMUSCULAR REEDUCATION: CPT

## 2020-10-14 ENCOUNTER — OFFICE VISIT (OUTPATIENT)
Dept: PHYSICAL THERAPY | Facility: CLINIC | Age: 80
End: 2020-10-14
Payer: MEDICARE

## 2020-10-14 DIAGNOSIS — G89.29 CHRONIC LEFT SI JOINT PAIN: ICD-10-CM

## 2020-10-14 DIAGNOSIS — M54.50 LUMBOSACRAL PAIN: ICD-10-CM

## 2020-10-14 DIAGNOSIS — G20 PARKINSON'S DISEASE (HCC): Primary | ICD-10-CM

## 2020-10-14 DIAGNOSIS — M53.3 CHRONIC LEFT SI JOINT PAIN: ICD-10-CM

## 2020-10-14 PROCEDURE — 97530 THERAPEUTIC ACTIVITIES: CPT

## 2020-10-14 PROCEDURE — 97112 NEUROMUSCULAR REEDUCATION: CPT

## 2020-10-16 ENCOUNTER — TELEMEDICINE (OUTPATIENT)
Dept: SPEECH THERAPY | Facility: CLINIC | Age: 80
End: 2020-10-16
Payer: MEDICARE

## 2020-10-16 ENCOUNTER — APPOINTMENT (OUTPATIENT)
Dept: PHYSICAL THERAPY | Facility: CLINIC | Age: 80
End: 2020-10-16
Payer: MEDICARE

## 2020-10-16 ENCOUNTER — TELEMEDICINE (OUTPATIENT)
Dept: PHYSICAL THERAPY | Facility: CLINIC | Age: 80
End: 2020-10-16
Payer: MEDICARE

## 2020-10-16 DIAGNOSIS — M53.3 CHRONIC LEFT SI JOINT PAIN: ICD-10-CM

## 2020-10-16 DIAGNOSIS — G89.29 CHRONIC LEFT SI JOINT PAIN: ICD-10-CM

## 2020-10-16 DIAGNOSIS — R49.9 VOICE DISORDER: ICD-10-CM

## 2020-10-16 DIAGNOSIS — G20 PARKINSON'S DISEASE (HCC): Primary | ICD-10-CM

## 2020-10-16 DIAGNOSIS — M54.50 LUMBOSACRAL PAIN: ICD-10-CM

## 2020-10-16 PROCEDURE — 97530 THERAPEUTIC ACTIVITIES: CPT

## 2020-10-16 PROCEDURE — 97110 THERAPEUTIC EXERCISES: CPT

## 2020-10-16 PROCEDURE — 92507 TX SP LANG VOICE COMM INDIV: CPT | Performed by: SPEECH-LANGUAGE PATHOLOGIST

## 2020-10-19 DIAGNOSIS — G30.9 ALZHEIMER'S DEMENTIA WITHOUT BEHAVIORAL DISTURBANCE, UNSPECIFIED TIMING OF DEMENTIA ONSET (HCC): ICD-10-CM

## 2020-10-19 DIAGNOSIS — F02.80 ALZHEIMER'S DEMENTIA WITHOUT BEHAVIORAL DISTURBANCE, UNSPECIFIED TIMING OF DEMENTIA ONSET (HCC): ICD-10-CM

## 2020-10-19 RX ORDER — DONEPEZIL HYDROCHLORIDE 5 MG/1
5 TABLET, FILM COATED ORAL
Qty: 90 TABLET | Refills: 3 | Status: SHIPPED | OUTPATIENT
Start: 2020-10-19 | End: 2021-02-25 | Stop reason: SDUPTHER

## 2020-10-21 ENCOUNTER — TELEMEDICINE (OUTPATIENT)
Dept: PHYSICAL THERAPY | Facility: CLINIC | Age: 80
End: 2020-10-21
Payer: MEDICARE

## 2020-10-21 DIAGNOSIS — G89.29 CHRONIC LEFT SI JOINT PAIN: ICD-10-CM

## 2020-10-21 DIAGNOSIS — M53.3 CHRONIC LEFT SI JOINT PAIN: ICD-10-CM

## 2020-10-21 DIAGNOSIS — M54.50 LUMBOSACRAL PAIN: ICD-10-CM

## 2020-10-21 DIAGNOSIS — G20 PARKINSON'S DISEASE (HCC): Primary | ICD-10-CM

## 2020-10-21 PROCEDURE — 97530 THERAPEUTIC ACTIVITIES: CPT

## 2020-10-21 PROCEDURE — 97110 THERAPEUTIC EXERCISES: CPT

## 2020-10-23 ENCOUNTER — TELEMEDICINE (OUTPATIENT)
Dept: SPEECH THERAPY | Facility: CLINIC | Age: 80
End: 2020-10-23
Payer: MEDICARE

## 2020-10-23 ENCOUNTER — TELEMEDICINE (OUTPATIENT)
Dept: PHYSICAL THERAPY | Facility: CLINIC | Age: 80
End: 2020-10-23
Payer: MEDICARE

## 2020-10-23 DIAGNOSIS — G20 PARKINSON'S DISEASE (HCC): Primary | ICD-10-CM

## 2020-10-23 DIAGNOSIS — M54.50 LUMBOSACRAL PAIN: ICD-10-CM

## 2020-10-23 DIAGNOSIS — R49.9 VOICE DISORDER: ICD-10-CM

## 2020-10-23 DIAGNOSIS — G89.29 CHRONIC LEFT SI JOINT PAIN: ICD-10-CM

## 2020-10-23 DIAGNOSIS — M53.3 CHRONIC LEFT SI JOINT PAIN: ICD-10-CM

## 2020-10-23 PROCEDURE — 97110 THERAPEUTIC EXERCISES: CPT

## 2020-10-23 PROCEDURE — 97530 THERAPEUTIC ACTIVITIES: CPT

## 2020-10-23 PROCEDURE — 97112 NEUROMUSCULAR REEDUCATION: CPT

## 2020-10-23 PROCEDURE — 92507 TX SP LANG VOICE COMM INDIV: CPT | Performed by: SPEECH-LANGUAGE PATHOLOGIST

## 2020-10-27 ENCOUNTER — ANTICOAG VISIT (OUTPATIENT)
Dept: CARDIOLOGY CLINIC | Facility: CLINIC | Age: 80
End: 2020-10-27

## 2020-10-27 ENCOUNTER — LAB (OUTPATIENT)
Dept: LAB | Facility: CLINIC | Age: 80
End: 2020-10-27
Payer: MEDICARE

## 2020-10-27 DIAGNOSIS — Z95.2 H/O MITRAL VALVE REPLACEMENT WITH MECHANICAL VALVE: ICD-10-CM

## 2020-10-27 DIAGNOSIS — I48.20 CHRONIC ATRIAL FIBRILLATION (HCC): ICD-10-CM

## 2020-10-28 ENCOUNTER — TELEMEDICINE (OUTPATIENT)
Dept: PHYSICAL THERAPY | Facility: CLINIC | Age: 80
End: 2020-10-28
Payer: MEDICARE

## 2020-10-28 DIAGNOSIS — M53.3 CHRONIC LEFT SI JOINT PAIN: ICD-10-CM

## 2020-10-28 DIAGNOSIS — G20 PARKINSON'S DISEASE (HCC): Primary | ICD-10-CM

## 2020-10-28 DIAGNOSIS — M54.50 LUMBOSACRAL PAIN: ICD-10-CM

## 2020-10-28 DIAGNOSIS — G89.29 CHRONIC LEFT SI JOINT PAIN: ICD-10-CM

## 2020-10-28 PROCEDURE — 97530 THERAPEUTIC ACTIVITIES: CPT

## 2020-10-28 PROCEDURE — 97112 NEUROMUSCULAR REEDUCATION: CPT

## 2020-10-30 ENCOUNTER — APPOINTMENT (OUTPATIENT)
Dept: SPEECH THERAPY | Facility: CLINIC | Age: 80
End: 2020-10-30
Payer: MEDICARE

## 2020-10-30 ENCOUNTER — APPOINTMENT (OUTPATIENT)
Dept: PHYSICAL THERAPY | Facility: CLINIC | Age: 80
End: 2020-10-30
Payer: MEDICARE

## 2020-11-03 ENCOUNTER — TELEMEDICINE (OUTPATIENT)
Dept: SPEECH THERAPY | Facility: CLINIC | Age: 80
End: 2020-11-03
Payer: MEDICARE

## 2020-11-03 DIAGNOSIS — G20 PARKINSON'S DISEASE (HCC): Primary | ICD-10-CM

## 2020-11-03 DIAGNOSIS — R49.9 VOICE DISORDER: ICD-10-CM

## 2020-11-03 PROCEDURE — 92507 TX SP LANG VOICE COMM INDIV: CPT | Performed by: SPEECH-LANGUAGE PATHOLOGIST

## 2020-11-06 ENCOUNTER — TELEMEDICINE (OUTPATIENT)
Dept: PHYSICAL THERAPY | Facility: CLINIC | Age: 80
End: 2020-11-06
Payer: MEDICARE

## 2020-11-06 ENCOUNTER — OFFICE VISIT (OUTPATIENT)
Dept: SPEECH THERAPY | Facility: CLINIC | Age: 80
End: 2020-11-06
Payer: MEDICARE

## 2020-11-06 DIAGNOSIS — G89.29 CHRONIC LEFT SI JOINT PAIN: ICD-10-CM

## 2020-11-06 DIAGNOSIS — M54.50 LUMBOSACRAL PAIN: ICD-10-CM

## 2020-11-06 DIAGNOSIS — G20 PARKINSON'S DISEASE (HCC): Primary | ICD-10-CM

## 2020-11-06 DIAGNOSIS — M53.3 CHRONIC LEFT SI JOINT PAIN: ICD-10-CM

## 2020-11-06 DIAGNOSIS — R49.9 VOICE DISORDER: ICD-10-CM

## 2020-11-06 PROCEDURE — 97112 NEUROMUSCULAR REEDUCATION: CPT

## 2020-11-06 PROCEDURE — 92507 TX SP LANG VOICE COMM INDIV: CPT | Performed by: SPEECH-LANGUAGE PATHOLOGIST

## 2020-11-06 PROCEDURE — 97530 THERAPEUTIC ACTIVITIES: CPT

## 2020-11-09 ENCOUNTER — ANTICOAG VISIT (OUTPATIENT)
Dept: CARDIOLOGY CLINIC | Facility: CLINIC | Age: 80
End: 2020-11-09

## 2020-11-09 ENCOUNTER — LAB (OUTPATIENT)
Dept: LAB | Facility: CLINIC | Age: 80
End: 2020-11-09
Payer: MEDICARE

## 2020-11-09 DIAGNOSIS — I48.20 CHRONIC ATRIAL FIBRILLATION (HCC): ICD-10-CM

## 2020-11-09 DIAGNOSIS — E78.2 MIXED DYSLIPIDEMIA: ICD-10-CM

## 2020-11-09 DIAGNOSIS — I50.42 CHRONIC COMBINED SYSTOLIC AND DIASTOLIC HEART FAILURE (HCC): ICD-10-CM

## 2020-11-09 DIAGNOSIS — Z95.2 H/O MITRAL VALVE REPLACEMENT WITH MECHANICAL VALVE: ICD-10-CM

## 2020-11-09 DIAGNOSIS — E03.9 ACQUIRED HYPOTHYROIDISM: ICD-10-CM

## 2020-11-10 ENCOUNTER — TELEMEDICINE (OUTPATIENT)
Dept: SPEECH THERAPY | Facility: CLINIC | Age: 80
End: 2020-11-10
Payer: MEDICARE

## 2020-11-10 DIAGNOSIS — G20 PARKINSON'S DISEASE (HCC): Primary | ICD-10-CM

## 2020-11-10 DIAGNOSIS — R49.9 VOICE DISORDER: ICD-10-CM

## 2020-11-10 PROCEDURE — 92507 TX SP LANG VOICE COMM INDIV: CPT | Performed by: SPEECH-LANGUAGE PATHOLOGIST

## 2020-11-11 ENCOUNTER — OFFICE VISIT (OUTPATIENT)
Dept: CARDIOLOGY CLINIC | Facility: CLINIC | Age: 80
End: 2020-11-11
Payer: MEDICARE

## 2020-11-11 VITALS
WEIGHT: 184 LBS | OXYGEN SATURATION: 99 % | TEMPERATURE: 98.4 F | SYSTOLIC BLOOD PRESSURE: 116 MMHG | BODY MASS INDEX: 30.66 KG/M2 | HEART RATE: 71 BPM | HEIGHT: 65 IN | DIASTOLIC BLOOD PRESSURE: 74 MMHG

## 2020-11-11 DIAGNOSIS — E78.2 MIXED DYSLIPIDEMIA: ICD-10-CM

## 2020-11-11 DIAGNOSIS — I48.20 CHRONIC ATRIAL FIBRILLATION (HCC): ICD-10-CM

## 2020-11-11 DIAGNOSIS — I50.42 CHRONIC COMBINED SYSTOLIC AND DIASTOLIC HEART FAILURE (HCC): Primary | ICD-10-CM

## 2020-11-11 DIAGNOSIS — I42.0 CARDIOMYOPATHY, DILATED, NONISCHEMIC (HCC): ICD-10-CM

## 2020-11-11 DIAGNOSIS — G20 PARKINSON'S DISEASE (HCC): ICD-10-CM

## 2020-11-11 DIAGNOSIS — I44.7 LEFT BUNDLE BRANCH BLOCK (LBBB): ICD-10-CM

## 2020-11-11 DIAGNOSIS — I49.3 ASYMPTOMATIC PVCS: ICD-10-CM

## 2020-11-11 DIAGNOSIS — Z95.2 H/O MITRAL VALVE REPLACEMENT WITH MECHANICAL VALVE: ICD-10-CM

## 2020-11-11 DIAGNOSIS — E03.9 ACQUIRED HYPOTHYROIDISM: ICD-10-CM

## 2020-11-11 DIAGNOSIS — Z79.01 ON CONTINUOUS ORAL ANTICOAGULATION: ICD-10-CM

## 2020-11-11 DIAGNOSIS — Z79.899 HIGH RISK MEDICATION USE: ICD-10-CM

## 2020-11-11 PROCEDURE — 99214 OFFICE O/P EST MOD 30 MIN: CPT | Performed by: INTERNAL MEDICINE

## 2020-11-11 PROCEDURE — 93000 ELECTROCARDIOGRAM COMPLETE: CPT | Performed by: INTERNAL MEDICINE

## 2020-11-12 ENCOUNTER — LAB (OUTPATIENT)
Dept: LAB | Facility: CLINIC | Age: 80
End: 2020-11-12
Payer: MEDICARE

## 2020-11-12 DIAGNOSIS — E78.2 MIXED DYSLIPIDEMIA: ICD-10-CM

## 2020-11-12 LAB
CHOLEST SERPL-MCNC: 184 MG/DL (ref 50–200)
HDLC SERPL-MCNC: 67 MG/DL
LDLC SERPL CALC-MCNC: 97 MG/DL (ref 0–100)
NONHDLC SERPL-MCNC: 117 MG/DL
TRIGL SERPL-MCNC: 102 MG/DL

## 2020-11-12 PROCEDURE — 80061 LIPID PANEL: CPT

## 2020-11-12 PROCEDURE — 36415 COLL VENOUS BLD VENIPUNCTURE: CPT

## 2020-11-13 ENCOUNTER — TELEMEDICINE (OUTPATIENT)
Dept: PHYSICAL THERAPY | Facility: CLINIC | Age: 80
End: 2020-11-13
Payer: MEDICARE

## 2020-11-13 ENCOUNTER — OFFICE VISIT (OUTPATIENT)
Dept: SPEECH THERAPY | Facility: CLINIC | Age: 80
End: 2020-11-13
Payer: MEDICARE

## 2020-11-13 DIAGNOSIS — G20 PARKINSON'S DISEASE (HCC): Primary | ICD-10-CM

## 2020-11-13 DIAGNOSIS — G89.29 CHRONIC LEFT SI JOINT PAIN: ICD-10-CM

## 2020-11-13 DIAGNOSIS — M53.3 CHRONIC LEFT SI JOINT PAIN: ICD-10-CM

## 2020-11-13 DIAGNOSIS — R49.9 VOICE DISORDER: ICD-10-CM

## 2020-11-13 DIAGNOSIS — M54.50 LUMBOSACRAL PAIN: ICD-10-CM

## 2020-11-13 PROCEDURE — 92507 TX SP LANG VOICE COMM INDIV: CPT | Performed by: SPEECH-LANGUAGE PATHOLOGIST

## 2020-11-13 PROCEDURE — 97112 NEUROMUSCULAR REEDUCATION: CPT

## 2020-11-13 PROCEDURE — 97530 THERAPEUTIC ACTIVITIES: CPT

## 2020-11-17 ENCOUNTER — TELEPHONE (OUTPATIENT)
Dept: CARDIOLOGY CLINIC | Facility: CLINIC | Age: 80
End: 2020-11-17

## 2020-11-17 ENCOUNTER — TELEMEDICINE (OUTPATIENT)
Dept: SPEECH THERAPY | Facility: CLINIC | Age: 80
End: 2020-11-17
Payer: MEDICARE

## 2020-11-17 DIAGNOSIS — R49.9 VOICE DISORDER: ICD-10-CM

## 2020-11-17 DIAGNOSIS — G20 PARKINSON'S DISEASE (HCC): Primary | ICD-10-CM

## 2020-11-17 PROCEDURE — 92507 TX SP LANG VOICE COMM INDIV: CPT | Performed by: SPEECH-LANGUAGE PATHOLOGIST

## 2020-11-20 ENCOUNTER — TELEMEDICINE (OUTPATIENT)
Dept: PHYSICAL THERAPY | Facility: CLINIC | Age: 80
End: 2020-11-20
Payer: MEDICARE

## 2020-11-20 ENCOUNTER — TELEMEDICINE (OUTPATIENT)
Dept: SPEECH THERAPY | Facility: CLINIC | Age: 80
End: 2020-11-20
Payer: MEDICARE

## 2020-11-20 DIAGNOSIS — R49.9 VOICE DISORDER: ICD-10-CM

## 2020-11-20 DIAGNOSIS — G89.29 CHRONIC LEFT SI JOINT PAIN: ICD-10-CM

## 2020-11-20 DIAGNOSIS — G20 PARKINSON'S DISEASE (HCC): Primary | ICD-10-CM

## 2020-11-20 DIAGNOSIS — M54.50 LUMBOSACRAL PAIN: ICD-10-CM

## 2020-11-20 DIAGNOSIS — M53.3 CHRONIC LEFT SI JOINT PAIN: ICD-10-CM

## 2020-11-20 PROCEDURE — 97110 THERAPEUTIC EXERCISES: CPT

## 2020-11-20 PROCEDURE — 97112 NEUROMUSCULAR REEDUCATION: CPT

## 2020-11-20 PROCEDURE — 92507 TX SP LANG VOICE COMM INDIV: CPT | Performed by: SPEECH-LANGUAGE PATHOLOGIST

## 2020-11-24 ENCOUNTER — TELEMEDICINE (OUTPATIENT)
Dept: SPEECH THERAPY | Facility: CLINIC | Age: 80
End: 2020-11-24
Payer: MEDICARE

## 2020-11-24 DIAGNOSIS — G20 PARKINSON'S DISEASE (HCC): Primary | ICD-10-CM

## 2020-11-24 DIAGNOSIS — R49.9 VOICE DISORDER: ICD-10-CM

## 2020-11-24 PROCEDURE — 92507 TX SP LANG VOICE COMM INDIV: CPT | Performed by: SPEECH-LANGUAGE PATHOLOGIST

## 2020-11-27 ENCOUNTER — OFFICE VISIT (OUTPATIENT)
Dept: SPEECH THERAPY | Facility: CLINIC | Age: 80
End: 2020-11-27
Payer: MEDICARE

## 2020-11-27 ENCOUNTER — APPOINTMENT (OUTPATIENT)
Dept: PHYSICAL THERAPY | Facility: CLINIC | Age: 80
End: 2020-11-27
Payer: MEDICARE

## 2020-11-27 DIAGNOSIS — G20 PARKINSON'S DISEASE (HCC): Primary | ICD-10-CM

## 2020-11-27 DIAGNOSIS — R49.9 VOICE DISORDER: ICD-10-CM

## 2020-11-27 PROCEDURE — 92507 TX SP LANG VOICE COMM INDIV: CPT | Performed by: SPEECH-LANGUAGE PATHOLOGIST

## 2020-11-30 ENCOUNTER — ANTICOAG VISIT (OUTPATIENT)
Dept: CARDIOLOGY CLINIC | Facility: CLINIC | Age: 80
End: 2020-11-30

## 2020-11-30 ENCOUNTER — LAB (OUTPATIENT)
Dept: LAB | Facility: CLINIC | Age: 80
End: 2020-11-30
Payer: MEDICARE

## 2020-11-30 DIAGNOSIS — Z95.2 H/O MITRAL VALVE REPLACEMENT WITH MECHANICAL VALVE: ICD-10-CM

## 2020-11-30 DIAGNOSIS — I48.20 CHRONIC ATRIAL FIBRILLATION (HCC): ICD-10-CM

## 2020-12-01 ENCOUNTER — APPOINTMENT (OUTPATIENT)
Dept: SPEECH THERAPY | Facility: CLINIC | Age: 80
End: 2020-12-01
Payer: MEDICARE

## 2020-12-02 ENCOUNTER — TELEMEDICINE (OUTPATIENT)
Dept: SPEECH THERAPY | Facility: CLINIC | Age: 80
End: 2020-12-02
Payer: MEDICARE

## 2020-12-02 DIAGNOSIS — R49.9 VOICE DISORDER: ICD-10-CM

## 2020-12-02 DIAGNOSIS — G20 PARKINSON'S DISEASE (HCC): Primary | ICD-10-CM

## 2020-12-02 PROCEDURE — 92507 TX SP LANG VOICE COMM INDIV: CPT | Performed by: SPEECH-LANGUAGE PATHOLOGIST

## 2020-12-03 ENCOUNTER — OFFICE VISIT (OUTPATIENT)
Dept: OBGYN CLINIC | Facility: CLINIC | Age: 80
End: 2020-12-03
Payer: MEDICARE

## 2020-12-03 ENCOUNTER — TELEPHONE (OUTPATIENT)
Dept: NEUROLOGY | Facility: CLINIC | Age: 80
End: 2020-12-03

## 2020-12-03 VITALS
DIASTOLIC BLOOD PRESSURE: 70 MMHG | WEIGHT: 190.2 LBS | HEART RATE: 74 BPM | SYSTOLIC BLOOD PRESSURE: 110 MMHG | HEIGHT: 65 IN | BODY MASS INDEX: 31.69 KG/M2

## 2020-12-03 DIAGNOSIS — M54.50 LUMBOSACRAL PAIN: ICD-10-CM

## 2020-12-03 DIAGNOSIS — G89.29 CHRONIC LEFT SI JOINT PAIN: Primary | ICD-10-CM

## 2020-12-03 DIAGNOSIS — M53.3 CHRONIC LEFT SI JOINT PAIN: Primary | ICD-10-CM

## 2020-12-03 PROCEDURE — 99213 OFFICE O/P EST LOW 20 MIN: CPT | Performed by: ORTHOPAEDIC SURGERY

## 2020-12-04 ENCOUNTER — APPOINTMENT (OUTPATIENT)
Dept: SPEECH THERAPY | Facility: CLINIC | Age: 80
End: 2020-12-04
Payer: MEDICARE

## 2020-12-08 ENCOUNTER — TELEMEDICINE (OUTPATIENT)
Dept: SPEECH THERAPY | Facility: CLINIC | Age: 80
End: 2020-12-08
Payer: MEDICARE

## 2020-12-08 DIAGNOSIS — G20 PARKINSON'S DISEASE (HCC): Primary | ICD-10-CM

## 2020-12-08 DIAGNOSIS — R49.9 VOICE DISORDER: ICD-10-CM

## 2020-12-08 PROCEDURE — 92507 TX SP LANG VOICE COMM INDIV: CPT | Performed by: SPEECH-LANGUAGE PATHOLOGIST

## 2020-12-11 ENCOUNTER — TELEMEDICINE (OUTPATIENT)
Dept: SPEECH THERAPY | Facility: CLINIC | Age: 80
End: 2020-12-11
Payer: MEDICARE

## 2020-12-11 DIAGNOSIS — G20 PARKINSON'S DISEASE (HCC): Primary | ICD-10-CM

## 2020-12-11 DIAGNOSIS — R49.9 VOICE DISORDER: ICD-10-CM

## 2020-12-11 PROCEDURE — 92507 TX SP LANG VOICE COMM INDIV: CPT | Performed by: SPEECH-LANGUAGE PATHOLOGIST

## 2020-12-15 ENCOUNTER — TELEMEDICINE (OUTPATIENT)
Dept: SPEECH THERAPY | Facility: CLINIC | Age: 80
End: 2020-12-15
Payer: MEDICARE

## 2020-12-15 DIAGNOSIS — G20 PARKINSON'S DISEASE (HCC): Primary | ICD-10-CM

## 2020-12-15 DIAGNOSIS — R49.9 VOICE DISORDER: ICD-10-CM

## 2020-12-15 PROCEDURE — 92507 TX SP LANG VOICE COMM INDIV: CPT | Performed by: SPEECH-LANGUAGE PATHOLOGIST

## 2020-12-18 ENCOUNTER — TELEMEDICINE (OUTPATIENT)
Dept: SPEECH THERAPY | Facility: CLINIC | Age: 80
End: 2020-12-18
Payer: MEDICARE

## 2020-12-18 DIAGNOSIS — G20 PARKINSON'S DISEASE (HCC): Primary | ICD-10-CM

## 2020-12-18 DIAGNOSIS — R49.9 VOICE DISORDER: ICD-10-CM

## 2020-12-18 PROCEDURE — 92507 TX SP LANG VOICE COMM INDIV: CPT | Performed by: SPEECH-LANGUAGE PATHOLOGIST

## 2020-12-27 DIAGNOSIS — E03.9 HYPOTHYROIDISM, UNSPECIFIED TYPE: ICD-10-CM

## 2020-12-28 ENCOUNTER — TRANSCRIBE ORDERS (OUTPATIENT)
Dept: ADMINISTRATIVE | Facility: HOSPITAL | Age: 80
End: 2020-12-28

## 2020-12-28 ENCOUNTER — ANTICOAG VISIT (OUTPATIENT)
Dept: CARDIOLOGY CLINIC | Facility: CLINIC | Age: 80
End: 2020-12-28

## 2020-12-28 ENCOUNTER — LAB (OUTPATIENT)
Dept: LAB | Facility: HOSPITAL | Age: 80
End: 2020-12-28
Attending: INTERNAL MEDICINE
Payer: MEDICARE

## 2020-12-28 DIAGNOSIS — I48.20 CHRONIC ATRIAL FIBRILLATION (HCC): ICD-10-CM

## 2020-12-28 DIAGNOSIS — Z95.2 H/O MITRAL VALVE REPLACEMENT WITH MECHANICAL VALVE: ICD-10-CM

## 2020-12-28 RX ORDER — LEVOTHYROXINE SODIUM 0.05 MG/1
TABLET ORAL
Qty: 30 TABLET | Refills: 3 | Status: SHIPPED | OUTPATIENT
Start: 2020-12-28 | End: 2021-04-26

## 2021-01-12 ENCOUNTER — ANTICOAG VISIT (OUTPATIENT)
Dept: CARDIOLOGY CLINIC | Facility: CLINIC | Age: 81
End: 2021-01-12

## 2021-01-12 ENCOUNTER — LAB (OUTPATIENT)
Dept: LAB | Facility: CLINIC | Age: 81
End: 2021-01-12
Payer: MEDICARE

## 2021-01-12 DIAGNOSIS — I48.20 CHRONIC ATRIAL FIBRILLATION (HCC): ICD-10-CM

## 2021-01-12 DIAGNOSIS — Z95.2 H/O MITRAL VALVE REPLACEMENT WITH MECHANICAL VALVE: ICD-10-CM

## 2021-01-20 DIAGNOSIS — Z23 ENCOUNTER FOR IMMUNIZATION: ICD-10-CM

## 2021-01-26 ENCOUNTER — ANTICOAG VISIT (OUTPATIENT)
Dept: CARDIOLOGY CLINIC | Facility: CLINIC | Age: 81
End: 2021-01-26

## 2021-01-26 ENCOUNTER — TRANSCRIBE ORDERS (OUTPATIENT)
Dept: ADMINISTRATIVE | Facility: HOSPITAL | Age: 81
End: 2021-01-26

## 2021-01-26 ENCOUNTER — LAB (OUTPATIENT)
Dept: LAB | Facility: HOSPITAL | Age: 81
End: 2021-01-26
Attending: INTERNAL MEDICINE
Payer: MEDICARE

## 2021-01-26 DIAGNOSIS — Z95.2 H/O MITRAL VALVE REPLACEMENT WITH MECHANICAL VALVE: ICD-10-CM

## 2021-01-26 DIAGNOSIS — I48.20 CHRONIC ATRIAL FIBRILLATION (HCC): ICD-10-CM

## 2021-01-29 DIAGNOSIS — Z12.31 ENCOUNTER FOR SCREENING MAMMOGRAM FOR BREAST CANCER: Primary | ICD-10-CM

## 2021-01-29 NOTE — PROGRESS NOTES
Called the patient on  Number listed in our system multiple times  Waited for her to join the [employer] teams video visit for the entirety of her scheduled appointment time  Patient will need follow-up scheduled

## 2021-02-01 ENCOUNTER — TELEMEDICINE (OUTPATIENT)
Dept: NEUROLOGY | Facility: CLINIC | Age: 81
End: 2021-02-01

## 2021-02-01 DIAGNOSIS — G20 PARKINSON'S DISEASE (HCC): Primary | ICD-10-CM

## 2021-02-06 ENCOUNTER — IMMUNIZATIONS (OUTPATIENT)
Dept: FAMILY MEDICINE CLINIC | Facility: HOSPITAL | Age: 81
End: 2021-02-06

## 2021-02-06 DIAGNOSIS — Z23 ENCOUNTER FOR IMMUNIZATION: Primary | ICD-10-CM

## 2021-02-06 PROCEDURE — 0001A SARS-COV-2 / COVID-19 MRNA VACCINE (PFIZER-BIONTECH) 30 MCG: CPT

## 2021-02-06 PROCEDURE — 91300 SARS-COV-2 / COVID-19 MRNA VACCINE (PFIZER-BIONTECH) 30 MCG: CPT

## 2021-02-09 ENCOUNTER — ANTICOAG VISIT (OUTPATIENT)
Dept: CARDIOLOGY CLINIC | Facility: CLINIC | Age: 81
End: 2021-02-09

## 2021-02-09 ENCOUNTER — LAB (OUTPATIENT)
Dept: LAB | Facility: HOSPITAL | Age: 81
End: 2021-02-09
Attending: INTERNAL MEDICINE
Payer: MEDICARE

## 2021-02-09 DIAGNOSIS — I50.42 CHRONIC COMBINED SYSTOLIC AND DIASTOLIC HEART FAILURE (HCC): ICD-10-CM

## 2021-02-09 DIAGNOSIS — Z79.899 HIGH RISK MEDICATION USE: ICD-10-CM

## 2021-02-09 DIAGNOSIS — I48.20 CHRONIC ATRIAL FIBRILLATION (HCC): ICD-10-CM

## 2021-02-09 DIAGNOSIS — Z95.2 H/O MITRAL VALVE REPLACEMENT WITH MECHANICAL VALVE: ICD-10-CM

## 2021-02-09 LAB
ALBUMIN SERPL BCP-MCNC: 3.5 G/DL (ref 3.5–5)
ALP SERPL-CCNC: 80 U/L (ref 46–116)
ALT SERPL W P-5'-P-CCNC: 13 U/L (ref 12–78)
ANION GAP SERPL CALCULATED.3IONS-SCNC: 4 MMOL/L (ref 4–13)
AST SERPL W P-5'-P-CCNC: 13 U/L (ref 5–45)
BILIRUB SERPL-MCNC: 1.4 MG/DL (ref 0.2–1)
BUN SERPL-MCNC: 19 MG/DL (ref 5–25)
CALCIUM SERPL-MCNC: 9 MG/DL (ref 8.3–10.1)
CHLORIDE SERPL-SCNC: 105 MMOL/L (ref 100–108)
CHOLEST SERPL-MCNC: 159 MG/DL (ref 50–200)
CO2 SERPL-SCNC: 31 MMOL/L (ref 21–32)
CREAT SERPL-MCNC: 0.97 MG/DL (ref 0.6–1.3)
GFR SERPL CREATININE-BSD FRML MDRD: 55 ML/MIN/1.73SQ M
GLUCOSE P FAST SERPL-MCNC: 104 MG/DL (ref 65–99)
HDLC SERPL-MCNC: 60 MG/DL
LDLC SERPL CALC-MCNC: 82 MG/DL (ref 0–100)
NONHDLC SERPL-MCNC: 99 MG/DL
POTASSIUM SERPL-SCNC: 4.1 MMOL/L (ref 3.5–5.3)
PROT SERPL-MCNC: 6.6 G/DL (ref 6.4–8.2)
SODIUM SERPL-SCNC: 140 MMOL/L (ref 136–145)
TRIGL SERPL-MCNC: 86 MG/DL

## 2021-02-09 PROCEDURE — 36415 COLL VENOUS BLD VENIPUNCTURE: CPT

## 2021-02-09 PROCEDURE — 80053 COMPREHEN METABOLIC PANEL: CPT

## 2021-02-09 PROCEDURE — 80061 LIPID PANEL: CPT

## 2021-02-24 ENCOUNTER — TELEPHONE (OUTPATIENT)
Dept: GASTROENTEROLOGY | Facility: AMBULATORY SURGERY CENTER | Age: 81
End: 2021-02-24

## 2021-02-24 ENCOUNTER — TELEPHONE (OUTPATIENT)
Dept: CARDIOLOGY CLINIC | Facility: CLINIC | Age: 81
End: 2021-02-24

## 2021-02-24 NOTE — TELEPHONE ENCOUNTER
Pt called stating her BP is running low this morning:    Two separate machines:    Sittin/50              93/50    Standin/44                  98/47                  77/40    She is feeling a bit lightheaded and just does not feel right  No chest pain, but SOB on exertion  Please advise  Can you please call her at [phone]? Thank you

## 2021-02-24 NOTE — TELEPHONE ENCOUNTER
Pt called and cancelld for tomorrow  Thanks cg  I did not cx her    She will call back to reschedule

## 2021-02-24 NOTE — TELEPHONE ENCOUNTER
Dr Julio Cesar Thomas spoke by cell phone directly to patient at 5:30 p m  on 02/24/2021  She is feeling better at present  I recommended she take her blood pressures morning on evening for two or three more days and let us know how the readings are, so we can determine whether to reduce her dose of Entresto

## 2021-02-25 ENCOUNTER — OFFICE VISIT (OUTPATIENT)
Dept: NEUROLOGY | Facility: CLINIC | Age: 81
End: 2021-02-25
Payer: MEDICARE

## 2021-02-25 VITALS
HEIGHT: 64 IN | HEART RATE: 82 BPM | WEIGHT: 186 LBS | SYSTOLIC BLOOD PRESSURE: 100 MMHG | DIASTOLIC BLOOD PRESSURE: 70 MMHG | BODY MASS INDEX: 31.76 KG/M2

## 2021-02-25 DIAGNOSIS — F02.80 ALZHEIMER'S DEMENTIA WITHOUT BEHAVIORAL DISTURBANCE, UNSPECIFIED TIMING OF DEMENTIA ONSET (HCC): ICD-10-CM

## 2021-02-25 DIAGNOSIS — G20 PARKINSON'S DISEASE (HCC): ICD-10-CM

## 2021-02-25 DIAGNOSIS — G30.9 ALZHEIMER'S DEMENTIA WITHOUT BEHAVIORAL DISTURBANCE, UNSPECIFIED TIMING OF DEMENTIA ONSET (HCC): ICD-10-CM

## 2021-02-25 DIAGNOSIS — I95.1 ORTHOSTATIC HYPOTENSION: Primary | ICD-10-CM

## 2021-02-25 PROCEDURE — 99214 OFFICE O/P EST MOD 30 MIN: CPT | Performed by: PSYCHIATRY & NEUROLOGY

## 2021-02-25 RX ORDER — DONEPEZIL HYDROCHLORIDE 10 MG/1
10 TABLET, FILM COATED ORAL
Qty: 90 TABLET | Refills: 3 | Status: SHIPPED | OUTPATIENT
Start: 2021-02-25 | End: 2021-10-05 | Stop reason: SDUPTHER

## 2021-02-25 NOTE — PATIENT INSTRUCTIONS
Parkinson's Disease Resources     Helpful web sites   -  www [employer]  org   -  www parkinson  org ([employer])   -  www [employer] ([address] for MDconnectME) - Order the "Every Victory Counts" augustina under the "resources" tab  Or visit [facility] org/EVC or call [phone]  - [facility] [place]  org/resources/parkinsons-exercise-essentials   - Contact the [employer] for an "Aware in care kit" @ 6823.957.2212 (this is a kit to take with you if you ever have to go to the hospital)   - www pmdalliance  org  -  parkinsons  carolyn edu/exercise_videos  html  - [facility] Exercise helpline: [phone]) [phone]  - SP3H    - Check out "The [address]" for help paying for your medications: www tafcares  org

## 2021-02-25 NOTE — ASSESSMENT & PLAN NOTE
79-year-old woman presents in follow-up for Parkinson's disease  Overall the patient feels that her motor symptoms are largely unchanged with the exception of a small increase in tremor and disbalance  She is more concerned about some progression in her cognitive symptoms  She was started on Aricept and memantine at the current doses by her prior neurologist   She does reports some improvement in her mentation when Aricept was originally started  We will increase the dose from 5 mg to 10 mg  We discussed potential adverse effects  Discussed safety issues regarding RBD ss she has begun talking in her sleep  We discussed different options regarding medication management of her Parkinson's motor symptoms  We agreed not to make any changes during today's visit  In the future she could try an increased dose of stalevo verses adding on an additional medications such as an MAO B inhibitor

## 2021-02-28 ENCOUNTER — IMMUNIZATIONS (OUTPATIENT)
Dept: FAMILY MEDICINE CLINIC | Facility: HOSPITAL | Age: 81
End: 2021-02-28

## 2021-02-28 DIAGNOSIS — Z23 ENCOUNTER FOR IMMUNIZATION: Primary | ICD-10-CM

## 2021-02-28 PROCEDURE — 0002A SARS-COV-2 / COVID-19 MRNA VACCINE (PFIZER-BIONTECH) 30 MCG: CPT

## 2021-02-28 PROCEDURE — 91300 SARS-COV-2 / COVID-19 MRNA VACCINE (PFIZER-BIONTECH) 30 MCG: CPT

## 2021-03-04 ENCOUNTER — LAB (OUTPATIENT)
Dept: LAB | Facility: CLINIC | Age: 81
End: 2021-03-04
Payer: MEDICARE

## 2021-03-04 ENCOUNTER — PREP FOR PROCEDURE (OUTPATIENT)
Dept: PAIN MEDICINE | Facility: CLINIC | Age: 81
End: 2021-03-04

## 2021-03-04 ENCOUNTER — TELEPHONE (OUTPATIENT)
Dept: PAIN MEDICINE | Facility: CLINIC | Age: 81
End: 2021-03-04

## 2021-03-04 ENCOUNTER — CONSULT (OUTPATIENT)
Dept: PAIN MEDICINE | Facility: CLINIC | Age: 81
End: 2021-03-04
Payer: MEDICARE

## 2021-03-04 ENCOUNTER — ANTICOAG VISIT (OUTPATIENT)
Dept: CARDIOLOGY CLINIC | Facility: CLINIC | Age: 81
End: 2021-03-04

## 2021-03-04 VITALS
HEIGHT: 65 IN | HEART RATE: 69 BPM | WEIGHT: 186.6 LBS | DIASTOLIC BLOOD PRESSURE: 71 MMHG | SYSTOLIC BLOOD PRESSURE: 110 MMHG | BODY MASS INDEX: 31.09 KG/M2

## 2021-03-04 DIAGNOSIS — G89.4 CHRONIC PAIN SYNDROME: Primary | ICD-10-CM

## 2021-03-04 DIAGNOSIS — G89.4 CHRONIC PAIN SYNDROME: ICD-10-CM

## 2021-03-04 DIAGNOSIS — Z11.59 SPECIAL SCREENING EXAMINATION FOR UNSPECIFIED VIRAL DISEASE: ICD-10-CM

## 2021-03-04 DIAGNOSIS — M54.42 CHRONIC LEFT-SIDED LOW BACK PAIN WITH LEFT-SIDED SCIATICA: ICD-10-CM

## 2021-03-04 DIAGNOSIS — G89.29 CHRONIC LEFT-SIDED LOW BACK PAIN WITH LEFT-SIDED SCIATICA: ICD-10-CM

## 2021-03-04 DIAGNOSIS — I48.20 CHRONIC ATRIAL FIBRILLATION (HCC): ICD-10-CM

## 2021-03-04 DIAGNOSIS — M46.1 SACROILIITIS (HCC): ICD-10-CM

## 2021-03-04 DIAGNOSIS — M54.40 ACUTE LEFT-SIDED LOW BACK PAIN WITH SCIATICA, SCIATICA LATERALITY UNSPECIFIED: Primary | ICD-10-CM

## 2021-03-04 DIAGNOSIS — Z95.2 H/O MITRAL VALVE REPLACEMENT WITH MECHANICAL VALVE: ICD-10-CM

## 2021-03-04 PROCEDURE — 99204 OFFICE O/P NEW MOD 45 MIN: CPT | Performed by: ANESTHESIOLOGY

## 2021-03-04 NOTE — H&P (VIEW-ONLY)
Assessment:  1  Chronic pain syndrome    2  Chronic left-sided low back pain with left-sided sciatica    3  Sacroiliitis ([address] )        Plan:  Orders Placed This Encounter   Procedures    Ambulatory referral to Physical Therapy     Standing Status:   Future     Standing Expiration Date:   3/4/2022     Referral Priority:   Routine     Referral Type:   Physical Therapy     Referral Reason:   Specialty Services Required     Requested Specialty:   Physical Therapy     Number of Visits Requested:   1     Expiration Date:   3/4/2022     My impressions and treatment recommendations were discussed in detail with the patient, who verbalized understanding and had no further questions  Given that the patient reports low back pain and left sided sacroiliitis, I felt a reasonable to offer the patient a left sacroiliac joint injection since this could be potentially therapeutic  The procedures, its risks, and benefits were explained in detail to the patient  Risks include but are not limited to bleeding, infection, hematoma formation, abscess formation, weakness, headache, failure the pain to improve, nerve irritation or damage, and potential worsening of the pain  The patient verbalized understanding and wished to proceed with the procedure  I did discuss with the patient that she does have signs and symptoms consistent with lumbar facet syndrome as well  She may be a candidate for medial branch blocks in the future  I will discuss this further with her after she undergoes the left sacroiliac joint injection  I also felt a reasonable to have the patient undergo a course of physical therapy 2-3 times per week for 4-6 weeks  Follow-up is planned in 4 weeks time or sooner as warranted  Discharge instructions were provided  I personally saw and examined the patient and I agree with the above discussed plan of care      History of Present Illness:    Monika Claire is a 79 y o  female who presents to [place] [facility] [address] [facility] for initial evaluation of the above stated pain complaints  The patient has a past medical and chronic pain history as outlined in the assessment section  She was referred by Dr Ibis Chakraborty  The patient reports pain primarily on the left side of her low back  She describes her pain as moderate and only when standing and walking  She is reporting of the interference with her daily activities  Her pain is intermittent in nature and can vary throughout the day  She describes her pain as pressure-like and dull/aching  She reports dropping objects  She ambulates with the assistance of a cane  Lying down and sitting decreases pain  Walking increases pain  Patient does have a history of a mitral valve replacement several years ago  She is currently anticoagulated on warfarin  She has not undergone physical therapy, but interested in trying physical therapy currently  Review of Systems:    Review of Systems   Constitutional: Negative for fever and unexpected weight change  HENT: Negative for trouble swallowing  Eyes: Negative for visual disturbance  Respiratory: Negative for shortness of breath and wheezing  Cardiovascular: Negative for chest pain and palpitations  Gastrointestinal: Negative for constipation, diarrhea, nausea and vomiting  Endocrine: Negative for cold intolerance, heat intolerance and polydipsia  Genitourinary: Negative for difficulty urinating and frequency  Musculoskeletal: Positive for arthralgias and back pain  Negative for gait problem, joint swelling and myalgias  Skin: Negative for rash  Neurological: Negative for dizziness, seizures, syncope, weakness and headaches  Hematological: Does not bruise/bleed easily  Psychiatric/Behavioral: Negative for dysphoric mood  All other systems reviewed and are negative          Patient Active Problem List   Diagnosis    H/O mitral valve replacement with mechanical valve    Atrial fibrillation ([place]) [I48 91]    Chronic hepatitis C ([place])    Chronic right-sided low back pain without sciatica    Drug induced insomnia ([place])    Dupuytren's contracture    Generalized osteoarthritis    Heart failure, left systolic, chronic (HCC)    Hypothyroidism    Memory impairment    Mitral valve disorder    Parkinson's disease ([place])    Peripheral vascular disease (HCC)    Seasonal allergies    Transient ischemic attack    Vitamin D insufficiency    Suprapatellar bursitis of right knee    Numbness and tingling in right hand    Other microscopic hematuria       Past Medical History:   Diagnosis Date    Anal fissure     Arthritis     osteo joints    Asthma with acute exacerbation     Blepharitis     Breast lump     Chalazion     CHF, chronic (HCC)     Difficulty walking     Disease of thyroid gland     Drooling     Dysphagia     Fall 05/04/2018    Fibromyalgia, primary     Glaucoma     Normal pressure    History of transfusion 1996    Hordeolum externum     Hx of transient ischemic attack (TIA)     Hypophonia     Infectious viral hepatitis     Hep C dx 1996     Lyme carditis     Murmur, cardiac     Parkinsons (HCC)     Rotator cuff tendinitis     Seasonal allergies     Urinary frequency        Past Surgical History:   Procedure Laterality Date    BREAST BIOPSY Right 2018    benign    BREAST CYST EXCISION Left     benign    BREAST SURGERY N/A     benign, calcium    CARDIAC SURGERY  1990    mitral valve replacement    CATARACT EXTRACTION Right 2015    CATARACT EXTRACTION Left 2014    CHEST TUBE INSERTION Right     post pleural effusion    CHOLECYSTECTOMY  2000    lap    HYSTERECTOMY  1973    partial    [place] COLSC FLX W/RMVL OF TUMOR POLYP LESION SNARE TQ N/A 7/18/2017    Procedure: COLONOSCOPY and biopsy ;  Surgeon: Melo Guerrero MD;  Location: [place] [address] [facility];   Service: Gastroenterology    SKIN BIOPSY      pre cancerous on face    TONSILLECTOMY      US GUIDED BREAST BIOPSY RIGHT COMPLETE Right 2018       Family History   Problem Relation Age of Onset    Heart disease Mother         murmur Cardiomegaly age 64    Pneumonia Father     Heart disease Brother         mitrsl valve    Parkinsonism Brother     Arthritis Brother     Lupus Daughter     Diabetes Daughter     Depression Daughter        Social History     Occupational History    Not on file   Tobacco Use    Smoking status: Former Smoker     Packs/day: 0 10     Years: 10 00     Pack years: 1 00     Quit date: 1970     Years since quittin 2    Smokeless tobacco: Never Used   Substance and Sexual Activity    Alcohol use: No    Drug use: No    Sexual activity: Not Currently         Current Outpatient Medications:     acetaminophen (TYLENOL) 650 mg CR tablet, Take 1 tablet (650 mg total) by mouth 3 (three) times a day (Patient taking differently: Take 650 mg by mouth every 8 (eight) hours as needed ), Disp: 100 tablet, Rfl: 0    alendronate (Fosamax) 70 mg tablet, Take 1 tablet (70 mg total) by mouth every 7 days, Disp: 12 tablet, Rfl: 3    carbidopa-levodopa-entacapone (STALEVO) -200 MG per tablet, Take 1 tablet by mouth 4 (four) times a day, Disp: 360 tablet, Rfl: 3    Cholecalciferol (VITAMIN D3) 2000 units TABS, Take 2,000 Units by mouth every morning, Disp: , Rfl:     donepezil (ARICEPT) 10 mg tablet, Take 1 tablet (10 mg total) by mouth daily at bedtime, Disp: 90 tablet, Rfl: 3    furosemide (LASIX) 40 mg tablet, Take 1 tablet (40 mg total) by mouth daily Take one tablet by mouth daily until weight loss of 5-10 lbs as occurred, then can resume use 4-5 days per week  Schedule administration to be done when patient can be near toilet facilities for 4-6 hours  , Disp: 90 tablet, Rfl: 1    levothyroxine 50 mcg tablet, take 1 tablet by mouth every morning, Disp: 30 tablet, Rfl: 3    memantine (NAMENDA) 10 mg tablet, Take 1 tablet (10 mg total) by mouth daily, Disp: 90 tablet, Rfl: 3   Multiple Vitamins-Minerals (OCUVITE ADULT 50+ PO), Take by mouth daily with breakfast, Disp: , Rfl:     sacubitril-valsartan (Entresto)  MG TABS, Take 1 tablet by mouth 2 (two) times a day, Disp: 180 tablet, Rfl: 3    warfarin (COUMADIN) 2 5 mg tablet, TAKE 1/2 TO 1 TABLET  DAILY BY MOUTH OR AS ORDERED BY PHYSICIAN , Disp: 90 tablet, Rfl: 3    Allergies   Allergen Reactions    Amantadine Tongue Swelling    Artane [Trihexyphenidyl] Other (See Comments)     Felt "disconnected", "out if it", shaky  Did not feel right     Glycopyrrolate      Nose bleeds    Pollen Extract        Physical Exam:    /71   Pulse 69   Ht 5' 4 5" (1 638 m)   Wt 84 6 kg (186 lb 9 6 oz)   BMI 31 54 kg/m²     Constitutional: obese  Eyes: anicteric  HEENT: grossly intact  Neck: supple, symmetric, trachea midline and no masses   Pulmonary:even and unlabored  Cardiovascular:No edema or pitting edema present  Skin:Normal without rashes or lesions and well hydrated  Psychiatric:Mood and affect appropriate  Neurologic:Cranial Nerves II-XII grossly intact  Musculoskeletal:antalgic     Lumbar Spine Exam    Appearance:  Normal lordosis  Palpation/Tenderness:  left sacroiliac joint tenderness   LETICIA testing is positive on the left  Sensory:  no sensory deficits noted  Range of Motion:  Flexion:   Moderately limited  with pain  Extension:  Moderately limited  with pain  Lateral Flexion - Left:  Moderately limited  with pain  Lateral Flexion - Right:  Moderately limited  with pain  Rotation - Left:  Moderately limited  with pain  Rotation - Right:  Moderately limited  with pain   Lumbar facet loading is positive bilaterally  Motor Strength:  Left hip flexion:  5/5  Left hip extension:  5/5  Right hip flexion:  5/5  Right hip extension:  5/5  Left knee flexion:  5/5  Left knee extension:  5/5  Right knee flexion:  5/5  Right knee extension:  5/5  Left foot dorsiflexion:  5/5  Left foot plantar flexion:  5/5  Right foot dorsiflexion: 5/5  Right foot plantar flexion:  5/5  Reflexes:  Left Patellar:  2+   Right Patellar:  2+   Left Achilles:  2+   Right Achilles:  2+   Special Tests:  Left Straight Leg Test:  negative  Right Straight Leg Test:  negative  Left Andres's Maneuver:  negative  Right Andres's Maneuver:  negative    Imaging    XR hip/pelv 8/21/2020    Study Result    LEFT HIP     INDICATION:   M25 552: Pain in left hip      COMPARISON:  01/08/2018     VIEWS:  XR HIP/PELV 2-3 VWS LEFT  W PELVIS IF PERFORMED   Images: 4     FINDINGS:     There is no acute fracture or dislocation      Mild arthritic changes in both hips      No lytic or blastic osseous lesion      Soft tissues are unremarkable  Degenerative changes in the lower lumbar spine with a transitional vertebra        IMPRESSION:     No acute osseous abnormality  Degenerative changes

## 2021-03-04 NOTE — PROGRESS NOTES
Assessment:  1  Chronic pain syndrome    2  Chronic left-sided low back pain with left-sided sciatica    3  Sacroiliitis ([address] )        Plan:  Orders Placed This Encounter   Procedures    Ambulatory referral to Physical Therapy     Standing Status:   Future     Standing Expiration Date:   3/4/2022     Referral Priority:   Routine     Referral Type:   Physical Therapy     Referral Reason:   Specialty Services Required     Requested Specialty:   Physical Therapy     Number of Visits Requested:   1     Expiration Date:   3/4/2022     My impressions and treatment recommendations were discussed in detail with the patient, who verbalized understanding and had no further questions  Given that the patient reports low back pain and left sided sacroiliitis, I felt a reasonable to offer the patient a left sacroiliac joint injection since this could be potentially therapeutic  The procedures, its risks, and benefits were explained in detail to the patient  Risks include but are not limited to bleeding, infection, hematoma formation, abscess formation, weakness, headache, failure the pain to improve, nerve irritation or damage, and potential worsening of the pain  The patient verbalized understanding and wished to proceed with the procedure  I did discuss with the patient that she does have signs and symptoms consistent with lumbar facet syndrome as well  She may be a candidate for medial branch blocks in the future  I will discuss this further with her after she undergoes the left sacroiliac joint injection  I also felt a reasonable to have the patient undergo a course of physical therapy 2-3 times per week for 4-6 weeks  Follow-up is planned in 4 weeks time or sooner as warranted  Discharge instructions were provided  I personally saw and examined the patient and I agree with the above discussed plan of care      History of Present Illness:    Stella Tsang is a 79 y o  female who presents to [facility] [address] [facility] for initial evaluation of the above stated pain complaints  The patient has a past medical and chronic pain history as outlined in the assessment section  She was referred by Dr Silvestre Lopez  The patient reports pain primarily on the left side of her low back  She describes her pain as moderate and only when standing and walking  She is reporting of the interference with her daily activities  Her pain is intermittent in nature and can vary throughout the day  She describes her pain as pressure-like and dull/aching  She reports dropping objects  She ambulates with the assistance of a cane  Lying down and sitting decreases pain  Walking increases pain  Patient does have a history of a mitral valve replacement several years ago  She is currently anticoagulated on warfarin  She has not undergone physical therapy, but interested in trying physical therapy currently  Review of Systems:    Review of Systems   Constitutional: Negative for fever and unexpected weight change  HENT: Negative for trouble swallowing  Eyes: Negative for visual disturbance  Respiratory: Negative for shortness of breath and wheezing  Cardiovascular: Negative for chest pain and palpitations  Gastrointestinal: Negative for constipation, diarrhea, nausea and vomiting  Endocrine: Negative for cold intolerance, heat intolerance and polydipsia  Genitourinary: Negative for difficulty urinating and frequency  Musculoskeletal: Positive for arthralgias and back pain  Negative for gait problem, joint swelling and myalgias  Skin: Negative for rash  Neurological: Negative for dizziness, seizures, syncope, weakness and headaches  Hematological: Does not bruise/bleed easily  Psychiatric/Behavioral: Negative for dysphoric mood  All other systems reviewed and are negative          Patient Active Problem List   Diagnosis    H/O mitral valve replacement with mechanical valve    Atrial fibrillation ([facility] ) [I48 91]    Chronic hepatitis C ([facility] )    Chronic right-sided low back pain without sciatica    Drug induced insomnia ([facility] )    Dupuytren's contracture    Generalized osteoarthritis    Heart failure, left systolic, chronic (HCC)    Hypothyroidism    Memory impairment    Mitral valve disorder    Parkinson's disease ([facility] )    Peripheral vascular disease (HCC)    Seasonal allergies    Transient ischemic attack    Vitamin D insufficiency    Suprapatellar bursitis of right knee    Numbness and tingling in right hand    Other microscopic hematuria       Past Medical History:   Diagnosis Date    Anal fissure     Arthritis     osteo joints    Asthma with acute exacerbation     Blepharitis     Breast lump     Chalazion     CHF, chronic (HCC)     Difficulty walking     Disease of thyroid gland     Drooling     Dysphagia     Fall 05/04/2018    Fibromyalgia, primary     Glaucoma     Normal pressure    History of transfusion 1996    Hordeolum externum     Hx of transient ischemic attack (TIA)     Hypophonia     Infectious viral hepatitis     Hep C dx 1996     Lyme carditis     Murmur, cardiac     Parkinsons (HCC)     Rotator cuff tendinitis     Seasonal allergies     Urinary frequency        Past Surgical History:   Procedure Laterality Date    BREAST BIOPSY Right 2018    benign    BREAST CYST EXCISION Left     benign    BREAST SURGERY N/A     benign, calcium    CARDIAC SURGERY  1990    mitral valve replacement    CATARACT EXTRACTION Right 2015    CATARACT EXTRACTION Left 2014    CHEST TUBE INSERTION Right     post pleural effusion    CHOLECYSTECTOMY  2000    lap    HYSTERECTOMY  1973    partial    TN COLSC FLX W/RMVL OF TUMOR POLYP LESION SNARE TQ N/A 7/18/2017    Procedure: COLONOSCOPY and biopsy ;  Surgeon: Michael Callahan MD;  Location: [facility];   Service: Gastroenterology    SKIN BIOPSY      pre cancerous on face    TONSILLECTOMY      US GUIDED BREAST BIOPSY RIGHT COMPLETE Right 2018       Family History   Problem Relation Age of Onset    Heart disease Mother         murmur Cardiomegaly age 64    Pneumonia Father     Heart disease Brother         mitrsl valve    Parkinsonism Brother     Arthritis Brother     Lupus Daughter     Diabetes Daughter     Depression Daughter        Social History     Occupational History    Not on file   Tobacco Use    Smoking status: Former Smoker     Packs/day: 0 10     Years: 10 00     Pack years: 1 00     Quit date: 1970     Years since quittin 2    Smokeless tobacco: Never Used   Substance and Sexual Activity    Alcohol use: No    Drug use: No    Sexual activity: Not Currently         Current Outpatient Medications:     acetaminophen (TYLENOL) 650 mg CR tablet, Take 1 tablet (650 mg total) by mouth 3 (three) times a day (Patient taking differently: Take 650 mg by mouth every 8 (eight) hours as needed ), Disp: 100 tablet, Rfl: 0    alendronate (Fosamax) 70 mg tablet, Take 1 tablet (70 mg total) by mouth every 7 days, Disp: 12 tablet, Rfl: 3    carbidopa-levodopa-entacapone (STALEVO) -200 MG per tablet, Take 1 tablet by mouth 4 (four) times a day, Disp: 360 tablet, Rfl: 3    Cholecalciferol (VITAMIN D3) 2000 units TABS, Take 2,000 Units by mouth every morning, Disp: , Rfl:     donepezil (ARICEPT) 10 mg tablet, Take 1 tablet (10 mg total) by mouth daily at bedtime, Disp: 90 tablet, Rfl: 3    furosemide (LASIX) 40 mg tablet, Take 1 tablet (40 mg total) by mouth daily Take one tablet by mouth daily until weight loss of 5-10 lbs as occurred, then can resume use 4-5 days per week  Schedule administration to be done when patient can be near toilet facilities for 4-6 hours  , Disp: 90 tablet, Rfl: 1    levothyroxine 50 mcg tablet, take 1 tablet by mouth every morning, Disp: 30 tablet, Rfl: 3    memantine (NAMENDA) 10 mg tablet, Take 1 tablet (10 mg total) by mouth daily, Disp: 90 tablet, Rfl: 3   Multiple Vitamins-Minerals (OCUVITE ADULT 50+ PO), Take by mouth daily with breakfast, Disp: , Rfl:     sacubitril-valsartan (Entresto)  MG TABS, Take 1 tablet by mouth 2 (two) times a day, Disp: 180 tablet, Rfl: 3    warfarin (COUMADIN) 2 5 mg tablet, TAKE 1/2 TO 1 TABLET  DAILY BY MOUTH OR AS ORDERED BY PHYSICIAN , Disp: 90 tablet, Rfl: 3    Allergies   Allergen Reactions    Amantadine Tongue Swelling    Artane [Trihexyphenidyl] Other (See Comments)     Felt "disconnected", "out if it", shaky  Did not feel right     Glycopyrrolate      Nose bleeds    Pollen Extract        Physical Exam:    /71   Pulse 69   Ht 5' 4 5" (1 638 m)   Wt 84 6 kg (186 lb 9 6 oz)   BMI 31 54 kg/m²     Constitutional: obese  Eyes: anicteric  HEENT: grossly intact  Neck: supple, symmetric, trachea midline and no masses   Pulmonary:even and unlabored  Cardiovascular:No edema or pitting edema present  Skin:Normal without rashes or lesions and well hydrated  Psychiatric:Mood and affect appropriate  Neurologic:Cranial Nerves II-XII grossly intact  Musculoskeletal:antalgic     Lumbar Spine Exam    Appearance:  Normal lordosis  Palpation/Tenderness:  left sacroiliac joint tenderness   LETICIA testing is positive on the left  Sensory:  no sensory deficits noted  Range of Motion:  Flexion:   Moderately limited  with pain  Extension:  Moderately limited  with pain  Lateral Flexion - Left:  Moderately limited  with pain  Lateral Flexion - Right:  Moderately limited  with pain  Rotation - Left:  Moderately limited  with pain  Rotation - Right:  Moderately limited  with pain   Lumbar facet loading is positive bilaterally  Motor Strength:  Left hip flexion:  5/5  Left hip extension:  5/5  Right hip flexion:  5/5  Right hip extension:  5/5  Left knee flexion:  5/5  Left knee extension:  5/5  Right knee flexion:  5/5  Right knee extension:  5/5  Left foot dorsiflexion:  5/5  Left foot plantar flexion:  5/5  Right foot dorsiflexion: 5/5  Right foot plantar flexion:  5/5  Reflexes:  Left Patellar:  2+   Right Patellar:  2+   Left Achilles:  2+   Right Achilles:  2+   Special Tests:  Left Straight Leg Test:  negative  Right Straight Leg Test:  negative  Left Andres's Maneuver:  negative  Right Andres's Maneuver:  negative    Imaging    XR hip/pelv 8/21/2020    Study Result    LEFT HIP     INDICATION:   M25 552: Pain in left hip      COMPARISON:  01/08/2018     VIEWS:  XR HIP/PELV 2-3 VWS LEFT  W PELVIS IF PERFORMED   Images: 4     FINDINGS:     There is no acute fracture or dislocation      Mild arthritic changes in both hips      No lytic or blastic osseous lesion      Soft tissues are unremarkable  Degenerative changes in the lower lumbar spine with a transitional vertebra        IMPRESSION:     No acute osseous abnormality  Degenerative changes

## 2021-03-04 NOTE — TELEPHONE ENCOUNTER
Scheduled patient for 03/19/21  Patient denies RX blood thinners/ NSAIDS  Nothing to eat or drink 1 hour prior to procedure  Needs to arrange transportation  Proper clothing for procedure  No vaccines 2 weeks prior or after procedure  If ill or place on antibiotics, please call to reschedule    COVID test be to done on 03/13/21at [facility]

## 2021-03-13 DIAGNOSIS — Z11.59 SPECIAL SCREENING EXAMINATION FOR UNSPECIFIED VIRAL DISEASE: ICD-10-CM

## 2021-03-13 PROCEDURE — U0005 INFEC AGEN DETEC AMPLI PROBE: HCPCS

## 2021-03-13 PROCEDURE — U0003 INFECTIOUS AGENT DETECTION BY NUCLEIC ACID (DNA OR RNA); SEVERE ACUTE RESPIRATORY SYNDROME CORONAVIRUS 2 (SARS-COV-2) (CORONAVIRUS DISEASE [COVID-19]), AMPLIFIED PROBE TECHNIQUE, MAKING USE OF HIGH THROUGHPUT TECHNOLOGIES AS DESCRIBED BY CMS-2020-01-R: HCPCS

## 2021-03-14 DIAGNOSIS — G20 PARKINSON DISEASE (HCC): ICD-10-CM

## 2021-03-15 LAB — SARS-COV-2 RNA RESP QL NAA+PROBE: NEGATIVE

## 2021-03-15 RX ORDER — CARBIDOPA, LEVODOPA AND ENTACAPONE 25; 200; 100 MG/1; MG/1; MG/1
TABLET, FILM COATED ORAL
Qty: 360 TABLET | Refills: 3 | Status: SHIPPED | OUTPATIENT
Start: 2021-03-15 | End: 2021-05-04 | Stop reason: CLARIF

## 2021-03-16 NOTE — TELEPHONE ENCOUNTER
Pt called requesting stalevo refill  Advised pt that this med was sent to [facility] pharmacy yesterday   Pt verbalized understanding

## 2021-03-19 ENCOUNTER — APPOINTMENT (OUTPATIENT)
Dept: RADIOLOGY | Facility: HOSPITAL | Age: 81
End: 2021-03-19
Payer: MEDICARE

## 2021-03-19 ENCOUNTER — HOSPITAL ENCOUNTER (OUTPATIENT)
Facility: AMBULARY SURGERY CENTER | Age: 81
Setting detail: OUTPATIENT SURGERY
Discharge: HOME | End: 2021-03-19
Attending: ANESTHESIOLOGY | Admitting: ANESTHESIOLOGY
Payer: MEDICARE

## 2021-03-19 VITALS
BODY MASS INDEX: 30.49 KG/M2 | RESPIRATION RATE: 16 BRPM | OXYGEN SATURATION: 100 % | HEART RATE: 66 BPM | DIASTOLIC BLOOD PRESSURE: 66 MMHG | WEIGHT: 183 LBS | HEIGHT: 65 IN | SYSTOLIC BLOOD PRESSURE: 122 MMHG | TEMPERATURE: 96.5 F

## 2021-03-19 DIAGNOSIS — G89.4 CHRONIC PAIN SYNDROME: ICD-10-CM

## 2021-03-19 PROCEDURE — 72200 X-RAY EXAM SI JOINTS: CPT

## 2021-03-19 PROCEDURE — 27096 INJECT SACROILIAC JOINT: CPT | Performed by: ANESTHESIOLOGY

## 2021-03-19 RX ORDER — BUPIVACAINE HYDROCHLORIDE 2.5 MG/ML
INJECTION, SOLUTION EPIDURAL; INFILTRATION; INTRACAUDAL AS NEEDED
Status: DISCONTINUED | OUTPATIENT
Start: 2021-03-19 | End: 2021-03-19 | Stop reason: HOSPADM

## 2021-03-19 RX ORDER — METHYLPREDNISOLONE ACETATE 80 MG/ML
INJECTION, SUSPENSION INTRA-ARTICULAR; INTRALESIONAL; INTRAMUSCULAR; SOFT TISSUE AS NEEDED
Status: DISCONTINUED | OUTPATIENT
Start: 2021-03-19 | End: 2021-03-19 | Stop reason: HOSPADM

## 2021-03-19 RX ORDER — LIDOCAINE WITH 8.4% SOD BICARB 0.9%(10ML)
SYRINGE (ML) INJECTION AS NEEDED
Status: DISCONTINUED | OUTPATIENT
Start: 2021-03-19 | End: 2021-03-19 | Stop reason: HOSPADM

## 2021-03-19 NOTE — DISCHARGE INSTRUCTIONS

## 2021-03-19 NOTE — OP NOTE
ATTENDING PHYSICIAN:  Lesia Rojas MD     PROCEDURE: Left sacroiliac joint injection with steroid and local anesthetic under fluoroscopic guidance  PREPROCEDURE DIAGNOSIS:  Sacroiliitis  POSTPROCEDURE DIAGNOSIS: Sacroiliitis  ANESTHESIA:  Local     ESTIMATED BLOOD LOSS:  Minimal     COMPLICATIONS:  None  LOCATION:  [address] [place] [address]  CONSENT:  Today's procedure, its potential benefits as well as its risks and potential side effects were reviewed  Discussed risks of the procedure including bleeding, infection, nerve irritation or damage, reactions to the medications, headache, failure of the pain to improve, and potential worsening of the pain were explained to the patient who verbalized understanding and who wished to proceed  Written informed consent was thereby obtained  DESCRIPTION OF THE PROCEDURE:  After written informed consent was obtained, the patient was taken to the fluoroscopy suite and placed in the prone position  Anatomical landmarks were identified by way of fluoroscopy in multiple views  The skin overlying the sacroiliac region was prepped using antiseptic and draped in the usual sterile fashion  Strict aseptic technique was utilized  The skin and subcutaneous tissues at the needle entry site were infiltrated with 5 mL of 1% preservative-free lidocaine using a 25-gauge 1-1/2-inch needle  A 22-gauge 3-1/2-inch needle was then incrementally advanced using multiple fluoroscopic views into the inferior posterior pole of the sacroiliac joint  Proper needle placement was confirmed with the aid of fluoroscopy in the AP and lateral views  After negative aspiration, contrast was injected which delineated the sacroiliac joint under fluoroscopy in the AP view  After negative aspiration was reconfirmed, a 3 mL mixture consisting of 2 mL of preservative-free 0 25% bupivacaine mixed with 1 mL of 80 mg/mL of Depo-Medrol was slowly injected      The patient tolerated the procedure well and all needles were removed with the tips intact  Hemostasis was maintained  There were no apparent paresthesias or complications  The skin was wiped clean and a Band-Aid was placed as appropriate  The patient was monitored for an appropriate period of time following the procedure and remained hemodynamically stable and neurovascularly intact following the procedure  The patient was ultimately discharged to home with supervision in good condition and instructed to call the office in a few days for an update or sooner as warranted  I was present and participated in all key and critical portions of this procedure      Yumiko Sharif MD  3/19/2021  9:51 AM

## 2021-03-22 ENCOUNTER — EVALUATION (OUTPATIENT)
Dept: PHYSICAL THERAPY | Facility: CLINIC | Age: 81
End: 2021-03-22
Payer: MEDICARE

## 2021-03-22 DIAGNOSIS — G89.4 CHRONIC PAIN SYNDROME: Primary | ICD-10-CM

## 2021-03-22 DIAGNOSIS — M46.1 SACROILIITIS (HCC): ICD-10-CM

## 2021-03-22 DIAGNOSIS — G89.29 CHRONIC LEFT-SIDED LOW BACK PAIN WITH LEFT-SIDED SCIATICA: ICD-10-CM

## 2021-03-22 DIAGNOSIS — M54.42 CHRONIC LEFT-SIDED LOW BACK PAIN WITH LEFT-SIDED SCIATICA: ICD-10-CM

## 2021-03-22 PROCEDURE — 97110 THERAPEUTIC EXERCISES: CPT

## 2021-03-22 PROCEDURE — 97162 PT EVAL MOD COMPLEX 30 MIN: CPT

## 2021-03-22 NOTE — PROGRESS NOTES
PT Evaluation     Today's date: 3/22/2021  Patient name: Melissa Simpson  : 1940  MRN: [id number]  Referring provider: Kendra Silverio MD  Dx:   Encounter Diagnosis     ICD-10-CM    1  Chronic pain syndrome  G89 4 Ambulatory referral to Physical Therapy   2  Chronic left-sided low back pain with left-sided sciatica  M54 42 Ambulatory referral to Physical Therapy    G89 29    3  Sacroiliitis ([facility] )  M46 1 Ambulatory referral to Physical Therapy                  Assessment  Assessment details: Melissa Simpson is a 79 y o  female who presents with pain, decreased strength, decreased ROM, ambulatory dysfunction and postural  dysfunction  Due to these impairments, patient has difficulty performing ADL's, recreational activities, ambulation, stair negotiation, lifting/carrying, transfers  Patient's clinical presentation is consistent with their referring diagnosis of Chronic pain syndrome  (primary encounter diagnosis)  Chronic left-sided low back pain with left-sided sciatica  Sacroiliitis [facility])  Patient has been educated in home exercise program and plan of care   Patient would benefit from skilled physical therapy services to address their aforementioned functional limitations and progress towards prior level of function and independence with home exercise program      Impairments: abnormal gait, abnormal or restricted ROM, activity intolerance, impaired balance, impaired physical strength, lacks appropriate home exercise program, pain with function, poor posture  and poor body mechanics    Goals  Short term goals to be accomplished in 4 weeks:  STG 1: Pt will demo independence with postural management  STG 2: Pt will demo I with HEP to maximize progress between therapy sessions  STG 3: Pt will demo L/S AROM < or = min loss throughout to promote improved functional mobility and body mechanics  STG 4: Pt will demo 1/2 MMT grade core stabilizers to improve lumbar stability with functional challenges  STG 5: Pt will reports pain dec freq and intensity 50%  STG 6: Pt will deny sleep disturbance    Long term goals to be accomplished in 12 weeks:  LTG 1: Pt will demo good body mech with >75% functional challenges to prevent reinjury  LTG 2: Pt will be able to return to PLOF pain-free  LTG 3: Pt will demo Good strength core stabilizers to promote carryover with body mech and posture    Plan  Plan details: HEP development, stretching, strengthening, A/AA/PROM, joint mobilizations, posture education, STM/MI as needed to reduce muscle tension, muscle reeducation, PLOC discussed and agreed upon with patient  Patient would benefit from: PT eval and skilled physical therapy  Planned modality interventions: cryotherapy and thermotherapy: hydrocollator packs  Planned therapy interventions: manual therapy, neuromuscular re-education, self care, therapeutic activities, therapeutic exercise and home exercise program  Frequency: 2x week  Plan of Care beginning date: 3/22/2021  Plan of Care expiration date: 2021  Treatment plan discussed with: patient        Subjective Evaluation    History of Present Illness  Mechanism of injury: Pt reports left sided low back pain that started approx 8-9 months ago and progressively worsened  Walking and standing for prolonged periods of time increase her pain which was limiting her ability to complete ADL's and IADL's  Received injection in [facility] on 3/19/2021 that helped relief her pain significantly       Hx of parkinson's disease   Pain  Current pain ratin  At best pain ratin  At worst pain ratin  Location: left low back   Quality: dull ache and discomfort  Relieving factors: change in position (injection)  Aggravating factors: standing, walking and lifting (bending forward )  Progression: improved    Hand dominance: right      Diagnostic Tests  X-ray: abnormal  MRI studies: abnormal  Treatments  Previous treatment: injection treatment  Patient Goals  Patient goals for therapy: decreased pain, independence with ADLs/IADLs, increased strength and improved balance  Patient goal: increase mobility         Objective     Posture: Lumbar lordosis is dec in standing  There is nil lateral shift  Lumbar AROM limitations:  (*=  Pain)  Lumbar flexion: mod loss*  Lumbar extension: mod loss*  R side glide: min loss  L side glide: min loss    Mechanical Asessment: not tested     Strength:  Core strength: Poor     Right  Left  Hip flexion:  4-/5  3+/5  Knee ext  4-/5  3+/5  Ankle DF  3+/5  3+/5  Ankle PF  4/5  4/5  Knee flex  3+/5  3+/5  Hip abduction  3/5  3-/5      Joint mobility:   Hypomobility with PA mobs in side-lying L3-S1    Tenderness/Palpation:   (+) TTP L lumbar paraspinals  (+) TTP L QL  (+) TTP L SIJ  (+) TTP L piriformis     Sensation:   hyperesthetia L L5 otherwise intact t o    Flexibility:  (+) tightness L>R hamstring, hip flexors, gluts/piriformis    Function:   Gait: Amb w/ R SPC, slow pace, wide BAILEE    30" STS: TBA  TUG: w/ SPC   trial 1:19"   trial 2:16"       Precautions: Parkinson's Disease  Falls risk  Daily Exercise Log:  Date 3/20/2021       Manual        STM L paraspinals                        Ther Exer        Pball 3 way str        Seated flex str HEP       Hip ER seated HEP               LTR w/ pball        DKTC w/ pball SOS        SKTC HEP                       Ther activ  STS         Stand hip ext/abd                                                NMRed        Marching at rail        FT EO at rail        Step taps at rail                                        Modalities         5'                        Skin intact pre/post             HEP:   Access Code: [email or web address]: [employer]  com/Date: 03/22/2021Prepared by: Amanda Elam   Hooklying Single Knee to Chest - 1 x daily - 7 x weekly - 5 reps - 15 hold   Seated Hip External Rotation Stretch - 1 x daily - 7 x weekly - 5 reps - 15 hold   Seated Lumbar Flexion Stretch - 1 x daily - 7 x weekly - 2 sets - 10 reps - 5 hold

## 2021-03-24 ENCOUNTER — OFFICE VISIT (OUTPATIENT)
Dept: PHYSICAL THERAPY | Facility: CLINIC | Age: 81
End: 2021-03-24
Payer: MEDICARE

## 2021-03-24 DIAGNOSIS — G89.4 CHRONIC PAIN SYNDROME: Primary | ICD-10-CM

## 2021-03-24 DIAGNOSIS — M46.1 SACROILIITIS (HCC): ICD-10-CM

## 2021-03-24 DIAGNOSIS — M54.42 CHRONIC LEFT-SIDED LOW BACK PAIN WITH LEFT-SIDED SCIATICA: ICD-10-CM

## 2021-03-24 DIAGNOSIS — G89.29 CHRONIC LEFT-SIDED LOW BACK PAIN WITH LEFT-SIDED SCIATICA: ICD-10-CM

## 2021-03-24 PROCEDURE — 97140 MANUAL THERAPY 1/> REGIONS: CPT

## 2021-03-24 PROCEDURE — 97110 THERAPEUTIC EXERCISES: CPT

## 2021-03-24 PROCEDURE — 97112 NEUROMUSCULAR REEDUCATION: CPT

## 2021-03-24 NOTE — PROGRESS NOTES
Daily Note     Today's date: 3/24/2021  Patient name: Samantha Homans  : 1940  MRN: [id number]  Referring provider: Taz Trinidad MD  Dx:   Encounter Diagnosis     ICD-10-CM    1  Chronic pain syndrome  G89 4    2  Chronic left-sided low back pain with left-sided sciatica  M54 42     G89 29    3  Sacroiliitis (HCC)  M46 1                   Subjective: Pt reports she felt good yesterday and over did it, states she does feel sore today rates it 3/10 on VAS  Objective: See treatment diary below      Assessment: Tolerated treatment well  Initiated mostly supine and seated exercises today, plan to incorporate balance and weightbearing exercises next session  Significant limitations noted in hip ER L>R which patient states has made it difficult to don/doff shoes  Patient demonstrated fatigue post treatment and would benefit from continued PT  Plan: Continue per plan of care  Precautions: Parkinson's Disease  Falls risk  Daily Exercise Log:  Date 3/22/2021 3/24      Manual        STM L paraspinals  LL                      Ther Exer        Pball 3 way str  5"x10      Seated flex str HEP 15"x5      Hip ER seated HEP 5x5"              LTR w/ pball  5"x10ea      DKTC w/ pball SOS  5"x10      SKTC HEP                       Ther activ  STS         Stand hip ext/abd                                                NMRed        Marching at rail        FT EO at rail        Step taps at rail        Sup add/abd  20x ea  RTB                              Modalities        MH 5'  5' in s/l                      Skin intact pre/post MH            HEP:   Access Code: [email or web address]: [email or web address]  com/Date: repared by: Jannet Stark   Hooklying Single Knee to Chest - 1 x daily - 7 x weekly - 5 reps - 15 hold   Seated Hip External Rotation Stretch - 1 x daily - 7 x weekly - 5 reps - 15 hold   Seated Lumbar Flexion Stretch - 1 x daily - 7 x weekly - 2 sets - 10 reps - 5 hold

## 2021-03-26 ENCOUNTER — TELEPHONE (OUTPATIENT)
Dept: PAIN MEDICINE | Facility: CLINIC | Age: 81
End: 2021-03-26

## 2021-03-29 ENCOUNTER — OFFICE VISIT (OUTPATIENT)
Dept: PHYSICAL THERAPY | Facility: CLINIC | Age: 81
End: 2021-03-29
Payer: MEDICARE

## 2021-03-29 DIAGNOSIS — M46.1 SACROILIITIS (HCC): ICD-10-CM

## 2021-03-29 DIAGNOSIS — M54.42 CHRONIC LEFT-SIDED LOW BACK PAIN WITH LEFT-SIDED SCIATICA: ICD-10-CM

## 2021-03-29 DIAGNOSIS — G89.4 CHRONIC PAIN SYNDROME: Primary | ICD-10-CM

## 2021-03-29 DIAGNOSIS — G89.29 CHRONIC LEFT-SIDED LOW BACK PAIN WITH LEFT-SIDED SCIATICA: ICD-10-CM

## 2021-03-29 PROCEDURE — 97110 THERAPEUTIC EXERCISES: CPT

## 2021-03-29 PROCEDURE — 97140 MANUAL THERAPY 1/> REGIONS: CPT

## 2021-03-29 NOTE — PROGRESS NOTES
Daily Note     Today's date: 3/29/2021  Patient name: Becki Varela  : 1940  MRN: [id number]  Referring provider: Linda Stokes MD  Dx:   Encounter Diagnosis     ICD-10-CM    1  Chronic pain syndrome  G89 4    2  Chronic left-sided low back pain with left-sided sciatica  M54 42     G89 29    3  Sacroiliitis ([address] )  M46 1                   Subjective: Pt reports she over did it again yesterday but feels good today  Did not sleep well but it was not related to her back       Objective: See treatment diary below      Assessment: Tolerated treatment well  Patient with no c/o of discomfort t/o session, demonstrated fatigue post treatment and would benefit from continued PT  Plan: Continue per plan of care  Precautions: Parkinson's Disease  Falls risk  Daily Exercise Log:  Date 3/22/2021 3/24 3/29     Manual        STM L paraspinals + QL  LL LL                     Ther Exer        Pball 3 way str  5"x10 5"x10ea     Seated flex str HEP 15"x5 2x10     Hip ER seated HEP 5x5" Sup 10"x5ea             LTR w/ pball  5"x10ea 5"x10ea     DKTC w/ pball SOS  5"x10 5"x15     SKTC HEP  15"x3ea                     Ther activ  STS         Stand hip ext/abd                                                NMRed        Marching at rail   NV     FT EO at rail   NV     Step taps at rail   NV     Sup add/abd  20x ea  RTB 20xea  RTB                             Modalities        MH 5'  5' in s/l 5' s/l                      Skin intact pre/post MH            HEP:   Access Code: [email or web address]: [email or web address]  com/Date: 1Prepared by: Jeevan Fernando   Hooklying Single Knee to Chest - 1 x daily - 7 x weekly - 5 reps - 15 hold   Seated Hip External Rotation Stretch - 1 x daily - 7 x weekly - 5 reps - 15 hold   Seated Lumbar Flexion Stretch - 1 x daily - 7 x weekly - 2 sets - 10 reps - 5 hold

## 2021-03-31 ENCOUNTER — ANTICOAG VISIT (OUTPATIENT)
Dept: CARDIOLOGY CLINIC | Facility: CLINIC | Age: 81
End: 2021-03-31

## 2021-03-31 ENCOUNTER — OFFICE VISIT (OUTPATIENT)
Dept: PHYSICAL THERAPY | Facility: CLINIC | Age: 81
End: 2021-03-31
Payer: MEDICARE

## 2021-03-31 ENCOUNTER — APPOINTMENT (OUTPATIENT)
Dept: LAB | Facility: CLINIC | Age: 81
End: 2021-03-31
Payer: MEDICARE

## 2021-03-31 DIAGNOSIS — Z95.2 H/O MITRAL VALVE REPLACEMENT WITH MECHANICAL VALVE: ICD-10-CM

## 2021-03-31 DIAGNOSIS — G89.4 CHRONIC PAIN SYNDROME: Primary | ICD-10-CM

## 2021-03-31 DIAGNOSIS — M46.1 SACROILIITIS (HCC): ICD-10-CM

## 2021-03-31 DIAGNOSIS — M54.42 CHRONIC LEFT-SIDED LOW BACK PAIN WITH LEFT-SIDED SCIATICA: ICD-10-CM

## 2021-03-31 DIAGNOSIS — G89.29 CHRONIC LEFT-SIDED LOW BACK PAIN WITH LEFT-SIDED SCIATICA: ICD-10-CM

## 2021-03-31 DIAGNOSIS — I48.20 CHRONIC ATRIAL FIBRILLATION (HCC): ICD-10-CM

## 2021-03-31 PROCEDURE — 97110 THERAPEUTIC EXERCISES: CPT

## 2021-03-31 PROCEDURE — 97112 NEUROMUSCULAR REEDUCATION: CPT

## 2021-03-31 NOTE — PROGRESS NOTES
Daily Note     Today's date: 3/31/2021  Patient name: Mika Dominguez  : 1940  MRN: [id number]  Referring provider: Jacinto Gomez MD  Dx:   Encounter Diagnosis     ICD-10-CM    1  Chronic pain syndrome  G89 4    2  Chronic left-sided low back pain with left-sided sciatica  M54 42     G89 29    3  Sacroiliitis (HCC)  M46 1                   Subjective: pt reports no back pain on arrival to session due to not doing much yet this morning  Objective: See treatment diary below      Assessment: Tolerated treatment well  Patient would benefit from continued PT no increases in back pain during session today  Addition of dynamic and static balance today to address pt's balance concerns, presenting with impairments in both more than likely associated with her PD  Pt fatigued with standing balance exercises and required several seated rest breaks  Would benefit from cont balance challenges as tolerated  Plan: Continue per plan of care  Precautions: Parkinson's Disease  Falls risk  Daily Exercise Log:  Date 3/22/2021 3/24 3/29 3/31/21    Manual        STM L paraspinals + QL  LL LL RR                     Ther Exer    20'    Pball 3 way str  5"x10 5"x10ea  5"x10ea     Seated flex str HEP 15"x5 2x10 2x10     Hip ER seated HEP 5x5" Sup 10"x5ea Sup 10"x5ea             LTR w/ pball  5"x10ea 5"x10ea 5"x10 ea    DKTC w/ pball SOS  5"x10 5"x15 5"x15     SKTC HEP  15"x3ea                     Ther activ  STS         Stand hip ext/abd                                                NMRed    20'    Marching at rail   NV 2x10 R/L     FT EO at rail   NV 20"x3     Step taps at rail   NV 4" step 1x10 R/L     Sup add/abd  20x ea  RTB 20xea  RTB 20xea  RTB                             Modalities        MH 5'  5' in s/l 5' s/l  5' post session; skin intact pre and post                     Skin intact pre/post MH            HEP:   Access Code: [email or web address]: [employer]  com/Date: repared by: Maylin Ivey   Hooklying Single Knee to Chest - 1 x daily - 7 x weekly - 5 reps - 15 hold   Seated Hip External Rotation Stretch - 1 x daily - 7 x weekly - 5 reps - 15 hold   Seated Lumbar Flexion Stretch - 1 x daily - 7 x weekly - 2 sets - 10 reps - 5 hold

## 2021-04-05 ENCOUNTER — OFFICE VISIT (OUTPATIENT)
Dept: PHYSICAL THERAPY | Facility: CLINIC | Age: 81
End: 2021-04-05
Payer: MEDICARE

## 2021-04-05 DIAGNOSIS — M54.42 CHRONIC LEFT-SIDED LOW BACK PAIN WITH LEFT-SIDED SCIATICA: ICD-10-CM

## 2021-04-05 DIAGNOSIS — G89.4 CHRONIC PAIN SYNDROME: Primary | ICD-10-CM

## 2021-04-05 DIAGNOSIS — G89.29 CHRONIC LEFT-SIDED LOW BACK PAIN WITH LEFT-SIDED SCIATICA: ICD-10-CM

## 2021-04-05 DIAGNOSIS — M46.1 SACROILIITIS (HCC): ICD-10-CM

## 2021-04-05 PROCEDURE — 97112 NEUROMUSCULAR REEDUCATION: CPT

## 2021-04-05 PROCEDURE — 97110 THERAPEUTIC EXERCISES: CPT

## 2021-04-05 PROCEDURE — 97140 MANUAL THERAPY 1/> REGIONS: CPT

## 2021-04-05 NOTE — PROGRESS NOTES
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-sided sciatica  M54 42     G89 29    3  Sacroiliitis (HCC)  M46 1                   Subjective: Pt notes less back pain each day  Objective: See treatment diary below      Assessment: Tolerated treatment well  Remains challenged by standing exercises due to balance impairments  Patient demonstrated fatigue post treatment and would benefit from continued PT  Plan: Continue per plan of care  Precautions: Parkinson's Disease  Falls risk  Daily Exercise Log:  Date 3/22/2021 3/24 3/29 3/31/21 2021   Manual        STM L paraspinals + QL  LL LL RR  LL                   Ther Exer    20'    Pball 3 way str  5"x10 5"x10ea  5"x10ea  5"x10ea   Seated flex str HEP 15"x5 2x10 2x10  2x10   Hip ER seated HEP 5x5" Sup 10"x5ea Sup 10"x5ea  20"x3ea           LTR w/ pball  5"x10ea 5"x10ea 5"x10 ea 5"x10ea   DKTC w/ pball SOS  5"x10 5"x15 5"x15  5"x15   SKTC HEP  15"x3ea  15"x3ea                   Ther activ  STS         Stand hip ext/abd     1x10ea                                           NMRed    20'    Marching at rail   NV 2x10 R/L  2x10 R/L   FT EO at rail   NV 20"x3     Step taps at rail   NV 4" step 1x10 R/L  6" 1x10  R/L   Sup add/abd  20x ea  RTB 20xea  RTB 20xea  RTB  1x15 GTB  2x10 ball                           Modalities        MH 5'  5' in s/l 5' s/l  5' post session; skin intact pre and post  NT                   Skin intact pre/post MH            HEP:   Access Code: [email or web address]: [employer]  com/Date: repared by: Tri Leak   Hooklying Single Knee to Chest - 1 x daily - 7 x weekly - 5 reps - 15 hold   Seated Hip External Rotation Stretch - 1 x daily - 7 x weekly - 5 reps - 15 hold   Seated Lumbar Flexion Stretch - 1 x daily - 7 x weekly - 2 sets - 10 reps - 5 hold

## 2021-04-07 ENCOUNTER — OFFICE VISIT (OUTPATIENT)
Dept: PHYSICAL THERAPY | Facility: CLINIC | Age: 81
End: 2021-04-07
Payer: MEDICARE

## 2021-04-07 DIAGNOSIS — G89.4 CHRONIC PAIN SYNDROME: Primary | ICD-10-CM

## 2021-04-07 DIAGNOSIS — G89.29 CHRONIC LEFT-SIDED LOW BACK PAIN WITH LEFT-SIDED SCIATICA: ICD-10-CM

## 2021-04-07 DIAGNOSIS — M46.1 SACROILIITIS (HCC): ICD-10-CM

## 2021-04-07 DIAGNOSIS — M54.42 CHRONIC LEFT-SIDED LOW BACK PAIN WITH LEFT-SIDED SCIATICA: ICD-10-CM

## 2021-04-07 PROCEDURE — 97110 THERAPEUTIC EXERCISES: CPT

## 2021-04-07 PROCEDURE — 97112 NEUROMUSCULAR REEDUCATION: CPT

## 2021-04-07 NOTE — PROGRESS NOTES
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-sided sciatica  M54 42     G89 29    3  Sacroiliitis ([address] )  M46 1                   Subjective: pt reports she was very busy yesterday putting all her items back in her new fridge and she was spent from that  This morning her back was 6/10 on both sides, the pain has lessened on arrival       Objective: See treatment diary below      Assessment: Tolerated treatment well  Patient would benefit from continued PT  Pt tolerates all WB exercises consecutively today but needed to sit when finished  LOB during amb in clinic requiring support of HR to assist, dynamic balance impairments remain and would benefit from challenging it as tolerated  Plan: Continue per plan of care  Precautions: Parkinson's Disease  Falls risk  Daily Exercise Log:  Date 2021       Manual        STM L paraspinals + QL RR                        Ther Exer        Pball 3 way str 5"x10ea       Seated flex str 2x10       Hip ER seated supine 20"x3each               LTR w/ pball 5"x10ea       DKTC w/ pball SOS 5"x15       SKTC                        Ther activ  STS         Stand hip ext/abd 1x10 each                                                NMRed        Marching at rail 1x10 R/L        FT EO at rail 25"x3        Step taps at rail 6" 1x10 R/L CGA        Sup add/abd Grn 1x15  5" x15  w/ ball                                Modalities        MH in SL  5' post session; skin intact pre and post                        Skin intact pre/post MH            HEP:   Access Code: [email or web address]: [employer]  com/Date: 1Prepared by: David Perez LucasExercises   Hooklying Single Knee to Chest - 1 x daily - 7 x weekly - 5 reps - 15 hold   Seated Hip External Rotation Stretch - 1 x daily - 7 x weekly - 5 reps - 15 hold   Seated Lumbar Flexion Stretch - 1 x daily - 7 x weekly - 2 sets - 10 reps - 5 hold

## 2021-04-09 ENCOUNTER — HOSPITAL ENCOUNTER (OUTPATIENT)
Dept: RADIOLOGY | Facility: HOSPITAL | Age: 81
Discharge: HOME | End: 2021-04-09
Attending: FAMILY MEDICINE
Payer: MEDICARE

## 2021-04-09 ENCOUNTER — TRANSCRIBE ORDERS (OUTPATIENT)
Dept: ADMINISTRATIVE | Facility: HOSPITAL | Age: 81
End: 2021-04-09

## 2021-04-09 VITALS — BODY MASS INDEX: 29.84 KG/M2 | HEIGHT: 65 IN | WEIGHT: 179.1 LBS

## 2021-04-09 DIAGNOSIS — Z12.31 ENCOUNTER FOR SCREENING MAMMOGRAM FOR BREAST CANCER: ICD-10-CM

## 2021-04-09 PROCEDURE — 77063 BREAST TOMOSYNTHESIS BI: CPT

## 2021-04-09 PROCEDURE — 77067 SCR MAMMO BI INCL CAD: CPT

## 2021-04-12 ENCOUNTER — OFFICE VISIT (OUTPATIENT)
Dept: PHYSICAL THERAPY | Facility: CLINIC | Age: 81
End: 2021-04-12
Payer: MEDICARE

## 2021-04-12 DIAGNOSIS — G89.4 CHRONIC PAIN SYNDROME: Primary | ICD-10-CM

## 2021-04-12 DIAGNOSIS — G89.29 CHRONIC LEFT-SIDED LOW BACK PAIN WITH LEFT-SIDED SCIATICA: ICD-10-CM

## 2021-04-12 DIAGNOSIS — M46.1 SACROILIITIS (HCC): ICD-10-CM

## 2021-04-12 DIAGNOSIS — M54.42 CHRONIC LEFT-SIDED LOW BACK PAIN WITH LEFT-SIDED SCIATICA: ICD-10-CM

## 2021-04-12 PROCEDURE — 97112 NEUROMUSCULAR REEDUCATION: CPT

## 2021-04-12 PROCEDURE — 97110 THERAPEUTIC EXERCISES: CPT

## 2021-04-12 NOTE — PROGRESS NOTES
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-sided sciatica  M54 42     G89 29    3  Sacroiliitis ([address] )  M46 1                   Subjective: pt reports her PD is acting up today and she is feeling shaky  Her back has been feeling okay, cont to get increases in pain with prolonged standing but she thinks her endurance has been a little better  Objective: See treatment diary below      Assessment: Tolerated treatment well  Patient would benefit from continued PT pt requiring CGA with all standing balance today  No increases in back pain with there ex, TTP in L sided LB during massage  Plan: Continue per plan of care  Precautions: Parkinson's Disease  Falls risk  Daily Exercise Log:  Date 2021      Manual  5'       STM L paraspinals + QL RR  RR                       Ther Exer  20'       Pball 3 way str 5"x10ea 5"x10ea       Seated flex str 2x10 2x10       Hip ER seated supine 20"x3each supine 20"x3each               LTR w/ pball 5"x10ea 5"x10 ea       DKTC w/ pball SOS 5"x15 5"x15       SKTC                        Ther activ  STS         Stand hip ext/abd 1x10 each                                                NMRed  15'       Marching at rail 1x10 R/L  2x10 R/L 1UE support today      FT EO at rail 25"x3  25"x3       Step taps at rail 6" 1x10 R/L CGA  8" 2x10 R/L CGA       Sup add/abd Grn 1x15  5" x15  w/ ball  Grn 1x15  5" x15  w/ ball                                Modalities        MH in SL  5' post session; skin intact pre and post  5' post session; skin intact pre and post                        Skin intact pre/post MH            HEP:   Access Code: [email or web address]: "[employer]  com/Date: 1Prepared by: Yari Mclaughlin Single Knee to Chest - 1 x daily - 7 x weekly - 5 reps - 15 hold   Seated Hip External Rotation Stretch - 1 x daily - 7 x weekly - 5 reps - 15 hold   Seated Lumbar Flexion Stretch - 1 x daily - 7 x weekly - 2 sets - 10 reps - 5 hold

## 2021-04-13 DIAGNOSIS — G20 PARKINSON DISEASE (HCC): ICD-10-CM

## 2021-04-13 NOTE — TELEPHONE ENCOUNTER
Patient calling in, states she called [email or web address] in [place] and "they might have it but would need a script to confirm if they do have it or not"  She asked if we can send the refill here first and if they do not have it then asked for an alternative  __    [facility], spoke with Katherin Wells, she checked with their  and states every single generic is on backorder with no estimated refill date

## 2021-04-13 NOTE — TELEPHONE ENCOUNTER
Patient called back in, states she called every local pharmacy and this medication is on backorder  States the pharmacies contacted their suppliers and there is no estimated date that this medicaiton will be available  She asked if she can either get this from [facility] of have another recommendation for meds  States her  wanted to take her to the ER just to get her medications  I reeducated patient on when is appropriate to present to the ER  Informed her I will contact the pharmacy and the provider to assist      Preferred pharm is [facility] on [address]  Best call back [phone], okay with detailed messages  _    [facility], spoke with Alicia, they do not have this medication  Are there alternatives to consider?

## 2021-04-13 NOTE — TELEPHONE ENCOUNTER
Patient calling in regarding a refill  States she takes Carbidopa-levodopa-entacapone -200 mg, 1 tab 4 times a day  States she has 11 pills left, tried to call her local [facility], but they reportedly tried to order this medication, but was unable to (medication on backorder)  Reports they called [facility] to see if they can order, but they cannot either  She originally asked for another medication  I instead advised she call other local pharmacies, ask if they have this medication, then call us back to let us know where we should send this refill  She verbalizes understanding and will call back

## 2021-04-14 ENCOUNTER — OFFICE VISIT (OUTPATIENT)
Dept: PHYSICAL THERAPY | Facility: CLINIC | Age: 81
End: 2021-04-14
Payer: MEDICARE

## 2021-04-14 DIAGNOSIS — G89.29 CHRONIC LEFT-SIDED LOW BACK PAIN WITH LEFT-SIDED SCIATICA: ICD-10-CM

## 2021-04-14 DIAGNOSIS — M54.42 CHRONIC LEFT-SIDED LOW BACK PAIN WITH LEFT-SIDED SCIATICA: ICD-10-CM

## 2021-04-14 DIAGNOSIS — G89.4 CHRONIC PAIN SYNDROME: Primary | ICD-10-CM

## 2021-04-14 DIAGNOSIS — M46.1 SACROILIITIS (HCC): ICD-10-CM

## 2021-04-14 PROCEDURE — 97140 MANUAL THERAPY 1/> REGIONS: CPT

## 2021-04-14 PROCEDURE — 97110 THERAPEUTIC EXERCISES: CPT

## 2021-04-14 NOTE — PROGRESS NOTES
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-sided sciatica  M54 42     G89 29    3  Sacroiliitis (HCC)  M46 1                    Subjective: pt reports her back is not feeling bad but does not really think there has been much change in her symptoms  Frustrated with her pharmacy and is having a hard time getting her medications and is on the last day of her PD medication  Objective: See treatment diary below      Assessment: Tolerated treatment well  Patient would benefit from continued PT       Plan: Continue per plan of care  Precautions: Parkinson's Disease  Falls risk  Daily Exercise Log:  Date 2021     Manual  5'       STM L paraspinals + QL RR  RR  RR                      Ther Exer  20'       Pball 3 way str 5"x10ea 5"x10ea  5"x10ea       Seated flex str 2x10 2x10  2x10      Hip ER seated supine 20"x3each supine 20"x3each  supine 20"x3each      Bridges    1x10      LTR w/ pball 5"x10ea 5"x10 ea  5'x15      DKTC w/ pball SOS 5"x15 5"x15  5"x15      SKTC        Supine alt  marching   1x10 R/L              Ther activ          STS         Stand hip ext/abd 1x10 each                                                NMRed  15'       Marching at rail 1x10 R/L  2x10 R/L 1UE support today      FT EO at rail 25"x3  25"x3       Step taps at rail 6" 1x10 R/L CGA  8" 2x10 R/L CGA       Sup add/abd Grn 1x15  5" x15  w/ ball  Grn 1x15  5" x15  w/ ball   Grn 2x10   5" 2x10  w/ ball                               Modalities        MH in SL  5' post session; skin intact pre and post  5' post session; skin intact pre and post   5' post session; skin intact pre and post                        Skin intact pre/post MH            HEP:   Access Code: [email or web address]: [employer]  com/Date: 03/22/2021Prepared by: Kaykay Ortiz   Hooklying Single Knee to Chest - 1 x daily - 7 x weekly - 5 reps - 15 hold   Seated Hip External Rotation Stretch - 1 x daily - 7 x weekly - 5 reps - 15 hold   Seated Lumbar Flexion Stretch - 1 x daily - 7 x weekly - 2 sets - 10 reps - 5 hold

## 2021-04-14 NOTE — TELEPHONE ENCOUNTER
Pt left a voicemail message re: below  She has 1 day supply left  TT sent to Dr Joni Alvarez asking if ok to call Carbidopa/ levodopa 25/100 1 tab 4 times daily  Entacapone 200mg 1 tab 4 times daily  Quantity of 120 w/ 4 refills   Awaiting response

## 2021-04-14 NOTE — TELEPHONE ENCOUNTER
Do the pharmacy have the medication separately? Carbidopa/ levodopa 25/100 1 tab 4 times daily  Entacapone 200mg 1 tab 4 times daily    If not can we just get her the carbidopa/levodopa scrip and try to see if opicapone 50mg daily would be covered?

## 2021-04-14 NOTE — TELEPHONE ENCOUNTER
[employer] [facility], spoke w/ Delfino Yip and advised of the below  States that Carbidopa/levodopa 25/100 mg tab and entacapone 200 mg tab is avail  Requesting scripts  She is unable to tell me if both is covered  She will need to have the scripts first      Rxs entered   Pls review and sign off    thanks

## 2021-04-14 NOTE — TELEPHONE ENCOUNTER
Received tt from Dr Veronica Chapa to call in scripts below  [facility] pharmacy, spoke w/ pharmacist David Cannon  Rxs Carbidopa/ levodopa 25/100 1 tab 4 times daily  Quantity 120 Refills 4 and   Entacapone 200mg 1 tab 4 times daily  Quantity 120 Refills 4 called in as ordered  She verbalized understanding  Pt made aware    Rxs below updated as phone in   Pls sign off  thanks

## 2021-04-15 RX ORDER — ENTACAPONE 200 MG/1
TABLET ORAL
Qty: 120 TABLET | Refills: 4 | OUTPATIENT
Start: 2021-04-15 | End: 2021-08-16 | Stop reason: SDUPTHER

## 2021-04-19 ENCOUNTER — OFFICE VISIT (OUTPATIENT)
Dept: PHYSICAL THERAPY | Facility: CLINIC | Age: 81
End: 2021-04-19
Payer: MEDICARE

## 2021-04-19 DIAGNOSIS — G89.4 CHRONIC PAIN SYNDROME: Primary | ICD-10-CM

## 2021-04-19 DIAGNOSIS — G89.29 CHRONIC LEFT-SIDED LOW BACK PAIN WITH LEFT-SIDED SCIATICA: ICD-10-CM

## 2021-04-19 DIAGNOSIS — M46.1 SACROILIITIS (HCC): ICD-10-CM

## 2021-04-19 DIAGNOSIS — M54.42 CHRONIC LEFT-SIDED LOW BACK PAIN WITH LEFT-SIDED SCIATICA: ICD-10-CM

## 2021-04-19 PROCEDURE — 97112 NEUROMUSCULAR REEDUCATION: CPT

## 2021-04-19 PROCEDURE — 97110 THERAPEUTIC EXERCISES: CPT

## 2021-04-19 NOTE — PROGRESS NOTES
Daily Note     Today's date: 2021  Patient name: Peng Llanes  : 1940  MRN: [id number]  Referring provider: Alfie Mora MD  Dx:   Encounter Diagnosis     ICD-10-CM    1  Chronic pain syndrome  G89 4    2  Chronic left-sided low back pain with left-sided sciatica  M54 42     G89 29    3  Sacroiliitis ([address] )  M46 1                    Subjective: pt reports she has been able to do more around her house but her back aches  States she is feeling exhausted and not well overall today, had a medication issue for her PD  Objective: See treatment diary below      Assessment: Tolerated treatment well  Patient would benefit from continued PT, modified session today per pt presentation  Plan: Continue per plan of care  Precautions: Parkinson's Disease  Falls risk  Daily Exercise Log:  Date 2021    Manual  5'       STM L paraspinals + QL RR  RR  RR  D/C                    Ther Exer  20'       Pball 3 way str 5"x10ea 5"x10ea  5"x10ea   5"x10ea    Seated flex str 2x10 2x10  2x10  2x10    Hip ER seated supine 20"x3each supine 20"x3each  supine 20"x3each  supine    Bridges    1x10  1x10    LTR w/ pball 5"x10ea 5"x10 ea  5'x15  5"x15    DKTC w/ pball SOS 5"x15 5"x15  5"x15  5"x15    SKTC    20"x3ea    Supine alt  marching   1x10 R/L  1x10 R/L            Ther activ          STS         Stand hip ext/abd 1x10 each                                                NMRed  15'       Marching at rail 1x10 R/L  2x10 R/L 1UE support today      FT EO at rail 25"x3  25"x3       Step taps at rail 6" 1x10 R/L CGA  8" 2x10 R/L CGA       Sup add/abd Grn 1x15  5" x15  w/ ball  Grn 1x15  5" x15  w/ ball   Grn 2x10   5" 2x10  w/ ball   grn 2x10  5" 2x10 w/ ball                            Modalities        MH in SL  5' post session; skin intact pre and post  5' post session; skin intact pre and post   5' post session; skin intact pre and post                        Skin intact pre/post             HEP:   Access Code: [email or web address]: [employer]  com/Date: 03/22/2021Prepared by: Tri Ryan   Hooklying Single Knee to Chest - 1 x daily - 7 x weekly - 5 reps - 15 hold   Seated Hip External Rotation Stretch - 1 x daily - 7 x weekly - 5 reps - 15 hold   Seated Lumbar Flexion Stretch - 1 x daily - 7 x weekly - 2 sets - 10 reps - 5 hold

## 2021-04-21 ENCOUNTER — OFFICE VISIT (OUTPATIENT)
Dept: PHYSICAL THERAPY | Facility: CLINIC | Age: 81
End: 2021-04-21
Payer: MEDICARE

## 2021-04-21 DIAGNOSIS — M54.42 CHRONIC LEFT-SIDED LOW BACK PAIN WITH LEFT-SIDED SCIATICA: Primary | ICD-10-CM

## 2021-04-21 DIAGNOSIS — G89.4 CHRONIC PAIN SYNDROME: ICD-10-CM

## 2021-04-21 DIAGNOSIS — G89.29 CHRONIC LEFT-SIDED LOW BACK PAIN WITH LEFT-SIDED SCIATICA: Primary | ICD-10-CM

## 2021-04-21 DIAGNOSIS — M46.1 SACROILIITIS (HCC): ICD-10-CM

## 2021-04-21 PROCEDURE — 97110 THERAPEUTIC EXERCISES: CPT

## 2021-04-21 NOTE — PROGRESS NOTES
PT Discharge    Today's date: 2021  Patient name: Dilshad Zuñiga  : 1940  MRN: [id number]  Referring provider: Lisa Gatica MD  Dx:   Encounter Diagnosis     ICD-10-CM    1  Chronic left-sided low back pain with left-sided sciatica  M54 42     G89 29    2  Chronic pain syndrome  G89 4    3  Sacroiliitis ([facility] [address] )  M46 1        Start Time: 09  Stop Time: 1005  Total time in clinic (min): 35 minutes    Assessment  Assessment details: Dilshad Zuñiga is a 79 y o  female who has been regularly participating in skilled therapy and has made minimal improvements with regards to pain, decreased strength, decreased ROM, ambulatory dysfunction and postural  dysfunction  Due to these impairments, patient has difficulty performing ADL's, recreational activities, ambulation, stair negotiation, lifting/carrying, transfers  Patient's clinical presentation is consistent with their referring diagnosis of Chronic pain syndrome  (primary encounter diagnosis)  Chronic left-sided low back pain with left-sided sciatica  Sacroiliitis [facility])  Patient has been educated in home exercise program and plan of care  Patient would benefit from being discharged at this time due to complications with other medical conditions and ability to manage her pain at home      Goals  Short term goals to be accomplished in 4 weeks: (partially met)  STG 1: Pt will demo independence with postural management  STG 2: Pt will demo I with HEP to maximize progress between therapy sessions  STG 3: Pt will demo L/S AROM < or = min loss throughout to promote improved functional mobility and body mechanics  STG 4: Pt will demo 1/2 MMT grade core stabilizers to improve lumbar stability with functional challenges  STG 5: Pt will reports pain dec freq and intensity 50%  STG 6: Pt will deny sleep disturbance    Long term goals to be accomplished in 12 weeks: (partially met)  LTG 1: Pt will demo good body mech with >75% functional challenges to prevent reinjury  LTG 2: Pt will be able to return to PLOF pain-free  LTG 3: Pt will demo Good strength core stabilizers to promote carryover with body mech and posture    Plan  Plan details:           Subjective Evaluation    History of Present Illness  Mechanism of injury: Pt notes minimal improvements in low back pain since starting therapy  Continues to feel discomfort with prolonged walking and standing making it difficult for her to perform ADL's and IADL's  At this time, she feels other medical conditions are more of a priority and she is able to manage her LBP at home with her HEP  Pain  Current pain ratin  At best pain ratin  At worst pain ratin  Location: left low back   Quality: dull ache and discomfort  Relieving factors: change in position (injection)  Aggravating factors: standing, walking and lifting (bending forward )  Progression: improved    Hand dominance: right      Diagnostic Tests  X-ray: abnormal  MRI studies: abnormal  Treatments  Previous treatment: injection treatment  Current treatment: physical therapy  Patient Goals  Patient goals for therapy: decreased pain, independence with ADLs/IADLs, increased strength and improved balance  Patient goal: increase mobility         Objective    Flowsheet Rows      Most Recent Value   PT/OT G-Codes   Current Score  36   Projected Score  56       Posture: Lumbar lordosis is dec in standing  There is nil lateral shift       Lumbar AROM limitations:  (*=  Pain)  Lumbar flexion: min loss*  Lumbar extension: min-mod loss*  R side glide: min loss  L side glide: min loss      Strength:  Core strength: fair     Right  Left  Hip flexion:  4/5  3+/5  Knee ext  4/5  3+/5  Ankle DF  4-/5  3+/5  Ankle PF  4+/5  4/5  Knee flex  3+/5  3+/5  Hip abduction  4-/5  3-/5      Joint mobility:   Hypomobility with PA mobs in side-lying L3-S1    Tenderness/Palpation:   (+) TTP L QL  (+) TTP L SIJ      Sensation:   hyperesthetia L L5 otherwise intact t o    Flexibility:  (+) tightness L>R hamstring, hip flexors, gluts/piriformis    Function:   Gait: Amb w/ R SPC, slow pace, wide BAILEE  TUG: w/ SPC   trial 1:15"   trial 2:14"       Precautions: Parkinson's Disease  Falls risk  HEP:   Access Code: [email or web address]: [email or web address]  com/Date: 03/22/2021Prepared by: Cynthia Castellon   Hooklying Single Knee to Chest - 1 x daily - 7 x weekly - 5 reps - 15 hold   Seated Hip External Rotation Stretch - 1 x daily - 7 x weekly - 5 reps - 15 hold   Seated Lumbar Flexion Stretch - 1 x daily - 7 x weekly - 2 sets - 10 reps - 5 hold

## 2021-04-21 NOTE — PROGRESS NOTES
Daily Note     Today's date: 2021  Patient name: Sandy Raines  : 1940  MRN: [id number]  Referring provider: Kyler Wong MD  Dx:   Encounter Diagnosis     ICD-10-CM    1  Chronic left-sided low back pain with left-sided sciatica  M54 42     G89 29    2  Chronic pain syndrome  G89 4    3  Sacroiliitis ([address] )  M46 1                   Subjective: pt reports she wants to make today her last day      Objective: See treatment diary below      Assessment: Tolerated treatment fair  Patient demonstrated fatigue post treatment pt thinks her other medical impairments are more important at this time and is unsure if PT is helping her LB, will cont with HEP at home to manage LB pain  Plan: D/C today      Precautions: Parkinson's Disease  Falls risk  Daily Exercise Log:  Date 2021   Manual  5'       STM L paraspinals + QL RR  RR  RR  D/C                    Ther Exer  20'    30'   Pball 3 way str 5"x10ea 5"x10ea  5"x10ea   5"x10ea 5"x10ea   Seated flex str 2x10 2x10  2x10  2x10 2x10    Hip ER seated supine 20"x3each supine 20"x3each  supine 20"x3each  supine Supine 20"x3 each    Bridges    1x10  1x10    LTR w/ pball 5"x10ea 5"x10 ea  5'x15  5"x15 5"x10    DKTC w/ pball SOS 5"x15 5"x15  5"x15  5"x15 5"x10    SKTC    20"x3ea    Supine alt  marching   1x10 R/L  1x10 R/L W/ TA 1x10 R/L    TA      5"x10    Ther activ          STS         Stand hip ext/abd 1x10 each                                                NMRed  15'       Marching at rail 1x10 R/L  2x10 R/L 1UE support today      FT EO at rail 25"x3  25"x3       Step taps at rail 6" 1x10 R/L CGA  8" 2x10 R/L CGA       Sup add/abd Grn 1x15  5" x15  w/ ball  Grn 1x15  5" x15  w/ ball   Grn 2x10   5" 2x10  w/ ball   grn 2x10  5" 2x10 w/ ball grn 2x10  5" 2x10 w/ ball                            Modalities        MH in SL  5' post session; skin intact pre and post  5' post session; skin intact pre and post   5' post session; skin intact pre and post     5' post session; skin intact pre and post                      Skin intact pre/post             HEP:   Access Code: [email or web address]: [employer]  com/Date: 03/22/2021Prepared by: Delicia Hare   Hooklying Single Knee to Chest - 1 x daily - 7 x weekly - 5 reps - 15 hold   Seated Hip External Rotation Stretch - 1 x daily - 7 x weekly - 5 reps - 15 hold   Seated Lumbar Flexion Stretch - 1 x daily - 7 x weekly - 2 sets - 10 reps - 5 hold

## 2021-04-25 DIAGNOSIS — E03.9 HYPOTHYROIDISM, UNSPECIFIED TYPE: ICD-10-CM

## 2021-04-26 ENCOUNTER — APPOINTMENT (OUTPATIENT)
Dept: PHYSICAL THERAPY | Facility: CLINIC | Age: 81
End: 2021-04-26
Payer: MEDICARE

## 2021-04-26 RX ORDER — LEVOTHYROXINE SODIUM 0.05 MG/1
TABLET ORAL
Qty: 30 TABLET | Refills: 3 | Status: SHIPPED | OUTPATIENT
Start: 2021-04-26 | End: 2021-09-02

## 2021-04-28 ENCOUNTER — APPOINTMENT (OUTPATIENT)
Dept: PHYSICAL THERAPY | Facility: CLINIC | Age: 81
End: 2021-04-28
Payer: MEDICARE

## 2021-04-28 ENCOUNTER — ANTICOAG VISIT (OUTPATIENT)
Dept: CARDIOLOGY CLINIC | Facility: CLINIC | Age: 81
End: 2021-04-28

## 2021-04-28 ENCOUNTER — APPOINTMENT (OUTPATIENT)
Dept: LAB | Facility: CLINIC | Age: 81
End: 2021-04-28
Payer: MEDICARE

## 2021-04-28 DIAGNOSIS — Z95.2 H/O MITRAL VALVE REPLACEMENT WITH MECHANICAL VALVE: ICD-10-CM

## 2021-04-28 DIAGNOSIS — I48.20 CHRONIC ATRIAL FIBRILLATION (HCC): ICD-10-CM

## 2021-05-04 ENCOUNTER — OFFICE VISIT (OUTPATIENT)
Dept: CARDIOLOGY CLINIC | Facility: CLINIC | Age: 81
End: 2021-05-04
Payer: MEDICARE

## 2021-05-04 VITALS
SYSTOLIC BLOOD PRESSURE: 100 MMHG | DIASTOLIC BLOOD PRESSURE: 62 MMHG | TEMPERATURE: 97.5 F | WEIGHT: 182 LBS | OXYGEN SATURATION: 100 % | RESPIRATION RATE: 18 BRPM | HEART RATE: 75 BPM | HEIGHT: 65 IN | BODY MASS INDEX: 30.32 KG/M2

## 2021-05-04 DIAGNOSIS — G20 PARKINSON'S DISEASE (HCC): ICD-10-CM

## 2021-05-04 DIAGNOSIS — E03.9 ACQUIRED HYPOTHYROIDISM: ICD-10-CM

## 2021-05-04 DIAGNOSIS — I42.0 CARDIOMYOPATHY, DILATED, NONISCHEMIC (HCC): ICD-10-CM

## 2021-05-04 DIAGNOSIS — R60.0 EDEMA OF BOTH LEGS: ICD-10-CM

## 2021-05-04 DIAGNOSIS — Z79.01 ON CONTINUOUS ORAL ANTICOAGULATION: ICD-10-CM

## 2021-05-04 DIAGNOSIS — I50.42 CHRONIC COMBINED SYSTOLIC AND DIASTOLIC HEART FAILURE (HCC): Primary | ICD-10-CM

## 2021-05-04 DIAGNOSIS — I44.7 LEFT BUNDLE BRANCH BLOCK (LBBB): ICD-10-CM

## 2021-05-04 DIAGNOSIS — E78.2 MIXED DYSLIPIDEMIA: ICD-10-CM

## 2021-05-04 DIAGNOSIS — I48.20 CHRONIC ATRIAL FIBRILLATION (HCC): ICD-10-CM

## 2021-05-04 DIAGNOSIS — I49.3 ASYMPTOMATIC PVCS: ICD-10-CM

## 2021-05-04 DIAGNOSIS — Z79.899 HIGH RISK MEDICATION USE: ICD-10-CM

## 2021-05-04 DIAGNOSIS — Z95.2 H/O MITRAL VALVE REPLACEMENT WITH MECHANICAL VALVE: ICD-10-CM

## 2021-05-04 PROCEDURE — 99214 OFFICE O/P EST MOD 30 MIN: CPT | Performed by: INTERNAL MEDICINE

## 2021-05-04 PROCEDURE — 93000 ELECTROCARDIOGRAM COMPLETE: CPT | Performed by: INTERNAL MEDICINE

## 2021-05-04 RX ORDER — FUROSEMIDE 40 MG/1
20 TABLET ORAL DAILY
Qty: 90 TABLET | Refills: 1
Start: 2021-05-04 | End: 2021-08-14 | Stop reason: ALTCHOICE

## 2021-05-04 NOTE — PROGRESS NOTES
Office Cardiology Progress Note  Jennifer Osei 79 y o  female MRN: [id number]  05/04/21  10:41 AM      ASSESSMENT:       1  Continued improvement in  bilateral pretibial/ankle/pedal edema with onset approximately 16+ months  ago with 9 pound weight gain in 1-1/2+ years and now with 7 pound weight loss in approximately 17+ months with no other evidence of heart failure   This coincides with decrease in dose of furosemide, which is now being taken   At dose of 20 milligrams daily  2  Chronic combined systolic and diastolic heart failure QPRJ 29-44% LVEF and normal MVR function on 06/22/2019 transthoracic echocardiogram   34% ejection fraction on 7/5/16 MUGA scan/underlying nonischemic dilated cardiomyopathy but currently with less exertional fatigue, dyspnea on exertion, and  previous resolution  of exhaustion and exertional cold sweats,  with suppression of occasional nocturnal wheezing   Initially improved on starting dose of Entresto, but had vasovagal type near syncope on 01/18/2019 with no recurrence   Patient with less frequent lightheadedness and  low blood pressures in the 90s-100's  2  Resolved dehydration/hypotension and residual mild dizziness as well as resolution of acute kidney injury since 11/18/16  3  Chronic atrial fibrillation, controlled, and chronic left bundle-branch block with no EKG changes in approximately 1-1/2+ years  4  History of mechanical mitral valve replacement September, 1990, with very good oral anticoagulation with target INR of 2 5-3 5   Latest INR 3 27 on  04/28/2021   5  Moderately frequent asymptomatic PVCs and otherwise benign Holter monitor December, 2013   6  18 9 pound weight loss in approximately 6 4+ years, but recent weight loss of  9  lbs in 17+ months  7  Controlled mixed dyslipidemia  8  Mild obstructive sleep apnea, previously suspected  9  History of asthma  10  History of GERD    11  History of benign positional vertigo with recurrence 14 months ago and again 13+ months ago  12  History of Lyme disease  15  Hypothyroidism with recently normal TSH level, which could have contributed to previous extreme fatigue  14  History of hepatitis C   15  History of Parkinson's disease/gait dysfunction with slowly progressive symptoms    16  Resolved nonproductive cough and postural dizziness with  flu-like illness nearly 2 65 years ago  17  Chronic fatigue and malaise, multifactorial, improving  18  Drug-induced insomnia  19  History of fall with subsequent left distal tibial fracture and left ankle sprain with right periorbital ecchymoses and laceration and left wrist sprain on 7/30/17, with no recurrence, possibly related to transient drop in blood pressure with upright position in hot weather after prolonged sitting  20  Mildly impaired fasting glucose of 104 on 02/09/2021 with A1c of 5 9% on 07/20/2020  Plan       Patient Instructions     1  Continue current medication  2  Cardiology follow-up approximately six months with basic metabolic panel  HPI    This 79 y o  female  denies new cardiopulmonary and medical symptoms  The patient recently discontinued physical therapy because of lack of progress for her chronic left low back pain with left-sided sciatica  She previously was treated for sacroiliitis with steroid and local anesthetic injection by Dr Jennifer Almanzar on 03/19/2021  She developed some ecchymoses over her left lower back with application of heat treatments during physical therapy, which was discontinued  The patient reduced her furosemide from 40 milligrams to 20 milligrams daily, using 1/2 tablet daily of her 40 milligram furosemide  She finds that she needs 6-7 hours near a bathroom after using furosemide  The patient is being seen in follow-up of the below listed diagnoses  Encounter Diagnoses   Name Primary?     Chronic combined systolic and diastolic heart failure (HCC) Yes    Chronic atrial fibrillation ([address] )     Cardiomyopathy, dilated, nonischemic (HCC)     H/O mitral valve replacement with mechanical valve     Left bundle branch block (LBBB)     Asymptomatic PVCs     On continuous oral anticoagulation     Acquired hypothyroidism     Parkinson's disease ([facility] [address] )     Mixed dyslipidemia     High risk medication use     Edema of both legs         Review of Systems    All other systems negative, except as noted in history of present illness    Historical Information   Past Medical History:   Diagnosis Date    Anal fissure     Arthritis     osteo joints    Asthma with acute exacerbation     Blepharitis     Breast lump     Chalazion     CHF, chronic ([facility] [address] )     Difficulty walking     Disease of thyroid gland     Drooling     Dysphagia     Fall 05/04/2018    Fibromyalgia, primary     Glaucoma     Normal pressure    History of transfusion 1996    Hordeolum externum     Hx of transient ischemic attack (TIA)     Hypophonia     Infectious viral hepatitis     Hep C dx 1996     Lyme carditis     Murmur, cardiac     Parkinsons (HCC)     Rotator cuff tendinitis     Seasonal allergies     Urinary frequency      Past Surgical History:   Procedure Laterality Date    BREAST BIOPSY Right 2018    benign    BREAST CYST EXCISION Left     benign    BREAST SURGERY N/A     benign, calcium    CARDIAC SURGERY  1990    mitral valve replacement    CATARACT EXTRACTION Right 2015    CATARACT EXTRACTION Left 2014    CHEST TUBE INSERTION Right     post pleural effusion    CHOLECYSTECTOMY  2000    lap    HYSTERECTOMY  1973    partial    OH ARTHROCENTESIS ASPIR&/INJ MAJOR JT/BURSA W/O US Left 3/19/2021    Procedure: Sacroiliac Joint Injection ( 61139 ); Surgeon: Maribel Ace MD;  Location: [facility] MAIN OR;  Service: Pain Management     OH COLSC FLX W/RMVL OF TUMOR POLYP LESION SNARE TQ N/A 7/18/2017    Procedure: COLONOSCOPY and biopsy ;  Surgeon: Brenda Troy MD;  Location: [place] [address] GI LAB;   Service: Gastroenterology    SKIN BIOPSY      pre cancerous on face    TONSILLECTOMY      US GUIDED BREAST BIOPSY RIGHT COMPLETE Right 2018     Social History     Substance and Sexual Activity   Alcohol Use No     Social History     Substance and Sexual Activity   Drug Use No     Social History     Tobacco Use   Smoking Status Former Smoker    Packs/day: 0 10    Years: 10 00    Pack years: 1 00    Quit date: 5    Years since quittin 3   Smokeless Tobacco Never Used       Family History:  Family History   Problem Relation Age of Onset    Heart disease Mother         murmur Cardiomegaly age 64    Pneumonia Father     Heart disease Brother         mitrsl valve    Parkinsonism Brother     Arthritis Brother     Lupus Daughter     Diabetes Daughter     Depression Daughter          Meds/Allergies     Prior to Admission medications    Medication Sig Start Date End Date Taking? Authorizing Provider   acetaminophen (TYLENOL) 650 mg CR tablet Take 1 tablet (650 mg total) by mouth 3 (three) times a day  Patient taking differently: Take 650 mg by mouth every 8 (eight) hours as needed  19  Yes Yin Rubi MD   alendronate (Fosamax) 70 mg tablet Take 1 tablet (70 mg total) by mouth every 7 days 20  Yes Bobo Fritz MD   carbidopa-levodopa (SINEMET)  mg per tablet Take 1 tablet by mouth four times a day 4/15/21  Yes Angelita Red MD   Cholecalciferol (VITAMIN D3) 2000 units TABS Take 2,000 Units by mouth every morning   Yes Historical Provider, MD   donepezil (ARICEPT) 10 mg tablet Take 1 tablet (10 mg total) by mouth daily at bedtime 21  Yes Patricio Bright MD   entacapone (COMTAN) 200 mg tablet Take 1 tab by mouth four times a day 4/15/21  Yes Angelita Red MD   furosemide (LASIX) 40 mg tablet Take 0 5 tablets (20 mg total) by mouth daily Take one tablet by mouth daily until weight loss of 5-10 lbs as occurred, then can resume use 4-5 days per week   Schedule administration to be done when patient can be near toilet facilities for 4-6 hours  5/4/21  Yes Nyoka Snellen, MD   levothyroxine 50 mcg tablet take 1 tablet by mouth every morning 4/26/21  Yes Gene Og MD   [facility]) 10 mg tablet Take 1 tablet (10 mg total) by mouth daily 7/6/20  Yes Bar Mazariegos MD   Multiple Vitamins-Minerals (OCUVITE ADULT 50+ PO) Take by mouth daily with breakfast   Yes Historical Provider, MD   sacubitril-valsartan Sunitha Blazing)  MG TABS Take 1 tablet by mouth 2 (two) times a day 9/8/20  Yes Nyoka Snellen, MD   warfarin (COUMADIN) 2 5 mg tablet TAKE 1/2 TO 1 TABLET  DAILY BY MOUTH OR AS ORDERED BY PHYSICIAN  9/8/20  Yes Nyoka Snellen, MD   furosemide (LASIX) 40 mg tablet Take 1 tablet (40 mg total) by mouth daily Take one tablet by mouth daily until weight loss of 5-10 lbs as occurred, then can resume use 4-5 days per week  Schedule administration to be done when patient can be near toilet facilities for 4-6 hours  5/21/20 5/4/21 Yes Robert Sanders MD   carbidopa-levodopa-entacapone (STALEVO) -200 MG per tablet take 1 tablet by mouth four times a day  Patient not taking: Reported on 5/4/2021 3/15/21 5/4/21  Santhosh Shanks MD       Allergies   Allergen Reactions    Amantadine Tongue Swelling    Artane [Trihexyphenidyl] Other (See Comments)     Felt "disconnected", "out if it", shaky  Did not feel right     Glycopyrrolate      Nose bleeds    Pollen Extract          Vitals:    05/04/21 0955   BP: 100/62   BP Location: Left arm   Patient Position: Sitting   Cuff Size: Large   Pulse: 75   Resp: 18   Temp: 97 5 °F (36 4 °C)   TempSrc: Temporal   SpO2: 100%   Weight: 82 6 kg (182 lb)   Height: 5' 4 5" (1 638 m)       Body mass index is 30 76 kg/m²  2 pound weight loss in approximately six months and 7 pound weight loss in approximately 10+ months    Physical Exam:    General Appearance:  Alert, cooperative, no distress, appears stated age and is mildly obese  Head:  Normocephalic, without obvious abnormality, atraumatic   Eyes:  PERRL, conjunctiva/corneas clear, EOM's intact,   both eyes   Ears:  Normal TM's and external ear canals, both ears   Nose: Nares normal, septum midline, mucosa normal, no drainage or sinus tenderness   Throat: Lips, mucosa, and tongue normal; teeth and gums normal   Neck: Supple, symmetrical, trachea midline, no adenopathy, thyroid: not enlarged, symmetric, no tenderness/mass/nodules, no carotid bruit or JVD   Back:   Symmetric, no curvature, ROM normal, no CVA tenderness   Lungs:   Clear to auscultation bilaterally, respirations unlabored   Chest Wall:  No tenderness or deformity   Heart:  Irregularly irregular cardiac rhythm, S1 click from mitral valve prosthesis, S2 normal, no murmur, rub or gallop   Abdomen:   Soft, non-tender, bowel sounds active all four quadrants,  no masses, no organomegaly with mild to moderate obesity noted  Extremities: Extremities normal, atraumatic, no cyanosis or edema   Pulses: 1+ and symmetric   Skin: Skin showed normal color, texture, turgor and no rashes or lesions   Lymph nodes: Cervical, supraclavicular, and axillary nodes normal   Neurologic: Normal         Cardiographics    ECG 05/04/21:    Atrial fibrillation with average ventricular rate 61 bpm   Left axis deviation   Left bundle branch block with secondary repolarization abnormalities  No significant change from 07/06/2020    IMAGING:    No Chest XR results available for this patient      Bilateral mammogram 04/09/2021:    Negative bilaterally with routine screening in one year suggested      LAB REVIEW:      Lab Results   Component Value Date    SODIUM 140 02/09/2021    K 4 1 02/09/2021     02/09/2021    CO2 31 02/09/2021    ANIONGAP 9 8 (L) 12/07/2015    BUN 19 02/09/2021    CREATININE 0 97 02/09/2021    GLUCOSE 91 12/07/2015    GLUF 104 (H) 02/09/2021    CALCIUM 9 0 02/09/2021    AST 13 02/09/2021    ALT 13 02/09/2021    ALKPHOS 80 02/09/2021 PROT 6 9 12/07/2015    BILITOT 1 6 (H) 12/07/2015    EGFR 55 02/09/2021    total bilirubin 02/09/2021:  1 40    Lab Results   Component Value Date    CHOLESTEROL 159 02/09/2021    CHOLESTEROL 184 11/12/2020    CHOLESTEROL 195 07/20/2020     Lab Results   Component Value Date    HDL 60 02/09/2021    HDL 67 11/12/2020    HDL 53 07/20/2020       Lab Results   Component Value Date    LDLCALC 82 02/09/2021    LDLCALC 97 11/12/2020    LDLCALC 115 (H) 07/20/2020     No components found for: [facility]  Lab Results   Component Value Date    TRIG 86 02/09/2021    TRIG 102 11/12/2020    TRIG 137 07/20/2020     INR 04/28/2021:  3 27          Semaj Ptael MD

## 2021-05-04 NOTE — PATIENT INSTRUCTIONS
1  Continue current medication  2  Cardiology follow-up approximately six months with basic metabolic panel

## 2021-05-06 ENCOUNTER — OFFICE VISIT (OUTPATIENT)
Dept: DERMATOLOGY | Age: 81
End: 2021-05-06
Payer: MEDICARE

## 2021-05-06 VITALS — BODY MASS INDEX: 30.35 KG/M2 | HEIGHT: 65 IN | TEMPERATURE: 97.2 F | WEIGHT: 182.2 LBS

## 2021-05-06 DIAGNOSIS — L82.1 SEBORRHEIC KERATOSES: ICD-10-CM

## 2021-05-06 DIAGNOSIS — D18.01 CHERRY ANGIOMA: ICD-10-CM

## 2021-05-06 DIAGNOSIS — L81.4 SOLAR LENTIGO: Primary | ICD-10-CM

## 2021-05-06 PROCEDURE — 99203 OFFICE O/P NEW LOW 30 MIN: CPT | Performed by: DERMATOLOGY

## 2021-05-06 NOTE — PATIENT INSTRUCTIONS
LENTIGO      Assessment and Plan:  Based on a thorough discussion of this condition and the management approach to it (including a comprehensive discussion of the known risks, side effects and potential benefits of treatment), the patient (family) agrees to implement the following specific plan:   When outside we recommend using a wide brim hat, sunglasses, long sleeve and pants, sunscreen with SPF 43+ with reapplication every 2 hours, or SPF specific clothing       What is a lentigo? A lentigo is a pigmented flat or slightly raised lesion with a clearly defined edge  Unlike an ephelis (freckle), it does not fade in the winter months  There are several kinds of lentigo  The name lentigo originally referred to its appearance resembling a small lentil  The plural of lentigo is lentigines, although lentigos is also in common use  Who gets lentigines? Lentigines can affect males and females of all ages and races  Solar lentigines are especially prevalent in fair skinned adults  Lentigines associated with syndromes are present at birth or arise during childhood  What causes lentigines? Common forms of lentigo are due to exposure to ultraviolet radiation:   Sun damage including sunburn    Indoor tanning    Phototherapy, especially photochemotherapy (PUVA)    Ionizing radiation, eg radiation therapy, can also cause lentigines  Several familial syndromes associated with widespread lentigines originate from mutations in Dino-MAP kinase, mTOR signaling and PTEN pathways  What are the clinical features of lentigines? Lentigines have been classified into several different types depending on what they look like, where they appear on the body, causative factors, and whether they are associated to other diseases or conditions  Lentigines may be solitary or more often, multiple  Most lentigines are smaller than 5 mm in diameter      Lentigo simplex   A precursor to junctional naevus    Arises during childhood and early adult life    Found on trunk and limbs    Small brown round or oval macule or thin plaque    Jagged or smooth edge    May have a dry surface    May disappear in time  Solar lentigo   A precursor to seborrhoeic keratosis    Found on chronically sun exposed sites such as hands, face, lower legs    May also follow sunburn to shoulders    Yellow, light or dark brown regular or irregular macule or thin plaque    May have a dry surface    Often has moth-eaten outline    Can slowly enlarge to several centimeters in diameter    May disappear, often through the process known as lichenoid keratosis    When atypical in appearance, may be difficult to distinguish from melanoma in situ  Ink spot lentigo   Also known as reticulated lentigo    Few in number compared to solar lentigines    Follows sunburn in very fair skinned individuals    Dark brown to black irregular ink spot-like macule  PUVA lentigo   Similar to ink spot lentigo but follows photochemotherapy (PUVA)    Location anywhere exposed to PUVA  Tanning bed lentigo   Similar to ink spot lentigo but follows indoor tanning    Location anywhere exposed to tanning bed  Radiation lentigo   Occurs in site of irradiation (accidental or therapeutic)    Associated with late-stage radiation dermatitis: epidermal atrophy, subcutaneous fibrosis, keratosis, telangiectasias  Melanotic macule   Mucosal surfaces or adjacent glabrous skin eg lip, vulva, penis, anus    Light to dark brown    Also called mucosal melanosis  Generalised lentigines   Found on any exposed or covered site from early childhood    Small macules may merge to form larger patches    Not associated with a syndrome    Also called lentigines profusa, multiple lentigines  Agminated lentigines   Naevoid eruption of lentigos confined to a single segmental area    Sharp demarcation in midline    May have associated neurological and developmental abnormalities  Patterned lentigines   Inherited tendency to lentigines on face, lips, buttocks, palms, soles    Recognised mainly in people of  ethnicity  Centrofacial neurodysraphic lentiginosis  Associated with mental retardation  Lentiginosis syndromes   Syndromes include LEOPARD/Ball, Peutz-Jeghers, Laugier-Hunziker, Moynahan, Xeroderma pigmentosum, myxoma syndromes (TRIVEDI, NAME, Gauthier), Ruvalcaba-Myhre-Nicole, Bannayan-Zonnana syndrome, Cowden disease (multiple hamartoma syndrome )    Inheritance is autosomal dominant; sporadic cases common    Widespread lentigines present at birth or arise in early childhood    Associated with neural, endocrine, and mesenchymal tumors    How is the diagnosis made? Lentigines are usually diagnosed clinically by their typical appearance  Concern regarding possibility of melanoma may lead to:   Dermatoscopy    Diagnostic excision biopsy    Histopathology of a lentigo shows:   Thickened epidermis    An increased number of melanocytes along the basal layer of epidermis    Unlike junctional melanocytic naevus, there are no nests of melanocytes    Increased melanin pigment within the keratinocytes    Additional features depending on type of lentigo    In contrast, an ephelis (freckle) shows sun-induced increased melanin within the keratinocytes, without an increase in number of cells  What is the treatment for lentigines? Most lentigines are left alone  Attempts to lighten them may not be successful  The following approaches are used:   SPF 50+ broad-spectrum sunscreen    Hydroquinone bleaching cream    Alpha hydroxy acids    Vitamin C    Retinoids    Azelaic acid    Chemical peels  Individual lesions can be permanently removed using:   Cryotherapy    Intense pulsed light    Pigment lasers    How can lentigines be prevented? Lentigines associated with exposure ultraviolet radiation can be prevented by very careful sun protection   Clothing is more successful at preventing new lentigines than are sunscreens  What is the outlook for lentigines? Lentigines usually persist  They may increase in number with age and sun exposure  Some in sun-protected sites may fade and disappear  SHINE ANGIOMAS    Assessment and Plan:  Based on a thorough discussion of this condition and the management approach to it (including a comprehensive discussion of the known risks, side effects and potential benefits of treatment), the patient (family) agrees to implement the following specific plan:   Monitor for changes   Benign, reassured       Assessment and Plan:    Cherry angioma, also known as [employer] spots, are benign vascular skin lesions  A "cherry angioma" is a firm red, blue or purple papule, 0 1-1 cm in diameter  When thrombosed, they can appear black in colour until evaluated with a dermatoscope when the red or purple colour is more easily seen  Cherry angioma may develop on any part of the body but most often appear on the scalp, face, lips and trunk  An angioma is due to proliferating endothelial cells; these are the cells that line the inside of a blood vessel  Angiomas can arise in early life or later in life; the most common type of angioma is a cherry angioma  Cherry angiomas are very common in males and females of any age or race  They are more noticeable in white skin than in skin of colour  They markedly increase in number from about the age of 36  There may be a family history of similar lesions  Eruptive cherry angiomas have been rarely reported to be associated with internal malignancy  The cause of angiomas is unknown  Genetic analysis of cherry angiomas has shown that they frequently carry specific somatic missense mutations in the GNAQ and GNA11 (Q209H) genes, which are involved in other vascular and melanocytic proliferations      Cherry angioma is usually diagnosed clinically and no investigations are necessary for the majority of lesions  It has a characteristic red-clod or lobular pattern on dermatoscopy (called lacunar pattern using conventional pattern analysis)  When there is uncertainty about the diagnosis, a biopsy may be performed  The angioma is composed of venules in a thickened papillary dermis  Collagen bundles may be prominent between the lobules  Cherry angiomas are harmless, so they do not usually have to be treated  Occasionally, they are removed to exclude a malignant skin lesion such as a nodular melanoma or because they are irritated or bleeding (and a subsequent risk for infection)  To decrease friction over the lesions, we recommend Neutrogena Daily Defense SPF 50+ at least 3 times a day  SEBORRHEIC KERATOSIS; NON-INFLAMED      Assessment and Plan:  Based on a thorough discussion of this condition and the management approach to it (including a comprehensive discussion of the known risks, side effects and potential benefits of treatment), the patient (family) agrees to implement the following specific plan:   Monitor for changes   Benign, reassured       Seborrheic Keratosis  A seborrheic keratosis is a harmless warty spot that appears during adult life as a common sign of skin aging  Seborrheic keratoses can arise on any area of skin, covered or uncovered, with the usual exception of the palms and soles  They do not arise from mucous membranes  Seborrheic keratoses can have highly variable appearance  Seborrheic keratoses are extremely common  It has been estimated that over 90% of adults over the age of 61 years have one or more of them  They occur in males and females of all races, typically beginning to erupt in the 35s or 45s  They are uncommon under the age of 21 years  The precise cause of seborrhoeic keratoses is not known  Seborrhoeic keratoses are considered degenerative in nature  As time goes by, seborrheic keratoses tend to become more numerous   Some people inherit a tendency to develop a very large number of them; some people may have hundreds of them  The name "seborrheic keratosis" is misleading, because these lesions are not limited to a seborrhoeic distribution (scalp, mid-face, chest, upper back), nor are they formed from sebaceous glands, nor are they associated with sebum -- which is greasy  Seborrheic keratosis may also be called "SK," "Seb K," "basal cell papilloma," "senile wart," or "barnacle "      Researchers have noted:   Eruptive seborrhoeic keratoses can follow sunburn or dermatitis   Skin friction may be the reason they appear in body folds   Viral cause (e g , human papillomavirus) seems unlikely   Stable and clonal mutations or activation of FRFR3, PIK3CA, KRISTINA, AKT1 and EGFR genes are found in seborrhoeic keratoses   Seborrhoeic keratosis can arise from solar lentigo   FRFR3 mutations also arise in solar lentigines  These mutations are associated with increased age and location on the head and neck, suggesting a role of ultraviolet radiation in these lesions   Seborrheic keratoses do not harbour tumour suppressor gene mutations   Epidermal growth factor receptor inhibitors, which are used to treat some cancers, often result in an increase in verrucal (warty) keratoses  There is no easy way to remove multiple lesions on a single occasion  Unless a specific lesion is "inflamed" and is causing pain or stinging/burning or is bleeding, most insurance companies do not authorize treatment

## 2021-05-06 NOTE — PROGRESS NOTES
[facility] Note     Patient Name: Quincy Christy  Encounter Date: 5 6 21     Have you been cared for by a [facility] in the last 3 years and, if so, which one? No    · Have you traveled outside of the [address] [place] [address] in the past 3 months or outside of the [facility] in the last 2 weeks? No     May we call your Preferred Phone number to discuss your specific medical information? Yes     May we leave a detailed message that includes your specific medical information? Yes      Today's Chief Concerns:   Concern #1:  Skin cancer   Concern #2:      Past Medical History:  Have you personally ever had or currently have any of the following? · Skin cancer (such as Melanoma, Basal Cell Carcinoma, Squamous Cell Carcinoma? (If Yes, please provide more detail)- No  · Eczema: No  · Psoriasis: No  · HIV/AIDS: No  · Hepatitis B or C: No  · Tuberculosis: No  · Systemic Immunosuppression such as Diabetes, Biologic or Immunotherapy, Chemotherapy, Organ Transplantation, Bone Marrow Transplantation (If YES, please provide more detail): No  · Radiation Treatment (If YES, please provide more detail): No  · Any other major medical conditions/concerns? (If Yes, which types)- No    Social History:     What is/was your primary occupation? retired     What are your hobbies/past-times? Needle point embroidery    Family History:  Have any of your "first degree relatives" (parent, brother, sister, or child) had any of the following       · Skin cancer such as Melanoma or Merkel Cell Carcinoma or Pancreatic Cancer? No  · Eczema, Asthma, Hay Fever or Seasonal Allergies: No  · Psoriasis or Psoriatic Arthritis: No  · Do any other medical conditions seem to run in your family? If Yes, what condition and which relatives?   No    Current Medications:   (please update all dermatological medications before printing patient's AVS!)      Current Outpatient Medications:    acetaminophen (TYLENOL) 650 mg CR tablet, Take 1 tablet (650 mg total) by mouth 3 (three) times a day (Patient taking differently: Take 650 mg by mouth every 8 (eight) hours as needed ), Disp: 100 tablet, Rfl: 0    alendronate (Fosamax) 70 mg tablet, Take 1 tablet (70 mg total) by mouth every 7 days, Disp: 12 tablet, Rfl: 3    Cholecalciferol (VITAMIN D3) 2000 units TABS, Take 2,000 Units by mouth every morning, Disp: , Rfl:     donepezil (ARICEPT) 10 mg tablet, Take 1 tablet (10 mg total) by mouth daily at bedtime, Disp: 90 tablet, Rfl: 3    entacapone (COMTAN) 200 mg tablet, Take 1 tab by mouth four times a day, Disp: 120 tablet, Rfl: 4    furosemide (LASIX) 40 mg tablet, Take 0 5 tablets (20 mg total) by mouth daily Take one tablet by mouth daily until weight loss of 5-10 lbs as occurred, then can resume use 4-5 days per week  Schedule administration to be done when patient can be near toilet facilities for 4-6 hours  , Disp: 90 tablet, Rfl: 1    levothyroxine 50 mcg tablet, take 1 tablet by mouth every morning, Disp: 30 tablet, Rfl: 3    memantine (NAMENDA) 10 mg tablet, Take 1 tablet (10 mg total) by mouth daily, Disp: 90 tablet, Rfl: 3    Multiple Vitamins-Minerals (OCUVITE ADULT 50+ PO), Take by mouth daily with breakfast, Disp: , Rfl:     sacubitril-valsartan (Entresto)  MG TABS, Take 1 tablet by mouth 2 (two) times a day, Disp: 180 tablet, Rfl: 3    warfarin (COUMADIN) 2 5 mg tablet, TAKE 1/2 TO 1 TABLET  DAILY BY MOUTH OR AS ORDERED BY PHYSICIAN , Disp: 90 tablet, Rfl: 3    carbidopa-levodopa (SINEMET)  mg per tablet, Take 1 tablet by mouth four times a day (Patient not taking: Reported on 5/6/2021), Disp: 120 tablet, Rfl: 4      Review of Systems:  Have you recently had or currently have any of the following? If YES, what are you doing for the problem?     · Fever, chills or unintended weight loss: No  · Sudden loss or change in your vision: No  · Nausea, vomiting or blood in your stool: No  · Painful or swollen joints: No  · Wheezing or cough: No  · Changing mole or non-healing wound: No  · Nosebleeds: No  · Excessive sweating: YES, heavily at night  · Easy or prolonged bleeding? YES, on blood thinner, warafrin  · Over the last 2 weeks, how often have you been bothered by the following problems? · Taking little interest or pleasure in doing things: 1 - Not at All  · Feeling down, depressed, or hopeless: 1 - Not at All  · Rapid heartbeat with epinephrine:  No    · FEMALES ONLY:    · Are you pregnant or planning to become pregnant? No  · Are you currently or planning to be nursing or breast feeding? No    · Any known allergies? Allergies   Allergen Reactions    Amantadine Tongue Swelling    Artane [Trihexyphenidyl] Other (See Comments)     Felt "disconnected", "out if it", shaky  Did not feel right     Glycopyrrolate      Nose bleeds    Pollen Extract    ·       Physical Exam:     Was a chaperone (Derm Clinical Assistant) present throughout the entire Physical Exam? Yes     Did the Dermatology Team specifically  the patient on the importance of a Full Skin Exam to be sure that nothing is missed clinically?  Yes}  o Did the patient ultimately request or accept a Full Skin Exam?  Yes  o Did the patient specifically refuse to have the areas "under-the-bra" examined by the Dermatologist? No  o Did the patient specifically refuse to have the areas "under-the-underwear" examined by the Dermatologist? No    CONSTITUTIONAL:   Vitals:    05/06/21 1438   Temp: (!) 97 2 °F (36 2 °C)   TempSrc: Probe   Weight: 82 6 kg (182 lb 3 2 oz)   Height: 5' 4 5" (1 638 m)           PSYCH: Normal mood and affect  EYES: Normal conjunctiva  ENT: Normal lips and oral mucosa  CARDIOVASCULAR: No edema  RESPIRATORY: Normal respirations  HEME/LYMPH/IMMUNO:  No regional lymphadenopathy except as noted below in "ASSESSMENT AND PLAN BY DIAGNOSIS"    SKIN:  FULL ORGAN SYSTEM EXAM   Hair, Scalp, Ears, Face Normal except as noted below in Assessment   Neck, Cervical Chain Nodes Normal except as noted below in Assessment   Right Arm/Hand/Fingers Normal except as noted below in Assessment   Left Arm/Hand/Fingers Normal except as noted below in Assessment   Chest/Breasts/Axillae Viewed areas Normal except as noted below in Assessment   Abdomen, Umbilicus Normal except as noted below in Assessment   Back/Spine Normal except as noted below in Assessment   Groin/Genitalia/Buttocks Normal except as noted below in Assessment   Right Leg, Foot, Toes Normal except as noted below in Assessment   Left Leg, Foot, Toes Normal except as noted below in Assessment        Assessment and Plan by Diagnosis:    History of Present Condition:     Duration:  How long has this been an issue for you?    o  getting routine exam due to parkinson medication  Bumps on back of concern to patient   Location Affected:  Where on the body is this affecting you? o  trunk extremities   Quality:  Is there any bleeding, pain, itch, burning/irritation, or redness associated with the skin lesion? o  denies   Severity:  Describe any bleeding, pain, itch, burning/irritation, or redness on a scale of 1 to 10 (with 10 being the worst)  o  denies   Timing:  Does this condition seem to be there pretty constantly or do you notice it more at specific times throughout the day?     o  consistent   Context:  Have you ever noticed that this condition seems to be associated with specific activities you do?    o  denies   Modifying Factors:    o Anything that seems to make the condition worse?    -  denies  o What have you tried to do to make the condition better?    -  denies   Associated Signs and Symptoms:  Does this skin lesion seem to be associated with any of the following:  o  nothing      LENTIGO    Physical Exam:   Anatomic Location Affected:  Trunk extremities   Morphological Description:  Light brown macules   Pertinent Positives:   Pertinent Negatives: Additional History of Present Condition:      Assessment and Plan:  Based on a thorough discussion of this condition and the management approach to it (including a comprehensive discussion of the known risks, side effects and potential benefits of treatment), the patient (family) agrees to implement the following specific plan:   When outside we recommend using a wide brim hat, sunglasses, long sleeve and pants, sunscreen with SPF 94+ with reapplication every 2 hours, or SPF specific clothing       What is a lentigo? A lentigo is a pigmented flat or slightly raised lesion with a clearly defined edge  Unlike an ephelis (freckle), it does not fade in the winter months  There are several kinds of lentigo  The name lentigo originally referred to its appearance resembling a small lentil  The plural of lentigo is lentigines, although lentigos is also in common use  Who gets lentigines? Lentigines can affect males and females of all ages and races  Solar lentigines are especially prevalent in fair skinned adults  Lentigines associated with syndromes are present at birth or arise during childhood  What causes lentigines? Common forms of lentigo are due to exposure to ultraviolet radiation:   Sun damage including sunburn    Indoor tanning    Phototherapy, especially photochemotherapy (PUVA)    Ionizing radiation, eg radiation therapy, can also cause lentigines  Several familial syndromes associated with widespread lentigines originate from mutations in Dino-MAP kinase, mTOR signaling and PTEN pathways  What are the clinical features of lentigines? Lentigines have been classified into several different types depending on what they look like, where they appear on the body, causative factors, and whether they are associated to other diseases or conditions  Lentigines may be solitary or more often, multiple  Most lentigines are smaller than 5 mm in diameter      Lentigo simplex   A precursor to junctional naevus    Arises during childhood and early adult life    Found on trunk and limbs    Small brown round or oval macule or thin plaque    Jagged or smooth edge    May have a dry surface    May disappear in time  Solar lentigo   A precursor to seborrhoeic keratosis    Found on chronically sun exposed sites such as hands, face, lower legs    May also follow sunburn to shoulders    Yellow, light or dark brown regular or irregular macule or thin plaque    May have a dry surface    Often has moth-eaten outline    Can slowly enlarge to several centimeters in diameter    May disappear, often through the process known as lichenoid keratosis    When atypical in appearance, may be difficult to distinguish from melanoma in situ  Ink spot lentigo   Also known as reticulated lentigo    Few in number compared to solar lentigines    Follows sunburn in very fair skinned individuals    Dark brown to black irregular ink spot-like macule  PUVA lentigo   Similar to ink spot lentigo but follows photochemotherapy (PUVA)    Location anywhere exposed to PUVA  Tanning bed lentigo   Similar to ink spot lentigo but follows indoor tanning    Location anywhere exposed to tanning bed  Radiation lentigo   Occurs in site of irradiation (accidental or therapeutic)    Associated with late-stage radiation dermatitis: epidermal atrophy, subcutaneous fibrosis, keratosis, telangiectasias  Melanotic macule   Mucosal surfaces or adjacent glabrous skin eg lip, vulva, penis, anus    Light to dark brown    Also called mucosal melanosis  Generalised lentigines   Found on any exposed or covered site from early childhood    Small macules may merge to form larger patches    Not associated with a syndrome    Also called lentigines profusa, multiple lentigines  Agminated lentigines   Naevoid eruption of lentigos confined to a single segmental area    Sharp demarcation in midline    May have associated neurological and developmental abnormalities  Patterned lentigines   Inherited tendency to lentigines on face, lips, buttocks, palms, soles    Recognised mainly in people of  ethnicity  Centrofacial neurodysraphic lentiginosis  Associated with mental retardation  Lentiginosis syndromes   Syndromes include LEOPARD/Windber, Peutz-Jeghers, Laugier-Hunziker, Moynahan, Xeroderma pigmentosum, myxoma syndromes (TRIVEDI, NAME, Gauthier), Ruvalcaba-Myhre-Nicole, Bannayan-Zonnana syndrome, Cowden disease (multiple hamartoma syndrome )    Inheritance is autosomal dominant; sporadic cases common    Widespread lentigines present at birth or arise in early childhood    Associated with neural, endocrine, and mesenchymal tumors    How is the diagnosis made? Lentigines are usually diagnosed clinically by their typical appearance  Concern regarding possibility of melanoma may lead to:   Dermatoscopy    Diagnostic excision biopsy    Histopathology of a lentigo shows:   Thickened epidermis    An increased number of melanocytes along the basal layer of epidermis    Unlike junctional melanocytic naevus, there are no nests of melanocytes    Increased melanin pigment within the keratinocytes    Additional features depending on type of lentigo    In contrast, an ephelis (freckle) shows sun-induced increased melanin within the keratinocytes, without an increase in number of cells  What is the treatment for lentigines? Most lentigines are left alone  Attempts to lighten them may not be successful  The following approaches are used:   SPF 50+ broad-spectrum sunscreen    Hydroquinone bleaching cream    Alpha hydroxy acids    Vitamin C    Retinoids    Azelaic acid    Chemical peels  Individual lesions can be permanently removed using:   Cryotherapy    Intense pulsed light    Pigment lasers    How can lentigines be prevented? Lentigines associated with exposure ultraviolet radiation can be prevented by very careful sun protection  Clothing is more successful at preventing new lentigines than are sunscreens  What is the outlook for lentigines? Lentigines usually persist  They may increase in number with age and sun exposure  Some in sun-protected sites may fade and disappear  SHINE ANGIOMAS    Physical Exam:   Anatomic Location Affected:  trunk   Morphological Description:  Scattered cherry red, 1-4 mm papules   Pertinent Positives:   Pertinent Negatives: Additional History of Present Condition:      Assessment and Plan:  Based on a thorough discussion of this condition and the management approach to it (including a comprehensive discussion of the known risks, side effects and potential benefits of treatment), the patient (family) agrees to implement the following specific plan:   Monitor for changes   Benign, reassured       Assessment and Plan:    Cherry angioma, also known as [employer] spots, are benign vascular skin lesions  A "cherry angioma" is a firm red, blue or purple papule, 0 1-1 cm in diameter  When thrombosed, they can appear black in colour until evaluated with a dermatoscope when the red or purple colour is more easily seen  Cherry angioma may develop on any part of the body but most often appear on the scalp, face, lips and trunk  An angioma is due to proliferating endothelial cells; these are the cells that line the inside of a blood vessel  Angiomas can arise in early life or later in life; the most common type of angioma is a cherry angioma  Cherry angiomas are very common in males and females of any age or race  They are more noticeable in white skin than in skin of colour  They markedly increase in number from about the age of 36  There may be a family history of similar lesions  Eruptive cherry angiomas have been rarely reported to be associated with internal malignancy  The cause of angiomas is unknown   Genetic analysis of cherry angiomas has shown that they frequently carry specific somatic missense mutations in the GNAQ and GNA11 (Q209H) genes, which are involved in other vascular and melanocytic proliferations  Cherry angioma is usually diagnosed clinically and no investigations are necessary for the majority of lesions  It has a characteristic red-clod or lobular pattern on dermatoscopy (called lacunar pattern using conventional pattern analysis)  When there is uncertainty about the diagnosis, a biopsy may be performed  The angioma is composed of venules in a thickened papillary dermis  Collagen bundles may be prominent between the lobules  Cherry angiomas are harmless, so they do not usually have to be treated  Occasionally, they are removed to exclude a malignant skin lesion such as a nodular melanoma or because they are irritated or bleeding (and a subsequent risk for infection)  To decrease friction over the lesions, we recommend Neutrogena Daily Defense SPF 50+ at least 3 times a day  SEBORRHEIC KERATOSIS; NON-INFLAMED    Physical Exam:   Anatomic Location Affected:  trunk   Morphological Description:  Flat and raised, waxy, smooth to warty textured, yellow to brownish-grey to dark brown to blackish, discrete, "stuck-on" appearing papules   Pertinent Positives:   Pertinent Negatives: Additional History of Present Condition:      Assessment and Plan:  Based on a thorough discussion of this condition and the management approach to it (including a comprehensive discussion of the known risks, side effects and potential benefits of treatment), the patient (family) agrees to implement the following specific plan:   Monitor for changes   Benign, reassured       Seborrheic Keratosis  A seborrheic keratosis is a harmless warty spot that appears during adult life as a common sign of skin aging  Seborrheic keratoses can arise on any area of skin, covered or uncovered, with the usual exception of the palms and soles  They do not arise from mucous membranes   Seborrheic keratoses can have highly variable appearance  Seborrheic keratoses are extremely common  It has been estimated that over 90% of adults over the age of 61 years have one or more of them  They occur in males and females of all races, typically beginning to erupt in the 35s or 45s  They are uncommon under the age of 21 years  The precise cause of seborrhoeic keratoses is not known  Seborrhoeic keratoses are considered degenerative in nature  As time goes by, seborrheic keratoses tend to become more numerous  Some people inherit a tendency to develop a very large number of them; some people may have hundreds of them  The name "seborrheic keratosis" is misleading, because these lesions are not limited to a seborrhoeic distribution (scalp, mid-face, chest, upper back), nor are they formed from sebaceous glands, nor are they associated with sebum -- which is greasy  Seborrheic keratosis may also be called "SK," "Seb K," "basal cell papilloma," "senile wart," or "barnacle "      Researchers have noted:   Eruptive seborrhoeic keratoses can follow sunburn or dermatitis   Skin friction may be the reason they appear in body folds   Viral cause (e g , human papillomavirus) seems unlikely   Stable and clonal mutations or activation of FRFR3, PIK3CA, KRISTINA, AKT1 and EGFR genes are found in seborrhoeic keratoses   Seborrhoeic keratosis can arise from solar lentigo   FRFR3 mutations also arise in solar lentigines  These mutations are associated with increased age and location on the head and neck, suggesting a role of ultraviolet radiation in these lesions   Seborrheic keratoses do not harbour tumour suppressor gene mutations   Epidermal growth factor receptor inhibitors, which are used to treat some cancers, often result in an increase in verrucal (warty) keratoses  There is no easy way to remove multiple lesions on a single occasion    Unless a specific lesion is "inflamed" and is causing pain or stinging/burning or is bleeding, most insurance companies do not authorize treatment        Scribe Attestation    I,:  Brandon De La Torre am acting as a scribe while in the presence of the attending physician :       I,:  Asiya Renteria MD personally performed the services described in this documentation    as scribed in my presence :

## 2021-05-26 ENCOUNTER — TRANSCRIBE ORDERS (OUTPATIENT)
Dept: ADMINISTRATIVE | Facility: HOSPITAL | Age: 81
End: 2021-05-26

## 2021-05-26 ENCOUNTER — ANTICOAG VISIT (OUTPATIENT)
Dept: CARDIOLOGY CLINIC | Facility: CLINIC | Age: 81
End: 2021-05-26

## 2021-05-26 ENCOUNTER — APPOINTMENT (OUTPATIENT)
Dept: LAB | Facility: CLINIC | Age: 81
End: 2021-05-26
Payer: MEDICARE

## 2021-05-26 DIAGNOSIS — I48.20 CHRONIC ATRIAL FIBRILLATION (HCC): ICD-10-CM

## 2021-05-26 DIAGNOSIS — Z95.2 H/O MITRAL VALVE REPLACEMENT WITH MECHANICAL VALVE: ICD-10-CM

## 2021-05-26 DIAGNOSIS — I48.91 ATRIAL FIBRILLATION, UNSPECIFIED TYPE (HCC): ICD-10-CM

## 2021-05-26 DIAGNOSIS — I48.91 ATRIAL FIBRILLATION, UNSPECIFIED TYPE (HCC): Primary | ICD-10-CM

## 2021-05-26 LAB
INR PPP: 3.56 (ref 0.84–1.19)
PROTHROMBIN TIME: 35.3 SECONDS (ref 11.6–14.5)

## 2021-05-26 PROCEDURE — 36415 COLL VENOUS BLD VENIPUNCTURE: CPT

## 2021-05-26 PROCEDURE — 85610 PROTHROMBIN TIME: CPT

## 2021-06-08 ENCOUNTER — ANTICOAG VISIT (OUTPATIENT)
Dept: CARDIOLOGY CLINIC | Facility: CLINIC | Age: 81
End: 2021-06-08

## 2021-06-08 ENCOUNTER — APPOINTMENT (OUTPATIENT)
Dept: LAB | Facility: CLINIC | Age: 81
End: 2021-06-08
Payer: MEDICARE

## 2021-06-08 ENCOUNTER — TRANSCRIBE ORDERS (OUTPATIENT)
Dept: ADMINISTRATIVE | Facility: HOSPITAL | Age: 81
End: 2021-06-08

## 2021-06-08 DIAGNOSIS — I48.20 CHRONIC ATRIAL FIBRILLATION (HCC): ICD-10-CM

## 2021-06-08 DIAGNOSIS — Z95.2 H/O MITRAL VALVE REPLACEMENT WITH MECHANICAL VALVE: ICD-10-CM

## 2021-06-08 DIAGNOSIS — I48.91 ATRIAL FIBRILLATION, UNSPECIFIED TYPE (HCC): ICD-10-CM

## 2021-06-08 DIAGNOSIS — I48.91 ATRIAL FIBRILLATION, UNSPECIFIED TYPE (HCC): Primary | ICD-10-CM

## 2021-06-08 LAB
INR PPP: 2.52 (ref 0.84–1.19)
PROTHROMBIN TIME: 27 SECONDS (ref 11.6–14.5)

## 2021-06-08 PROCEDURE — 36415 COLL VENOUS BLD VENIPUNCTURE: CPT

## 2021-06-08 PROCEDURE — 85610 PROTHROMBIN TIME: CPT

## 2021-06-15 ENCOUNTER — OFFICE VISIT (OUTPATIENT)
Dept: FAMILY MEDICINE CLINIC | Facility: CLINIC | Age: 81
End: 2021-06-15
Payer: MEDICARE

## 2021-06-15 VITALS
HEART RATE: 60 BPM | DIASTOLIC BLOOD PRESSURE: 72 MMHG | SYSTOLIC BLOOD PRESSURE: 120 MMHG | HEIGHT: 65 IN | TEMPERATURE: 97.4 F | RESPIRATION RATE: 14 BRPM | WEIGHT: 186 LBS | BODY MASS INDEX: 30.99 KG/M2

## 2021-06-15 DIAGNOSIS — R53.82 CHRONIC FATIGUE: ICD-10-CM

## 2021-06-15 DIAGNOSIS — E03.9 ACQUIRED HYPOTHYROIDISM: ICD-10-CM

## 2021-06-15 DIAGNOSIS — G89.29 OTHER CHRONIC PAIN: Primary | ICD-10-CM

## 2021-06-15 DIAGNOSIS — G20 PARKINSON'S DISEASE (HCC): ICD-10-CM

## 2021-06-15 PROCEDURE — 99214 OFFICE O/P EST MOD 30 MIN: CPT | Performed by: FAMILY MEDICINE

## 2021-06-15 RX ORDER — CLINDAMYCIN HYDROCHLORIDE 150 MG/1
600 CAPSULE ORAL
COMMUNITY
Start: 2021-05-26

## 2021-06-15 RX ORDER — DULOXETIN HYDROCHLORIDE 30 MG/1
30 CAPSULE, DELAYED RELEASE ORAL DAILY
Qty: 30 CAPSULE | Refills: 3 | Status: SHIPPED | OUTPATIENT
Start: 2021-06-15 | End: 2021-11-04 | Stop reason: ALTCHOICE

## 2021-06-15 NOTE — PROGRESS NOTES
Chief Complaint   Patient presents with    Multiple Concerns        Patient ID: Peng Llanes is a 79 y o  female  HPI  Pt is seeing for f/u chronic conditions -  Has chronic back pain -  Failed steroid injections with pain specialist -  Cannot stand for few min w/o pain -  Walking with a walker -  No back pain with walking -  Under neurologist care for parkinson's disease  - has chronic legs swelling and under cardiologist care for CHF  -  Has hypothyroidism -  Stable     The following portions of the patient's history were reviewed and updated as appropriate: allergies, current medications, past family history, past medical history, past social history, past surgical history and problem list     Review of Systems   Constitutional: Positive for fatigue  Negative for activity change, appetite change and fever  HENT: Negative  Respiratory: Negative  Cardiovascular: Positive for leg swelling  Negative for chest pain and palpitations  Gastrointestinal: Negative  Endocrine: Positive for heat intolerance  Genitourinary: Negative  Musculoskeletal: Positive for back pain and gait problem  Negative for joint swelling and myalgias  Neurological: Negative for dizziness and weakness  Under neurologist care for Parkinson's disease    Psychiatric/Behavioral: Negative for decreased concentration, dysphoric mood and suicidal ideas         Current Outpatient Medications   Medication Sig Dispense Refill    acetaminophen (TYLENOL) 650 mg CR tablet Take 1 tablet (650 mg total) by mouth 3 (three) times a day (Patient taking differently: Take 650 mg by mouth every 8 (eight) hours as needed ) 100 tablet 0    alendronate (Fosamax) 70 mg tablet Take 1 tablet (70 mg total) by mouth every 7 days 12 tablet 3    Cholecalciferol (VITAMIN D3) 2000 units TABS Take 2,000 Units by mouth every morning      clindamycin (CLEOCIN) 150 mg capsule Take 600 mg by mouth 1 hour prior to dental appointment      donepezil (ARICEPT) 10 mg tablet Take 1 tablet (10 mg total) by mouth daily at bedtime 90 tablet 3    entacapone (COMTAN) 200 mg tablet Take 1 tab by mouth four times a day (Patient taking differently: Take 200 mg by mouth 3 (three) times a day Take 1 tab by mouth four times a day) 120 tablet 4    furosemide (LASIX) 40 mg tablet Take 0 5 tablets (20 mg total) by mouth daily Take one tablet by mouth daily until weight loss of 5-10 lbs as occurred, then can resume use 4-5 days per week  Schedule administration to be done when patient can be near toilet facilities for 4-6 hours  90 tablet 1    levothyroxine 50 mcg tablet take 1 tablet by mouth every morning 30 tablet 3    memantine (NAMENDA) 10 mg tablet Take 1 tablet (10 mg total) by mouth daily 90 tablet 3    Multiple Vitamins-Minerals (OCUVITE ADULT 50+ PO) Take by mouth daily with breakfast      sacubitril-valsartan (Entresto)  MG TABS Take 1 tablet by mouth 2 (two) times a day 180 tablet 3    warfarin (COUMADIN) 2 5 mg tablet TAKE 1/2 TO 1 TABLET  DAILY BY MOUTH OR AS ORDERED BY PHYSICIAN  90 tablet 3    carbidopa-levodopa (SINEMET)  mg per tablet Take 1 tablet by mouth four times a day (Patient not taking: Reported on 5/6/2021) 120 tablet 4     No current facility-administered medications for this visit  Objective:    /72 (BP Location: Left arm, Patient Position: Sitting, Cuff Size: Adult)   Pulse 60   Temp (!) 97 4 °F (36 3 °C) (Temporal)   Resp 14   Ht 5' 4 5" (1 638 m)   Wt 84 4 kg (186 lb)   BMI 31 43 kg/m²        Physical Exam  Constitutional:       Appearance: She is not ill-appearing  Cardiovascular:      Rate and Rhythm: Normal rate and regular rhythm  Heart sounds: No murmur heard  Pulmonary:      Effort: Pulmonary effort is normal  No respiratory distress  Breath sounds: No wheezing, rhonchi or rales  Musculoskeletal:      Right lower leg: Edema present  Left lower leg: Edema present        Comments: Using a walker    Neurological:      General: No focal deficit present  Mental Status: She is alert and oriented to person, place, and time  Psychiatric:         Mood and Affect: Mood normal            Labs in chart were reviewed  Assessment/Plan:         Diagnoses and all orders for this visit:    Other chronic pain  -     Ambulatory referral to Pain Management; Future  -     DULoxetine (CYMBALTA) 30 mg delayed release capsule; Take 1 capsule (30 mg total) by mouth daily    Acquired hypothyroidism  -     TSH, 3rd generation; Future  Stable  Cont med  Parkinson's disease ([facility] [address] )  F/u with neurologist   Chronic fatigue  Multi factorial   Other orders  -     clindamycin (CLEOCIN) 150 mg capsule; Take 600 mg by mouth 1 hour prior to dental appointment            BMI Counseling: Body mass index is 31 43 kg/m²  Discussed the patient's BMI with her  The BMI is above normal  Nutrition recommendations include reducing portion sizes, decreasing overall calorie intake, 3-5 servings of fruits/vegetables daily and reducing fast food intake  Exercise recommendations include exercising 3-5 times per week           rto in 3 wks for  Stone eHrnandez MD

## 2021-06-25 ENCOUNTER — ANTICOAG VISIT (OUTPATIENT)
Dept: CARDIOLOGY CLINIC | Facility: CLINIC | Age: 81
End: 2021-06-25

## 2021-06-25 ENCOUNTER — APPOINTMENT (OUTPATIENT)
Dept: LAB | Facility: CLINIC | Age: 81
End: 2021-06-25
Payer: MEDICARE

## 2021-06-25 DIAGNOSIS — Z95.2 H/O MITRAL VALVE REPLACEMENT WITH MECHANICAL VALVE: Primary | ICD-10-CM

## 2021-06-25 DIAGNOSIS — I48.91 ATRIAL FIBRILLATION, UNSPECIFIED TYPE (HCC): ICD-10-CM

## 2021-06-25 DIAGNOSIS — I48.11 LONGSTANDING PERSISTENT ATRIAL FIBRILLATION (HCC): ICD-10-CM

## 2021-06-25 LAB
INR PPP: 2.5 (ref 0.84–1.19)
PROTHROMBIN TIME: 26.8 SECONDS (ref 11.6–14.5)

## 2021-06-25 PROCEDURE — 36415 COLL VENOUS BLD VENIPUNCTURE: CPT

## 2021-06-25 PROCEDURE — 85610 PROTHROMBIN TIME: CPT

## 2021-06-28 DIAGNOSIS — R41.89 COGNITIVE IMPAIRMENT: ICD-10-CM

## 2021-06-29 RX ORDER — MEMANTINE HYDROCHLORIDE 10 MG/1
TABLET ORAL
Qty: 90 TABLET | Refills: 0 | Status: SHIPPED | OUTPATIENT
Start: 2021-06-29 | End: 2021-10-07

## 2021-06-29 NOTE — TELEPHONE ENCOUNTER
I have never seen this patient  Refilling from Dr Ko Jimenez    LOV Dr Valiente Sers 2/25/21 and scheduled with Micah Marcelo on 8/11/21

## 2021-07-06 ENCOUNTER — TELEMEDICINE (OUTPATIENT)
Dept: FAMILY MEDICINE CLINIC | Facility: CLINIC | Age: 81
End: 2021-07-06
Payer: MEDICARE

## 2021-07-06 DIAGNOSIS — Z00.00 MEDICARE ANNUAL WELLNESS VISIT, SUBSEQUENT: Primary | ICD-10-CM

## 2021-07-06 PROCEDURE — 1123F ACP DISCUSS/DSCN MKR DOCD: CPT | Performed by: FAMILY MEDICINE

## 2021-07-06 PROCEDURE — G0439 PPPS, SUBSEQ VISIT: HCPCS | Performed by: FAMILY MEDICINE

## 2021-07-06 NOTE — PATIENT INSTRUCTIONS
Medicare Preventive Visit Patient Instructions  Thank you for completing your Welcome to Medicare Visit or Medicare Annual Wellness Visit today  Your next wellness visit will be due in one year (7/7/2022)  The screening/preventive services that you may require over the next 5-10 years are detailed below  Some tests may not apply to you based off risk factors and/or age  Screening tests ordered at today's visit but not completed yet may show as past due  Also, please note that scanned in results may not display below  Preventive Screenings:  Service Recommendations Previous Testing/Comments   Colorectal Cancer Screening  * Colonoscopy    * Fecal Occult Blood Test (FOBT)/Fecal Immunochemical Test (FIT)  * Fecal DNA/Cologuard Test  * Flexible Sigmoidoscopy Age: 54-65 years old   Colonoscopy: every 10 years (may be performed more frequently if at higher risk)  OR  FOBT/FIT: every 1 year  OR  Cologuard: every 3 years  OR  Sigmoidoscopy: every 5 years  Screening may be recommended earlier than age 48 if at higher risk for colorectal cancer  Also, an individualized decision between you and your healthcare provider will decide whether screening between the ages of 74-80 would be appropriate  Colonoscopy: Not on file  FOBT/FIT: Not on file  Cologuard: Not on file  Sigmoidoscopy: Not on file          Breast Cancer Screening Age: 36 years old  Frequency: every 1-2 years  Not required if history of left and right mastectomy Mammogram: 04/09/2021        Cervical Cancer Screening Between the ages of 21-29, pap smear recommended once every 3 years  Between the ages of 33-67, can perform pap smear with HPV co-testing every 5 years     Recommendations may differ for women with a history of total hysterectomy, cervical cancer, or abnormal pap smears in past  Pap Smear: Not on file        Hepatitis C Screening Once for adults born between [facility]  More frequently in patients at high risk for Hepatitis C Hep C Antibody: Not on file        Diabetes Screening 1-2 times per year if you're at risk for diabetes or have pre-diabetes Fasting glucose: 104 mg/dL   A1C: 5 9 %        Cholesterol Screening Once every 5 years if you don't have a lipid disorder  May order more often based on risk factors  Lipid panel: 02/09/2021          Other Preventive Screenings Covered by Medicare:  1  Abdominal Aortic Aneurysm (AAA) Screening: covered once if your at risk  You're considered to be at risk if you have a family history of AAA  2  Lung Cancer Screening: covers low dose CT scan once per year if you meet all of the following conditions: (1) Age 50-69; (2) No signs or symptoms of lung cancer; (3) Current smoker or have quit smoking within the last 15 years; (4) You have a tobacco smoking history of at least 30 pack years (packs per day multiplied by number of years you smoked); (5) You get a written order from a healthcare provider  3  Glaucoma Screening: covered annually if you're considered high risk: (1) You have diabetes OR (2) Family history of glaucoma OR (3)  aged 48 and older OR (3)  American aged 72 and older  3  Osteoporosis Screening: covered every 2 years if you meet one of the following conditions: (1) You're estrogen deficient and at risk for osteoporosis based off medical history and other findings; (2) Have a vertebral abnormality; (3) On glucocorticoid therapy for more than 3 months; (4) Have primary hyperparathyroidism; (5) On osteoporosis medications and need to assess response to drug therapy  · Last bone density test (DXA Scan): 08/13/2020   5  HIV Screening: covered annually if you're between the age of 15-65  Also covered annually if you are younger than 13 and older than 72 with risk factors for HIV infection  For pregnant patients, it is covered up to 3 times per pregnancy      Immunizations:  Immunization Recommendations   Influenza Vaccine Annual influenza vaccination during flu season is recommended for all persons aged >= 6 months who do not have contraindications   Pneumococcal Vaccine (Prevnar and Pneumovax)  * Prevnar = PCV13  * Pneumovax = PPSV23   Adults 25-60 years old: 1-3 doses may be recommended based on certain risk factors  Adults 72 years old: Prevnar (PCV13) vaccine recommended followed by Pneumovax (PPSV23) vaccine  If already received PPSV23 since turning 65, then PCV13 recommended at least one year after PPSV23 dose  Hepatitis B Vaccine 3 dose series if at intermediate or high risk (ex: diabetes, end stage renal disease, liver disease)   Tetanus (Td) Vaccine - COST NOT COVERED BY MEDICARE PART B Following completion of primary series, a booster dose should be given every 10 years to maintain immunity against tetanus  Td may also be given as tetanus wound prophylaxis  Tdap Vaccine - COST NOT COVERED BY MEDICARE PART B Recommended at least once for all adults  For pregnant patients, recommended with each pregnancy  Shingles Vaccine (Shingrix) - COST NOT COVERED BY MEDICARE PART B  2 shot series recommended in those aged 48 and above     Health Maintenance Due:      Topic Date Due    Hepatitis C Screening  Discontinued     Immunizations Due:      Topic Date Due    Hepatitis A Vaccine (1 of 2 - Risk 2-dose series) Never done    Hepatitis B Vaccine (1 of 3 - Risk 3-dose series) Never done    Influenza Vaccine (1) 09/01/2021     Advance Directives   What are advance directives? Advance directives are legal documents that state your wishes and plans for medical care  These plans are made ahead of time in case you lose your ability to make decisions for yourself  Advance directives can apply to any medical decision, such as the treatments you want, and if you want to donate organs  What are the types of advance directives? There are many types of advance directives, and each state has rules about how to use them   You may choose a combination of any of the following:  · Living will:  This is a written record of the treatment you want  You can also choose which treatments you do not want, which to limit, and which to stop at a certain time  This includes surgery, medicine, IV fluid, and tube feedings  · Durable power of  for healthcare [facility]): This is a written record that states who you want to make healthcare choices for you when you are unable to make them for yourself  This person, called a proxy, is usually a family member or a friend  You may choose more than 1 proxy  · Do not resuscitate (DNR) order:  A DNR order is used in case your heart stops beating or you stop breathing  It is a request not to have certain forms of treatment, such as CPR  A DNR order may be included in other types of advance directives  · Medical directive: This covers the care that you want if you are in a coma, near death, or unable to make decisions for yourself  You can list the treatments you want for each condition  Treatment may include pain medicine, surgery, blood transfusions, dialysis, IV or tube feedings, and a ventilator (breathing machine)  · Values history: This document has questions about your views, beliefs, and how you feel and think about life  This information can help others choose the care that you would choose  Why are advance directives important? An advance directive helps you control your care  Although spoken wishes may be used, it is better to have your wishes written down  Spoken wishes can be misunderstood, or not followed  Treatments may be given even if you do not want them  An advance directive may make it easier for your family to make difficult choices about your care  Weight Management   Why it is important to manage your weight:  Being overweight increases your risk of health conditions such as heart disease, high blood pressure, type 2 diabetes, and certain types of cancer   It can also increase your risk for osteoarthritis, sleep apnea, and other respiratory problems  Aim for a slow, steady weight loss  Even a small amount of weight loss can lower your risk of health problems  How to lose weight safely:  A safe and healthy way to lose weight is to eat fewer calories and get regular exercise  You can lose up about 1 pound a week by decreasing the number of calories you eat by 500 calories each day  Healthy meal plan for weight management:  A healthy meal plan includes a variety of foods, contains fewer calories, and helps you stay healthy  A healthy meal plan includes the following:  · Eat whole-grain foods more often  A healthy meal plan should contain fiber  Fiber is the part of grains, fruits, and vegetables that is not broken down by your body  Whole-grain foods are healthy and provide extra fiber in your diet  Some examples of whole-grain foods are whole-wheat breads and pastas, oatmeal, brown rice, and bulgur  · Eat a variety of vegetables every day  Include dark, leafy greens such as spinach, kale, gaston greens, and mustard greens  Eat yellow and orange vegetables such as carrots, sweet potatoes, and winter squash  · Eat a variety of fruits every day  Choose fresh or canned fruit (canned in its own juice or light syrup) instead of juice  Fruit juice has very little or no fiber  · Eat low-fat dairy foods  Drink fat-free (skim) milk or 1% milk  Eat fat-free yogurt and low-fat cottage cheese  Try low-fat cheeses such as mozzarella and other reduced-fat cheeses  · Choose meat and other protein foods that are low in fat  Choose beans or other legumes such as split peas or lentils  Choose fish, skinless poultry (chicken or turkey), or lean cuts of red meat (beef or pork)  Before you cook meat or poultry, cut off any visible fat  · Use less fat and oil  Try baking foods instead of frying them  Add less fat, such as margarine, sour cream, regular salad dressing and mayonnaise to foods  Eat fewer high-fat foods   Some examples of high-fat foods include french fries, doughnuts, ice cream, and cakes  · Eat fewer sweets  Limit foods and drinks that are high in sugar  This includes candy, cookies, regular soda, and sweetened drinks  Exercise:  Exercise at least 30 minutes per day on most days of the week  Some examples of exercise include walking, biking, dancing, and swimming  You can also fit in more physical activity by taking the stairs instead of the elevator or parking farther away from stores  Ask your healthcare provider about the best exercise plan for you  © Copyright [address] 2018 Information is for End User's use only and may not be sold, redistributed or otherwise used for commercial purposes   All illustrations and images included in CareNotes® are the copyrighted property of [employer]  or [address] [place] [address]

## 2021-07-06 NOTE — PROGRESS NOTES
Assessment and Plan:     Problem List Items Addressed This Visit     None           Preventive health issues were discussed with patient, and age appropriate screening tests were ordered as noted in patient's After Visit Summary  Personalized health advice and appropriate referrals for health education or preventive services given if needed, as noted in patient's After Visit Summary       History of Present Illness:     Patient presents for Medicare Annual Wellness visit    Patient Care Team:  Aly Rodrigues MD as PCP - General  MD Clark Marcos MD Lasandra Hamper, MD Hennie Gross, MD Hennie Gross, MD as Endoscopist     Problem List:     Patient Active Problem List   Diagnosis    H/O mitral valve replacement with mechanical valve    Atrial fibrillation ([facility] [I48 91]    Chronic hepatitis C ([facility] [facility] )    Chronic right-sided low back pain without sciatica    Drug induced insomnia ([facility] )    Dupuytren's contracture    Generalized osteoarthritis    Heart failure, left systolic, chronic ([facility] [address] [facility] )    Hypothyroidism    Memory impairment    Mitral valve disorder    Parkinson's disease ([facility]    Peripheral vascular disease ([facility] [address] [facility] )    Seasonal allergies    Transient ischemic attack    Vitamin D insufficiency    Suprapatellar bursitis of right knee    Numbness and tingling in right hand    Other microscopic hematuria    Cardiomyopathy, dilated, nonischemic (HCC)    Left bundle branch block (LBBB)    Asymptomatic PVCs    On continuous oral anticoagulation    Mixed dyslipidemia    High risk medication use    Other chronic pain      Past Medical and Surgical History:     Past Medical History:   Diagnosis Date    Anal fissure     Arthritis     osteo joints    Asthma with acute exacerbation     Blepharitis     Breast lump     Chalazion     CHF, chronic ([address] )     Difficulty walking     Disease of thyroid gland     Drooling     Dysphagia     Fall 05/04/2018    Fibromyalgia, primary     Glaucoma     Normal pressure    History of transfusion 1996    Hordeolum externum     Hx of transient ischemic attack (TIA)     Hypophonia     Infectious viral hepatitis     Hep C dx 1996     Lyme carditis     Murmur, cardiac     Parkinsons (HCC)     Rotator cuff tendinitis     Seasonal allergies     Urinary frequency      Past Surgical History:   Procedure Laterality Date    BREAST BIOPSY Right 2018    benign    BREAST CYST EXCISION Left     benign    BREAST SURGERY N/A     benign, calcium    CARDIAC SURGERY  1990    mitral valve replacement    CATARACT EXTRACTION Right 2015    CATARACT EXTRACTION Left 2014    CHEST TUBE INSERTION Right     post pleural effusion    CHOLECYSTECTOMY  2000    lap    HYSTERECTOMY  1973    partial    AL ARTHROCENTESIS ASPIR&/INJ MAJOR JT/BURSA W/O US Left 3/19/2021    Procedure: Sacroiliac Joint Injection ( 37324 ); Surgeon: Ibrahima Fulton MD;  Location[facility] MAIN OR;  Service: Pain Management     AL COLSC FLX W/RMVL OF TUMOR POLYP LESION SNARE TQ N/A 7/18/2017    Procedure: COLONOSCOPY and biopsy ;  Surgeon: Cathi Welsh MD;  Location: [facility] GI LAB;   Service: Gastroenterology    SKIN BIOPSY      pre cancerous on face    TONSILLECTOMY      US GUIDED BREAST BIOPSY RIGHT COMPLETE Right 2/13/2018      Family History:     Family History   Problem Relation Age of Onset    Heart disease Mother         murmur Cardiomegaly age 64    Pneumonia Father     Heart disease Brother         mitrsl valve    Parkinsonism Brother     Arthritis Brother     Lupus Daughter     Diabetes Daughter     Depression Daughter       Social History:     Social History     Socioeconomic History    Marital status: /Civil Union     Spouse name: Not on file    Number of children: Not on file    Years of education: Not on file    Highest education level: Not on file   Occupational History    Not on file   Tobacco Use    Smoking status: Former Smoker     Packs/day: 0 10     Years: 10 00     Pack years: 1 00     Quit date: 1970     Years since quittin 5    Smokeless tobacco: Never Used   Vaping Use    Vaping Use: Never used   Substance and Sexual Activity    Alcohol use: No    Drug use: No    Sexual activity: Not Currently   Other Topics Concern    Not on file   Social History Narrative    Not on file     Social Determinants of Health     Financial Resource Strain:     Difficulty of Paying Living Expenses:    Food Insecurity:     Worried About Running Out of Food in the Last Year:     [address] in the Last Year:    Transportation Needs:     Lack of Transportation (Medical):      Lack of Transportation (Non-Medical):    Physical Activity:     Days of Exercise per Week:     Minutes of Exercise per Session:    Stress:     Feeling of Stress :    Social Connections:     Frequency of Communication with Friends and Family:     Frequency of Social Gatherings with Friends and Family:     Attends [employer] Services:     Active Member of Clubs or Organizations:     Attends Club or Organization Meetings:     Marital Status:    Intimate Partner Violence:     Fear of Current or Ex-Partner:     Emotionally Abused:     Physically Abused:     Sexually Abused:       Medications and Allergies:     Current Outpatient Medications   Medication Sig Dispense Refill    acetaminophen (TYLENOL) 650 mg CR tablet Take 1 tablet (650 mg total) by mouth 3 (three) times a day (Patient taking differently: Take 650 mg by mouth every 8 (eight) hours as needed ) 100 tablet 0    alendronate (Fosamax) 70 mg tablet Take 1 tablet (70 mg total) by mouth every 7 days 12 tablet 3    carbidopa-levodopa (SINEMET)  mg per tablet Take 1 tablet by mouth four times a day 120 tablet 4    Cholecalciferol (VITAMIN D3) 2000 units TABS Take 2,000 Units by mouth every morning      clindamycin (CLEOCIN) 150 mg capsule Take 600 mg by mouth 1 hour prior to dental appointment      donepezil (ARICEPT) 10 mg tablet Take 1 tablet (10 mg total) by mouth daily at bedtime 90 tablet 3    DULoxetine (CYMBALTA) 30 mg delayed release capsule Take 1 capsule (30 mg total) by mouth daily 30 capsule 3    entacapone (COMTAN) 200 mg tablet Take 1 tab by mouth four times a day (Patient taking differently: Take 200 mg by mouth 3 (three) times a day Take 1 tab by mouth four times a day) 120 tablet 4    furosemide (LASIX) 40 mg tablet Take 0 5 tablets (20 mg total) by mouth daily Take one tablet by mouth daily until weight loss of 5-10 lbs as occurred, then can resume use 4-5 days per week  Schedule administration to be done when patient can be near toilet facilities for 4-6 hours  90 tablet 1    levothyroxine 50 mcg tablet take 1 tablet by mouth every morning 30 tablet 3    memantine (NAMENDA) 10 mg tablet take 1 tablet by mouth once daily 90 tablet 0    Multiple Vitamins-Minerals (OCUVITE ADULT 50+ PO) Take by mouth daily with breakfast      sacubitril-valsartan (Entresto)  MG TABS Take 1 tablet by mouth 2 (two) times a day 180 tablet 3    warfarin (COUMADIN) 2 5 mg tablet TAKE 1/2 TO 1 TABLET  DAILY BY MOUTH OR AS ORDERED BY PHYSICIAN  90 tablet 3     No current facility-administered medications for this visit  Allergies   Allergen Reactions    Amantadine Tongue Swelling    Artane [Trihexyphenidyl] Other (See Comments)     Felt "disconnected", "out if it", shaky   Did not feel right     Glycopyrrolate      Nose bleeds    Pollen Extract       Immunizations:     Immunization History   Administered Date(s) Administered    H1N1, All Formulations 10/26/2009    Influenza Split High Dose Preservative Free IM 09/25/2014, 10/08/2015, 09/26/2016, 08/22/2017    Influenza, high dose seasonal 0 7 mL 09/10/2018, 10/02/2019, 10/01/2020    Influenza, seasonal, injectable 10/04/2005, 10/31/2006, 10/25/2007, 09/13/2008, 09/17/2009, 09/24/2010, 09/17/2012, 10/01/2013    Pneumococcal Conjugate 13-Valent 11/21/2016    Pneumococcal Polysaccharide PPV23 03/19/2007, 09/03/2014    SARS-CoV-2 / COVID-19 mRNA IM ([employer]) 02/06/2021, 02/28/2021    Tdap 06/17/2008      Health Maintenance:         Topic Date Due    Hepatitis C Screening  Discontinued         Topic Date Due    Hepatitis A Vaccine (1 of 2 - Risk 2-dose series) Never done    Hepatitis B Vaccine (1 of 3 - Risk 3-dose series) Never done    Influenza Vaccine (1) 09/01/2021      Medicare Health Risk Assessment:     There were no vitals taken for this visit  Blas Post is here for her Subsequent Wellness visit  Health Risk Assessment:   Patient rates overall health as fair  Patient feels that their physical health rating is slightly worse  Patient is satisfied with their life  Eyesight was rated as slightly worse  Hearing was rated as same  Patient feels that their emotional and mental health rating is slightly better  Patients states they are never, rarely angry  Patient states they are always unusually tired/fatigued  Pain experienced in the last 7 days has been some  Patient's pain rating has been 6/10  Patient states that she has experienced no weight loss or gain in last 6 months  Depression Screening:   PHQ-2 Score: 1      Fall Risk Screening: In the past year, patient has experienced: no history of falling in past year      Urinary Incontinence Screening:   Patient has leaked urine accidently in the last six months  Home Safety:  Patient has trouble with stairs inside or outside of their home  Patient has working smoke alarms and has working carbon monoxide detector  Home safety hazards include: none  Nutrition:   Current diet is Regular  Medications:   Patient is currently taking over-the-counter supplements  OTC medications include: see medication list  Patient is able to manage medications       Activities of Daily Living (ADLs)/Instrumental Activities of Daily Living (IADLs):   Walk and transfer into and out of bed and chair?: Yes  Dress and groom yourself?: Yes    Bathe or shower yourself?: Yes    Feed yourself? Yes  Do your laundry/housekeeping?: Yes  Manage your money, pay your bills and track your expenses?: Yes  Make your own meals?: Yes    Do your own shopping?: Yes    ADL comments: Needs some help with laundry and housekeeping and making meals    Previous Hospitalizations:   Any hospitalizations or ED visits within the last 12 months?: No      Advance Care Planning:   Living will: Yes    Durable POA for healthcare: Yes    Advanced directive: Yes      Cognitive Screening:   Provider or family/friend/caregiver concerned regarding cognition?: No    PREVENTIVE SCREENINGS      Cardiovascular Screening:    General: Screening Current      Diabetes Screening:     General: Screening Current      Colorectal Cancer Screening:     General: Screening Not Indicated      Breast Cancer Screening:     General: Screening Current      Cervical Cancer Screening:    General: Screening Not Indicated      Osteoporosis Screening:    General: Screening Current      Abdominal Aortic Aneurysm (AAA) Screening:        General: Screening Not Indicated      Lung Cancer Screening:     General: Screening Not Indicated      Hepatitis C Screening:    General: Screening Not Indicated and History Hepatitis C    Screening, Brief Intervention, and Referral to Treatment (SBIRT)    Screening  Typical number of drinks in a day: 0  Typical number of drinks in a week: 0  Interpretation: Low risk drinking behavior      Single Item Drug Screening:  How often have you used an illegal drug (including marijuana) or a prescription medication for non-medical reasons in the past year? never    Single Item Drug Screen Score: 0  Interpretation: Negative screen for possible drug use disorder      Aly Rodrigues MD  Virtual AWV Consent    Reason for visit is AWV    Encounter provider Aly Rodrigues MD    Provider located at [place] [id number] [address] [facility]  [id number]      Recent Visits  No visits were found meeting these conditions  Showing recent visits within past 7 days and meeting all other requirements  Future Appointments  No visits were found meeting these conditions  Showing future appointments within next 150 days and meeting all other requirements       After connecting through televideo, the patient was identified by name and date of birth  Enma Monson was informed that this is a telemedicine visit and that the visit is being conducted through telephone  My office door was closed  No one else was in the room  She acknowledged consent and understanding of privacy and security of the video platform  The patient has agreed to participate and understands they can discontinue the visit at any time    Patient is aware this is a billable service

## 2021-07-19 ENCOUNTER — ANTICOAG VISIT (OUTPATIENT)
Dept: CARDIOLOGY CLINIC | Facility: CLINIC | Age: 81
End: 2021-07-19

## 2021-07-19 ENCOUNTER — APPOINTMENT (OUTPATIENT)
Dept: LAB | Facility: CLINIC | Age: 81
End: 2021-07-19
Payer: MEDICARE

## 2021-07-19 DIAGNOSIS — I48.0 PAROXYSMAL ATRIAL FIBRILLATION (HCC): ICD-10-CM

## 2021-07-19 DIAGNOSIS — Z95.2 H/O MITRAL VALVE REPLACEMENT WITH MECHANICAL VALVE: Primary | ICD-10-CM

## 2021-07-19 DIAGNOSIS — I48.91 ATRIAL FIBRILLATION, UNSPECIFIED TYPE (HCC): ICD-10-CM

## 2021-07-19 DIAGNOSIS — E03.9 ACQUIRED HYPOTHYROIDISM: ICD-10-CM

## 2021-07-19 LAB
ALBUMIN SERPL BCP-MCNC: 3.5 G/DL (ref 3.5–5)
ALP SERPL-CCNC: 85 U/L (ref 46–116)
ALT SERPL W P-5'-P-CCNC: 12 U/L (ref 12–78)
ANION GAP SERPL CALCULATED.3IONS-SCNC: 3 MMOL/L (ref 4–13)
AST SERPL W P-5'-P-CCNC: 15 U/L (ref 5–45)
BILIRUB SERPL-MCNC: 1.17 MG/DL (ref 0.2–1)
BUN SERPL-MCNC: 17 MG/DL (ref 5–25)
CALCIUM SERPL-MCNC: 9.2 MG/DL (ref 8.3–10.1)
CHLORIDE SERPL-SCNC: 106 MMOL/L (ref 100–108)
CO2 SERPL-SCNC: 28 MMOL/L (ref 21–32)
CREAT SERPL-MCNC: 0.97 MG/DL (ref 0.6–1.3)
GFR SERPL CREATININE-BSD FRML MDRD: 55 ML/MIN/1.73SQ M
GLUCOSE P FAST SERPL-MCNC: 110 MG/DL (ref 65–99)
INR PPP: 2.95 (ref 0.84–1.19)
POTASSIUM SERPL-SCNC: 4.7 MMOL/L (ref 3.5–5.3)
PROT SERPL-MCNC: 6.4 G/DL (ref 6.4–8.2)
PROTHROMBIN TIME: 30.5 SECONDS (ref 11.6–14.5)
SODIUM SERPL-SCNC: 137 MMOL/L (ref 136–145)
TSH SERPL DL<=0.05 MIU/L-ACNC: 1.5 UIU/ML (ref 0.36–3.74)

## 2021-07-19 PROCEDURE — 36415 COLL VENOUS BLD VENIPUNCTURE: CPT

## 2021-07-19 PROCEDURE — 85610 PROTHROMBIN TIME: CPT

## 2021-07-19 PROCEDURE — 84443 ASSAY THYROID STIM HORMONE: CPT

## 2021-07-19 PROCEDURE — 80053 COMPREHEN METABOLIC PANEL: CPT

## 2021-07-20 DIAGNOSIS — M85.80 OSTEOPENIA WITH HIGH RISK OF FRACTURE: ICD-10-CM

## 2021-07-20 RX ORDER — ALENDRONATE SODIUM 70 MG/1
TABLET ORAL
Qty: 12 TABLET | Refills: 3 | Status: SHIPPED | OUTPATIENT
Start: 2021-07-20 | End: 2022-06-01

## 2021-08-11 ENCOUNTER — OFFICE VISIT (OUTPATIENT)
Dept: NEUROLOGY | Facility: CLINIC | Age: 81
End: 2021-08-11
Payer: MEDICARE

## 2021-08-11 VITALS
SYSTOLIC BLOOD PRESSURE: 95 MMHG | HEART RATE: 72 BPM | WEIGHT: 184.9 LBS | DIASTOLIC BLOOD PRESSURE: 56 MMHG | BODY MASS INDEX: 31.25 KG/M2

## 2021-08-11 DIAGNOSIS — R13.10 DYSPHAGIA, UNSPECIFIED TYPE: ICD-10-CM

## 2021-08-11 DIAGNOSIS — G20 PARKINSON'S DISEASE (HCC): Primary | ICD-10-CM

## 2021-08-11 PROCEDURE — 99214 OFFICE O/P EST MOD 30 MIN: CPT | Performed by: PHYSICIAN ASSISTANT

## 2021-08-11 NOTE — ASSESSMENT & PLAN NOTE
Patient has noticed some overall progression since the last visit  Increased left greater than right bradykinesia and rigidity on exam  We had a long discussion in regards to the options for her at this time  The patient has low blood pressure which will likely limit our ability to go up on her dopaminergic medications without causing symptomatic orthostatic hypotension  May consider a trial of Midodrine in the future to help increase her pressure and allow for higher Sinemet doses however she would like to discuss this with her Cardiologist first  We also discussed the possibility of trying an MAO B inhibitor (Selegiline, Azilect, Durbin Cantrell) or Artice Peels however she would like to hold off for now  I do feel that she would benefit most from increasing her Sinemet dose to help with her left sided slowness and she will let me know what her Cardiologist says in regards to the Midodrine  If they are okay with potentially starting this medication then may have her increase Sinemet to 1 5tabs four times a day  Also discussed use of compression stockings and increased fluids as well  She does have issues with speech which improved with speech therapy in the past   She is hesitant to go to the therapy right now with Covid however she would be interested in virtual speech therapy  She would like to hold off on physical therapy  She has been having increased swallowing issues and will also order an updated swallow study

## 2021-08-11 NOTE — PATIENT INSTRUCTIONS
Patient has noticed some overall progression since the last visit  Increased left greater than right bradykinesia and rigidity on exam  We had a long discussion in regards to the options for her at this time  The patient has low blood pressure which will likely limit our ability to go up on her dopaminergic medications without causing symptomatic orthostatic hypotension  May consider a trial of Midodrine in the future to help increase her pressure and allow for higher Sinemet doses however she would like to discuss this with her Cardiologist first  We also discussed the possibility of trying an MAO B inhibitor (Selegiline, Azilect, Bandar Barefoot) or [place] however she would like to hold off for now  I do feel that she would benefit most from increasing her Sinemet dose to help with her left sided slowness and she will let me know what her Cardiologist says in regards to the Midodrine  If they are okay with potentially starting this medication then may have her increase Sinemet to 1 5tabs four times a day  Also discussed use of compression stockings and increased fluids as well  She does have issues with speech which improved with speech therapy in the past   She is hesitant to go to the therapy right now with Covid however she would be interested in virtual speech therapy  She would like to hold off on physical therapy  She has been having increased swallowing issues and will also order an updated swallow study

## 2021-08-11 NOTE — PROGRESS NOTES
Patient ID: Pipo Nogueira is a 79 y o  female  Assessment/Plan:    Parkinson's disease [facility])  Patient has noticed some overall progression since the last visit  Increased left greater than right bradykinesia and rigidity on exam  We had a long discussion in regards to the options for her at this time  The patient has low blood pressure which will likely limit our ability to go up on her dopaminergic medications without causing symptomatic orthostatic hypotension  May consider a trial of Midodrine in the future to help increase her pressure and allow for higher Sinemet doses however she would like to discuss this with her Cardiologist first  We also discussed the possibility of trying an MAO B inhibitor (Selegiline, Azilect, Severo Jakes) or [place] however she would like to hold off for now  I do feel that she would benefit most from increasing her Sinemet dose to help with her left sided slowness and she will let me know what her Cardiologist says in regards to the Midodrine  If they are okay with potentially starting this medication then may have her increase Sinemet to 1 5tabs four times a day  Also discussed use of compression stockings and increased fluids as well  She does have issues with speech which improved with speech therapy in the past   She is hesitant to go to the therapy right now with Covid however she would be interested in virtual speech therapy  She would like to hold off on physical therapy  She has been having increased swallowing issues and will also order an updated swallow study  Subjective:    Pipo Nogueira is an 79year-old woman with hep C, heart failure and afib who presents in follow up for Parkinson's disease  To review, symptom onset around 2012 with reduced range in her singing voice  She is levodopa responsive  Symptoms are left>right  Previously followed with Dr Sita Lin  At her last visit she was having some increased tremors and imbalance   Main concern was worsening cognition and her Aricept was increased to 10mg daily  INTERVAL HISTORY:  She feels that she has had deteriorated a bit since her last visit  She gets very tired and fatigued after any activity  She struggles with her sleep  She will talk and yell at times  She has flipped out of her bed on one occasion  She had tried Melatonin on the past with some benefit  She had speech therapy in the past which she found to be very helpful  She has some drooling, more at night  She is able to perform her ADLs however she is very slow  She will feel lightheaded when standing at times  She does feel that her swallowing is getting worse with time  Current medications and timing:  - stalevo 25/100/200 1 tab 4 times per day 7 11 3 QHS  - aricept 10mg    - memantine 10mg daily      Prior trials:  - Primidone not effective   - artane: very poor reactive              I personally reviewed and updated the ROS  Objective:    Blood pressure 95/56, pulse 72, weight 83 9 kg (184 lb 14 4 oz)  Physical Exam  Constitutional:       Appearance: Normal appearance  HENT:      Right Ear: Hearing normal       Left Ear: Hearing normal    Eyes:      General: Lids are normal       Extraocular Movements: Extraocular movements intact  Pupils: Pupils are equal, round, and reactive to light  Pulmonary:      Effort: Pulmonary effort is normal    Neurological:      Mental Status: She is alert  Deep Tendon Reflexes: Strength normal    Psychiatric:         Speech: Speech normal          Neurological Exam  Mental Status  Alert  Oriented to person, place and time  Speech is normal     Cranial Nerves  CN III, IV, VI: Extraocular movements intact bilaterally  Normal lids and orbits bilaterally  Pupils equal round and reactive to light bilaterally  CN V:  Right: Facial sensation is normal   Left: Facial sensation is normal on the left    CN VIII:  Right: Hearing is normal   Left: Hearing is normal   CN XI: Shoulder shrug strength is normal     Motor   Strength is 5/5 throughout all four extremities  Sensory  Light touch is normal in upper and lower extremities  Reflexes  Glabellar tap present  Coordination  Right: Finger-to-nose abnormality:  Left: Finger-to-nose abnormality:    Gait  Casual gait:      UPDRS motor:                              Time since last dose:     8/11/21   Speech  0  1   Facial Expression  2     Rigidity - Neck  0     Rigidity - Upper Extremity (Right)  0  1   Rigidity - Upper Extremity (Left)   1  2   Rigidity - Lower Extremity (Right)  0  0   Rigidity - Lower Extremity (Left)   0  0   Finger Taps (Right)   2  2   Finger Taps (Left)   3  3   Hand Movement (Right)  2  2   Hand Movement (Left)   3  3   Pronation/Supination (Right)  1  1   Pronation/Supination (Left)   3  3   Toe Tapping (Right) 1  1   Toe Tapping (Left) 1  3   Leg Agility (Right)  2  2   Leg Agility (Left)   1  2   Arising from Chair   2  2   Gait   1  2   Freezing of Gait 0  0   Postural Stability         Posture 1  1   Global spontaneity of movement 2-3  3   Postural Tremor (Right) 0  0   Postural Tremor (Left) 0  0   Kinetic Tremor (Right)  0 0   Kinetic Tremor (Left)  1  1   Rest tremor amplitude RUE 0  0   Rest tremor amplitude LUE 1  1   Rest tremor amplitude RLE 0  0   Reset tremor amplitude LLE 0  0   Lip/Jaw Tremor  2     Consistency of tremor 2 1   Motor Exam Total:            ROS:    Review of Systems   Constitutional: Negative  Negative for appetite change and fever  Drooling   HENT: Negative  Negative for hearing loss, tinnitus, trouble swallowing and voice change  Speech difficulty    Eyes: Negative  Negative for photophobia and pain  Respiratory: Negative  Negative for shortness of breath  Cardiovascular: Negative  Negative for palpitations  Gastrointestinal: Negative  Negative for nausea and vomiting  Endocrine: Positive for heat intolerance (sweating )   Negative for cold intolerance  Genitourinary: Negative  Negative for dysuria, frequency and urgency  Musculoskeletal: Positive for gait problem (balnce issues )  Negative for myalgias and neck pain  Skin: Negative  Negative for rash  Neurological: Positive for tremors (increased )  Negative for dizziness, seizures, syncope, facial asymmetry, speech difficulty, weakness, light-headedness, numbness and headaches  Hematological: Negative  Does not bruise/bleed easily  Psychiatric/Behavioral: Positive for sleep disturbance (movement at night, talking rolled out of bed )  Negative for confusion and hallucinations  All other systems reviewed and are negative

## 2021-08-13 DIAGNOSIS — I50.42 CHRONIC COMBINED SYSTOLIC AND DIASTOLIC HEART FAILURE (HCC): ICD-10-CM

## 2021-08-13 DIAGNOSIS — R60.0 EDEMA OF BOTH LEGS: ICD-10-CM

## 2021-08-13 NOTE — TELEPHONE ENCOUNTER
furosemide (LASIX) 40 mg tablet  PATIENT TAKING 20 MG  PLEASE SEND NEW RX TO [facility]    PATIENT WOULD LIKE A CALL FROM DR READ REGARDING NEW MEDICATIONS THAT HER NEUROLOGIST    PLEASE CALL PATIENT [phone]

## 2021-08-14 DIAGNOSIS — I50.42 CHRONIC COMBINED SYSTOLIC AND DIASTOLIC HEART FAILURE (HCC): ICD-10-CM

## 2021-08-14 DIAGNOSIS — I95.1 ORTHOSTATIC HYPOTENSION: Primary | ICD-10-CM

## 2021-08-14 RX ORDER — FUROSEMIDE 20 MG/1
TABLET ORAL
Qty: 52 TABLET | Refills: 3 | Status: SHIPPED | OUTPATIENT
Start: 2021-08-14 | End: 2021-08-23 | Stop reason: DRUGHIGH

## 2021-08-14 RX ORDER — MIDODRINE HYDROCHLORIDE 2.5 MG/1
TABLET ORAL
Qty: 90 TABLET | Refills: 5 | Status: SHIPPED | OUTPATIENT
Start: 2021-08-14 | End: 2021-11-04

## 2021-08-14 NOTE — PROGRESS NOTES
Because of concern about orthostatic hypotension expressed by Neurology, we have started patient on midodrine 2 5 milligrams t i d  for determination as to whether she will tolerate a higher dose of carbidopa-levodopa

## 2021-08-16 ENCOUNTER — TELEPHONE (OUTPATIENT)
Dept: NEUROLOGY | Facility: CLINIC | Age: 81
End: 2021-08-16

## 2021-08-16 NOTE — TELEPHONE ENCOUNTER
Start midodrine first  Wait 2 days then increase Sinemet to 1 5 4 times daily  Entacapone will not be increased  This is the highest dose   Refills for both sent

## 2021-08-16 NOTE — TELEPHONE ENCOUNTER
Patient calling with update for [facility]  Patient stated her cardiologist started her on low dose midodrine (she hasn't started it yet)  Patient requesting to increase her sinemet as discussed with [facility]  May consider a trial of Midodrine in the future to help increase her pressure and allow for higher Sinemet doses however she would like to discuss this with her Cardiologist first  If they are okay with potentially starting this medication then may have her increase Sinemet to 1 5tabs four times a day  Patient currently takes sinemet one tab QID with entacapone 200 mg QID  She's questioning if the entacapone is going to be increased as well  She is requesting refills for increased sinemet dose and entacapone  [facility] on file

## 2021-08-17 RX ORDER — FUROSEMIDE 20 MG/1
20 TABLET ORAL DAILY
Qty: 90 TABLET | Refills: 3
Start: 2021-08-17 | End: 2021-08-23 | Stop reason: DRUGHIGH

## 2021-08-18 ENCOUNTER — HOSPITAL ENCOUNTER (OUTPATIENT)
Dept: RADIOLOGY | Facility: HOSPITAL | Age: 81
Discharge: HOME | End: 2021-08-18
Payer: MEDICARE

## 2021-08-18 ENCOUNTER — APPOINTMENT (OUTPATIENT)
Dept: LAB | Facility: CLINIC | Age: 81
End: 2021-08-18
Payer: MEDICARE

## 2021-08-18 ENCOUNTER — ANTICOAG VISIT (OUTPATIENT)
Dept: CARDIOLOGY CLINIC | Facility: CLINIC | Age: 81
End: 2021-08-18

## 2021-08-18 DIAGNOSIS — I48.91 ATRIAL FIBRILLATION, UNSPECIFIED TYPE (HCC): ICD-10-CM

## 2021-08-18 DIAGNOSIS — G20 PARKINSON'S DISEASE (HCC): ICD-10-CM

## 2021-08-18 DIAGNOSIS — Z95.2 H/O MITRAL VALVE REPLACEMENT WITH MECHANICAL VALVE: Primary | ICD-10-CM

## 2021-08-18 DIAGNOSIS — R13.10 DYSPHAGIA, UNSPECIFIED TYPE: ICD-10-CM

## 2021-08-18 LAB
INR PPP: 2.91 (ref 0.84–1.19)
PROTHROMBIN TIME: 30.2 SECONDS (ref 11.6–14.5)

## 2021-08-18 PROCEDURE — 85610 PROTHROMBIN TIME: CPT

## 2021-08-18 PROCEDURE — 36415 COLL VENOUS BLD VENIPUNCTURE: CPT

## 2021-08-18 PROCEDURE — 74230 X-RAY XM SWLNG FUNCJ C+: CPT

## 2021-08-18 PROCEDURE — 92611 MOTION FLUOROSCOPY/SWALLOW: CPT

## 2021-08-18 NOTE — TELEPHONE ENCOUNTER
Patient stated her rxs were never sent  They didn't get sent electronically  Informed patient that I will call them in to the pharmacy now

## 2021-08-18 NOTE — PROCEDURES
Speech Pathology Videofluoroscopic Swallow Study      Patient Name: Marianna Enamorado    JUNZS'O Date: 8/18/2021     Past Medical History  Past Medical History:   Diagnosis Date    Anal fissure     Arthritis     osteo joints    Asthma with acute exacerbation     Blepharitis     Breast lump     Chalazion     CHF, chronic ([address] )     Difficulty walking     Disease of thyroid gland     Drooling     Dysphagia     Fall 05/04/2018    Fibromyalgia, primary     Glaucoma     Normal pressure    History of transfusion 1996    Hordeolum externum     Hx of transient ischemic attack (TIA)     Hypophonia     Infectious viral hepatitis     Hep C dx 1996     Lyme carditis     Murmur, cardiac     Parkinsons (HCC)     Rotator cuff tendinitis     Seasonal allergies     Urinary frequency  Reflux (by report)        General Information;  Marianna Enamorado is a pleasant 79 y o  female with a PMH remarkable for Parkinson's disease  Current concerns for dysphagia include increased "hesitation" and episodic coughing with oral intake  VBS was recommended to assess oropharyngeal stage swallowing skills at this time  Esme Puff was viewed in lateral position and was given trials of pureed, soft moist food (canned peaches) and solid food (Erica Doone cookie) as well as nectar (mildly) thick liquid, thin liquid, mixed consistency item (fruitwith thin liquid) and a13mm pill with thin liquid  Oral stage: Minimal oral stage dysphagia  Mastication was timely and grossly effective with materials administered today  Bolus formation and transfer were functional with minimal oral residue  Oral control was adequate to minimally impaired (mildly reduced control of the minimal oral residue noted on occasion)  Pharyngeal stage:  Pharyngeal stage swallowing skills also minimally to mildly impaired  Swallowing initiation was prompt to MILDLY delayed  Hyolaryngeal rise and anterior displacement were adequate      AIrway closure/protection appeared complete  Tongue base retraction appeared to be of adequate strength  Pharyngeal constriction also appeared adequate  Management of food/liquid follows:   Food and liquid episodically were "suspeneded" in the vallecular space before swallows were initiated  No penetration/aspiration occurred with the primary swallow  With secondary swallow to manage mild oral residue with liquid, a small amount of liquid penetrated into the laryngeal inlet (no aspiration occurred) and Chandrakant Chaidez did cough in response to this mild penetration  No penetration or aspiration occurred with food  No significant pharyngeal residue noted with food, liquid or the 13 mm pill  Strategies and Efficacy: Use of  Slight neck flexion during primary and secondary swallow could reduce episodes of larygneal penetration  Esophageal stage:  Esophageal screening was completed after administration of the pill  No stasis of any material in the esophagus noted in screening today  Assessment Summary:    Mrs Francois Cantu presented with mild oropharyngeal dysphagia related to mild delay in swallow initiation  Note: Images are available for review in PACS as desired  Recommendations:   Recommended Diet:  regular diet and thin liquids   Recommended Form of Medications: as desired and tolerated (took pill with thin liquid safetly today)   Aspiration precautions:  Upright position and slight neck flexion, swallowing 1-2 times per bite/sip as needed  Limit distractions during meals/intake  Results Reviewed with: patient  SLP Dysphagia therapy recommended: none at this time Sariah Thompson was in agreement with same)  Thank you for this referral   Please do not hesitate to contact me with any questions or concerns      [facility]   Speech Pathologist  [place] License [id number]  [phone]

## 2021-08-23 ENCOUNTER — TELEPHONE (OUTPATIENT)
Dept: CARDIOLOGY CLINIC | Facility: CLINIC | Age: 81
End: 2021-08-23

## 2021-08-23 DIAGNOSIS — I50.42 CHRONIC COMBINED SYSTOLIC AND DIASTOLIC HEART FAILURE (HCC): ICD-10-CM

## 2021-08-23 DIAGNOSIS — R60.0 EDEMA OF BOTH LEGS: ICD-10-CM

## 2021-08-23 RX ORDER — FUROSEMIDE 20 MG/1
TABLET ORAL
Qty: 90 TABLET | Refills: 3
Start: 2021-08-23

## 2021-08-23 RX ORDER — FUROSEMIDE 20 MG/1
TABLET ORAL
Qty: 52 TABLET | Refills: 3
Start: 2021-08-23 | End: 2021-08-23 | Stop reason: SDUPTHER

## 2021-08-23 NOTE — TELEPHONE ENCOUNTER
Reviewed cardiology call as well  It appear they have lower furosamide now to given frequent urination  We could have her lower Sinemet back to 1 tab qid and wait a week  If symptoms resolve and blood pressure appears to normalize then we can try increasing Sinemet again to 1 5 tab 4 times daily

## 2021-08-23 NOTE — TELEPHONE ENCOUNTER
Spoke to patient by telephone and advised reduction in dose of furosemide to 10 milligrams four days a week from 20 milligrams four days a week by cutting tablets in half  Recommended she monitor carefully for signs of fluid retention, such as weight gain or ankle edema

## 2021-08-23 NOTE — TELEPHONE ENCOUNTER
Received call from patient who reports she started midodrine on Friday and her carbidopa-levodopa was increased on Sunday  Patient reports she feels unsteady more than normal and feels a heaviness on her chest and weak  /54, HR 77     Patient denies chest pain, tightness, palpations, sob, numbness/tingling in extremities, pain in neck or jaw, dizziness or lightheadedness  Patient thinks it may be due to her medications but is not sure  Advised patient is symptoms worsen or change call 911 and proceed to ED  Patient verbalized understanding  Please advise     Thank you

## 2021-08-23 NOTE — TELEPHONE ENCOUNTER
Spoke with patient, message given per Dr Anabell Ireland  Patient verbalized understanding  Patient failed to mention in previous phone call that she has been up every 15-20 minutes urinating and was wondering if she can cut back on the furosemide? Patient denies any abdominal pain or burning when urinating

## 2021-08-23 NOTE — TELEPHONE ENCOUNTER
Patient calling to report that she started the midodrine (prescribed by cardiologist) on Friday, waited 2 days as advised and started the increased dose of sinemet yesterday 8/22  She reports feeling "funny" since starting med changes  She did call cardiologist and is awaiting a call back  She reports she took her BP at 12:14  Pm today and it was 102/54  Asymptomatic  She stated she felt better on the lower dose of sinemet, she reports feeling very shaky and is having trouble with her balance  She's questioning if she should continue the midodrine and increased sinemet dose  Please advise

## 2021-08-23 NOTE — TELEPHONE ENCOUNTER
Midodrine should not  contribute to feeling more unsteady or having chest heaviness and weakness and was started in a very low dose  It is also very soon to  how she will tolerate the increased dose of carbidopa-levodopa  She will have to use her judgment as to whether she feels she can be more patient with how she is feeling  The vital signs seem normal   Notify patient of this impression  If she has additional questions, let me know

## 2021-08-25 NOTE — TELEPHONE ENCOUNTER
Patient advised to lower sinemet down for one week and call with updates  She stated she does feel a little bit better she thinks she may have had a cold that was contributing to her sxs  She will call us next week

## 2021-09-01 DIAGNOSIS — E03.9 HYPOTHYROIDISM, UNSPECIFIED TYPE: ICD-10-CM

## 2021-09-02 RX ORDER — LEVOTHYROXINE SODIUM 0.05 MG/1
TABLET ORAL
Qty: 30 TABLET | Refills: 3 | Status: SHIPPED | OUTPATIENT
Start: 2021-09-02 | End: 2021-12-22

## 2021-09-07 ENCOUNTER — TELEPHONE (OUTPATIENT)
Dept: CARDIOLOGY CLINIC | Facility: CLINIC | Age: 81
End: 2021-09-07

## 2021-09-07 NOTE — TELEPHONE ENCOUNTER
I spoke with patient, made aware of recommendations  She reports a bp this morning of 111/58 with a pulse of 80  She states she is very very tired  She took a standing blood pressure was 97/58 pulse 76    9/3 94/55 standing 96/53

## 2021-09-07 NOTE — TELEPHONE ENCOUNTER
Patient calling with update since decreasing her sinemet  She has been taking one tab QID  She stated she hasn't noticed any improvement  She stated she feels more exhausted if anything  She's been checking her BPs  Most recent readin/58 (sitting) 98/58 (standing)  She stated she's not sure if she should continue the midodrine  She stated she will call cardiology regarding this  She's taking midodrine 2 5 mg TID    Please advise on any further recommendations

## 2021-09-07 NOTE — TELEPHONE ENCOUNTER
Blas Post called to update you on the dosage of carbidopa levodopa - neurology had decreased the dosage to one tablet four times a day from 1 5 tablets four times a day  Does this indicate a need for change in other medications as was done before? Can you please advise?

## 2021-09-07 NOTE — TELEPHONE ENCOUNTER
There were a couple of changes that happened at the same time  Given she feels more exhausted but her blood pressure normalized, could she try increasing her Sinemet again to 1 5 alternating with 1 tab for a week then 1 5 tabs qid  while still on midodrine

## 2021-09-07 NOTE — TELEPHONE ENCOUNTER
Notify patient that there are ongoing concerns regarding low blood pressure and interaction with her warfarin, both of which will need to be monitored frequently  If the blood pressure gets too high or too low, let me know  No need to change any of the current medication for now

## 2021-09-07 NOTE — TELEPHONE ENCOUNTER
Notify patient that blood pressures standing in the 90s are acceptable, but she should notify us if she consistently runs under 90 systolic

## 2021-09-14 DIAGNOSIS — I50.42 CHRONIC COMBINED SYSTOLIC AND DIASTOLIC HEART FAILURE (HCC): ICD-10-CM

## 2021-09-14 RX ORDER — SACUBITRIL AND VALSARTAN 97; 103 MG/1; MG/1
TABLET, FILM COATED ORAL
Qty: 180 TABLET | Refills: 3 | Status: SHIPPED | OUTPATIENT
Start: 2021-09-14 | End: 2022-05-04 | Stop reason: DRUGHIGH

## 2021-09-21 ENCOUNTER — APPOINTMENT (OUTPATIENT)
Dept: LAB | Facility: CLINIC | Age: 81
End: 2021-09-21
Payer: MEDICARE

## 2021-09-21 ENCOUNTER — ANTICOAG VISIT (OUTPATIENT)
Dept: CARDIOLOGY CLINIC | Facility: CLINIC | Age: 81
End: 2021-09-21

## 2021-09-21 DIAGNOSIS — Z95.2 H/O MITRAL VALVE REPLACEMENT WITH MECHANICAL VALVE: Primary | ICD-10-CM

## 2021-09-21 DIAGNOSIS — Z95.2 S/P MVR (MITRAL VALVE REPLACEMENT): Primary | ICD-10-CM

## 2021-09-21 LAB
INR PPP: 2.98 (ref 0.84–1.19)
PROTHROMBIN TIME: 29.5 SECONDS (ref 11.6–14.5)

## 2021-09-21 PROCEDURE — 36415 COLL VENOUS BLD VENIPUNCTURE: CPT

## 2021-09-21 PROCEDURE — 85610 PROTHROMBIN TIME: CPT

## 2021-09-24 ENCOUNTER — CLINICAL SUPPORT (OUTPATIENT)
Dept: FAMILY MEDICINE CLINIC | Facility: CLINIC | Age: 81
End: 2021-09-24
Payer: MEDICARE

## 2021-09-24 DIAGNOSIS — Z23 NEED FOR INFLUENZA VACCINATION: Primary | ICD-10-CM

## 2021-09-24 PROCEDURE — G0008 ADMIN INFLUENZA VIRUS VAC: HCPCS

## 2021-09-24 PROCEDURE — 90662 IIV NO PRSV INCREASED AG IM: CPT

## 2021-10-05 DIAGNOSIS — G30.9 ALZHEIMER'S DEMENTIA WITHOUT BEHAVIORAL DISTURBANCE, UNSPECIFIED TIMING OF DEMENTIA ONSET (HCC): ICD-10-CM

## 2021-10-05 DIAGNOSIS — F02.80 ALZHEIMER'S DEMENTIA WITHOUT BEHAVIORAL DISTURBANCE, UNSPECIFIED TIMING OF DEMENTIA ONSET (HCC): ICD-10-CM

## 2021-10-05 RX ORDER — DONEPEZIL HYDROCHLORIDE 10 MG/1
10 TABLET, FILM COATED ORAL
Qty: 90 TABLET | Refills: 3 | Status: SHIPPED | OUTPATIENT
Start: 2021-10-05

## 2021-10-07 DIAGNOSIS — Z95.2 S/P MVR (MITRAL VALVE REPLACEMENT): ICD-10-CM

## 2021-10-07 DIAGNOSIS — Z79.01 ANTICOAGULATED: ICD-10-CM

## 2021-10-07 RX ORDER — WARFARIN SODIUM 2.5 MG/1
TABLET ORAL
Qty: 90 TABLET | Refills: 3 | Status: SHIPPED | OUTPATIENT
Start: 2021-10-07 | End: 2022-05-10 | Stop reason: SDUPTHER

## 2021-10-16 ENCOUNTER — IMMUNIZATIONS (OUTPATIENT)
Dept: FAMILY MEDICINE CLINIC | Facility: HOSPITAL | Age: 81
End: 2021-10-16

## 2021-10-16 DIAGNOSIS — Z23 ENCOUNTER FOR IMMUNIZATION: Primary | ICD-10-CM

## 2021-10-16 PROCEDURE — 0001A SARS-COV-2 / COVID-19 MRNA VACCINE (PFIZER-BIONTECH) 30 MCG: CPT

## 2021-10-16 PROCEDURE — 91300 SARS-COV-2 / COVID-19 MRNA VACCINE (PFIZER-BIONTECH) 30 MCG: CPT

## 2021-10-20 DIAGNOSIS — I48.0 PAROXYSMAL ATRIAL FIBRILLATION (HCC): Primary | ICD-10-CM

## 2021-10-25 ENCOUNTER — ANTICOAG VISIT (OUTPATIENT)
Dept: CARDIOLOGY CLINIC | Facility: CLINIC | Age: 81
End: 2021-10-25

## 2021-10-25 ENCOUNTER — APPOINTMENT (OUTPATIENT)
Dept: LAB | Facility: CLINIC | Age: 81
End: 2021-10-25
Payer: MEDICARE

## 2021-10-25 DIAGNOSIS — Z95.2 H/O MITRAL VALVE REPLACEMENT WITH MECHANICAL VALVE: Primary | ICD-10-CM

## 2021-11-01 ENCOUNTER — APPOINTMENT (OUTPATIENT)
Dept: LAB | Facility: CLINIC | Age: 81
End: 2021-11-01
Payer: MEDICARE

## 2021-11-01 DIAGNOSIS — Z79.899 HIGH RISK MEDICATION USE: ICD-10-CM

## 2021-11-01 DIAGNOSIS — I50.42 CHRONIC COMBINED SYSTOLIC AND DIASTOLIC HEART FAILURE (HCC): ICD-10-CM

## 2021-11-01 LAB
ANION GAP SERPL CALCULATED.3IONS-SCNC: 4 MMOL/L (ref 4–13)
BUN SERPL-MCNC: 15 MG/DL (ref 5–25)
CALCIUM SERPL-MCNC: 9.2 MG/DL (ref 8.3–10.1)
CHLORIDE SERPL-SCNC: 109 MMOL/L (ref 100–108)
CO2 SERPL-SCNC: 23 MMOL/L (ref 21–32)
CREAT SERPL-MCNC: 1.02 MG/DL (ref 0.6–1.3)
GFR SERPL CREATININE-BSD FRML MDRD: 51 ML/MIN/1.73SQ M
GLUCOSE P FAST SERPL-MCNC: 115 MG/DL (ref 65–99)
POTASSIUM SERPL-SCNC: 4.1 MMOL/L (ref 3.5–5.3)
SODIUM SERPL-SCNC: 136 MMOL/L (ref 136–145)

## 2021-11-01 PROCEDURE — 80048 BASIC METABOLIC PNL TOTAL CA: CPT

## 2021-11-04 ENCOUNTER — OFFICE VISIT (OUTPATIENT)
Dept: CARDIOLOGY CLINIC | Facility: CLINIC | Age: 81
End: 2021-11-04
Payer: MEDICARE

## 2021-11-04 VITALS
DIASTOLIC BLOOD PRESSURE: 60 MMHG | HEART RATE: 71 BPM | HEIGHT: 65 IN | TEMPERATURE: 97.6 F | OXYGEN SATURATION: 100 % | SYSTOLIC BLOOD PRESSURE: 110 MMHG | BODY MASS INDEX: 29.99 KG/M2 | WEIGHT: 180 LBS

## 2021-11-04 DIAGNOSIS — G45.9 TRANSIENT ISCHEMIC ATTACK: ICD-10-CM

## 2021-11-04 DIAGNOSIS — I44.7 LEFT BUNDLE BRANCH BLOCK (LBBB): ICD-10-CM

## 2021-11-04 DIAGNOSIS — E78.2 MIXED DYSLIPIDEMIA: ICD-10-CM

## 2021-11-04 DIAGNOSIS — I73.9 PERIPHERAL VASCULAR DISEASE (HCC): ICD-10-CM

## 2021-11-04 DIAGNOSIS — E03.9 ACQUIRED HYPOTHYROIDISM: ICD-10-CM

## 2021-11-04 DIAGNOSIS — Z95.4 S/P MITRAL VALVE REPLACEMENT WITH METALLIC VALVE: ICD-10-CM

## 2021-11-04 DIAGNOSIS — I42.0 CARDIOMYOPATHY, DILATED, NONISCHEMIC (HCC): ICD-10-CM

## 2021-11-04 DIAGNOSIS — I49.3 ASYMPTOMATIC PVCS: ICD-10-CM

## 2021-11-04 DIAGNOSIS — G20 PARKINSON'S DISEASE (HCC): ICD-10-CM

## 2021-11-04 DIAGNOSIS — I48.20 CHRONIC ATRIAL FIBRILLATION (HCC): ICD-10-CM

## 2021-11-04 DIAGNOSIS — I95.1 ORTHOSTATIC HYPOTENSION: ICD-10-CM

## 2021-11-04 DIAGNOSIS — Z79.01 ON CONTINUOUS ORAL ANTICOAGULATION: ICD-10-CM

## 2021-11-04 DIAGNOSIS — I50.42 CHRONIC COMBINED SYSTOLIC AND DIASTOLIC HEART FAILURE (HCC): Primary | ICD-10-CM

## 2021-11-04 DIAGNOSIS — Z79.899 HIGH RISK MEDICATION USE: ICD-10-CM

## 2021-11-04 PROCEDURE — 99214 OFFICE O/P EST MOD 30 MIN: CPT | Performed by: INTERNAL MEDICINE

## 2021-11-04 PROCEDURE — 93000 ELECTROCARDIOGRAM COMPLETE: CPT | Performed by: INTERNAL MEDICINE

## 2021-11-04 RX ORDER — MIDODRINE HYDROCHLORIDE 2.5 MG/1
TABLET ORAL
Qty: 90 TABLET | Refills: 5
Start: 2021-11-04 | End: 2022-05-04 | Stop reason: ALTCHOICE

## 2021-11-30 ENCOUNTER — OFFICE VISIT (OUTPATIENT)
Dept: FAMILY MEDICINE CLINIC | Facility: CLINIC | Age: 81
End: 2021-11-30
Payer: MEDICARE

## 2021-11-30 VITALS
OXYGEN SATURATION: 99 % | RESPIRATION RATE: 14 BRPM | BODY MASS INDEX: 31.09 KG/M2 | DIASTOLIC BLOOD PRESSURE: 64 MMHG | TEMPERATURE: 96.9 F | HEIGHT: 65 IN | WEIGHT: 186.6 LBS | SYSTOLIC BLOOD PRESSURE: 108 MMHG | HEART RATE: 81 BPM

## 2021-11-30 DIAGNOSIS — L03.116 LEFT LEG CELLULITIS: Primary | ICD-10-CM

## 2021-11-30 PROCEDURE — 99213 OFFICE O/P EST LOW 20 MIN: CPT | Performed by: NURSE PRACTITIONER

## 2021-11-30 RX ORDER — CEPHALEXIN 500 MG/1
500 CAPSULE ORAL EVERY 8 HOURS SCHEDULED
Qty: 21 CAPSULE | Refills: 0 | Status: SHIPPED | OUTPATIENT
Start: 2021-11-30 | End: 2021-12-07

## 2021-12-02 ENCOUNTER — APPOINTMENT (OUTPATIENT)
Dept: LAB | Facility: CLINIC | Age: 81
End: 2021-12-02
Payer: MEDICARE

## 2021-12-03 ENCOUNTER — ANTICOAG VISIT (OUTPATIENT)
Dept: CARDIOLOGY CLINIC | Facility: CLINIC | Age: 81
End: 2021-12-03

## 2021-12-03 DIAGNOSIS — Z95.2 H/O MITRAL VALVE REPLACEMENT WITH MECHANICAL VALVE: Primary | ICD-10-CM

## 2021-12-22 DIAGNOSIS — E03.9 HYPOTHYROIDISM, UNSPECIFIED TYPE: ICD-10-CM

## 2021-12-22 DIAGNOSIS — G20 PARKINSON DISEASE (HCC): ICD-10-CM

## 2021-12-22 RX ORDER — LEVOTHYROXINE SODIUM 0.05 MG/1
TABLET ORAL
Qty: 30 TABLET | Refills: 3 | Status: SHIPPED | OUTPATIENT
Start: 2021-12-22 | End: 2022-05-03

## 2021-12-23 RX ORDER — ENTACAPONE 200 MG/1
TABLET ORAL
Qty: 120 TABLET | Refills: 4 | Status: SHIPPED | OUTPATIENT
Start: 2021-12-23 | End: 2022-06-17

## 2022-01-01 ENCOUNTER — TELEMEDICINE (OUTPATIENT)
Dept: NEUROLOGY | Facility: CLINIC | Age: 82
End: 2022-01-01

## 2022-01-01 DIAGNOSIS — J11.1 INFLUENZA: Primary | ICD-10-CM

## 2022-01-01 DIAGNOSIS — G20.A1 PD (PARKINSON'S DISEASE) (HCC): Primary | ICD-10-CM

## 2022-01-01 RX ORDER — OSELTAMIVIR PHOSPHATE 30 MG/1
30 CAPSULE ORAL EVERY 12 HOURS SCHEDULED
Qty: 10 CAPSULE | Refills: 0 | Status: SHIPPED | OUTPATIENT
Start: 2022-01-01 | End: 2022-01-01 | Stop reason: SDUPTHER

## 2022-01-05 ENCOUNTER — ANTICOAG VISIT (OUTPATIENT)
Dept: CARDIOLOGY CLINIC | Facility: CLINIC | Age: 82
End: 2022-01-05

## 2022-01-05 ENCOUNTER — APPOINTMENT (OUTPATIENT)
Dept: LAB | Facility: CLINIC | Age: 82
End: 2022-01-05
Payer: MEDICARE

## 2022-01-05 DIAGNOSIS — Z95.2 H/O MITRAL VALVE REPLACEMENT WITH MECHANICAL VALVE: Primary | ICD-10-CM

## 2022-01-05 DIAGNOSIS — Z95.2 S/P MVR (MITRAL VALVE REPLACEMENT): Primary | ICD-10-CM

## 2022-01-05 DIAGNOSIS — I50.42 CHRONIC COMBINED SYSTOLIC AND DIASTOLIC HEART FAILURE (HCC): ICD-10-CM

## 2022-01-05 DIAGNOSIS — E78.2 MIXED DYSLIPIDEMIA: ICD-10-CM

## 2022-01-07 NOTE — TELEPHONE ENCOUNTER
----- Message from Yin Rubi MD sent at 10/8/2018  8:21 PM EDT -----  Call patient and tell her that nothing serious was found on recent blood tests  Will review in detail at next appointment  NT- pt was found sitting EOB

## 2022-01-14 ENCOUNTER — VBI (OUTPATIENT)
Dept: ADMINISTRATIVE | Facility: OTHER | Age: 82
End: 2022-01-14

## 2022-01-18 ENCOUNTER — ANTICOAG VISIT (OUTPATIENT)
Dept: CARDIOLOGY CLINIC | Facility: CLINIC | Age: 82
End: 2022-01-18

## 2022-01-18 ENCOUNTER — APPOINTMENT (OUTPATIENT)
Dept: LAB | Facility: CLINIC | Age: 82
End: 2022-01-18
Payer: MEDICARE

## 2022-01-18 DIAGNOSIS — Z95.2 H/O MITRAL VALVE REPLACEMENT WITH MECHANICAL VALVE: Primary | ICD-10-CM

## 2022-01-19 ENCOUNTER — OFFICE VISIT (OUTPATIENT)
Dept: NEUROLOGY | Facility: CLINIC | Age: 82
End: 2022-01-19
Payer: MEDICARE

## 2022-01-19 VITALS
WEIGHT: 186.1 LBS | DIASTOLIC BLOOD PRESSURE: 56 MMHG | HEART RATE: 79 BPM | SYSTOLIC BLOOD PRESSURE: 112 MMHG | BODY MASS INDEX: 31.45 KG/M2 | TEMPERATURE: 96.2 F

## 2022-01-19 DIAGNOSIS — I95.1 ORTHOSTATIC HYPOTENSION: ICD-10-CM

## 2022-01-19 DIAGNOSIS — G20 PARKINSON DISEASE (HCC): ICD-10-CM

## 2022-01-19 DIAGNOSIS — R26.89 IMBALANCE: ICD-10-CM

## 2022-01-19 DIAGNOSIS — R13.10 DYSPHAGIA, UNSPECIFIED TYPE: Primary | ICD-10-CM

## 2022-01-19 DIAGNOSIS — G20 PARKINSON'S DISEASE (HCC): ICD-10-CM

## 2022-01-19 DIAGNOSIS — R41.3 MEMORY IMPAIRMENT: ICD-10-CM

## 2022-01-19 PROCEDURE — 99215 OFFICE O/P EST HI 40 MIN: CPT | Performed by: PHYSICIAN ASSISTANT

## 2022-01-19 NOTE — PROGRESS NOTES
Patient ID: Aaron Cody is a 79 y o  female  Assessment/Plan:    Parkinson's disease [facility]  Patient with Parkinson's disease for 8+ years  She is having some overall progression with increased slowness as well as imbalance  Unfortunately she did not feel right with the increased Sinemet dose at the last visit however at that time she had also been started on midodrine so unclear which 1 was the cause  We once again had a long discussion in regards to the goal and role of increasing her dopamine replacement  Certainly on exam today she continues to have left greater than right bradykinesia as well as imbalance and postural instability  Because of this she would be open to a retrial of increasing her Sinemet dose at this time  We will try taking it very slow to avoid any negative side effects  She will start by taking Sinemet 1 5 tabs in the morning and 1 tab other doses for a week  If this is tolerated then she will increase to 1 5 tabs for the 1st 2 doses 1 tab for the other 2 doses for a week  If still tolerated she will increase to 1 5 tabs for the 1st 3 doses 1 tab for the last dose for a week and then 1 5 tabs 4 times a day  If at any point during this increase she has any side effects she will call the office  If she starts having worsening orthostatic hypotension then we can certainly discuss restarting the midodrine which she had been on before  Because of her worsening of imbalance and no recent imaging of the brain will get an updated MRI at this time to make sure there is no underlying structural cause or perhaps any indication of cerebellar atrophy  She is also continuing to have swallowing issues which she feels has worsened since the last visit  She did have a swallow study done in August however given this clear progression we will have her repeat the swallow study at this time  Perhaps she may benefit from some speech / swallow therapy at this time       She is also having significant difficulty with disruptive sleep throughout the night  She has tried melatonin in the past however we did discuss restarting this and potentially increasing it up to 20 mg before bed  She may also want to look into perhaps the melatonin gummies that are extended release as this may give her better coverage throughout the night  If however she continues to have difficulty despite this then we did discuss a trial of trazodone or Remeron  We did discuss the importance of exercise  She had PT in the past and the LOUD therapy which she found to be very helpful  We did discuss going back to physical therapy however she would like to hold off at this time  If she changes her mind prior to the next visit she can simply call our office  Subjective:    Stoney Castro is an 79year-old woman with hep C, heart failure and afib who presents in follow up for Parkinson's disease  To review, symptom onset around 2012 with reduced range in her singing voice  She is levodopa responsive  Symptoms are left>right  Previously followed with Dr Stefano Feliciano  Aricept started for cognitive changes in the past       At her last visit she had some progression however she was also having symptomatic orthostatic hypotension which was limiting our ability to increase her Sinemet dose  She wanted to discuss a trial of Midodrine with her Cardiologist       INTERVAL HISTORY:  Her Cardiologist started Midodrine with some improvement of her blood pressure  Sinemet was then also increase to 1 5tabs qid  Patient did not feel right on the higher Sinemet dose and has since reduced back to 1tab qid   She also recently followed with cardiology and wanted to stop the Midodrine as well which was weaned off    She feels that overall she is feeling slower   She struggles with eating at times because of some hand tremors   Tremors will also interfere with brushing her teeth   She is takes shorter steps and feels that she is more off balance   She has caught herself from falling several times   She uses the walker more now   She is no longer able to keep up with her household chores   She can shower with a seat and grab bars, her  has to help with socks however otherwise she can dress   She does have drooling   She is noticing some worsening of her swallowing  She had a swallow study in 8/2021 which showed mild oropharyngeal dysphagia related to mild delay in swallow initiation  Regular diet recommended  One of her biggest issues at this time is disruptive sleep  She often has to wake multiple times a night  She has tried melatonin with no benefit   Per the  she is often still in her sleep however she will at times yell or thrash     Current medications and timing:  - Sinemet 25/100mg 1tab qid   - Comtan 200mg qid   - aricept 10mg    - memantine 10mg daily      Prior trials:  - Primidone not effective   - artane: very poor reactive       I personally reviewed and updated the ROS  Total time spent today was 50 minutes  Greater than 50% of total time was spent with the patient and / or family counseling and / or coordinating plan of care  Objective:    Blood pressure 112/56, pulse 79, temperature (!) 96 2 °F (35 7 °C), weight 84 4 kg (186 lb 1 6 oz)  Physical Exam  Constitutional:       Appearance: Normal appearance  HENT:      Right Ear: Hearing normal       Left Ear: Hearing normal    Eyes:      General: Lids are normal       Extraocular Movements: Extraocular movements intact  Pupils: Pupils are equal, round, and reactive to light  Pulmonary:      Effort: Pulmonary effort is normal    Neurological:      Mental Status: She is alert  Deep Tendon Reflexes: Strength normal    Psychiatric:         Speech: Speech normal          Neurological Exam  Mental Status  Alert  Oriented to person, place and time  Speech is normal     Cranial Nerves  CN III, IV, VI: Extraocular movements intact bilaterally   Normal lids and orbits bilaterally  Pupils equal round and reactive to light bilaterally  CN V:  Right: Facial sensation is normal   Left: Facial sensation is normal on the left  CN VIII:  Right: Hearing is normal   Left: Hearing is normal   CN XI: Shoulder shrug strength is normal     Motor   Strength is 5/5 throughout all four extremities  Sensory  Light touch is normal in upper and lower extremities  Reflexes  Glabellar tap present  Coordination  Right: Finger-to-nose abnormality:  Left: Finger-to-nose abnormality:    Gait  Casual gait: Reduced stride length  Shuffling gait  UPDRS motor:                              Time since last dose:   1/19/22 8/11/21   Speech  1  1   Facial Expression       Rigidity - Neck  0     Rigidity - Upper Extremity (Right)  0  1   Rigidity - Upper Extremity (Left)   1  2   Rigidity - Lower Extremity (Right)  0  0   Rigidity - Lower Extremity (Left)   0  0   Finger Taps (Right)   2  2   Finger Taps (Left)   3  3   Hand Movement (Right)  2  2   Hand Movement (Left)   3  3   Pronation/Supination (Right)  1  1   Pronation/Supination (Left)   3  3   Toe Tapping (Right) 1  1   Toe Tapping (Left) 2  3   Leg Agility (Right)  2  2   Leg Agility (Left)   1  2   Arising from Chair   2  2   Gait   1  2   Freezing of Gait 0  0   Postural Stability         Posture 1  1   Global spontaneity of movement 3  3   Postural Tremor (Right) 0  0   Postural Tremor (Left) 0  0   Kinetic Tremor (Right)  0 0   Kinetic Tremor (Left)  1  1   Rest tremor amplitude RUE 0  0   Rest tremor amplitude LUE 1  1   Rest tremor amplitude RLE 0  0   Reset tremor amplitude LLE 0  0   Lip/Jaw Tremor       Consistency of tremor 2 1   Motor Exam Total:          ROS:    Review of Systems   Constitutional: Negative  Negative for appetite change and fever  HENT: Positive for drooling  Negative for hearing loss, tinnitus, trouble swallowing and voice change  Constant nose dripping    Eyes: Negative    Negative for photophobia and pain  Respiratory: Positive for shortness of breath (with slight movement )  Cardiovascular: Negative  Negative for palpitations  Gastrointestinal: Negative  Negative for nausea and vomiting  Endocrine: Negative  Negative for cold intolerance  Genitourinary: Negative  Negative for dysuria, frequency and urgency  Musculoskeletal: Positive for gait problem (taking smaller steps )  Negative for myalgias and neck pain  Taking longer to get dressed    Skin: Negative  Negative for rash  Neurological: Negative for dizziness, tremors, seizures, syncope, facial asymmetry, speech difficulty, weakness, light-headedness, numbness and headaches  Hematological: Negative  Does not bruise/bleed easily  Psychiatric/Behavioral: Positive for sleep disturbance  Negative for confusion and hallucinations  All other systems reviewed and are negative

## 2022-01-19 NOTE — PATIENT INSTRUCTIONS
Patient with Parkinson's disease for 8+ years  She is having some overall progression with increased slowness as well as imbalance  Unfortunately she did not feel right with the increased Sinemet dose at the last visit however at that time she had also been started on midodrine so unclear which 1 was the cause  We once again had a long discussion in regards to the goal and role of increasing her dopamine replacement  Certainly on exam today she continues to have left greater than right bradykinesia as well as imbalance and postural instability  Because of this she would be open to a retrial of increasing her Sinemet dose at this time  We will try taking it very slow to avoid any negative side effects  She will start by taking Sinemet 1 5 tabs in the morning and 1 tab other doses for a week  If this is tolerated then she will increase to 1 5 tabs for the 1st 2 doses 1 tab for the other 2 doses for a week  If still tolerated she will increase to 1 5 tabs for the 1st 3 doses 1 tab for the last dose for a week and then 1 5 tabs 4 times a day  If at any point during this increase she has any side effects she will call the office  If she starts having worsening orthostatic hypotension then we can certainly discuss restarting the midodrine which she had been on before  Because of her worsening of imbalance and no recent imaging of the brain will get an updated MRI at this time to make sure there is no underlying structural cause or perhaps any indication of cerebellar atrophy  She is also continuing to have swallowing issues which she feels has worsened since the last visit  She did have a swallow study done in August however given this clear progression we will have her repeat the swallow study at this time  Perhaps she may benefit from some speech / swallow therapy at this time  She is also having significant difficulty with disruptive sleep throughout the night    She has tried melatonin in the past however we did discuss restarting this and potentially increasing it up to 20 mg before bed  She may also want to look into perhaps the melatonin gummies that are extended release as this may give her better coverage throughout the night  If however she continues to have difficulty despite this then we did discuss a trial of trazodone or Remeron  We did discuss the importance of exercise  She had PT in the past and the LOUD therapy which she found to be very helpful  We did discuss going back to physical therapy however she would like to hold off at this time  If she changes her mind prior to the next visit she can simply call our office

## 2022-01-21 ENCOUNTER — OFFICE VISIT (OUTPATIENT)
Dept: PODIATRY | Facility: CLINIC | Age: 82
End: 2022-01-21
Payer: MEDICARE

## 2022-01-21 VITALS
SYSTOLIC BLOOD PRESSURE: 112 MMHG | BODY MASS INDEX: 30.99 KG/M2 | DIASTOLIC BLOOD PRESSURE: 56 MMHG | HEART RATE: 79 BPM | RESPIRATION RATE: 16 BRPM | HEIGHT: 65 IN | WEIGHT: 186 LBS

## 2022-01-21 DIAGNOSIS — R60.0 LOCALIZED EDEMA: ICD-10-CM

## 2022-01-21 DIAGNOSIS — M79.609 PARESTHESIA AND PAIN OF EXTREMITY: ICD-10-CM

## 2022-01-21 DIAGNOSIS — L85.3 XEROSIS CUTIS: ICD-10-CM

## 2022-01-21 DIAGNOSIS — L60.0 INGROWN TOENAIL: ICD-10-CM

## 2022-01-21 DIAGNOSIS — B35.1 ONYCHOMYCOSIS: Primary | ICD-10-CM

## 2022-01-21 DIAGNOSIS — M79.672 PAIN IN BOTH FEET: ICD-10-CM

## 2022-01-21 DIAGNOSIS — R20.2 PARESTHESIA AND PAIN OF EXTREMITY: ICD-10-CM

## 2022-01-21 DIAGNOSIS — M79.671 PAIN IN BOTH FEET: ICD-10-CM

## 2022-01-21 PROCEDURE — 99213 OFFICE O/P EST LOW 20 MIN: CPT | Performed by: PODIATRIST

## 2022-01-21 RX ORDER — AMMONIUM LACTATE 12 G/100G
LOTION TOPICAL 2 TIMES DAILY PRN
Qty: 400 G | Refills: 0 | Status: SHIPPED | OUTPATIENT
Start: 2022-01-21 | End: 2022-05-04 | Stop reason: ALTCHOICE

## 2022-01-21 NOTE — PROGRESS NOTES
Assessment/Plan:    Patient opted out of oral and topical anti fungal medications at this time, all onychomycotic dystrophic nails debrided using sterile Nipper and electrical ragini to patient tolerance, Betadine applied, patient educated on daily foot hygiene for the management of fungal infection  Patient follow-up with her primary regarding bilateral lower extremity swelling,  Patient opted for topical management of paresthesia neuropathy     Diagnoses and all orders for this visit:    Onychomycosis    Pain in both feet    Ingrown toenail    Localized edema    Paresthesia and pain of extremity    Xerosis cutis          Subjective:      Patient ID: Gt Edwards is a 79 y o  female  65-year-old female past medical history significant of Parkinson disease, presents to the office for painful elongated thickened dystrophic discolored toenails, patient has a history of CHF and bilateral lower extremity swelling and edema, patient denies constitutional symptoms, patient denies recent trauma to the foot, patient is unable self treat due to past medical history      The following portions of the patient's history were reviewed and updated as appropriate: allergies, current medications, past family history, past medical history, past social history, past surgical history and problem list     Review of Systems   Constitutional: Negative  Respiratory: Negative  Cardiovascular: Positive for leg swelling  Musculoskeletal: Positive for arthralgias  Skin: Positive for color change  Neurological: Positive for numbness                 Historical Information   Past Medical History:   Diagnosis Date    Anal fissure     Arthritis     osteo joints    Asthma with acute exacerbation     Blepharitis     Breast lump     Chalazion     CHF, chronic (HCC)     Difficulty walking     Disease of thyroid gland     Drooling     Dysphagia     Fall 05/04/2018    Fibromyalgia, primary     Glaucoma     Normal pressure    History of transfusion     Hordeolum externum     Hx of transient ischemic attack (TIA)     Hypophonia     Infectious viral hepatitis     Hep C dx      Lyme carditis     Murmur, cardiac     Parkinsons (HCC)     Rotator cuff tendinitis     Seasonal allergies     Urinary frequency      Past Surgical History:   Procedure Laterality Date    BREAST BIOPSY Right 2018    benign    BREAST CYST EXCISION Left     benign    BREAST SURGERY N/A     benign, calcium    CARDIAC SURGERY      mitral valve replacement    CATARACT EXTRACTION Right 2015    CATARACT EXTRACTION Left 2014    CHEST TUBE INSERTION Right     post pleural effusion    CHOLECYSTECTOMY  2000    lap    HYSTERECTOMY  1973    partial    FL ARTHROCENTESIS ASPIR&/INJ MAJOR JT/BURSA W/O US Left 3/19/2021    Procedure: Sacroiliac Joint Injection ( 20123 ); Surgeon: Charisse Reis MD;  Location: [facility] MAIN OR;  Service: Pain Management     FL COLSC FLX W/RMVL OF TUMOR POLYP LESION SNARE TQ N/A 2017    Procedure: COLONOSCOPY and biopsy ;  Surgeon: Micheline Holliday MD;  Location: [facility] GI LAB;   Service: Gastroenterology    SKIN BIOPSY      pre cancerous on face    TONSILLECTOMY      US GUIDED BREAST BIOPSY RIGHT COMPLETE Right 2018     Social History   Social History     Substance and Sexual Activity   Alcohol Use No     Social History     Substance and Sexual Activity   Drug Use No     Social History     Tobacco Use   Smoking Status Former Smoker    Packs/day: 0 10    Years: 10 00    Pack years: 1 00    Quit date: 5    Years since quittin 0   Smokeless Tobacco Never Used     Family History:   Family History   Problem Relation Age of Onset    Heart disease Mother         murmur Cardiomegaly age 64    Pneumonia Father     Heart disease Brother         mitrsl valve    Parkinsonism Brother     Arthritis Brother     Lupus Daughter     Diabetes Daughter     Depression Daughter        Meds/Allergies all medications and allergies reviewed  Allergies   Allergen Reactions    Amantadine Tongue Swelling    Artane [Trihexyphenidyl] Other (See Comments)     Felt "disconnected", "out if it", evelyn  Did not feel right     Glycopyrrolate      Nose bleeds    Pollen Extract        Objective:      /56   Pulse 79   Resp 16   Ht 5' 4 5" (1 638 m)   Wt 84 4 kg (186 lb)   BMI 31 43 kg/m²          Physical Exam  Constitutional:       General: She is not in acute distress  Appearance: She is well-developed  She is not ill-appearing, toxic-appearing or diaphoretic  HENT:      Head: Normocephalic  Cardiovascular:      Pulses:           Dorsalis pedis pulses are 1+ on the right side and 1+ on the left side  Posterior tibial pulses are 0 on the right side and 0 on the left side  Comments: Palpable DP pulse, nonpalpable PT pulse, CFT is less than 3 seconds, temperature gradient within normal limit, a trophic skin changes noted with skin thinning in shiny, patient report occasional claudication to bilateral calf, localized edema foot and ankle Q9  Pulmonary:      Effort: Pulmonary effort is normal    Musculoskeletal:         General: Tenderness and deformity present  Comments: Pes planus type foot pain on palpation and range of motion of the 1st MPJ, metatarsal heads bilateral foot, pain on palpation on range of motion of the ST joint, crepitus noted in midfoot joint  Skin:     General: Skin is dry  Capillary Refill: Capillary refill takes 2 to 3 seconds  Comments: Trophic skin changes bilateral foot and ankle, alternating hypopigmentation and hyperpigmentation patches around the foot and ankle on anterior leg, skin is shiny and thin, absent hair growth, atrophy of fat pad  Diffuse xerosis bilateral lower extremity, dystrophic discolored thickened toenails onychomycotic, onychocryptosis noted as well, malodor and subungual debris     Neurological:      Mental Status: She is alert and oriented to person, place, and time  Sensory: Sensory deficit present  Motor: Atrophy present  Deep Tendon Reflexes: Reflexes abnormal       Foot Exam    Right Foot/Ankle     Neurovascular  Dorsalis pedis: 1+  Posterior tibial: 0      Left Foot/Ankle      Neurovascular  Dorsalis pedis: 1+  Posterior tibial: 0              Portions of the record may have been created with voice recognition software  Occasional wrong word or "sound a like" substitutions may have occurred due to the inherent limitations of voice recognition software  Read the chart carefully and recognize, using context, where substitutions have occurred

## 2022-01-26 ENCOUNTER — HOSPITAL ENCOUNTER (OUTPATIENT)
Dept: RADIOLOGY | Facility: HOSPITAL | Age: 82
Discharge: HOME | End: 2022-01-26
Payer: MEDICARE

## 2022-01-26 DIAGNOSIS — R13.10 DYSPHAGIA, UNSPECIFIED TYPE: ICD-10-CM

## 2022-01-26 PROCEDURE — 92611 MOTION FLUOROSCOPY/SWALLOW: CPT

## 2022-01-26 PROCEDURE — 74230 X-RAY XM SWLNG FUNCJ C+: CPT

## 2022-01-26 NOTE — PROCEDURES
Speech Pathology Videofluoroscopic Swallow Study (VFSS=VBS/MBS)      Patient Name: Misael Villagran    YNCFD'E Date: 1/26/2022    Past Medical History  Past Medical History:   Diagnosis Date    Anal fissure     Arthritis     osteo joints    Asthma with acute exacerbation     Blepharitis     Breast lump     Chalazion     CHF, chronic ([address] )     Difficulty walking     Disease of thyroid gland     Drooling     Dysphagia     Fall 05/04/2018    Fibromyalgia, primary     Glaucoma     Normal pressure    History of transfusion 1996    Hordeolum externum     Hx of transient ischemic attack (TIA)     Hypophonia     Infectious viral hepatitis     Hep C dx 1996     Lyme carditis     Murmur, cardiac     Parkinsons (HCC)     Rotator cuff tendinitis     Seasonal allergies     Urinary frequency          General Information;  Miasel Villagran is a 79 y o  female known to me from video fluoroscopic swallowing study completed 08/18/2021 with a PMH remarkable for Parkinson's disease  She has had some recent progression of her symptoms of PD including worsening slowing of movement, as well as increased difficulty swallowing, increased balance issues  Repeat VFSS was recommended to assess current oropharyngeal stage swallowing skills  Willi Walter was viewed in lateral position and was given trials of pureed (applesauce), soft moist (Fig Mckinnon cookie) and solid food (Erica Doone cookie) as well nectar (mildly thick), thin liquid, mixed consistency item (hard cookie with thin liquid) and a13mm pill with thin liquid    Oral stage: Mild oral stage dysphagia  Mastication was timely and grossly effective with materials administered today  Bolus formation was piecemeal in nature (2 transfers cleared all food and liquid from the oral cavity))  Transfer was mildly hesitant"  with brief "freezing" of action during propulsion    Oral (jaw) tremors 40 not present with presentations of food but were noted when asked to drink liquid quickly  No labial leakage or significant premature spillage occurred with materials today  Pharyngeal stage: Mild pharyngeal dysphagia  Swallowing initiation was generally prompt but transition from the oral to the pharyngeal stage was mildly prolonged or hesitant at times  Chary Yancey acknowledged this hesitation or struggle to swallow    Hyolaryngeal excursion was adequate  AIrway entrance closure or protection appeared adequate  Tongue base retraction appeared to be of adequate strength  Pharyngeal constriction suspected or appeared adequate  Management of food/liquid follows: All food, liquid and the 13 mm pill passed through the pharynx with no aspiration or significant pharyngeal residue noted with materials administered today  Transient laryngeal penetration noted with rapid intake and thin liquid  Strategies and Efficacy:   1  Use of slight neck flexion (which Angela used independently) appeared to maximize oral control  2  Use single, controlled sips of liquid maximized safety as well (as compared to multiple sips with attempts at rapid transfer  Esophageal stage:  Esophageal screening was completed  Delayed transit of pill through the distal esophagus was noted but transit achieved when given multiple sips of liquid  Esophageal dysmotility suspected  Assessment Summary:    Pamela Reyna presented with s/s suggestive of mild oropharyngeal dysphagia  Today's performance reflects a mild progression of her symptoms with jaw tremoring and risk for impaired oral control and occasions of laryngeal penetration and/or aspiration noted  Following testing, I reviewed my impressions with Chary Yancey and educated her in strategies of the neck flexion and single controlled sips of liquid    I utilized the analogy of the oral/mouth stage being that [facility] and that she should utilize single controlled sips so as to pass liquid from the mouth to the throat successfully without aspirating/from lying       Note: Images are available for review in PACS as desired  Recommendations:   Recommended Diet:  regular diet and thin liquids  Recommended Form of Medications: as desired   Aspiration precautions:  1  Sit upright and keep chin down slightly  2  Take single sips of liquids, hold in mouth 2 secs, then "pass" to throat (do not take successive sips and go slowly with single sips) Continue f/u with Neurology    SLP Dysphagia therapy recommended: I spoke with Arun Acosta re: same and she verbalized comprehension of impressions and recommendations    She stated that she does not feel that any attempts to follow-up with the outpatient therapist with whom she is very familiar and completed Speech Therapy/ the [address] \[facility].\"" , [facility] [id number] [phone]

## 2022-02-02 ENCOUNTER — VBI (OUTPATIENT)
Dept: ADMINISTRATIVE | Facility: OTHER | Age: 82
End: 2022-02-02

## 2022-02-02 ENCOUNTER — APPOINTMENT (OUTPATIENT)
Dept: LAB | Facility: CLINIC | Age: 82
End: 2022-02-02
Payer: MEDICARE

## 2022-02-02 ENCOUNTER — ANTICOAG VISIT (OUTPATIENT)
Dept: CARDIOLOGY CLINIC | Facility: CLINIC | Age: 82
End: 2022-02-02

## 2022-02-02 DIAGNOSIS — Z95.2 H/O MITRAL VALVE REPLACEMENT WITH MECHANICAL VALVE: Primary | ICD-10-CM

## 2022-02-12 ENCOUNTER — HOSPITAL ENCOUNTER (OUTPATIENT)
Dept: RADIOLOGY | Facility: HOSPITAL | Age: 82
Discharge: HOME | End: 2022-02-12
Payer: MEDICARE

## 2022-02-12 DIAGNOSIS — R26.89 IMBALANCE: ICD-10-CM

## 2022-02-12 PROCEDURE — G1004 CDSM NDSC: HCPCS

## 2022-02-12 PROCEDURE — 70551 MRI BRAIN STEM W/O DYE: CPT

## 2022-02-16 ENCOUNTER — ANTICOAG VISIT (OUTPATIENT)
Dept: CARDIOLOGY CLINIC | Facility: CLINIC | Age: 82
End: 2022-02-16

## 2022-02-16 ENCOUNTER — APPOINTMENT (OUTPATIENT)
Dept: LAB | Facility: CLINIC | Age: 82
End: 2022-02-16
Payer: MEDICARE

## 2022-02-16 DIAGNOSIS — Z95.2 H/O MITRAL VALVE REPLACEMENT WITH MECHANICAL VALVE: Primary | ICD-10-CM

## 2022-03-01 ENCOUNTER — OFFICE VISIT (OUTPATIENT)
Dept: FAMILY MEDICINE CLINIC | Facility: CLINIC | Age: 82
End: 2022-03-01
Payer: MEDICARE

## 2022-03-01 VITALS
BODY MASS INDEX: 30.16 KG/M2 | RESPIRATION RATE: 16 BRPM | WEIGHT: 181 LBS | HEART RATE: 80 BPM | HEIGHT: 65 IN | TEMPERATURE: 96.3 F | DIASTOLIC BLOOD PRESSURE: 60 MMHG | SYSTOLIC BLOOD PRESSURE: 104 MMHG

## 2022-03-01 DIAGNOSIS — B34.9 VIRAL INFECTION, UNSPECIFIED: ICD-10-CM

## 2022-03-01 DIAGNOSIS — Z12.31 ENCOUNTER FOR SCREENING MAMMOGRAM FOR BREAST CANCER: ICD-10-CM

## 2022-03-01 DIAGNOSIS — R35.0 FREQUENCY OF URINATION: Primary | ICD-10-CM

## 2022-03-01 LAB
SARS-COV-2 RNA RESP QL NAA+PROBE: NEGATIVE
SL AMB  POCT GLUCOSE, UA: ABNORMAL
SL AMB LEUKOCYTE ESTERASE,UA: 500
SL AMB POCT BILIRUBIN,UA: ABNORMAL
SL AMB POCT BLOOD,UA: ABNORMAL
SL AMB POCT CLARITY,UA: CLEAR
SL AMB POCT COLOR,UA: ABNORMAL
SL AMB POCT KETONES,UA: 15
SL AMB POCT NITRITE,UA: ABNORMAL
SL AMB POCT PH,UA: 7
SL AMB POCT SPECIFIC GRAVITY,UA: 1.01
SL AMB POCT URINE PROTEIN: ABNORMAL
SL AMB POCT UROBILINOGEN: ABNORMAL

## 2022-03-01 PROCEDURE — 99213 OFFICE O/P EST LOW 20 MIN: CPT | Performed by: FAMILY MEDICINE

## 2022-03-01 PROCEDURE — U0005 INFEC AGEN DETEC AMPLI PROBE: HCPCS | Performed by: FAMILY MEDICINE

## 2022-03-01 PROCEDURE — 81003 URINALYSIS AUTO W/O SCOPE: CPT | Performed by: FAMILY MEDICINE

## 2022-03-01 PROCEDURE — U0003 INFECTIOUS AGENT DETECTION BY NUCLEIC ACID (DNA OR RNA); SEVERE ACUTE RESPIRATORY SYNDROME CORONAVIRUS 2 (SARS-COV-2) (CORONAVIRUS DISEASE [COVID-19]), AMPLIFIED PROBE TECHNIQUE, MAKING USE OF HIGH THROUGHPUT TECHNOLOGIES AS DESCRIBED BY CMS-2020-01-R: HCPCS | Performed by: FAMILY MEDICINE

## 2022-03-01 RX ORDER — CIPROFLOXACIN 250 MG/1
250 TABLET, FILM COATED ORAL EVERY 12 HOURS SCHEDULED
Qty: 14 TABLET | Refills: 0 | Status: SHIPPED | OUTPATIENT
Start: 2022-03-01 | End: 2022-03-03

## 2022-03-01 NOTE — PROGRESS NOTES
Chief Complaint   Patient presents with    Urinary Frequency     burning     Cough     congestion , slight headache low grade temp 2 negative covid testing , spent last 3 days sleeping , pt unsteady while walking         Patient ID: Joe Rodriguez is a 79 y o  female  HPI  Pt is seeing for dysuria and URI symptoms with low grade T, HA, worsening MS symptoms x few days  -  No therapy tried, no SOB or wheezing     The following portions of the patient's history were reviewed and updated as appropriate: allergies, current medications, past family history, past medical history, past social history, past surgical history and problem list     Review of Systems   Constitutional: Positive for activity change and fatigue  Negative for fever  HENT: Positive for congestion  Negative for ear pain  Respiratory: Negative  Gastrointestinal: Negative  Genitourinary: Positive for dysuria and frequency  Neurological: Positive for headaches  Negative for dizziness  Current Outpatient Medications   Medication Sig Dispense Refill    alendronate (FOSAMAX) 70 mg tablet take 1 tablet by mouth every 7 days 12 tablet 3    ammonium lactate (LAC-HYDRIN) 12 % lotion Apply topically 2 (two) times a day as needed for dry skin 400 g 0    carbidopa-levodopa (SINEMET)  mg per tablet Take 1 5 tablet by mouth four times a day 540 tablet 3    Cholecalciferol (VITAMIN D3) 2000 units TABS Take 2,000 Units by mouth every morning      clindamycin (CLEOCIN) 150 mg capsule Take 600 mg by mouth 1 hour prior to dental appointment       donepezil (ARICEPT) 10 mg tablet Take 1 tablet (10 mg total) by mouth daily at bedtime 90 tablet 3    entacapone (COMTAN) 200 mg tablet take 1 tablet by mouth four times a day 120 tablet 4    Entresto  MG TABS take 1 tablet by mouth twice a day 180 tablet 3    furosemide (LASIX) 20 mg tablet Take 1/2 tablet by mouth  4 days per week   Schedule administration to be done when patient can be near toilet facilities for 4-6 hours  90 tablet 3    levothyroxine 50 mcg tablet take 1 tablet by mouth every morning 30 tablet 3    memantine (NAMENDA) 10 mg tablet take 1 tablet by mouth once daily 90 tablet 3    Multiple Vitamins-Minerals (OCUVITE ADULT 50+ PO) Take by mouth daily with breakfast      warfarin (COUMADIN) 2 5 mg tablet TAKE 1/2 TO 1 TABLET BY MOUTH DAILY OR AS DIRECTED BY PHYSICIAN 90 tablet 3    acetaminophen (TYLENOL) 650 mg CR tablet Take 1 tablet (650 mg total) by mouth 3 (three) times a day (Patient not taking: Reported on 3/1/2022 ) 100 tablet 0    Diclofenac Sodium (VOLTAREN) 1 % Apply 2 g topically 4 (four) times a day as needed (Foot pain) for up to 15 days 50 g 2    midodrine (PROAMATINE) 2 5 mg tablet Take one tablet by mouth two  time times a day in morning and by 5:00 p m  daily for one week, then one tablet daily for one week and then discontinue  (Patient not taking: Reported on 3/1/2022 ) 90 tablet 5     No current facility-administered medications for this visit  Objective:    /60 (BP Location: Left arm, Patient Position: Sitting, Cuff Size: Large)   Pulse 80   Temp (!) 96 3 °F (35 7 °C)   Resp 16   Ht 5' 4 5" (1 638 m)   Wt 82 1 kg (181 lb)   BMI 30 59 kg/m²        Physical Exam  Constitutional:       Appearance: She is not ill-appearing  HENT:      Nose: Congestion present  No rhinorrhea  Cardiovascular:      Rate and Rhythm: Normal rate and regular rhythm  Pulmonary:      Effort: Pulmonary effort is normal  No respiratory distress  Breath sounds: No wheezing, rhonchi or rales  Abdominal:      Palpations: Abdomen is soft  Tenderness: There is no abdominal tenderness  Neurological:      Mental Status: She is alert             Recent Results (from the past 672 hour(s))   Protime-INR    Collection Time: 02/02/22  8:50 AM   Result Value Ref Range    Protime 32 7 (H) 11 6 - 14 5 seconds    INR 3 42 (H) 0 84 - 1 19   Protime-INR Collection Time: 02/16/22  9:01 AM   Result Value Ref Range    Protime 32 3 (H) 11 6 - 14 5 seconds    INR 3 37 (H) 0 84 - 1 19   POCT urine dip auto non-scope    Collection Time: 03/01/22  8:36 AM   Result Value Ref Range     COLOR,UA orange     CLARITY,UA clear     SPECIFIC GRAVITY,UA 1 010      PH,UA 7     LEUKOCYTE ESTERASE,     NITRITE,UA neg     GLUCOSE, UA norm     KETONES,UA 15     BILIRUBIN,UA neg     BLOOD,UA neg     POCT URINE PROTEIN neg     SL AMB POCT UROBILINOGEN neg          Assessment/Plan:         Diagnoses and all orders for this visit:    Frequency of urination  -     POCT urine dip auto non-scope  -     Urine culture  -     ciprofloxacin (CIPRO) 250 mg tablet;  Take 1 tablet (250 mg total) by mouth every 12 (twelve) hours for 7 days    Viral infection, unspecified  -     COVID Only - Office Collect          rto prn                   Christine Meza MD

## 2022-03-03 ENCOUNTER — TELEPHONE (OUTPATIENT)
Dept: FAMILY MEDICINE CLINIC | Facility: CLINIC | Age: 82
End: 2022-03-03

## 2022-03-03 LAB
BACTERIA UR CULT: NORMAL
Lab: NORMAL

## 2022-03-09 ENCOUNTER — APPOINTMENT (OUTPATIENT)
Dept: LAB | Facility: CLINIC | Age: 82
End: 2022-03-09
Payer: MEDICARE

## 2022-03-09 ENCOUNTER — ANTICOAG VISIT (OUTPATIENT)
Dept: CARDIOLOGY CLINIC | Facility: CLINIC | Age: 82
End: 2022-03-09

## 2022-03-09 DIAGNOSIS — Z95.2 H/O MITRAL VALVE REPLACEMENT WITH MECHANICAL VALVE: Primary | ICD-10-CM

## 2022-03-14 ENCOUNTER — APPOINTMENT (EMERGENCY)
Dept: RADIOLOGY | Facility: HOSPITAL | Age: 82
End: 2022-03-14
Payer: MEDICARE

## 2022-03-14 ENCOUNTER — HOSPITAL ENCOUNTER (EMERGENCY)
Facility: HOSPITAL | Age: 82
Discharge: HOME | End: 2022-03-14
Attending: EMERGENCY MEDICINE | Admitting: EMERGENCY MEDICINE
Payer: MEDICARE

## 2022-03-14 VITALS
RESPIRATION RATE: 18 BRPM | SYSTOLIC BLOOD PRESSURE: 134 MMHG | BODY MASS INDEX: 29.82 KG/M2 | TEMPERATURE: 98.1 F | HEART RATE: 69 BPM | HEIGHT: 65 IN | WEIGHT: 179 LBS | OXYGEN SATURATION: 98 % | DIASTOLIC BLOOD PRESSURE: 82 MMHG

## 2022-03-14 DIAGNOSIS — R19.7 DIARRHEA: ICD-10-CM

## 2022-03-14 DIAGNOSIS — R55 SYNCOPE: ICD-10-CM

## 2022-03-14 DIAGNOSIS — E86.0 DEHYDRATION: Primary | ICD-10-CM

## 2022-03-14 LAB
2HR DELTA HS TROPONIN: -2 NG/L
ALBUMIN SERPL BCP-MCNC: 3.1 G/DL (ref 3.5–5)
ALP SERPL-CCNC: 72 U/L (ref 46–116)
ALT SERPL W P-5'-P-CCNC: 8 U/L (ref 12–78)
ANION GAP SERPL CALCULATED.3IONS-SCNC: 7 MMOL/L (ref 4–13)
APTT PPP: 38 SECONDS (ref 23–37)
AST SERPL W P-5'-P-CCNC: 16 U/L (ref 5–45)
BACTERIA UR QL AUTO: ABNORMAL /HPF
BASOPHILS # BLD AUTO: 0.05 THOUSANDS/ΜL (ref 0–0.1)
BASOPHILS NFR BLD AUTO: 1 % (ref 0–1)
BILIRUB SERPL-MCNC: 0.82 MG/DL (ref 0.2–1)
BILIRUB UR QL STRIP: NEGATIVE
BUN SERPL-MCNC: 19 MG/DL (ref 5–25)
CALCIUM ALBUM COR SERPL-MCNC: 9.4 MG/DL (ref 8.3–10.1)
CALCIUM SERPL-MCNC: 8.7 MG/DL (ref 8.3–10.1)
CARDIAC TROPONIN I PNL SERPL HS: 7 NG/L
CARDIAC TROPONIN I PNL SERPL HS: 9 NG/L
CHLORIDE SERPL-SCNC: 108 MMOL/L (ref 100–108)
CLARITY UR: CLEAR
CO2 SERPL-SCNC: 25 MMOL/L (ref 21–32)
COLOR UR: ABNORMAL
CREAT SERPL-MCNC: 1.03 MG/DL (ref 0.6–1.3)
EOSINOPHIL # BLD AUTO: 0.11 THOUSAND/ΜL (ref 0–0.61)
EOSINOPHIL NFR BLD AUTO: 1 % (ref 0–6)
ERYTHROCYTE [DISTWIDTH] IN BLOOD BY AUTOMATED COUNT: 14.4 % (ref 11.6–15.1)
FINE GRAN CASTS URNS QL MICRO: ABNORMAL /LPF
FLUAV RNA RESP QL NAA+PROBE: NEGATIVE
FLUBV RNA RESP QL NAA+PROBE: NEGATIVE
GFR SERPL CREATININE-BSD FRML MDRD: 50 ML/MIN/1.73SQ M
GLUCOSE SERPL-MCNC: 133 MG/DL (ref 65–140)
GLUCOSE UR STRIP-MCNC: ABNORMAL MG/DL
HCT VFR BLD AUTO: 34.1 % (ref 34.8–46.1)
HGB BLD-MCNC: 11.2 G/DL (ref 11.5–15.4)
HGB UR QL STRIP.AUTO: NEGATIVE
HYALINE CASTS #/AREA URNS LPF: ABNORMAL /LPF
IMM GRANULOCYTES # BLD AUTO: 0.03 THOUSAND/UL (ref 0–0.2)
IMM GRANULOCYTES NFR BLD AUTO: 0 % (ref 0–2)
INR PPP: 2.68 (ref 0.84–1.19)
KETONES UR STRIP-MCNC: ABNORMAL MG/DL
LACTATE SERPL-SCNC: 1.6 MMOL/L (ref 0.5–2)
LEUKOCYTE ESTERASE UR QL STRIP: NEGATIVE
LIPASE SERPL-CCNC: 143 U/L (ref 73–393)
LYMPHOCYTES # BLD AUTO: 0.88 THOUSANDS/ΜL (ref 0.6–4.47)
LYMPHOCYTES NFR BLD AUTO: 10 % (ref 14–44)
MAGNESIUM SERPL-MCNC: 2.2 MG/DL (ref 1.6–2.6)
MCH RBC QN AUTO: 30.4 PG (ref 26.8–34.3)
MCHC RBC AUTO-ENTMCNC: 32.8 G/DL (ref 31.4–37.4)
MCV RBC AUTO: 92 FL (ref 82–98)
MONOCYTES # BLD AUTO: 0.75 THOUSAND/ΜL (ref 0.17–1.22)
MONOCYTES NFR BLD AUTO: 9 % (ref 4–12)
NEUTROPHILS # BLD AUTO: 6.63 THOUSANDS/ΜL (ref 1.85–7.62)
NEUTS SEG NFR BLD AUTO: 79 % (ref 43–75)
NITRITE UR QL STRIP: NEGATIVE
NON-SQ EPI CELLS URNS QL MICRO: ABNORMAL /HPF
NRBC BLD AUTO-RTO: 0 /100 WBCS
PH UR STRIP.AUTO: 6.5 [PH]
PLATELET # BLD AUTO: 222 THOUSANDS/UL (ref 149–390)
PMV BLD AUTO: 11 FL (ref 8.9–12.7)
POTASSIUM SERPL-SCNC: 4.4 MMOL/L (ref 3.5–5.3)
PROT SERPL-MCNC: 6 G/DL (ref 6.4–8.2)
PROT UR STRIP-MCNC: ABNORMAL MG/DL
PROTHROMBIN TIME: 27.6 SECONDS (ref 11.6–14.5)
RBC # BLD AUTO: 3.69 MILLION/UL (ref 3.81–5.12)
RBC #/AREA URNS AUTO: ABNORMAL /HPF
RSV RNA RESP QL NAA+PROBE: NEGATIVE
SARS-COV-2 RNA RESP QL NAA+PROBE: NEGATIVE
SODIUM SERPL-SCNC: 140 MMOL/L (ref 136–145)
SP GR UR STRIP.AUTO: 1.02 (ref 1–1.03)
UROBILINOGEN UR QL STRIP.AUTO: 0.2 E.U./DL
WBC # BLD AUTO: 8.45 THOUSAND/UL (ref 4.31–10.16)
WBC #/AREA URNS AUTO: ABNORMAL /HPF

## 2022-03-14 PROCEDURE — 96360 HYDRATION IV INFUSION INIT: CPT

## 2022-03-14 PROCEDURE — 85730 THROMBOPLASTIN TIME PARTIAL: CPT | Performed by: EMERGENCY MEDICINE

## 2022-03-14 PROCEDURE — 99285 EMERGENCY DEPT VISIT HI MDM: CPT

## 2022-03-14 PROCEDURE — 93005 ELECTROCARDIOGRAM TRACING: CPT

## 2022-03-14 PROCEDURE — 85025 COMPLETE CBC W/AUTO DIFF WBC: CPT | Performed by: EMERGENCY MEDICINE

## 2022-03-14 PROCEDURE — 83605 ASSAY OF LACTIC ACID: CPT | Performed by: EMERGENCY MEDICINE

## 2022-03-14 PROCEDURE — 99285 EMERGENCY DEPT VISIT HI MDM: CPT | Performed by: EMERGENCY MEDICINE

## 2022-03-14 PROCEDURE — 87086 URINE CULTURE/COLONY COUNT: CPT | Performed by: EMERGENCY MEDICINE

## 2022-03-14 PROCEDURE — 85610 PROTHROMBIN TIME: CPT | Performed by: EMERGENCY MEDICINE

## 2022-03-14 PROCEDURE — 80053 COMPREHEN METABOLIC PANEL: CPT | Performed by: EMERGENCY MEDICINE

## 2022-03-14 PROCEDURE — 81001 URINALYSIS AUTO W/SCOPE: CPT | Performed by: EMERGENCY MEDICINE

## 2022-03-14 PROCEDURE — G1004 CDSM NDSC: HCPCS

## 2022-03-14 PROCEDURE — 83690 ASSAY OF LIPASE: CPT | Performed by: EMERGENCY MEDICINE

## 2022-03-14 PROCEDURE — 83735 ASSAY OF MAGNESIUM: CPT | Performed by: EMERGENCY MEDICINE

## 2022-03-14 PROCEDURE — 0241U HB NFCT DS VIR RESP RNA 4 TRGT: CPT | Performed by: EMERGENCY MEDICINE

## 2022-03-14 PROCEDURE — 96361 HYDRATE IV INFUSION ADD-ON: CPT

## 2022-03-14 PROCEDURE — 74177 CT ABD & PELVIS W/CONTRAST: CPT

## 2022-03-14 PROCEDURE — 84484 ASSAY OF TROPONIN QUANT: CPT | Performed by: EMERGENCY MEDICINE

## 2022-03-14 PROCEDURE — 36415 COLL VENOUS BLD VENIPUNCTURE: CPT | Performed by: EMERGENCY MEDICINE

## 2022-03-14 PROCEDURE — 87040 BLOOD CULTURE FOR BACTERIA: CPT | Performed by: EMERGENCY MEDICINE

## 2022-03-14 PROCEDURE — 70450 CT HEAD/BRAIN W/O DYE: CPT

## 2022-03-14 RX ORDER — SACCHAROMYCES BOULARDII 250 MG
250 CAPSULE ORAL 2 TIMES DAILY
Qty: 28 CAPSULE | Refills: 0 | Status: SHIPPED | OUTPATIENT
Start: 2022-03-14 | End: 2022-03-28

## 2022-03-14 RX ADMIN — SODIUM CHLORIDE 1000 ML: 0.9 INJECTION, SOLUTION INTRAVENOUS at 13:59

## 2022-03-14 RX ADMIN — IOHEXOL 100 ML: 350 INJECTION, SOLUTION INTRAVENOUS at 14:56

## 2022-03-14 NOTE — ED PROCEDURE NOTE
PROCEDURE  ECG 12 Lead Documentation Only    Date/Time: 3/14/2022 1:15 PM  Performed by: Jl Antonio DO  Authorized by: Jl Antonio DO     ECG reviewed by me, the ED Provider: yes    Patient location:  ED  Interpretation:     Interpretation: abnormal    Rate:     ECG rate:  58    ECG rate assessment: bradycardic    Rhythm:     Rhythm: atrial fibrillation    Ectopy:     Ectopy: none    Conduction:     Conduction: abnormal      Abnormal conduction: complete LBBB           Jl Antonio DO  03/14/22 [id number]

## 2022-03-14 NOTE — ED PROVIDER NOTES
History  Chief Complaint   Patient presents with    Diarrhea     started with diarrhea this morning and  told her she passed out for about 30 seconds    Syncope     Patient presents for evaluation after syncopal episode  Patient states she was preparing dinner in the kitchen chopping vegetables when she became lightheaded/dizzy  Patient also felt nauseous and felt like she was going to pass out  Patient states she was able to walk over to a chair and lower self down to a seated position  She called for her    arrived reported that she passed out for about 30 seconds while in the chair  There is no head trauma or fall to the ground  Patient reports she has had diarrhea starting today about 3 or 4 times of loose watery stool  She denies any abdominal pain at this time  States she was able to eat breakfast of oatmeal   She also had some water when she took her pills other not that was it  Patient finished a course of Cipro for a urinary tract infection yesterday  Patient reports she has ongoing dysuria and frequency and that her symptoms did not improve while on the antibiotic  History provided by:  Patient   used: No    Diarrhea  Associated symptoms: no abdominal pain, no arthralgias, no chills, no fever and no vomiting    Syncope  Associated symptoms: no chest pain, no fever, no palpitations, no seizures, no shortness of breath and no vomiting        Prior to Admission Medications   Prescriptions Last Dose Informant Patient Reported? Taking?    Cholecalciferol (VITAMIN D3) 2000 units TABS  Self Yes No   Sig: Take 2,000 Units by mouth every morning   Diclofenac Sodium (VOLTAREN) 1 %   No No   Sig: Apply 2 g topically 4 (four) times a day as needed (Foot pain) for up to 15 days   Entresto  MG TABS   No No   Sig: take 1 tablet by mouth twice a day   Multiple Vitamins-Minerals (OCUVITE ADULT 50+ PO)  Self Yes No   Sig: Take by mouth daily with breakfast acetaminophen (TYLENOL) 650 mg CR tablet  Self No No   Sig: Take 1 tablet (650 mg total) by mouth 3 (three) times a day   Patient not taking: Reported on 3/1/2022    alendronate (FOSAMAX) 70 mg tablet   No No   Sig: take 1 tablet by mouth every 7 days   ammonium lactate (LAC-HYDRIN) 12 % lotion   No No   Sig: Apply topically 2 (two) times a day as needed for dry skin   carbidopa-levodopa (SINEMET)  mg per tablet   No No   Sig: Take 1 5 tablet by mouth four times a day   clindamycin (CLEOCIN) 150 mg capsule   Yes No   Sig: Take 600 mg by mouth 1 hour prior to dental appointment    donepezil (ARICEPT) 10 mg tablet   No No   Sig: Take 1 tablet (10 mg total) by mouth daily at bedtime   entacapone (COMTAN) 200 mg tablet   No No   Sig: take 1 tablet by mouth four times a day   furosemide (LASIX) 20 mg tablet   No No   Sig: Take 1/2 tablet by mouth  4 days per week  Schedule administration to be done when patient can be near toilet facilities for 4-6 hours  levothyroxine 50 mcg tablet   No No   Sig: take 1 tablet by mouth every morning   memantine (NAMENDA) 10 mg tablet   No No   Sig: take 1 tablet by mouth once daily   midodrine (PROAMATINE) 2 5 mg tablet   No No   Sig: Take one tablet by mouth two  time times a day in morning and by 5:00 p m  daily for one week, then one tablet daily for one week and then discontinue     Patient not taking: Reported on 3/1/2022    warfarin (COUMADIN) 2 5 mg tablet   No No   Sig: TAKE 1/2 TO 1 TABLET BY MOUTH DAILY OR AS DIRECTED BY PHYSICIAN      Facility-Administered Medications: None       Past Medical History:   Diagnosis Date    Anal fissure     Arthritis     osteo joints    Asthma with acute exacerbation     Blepharitis     Breast lump     Chalazion     CHF, chronic (HCC)     Difficulty walking     Disease of thyroid gland     Drooling     Dysphagia     Fall 05/04/2018    Fibromyalgia, primary     Glaucoma     Normal pressure    History of transfusion 1996  Hordeolum externum     Hx of transient ischemic attack (TIA)     Hypophonia     Infectious viral hepatitis     Hep C dx      Lyme carditis     Murmur, cardiac     Parkinsons (HCC)     Rotator cuff tendinitis     Seasonal allergies     Urinary frequency        Past Surgical History:   Procedure Laterality Date    BREAST BIOPSY Right 2018    benign    BREAST CYST EXCISION Left     benign    BREAST SURGERY N/A     benign, calcium    CARDIAC SURGERY      mitral valve replacement    CATARACT EXTRACTION Right 2015    CATARACT EXTRACTION Left 2014    CHEST TUBE INSERTION Right     post pleural effusion    CHOLECYSTECTOMY  2000    lap    HYSTERECTOMY  1973    partial    NV ARTHROCENTESIS ASPIR&/INJ MAJOR JT/BURSA W/O US Left 3/19/2021    Procedure: Sacroiliac Joint Injection ( 43593 ); Surgeon: Adriane Hill MD;  Location: [facility] MAIN OR;  Service: Pain Management     NV COLSC FLX W/RMVL OF TUMOR POLYP LESION SNARE TQ N/A 2017    Procedure: COLONOSCOPY and biopsy ;  Surgeon: Jermaine Renteria MD;  Location: [facility] GI LAB; Service: Gastroenterology    SKIN BIOPSY      pre cancerous on face    TONSILLECTOMY      US GUIDED BREAST BIOPSY RIGHT COMPLETE Right 2018       Family History   Problem Relation Age of Onset    Heart disease Mother         murmur Cardiomegaly age 64    Pneumonia Father     Heart disease Brother         mitrsl valve    Parkinsonism Brother     Arthritis Brother     Lupus Daughter     Diabetes Daughter     Depression Daughter      I have reviewed and agree with the history as documented      E-Cigarette/Vaping    E-Cigarette Use Never User      E-Cigarette/Vaping Substances    Nicotine No     THC No     CBD No     Flavoring No     Other No     Unknown No      Social History     Tobacco Use    Smoking status: Former Smoker     Packs/day: 0 10     Years: 10 00     Pack years: 1 00     Quit date:      Years since quittin 2    Smokeless tobacco: Never Used   Vaping Use    Vaping Use: Never used   Substance Use Topics    Alcohol use: No    Drug use: No       Review of Systems   Constitutional: Negative for chills and fever  HENT: Negative for ear pain and sore throat  Eyes: Negative for pain and visual disturbance  Respiratory: Negative for cough and shortness of breath  Cardiovascular: Positive for syncope  Negative for chest pain and palpitations  Gastrointestinal: Positive for diarrhea  Negative for abdominal pain and vomiting  Genitourinary: Positive for dysuria and frequency  Negative for difficulty urinating and hematuria  Musculoskeletal: Negative for arthralgias and back pain  Skin: Negative for color change and rash  Neurological: Negative for seizures and syncope  All other systems reviewed and are negative  Physical Exam  Physical Exam  Vitals and nursing note reviewed  Constitutional:       General: She is not in acute distress  HENT:      Head: Atraumatic  Right Ear: External ear normal       Left Ear: External ear normal       Nose: Nose normal       Mouth/Throat:      Mouth: Mucous membranes are moist       Pharynx: Oropharynx is clear  Eyes:      General: No scleral icterus  Extraocular Movements: Extraocular movements intact  Conjunctiva/sclera: Conjunctivae normal       Pupils: Pupils are equal, round, and reactive to light  Cardiovascular:      Rate and Rhythm: Normal rate  Rhythm irregular  Pulses: Normal pulses  Pulmonary:      Effort: Pulmonary effort is normal  No respiratory distress  Breath sounds: Normal breath sounds  Abdominal:      General: Abdomen is flat  Bowel sounds are normal  There is no distension  Palpations: Abdomen is soft  Tenderness: There is no abdominal tenderness  There is no guarding or rebound  Musculoskeletal:         General: No deformity  Normal range of motion     Skin:     Capillary Refill: Capillary refill takes less than 2 seconds  Findings: No rash  Neurological:      General: No focal deficit present  Mental Status: She is alert and oriented to person, place, and time  Cranial Nerves: No cranial nerve deficit  Sensory: No sensory deficit  Motor: No weakness  Vital Signs  ED Triage Vitals   Temperature Pulse Respirations Blood Pressure SpO2   03/14/22 1257 03/14/22 1257 03/14/22 1257 03/14/22 1257 03/14/22 1257   97 9 °F (36 6 °C) 78 18 93/55 97 %      Temp Source Heart Rate Source Patient Position - Orthostatic VS BP Location FiO2 (%)   03/14/22 1652 03/14/22 1415 03/14/22 1415 03/14/22 1415 --   Oral Monitor Lying Right arm       Pain Score       03/14/22 1257       No Pain           Vitals:    03/14/22 1257 03/14/22 1415 03/14/22 1615 03/14/22 1652   BP: 93/55 105/55 128/61 134/82   Pulse: 78 70 68 69   Patient Position - Orthostatic VS:  Lying Lying Lying         Visual Acuity      ED Medications  Medications   sodium chloride 0 9 % bolus 1,000 mL (0 mL Intravenous Stopped 3/14/22 1622)   iohexol (OMNIPAQUE) 350 MG/ML injection (SINGLE-DOSE) 100 mL (100 mL Intravenous Given 3/14/22 1456)       Diagnostic Studies  Results Reviewed     Procedure Component Value Units Date/Time    HS Troponin I 2hr [[id number]]  (Normal) Collected: 03/14/22 1550    Lab Status: Final result Specimen: Blood from Arm, Left Updated: 03/14/22 1623     hs TnI 2hr 7 ng/L      Delta 2hr hsTnI -2 ng/L     Urine culture [[id number]] Collected: 03/14/22 1326    Lab Status:  In process Specimen: Urine Updated: 03/14/22 1621    Urine Microscopic [[id number]]  (Abnormal) Collected: 03/14/22 1416    Lab Status: Final result Specimen: Urine, Clean Catch Updated: 03/14/22 1450     RBC, UA None Seen /hpf      WBC, UA 4-10 /hpf      Epithelial Cells Moderate /hpf      Bacteria, UA Occasional /hpf      Hyaline Casts, UA 1-2 /lpf      Fine granular casts 1-2 /lpf     COVID/FLU/RSV - 2 hour TAT [[id number]]  (Normal) Collected: 03/14/22 1349    Lab Status: Final result Specimen: Nares from Nose Updated: 03/14/22 1450     SARS-CoV-2 Negative     INFLUENZA A PCR Negative     INFLUENZA B PCR Negative     RSV PCR Negative    Narrative:      FOR PEDIATRIC PATIENTS - copy/paste COVID Guidelines URL to browser: [email or web address]    SARS-CoV-2 assay is a Nucleic Acid Amplification assay intended for the  qualitative detection of nucleic acid from SARS-CoV-2 in nasopharyngeal  swabs  Results are for the presumptive identification of SARS-CoV-2 RNA  Positive results are indicative of infection with SARS-CoV-2, the virus  causing COVID-19, but do not rule out bacterial infection or co-infection  with other viruses  Laboratories within the [place] and its  territories are required to report all positive results to the appropriate  public health authorities  Negative results do not preclude SARS-CoV-2  infection and should not be used as the sole basis for treatment or other  patient management decisions  Negative results must be combined with  clinical observations, patient history, and epidemiological information  This test has not been FDA cleared or approved  This test has been authorized by FDA under an Emergency Use Authorization  (EUA)  This test is only authorized for the duration of time the  declaration that circumstances exist justifying the authorization of the  emergency use of an in vitro diagnostic tests for detection of SARS-CoV-2  virus and/or diagnosis of COVID-19 infection under section 564(b)(1) of  the Act, 21 U  S C  716ATH-4(K)(4), unless the authorization is terminated  or revoked sooner  The test has been validated but independent review by FDA  and CLIA is pending  Test performed using [employer] Gene[employer]pert: This RT-PCR assay targets N2,  a region unique to SARS-CoV-2   A conserved region in the E-gene was chosen  for pan-Sarbecovirus detection which includes SARS-CoV-2  UA w Reflex to Microscopic w Reflex to Culture [[id number]]  (Abnormal) Collected: 03/14/22 1416    Lab Status: Final result Specimen: Urine, Clean Catch Updated: 03/14/22 1449     Color, UA Orange     Clarity, UA Clear     Specific Gravity, UA 1 025     pH, UA 6 5     Leukocytes, UA Negative     Nitrite, UA Negative     Protein, UA 30 (1+) mg/dl      Glucose,  (1/10%) mg/dl      Ketones, UA 15 (1+) mg/dl      Urobilinogen, UA 0 2 E U /dl      Bilirubin, UA Negative     Blood, UA Negative    HS Troponin 0hr (reflex protocol) [[id number]]  (Normal) Collected: 03/14/22 1349    Lab Status: Final result Specimen: Blood from Arm, Right Updated: 03/14/22 1445     hs TnI 0hr 9 ng/L     Protime-INR [[id number]]  (Abnormal) Collected: 03/14/22 1349    Lab Status: Final result Specimen: Blood from Arm, Right Updated: 03/14/22 1442     Protime 27 6 seconds      INR 2 68    APTT [[id number]]  (Abnormal) Collected: 03/14/22 1349    Lab Status: Final result Specimen: Blood from Arm, Right Updated: 03/14/22 1442     PTT 38 seconds     Lactic acid [[id number]]  (Normal) Collected: 03/14/22 1349    Lab Status: Final result Specimen: Blood from Arm, Right Updated: 03/14/22 1441     LACTIC ACID 1 6 mmol/L     Narrative:      Result may be elevated if tourniquet was used during collection      Comprehensive metabolic panel [[id number]]  (Abnormal) Collected: 03/14/22 1349    Lab Status: Final result Specimen: Blood from Arm, Right Updated: 03/14/22 1436     Sodium 140 mmol/L      Potassium 4 4 mmol/L      Chloride 108 mmol/L      CO2 25 mmol/L      ANION GAP 7 mmol/L      BUN 19 mg/dL      Creatinine 1 03 mg/dL      Glucose 133 mg/dL      Calcium 8 7 mg/dL      Corrected Calcium 9 4 mg/dL      AST 16 U/L      ALT 8 U/L      Alkaline Phosphatase 72 U/L      Total Protein 6 0 g/dL      Albumin 3 1 g/dL      Total Bilirubin 0 82 mg/dL      eGFR 50 ml/min/1 73sq m     Narrative:      [place] guidelines for Chronic Kidney Disease (CKD):     Stage 1 with normal or high GFR (GFR > 90 mL/min/1 73 square meters)    Stage 2 Mild CKD (GFR = 60-89 mL/min/1 73 square meters)    Stage 3A Moderate CKD (GFR = 45-59 mL/min/1 73 square meters)    Stage 3B Moderate CKD (GFR = 30-44 mL/min/1 73 square meters)    Stage 4 Severe CKD (GFR = 15-29 mL/min/1 73 square meters)    Stage 5 End Stage CKD (GFR <15 mL/min/1 73 square meters)  Note: GFR calculation is accurate only with a steady state creatinine    Lipase [[id number]]  (Normal) Collected: 03/14/22 1349    Lab Status: Final result Specimen: Blood from Arm, Right Updated: 03/14/22 1436     Lipase 143 u/L     Magnesium [[id number]]  (Normal) Collected: 03/14/22 1349    Lab Status: Final result Specimen: Blood from Arm, Right Updated: 03/14/22 1436     Magnesium 2 2 mg/dL     CBC and differential [[id number]]  (Abnormal) Collected: 03/14/22 1349    Lab Status: Final result Specimen: Blood from Arm, Right Updated: 03/14/22 1404     WBC 8 45 Thousand/uL      RBC 3 69 Million/uL      Hemoglobin 11 2 g/dL      Hematocrit 34 1 %      MCV 92 fL      MCH 30 4 pg      MCHC 32 8 g/dL      RDW 14 4 %      MPV 11 0 fL      Platelets 347 Thousands/uL      nRBC 0 /100 WBCs      Neutrophils Relative 79 %      Immat GRANS % 0 %      Lymphocytes Relative 10 %      Monocytes Relative 9 %      Eosinophils Relative 1 %      Basophils Relative 1 %      Neutrophils Absolute 6 63 Thousands/µL      Immature Grans Absolute 0 03 Thousand/uL      Lymphocytes Absolute 0 88 Thousands/µL      Monocytes Absolute 0 75 Thousand/µL      Eosinophils Absolute 0 11 Thousand/µL      Basophils Absolute 0 05 Thousands/µL     Blood culture #1 [[id number]] Collected: 03/14/22 1349    Lab Status: In process Specimen: Blood from Arm, Left Updated: 03/14/22 1401    Blood culture #2 [[id number]] Collected: 03/14/22 1349    Lab Status:  In process Specimen: Blood from Arm, Right Updated: 03/14/22 1401 Clostridium difficile toxin by PCR with EIA [[id number]]     Lab Status: No result Specimen: Stool                  CT head without contrast   Final Result by Megan Benitez MD (03/14 1518)      No acute intracranial abnormality  Workstation performed: [id number]         CT abdomen pelvis with contrast   Final Result by Colt Llanos DO (03/14 1532)      No acute intra-abdominal abnormality  Colonic diverticulosis without evidence of diverticulitis  Workstation performed: [id number]                    Procedures  Procedures         ED Course                               SBIRT 22yo+      Most Recent Value   SBIRT (25 yo +)    In order to provide better care to our patients, we are screening all of our patients for alcohol and drug use  Would it be okay to ask you these screening questions? No Filed at: 03/14/2022 1350                    MDM  Number of Diagnoses or Management Options  Dehydration  Diarrhea  Syncope  Diagnosis management comments: Pulse ox 97% on room air indicating adequate oxygenation  Blood pressure in symptoms improved after IV fluid hydration in the ER  Patient was were able walk around the ER without feeling lightheaded or dizzy  Symptoms likely secondary to dehydration from the diarrhea  Patient unable to produce a stool sample in the ER  Will give a prescription to collect a stool sample as outpatient the rule out C diff  Will start patient on probiotics  Urine culture sent  Return if symptoms worsen         Amount and/or Complexity of Data Reviewed  Clinical lab tests: ordered and reviewed  Tests in the radiology section of CPT®: ordered and reviewed  Decide to obtain previous medical records or to obtain history from someone other than the patient: yes  Review and summarize past medical records: yes    Patient Progress  Patient progress: improved      Disposition  Final diagnoses:   Dehydration   Diarrhea   Syncope     Time reflects when diagnosis was documented in both MDM as applicable and the Disposition within this note     Time User Action Codes Description Comment    3/14/2022  4:37 PM Chalmer Ledger Add [E86 0] Dehydration     3/14/2022  4:37 PM Chalmer Ledger Add [R19 7] Diarrhea     3/14/2022  4:38 PM [place] Lexi Escalona Add [R55] Syncope       ED Disposition     ED Disposition Condition Date/Time Comment    Discharge Stable Mon Mar 14, 2022  4:37 PM Peng Llanes discharge to home/self care              Follow-up Information     Follow up With Specialties Details Why Contact Info Additional Information    Loretta Taylor MD Family Medicine In 3 days  [address] [facility] [address] [facility] Emergency Department Emergency Medicine  If symptoms worsen [address] [address]  [address] [address] Emergency Department, [facility], [address]          Discharge Medication List as of 3/14/2022  4:38 PM      START taking these medications    Details   saccharomyces boulardii (FLORASTOR) 250 mg capsule Take 1 capsule (250 mg total) by mouth 2 (two) times a day for 14 days, Starting Mon 3/14/2022, Until Mon 3/28/2022, Normal         CONTINUE these medications which have NOT CHANGED    Details   acetaminophen (TYLENOL) 650 mg CR tablet Take 1 tablet (650 mg total) by mouth 3 (three) times a day, Starting Thu 6/6/2019, No Print      alendronate (FOSAMAX) 70 mg tablet take 1 tablet by mouth every 7 days, Normal      ammonium lactate (LAC-HYDRIN) 12 % lotion Apply topically 2 (two) times a day as needed for dry skin, Starting Fri 1/21/2022, Normal      carbidopa-levodopa (SINEMET)  mg per tablet Take 1 5 tablet by mouth four times a day, Phone In      Cholecalciferol (VITAMIN D3) 2000 units TABS Take 2,000 Units by mouth every morning, Historical Med      clindamycin (CLEOCIN) 150 mg capsule Take 600 mg by mouth 1 hour prior to dental appointment , Starting Wed 5/26/2021, Historical Med      Diclofenac Sodium (VOLTAREN) 1 % Apply 2 g topically 4 (four) times a day as needed (Foot pain) for up to 15 days, Starting Fri 1/21/2022, Until Sat 2/5/2022 at 2359, Normal      donepezil (ARICEPT) 10 mg tablet Take 1 tablet (10 mg total) by mouth daily at bedtime, Starting Tue 10/5/2021, Normal      entacapone (COMTAN) 200 mg tablet take 1 tablet by mouth four times a day, Normal      Entresto  MG TABS take 1 tablet by mouth twice a day, Normal      furosemide (LASIX) 20 mg tablet Take 1/2 tablet by mouth  4 days per week  Schedule administration to be done when patient can be near toilet facilities for 4-6 hours  , No Print      levothyroxine 50 mcg tablet take 1 tablet by mouth every morning, Normal      memantine (NAMENDA) 10 mg tablet take 1 tablet by mouth once daily, Normal      midodrine (PROAMATINE) 2 5 mg tablet Take one tablet by mouth two  time times a day in morning and by 5:00 p m  daily for one week, then one tablet daily for one week and then discontinue , No Print      Multiple Vitamins-Minerals (OCUVITE ADULT 50+ PO) Take by mouth daily with breakfast, Historical Med      warfarin (COUMADIN) 2 5 mg tablet TAKE 1/2 TO 1 TABLET BY MOUTH DAILY OR AS DIRECTED BY PHYSICIAN, Normal             Outpatient Discharge Orders   Clostridium difficile toxin by PCR with EIA   Standing Status: Future Standing Exp   Date: 03/14/23       PDMP Review     None          ED Provider  Electronically Signed by           Sosa Abbott DO  03/14/22 9057

## 2022-03-15 LAB
BACTERIA UR CULT: NORMAL
QRS AXIS: -8 DEGREES
QRSD INTERVAL: 152 MS
QT INTERVAL: 414 MS
QTC INTERVAL: 406 MS
T WAVE AXIS: 168 DEGREES
VENTRICULAR RATE: 58 BPM

## 2022-03-15 PROCEDURE — 93010 ELECTROCARDIOGRAM REPORT: CPT | Performed by: INTERNAL MEDICINE

## 2022-03-16 ENCOUNTER — TELEPHONE (OUTPATIENT)
Dept: NEUROLOGY | Facility: CLINIC | Age: 82
End: 2022-03-16

## 2022-03-16 NOTE — TELEPHONE ENCOUNTER
Called pt and spoke to Misael Villagran in regards to getting a sooner appt scheduled for a virtual appt as she was scheduled but have it sooner and with Dr Trinity Layton  I offered an appt on 03/23/2022 and the patient agreed   Patient was happy for the call and will like to keep the appt virtual

## 2022-03-19 LAB
BACTERIA BLD CULT: NORMAL
BACTERIA BLD CULT: NORMAL

## 2022-03-21 ENCOUNTER — OFFICE VISIT (OUTPATIENT)
Dept: FAMILY MEDICINE CLINIC | Facility: CLINIC | Age: 82
End: 2022-03-21
Payer: MEDICARE

## 2022-03-21 ENCOUNTER — LAB (OUTPATIENT)
Dept: LAB | Facility: CLINIC | Age: 82
End: 2022-03-21
Payer: MEDICARE

## 2022-03-21 ENCOUNTER — ANTICOAG VISIT (OUTPATIENT)
Dept: CARDIOLOGY CLINIC | Facility: CLINIC | Age: 82
End: 2022-03-21

## 2022-03-21 VITALS
WEIGHT: 183 LBS | RESPIRATION RATE: 16 BRPM | SYSTOLIC BLOOD PRESSURE: 102 MMHG | HEIGHT: 65 IN | BODY MASS INDEX: 30.49 KG/M2 | HEART RATE: 60 BPM | DIASTOLIC BLOOD PRESSURE: 54 MMHG | TEMPERATURE: 97.1 F

## 2022-03-21 DIAGNOSIS — Z95.2 H/O MITRAL VALVE REPLACEMENT WITH MECHANICAL VALVE: Primary | ICD-10-CM

## 2022-03-21 DIAGNOSIS — R82.90 ABNORMAL URINALYSIS: ICD-10-CM

## 2022-03-21 DIAGNOSIS — I95.9 HYPOTENSION, UNSPECIFIED HYPOTENSION TYPE: Primary | ICD-10-CM

## 2022-03-21 DIAGNOSIS — D64.9 LOW HEMOGLOBIN: ICD-10-CM

## 2022-03-21 DIAGNOSIS — G20 PARKINSON DISEASE (HCC): ICD-10-CM

## 2022-03-21 DIAGNOSIS — R55 SYNCOPE, UNSPECIFIED SYNCOPE TYPE: ICD-10-CM

## 2022-03-21 LAB
ERYTHROCYTE [DISTWIDTH] IN BLOOD BY AUTOMATED COUNT: 14.7 % (ref 11.6–15.1)
HCT VFR BLD AUTO: 35.3 % (ref 34.8–46.1)
HGB BLD-MCNC: 11.4 G/DL (ref 11.5–15.4)
MCH RBC QN AUTO: 30.7 PG (ref 26.8–34.3)
MCHC RBC AUTO-ENTMCNC: 32.3 G/DL (ref 31.4–37.4)
MCV RBC AUTO: 95 FL (ref 82–98)
PLATELET # BLD AUTO: 231 THOUSANDS/UL (ref 149–390)
PMV BLD AUTO: 11.2 FL (ref 8.9–12.7)
RBC # BLD AUTO: 3.71 MILLION/UL (ref 3.81–5.12)
SL AMB  POCT GLUCOSE, UA: ABNORMAL
SL AMB LEUKOCYTE ESTERASE,UA: 25
SL AMB POCT BILIRUBIN,UA: ABNORMAL
SL AMB POCT BLOOD,UA: ABNORMAL
SL AMB POCT CLARITY,UA: CLEAR
SL AMB POCT COLOR,UA: ABNORMAL
SL AMB POCT KETONES,UA: 15
SL AMB POCT NITRITE,UA: ABNORMAL
SL AMB POCT PH,UA: 5
SL AMB POCT SPECIFIC GRAVITY,UA: 1020
SL AMB POCT URINE PROTEIN: ABNORMAL
SL AMB POCT UROBILINOGEN: ABNORMAL
WBC # BLD AUTO: 7.5 THOUSAND/UL (ref 4.31–10.16)

## 2022-03-21 PROCEDURE — 85027 COMPLETE CBC AUTOMATED: CPT

## 2022-03-21 PROCEDURE — 99214 OFFICE O/P EST MOD 30 MIN: CPT | Performed by: FAMILY MEDICINE

## 2022-03-21 PROCEDURE — 81003 URINALYSIS AUTO W/O SCOPE: CPT | Performed by: FAMILY MEDICINE

## 2022-03-21 NOTE — PROGRESS NOTES
Chief Complaint   Patient presents with    Follow-up     ER f/o syncope         Patient ID: Marvin Burch is a 79 y o  female  HPI  Pt is seeing for f/u ER visit on 3/14/2022 for syncopal episode at home -  Was hydrated with IV fluids -  Labs were stable , CT abd and pelvis showed no acute abnormality     The following portions of the patient's history were reviewed and updated as appropriate: allergies, current medications, past family history, past medical history, past social history, past surgical history and problem list     Review of Systems   Constitutional: Positive for fatigue  Negative for activity change, appetite change and fever  HENT: Negative  Respiratory: Negative  Cardiovascular: Negative for chest pain, palpitations and leg swelling  Gastrointestinal: Negative  Genitourinary: Negative  Musculoskeletal: Positive for back pain and gait problem  Negative for joint swelling and myalgias  Neurological: Negative for dizziness and weakness  Under neurologist care for Parkinson's disease    Psychiatric/Behavioral: Negative for decreased concentration, dysphoric mood and suicidal ideas         Current Outpatient Medications   Medication Sig Dispense Refill    acetaminophen (TYLENOL) 650 mg CR tablet Take 1 tablet (650 mg total) by mouth 3 (three) times a day 100 tablet 0    alendronate (FOSAMAX) 70 mg tablet take 1 tablet by mouth every 7 days 12 tablet 3    carbidopa-levodopa (SINEMET)  mg per tablet Take 1 5 tablet by mouth four times a day 540 tablet 3    Cholecalciferol (VITAMIN D3) 2000 units TABS Take 2,000 Units by mouth every morning      clindamycin (CLEOCIN) 150 mg capsule Take 600 mg by mouth 1 hour prior to dental appointment       donepezil (ARICEPT) 10 mg tablet Take 1 tablet (10 mg total) by mouth daily at bedtime 90 tablet 3    entacapone (COMTAN) 200 mg tablet take 1 tablet by mouth four times a day 120 tablet 4    Entresto  MG TABS take 1 tablet by mouth twice a day 180 tablet 3    furosemide (LASIX) 20 mg tablet Take 1/2 tablet by mouth  4 days per week  Schedule administration to be done when patient can be near toilet facilities for 4-6 hours  90 tablet 3    levothyroxine 50 mcg tablet take 1 tablet by mouth every morning 30 tablet 3    memantine (NAMENDA) 10 mg tablet take 1 tablet by mouth once daily 90 tablet 3    Multiple Vitamins-Minerals (OCUVITE ADULT 50+ PO) Take by mouth daily with breakfast      saccharomyces boulardii (FLORASTOR) 250 mg capsule Take 1 capsule (250 mg total) by mouth 2 (two) times a day for 14 days 28 capsule 0    warfarin (COUMADIN) 2 5 mg tablet TAKE 1/2 TO 1 TABLET BY MOUTH DAILY OR AS DIRECTED BY PHYSICIAN 90 tablet 3    ammonium lactate (LAC-HYDRIN) 12 % lotion Apply topically 2 (two) times a day as needed for dry skin (Patient not taking: Reported on 3/21/2022 ) 400 g 0    Diclofenac Sodium (VOLTAREN) 1 % Apply 2 g topically 4 (four) times a day as needed (Foot pain) for up to 15 days 50 g 2    midodrine (PROAMATINE) 2 5 mg tablet Take one tablet by mouth two  time times a day in morning and by 5:00 p m  daily for one week, then one tablet daily for one week and then discontinue  (Patient not taking: Reported on 3/1/2022 ) 90 tablet 5     No current facility-administered medications for this visit  Objective:    /54 (BP Location: Left arm, Patient Position: Sitting, Cuff Size: Standard)   Pulse 60   Temp (!) 97 1 °F (36 2 °C)   Resp 16   Ht 5' 4 5" (1 638 m)   Wt 83 kg (183 lb)   BMI 30 93 kg/m²        Physical Exam  Constitutional:       Appearance: She is not ill-appearing  HENT:      Nose: No congestion or rhinorrhea  Cardiovascular:      Rate and Rhythm: Normal rate and regular rhythm  Pulmonary:      Effort: Pulmonary effort is normal  No respiratory distress  Breath sounds: No wheezing, rhonchi or rales  Abdominal:      Palpations: Abdomen is soft        Tenderness: There is no abdominal tenderness  Neurological:      Mental Status: She is alert  Labs in chart were reviewed  Assessment/Plan:         Diagnoses and all orders for this visit:    Hypotension, unspecified hypotension type  Was advised to call cardiologist to discuss lowering Entresto dose  Syncope, unspecified syncope type   likely 2 to hypotension   Low hemoglobin  -     CBC;  Future    Abnormal urinalysis  -     POCT urine dip auto non-scope 0  Still has ketones  Oral hydration advised           rto prn                   Isabela Keenan MD

## 2022-03-23 ENCOUNTER — TELEMEDICINE (OUTPATIENT)
Dept: NEUROLOGY | Facility: CLINIC | Age: 82
End: 2022-03-23
Payer: MEDICARE

## 2022-03-23 DIAGNOSIS — Z86.73 OLD CEREBELLAR INFARCT WITHOUT LATE EFFECT: ICD-10-CM

## 2022-03-23 DIAGNOSIS — G20 PARKINSON'S DISEASE (HCC): Primary | ICD-10-CM

## 2022-03-23 PROCEDURE — 99214 OFFICE O/P EST MOD 30 MIN: CPT | Performed by: PSYCHIATRY & NEUROLOGY

## 2022-03-23 NOTE — ASSESSMENT & PLAN NOTE
MRI brain 2/2022 noted bilateral chronic cerebellar infarcts  Patient denies history of strokes in the past  The infarct may cause postural instability through truncal ataxia but current worsening symptoms due to timing most likely related to sinemet dosing ramp  Patient continues to be on warfarin 2 5mg daily and would recommend continuing this medication

## 2022-03-23 NOTE — PATIENT INSTRUCTIONS
Change taking sinemet to 1 5 tabs at 7am, 1 at 11am, 1 5 at 3pm, and 1 at 7pm     The MRI brain shows old strokes that can cause balance issues however, the worsening balance you have seems most likely due to medication changes of the sinemet over the old strokes  Continue warfarin as this can continue to help prevent future strokes      Continue your other medications    Followup with Queta Abel in 3-4 months

## 2022-03-23 NOTE — ASSESSMENT & PLAN NOTE
Patient is an 79year old woman with hx of HepC, HF, Afib presenting for Parkinson's Disease followup  Since last visit in 2/2022, patient has noted more dyskinesia of the upper extremities and also most likely for lower extremities  They tend to occur more when sitting down and causes her to spill objects  Patient found these symptoms to worsen during the gradual ramp of the sinemet  She also noted more frequent orthostatic hypotension episodes and postural instability (which seems related to her dyskinesia on exam)  Patient otherwise states overall she is tolerating the sinemet fairly well (only notices some nausea at beginning of ramp)  No other significant physical exam findings noted except for the dyskinetic movements when sitting and standing and no focal motor/sensory/visual deficits noted on limited virtual exam     Patient having sleep difficulties even with melatonin  Patient at this point hesitant about trying other medications for sleep at this point however  Impression: Patient's dyskinetic symptoms seems to have occurred during the middle of the ramp of the sinemet and thus sinemet increase may have caused the dyskinetic symptoms       Plan:  Recommended to patient to try reducing the sinemet dosing to 1 5 tabs 7am, 1 tab 11am, 1 5 tabs 3pm, and 1 tab 7pm to see if this helps with dyskinesia and dyskinesia related postural instability  Patient to followup with Marcos White in 3-4 months

## 2022-03-23 NOTE — PROGRESS NOTES
Virtual Regular Visit    Verification of patient location:    Patient is located in the following state in which Dr Shakila Bahena hold an active license [place]      Assessment/Plan:    Problem List Items Addressed This Visit        Nervous and Auditory    Parkinson's disease ([facility] [address] ) - Primary     Patient is an 79year old woman with hx of HepC, HF, Afib presenting for Parkinson's Disease followup  Since last visit in 2/2022, patient has noted more dyskinesia of the upper extremities and also most likely for lower extremities  They tend to occur more when sitting down and causes her to spill objects  Patient found these symptoms to worsen during the gradual ramp of the sinemet  She also noted more frequent orthostatic hypotension episodes and postural instability (which seems related to her dyskinesia on exam)  Patient otherwise states overall she is tolerating the sinemet fairly well (only notices some nausea at beginning of ramp)  No other significant physical exam findings noted except for the dyskinetic movements when sitting and standing and no focal motor/sensory/visual deficits noted on limited virtual exam     Patient having sleep difficulties even with melatonin  Patient at this point hesitant about trying other medications for sleep at this point however  Impression: Patient's dyskinetic symptoms seems to have occurred during the middle of the ramp of the sinemet and thus sinemet increase may have caused the dyskinetic symptoms  Plan:  Recommended to patient to try reducing the sinemet dosing to 1 5 tabs 7am, 1 tab 11am, 1 5 tabs 3pm, and 1 tab 7pm to see if this helps with dyskinesia and dyskinesia related postural instability  Patient to followup with Alida Favre in 3-4 months         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     MRI brain 2/2022 noted bilateral chronic cerebellar infarcts   Patient denies history of strokes in the past  The infarct may cause postural instability through truncal ataxia but current worsening symptoms due to timing most likely related to sinemet dosing ramp  Patient continues to be on warfarin 2 5mg daily and would recommend continuing this medication  Reason for visit is   Chief Complaint   Patient presents with    Virtual Regular Visit        Encounter provider Ludy Cannon MD    Provider located at [address] [place] [address] [address]  [facility] [address]  [facility] [address] [phone]      Recent Visits  Date Type Provider Dept   03/21/22 [facility] Visit [facility] [address] [facility]   03/16/22 Telephone oCri Wu MA [facility] Amish Rojo recent visits within past 7 days and meeting all other requirements  Today's Visits  Date Type Provider Dept   03/23/22 Sachin Arana MD [facility]   Showing today's visits and meeting all other requirements  Future Appointments  No visits were found meeting these conditions  Showing future appointments within next 150 days and meeting all other requirements       The patient was identified by name and date of birth  Elías Guadarrama was informed that this is a telemedicine visit and that the visit is being conducted through [address] [place] [address] Now and patient was informed that this is a secure, HIPAA-compliant platform  She agrees to proceed     My office door was closed  The patient was notified the following individuals were present in the room Dr Wendi Chase  She acknowledged consent and understanding of privacy and security of the video platform  The patient has agreed to participate and understands they can discontinue the visit at any time  Patient is aware this is a billable service  Subjective  Elías Guadarrama is a 79 y o  female with Hep C, HF, and Afib presenting for followup for Parkinson's Disease  For review, patient's symptoms began 2012 with reduced range of her singing voice  Patient is known to be levodopa responsive   Symptoms are greater on the left than the right  Patient previously followed with Dr Peng Prajapati and Aricept started for cognitive changes in the past  Before the 1/19/2022 visit, patient was having progression of her symptoms but also having symptomatic orthostatic hypotension limiting the increase of her sinemet dosing  Midodrine was also weaned off after discussion with cardiology  On last visit, patient noted feeling slower, struggle with eating because of hand tremors and is taking shorter steps and feels she is more off balance  Patient nearly fell several times and was more frequently using a walker  She finds it difficult to keep up with household chores and has drooling  There was noted more difficulty of dysphagia  Patient has disruptive sleep and often has to wake multiple times a night  Interval History:  Patient since last visit increased to sinemet 1 5 tabs 4 times a day after a ramp  Updated MRI on 2/12/2022 showed chronic lacunar infarcts in the cerebellum  Patient states having more difficulty with ambulation, involuntary movements (such as swinging arm back and forth when sitting down, easier to spill things due to involuntary movements, swinging motion with more involvement of stomach muscles) increase, more drooling from the mouth, increase in postural instability, notices more with movement, standing up and lightheadedness  Increased exhaustion and unable to function for a couple hours (usually happens 4 days out of the week)  Facial tremors are not as noticeable but may be a little more pronounced  Patient is on a walker  Patient's general movements are getting slower  Patient's swallowing has been stable since the last visit and had a swallow test recently on 1/26/2022 showed continued mild dysphagia  Patient has been lightheaded when by the sink standing a week ago and passed out for 30 seconds and was found to have had UTI which she was sent to ED   Patient had other episodes of lightheadedness when standing up which can be frequent to couple of times a day and more when standing up  Patient currently taking sinemet 1 5 tabs 4 times a day  7am, 11am, 3pm, 7pm  Patient able to tolerate them fairly well with some mild nausea and loss of appetite with the nausea  Nausea is getting better  Patient feels like however the sinemet increase did not help with her symptoms  Patient also noted her arm movements worsened while in the middle of the ramp of her sinemet medications  Patient states sleep has been not very difficult  Patient tends to be frequent awakenings at night and have trouble going to sleep  Patient finds herself waking up from sleep 3-4 times to urinate  Patient only takes over the counter melatonin for sleep which does not help  No new significant vision problems, no numbness, no weakness  Continues to have near falls which has been stable for her  Currently not on physical therapy at this time        HPI     Past Medical History:   Diagnosis Date    Anal fissure     Arthritis     osteo joints    Asthma with acute exacerbation     Blepharitis     Breast lump     Chalazion     CHF, chronic (HCC)     Difficulty walking     Disease of thyroid gland     Drooling     Dysphagia     Fall 05/04/2018    Fibromyalgia, primary     Glaucoma     Normal pressure    History of transfusion 1996    Hordeolum externum     Hx of transient ischemic attack (TIA)     Hypophonia     Infectious viral hepatitis     Hep C dx 1996     Lyme carditis     Murmur, cardiac     Parkinsons (HCC)     Rotator cuff tendinitis     Seasonal allergies     Urinary frequency        Past Surgical History:   Procedure Laterality Date    BREAST BIOPSY Right 2018    benign    BREAST CYST EXCISION Left     benign    BREAST SURGERY N/A     benign, calcium    CARDIAC SURGERY  1990    mitral valve replacement    CATARACT EXTRACTION Right 2015    CATARACT EXTRACTION Left 2014    CHEST TUBE INSERTION Right post pleural effusion    CHOLECYSTECTOMY  2000    lap    HYSTERECTOMY  1973    partial    RI ARTHROCENTESIS ASPIR&/INJ MAJOR JT/BURSA W/O US Left 3/19/2021    Procedure: Sacroiliac Joint Injection ( 58437 ); Surgeon: Kandi Briones MD;  Location[facility] MAIN OR;  Service: Pain Management     RI COLSC FLX W/RMVL OF TUMOR POLYP LESION SNARE TQ N/A 7/18/2017    Procedure: COLONOSCOPY and biopsy ;  Surgeon: Capo Turner MD;  [facility]; Service: Gastroenterology    SKIN BIOPSY      pre cancerous on face    TONSILLECTOMY      US GUIDED BREAST BIOPSY RIGHT COMPLETE Right 2/13/2018       Current Outpatient Medications   Medication Sig Dispense Refill    acetaminophen (TYLENOL) 650 mg CR tablet Take 1 tablet (650 mg total) by mouth 3 (three) times a day (Patient taking differently: Take 650 mg by mouth as needed  ) 100 tablet 0    alendronate (FOSAMAX) 70 mg tablet take 1 tablet by mouth every 7 days 12 tablet 3    carbidopa-levodopa (SINEMET)  mg per tablet TAKE 1 AND 1/2 TABLETS BY MOUTH 4 TIMES A  tablet 8    Cholecalciferol (VITAMIN D3) 2000 units TABS Take 2,000 Units by mouth every morning      clindamycin (CLEOCIN) 150 mg capsule Take 600 mg by mouth 1 hour prior to dental appointment       donepezil (ARICEPT) 10 mg tablet Take 1 tablet (10 mg total) by mouth daily at bedtime 90 tablet 3    entacapone (COMTAN) 200 mg tablet take 1 tablet by mouth four times a day 120 tablet 4    Entresto  MG TABS take 1 tablet by mouth twice a day 180 tablet 3    furosemide (LASIX) 20 mg tablet Take 1/2 tablet by mouth  4 days per week  Schedule administration to be done when patient can be near toilet facilities for 4-6 hours   90 tablet 3    levothyroxine 50 mcg tablet take 1 tablet by mouth every morning 30 tablet 3    memantine (NAMENDA) 10 mg tablet take 1 tablet by mouth once daily 90 tablet 3    Multiple Vitamins-Minerals (OCUVITE ADULT 50+ PO) Take by mouth daily with breakfast      saccharomyces boulardii (FLORASTOR) 250 mg capsule Take 1 capsule (250 mg total) by mouth 2 (two) times a day for 14 days 28 capsule 0    warfarin (COUMADIN) 2 5 mg tablet TAKE 1/2 TO 1 TABLET BY MOUTH DAILY OR AS DIRECTED BY PHYSICIAN 90 tablet 3    ammonium lactate (LAC-HYDRIN) 12 % lotion Apply topically 2 (two) times a day as needed for dry skin (Patient not taking: Reported on 3/21/2022 ) 400 g 0    Diclofenac Sodium (VOLTAREN) 1 % Apply 2 g topically 4 (four) times a day as needed (Foot pain) for up to 15 days 50 g 2    midodrine (PROAMATINE) 2 5 mg tablet Take one tablet by mouth two  time times a day in morning and by 5:00 p m  daily for one week, then one tablet daily for one week and then discontinue  (Patient not taking: Reported on 3/1/2022 ) 90 tablet 5     No current facility-administered medications for this visit  Allergies   Allergen Reactions    Amantadine Tongue Swelling    Artane [Trihexyphenidyl] Other (See Comments)     Felt "disconnected", "out if it", shaky  Did not feel right     Glycopyrrolate      Nose bleeds    Pollen Extract        Review of Systems   Constitutional: Positive for fatigue (Somedays can barely get out of bed and is tired most of the time)  Negative for appetite change and fever  HENT: Positive for trouble swallowing (a little bit)  Negative for hearing loss, tinnitus and voice change  Eyes: Negative  Negative for photophobia and pain  Respiratory: Negative  Negative for shortness of breath  Cardiovascular: Negative  Negative for palpitations  Gastrointestinal: Negative for nausea and vomiting  Urinary incontinence     Endocrine: Negative  Negative for cold intolerance  Genitourinary: Negative  Negative for dysuria, frequency and urgency  Musculoskeletal: Positive for myalgias (occasional)  Negative for neck pain  Involuntary movement of upper torso and occurs sometimes     Skin: Negative    Negative for rash    Allergic/Immunologic: Negative  Neurological: Positive for dizziness, tremors (Facial Tremor / hands / and sometime whoile body) and speech difficulty (Trouble getting words out)  Negative for seizures, syncope, facial asymmetry, weakness, light-headedness, numbness and headaches  Hematological: Negative  Does not bruise/bleed easily  Psychiatric/Behavioral: Positive for sleep disturbance  Negative for confusion and hallucinations  All other systems reviewed and are negative  Video Exam    There were no vitals filed for this visit  Physical Exam  Eyes:      Extraocular Movements: EOM normal       Pupils: Pupils are equal, round, and reactive to light  Neurological:      Gait: Gait is intact  Psychiatric:         Speech: Speech is slurred  Neurologic Exam     Mental Status   Oriented to person  Oriented to place  Oriented to year, month and date  Attention: normal  Concentration: normal    Speech: slurred     Cranial Nerves     CN III, IV, VI   Pupils are equal, round, and reactive to light  Extraocular motions are normal      CN VII   Facial expression full, symmetric  CN XI   CN XI normal      CN XII   CN XII normal      Motor Exam Antigravity on all extremities and spontaneous movements    Patient notably dyskinetic when sitting down and standing up     Gait, Coordination, and Reflexes     Gait  Gait: normal      I spent 30 minutes directly with the patient during this visit    VIRTUAL VISIT DISCLAIMER      Peng Llanes verbally agrees to participate in [employer]  Pt is aware that [employer] could be limited without vital signs or the ability to perform a full hands-on physical Taz Tavarez understands she or the provider may request at any time to terminate the video visit and request the patient to seek care or treatment in person

## 2022-03-29 ENCOUNTER — TELEPHONE (OUTPATIENT)
Dept: FAMILY MEDICINE CLINIC | Facility: CLINIC | Age: 82
End: 2022-03-29

## 2022-04-11 ENCOUNTER — HOSPITAL ENCOUNTER (OUTPATIENT)
Dept: NON INVASIVE DIAGNOSTICS | Facility: HOSPITAL | Age: 82
Discharge: HOME | End: 2022-04-11
Attending: INTERNAL MEDICINE
Payer: MEDICARE

## 2022-04-11 VITALS
HEART RATE: 74 BPM | BODY MASS INDEX: 30.49 KG/M2 | HEIGHT: 65 IN | DIASTOLIC BLOOD PRESSURE: 60 MMHG | WEIGHT: 183 LBS | SYSTOLIC BLOOD PRESSURE: 110 MMHG

## 2022-04-11 DIAGNOSIS — I44.7 LEFT BUNDLE BRANCH BLOCK (LBBB): ICD-10-CM

## 2022-04-11 DIAGNOSIS — I48.20 CHRONIC ATRIAL FIBRILLATION (HCC): ICD-10-CM

## 2022-04-11 DIAGNOSIS — I42.0 CARDIOMYOPATHY, DILATED, NONISCHEMIC (HCC): ICD-10-CM

## 2022-04-11 LAB
AORTIC ROOT: 2.9 CM
APICAL FOUR CHAMBER EJECTION FRACTION: 55 %
AV LVOT MEAN GRADIENT: 2 MMHG
AV LVOT PEAK GRADIENT: 3 MMHG
DOP CALC LVOT PEAK VEL VTI: 18.49 CM
DOP CALC LVOT PEAK VEL: 0.93 M/S
DOP CALC MV VTI: 34.42 CM
FRACTIONAL SHORTENING: 30 % (ref 28–44)
INTERVENTRICULAR SEPTUM IN DIASTOLE (PARASTERNAL SHORT AXIS VIEW): 1.1 CM
INTERVENTRICULAR SEPTUM: 1.1 CM (ref 0.53–0.99)
LAAS-AP2: 38.6 CM2
LAAS-AP4: 35.9 CM2
LEFT ATRIUM AREA SYSTOLE SINGLE PLANE A4C: 32.8 CM2
LEFT ATRIUM SIZE: 5.3 CM
LEFT INTERNAL DIMENSION IN SYSTOLE: 3.3 CM (ref 2.83–4.28)
LEFT VENTRICULAR INTERNAL DIMENSION IN DIASTOLE: 4.7 CM (ref 4.64–6.91)
LEFT VENTRICULAR POSTERIOR WALL IN END DIASTOLE: 1.3 CM (ref 0.52–0.98)
LEFT VENTRICULAR STROKE VOLUME: 56 ML
LVSV (TEICH): 56 ML
MV E'TISSUE VEL-SEP: 5 CM/S
MV MEAN GRADIENT: 4 MMHG
MV PEAK GRADIENT: 10 MMHG
MV STENOSIS PRESSURE HALF TIME: 108 MS
MV VALVE AREA P 1/2 METHOD: 2.04 CM2
RIGHT ATRIUM AREA SYSTOLE A4C: 11.6 CM2
RIGHT VENTRICLE ID DIMENSION: 3.4 CM
SL CV LEFT ATRIUM LENGTH A2C: 6.5 CM
SL CV LV EF: 50
SL CV PED ECHO LEFT VENTRICLE DIASTOLIC VOLUME (MOD BIPLANE) 2D: 102 ML
SL CV PED ECHO LEFT VENTRICLE SYSTOLIC VOLUME (MOD BIPLANE) 2D: 45 ML
TR MAX PG: 26 MMHG
TR PEAK VELOCITY: 2.6 M/S
TRICUSPID VALVE PEAK REGURGITATION VELOCITY: 2.57 M/S
Z-SCORE OF INTERVENTRICULAR SEPTUM IN END DIASTOLE: 2.87
Z-SCORE OF LEFT VENTRICULAR DIMENSION IN END DIASTOLE: -1.86
Z-SCORE OF LEFT VENTRICULAR DIMENSION IN END SYSTOLE: -0.42
Z-SCORE OF LEFT VENTRICULAR POSTERIOR WALL IN END DIASTOLE: 4.68

## 2022-04-11 PROCEDURE — 93306 TTE W/DOPPLER COMPLETE: CPT | Performed by: INTERNAL MEDICINE

## 2022-04-11 PROCEDURE — 93306 TTE W/DOPPLER COMPLETE: CPT

## 2022-04-14 ENCOUNTER — APPOINTMENT (OUTPATIENT)
Dept: LAB | Facility: CLINIC | Age: 82
End: 2022-04-14
Payer: MEDICARE

## 2022-04-14 ENCOUNTER — ANTICOAG VISIT (OUTPATIENT)
Dept: CARDIOLOGY CLINIC | Facility: CLINIC | Age: 82
End: 2022-04-14

## 2022-04-14 DIAGNOSIS — Z95.2 H/O MITRAL VALVE REPLACEMENT WITH MECHANICAL VALVE: Primary | ICD-10-CM

## 2022-04-21 ENCOUNTER — HOSPITAL ENCOUNTER (OUTPATIENT)
Dept: RADIOLOGY | Facility: HOSPITAL | Age: 82
Discharge: HOME | End: 2022-04-21
Attending: FAMILY MEDICINE
Payer: MEDICARE

## 2022-04-21 ENCOUNTER — OFFICE VISIT (OUTPATIENT)
Dept: PODIATRY | Facility: CLINIC | Age: 82
End: 2022-04-21
Payer: MEDICARE

## 2022-04-21 VITALS — WEIGHT: 176 LBS | RESPIRATION RATE: 16 BRPM | BODY MASS INDEX: 29.32 KG/M2 | HEART RATE: 74 BPM | HEIGHT: 65 IN

## 2022-04-21 VITALS — HEIGHT: 65 IN | BODY MASS INDEX: 29.32 KG/M2 | WEIGHT: 176 LBS

## 2022-04-21 DIAGNOSIS — B35.1 ONYCHOMYCOSIS: ICD-10-CM

## 2022-04-21 DIAGNOSIS — M79.609 PARESTHESIA AND PAIN OF EXTREMITY: ICD-10-CM

## 2022-04-21 DIAGNOSIS — L85.3 XEROSIS CUTIS: ICD-10-CM

## 2022-04-21 DIAGNOSIS — M79.671 PAIN IN BOTH FEET: ICD-10-CM

## 2022-04-21 DIAGNOSIS — M79.672 PAIN IN BOTH FEET: ICD-10-CM

## 2022-04-21 DIAGNOSIS — I73.9 PERIPHERAL VASCULAR DISEASE, UNSPECIFIED (HCC): Primary | ICD-10-CM

## 2022-04-21 DIAGNOSIS — Z12.31 SCREENING MAMMOGRAM, ENCOUNTER FOR: ICD-10-CM

## 2022-04-21 DIAGNOSIS — L60.0 INGROWN TOENAIL: ICD-10-CM

## 2022-04-21 DIAGNOSIS — R60.0 LOCALIZED EDEMA: ICD-10-CM

## 2022-04-21 DIAGNOSIS — R20.2 PARESTHESIA AND PAIN OF EXTREMITY: ICD-10-CM

## 2022-04-21 PROCEDURE — 77067 SCR MAMMO BI INCL CAD: CPT

## 2022-04-21 PROCEDURE — 77063 BREAST TOMOSYNTHESIS BI: CPT

## 2022-04-21 PROCEDURE — 11721 DEBRIDE NAIL 6 OR MORE: CPT | Performed by: PODIATRIST

## 2022-04-22 ENCOUNTER — APPOINTMENT (OUTPATIENT)
Dept: LAB | Facility: CLINIC | Age: 82
End: 2022-04-22
Payer: MEDICARE

## 2022-04-22 ENCOUNTER — ANTICOAG VISIT (OUTPATIENT)
Dept: CARDIOLOGY CLINIC | Facility: CLINIC | Age: 82
End: 2022-04-22

## 2022-04-22 DIAGNOSIS — Z95.2 H/O MITRAL VALVE REPLACEMENT WITH MECHANICAL VALVE: Primary | ICD-10-CM

## 2022-05-02 DIAGNOSIS — E03.9 HYPOTHYROIDISM, UNSPECIFIED TYPE: ICD-10-CM

## 2022-05-03 RX ORDER — LEVOTHYROXINE SODIUM 0.05 MG/1
TABLET ORAL
Qty: 30 TABLET | Refills: 3 | Status: SHIPPED | OUTPATIENT
Start: 2022-05-03

## 2022-05-04 ENCOUNTER — OFFICE VISIT (OUTPATIENT)
Dept: CARDIOLOGY CLINIC | Facility: CLINIC | Age: 82
End: 2022-05-04
Payer: MEDICARE

## 2022-05-04 VITALS
OXYGEN SATURATION: 98 % | WEIGHT: 176 LBS | BODY MASS INDEX: 29.32 KG/M2 | SYSTOLIC BLOOD PRESSURE: 110 MMHG | TEMPERATURE: 98.6 F | DIASTOLIC BLOOD PRESSURE: 60 MMHG | HEART RATE: 72 BPM | HEIGHT: 65 IN

## 2022-05-04 DIAGNOSIS — G20 PARKINSON'S DISEASE (HCC): ICD-10-CM

## 2022-05-04 DIAGNOSIS — Z79.01 ON CONTINUOUS ORAL ANTICOAGULATION: ICD-10-CM

## 2022-05-04 DIAGNOSIS — Z95.4 S/P MITRAL VALVE REPLACEMENT WITH METALLIC VALVE: ICD-10-CM

## 2022-05-04 DIAGNOSIS — I49.3 ASYMPTOMATIC PVCS: ICD-10-CM

## 2022-05-04 DIAGNOSIS — I48.20 CHRONIC ATRIAL FIBRILLATION (HCC): ICD-10-CM

## 2022-05-04 DIAGNOSIS — G45.9 TRANSIENT ISCHEMIC ATTACK: ICD-10-CM

## 2022-05-04 DIAGNOSIS — E78.2 MIXED DYSLIPIDEMIA: ICD-10-CM

## 2022-05-04 DIAGNOSIS — I42.0 CARDIOMYOPATHY, DILATED, NONISCHEMIC (HCC): ICD-10-CM

## 2022-05-04 DIAGNOSIS — E03.9 ACQUIRED HYPOTHYROIDISM: ICD-10-CM

## 2022-05-04 DIAGNOSIS — I50.42 CHRONIC COMBINED SYSTOLIC AND DIASTOLIC HEART FAILURE (HCC): Primary | ICD-10-CM

## 2022-05-04 DIAGNOSIS — I44.7 LEFT BUNDLE BRANCH BLOCK (LBBB): ICD-10-CM

## 2022-05-04 DIAGNOSIS — I73.9 PERIPHERAL VASCULAR DISEASE (HCC): ICD-10-CM

## 2022-05-04 DIAGNOSIS — Z79.899 HIGH RISK MEDICATION USE: ICD-10-CM

## 2022-05-04 DIAGNOSIS — I95.1 ORTHOSTATIC HYPOTENSION: ICD-10-CM

## 2022-05-04 PROCEDURE — 93000 ELECTROCARDIOGRAM COMPLETE: CPT | Performed by: INTERNAL MEDICINE

## 2022-05-04 PROCEDURE — 99214 OFFICE O/P EST MOD 30 MIN: CPT | Performed by: INTERNAL MEDICINE

## 2022-05-04 RX ORDER — SACUBITRIL AND VALSARTAN 49; 51 MG/1; MG/1
1 TABLET, FILM COATED ORAL 2 TIMES DAILY
Qty: 14 TABLET | Refills: 0 | Status: SHIPPED | COMMUNITY
Start: 2022-05-04 | End: 2022-05-17 | Stop reason: SDUPTHER

## 2022-05-04 RX ORDER — SACUBITRIL AND VALSARTAN 49; 51 MG/1; MG/1
1 TABLET, FILM COATED ORAL 2 TIMES DAILY
Qty: 28 TABLET | Refills: 2 | Status: SHIPPED | COMMUNITY
Start: 2022-05-04 | End: 2022-05-04 | Stop reason: SDUPTHER

## 2022-05-04 NOTE — PROGRESS NOTES
Office Cardiology Progress Note  Tamsen Saint 79 y o  female MRN: [id number]  05/04/22  10:05 AM      ASSESSMENT:    1    Resolution of  bilateral pretibial/ankle/pedal edema with onset approximately 28+ months  ago associated  with 9 pound weight gain in 1-1/2+ years and now with 13 pound weight loss in approximately 1 83+ years   with no other evidence of heart failure   This coincides with decrease in dose of furosemide, which is now being taken at dose of 20 milligrams four days per week  2  Chronic combined systolic and diastolic heart failure FYHI 46-28% LVEF and normal MVR function on 04/11/2022 and 06/22/2019 transthoracic echocardiograms   Significant improvement from 34% ejection fraction on 7/5/16 MUGA scan/underlying nonischemic dilated cardiomyopathy but currently with more exertional fatigue, dyspnea on exertion, and  recurrence  of exhaustion and exertional cold sweats,  with suppression of occasional nocturnal wheezing   Initially improved on starting dose of Entresto, but had vasovagal type near syncope on 01/18/2019 with no recurrence   Patient with less frequent lightheadedness and  low blood pressures in the 90s-100's  The current symptoms are likely related to physical deconditioning  2  Resolved dehydration/hypotension and residual mild dizziness as well as resolution of acute kidney injury since 11/18/16  Patient currently off midodrine  3  Chronic atrial fibrillation, controlled, and chronic left bundle-branch block with no EKG changes in approximately 2 5+ years  4  History of mechanical mitral valve replacement September, 1990, with very good oral anticoagulation with target INR of 2 5-3 5   Latest INR 2 99 on 04/22/2022   5  Moderately frequent asymptomatic PVCs and otherwise benign Holter monitor December, 2013   6  24 9 pound weight loss in approximately 7 4+ years,  and recent weight loss of 15  lbs in 28+ months or 2 33+ years  7  Controlled mixed dyslipidemia    8  Mild obstructive sleep apnea, previously suspected  9  History of asthma  10  History of GERD  11  History of benign positional vertigo with recurrence 20 months ago and again 19+ months ago  12  History of Lyme disease  13  Hypothyroidism with  prior normal TSH level, which could have contributed to previous extreme fatigue  14  History of hepatitis C   15  History of Parkinson's disease/gait dysfunction with slowly progressive symptoms    16  Resolved nonproductive cough and postural dizziness with  flu-like illness nearly 3 65 years ago  17  Chronic fatigue and malaise, multifactorial, improving  18  Drug-induced insomnia  19  History of fall with subsequent left distal tibial fracture and left ankle sprain with right periorbital ecchymoses and laceration and left wrist sprain on 7/30/17, with no recurrence, possibly related to transient drop in blood pressure with upright position in hot weather after prolonged sitting  20  Mildly impaired fasting glucose of 115 on 11/01/2021 with A1c of 5 9% on 07/20/2020  21  Chronic kidney disease, stage IIIa  Plan       Patient Instructions     1  We have provided a two week supply of Entresto at a lower dose of 49/51 milligrams twice a day to replace the full dose  milligram strength  2  Let us know higher you are feeling on the lower dose so that we can renew it at your pharmacy  For now, put aside the  milligram strength  3  Consider a supervised low level exercise program to build up better stamina  Perhaps Dr Ashley Nuñez can order supervised physical therapy  4  Continue all other medication as presently using  5  Cardiology follow-up approximately six months with EKG, lipids, CMP  HPI    This 79 y o  female  denies new cardiopulmonary and medical symptoms  She has chronic persistent lack of energy with minimal exertion and claims to run out of steam very easily    She currently takes her warfarin at a dose of 2 5 milligrams Monday through Saturday and 1 25 milligrams every Sunday  The patient had an emergency department visit on 03/14/2022 at [facility] presenting with diarrhea followed by a 32nd episode of syncope  This occurred in the setting of use of Cipro for UTI, which seem to result in  diarrhea on the day of evaluation  It was felt that the syncope was likely from a drop in blood pressure due to dehydration from diarrhea  The patient is also being seen in follow-up of the below listed diagnoses  Encounter Diagnoses   Name Primary?     Chronic combined systolic and diastolic heart failure (HCC) Yes    Chronic atrial fibrillation (HCC)     S/P mitral valve replacement with metallic valve     Peripheral vascular disease (HCC)     Transient ischemic attack     Cardiomyopathy, dilated, nonischemic (HCC)     Left bundle branch block (LBBB)     Asymptomatic PVCs     Orthostatic hypotension     Mixed dyslipidemia     On continuous oral anticoagulation     Acquired hypothyroidism     Parkinson's disease ([address] )     High risk medication use         Review of Systems    All other systems negative, except as noted in history of present illness    Historical Information   Past Medical History:   Diagnosis Date    Anal fissure     Arthritis     osteo joints    Asthma with acute exacerbation     Blepharitis     Breast lump     Chalazion     CHF, chronic ([address] )     Difficulty walking     Disease of thyroid gland     Drooling     Dysphagia     Fall 05/04/2018    Fibromyalgia, primary     Glaucoma     Normal pressure    History of transfusion 1996    Hordeolum externum     Hx of transient ischemic attack (TIA)     Hypophonia     Infectious viral hepatitis     Hep C dx 1996     Lyme carditis     Murmur, cardiac     Parkinsons (HCC)     Rotator cuff tendinitis     Seasonal allergies     Urinary frequency      Past Surgical History:   Procedure Laterality Date    BREAST BIOPSY Right 2018    benign    BREAST CYST EXCISION Left     benign    BREAST SURGERY N/A     benign, calcium    CARDIAC SURGERY      mitral valve replacement    CATARACT EXTRACTION Right 2015    CATARACT EXTRACTION Left 2014    CHEST TUBE INSERTION Right     post pleural effusion    CHOLECYSTECTOMY  2000    lap    HYSTERECTOMY  1973    partial    TX ARTHROCENTESIS ASPIR&/INJ MAJOR JT/BURSA W/O US Left 3/19/2021    Procedure: Sacroiliac Joint Injection ( [address] ); Surgeon: Linda Lara MD;  Location[facility] MAIN OR;  Service: Pain Management     TX COLSC FLX W/RMVL OF TUMOR POLYP LESION SNARE TQ N/A 2017    Procedure: COLONOSCOPY and biopsy ;  Surgeon: Renée Hendrix MD;  Location: [place] [address] GI LAB; Service: Gastroenterology    SKIN BIOPSY      pre cancerous on face    TONSILLECTOMY      US GUIDED BREAST BIOPSY RIGHT COMPLETE Right 2018     Social History     Substance and Sexual Activity   Alcohol Use No     Social History     Substance and Sexual Activity   Drug Use No     Social History     Tobacco Use   Smoking Status Former Smoker    Packs/day: 0 10    Years: 10 00    Pack years: 1 00    Quit date: 5    Years since quittin 3   Smokeless Tobacco Never Used       Family History:  Family History   Problem Relation Age of Onset    Heart disease Mother         murmur Cardiomegaly age 64    Pneumonia Father     Heart disease Brother         mitrsl valve    Parkinsonism Brother     Arthritis Brother     Lupus Daughter     Diabetes Daughter     Depression Daughter          Meds/Allergies     Prior to Admission medications    Medication Sig Start Date End Date Taking?  Authorizing Provider   acetaminophen (TYLENOL) 650 mg CR tablet Take 1 tablet (650 mg total) by mouth 3 (three) times a day  Patient taking differently: Take 650 mg by mouth as needed   19  Yes Carrillo Klein MD   alendronate (FOSAMAX) 70 mg tablet take 1 tablet by mouth every 7 days 21  Yes Jessa Walker MD carbidopa-levodopa (SINEMET)  mg per tablet TAKE 1 AND 1/2 TABLETS BY MOUTH 4 TIMES A DAY 3/22/22  Yes Niki Miramontes MD   Cholecalciferol (VITAMIN D3) 2000 units TABS Take 2,000 Units by mouth every morning   Yes Historical Provider, MD   clindamycin (CLEOCIN) 150 mg capsule Take 600 mg by mouth 1 hour prior to dental appointment  5/26/21  Yes Historical Provider, MD   donepezil (ARICEPT) 10 mg tablet Take 1 tablet (10 mg total) by mouth daily at bedtime 10/5/21  Yes Fahad Escoto PA-C   entacapone (COMTAN) 200 mg tablet take 1 tablet by mouth four times a day 12/23/21  Yes Niki Miramontes MD   furosemide (LASIX) 20 mg tablet Take 1/2 tablet by mouth  4 days per week  Schedule administration to be done when patient can be near toilet facilities for 4-6 hours   8/23/21  Yes Candice Mason MD   levothyroxine 50 mcg tablet take 1 tablet by mouth every morning 5/3/22  Yes Julianna Roe MD   [facility]) 10 mg tablet take 1 tablet by mouth once daily 10/7/21  Yes Fahad Escoto PA-C   Multiple Vitamins-Minerals (OCUVITE ADULT 50+ PO) Take by mouth daily with breakfast   Yes Historical Provider, MD   warfarin (COUMADIN) 2 5 mg tablet TAKE 1/2 TO 1 TABLET BY MOUTH DAILY OR AS DIRECTED BY PHYSICIAN 10/7/21  Yes MD Andra Goddard  MG TABS take 1 tablet by mouth twice a day 9/14/21 5/4/22 Yes Candice Mason MD   Diclofenac Sodium (VOLTAREN) 1 % Apply 2 g topically 4 (four) times a day as needed (Foot pain) for up to 15 days 1/21/22 2/5/22  Jessica Lr DPM   sacubitril-valsartan Lindsay Stagepop) 49-51 MG TABS Take 1 tablet by mouth 2 (two) times a day 5/4/22   Candice Mason MD   ammonium lactate (LAC-HYDRIN) 12 % lotion Apply topically 2 (two) times a day as needed for dry skin  Patient not taking: Reported on 5/4/2022 1/21/22 5/4/22  Jessica Lr DPM   midodrine (PROAMATINE) 2 5 mg tablet Take one tablet by mouth two  time times a day in morning and by 5:00 p m  daily for one week, then one tablet daily for one week and then discontinue  Patient not taking: Reported on 3/1/2022  11/4/21 5/4/22  Anselmo George MD   sacubitril-valsartan Lonpiedad Arevalo) 49-51 MG TABS Take 1 tablet by mouth 2 (two) times a day 5/4/22 5/4/22  Anselmo George MD       Allergies   Allergen Reactions    Amantadine Tongue Swelling    Artane [Trihexyphenidyl] Other (See Comments)     Felt "disconnected", "out if it", shaky  Did not feel right     Glycopyrrolate      Nose bleeds    Pollen Extract          Vitals:    05/04/22 0845   BP: 110/60   BP Location: Left arm   Patient Position: Sitting   Cuff Size: Standard   Pulse: 72   Temp: 98 6 °F (37 °C)   SpO2: 98%   Weight: 79 8 kg (176 lb)   Height: 5' 4 5" (1 638 m)       Body mass index is 29 74 kg/m²  4 pound weight loss in approximately six months with 8 pound weight loss in about 1 5 years and 13 pound weight loss in approximately 1 83+ years    Physical Exam:    General Appearance:  Alert, cooperative, no distress, appears stated age and is moderately to  markedly overweight     Head:  Normocephalic, without obvious abnormality, atraumatic   Eyes:  PERRL, conjunctiva/corneas clear, EOM's intact,   both eyes   Ears:  Normal TM's and external ear canals, both ears   Nose: Nares normal, septum midline, mucosa normal, no drainage or sinus tenderness   Throat: Lips, mucosa, and tongue normal; teeth and gums normal   Neck: Supple, symmetrical, trachea midline, no adenopathy, thyroid: not enlarged, symmetric, no tenderness/mass/nodules, no carotid bruit or JVD   Back:   Symmetric, no curvature, ROM normal, no CVA tenderness   Lungs:   Clear to auscultation bilaterally, respirations unlabored   Chest Wall:  No tenderness or deformity   Heart:  Irregularly irregular cardiac rhythm, S1 has metallic sound consistent with prosthetic mitral leaflet closure, S2 normal, no murmur, rub or gallop   Abdomen:   Soft, non-tender, bowel sounds active all four quadrants,  no masses, no organomegaly  Mild abdominal obesity noted  Extremities: Extremities normal, atraumatic, no cyanosis or edema   Pulses: 1+ and symmetric   Skin: Skin showed normal color, texture, turgor and no rashes or lesions   Lymph nodes: Cervical, supraclavicular, and axillary nodes normal   Neurologic: Normal         Cardiographics    ECG 05/04/22:    Controlled atrial fibrillation with average ventricular rate 72 bpm   Left bundle branch block with secondary ST-T abnormalities   Low QRS voltage   Abnormal ECG, similar to 11/04/2021  IMAGING:    No Chest XR results available for this patient  Transthoracic echocardiogram 04/11/2022:    Low normal LVEF of 50-55% with mild concentric LVH and normal LV cavity size  Severe left atrial dilatation  Trace to mild AI  Normally functioning mechanical mitral valve prosthesis with mean valvular gradient of 3 mmHg and trace MR   1-2 +TR with PA systolic pressure 35 mmHg  No major change from prior study from 2019        LAB REVIEW:      Lab Results   Component Value Date    SODIUM 140 03/14/2022    K 4 4 03/14/2022     03/14/2022    CO2 25 03/14/2022    ANIONGAP 9 8 (L) 12/07/2015    BUN 19 03/14/2022    CREATININE 1 03 03/14/2022    GLUCOSE 91 12/07/2015    GLUF 115 (H) 11/01/2021    CALCIUM 8 7 03/14/2022    CORRECTEDCA 9 4 03/14/2022    AST 16 03/14/2022    ALT 8 (L) 03/14/2022    ALKPHOS 72 03/14/2022    PROT 6 9 12/07/2015    BILITOT 1 6 (H) 12/07/2015    EGFR 50 03/14/2022     Lab Results   Component Value Date    CHOLESTEROL 159 02/09/2021    CHOLESTEROL 184 11/12/2020    CHOLESTEROL 195 07/20/2020     Lab Results   Component Value Date    HDL 60 02/09/2021    HDL 67 11/12/2020    HDL 53 07/20/2020       Lab Results   Component Value Date    LDLCALC 82 02/09/2021    LDLCALC 97 11/12/2020    LDLCALC 115 (H) 07/20/2020     No components found for: [facility]  Lab Results   Component Value Date    TRIG 86 02/09/2021    TRIG 102 11/12/2020    TRIG 137 07/20/2020       INR 04/22/2022:  2 99  CBC 03/21/2022:  H/H-11 4/35 3 with normal WBC and platelets   hs troponin, lipase, magnesium 03/14/2022:   All normal        Jesika Gilbert MD

## 2022-05-04 NOTE — PATIENT INSTRUCTIONS
1  We have provided a two week supply of Entresto at a lower dose of 49/51 milligrams twice a day to replace the full dose  milligram strength  2  Let us know higher you are feeling on the lower dose so that we can renew it at your pharmacy  For now, put aside the  milligram strength  3  Consider a supervised low level exercise program to build up better stamina  Perhaps Dr Joy Moses can order supervised physical therapy  4  Continue all other medication as presently using  5  Cardiology follow-up approximately six months with EKG, lipids, CMP

## 2022-05-09 ENCOUNTER — APPOINTMENT (OUTPATIENT)
Dept: LAB | Facility: CLINIC | Age: 82
End: 2022-05-09
Payer: MEDICARE

## 2022-05-09 ENCOUNTER — ANTICOAG VISIT (OUTPATIENT)
Dept: CARDIOLOGY CLINIC | Facility: CLINIC | Age: 82
End: 2022-05-09

## 2022-05-09 DIAGNOSIS — Z95.2 H/O MITRAL VALVE REPLACEMENT WITH MECHANICAL VALVE: Primary | ICD-10-CM

## 2022-05-10 DIAGNOSIS — Z79.01 ANTICOAGULATED: ICD-10-CM

## 2022-05-10 DIAGNOSIS — Z95.2 S/P MVR (MITRAL VALVE REPLACEMENT): Primary | ICD-10-CM

## 2022-05-10 RX ORDER — WARFARIN SODIUM 2.5 MG/1
TABLET ORAL
Qty: 30 TABLET | Refills: 11 | Status: SHIPPED | OUTPATIENT
Start: 2022-05-10

## 2022-05-17 ENCOUNTER — TELEPHONE (OUTPATIENT)
Dept: CARDIOLOGY CLINIC | Facility: CLINIC | Age: 82
End: 2022-05-17

## 2022-05-17 DIAGNOSIS — I50.42 CHRONIC COMBINED SYSTOLIC AND DIASTOLIC HEART FAILURE (HCC): ICD-10-CM

## 2022-05-17 RX ORDER — SACUBITRIL AND VALSARTAN 49; 51 MG/1; MG/1
1 TABLET, FILM COATED ORAL 2 TIMES DAILY
Qty: 60 TABLET | Refills: 5 | Status: SHIPPED | OUTPATIENT
Start: 2022-05-17 | End: 2022-07-05 | Stop reason: SINTOL

## 2022-05-17 NOTE — TELEPHONE ENCOUNTER
Pt called to update you on entresto  States she is taking it well and there seems to be no issues   She asks if you can send to pharmacy on file for her, thank you

## 2022-05-23 ENCOUNTER — APPOINTMENT (OUTPATIENT)
Dept: LAB | Facility: CLINIC | Age: 82
End: 2022-05-23
Payer: MEDICARE

## 2022-05-23 ENCOUNTER — ANTICOAG VISIT (OUTPATIENT)
Dept: CARDIOLOGY CLINIC | Facility: CLINIC | Age: 82
End: 2022-05-23

## 2022-05-23 DIAGNOSIS — Z95.2 H/O MITRAL VALVE REPLACEMENT WITH MECHANICAL VALVE: Primary | ICD-10-CM

## 2022-05-31 DIAGNOSIS — M85.80 OSTEOPENIA WITH HIGH RISK OF FRACTURE: ICD-10-CM

## 2022-06-01 RX ORDER — ALENDRONATE SODIUM 70 MG/1
TABLET ORAL
Qty: 12 TABLET | Refills: 3 | Status: SHIPPED | OUTPATIENT
Start: 2022-06-01

## 2022-06-06 ENCOUNTER — APPOINTMENT (OUTPATIENT)
Dept: LAB | Facility: CLINIC | Age: 82
End: 2022-06-06
Payer: MEDICARE

## 2022-06-06 ENCOUNTER — ANTICOAG VISIT (OUTPATIENT)
Dept: CARDIOLOGY CLINIC | Facility: CLINIC | Age: 82
End: 2022-06-06

## 2022-06-06 DIAGNOSIS — Z95.2 H/O MITRAL VALVE REPLACEMENT WITH MECHANICAL VALVE: Primary | ICD-10-CM

## 2022-06-06 DIAGNOSIS — I48.11 LONGSTANDING PERSISTENT ATRIAL FIBRILLATION (HCC): ICD-10-CM

## 2022-06-09 ENCOUNTER — OFFICE VISIT (OUTPATIENT)
Dept: DERMATOLOGY | Age: 82
End: 2022-06-09
Payer: MEDICARE

## 2022-06-09 VITALS — TEMPERATURE: 97.6 F | WEIGHT: 178 LBS | BODY MASS INDEX: 29.66 KG/M2 | HEIGHT: 65 IN

## 2022-06-09 DIAGNOSIS — L81.4 SOLAR LENTIGO: Primary | ICD-10-CM

## 2022-06-09 DIAGNOSIS — L57.0 ACTINIC KERATOSIS: ICD-10-CM

## 2022-06-09 DIAGNOSIS — L82.0 INFLAMED SEBORRHEIC KERATOSIS: ICD-10-CM

## 2022-06-09 DIAGNOSIS — L82.1 SEBORRHEIC KERATOSES: ICD-10-CM

## 2022-06-09 DIAGNOSIS — D18.01 CHERRY ANGIOMA: ICD-10-CM

## 2022-06-09 PROCEDURE — 17110 DESTRUCTION B9 LES UP TO 14: CPT | Performed by: DERMATOLOGY

## 2022-06-09 PROCEDURE — 17000 DESTRUCT PREMALG LESION: CPT | Performed by: DERMATOLOGY

## 2022-06-09 PROCEDURE — 99213 OFFICE O/P EST LOW 20 MIN: CPT | Performed by: DERMATOLOGY

## 2022-06-09 NOTE — PROGRESS NOTES
[facility] Follow Up Note    Patient Name: Rasheed Rossi  Encounter Date: 06/09/2022    Today's Chief Concerns:  Ken Callahan Concern #1:  Full skin exam   Current Medications:    Current Outpatient Medications:     alendronate (FOSAMAX) 70 mg tablet, take 1 tablet by mouth EVERY 7 DAYS, Disp: 12 tablet, Rfl: 3    carbidopa-levodopa (SINEMET)  mg per tablet, TAKE 1 AND 1/2 TABLETS BY MOUTH 4 TIMES A DAY, Disp: 240 tablet, Rfl: 8    Cholecalciferol (VITAMIN D3) 2000 units TABS, Take 2,000 Units by mouth every morning, Disp: , Rfl:     clindamycin (CLEOCIN) 150 mg capsule, Take 600 mg by mouth 1 hour prior to dental appointment , Disp: , Rfl:     donepezil (ARICEPT) 10 mg tablet, Take 1 tablet (10 mg total) by mouth daily at bedtime, Disp: 90 tablet, Rfl: 3    entacapone (COMTAN) 200 mg tablet, take 1 tablet by mouth four times a day, Disp: 120 tablet, Rfl: 4    furosemide (LASIX) 20 mg tablet, Take 1/2 tablet by mouth  4 days per week  Schedule administration to be done when patient can be near toilet facilities for 4-6 hours  , Disp: 90 tablet, Rfl: 3    levothyroxine 50 mcg tablet, take 1 tablet by mouth every morning, Disp: 30 tablet, Rfl: 3    memantine (NAMENDA) 10 mg tablet, take 1 tablet by mouth once daily, Disp: 90 tablet, Rfl: 3    Multiple Vitamins-Minerals (OCUVITE ADULT 50+ PO), Take by mouth daily with breakfast, Disp: , Rfl:     sacubitril-valsartan (Entresto) 49-51 MG TABS, Take 1 tablet by mouth in the morning and 1 tablet in the evening , Disp: 60 tablet, Rfl: 5    warfarin (COUMADIN) 2 5 mg tablet, TAKE 1/2 TO 1 TABLET BY MOUTH DAILY OR AS DIRECTED BY PHYSICIAN, Disp: 30 tablet, Rfl: 11    acetaminophen (TYLENOL) 650 mg CR tablet, Take 1 tablet (650 mg total) by mouth 3 (three) times a day (Patient taking differently: Take 650 mg by mouth as needed  ), Disp: 100 tablet, Rfl: 0    Diclofenac Sodium (VOLTAREN) 1 %, Apply 2 g topically 4 (four) times a day as needed (Foot pain) for up to 15 days, Disp: 50 g, Rfl: 2    CONSTITUTIONAL:   Vitals:    06/09/22 0932   Temp: 97 6 °F (36 4 °C)   TempSrc: Temporal   Weight: 80 7 kg (178 lb)   Height: 5' 4 5" (1 638 m)       Specific Alerts:    Have you been seen by a [facility]atologist in the last 3 years? YES    Are you pregnant or planning to become pregnant? No    Are you currently or planning to be nursing or breast feeding? No    Allergies   Allergen Reactions    Amantadine Tongue Swelling    Artane [Trihexyphenidyl] Other (See Comments)     Felt "disconnected", "out if it", evelyn  Did not feel right     Glycopyrrolate      Nose bleeds    Pollen Extract        May we call your Preferred Phone number to discuss your specific medical information? YES    May we leave a detailed message that includes your specific medical information? YES    Have you traveled outside of the [facility] in the past 3 months? No    Do you currently have a pacemaker or defibrillator? No    Do you have any artificial heart valves, joints, plates, screws, rods, stents, pins, etc? No   - If Yes, were any placed within the last 2 years? Do you require any medications prior to a surgical procedure? YES, clindamycin for dental procedures     Are you taking any medications that cause you to bleed more easily ("blood thinners") YES    Have you ever experienced a rapid heartbeat with epinephrine? No    Have you ever been treated with "gold" (gold sodium thiomalate) therapy? No    [facility] can help with wrinkles, "laugh lines," facial volume loss, "double chin," "love handles," age spots, and more  Are you interested in learning today about some of the skin enhancement procedures that we offer? (If Yes, please provide more detail) No    Review of Systems:  Have you recently had or currently have any of the following?     · Fever or chills: No  · Night Sweats: No  · Headaches: No  · Weight Gain: No  · Weight Loss: No  · Blurry Vision: No  · Nausea: YES  · Vomiting: No  · Diarrhea: No  · Blood in Stool: No  · Abdominal Pain: No  · Itchy Skin: No  · Painful Joints: No  · Swollen Joints: No  · Muscle Pain: No  · Irregular Mole: No  · Sun Burn: No  · Dry Skin: No  · Skin Color Changes: No  · Scar or Keloid: No  · Cold Sores/Fever Blisters: No  · Bacterial Infections/MRSA: No  · Anxiety: No  · Depression: No  · Suicidal or Homicidal Thoughts: No      PSYCH: Normal mood and affect  EYES: Normal conjunctiva  ENT: Normal lips and oral mucosa  CARDIOVASCULAR: No edema  RESPIRATORY: Normal respirations  HEME/LYMPH/IMMUNO:  No regional lymphadenopathy except as noted below in ASSESSMENT AND PLAN BY DIAGNOSIS    FULL ORGAN SYSTEM SKIN EXAM (SKIN)   Hair, Scalp, Ears, Face Normal except as noted below in Assessment   Neck, Cervical Chain Nodes Normal except as noted below in Assessment   Right Arm/Hand/Fingers Normal except as noted below in Assessment   Left Arm/Hand/Fingers Normal except as noted below in Assessment   Chest/Breasts/Axillae Viewed areas Normal except as noted below in Assessment   Abdomen, Umbilicus Normal except as noted below in Assessment   Back/Spine Normal except as noted below in Assessment   Groin/Genitalia/Buttocks Viewed areas Normal except as noted below in Assessment   Right Leg, Foot, Toes Normal except as noted below in Assessment   Left Leg, Foot, Toes Normal except as noted below in Assessment       LENTIGO    Physical Exam:   Anatomic Location Affected:  Trunk, upper and lower extremities and face    Morphological Description:  Several tan brown macules    Pertinent Positives:   Pertinent Negatives:     Additional History of Present Condition:  Patient is here for full skin exam     Assessment and Plan:  Based on a thorough discussion of this condition and the management approach to it (including a comprehensive discussion of the known risks, side effects and potential benefits of treatment), the patient (family) agrees to implement the following specific plan:   Monitor for changes    When outside we recommend using a wide brim hat, sunglasses, long sleeve and pants, sunscreen with SPF 21+ with reapplication every 2 hours, or SPF specific clothing    Routine skin exam recommended yearly     What is a lentigo? A lentigo is a pigmented flat or slightly raised lesion with a clearly defined edge  Unlike an ephelis (freckle), it does not fade in the winter months  There are several kinds of lentigo  The name lentigo originally referred to its appearance resembling a small lentil  The plural of lentigo is lentigines, although lentigos is also in common use  Who gets lentigines? Lentigines can affect males and females of all ages and races  Solar lentigines are especially prevalent in fair skinned adults  Lentigines associated with syndromes are present at birth or arise during childhood  What causes lentigines? Common forms of lentigo are due to exposure to ultraviolet radiation:   Sun damage including sunburn    Indoor tanning    Phototherapy, especially photochemotherapy (PUVA)    Ionizing radiation, eg radiation therapy, can also cause lentigines  Several familial syndromes associated with widespread lentigines originate from mutations in Dino-MAP kinase, mTOR signaling and PTEN pathways  What are the clinical features of lentigines? Lentigines have been classified into several different types depending on what they look like, where they appear on the body, causative factors, and whether they are associated to other diseases or conditions  Lentigines may be solitary or more often, multiple  Most lentigines are smaller than 5 mm in diameter      Lentigo simplex   A precursor to junctional naevus    Arises during childhood and early adult life    Found on trunk and limbs    Small brown round or oval macule or thin plaque    Jagged or smooth edge    May have a dry surface    May disappear in time  Solar lentigo   A precursor to seborrhoeic keratosis    Found on chronically sun exposed sites such as hands, face, lower legs    May also follow sunburn to shoulders    Yellow, light or dark brown regular or irregular macule or thin plaque    May have a dry surface    Often has moth-eaten outline    Can slowly enlarge to several centimeters in diameter    May disappear, often through the process known as lichenoid keratosis    When atypical in appearance, may be difficult to distinguish from melanoma in situ  Ink spot lentigo   Also known as reticulated lentigo    Few in number compared to solar lentigines    Follows sunburn in very fair skinned individuals    Dark brown to black irregular ink spot-like macule  PUVA lentigo   Similar to ink spot lentigo but follows photochemotherapy (PUVA)    Location anywhere exposed to PUVA  Tanning bed lentigo   Similar to ink spot lentigo but follows indoor tanning    Location anywhere exposed to tanning bed  Radiation lentigo   Occurs in site of irradiation (accidental or therapeutic)    Associated with late-stage radiation dermatitis: epidermal atrophy, subcutaneous fibrosis, keratosis, telangiectasias  Melanotic macule   Mucosal surfaces or adjacent glabrous skin eg lip, vulva, penis, anus    Light to dark brown    Also called mucosal melanosis  Generalised lentigines   Found on any exposed or covered site from early childhood    Small macules may merge to form larger patches    Not associated with a syndrome    Also called lentigines profusa, multiple lentigines  Agminated lentigines   Naevoid eruption of lentigos confined to a single segmental area    Sharp demarcation in midline    May have associated neurological and developmental abnormalities  Patterned lentigines   Inherited tendency to lentigines on face, lips, buttocks, palms, soles    Recognised mainly in people of  ethnicity  Centrofacial neurodysraphic lentiginosis  Associated with mental retardation  Lentiginosis syndromes   Syndromes include LEOPARD/[place], Peutz-Jeghers, Laugier-Hunziker, Moynahan, Xeroderma pigmentosum, myxoma syndromes (TRIVEDI, NAME, Gauthier), Ruvalcaba-Myhre-Nicole, Bannayan-Zonnana syndrome, Cowden disease (multiple hamartoma syndrome )    Inheritance is autosomal dominant; sporadic cases common    Widespread lentigines present at birth or arise in early childhood    Associated with neural, endocrine, and mesenchymal tumors    How is the diagnosis made? Lentigines are usually diagnosed clinically by their typical appearance  Concern regarding possibility of melanoma may lead to:   Dermatoscopy    Diagnostic excision biopsy    Histopathology of a lentigo shows:   Thickened epidermis    An increased number of melanocytes along the basal layer of epidermis    Unlike junctional melanocytic naevus, there are no nests of melanocytes    Increased melanin pigment within the keratinocytes    Additional features depending on type of lentigo    In contrast, an ephelis (freckle) shows sun-induced increased melanin within the keratinocytes, without an increase in number of cells  What is the treatment for lentigines? Most lentigines are left alone  Attempts to lighten them may not be successful  The following approaches are used:   SPF 50+ broad-spectrum sunscreen    Hydroquinone bleaching cream    Alpha hydroxy acids    Vitamin C    Retinoids    Azelaic acid    Chemical peels  Individual lesions can be permanently removed using:   Cryotherapy    Intense pulsed light    Pigment lasers    How can lentigines be prevented? Lentigines associated with exposure ultraviolet radiation can be prevented by very careful sun protection  Clothing is more successful at preventing new lentigines than are sunscreens  What is the outlook for lentigines? Lentigines usually persist  They may increase in number with age and sun exposure   Some in sun-protected sites may fade and disappear  SHINE ANGIOMAS    Physical Exam:   Anatomic Location Affected:  Trunk    Morphological Description:  Scattered cherry red, 1-4 mm papules   Pertinent Positives:   Pertinent Negatives: Additional History of Present Condition:  Patient is here for full skin exam     Assessment and Plan:  Based on a thorough discussion of this condition and the management approach to it (including a comprehensive discussion of the known risks, side effects and potential benefits of treatment), the patient (family) agrees to implement the following specific plan:   Reassured benign     Assessment and Plan:    Cherry angioma, also known as [employer] spots, are benign vascular skin lesions  A "cherry angioma" is a firm red, blue or purple papule, 0 1-1 cm in diameter  When thrombosed, they can appear black in colour until evaluated with a dermatoscope when the red or purple colour is more easily seen  Cherry angioma may develop on any part of the body but most often appear on the scalp, face, lips and trunk  An angioma is due to proliferating endothelial cells; these are the cells that line the inside of a blood vessel  Angiomas can arise in early life or later in life; the most common type of angioma is a cherry angioma  Cherry angiomas are very common in males and females of any age or race  They are more noticeable in white skin than in skin of colour  They markedly increase in number from about the age of 36  There may be a family history of similar lesions  Eruptive cherry angiomas have been rarely reported to be associated with internal malignancy  The cause of angiomas is unknown  Genetic analysis of cherry angiomas has shown that they frequently carry specific somatic missense mutations in the GNAQ and GNA11 (Q209H) genes, which are involved in other vascular and melanocytic proliferations      Cherry angioma is usually diagnosed clinically and no investigations are necessary for the majority of lesions  It has a characteristic red-clod or lobular pattern on dermatoscopy (called lacunar pattern using conventional pattern analysis)  When there is uncertainty about the diagnosis, a biopsy may be performed  The angioma is composed of venules in a thickened papillary dermis  Collagen bundles may be prominent between the lobules  Cherry angiomas are harmless, so they do not usually have to be treated  Occasionally, they are removed to exclude a malignant skin lesion such as a nodular melanoma or because they are irritated or bleeding (and a subsequent risk for infection)  To decrease friction over the lesions, we recommend Neutrogena Daily Defense SPF 50+ at least 3 times a day  SEBORRHEIC KERATOSIS; NON-INFLAMED AND INFLAMED     Physical Exam:   Anatomic Location Affected:  Trunk, Face, scalp    Morphological Description:  Flat and raised, waxy, smooth to warty textured, yellow to brownish-grey to dark brown to blackish, discrete, "stuck-on" appearing papules   Pertinent Positives:   Pertinent Negatives: Additional History of Present Condition:  Patient reports new bumps on the skin  1 on cheek and 1 on scalp itch, and hurt, others do not bother her  nothing seems to make it worse or better  No prior treatment  Assessment and Plan:  Based on a thorough discussion of this condition and the management approach to it (including a comprehensive discussion of the known risks, side effects and potential benefits of treatment), the patient (family) agrees to implement the following specific plan:   Reassured benign    Cryotherapy done in office today to inflamed papule on right cheek and scalp     Seborrheic Keratosis  A seborrheic keratosis is a harmless warty spot that appears during adult life as a common sign of skin aging  Seborrheic keratoses can arise on any area of skin, covered or uncovered, with the usual exception of the palms and soles   They do not arise from mucous membranes  Seborrheic keratoses can have highly variable appearance  Seborrheic keratoses are extremely common  It has been estimated that over 90% of adults over the age of 61 years have one or more of them  They occur in males and females of all races, typically beginning to erupt in the 35s or 45s  They are uncommon under the age of 21 years  The precise cause of seborrhoeic keratoses is not known  Seborrhoeic keratoses are considered degenerative in nature  As time goes by, seborrheic keratoses tend to become more numerous  Some people inherit a tendency to develop a very large number of them; some people may have hundreds of them  The name "seborrheic keratosis" is misleading, because these lesions are not limited to a seborrhoeic distribution (scalp, mid-face, chest, upper back), nor are they formed from sebaceous glands, nor are they associated with sebum -- which is greasy  Seborrheic keratosis may also be called "SK," "Seb K," "basal cell papilloma," "senile wart," or "barnacle "      Researchers have noted:   Eruptive seborrhoeic keratoses can follow sunburn or dermatitis   Skin friction may be the reason they appear in body folds   Viral cause (e g , human papillomavirus) seems unlikely   Stable and clonal mutations or activation of FRFR3, PIK3CA, KRISTINA, AKT1 and EGFR genes are found in seborrhoeic keratoses   Seborrhoeic keratosis can arise from solar lentigo   FRFR3 mutations also arise in solar lentigines  These mutations are associated with increased age and location on the head and neck, suggesting a role of ultraviolet radiation in these lesions   Seborrheic keratoses do not harbour tumour suppressor gene mutations   Epidermal growth factor receptor inhibitors, which are used to treat some cancers, often result in an increase in verrucal (warty) keratoses  There is no easy way to remove multiple lesions on a single occasion    Unless a specific lesion is "inflamed" and is causing pain or stinging/burning or is bleeding, most insurance companies do not authorize treatment  ACTINIC KERATOSIS    Physical Exam:   Anatomic Location Affected:  Bridge of nose    Morphological Description:  Pink scaly papule     Additional History of Present Condition:  Patient is here for skin exam     Assessment and Plan:  Based on a thorough discussion of this condition and the management approach to it (including a comprehensive discussion of the known risks, side effects and potential benefits of treatment), the patient (family) agrees to implement the following specific plan:     Cryotherapy done in office today   Area will become red and puffy  Scab up and fall off in 2-3 weeks     Actinic keratoses are very common on sites repeatedly exposed to the sun, especially the backs of the hands and the face, most often affecting the ears, nose, cheeks, upper lip, vermilion of the lower lip, temples, forehead and balding scalp  In severely chronically sun-damaged individuals, they may also be found on the upper trunk, upper and lower limbs, and dorsum of feet  We discussed the theoretical premalignant (pre-cancerous) nature and etiology of these growths  We discussed the prevailing notion that actinic keratoses are a reflection of abnormal skin cell development due to DNA damage by short wavelength UVB  They are more likely to appear if the immune function is poor, due to aging, recent sun exposure, predisposing disease or certain drugs  We discussed that the main concern is that actinic keratoses may predispose to squamous cell carcinoma  It is rare for a solitary actinic keratosis to evolve to squamous cell carcinoma (SCC), but the risk of SCC occurring at some stage in a patient with more than 10 actinic keratoses is thought to be about 10 to 15%  A tender, thickened, ulcerated or enlarging actinic keratosis is suspicious of SCC      Actinic keratoses may be prevented by strict sun protection  If already present, keratoses may improve with a very high sun protection factor (50+) broad-spectrum sunscreen applied at least daily to affected areas, year-round  We recommend that UPF-rated clothing and hats and sunglasses be worn whenever possible and that a sunscreen-moisturizer combination product such as Neutrogena Daily Defense be applied at least three times a day  We performed a thorough discussion of treatment options and specific risk/benefits/alternatives including but not limited to medical field treatment with medications such as the following:     Topical field area medications such as 5-fluorouracil or Aldara (specifically, the trouble with long-term compliance, blistering and local skin reaction versus the convenience of at-home therapy and that field therapy gets what is not yet seen)   Cryotherapy (specifically, local pain, scarring, dyspigmentation, blistering, possible superinfection, and treats only what we see versus directed treatment today)   Photodynamic therapy (specifically, local pain, scarring, dyspigmentation, blistering, possible superinfection, need to schedule for a later date, and time spent in the office versus field therapy that gets what is not yet seen)  PROCEDURE:  DESTRUCTION OF BENIGN LESIONS  After a thorough discussion of treatment options and risk/benefits/alternatives (including but not limited to local pain, scarring, dyspigmentation, blistering, and possible superinfection), verbal and written consent were obtained and the aforementioned lesions were treated on with cryotherapy using liquid nitrogen x 1 cycle for 5-10 seconds   TOTAL NUMBER of 2 lesions were treated today on the ANATOMIC LOCATION: right cheek and scalp  The patient tolerated the procedure well, and after-care instructions were provided      PROCEDURE:  DESTRUCTION OF PRE-MALIGNANT LESIONS  After a thorough discussion of treatment options and risk/benefits/alternatives (including but not limited to local pain, scarring, dyspigmentation, blistering, and possible superinfection), verbal and written consent were obtained and the aforementioned lesions were treated on with cryotherapy using liquid nitrogen x 1 cycle for 5-10 seconds   TOTAL NUMBER of 1 pre-malignant lesions were treated today on the ANATOMIC LOCATION: bridge of nose  The patient tolerated the procedure well, and after-care instructions were provided      Scribe Attestation    I,:  Hector Gonzalez am acting as a scribe while in the presence of the attending physician :       I,:  Thomas Ryan MD personally performed the services described in this documentation    as scribed in my presence :

## 2022-06-09 NOTE — PATIENT INSTRUCTIONS
[place]    Assessment and Plan:  Based on a thorough discussion of this condition and the management approach to it (including a comprehensive discussion of the known risks, side effects and potential benefits of treatment), the patient (family) agrees to implement the following specific plan:  Monitor for changes   When outside we recommend using a wide brim hat, sunglasses, long sleeve and pants, sunscreen with SPF 97+ with reapplication every 2 hours, or SPF specific clothing   Routine skin exam recommended yearly     What is a lentigo? A lentigo is a pigmented flat or slightly raised lesion with a clearly defined edge  Unlike an ephelis (freckle), it does not fade in the winter months  There are several kinds of lentigo  The name lentigo originally referred to its appearance resembling a small lentil  The plural of lentigo is lentigines, although lentigos is also in common use  CHERRY ANGIOMAs    Assessment and Plan:  Based on a thorough discussion of this condition and the management approach to it (including a comprehensive discussion of the known risks, side effects and potential benefits of treatment), the patient (family) agrees to implement the following specific plan:  Reassured benign     Assessment and Plan:    Cherry angioma, also known as [employer] spots, are benign vascular skin lesions  A "cherry angioma" is a firm red, blue or purple papule, 0 1-1 cm in diameter  When thrombosed, they can appear black in colour until evaluated with a dermatoscope when the red or purple colour is more easily seen  Cherry angioma may develop on any part of the body but most often appear on the scalp, face, lips and trunk  An angioma is due to proliferating endothelial cells; these are the cells that line the inside of a blood vessel      SEBORRHEIC KERATOSIS; NON-INFLAMED AND INFLAMED     Assessment and Plan:  Based on a thorough discussion of this condition and the management approach to it (including a comprehensive discussion of the known risks, side effects and potential benefits of treatment), the patient (family) agrees to implement the following specific plan:  Reassured benign   Cryotherapy done in office today to inflamed papule on right cheek     Seborrheic Keratosis  A seborrheic keratosis is a harmless warty spot that appears during adult life as a common sign of skin aging  Seborrheic keratoses can arise on any area of skin, covered or uncovered, with the usual exception of the palms and soles  They do not arise from mucous membranes  Seborrheic keratoses can have highly variable appearance  Seborrheic keratoses are extremely common  It has been estimated that over 90% of adults over the age of 61 years have one or more of them  They occur in males and females of all races, typically beginning to erupt in the 35s or 45s  They are uncommon under the age of 21 years  The precise cause of seborrhoeic keratoses is not known  Seborrhoeic keratoses are considered degenerative in nature  As time goes by, seborrheic keratoses tend to become more numerous  Some people inherit a tendency to develop a very large number of them; some people may have hundreds of them  ACTINIC KERATOSIS     Assessment and Plan:  Based on a thorough discussion of this condition and the management approach to it (including a comprehensive discussion of the known risks, side effects and potential benefits of treatment), the patient (family) agrees to implement the following specific plan:    Cryotherapy done in office today  Area will become red and puffy  Scab up and fall off in 2-3 weeks     Actinic keratoses are very common on sites repeatedly exposed to the sun, especially the backs of the hands and the face, most often affecting the ears, nose, cheeks, upper lip, vermilion of the lower lip, temples, forehead and balding scalp   In severely chronically sun-damaged individuals, they may also be found on the upper trunk, upper and lower limbs, and dorsum of feet

## 2022-06-17 DIAGNOSIS — G20 PARKINSON DISEASE (HCC): ICD-10-CM

## 2022-06-17 RX ORDER — ENTACAPONE 200 MG/1
TABLET ORAL
Qty: 120 TABLET | Refills: 4 | Status: SHIPPED | OUTPATIENT
Start: 2022-06-17

## 2022-06-30 ENCOUNTER — TELEPHONE (OUTPATIENT)
Dept: CARDIOLOGY CLINIC | Facility: CLINIC | Age: 82
End: 2022-06-30

## 2022-06-30 NOTE — TELEPHONE ENCOUNTER
See if we can squeeze her in for an appointment in place of a routine follow-up patient next week  I have no immediate advice for her and recommend emergency room care if she feels much worse

## 2022-06-30 NOTE — TELEPHONE ENCOUNTER
Patient reports that she is feeling off,   bp has been rising according to      Last bp was 126/67 pulse 63

## 2022-07-01 ENCOUNTER — TELEPHONE (OUTPATIENT)
Dept: NEUROLOGY | Facility: CLINIC | Age: 82
End: 2022-07-01

## 2022-07-01 NOTE — TELEPHONE ENCOUNTER
MEDICATION REQUESTED: dexmethylphenidate (FOCALIN XR) 10 MG 24 hr capsule    Last office visit: 1/8/2021    Next office visit: 7/7/2021    LAST REFILL: 1/8/21     Pt called and states that she has virtual video appt w/ you today at 10:15  She is having a hard time connecting  Can you do virtual telephone today?    tt sent    Spoke w/ Sunitha and states that we don't do virtual telephone visit anymore  Call must be transferred to fd first  Pt made aware  Call transferred to [facility]

## 2022-07-01 NOTE — TELEPHONE ENCOUNTER
Reached out to patient in attempt to register her for virtual visit with Gianna Hollins/swati to return call to registerr and check in on first number  Called all others and received vmail on spouse phone and no ring on other number listed

## 2022-07-05 ENCOUNTER — OFFICE VISIT (OUTPATIENT)
Dept: CARDIOLOGY CLINIC | Facility: CLINIC | Age: 82
End: 2022-07-05
Payer: MEDICARE

## 2022-07-05 VITALS
HEART RATE: 72 BPM | OXYGEN SATURATION: 99 % | SYSTOLIC BLOOD PRESSURE: 100 MMHG | TEMPERATURE: 98.2 F | HEIGHT: 65 IN | DIASTOLIC BLOOD PRESSURE: 54 MMHG | WEIGHT: 179 LBS | BODY MASS INDEX: 29.82 KG/M2

## 2022-07-05 DIAGNOSIS — E78.2 MIXED DYSLIPIDEMIA: ICD-10-CM

## 2022-07-05 DIAGNOSIS — I50.42 CHRONIC COMBINED SYSTOLIC AND DIASTOLIC HEART FAILURE (HCC): ICD-10-CM

## 2022-07-05 DIAGNOSIS — I44.7 LEFT BUNDLE BRANCH BLOCK (LBBB): ICD-10-CM

## 2022-07-05 DIAGNOSIS — Z79.01 ON CONTINUOUS ORAL ANTICOAGULATION: ICD-10-CM

## 2022-07-05 DIAGNOSIS — G20 PARKINSON'S DISEASE (HCC): ICD-10-CM

## 2022-07-05 DIAGNOSIS — I73.9 PERIPHERAL VASCULAR DISEASE (HCC): ICD-10-CM

## 2022-07-05 DIAGNOSIS — I49.3 ASYMPTOMATIC PVCS: ICD-10-CM

## 2022-07-05 DIAGNOSIS — I48.20 CHRONIC ATRIAL FIBRILLATION (HCC): ICD-10-CM

## 2022-07-05 DIAGNOSIS — G45.9 TRANSIENT ISCHEMIC ATTACK: ICD-10-CM

## 2022-07-05 DIAGNOSIS — I95.1 ORTHOSTATIC HYPOTENSION: ICD-10-CM

## 2022-07-05 DIAGNOSIS — K52.1 DIARRHEA DUE TO DRUG: ICD-10-CM

## 2022-07-05 DIAGNOSIS — I42.0 CARDIOMYOPATHY, DILATED, NONISCHEMIC (HCC): ICD-10-CM

## 2022-07-05 DIAGNOSIS — R53.1 WEAKNESS: Primary | ICD-10-CM

## 2022-07-05 DIAGNOSIS — Z95.2 H/O MITRAL VALVE REPLACEMENT WITH MECHANICAL VALVE: ICD-10-CM

## 2022-07-05 PROCEDURE — 93000 ELECTROCARDIOGRAM COMPLETE: CPT | Performed by: INTERNAL MEDICINE

## 2022-07-05 PROCEDURE — 99215 OFFICE O/P EST HI 40 MIN: CPT | Performed by: INTERNAL MEDICINE

## 2022-07-05 NOTE — PROGRESS NOTES
Office Cardiology Progress Note  Josselyn Moulton 79 y o  female MRN: [id number]  07/05/22  1:25 PM      ASSESSMENT:    1    Recent episode of near syncope and cold sweats with presumed hypotension, possibly secondary to recent worsening diarrhea, which could be from [facility]  Higher morning blood pressures on recent measurement, with normal pressures rest of day  Recurrent dehydration/hypotension and residual mild dizziness as well as resolution of acute kidney injury since 11/18/16  Patient currently off midodrine  2   Resolution of  bilateral pretibial/ankle/pedal edema with onset approximately 30+ months  ago associated  with 12 pound weight gain in 1 7+ years and now with 10 pound weight loss in approximately 2+ years  with no other evidence of heart failure   This coincides with decrease in dose of furosemide, which is now being taken at dose of 20 milligrams four days per week  3   Chronic combined systolic and diastolic heart failure DNHD 95-22% LVEF and normal MVR function on 04/11/2022 and 06/22/2019 transthoracic echocardiograms   Significant improvement from 34% ejection fraction on 7/5/16 MUGA scan/underlying nonischemic dilated cardiomyopathy but currently with more exertional fatigue, dyspnea on exertion, and  recurrence  of exhaustion and exertional cold sweats,  with suppression of occasional nocturnal wheezing   Initially improved on starting dose of Entresto, but had vasovagal type near syncope on 01/18/2019 with no recurrence   Patient with less frequent lightheadedness and  low blood pressures in the 90s-100's  The current symptoms are likely related to physical deconditioning  4  Recent severe diarrhea for the past week superimposed on chronic intermittent diarrhea, likely causing dehydration  5  Chronic atrial fibrillation, controlled, and chronic left bundle-branch block with no EKG changes in approximately 2 7+ years    6  History of mechanical mitral valve replacement September, 1990, with very good oral anticoagulation with target INR of 2 5-3 5   Latest INR 3 20 on 06/06/2022  7  Moderately frequent asymptomatic PVCs and otherwise benign Holter monitor December, 2013   8  21 9 pound weight loss in approximately 7 6+ years,  and recent weight loss of 12  lbs in 30+ months or 2 5+ years  9  Controlled mixed dyslipidemia  10  Mild obstructive sleep apnea, previously suspected  11  History of asthma  12  History of GERD  13  History of benign positional vertigo with recurrence 22 months ago and again 21+ months ago  14  History of Lyme disease  15  Hypothyroidism with  prior normal TSH level, which could have contributed to previous extreme fatigue  16  History of hepatitis C   17  History of Parkinson's disease/gait dysfunction with slowly progressive symptoms    18  Resolved nonproductive cough and postural dizziness with  flu-like illness nearly 3 85 years ago  19  Chronic fatigue and malaise, multifactorial, improving  20  Drug-induced insomnia  21  History of fall with subsequent left distal tibial fracture and left ankle sprain with right periorbital ecchymoses and laceration and left wrist sprain on 7/30/17, with no recurrence, possibly related to transient drop in blood pressure with upright position in hot weather after prolonged sitting  22  Mildly impaired fasting glucose of 133 on 03/14/2022 with A1c of 5 9% on 07/20/2020  21  Chronic kidney disease, stage IIIa  Plan       Patient Instructions     1  Discontinue Entresto , which might contribute to recent increase in diarrhea  2  Starting in approximately two weeks, do blood pressures at home for four consecutive days, mid to late morning and evening, three readings at a time approximately 1-3  minutes apart, recording them on blood pressure data sheet given to you by Dr Bianca Galvez  Feel free to make extra copies of the blood pressure data sheet for future use    3   Take blood pressures after resting for at least 15 minutes while seated in a chair or at a table with arm supported on arm rest or table  Do 3 readings, one after the other, both morning and evening for 4 consecutive days  4    Write down each and every reading with date and time and   E-mail list of readings to the office of Dr Tulio Vaughn for his review  Send email to:  Beckham [email or web address]  Please note that he e-mail address is case sensitive  5    Our office will contact you with further instructions and the results of this review  6    Let us know if there is no improvement in the diarrhea  7      Follow-up as previously scheduled in early November, 2022 with EKG  HPI    This 79 y o  female  called our office on 06/30/2022 after experiencing a near syncopal episode while in the supermarket with associated cold sweats  Upon returning home, she noted an elevated blood pressure for her of 148/71 at 5:00 p m , followed by a subsequent blood pressure in the evening of 139/69  She subsequently has been feeling weak and has been tracking her blood pressures since then and is notice that her morning blood pressures before medication have ranged from a low of 128/75 to a high of 143/90 on 07/01/2022  Her subsequent blood pressure readings each of those days between 7/1 and 07/04/2022 have been in the normal range  The patient has had progressive and relatively severe diarrhea for the past week with intermittent diarrhea prior to that  The patient denies any dyspnea, palpitations, orthopnea, paroxysmal nocturnal dyspnea, chest pain, syncope, edema, cough, sputum production, wheezing, fever, chills  The patient did have a low-grade fever of less than 100° on one of the recent days  The patient is also being seen in follow-up of the below listed diagnoses  Encounter Diagnoses   Name Primary?     Weakness Yes    Diarrhea due to drug     Chronic atrial fibrillation ([address] )     H/O mitral valve replacement with mechanical valve     Chronic combined systolic and diastolic heart failure (HCC)     Peripheral vascular disease (HCC)     Left bundle branch block (LBBB)     Cardiomyopathy, dilated, nonischemic (HCC)     Transient ischemic attack     Asymptomatic PVCs     Orthostatic hypotension     Parkinson's disease ([facility] [address]     On continuous oral anticoagulation     Mixed dyslipidemia         Review of Systems    All other systems negative, except as noted in history of present illness    Historical Information   Past Medical History:   Diagnosis Date    Anal fissure     Arthritis     osteo joints    Asthma with acute exacerbation     Blepharitis     Breast lump     Chalazion     CHF, chronic ([facility] [address] )     Difficulty walking     Disease of thyroid gland     Drooling     Dysphagia     Fall 05/04/2018    Fibromyalgia, primary     Glaucoma     Normal pressure    History of transfusion 1996    Hordeolum externum     Hx of transient ischemic attack (TIA)     Hypophonia     Infectious viral hepatitis     Hep C dx 1996     Lyme carditis     Murmur, cardiac     Parkinsons (HCC)     Rotator cuff tendinitis     Seasonal allergies     Urinary frequency      Past Surgical History:   Procedure Laterality Date    BREAST BIOPSY Right 2018    benign    BREAST CYST EXCISION Left     benign    BREAST SURGERY N/A     benign, calcium    CARDIAC SURGERY  1990    mitral valve replacement    CATARACT EXTRACTION Right 2015    CATARACT EXTRACTION Left 2014    CHEST TUBE INSERTION Right     post pleural effusion    CHOLECYSTECTOMY  2000    lap    HYSTERECTOMY  1973    partial    SC ARTHROCENTESIS ASPIR&/INJ MAJOR JT/BURSA W/O US Left 3/19/2021    Procedure: Sacroiliac Joint Injection ( 17982 );   Surgeon: Sarah Schilling MD;  [facility] MAIN OR;  Service: Pain Management     SC COLSC FLX W/RMVL OF TUMOR POLYP LESION SNARE TQ N/A 7/18/2017    Procedure: COLONOSCOPY and biopsy ;  Surgeon: Vinnie Zamudio MD;  Location: [facility] GI LAB; Service: Gastroenterology    SKIN BIOPSY      pre cancerous on face    TONSILLECTOMY      US GUIDED BREAST BIOPSY RIGHT COMPLETE Right 2018     Social History     Substance and Sexual Activity   Alcohol Use No     Social History     Substance and Sexual Activity   Drug Use No     Social History     Tobacco Use   Smoking Status Former Smoker    Packs/day: 0 10    Years: 10 00    Pack years: 1 00    Quit date: 5    Years since quittin 5   Smokeless Tobacco Never Used       Family History:  Family History   Problem Relation Age of Onset    Heart disease Mother         murmur Cardiomegaly age 64    Pneumonia Father     Heart disease Brother         mitrsl valve    Parkinsonism Brother     Arthritis Brother     Lupus Daughter     Diabetes Daughter     Depression Daughter          Meds/Allergies     Prior to Admission medications    Medication Sig Start Date End Date Taking?  Authorizing Provider   acetaminophen (TYLENOL) 650 mg CR tablet Take 1 tablet (650 mg total) by mouth 3 (three) times a day  Patient taking differently: Take 650 mg by mouth as needed 19  Yes Semaj Patel MD   alendronate (FOSAMAX) 70 mg tablet take 1 tablet by mouth EVERY 7 DAYS 22  Yes Gopi Spencer MD   carbidopa-levodopa (SINEMET)  mg per tablet TAKE 1 AND 1/2 TABLETS BY MOUTH 4 TIMES A DAY  Patient taking differently: Take 1 tablet by mouth 4 (four) times a day TAKE 1 AND 1/2 TABLETS IN THE MORNING 1 TABLET AT LATE MORNING 1 1/2 MID AFTER NOON  1 TABLET IN THE EVENING 3/22/22  Yes Alex Loving MD   Cholecalciferol (VITAMIN D3) 2000 units TABS Take 2,000 Units by mouth every morning   Yes Historical Provider, MD   clindamycin (CLEOCIN) 150 mg capsule Take 600 mg by mouth 1 hour prior to dental appointment  21  Yes Historical Provider, MD   donepezil (ARICEPT) 10 mg tablet Take 1 tablet (10 mg total) by mouth daily at bedtime 10/5/21  Yes Ariana Lara PA-C   entacapone (COMTAN) 200 mg tablet take 1 tablet by mouth four times a day 6/17/22  Yes Bre Kingston PA-C   furosemide (LASIX) 20 mg tablet Take 1/2 tablet by mouth  4 days per week  Schedule administration to be done when patient can be near toilet facilities for 4-6 hours  8/23/21  Yes Olga Mcintyre MD   levothyroxine 50 mcg tablet take 1 tablet by mouth every morning 5/3/22  Yes Ke Davey MD   memantine [facility]) 10 mg tablet take 1 tablet by mouth once daily 10/7/21  Yes Bre Kingston PA-C   Multiple Vitamins-Minerals (OCUVITE ADULT 50+ PO) Take by mouth daily with breakfast   Yes Historical Provider, MD   warfarin (COUMADIN) 2 5 mg tablet TAKE 1/2 TO 1 TABLET BY MOUTH DAILY OR AS DIRECTED BY PHYSICIAN 5/10/22  Yes Olga Mcintyre MD   sacubitril-valsartan Orval Peat) 49-51 MG TABS Take 1 tablet by mouth in the morning and 1 tablet in the evening  5/17/22 7/5/22 Yes Ogla Mcintyre MD   Diclofenac Sodium (VOLTAREN) 1 % Apply 2 g topically 4 (four) times a day as needed (Foot pain) for up to 15 days 1/21/22 2/5/22  Jenny Fry DPM       Allergies   Allergen Reactions    Amantadine Tongue Swelling    Artane [Trihexyphenidyl] Other (See Comments)     Felt "disconnected", "out if it", shaky  Did not feel right     Glycopyrrolate      Nose bleeds    Pollen Extract          Vitals:    07/05/22 1105   BP: 100/54   BP Location: Left arm   Patient Position: Sitting   Cuff Size: Large   Pulse: 72   Temp: 98 2 °F (36 8 °C)   SpO2: 99%   Weight: 81 2 kg (179 lb)   Height: 5' 4 5" (1 638 m)       Body mass index is 30 25 kg/m²  3 pound weight gain in approximately two months with 5 pound weight loss in approximately 1 7 years and 10 pound weight loss in approximately 2+ years    Physical Exam:    General Appearance:  Alert, cooperative, no distress, appears stated age and is mildly obese     Head:  Normocephalic, without obvious abnormality, atraumatic   Eyes:  PERRL, conjunctiva/corneas clear, EOM's intact,   both eyes   Ears: Normal TM's and external ear canals, both ears   Nose: Nares normal, septum midline, mucosa normal, no drainage or sinus tenderness   Throat: Lips, mucosa, and tongue normal; teeth and gums normal   Neck: Supple, symmetrical, trachea midline, no adenopathy, thyroid: not enlarged, symmetric, no tenderness/mass/nodules, no carotid bruit or JVD   Back:   Symmetric, no curvature, ROM normal, no CVA tenderness   Lungs:   Clear to auscultation bilaterally, respirations unlabored   Chest Wall:  No tenderness or deformity   Heart:  Irregularly irregular cardiac rhythm, S1, S2 normal, no murmur, rub or gallop   Abdomen:   Soft, non-tender, bowel sounds active all four quadrants,  no masses, no organomegaly  Mild to moderate abdominal obesity noted  Extremities: Extremities normal, atraumatic, no cyanosis with trace nonpitting pretibial and ankle edema bilaterally  Pulses: 2+ and symmetric   Skin: Skin showed normal color, texture, turgor and no rashes or lesions   Lymph nodes: Cervical, supraclavicular, and axillary nodes normal   Neurologic: Normal         Cardiographics    ECG 07/05/22:    Controlled atrial fibrillation with average ventricular rate 72 bpm   Isolated PVCs and/or apparently conducted complexes   Left bundle branch block with secondary repolarization abnormalities in leads I and aVL  New PVCs or aberrant complexes with no other changes since 05/04/2022    IMAGING:    No Chest XR results available for this patient            LAB REVIEW:      Lab Results   Component Value Date    SODIUM 140 03/14/2022    K 4 4 03/14/2022     03/14/2022    CO2 25 03/14/2022    ANIONGAP 9 8 (L) 12/07/2015    BUN 19 03/14/2022    CREATININE 1 03 03/14/2022    GLUCOSE 91 12/07/2015    GLUF 115 (H) 11/01/2021    CALCIUM 8 7 03/14/2022    CORRECTEDCA 9 4 03/14/2022    AST 16 03/14/2022    ALT 8 (L) 03/14/2022    ALKPHOS 72 03/14/2022    PROT 6 9 12/07/2015    BILITOT 1 6 (H) 12/07/2015    EGFR 50 03/14/2022     Lab Results Component Value Date    CHOLESTEROL 159 02/09/2021    CHOLESTEROL 184 11/12/2020    CHOLESTEROL 195 07/20/2020     Lab Results   Component Value Date    HDL 60 02/09/2021    HDL 67 11/12/2020    HDL 53 07/20/2020       Lab Results   Component Value Date    LDLCALC 82 02/09/2021    LDLCALC 97 11/12/2020    LDLCALC 115 (H) 07/20/2020     No components found for: [facility]  Lab Results   Component Value Date    TRIG 86 02/09/2021    TRIG 102 11/12/2020    TRIG 137 07/20/2020     PT/INR 06/06/2022:  31 1/3 20    Because of recent onset of acute symptoms, detailed review of the patient's blood pressure pattern and discussion of potential management strategies, this visit consumed 46 minutes of face-to-face time with patient        Rachel Ta MD

## 2022-07-05 NOTE — PATIENT INSTRUCTIONS
Discontinue Entresto , which might contribute to recent increase in diarrhea  Starting in approximately two weeks, do blood pressures at home for four consecutive days, mid to late morning and evening, three readings at a time approximately 1-3  minutes apart, recording them on blood pressure data sheet given to you by Dr Suni Joshi  Feel free to make extra copies of the blood pressure data sheet for future use  Take blood pressures after resting for at least 15 minutes while seated in a chair or at a table with arm supported on arm rest or table  Do 3 readings, one after the other, both morning and evening for 4 consecutive days  Write down each and every reading with date and time and   E-mail list of readings to the office of Dr Suni Joshi for his review  Send email to:  [email or web address]  Please note that he e-mail address is case sensitive  Our office will contact you with further instructions and the results of this review  Let us know if there is no improvement in the diarrhea  7      Follow-up as previously scheduled in early November, 2022 with EKG

## 2022-07-06 ENCOUNTER — APPOINTMENT (OUTPATIENT)
Dept: LAB | Facility: CLINIC | Age: 82
End: 2022-07-06
Payer: MEDICARE

## 2022-07-06 ENCOUNTER — ANTICOAG VISIT (OUTPATIENT)
Dept: CARDIOLOGY CLINIC | Facility: CLINIC | Age: 82
End: 2022-07-06

## 2022-07-06 DIAGNOSIS — Z95.2 H/O MITRAL VALVE REPLACEMENT WITH MECHANICAL VALVE: Primary | ICD-10-CM

## 2022-07-06 DIAGNOSIS — I48.11 LONGSTANDING PERSISTENT ATRIAL FIBRILLATION (HCC): ICD-10-CM

## 2022-07-12 NOTE — PROGRESS NOTES
Assessment/Plan:    Patient opted out of oral and topical anti fungal medications at this time, all onychomycotic dystrophic nails debrided using sterile Nipper and electrical ragini to patient tolerance, Betadine applied, patient educated on daily foot hygiene for the management of fungal infection  Patient follow-up with her primary regarding bilateral lower extremity swelling,  Patient opted for topical management of paresthesia neuropathy     Diagnoses and all orders for this visit:    Peripheral vascular disease, unspecified ([address] )    Onychomycosis    Pain in both feet    Ingrown toenail    Localized edema    Paresthesia and pain of extremity    Xerosis cutis          Subjective:      Patient ID: Irene Vogt is a 79 y o  female  79-year-old female past medical history significant of Parkinson disease, presents to the office for painful elongated thickened dystrophic discolored toenails, patient has a history of CHF and bilateral lower extremity swelling and edema, patient denies constitutional symptoms, patient denies recent trauma to the foot, patient is unable self treat due to past medical history      The following portions of the patient's history were reviewed and updated as appropriate: allergies, current medications, past family history, past medical history, past social history, past surgical history and problem list     Review of Systems   Constitutional: Negative  Respiratory: Negative  Cardiovascular: Positive for leg swelling  Musculoskeletal: Positive for arthralgias  Skin: Positive for color change  Neurological: Positive for numbness                 Historical Information   Past Medical History:   Diagnosis Date    Anal fissure     Arthritis     osteo joints    Asthma with acute exacerbation     Blepharitis     Breast lump     Chalazion     CHF, chronic (HCC)     Difficulty walking     Disease of thyroid gland     Drooling     Dysphagia     Fall 05/04/2018    Fibromyalgia, primary     Glaucoma     Normal pressure    History of transfusion     Hordeolum externum     Hx of transient ischemic attack (TIA)     Hypophonia     Infectious viral hepatitis     Hep C dx      Lyme carditis     Murmur, cardiac     Parkinsons (HCC)     Rotator cuff tendinitis     Seasonal allergies     Urinary frequency      Past Surgical History:   Procedure Laterality Date    BREAST BIOPSY Right 2018    benign    BREAST CYST EXCISION Left     benign    BREAST SURGERY N/A     benign, calcium    CARDIAC SURGERY      mitral valve replacement    CATARACT EXTRACTION Right 2015    CATARACT EXTRACTION Left 2014    CHEST TUBE INSERTION Right     post pleural effusion    CHOLECYSTECTOMY      lap    HYSTERECTOMY  1973    partial    CA ARTHROCENTESIS ASPIR&/INJ MAJOR JT/BURSA W/O US Left 3/19/2021    Procedure: Sacroiliac Joint Injection ( 77262 ); Surgeon: Pj Matute MD;  [facility]: [facility] MAIN OR;  Service: Pain Management     CA COLSC FLX W/RMVL OF TUMOR POLYP LESION SNARE TQ N/A 2017    Procedure: COLONOSCOPY and biopsy ;  Surgeon: Mikey Mccloud MD;  Location: [place] [address] GI LAB;   Service: Gastroenterology    SKIN BIOPSY      pre cancerous on face    TONSILLECTOMY      US GUIDED BREAST BIOPSY RIGHT COMPLETE Right 2018     Social History   Social History     Substance and Sexual Activity   Alcohol Use No     Social History     Substance and Sexual Activity   Drug Use No     Social History     Tobacco Use   Smoking Status Former Smoker    Packs/day: 0 10    Years: 10 00    Pack years: 1 00    Quit date: 5    Years since quittin 5   Smokeless Tobacco Never Used     Family History:   Family History   Problem Relation Age of Onset    Heart disease Mother         murmur Cardiomegaly age 64    Pneumonia Father     Heart disease Brother         mitrsl valve    Parkinsonism Brother     Arthritis Brother     Lupus Daughter     Diabetes Daughter     Depression Daughter        Meds/Allergies   all medications and allergies reviewed  Allergies   Allergen Reactions    Amantadine Tongue Swelling    Artane [Trihexyphenidyl] Other (See Comments)     Felt "disconnected", "out if it", evelyn  Did not feel right     Glycopyrrolate      Nose bleeds    Pollen Extract        Objective:      Pulse 74   Resp 16   Ht 5' 4 5" (1 638 m)   Wt 79 8 kg (176 lb)   BMI 29 74 kg/m²          Physical Exam  Constitutional:       General: She is not in acute distress  Appearance: She is well-developed  She is not ill-appearing, toxic-appearing or diaphoretic  HENT:      Head: Normocephalic  Cardiovascular:      Pulses:           Dorsalis pedis pulses are 1+ on the right side and 1+ on the left side  Posterior tibial pulses are 0 on the right side and 0 on the left side  Comments: Palpable DP pulse, nonpalpable PT pulse, CFT is less than 3 seconds, temperature gradient within normal limit, a trophic skin changes noted with skin thinning in shiny, patient report occasional claudication to bilateral calf, localized edema foot and ankle Q9  Pulmonary:      Effort: Pulmonary effort is normal    Musculoskeletal:         General: Tenderness and deformity present  Comments: Pes planus type foot pain on palpation and range of motion of the 1st MPJ, metatarsal heads bilateral foot, pain on palpation on range of motion of the ST joint, crepitus noted in midfoot joint  Skin:     General: Skin is dry  Capillary Refill: Capillary refill takes 2 to 3 seconds  Comments: Trophic skin changes bilateral foot and ankle, alternating hypopigmentation and hyperpigmentation patches around the foot and ankle on anterior leg, skin is shiny and thin, absent hair growth, atrophy of fat pad  Diffuse xerosis bilateral lower extremity, dystrophic discolored thickened toenails onychomycotic, onychocryptosis noted as well, malodor and subungual debris  Neurological:      Mental Status: She is alert and oriented to person, place, and time  Sensory: Sensory deficit present  Motor: Atrophy present  Deep Tendon Reflexes: Reflexes abnormal       Foot Exam    Right Foot/Ankle     Neurovascular  Dorsalis pedis: 1+  Posterior tibial: 0      Left Foot/Ankle      Neurovascular  Dorsalis pedis: 1+  Posterior tibial: 0              Portions of the record may have been created with voice recognition software  Occasional wrong word or "sound a like" substitutions may have occurred due to the inherent limitations of voice recognition software  Read the chart carefully and recognize, using context, where substitutions have occurred

## 2022-07-21 ENCOUNTER — DOCUMENTATION (OUTPATIENT)
Dept: CARDIOLOGY CLINIC | Facility: CLINIC | Age: 82
End: 2022-07-21

## 2022-07-21 NOTE — PROGRESS NOTES
Message sent to patient on 07/21/2022:    Landry Brennan,     We calculated your average blood pressure at home between 7/17 and 07/20/2022  The average overall blood pressure was 130/69, which is quite acceptable for your age group  Has there been any improvement in the diarrhea with discontinuation of the Entresto? If there has been significant improvement, continue without that medication

## 2022-07-28 ENCOUNTER — OFFICE VISIT (OUTPATIENT)
Dept: FAMILY MEDICINE CLINIC | Facility: CLINIC | Age: 82
End: 2022-07-28
Payer: MEDICARE

## 2022-07-28 VITALS
TEMPERATURE: 97.3 F | RESPIRATION RATE: 14 BRPM | WEIGHT: 183 LBS | BODY MASS INDEX: 30.49 KG/M2 | DIASTOLIC BLOOD PRESSURE: 80 MMHG | HEIGHT: 65 IN | SYSTOLIC BLOOD PRESSURE: 140 MMHG | HEART RATE: 84 BPM

## 2022-07-28 DIAGNOSIS — R35.0 URINARY FREQUENCY: ICD-10-CM

## 2022-07-28 DIAGNOSIS — I48.20 CHRONIC ATRIAL FIBRILLATION (HCC): ICD-10-CM

## 2022-07-28 DIAGNOSIS — F02.80 ALZHEIMER'S DEMENTIA WITHOUT BEHAVIORAL DISTURBANCE, UNSPECIFIED TIMING OF DEMENTIA ONSET: ICD-10-CM

## 2022-07-28 DIAGNOSIS — N39.0 URINARY TRACT INFECTION WITHOUT HEMATURIA, SITE UNSPECIFIED: Primary | ICD-10-CM

## 2022-07-28 DIAGNOSIS — G30.9 ALZHEIMER'S DEMENTIA WITHOUT BEHAVIORAL DISTURBANCE, UNSPECIFIED TIMING OF DEMENTIA ONSET: ICD-10-CM

## 2022-07-28 DIAGNOSIS — R50.9 FEVER, UNSPECIFIED FEVER CAUSE: ICD-10-CM

## 2022-07-28 LAB
SL AMB  POCT GLUCOSE, UA: ABNORMAL
SL AMB LEUKOCYTE ESTERASE,UA: 75
SL AMB POCT BILIRUBIN,UA: ABNORMAL
SL AMB POCT BLOOD,UA: 50
SL AMB POCT CLARITY,UA: CLEAR
SL AMB POCT COLOR,UA: ABNORMAL
SL AMB POCT KETONES,UA: 15
SL AMB POCT NITRITE,UA: ABNORMAL
SL AMB POCT PH,UA: 6
SL AMB POCT SPECIFIC GRAVITY,UA: 1.02
SL AMB POCT URINE PROTEIN: ABNORMAL
SL AMB POCT UROBILINOGEN: ABNORMAL

## 2022-07-28 PROCEDURE — 81003 URINALYSIS AUTO W/O SCOPE: CPT | Performed by: STUDENT IN AN ORGANIZED HEALTH CARE EDUCATION/TRAINING PROGRAM

## 2022-07-28 PROCEDURE — 99214 OFFICE O/P EST MOD 30 MIN: CPT | Performed by: STUDENT IN AN ORGANIZED HEALTH CARE EDUCATION/TRAINING PROGRAM

## 2022-07-28 RX ORDER — CEFDINIR 300 MG/1
300 CAPSULE ORAL EVERY 12 HOURS SCHEDULED
Qty: 10 CAPSULE | Refills: 0 | Status: SHIPPED | OUTPATIENT
Start: 2022-07-28 | End: 2022-08-02

## 2022-07-28 NOTE — PROGRESS NOTES
Clinic Visit Note  Quincy Christy 79 y o  female   MRN: [id number]    Assessment and Plan      Diagnoses and all orders for this visit:    Urinary tract infection without hematuria, site unspecified  Appears uncomplicated, medications reviewed, cefdinir appropriate antibiotic choice, will send out for culture sensitivities especially in the setting of mechanical valve  We discussed, we will have to watch closely in the outpatient setting given risk for urosepsis  Blood pressure stable, mentating appropriately  Hold off on imaging at this time  Patient also experiencing slight increase in shortness of breath, oxygenating appropriately, no respiratory dyspnea at time of encounter  Continue Lasix therapy, lungs clear to auscultation, no wheezing appreciated  -     cefdinir (OMNICEF) 300 mg capsule; Take 1 capsule (300 mg total) by mouth every 12 (twelve) hours for 5 days    Urinary frequency  -     POCT urine dip auto non-scope    Fever, unspecified fever cause  Third generation cephalosporin antibiotic therapy for UTI  COVID negative test at home  No signs of pneumonia presentation  Blood pressure stable, monitor at home    Alzheimer's dementia without behavioral disturbance, unspecified timing of dementia onset (HCC)  Continue Sinemet, Donepezil, Memantine, Comtan appears at baseline    Chronic atrial fibrillation (HCC)  Auto rate control, warfarin anticoagulation  History mitral valve replacement, mechanical valve    My impressions and treatment recommendations were discussed in detail with the patient who verbalized understanding and had no further questions  Discharge instructions were provided  Subjective     Chief Complaint:  Follow-up/same-day appointment    History of Present Illness:    Patient is a pleasant 79-year-old female accompanied by  coming in as a same-day visit secondary to urinary tract infection symptoms with positive dysuria and frequency    Patient also has chronic shortness of breath which he feels is slightly worse today  Patient also is experiencing nausea, decreased appetite  The following portions of the patient's history were reviewed and updated as appropriate: allergies, current medications, past family history, past medical history, past social history, past surgical history and problem list     REVIEW OF SYSTEMS:  A complete 12-point review of systems is negative other than that noted in the HPI  Review of Systems   Constitutional: Positive for chills, fatigue and fever  Respiratory: Negative for cough, shortness of breath and wheezing  Cardiovascular: Negative for chest pain, palpitations and leg swelling  Gastrointestinal: Negative for abdominal distention, abdominal pain, constipation, diarrhea, nausea and vomiting  Genitourinary: Positive for dysuria and frequency  Negative for hematuria  Musculoskeletal: Negative for back pain and neck pain  Neurological: Negative for dizziness and headaches           Current Outpatient Medications:     cefdinir (OMNICEF) 300 mg capsule, Take 1 capsule (300 mg total) by mouth every 12 (twelve) hours for 5 days, Disp: 10 capsule, Rfl: 0    acetaminophen (TYLENOL) 650 mg CR tablet, Take 1 tablet (650 mg total) by mouth 3 (three) times a day (Patient taking differently: Take 650 mg by mouth as needed), Disp: 100 tablet, Rfl: 0    alendronate (FOSAMAX) 70 mg tablet, take 1 tablet by mouth EVERY 7 DAYS, Disp: 12 tablet, Rfl: 3    carbidopa-levodopa (SINEMET)  mg per tablet, TAKE 1 AND 1/2 TABLETS BY MOUTH 4 TIMES A DAY (Patient taking differently: Take 1 tablet by mouth 4 (four) times a day TAKE 1 AND 1/2 TABLETS IN THE MORNING 1 TABLET AT LATE MORNING 1 1/2 MID AFTER NOON  1 TABLET IN THE EVENING), Disp: 240 tablet, Rfl: 8    Cholecalciferol (VITAMIN D3) 2000 units TABS, Take 2,000 Units by mouth every morning, Disp: , Rfl:     clindamycin (CLEOCIN) 150 mg capsule, Take 600 mg by mouth 1 hour prior to dental appointment , Disp: , Rfl:     Diclofenac Sodium (VOLTAREN) 1 %, Apply 2 g topically 4 (four) times a day as needed (Foot pain) for up to 15 days, Disp: 50 g, Rfl: 2    donepezil (ARICEPT) 10 mg tablet, Take 1 tablet (10 mg total) by mouth daily at bedtime, Disp: 90 tablet, Rfl: 3    entacapone (COMTAN) 200 mg tablet, take 1 tablet by mouth four times a day, Disp: 120 tablet, Rfl: 4    furosemide (LASIX) 20 mg tablet, Take 1/2 tablet by mouth  4 days per week  Schedule administration to be done when patient can be near toilet facilities for 4-6 hours  , Disp: 90 tablet, Rfl: 3    levothyroxine 50 mcg tablet, take 1 tablet by mouth every morning, Disp: 30 tablet, Rfl: 3    memantine (NAMENDA) 10 mg tablet, take 1 tablet by mouth once daily, Disp: 90 tablet, Rfl: 3    Multiple Vitamins-Minerals (OCUVITE ADULT 50+ PO), Take by mouth daily with breakfast, Disp: , Rfl:     warfarin (COUMADIN) 2 5 mg tablet, TAKE 1/2 TO 1 TABLET BY MOUTH DAILY OR AS DIRECTED BY PHYSICIAN, Disp: 30 tablet, Rfl: 11  Past Medical History:   Diagnosis Date    Anal fissure     Arthritis     osteo joints    Asthma with acute exacerbation     Blepharitis     Breast lump     Chalazion     CHF, chronic (HCC)     Difficulty walking     Disease of thyroid gland     Drooling     Dysphagia     Fall 05/04/2018    Fibromyalgia, primary     Glaucoma     Normal pressure    History of transfusion 1996    Hordeolum externum     Hx of transient ischemic attack (TIA)     Hypophonia     Infectious viral hepatitis     Hep C dx 1996     Lyme carditis     Murmur, cardiac     Parkinsons (HCC)     Rotator cuff tendinitis     Seasonal allergies     Urinary frequency      Past Surgical History:   Procedure Laterality Date    BREAST BIOPSY Right 2018    benign    BREAST CYST EXCISION Left     benign    BREAST SURGERY N/A     benign, calcium    CARDIAC SURGERY  1990    mitral valve replacement    CATARACT EXTRACTION Right 2015    CATARACT EXTRACTION Left 2014    CHEST TUBE INSERTION Right     post pleural effusion    CHOLECYSTECTOMY  2000    lap    HYSTERECTOMY  1973    partial    MT ARTHROCENTESIS ASPIR&/INJ MAJOR JT/BURSA W/O US Left 3/19/2021    Procedure: Sacroiliac Joint Injection ( 26836 ); Surgeon: Pj Matute MD;  Location: [facility] MAIN OR;  Service: Pain Management     MT COLSC FLX W/RMVL OF TUMOR POLYP LESION SNARE TQ N/A 2017    Procedure: COLONOSCOPY and biopsy ;  Surgeon: Mikey Mccloud MD;  Location: [place] [address] GI LAB;   Service: Gastroenterology    SKIN BIOPSY      pre cancerous on face    TONSILLECTOMY      US GUIDED BREAST BIOPSY RIGHT COMPLETE Right 2018     Social History     Socioeconomic History    Marital status: /Civil Union     Spouse name: Not on file    Number of children: Not on file    Years of education: Not on file    Highest education level: Not on file   Occupational History    Not on file   Tobacco Use    Smoking status: Former Smoker     Packs/day: 0 10     Years: 10 00     Pack years: 1 00     Quit date:      Years since quittin 6    Smokeless tobacco: Never Used   Vaping Use    Vaping Use: Never used   Substance and Sexual Activity    Alcohol use: No    Drug use: No    Sexual activity: Not Currently   Other Topics Concern    Not on file   Social History Narrative    Not on file     Social Determinants of Health     Financial Resource Strain: Not on file   Food Insecurity: Not on file   Transportation Needs: Not on file   Physical Activity: Not on file   Stress: Not on file   Social Connections: Not on file   Intimate Partner Violence: Not on file   Housing Stability: Not on file     Family History   Problem Relation Age of Onset    Heart disease Mother         murmur Cardiomegaly age 64    Pneumonia Father     Heart disease Brother         mitrsl valve    Parkinsonism Brother     Arthritis Brother     Lupus Daughter     Diabetes Daughter    Natoranjit Mixons Depression Daughter      Allergies   Allergen Reactions    Amantadine Tongue Swelling    Artane [Trihexyphenidyl] Other (See Comments)     Felt "disconnected", "out if it", shaky  Did not feel right     Glycopyrrolate      Nose bleeds    Pollen Extract        Objective     Vitals:    07/28/22 1407   BP: 140/80   BP Location: Left arm   Patient Position: Sitting   Cuff Size: Standard   Pulse: 84   Resp: 14   Temp: (!) 97 3 °F (36 3 °C)   TempSrc: Temporal   Weight: 83 kg (183 lb)   Height: 5' 4 5" (1 638 m)       Physical Exam:     GENERAL: NAD, pleasant   HEENT:  NC/AT, PERRL, EOMI, no scleral icterus  CARDIAC:  RRR, +S1/S2, no S3/S4 appreciated, no m/g/r  PULMONARY:  CTA B/L, no wheezing/rales/rhonci, non-labored breathing  ABDOMEN:  Soft, NT/ND, no rebound/guarding/rigidity, no CVA tenderness  Extremities:   1+ edema, no cyanosis, or clubbing  Musculoskeletal:  Full range of motion intact in all extremities   NEUROLOGIC: Grossly intact, no focal deficits  SKIN:  No rashes or erythema noted on exposed skin  Psych: Normal affect, mood stable    ==  PLEASE NOTE:  This encounter was completed utilizing the M- Modal/Fluency Direct Speech Voice Recognition Software  Grammatical errors, random word insertions, pronoun errors and incomplete sentences are occasional consequences of the system due to software limitations, ambient noise and hardware issues  These may be missed by proof reading prior to affixing electronic signature  Any questions or concerns about the content, text or information contained within the body of this dictation should be directly addressed to the physician for clarification  Please do not hesitate to call me directly if you have any any questions or concerns      Gerber Marroquin DO  [facility] Internal Medicine   [facility]

## 2022-08-01 LAB
APPEARANCE UR: CLEAR
BILIRUB UR QL STRIP: NEGATIVE
COLOR UR: YELLOW
GLUCOSE UR QL: NEGATIVE
HGB UR QL STRIP: NEGATIVE
KETONES UR QL STRIP: ABNORMAL
LEUKOCYTE ESTERASE UR QL STRIP: NEGATIVE
MICRO URNS: ABNORMAL
NITRITE UR QL STRIP: NEGATIVE
PH UR STRIP: 6 [PH] (ref 5–7.5)
PROT UR QL STRIP: ABNORMAL
SP GR UR: 1.02 (ref 1–1.03)
UROBILINOGEN UR STRIP-ACNC: 0.2 MG/DL (ref 0.2–1)

## 2022-08-03 ENCOUNTER — APPOINTMENT (OUTPATIENT)
Dept: LAB | Facility: CLINIC | Age: 82
End: 2022-08-03
Payer: MEDICARE

## 2022-08-04 ENCOUNTER — ANTICOAG VISIT (OUTPATIENT)
Dept: CARDIOLOGY CLINIC | Facility: CLINIC | Age: 82
End: 2022-08-04

## 2022-08-04 DIAGNOSIS — Z95.2 H/O MITRAL VALVE REPLACEMENT WITH MECHANICAL VALVE: Primary | ICD-10-CM

## 2022-08-15 ENCOUNTER — ANTICOAG VISIT (OUTPATIENT)
Dept: CARDIOLOGY CLINIC | Facility: CLINIC | Age: 82
End: 2022-08-15

## 2022-08-15 ENCOUNTER — APPOINTMENT (OUTPATIENT)
Dept: LAB | Facility: CLINIC | Age: 82
End: 2022-08-15
Payer: MEDICARE

## 2022-08-15 DIAGNOSIS — I48.20 CHRONIC ATRIAL FIBRILLATION (HCC): ICD-10-CM

## 2022-08-15 DIAGNOSIS — Z95.2 H/O MITRAL VALVE REPLACEMENT WITH MECHANICAL VALVE: Primary | ICD-10-CM

## 2022-08-23 ENCOUNTER — OFFICE VISIT (OUTPATIENT)
Dept: FAMILY MEDICINE CLINIC | Facility: CLINIC | Age: 82
End: 2022-08-23
Payer: MEDICARE

## 2022-08-23 VITALS
RESPIRATION RATE: 16 BRPM | DIASTOLIC BLOOD PRESSURE: 52 MMHG | TEMPERATURE: 97.6 F | HEIGHT: 65 IN | WEIGHT: 177 LBS | SYSTOLIC BLOOD PRESSURE: 102 MMHG | HEART RATE: 72 BPM | BODY MASS INDEX: 29.49 KG/M2

## 2022-08-23 DIAGNOSIS — M75.21 BICIPITAL TENDINITIS OF SHOULDER, RIGHT: ICD-10-CM

## 2022-08-23 DIAGNOSIS — R35.0 URINARY FREQUENCY: Primary | ICD-10-CM

## 2022-08-23 DIAGNOSIS — R31.9 HEMATURIA, UNSPECIFIED TYPE: ICD-10-CM

## 2022-08-23 DIAGNOSIS — R32 URINARY INCONTINENCE, UNSPECIFIED TYPE: ICD-10-CM

## 2022-08-23 LAB
SL AMB  POCT GLUCOSE, UA: ABNORMAL
SL AMB LEUKOCYTE ESTERASE,UA: 500
SL AMB POCT BILIRUBIN,UA: ABNORMAL
SL AMB POCT BLOOD,UA: 50
SL AMB POCT CLARITY,UA: ABNORMAL
SL AMB POCT COLOR,UA: ABNORMAL
SL AMB POCT KETONES,UA: 15
SL AMB POCT NITRITE,UA: ABNORMAL
SL AMB POCT PH,UA: 5
SL AMB POCT SPECIFIC GRAVITY,UA: 1.02
SL AMB POCT URINE PROTEIN: 30
SL AMB POCT UROBILINOGEN: ABNORMAL

## 2022-08-23 PROCEDURE — 81003 URINALYSIS AUTO W/O SCOPE: CPT | Performed by: FAMILY MEDICINE

## 2022-08-23 PROCEDURE — 99213 OFFICE O/P EST LOW 20 MIN: CPT | Performed by: FAMILY MEDICINE

## 2022-08-23 RX ORDER — CIPROFLOXACIN 250 MG/1
250 TABLET, FILM COATED ORAL EVERY 12 HOURS SCHEDULED
Qty: 28 TABLET | Refills: 0 | Status: SHIPPED | OUTPATIENT
Start: 2022-08-23 | End: 2022-09-06

## 2022-08-23 NOTE — PROGRESS NOTES
Chief Complaint   Patient presents with    Urinary Tract Infection     Pt c/o urinary frequency with burning        Patient ID: Rupesh Koo is a 79 y o  female  HPI  Pt is seeing for worsening urinary incontinence and dysuria  -  Started over few wks ago, worsening last few days -  No fever -  Was Tx for UTI with Duricef -  Did not help -  On and off symptoms for 8-9 months -  Did not see urologist     The following portions of the patient's history were reviewed and updated as appropriate: allergies, current medications, past family history, past medical history, past social history, past surgical history and problem list     Review of Systems   Constitutional: Negative  HENT: Negative  Respiratory: Negative  Gastrointestinal: Negative for abdominal pain, nausea and vomiting  Genitourinary: Positive for dysuria and frequency  Negative for hematuria  Musculoskeletal: Positive for myalgias (R upper arm pain x 2 wks )  Current Outpatient Medications   Medication Sig Dispense Refill    acetaminophen (TYLENOL) 650 mg CR tablet Take 1 tablet (650 mg total) by mouth 3 (three) times a day (Patient taking differently: Take 650 mg by mouth as needed) 100 tablet 0    alendronate (FOSAMAX) 70 mg tablet take 1 tablet by mouth EVERY 7 DAYS 12 tablet 3    carbidopa-levodopa (SINEMET)  mg per tablet TAKE 1 AND 1/2 TABLETS BY MOUTH 4 TIMES A DAY (Patient taking differently: Take 1 tablet by mouth 4 (four) times a day TAKE 1 AND 1/2 TABLETS IN THE MORNING 1 TABLET AT LATE MORNING 1 1/2 MID AFTER NOON  1 TABLET IN THE EVENING) 240 tablet 8    Cholecalciferol (VITAMIN D3) 2000 units TABS Take 2,000 Units by mouth every morning      donepezil (ARICEPT) 10 mg tablet Take 1 tablet (10 mg total) by mouth daily at bedtime 90 tablet 3    entacapone (COMTAN) 200 mg tablet take 1 tablet by mouth four times a day 120 tablet 4    furosemide (LASIX) 20 mg tablet Take 1/2 tablet by mouth  4 days per week  Schedule administration to be done when patient can be near toilet facilities for 4-6 hours  90 tablet 3    levothyroxine 50 mcg tablet take 1 tablet by mouth every morning 30 tablet 3    memantine (NAMENDA) 10 mg tablet take 1 tablet by mouth once daily 90 tablet 3    Multiple Vitamins-Minerals (OCUVITE ADULT 50+ PO) Take by mouth daily with breakfast      warfarin (COUMADIN) 2 5 mg tablet TAKE 1/2 TO 1 TABLET BY MOUTH DAILY OR AS DIRECTED BY PHYSICIAN 30 tablet 11    clindamycin (CLEOCIN) 150 mg capsule Take 600 mg by mouth 1 hour prior to dental appointment  (Patient not taking: Reported on 8/23/2022)      Diclofenac Sodium (VOLTAREN) 1 % Apply 2 g topically 4 (four) times a day as needed (Foot pain) for up to 15 days 50 g 2     No current facility-administered medications for this visit  Objective:    /52 (BP Location: Right arm, Patient Position: Sitting, Cuff Size: Adult)   Pulse 72   Temp 97 6 °F (36 4 °C) (Temporal)   Resp 16   Ht 5' 4 5" (1 638 m)   Wt 80 3 kg (177 lb)   BMI 29 91 kg/m²        Physical Exam  Constitutional:       Appearance: She is not ill-appearing  Cardiovascular:      Rate and Rhythm: Normal rate  Pulmonary:      Effort: Pulmonary effort is normal  No respiratory distress  Abdominal:      Palpations: Abdomen is soft  Tenderness: There is no abdominal tenderness  There is no right CVA tenderness or left CVA tenderness  Musculoskeletal:      Right upper arm: Tenderness present  Arms:    Neurological:      Mental Status: She is alert  Labs in chart were reviewed    Recent Results (from the past 672 hour(s))   UA (URINE) with reflex to Scope    Collection Time: 07/28/22 12:00 AM   Result Value Ref Range    Specific Gravity 1 025 1 005 - 1 030    Ph 6 0 5 0 - 7 5    Color UA Yellow Yellow    Urine Appearance Clear Clear    Leukocyte Esterase Negative Negative    Protein Trace Negative/Trace    Glucose, 24 HR Urine Negative Negative Ketone, Urine Trace (A) Negative    Blood, Urine Negative Negative    Bilirubin, Urine Negative Negative    Urobilinogen Urine 0 2 0 2 - 1 0 mg/dL    SL AMB NITRITES URINE, QUAL  Negative Negative    Microscopic Examination Comment    POCT urine dip auto non-scope    Collection Time: 07/28/22  2:15 PM   Result Value Ref Range     COLOR,UA sal     CLARITY,UA clear     SPECIFIC GRAVITY,UA 1 020      PH,UA 6     LEUKOCYTE ESTERASE,UA 75     NITRITE,UA neg     GLUCOSE, UA norm     KETONES,UA 15     BILIRUBIN,UA neg     BLOOD,UA 50     POCT URINE PROTEIN neg     SL AMB POCT UROBILINOGEN norm    Protime-INR    Collection Time: 08/03/22 10:55 AM   Result Value Ref Range    Protime 27 9 (H) 11 6 - 14 5 seconds    INR 2 58 (H) 0 84 - 1 19   Protime-INR    Collection Time: 08/15/22  8:43 AM   Result Value Ref Range    Protime 33 0 (H) 11 6 - 14 5 seconds    INR 3 20 (H) 0 84 - 1 19   POCT urine dip auto non-scope    Collection Time: 08/23/22  9:43 AM   Result Value Ref Range     COLOR,UA orange     CLARITY,UA cloudy     SPECIFIC GRAVITY,UA 1 020      PH,UA 5     LEUKOCYTE ESTERASE,     NITRITE,UA neg     GLUCOSE, UA norm     KETONES,UA 15     BILIRUBIN,UA neg     BLOOD,UA 50     POCT URINE PROTEIN 30     SL AMB POCT UROBILINOGEN norm      Assessment/Plan:         Diagnoses and all orders for this visit:    Urinary frequency  -     POCT urine dip auto non-scope  -     Urine culture  -     ciprofloxacin (CIPRO) 250 mg tablet; Take 1 tablet (250 mg total) by mouth every 12 (twelve) hours for 14 days  -     Ambulatory Referral to Urology; Future    Hematuria, unspecified type  -     Urine culture  -     ciprofloxacin (CIPRO) 250 mg tablet; Take 1 tablet (250 mg total) by mouth every 12 (twelve) hours for 14 days  -     Ambulatory Referral to Urology; Future    Urinary incontinence, unspecified type  -     Ambulatory Referral to Urology;  Future    Bicipital tendinitis of shoulder, right   warm compresses, Voltaren gel OTC    rto prn                           Fideila Brooks MD

## 2022-08-24 ENCOUNTER — APPOINTMENT (OUTPATIENT)
Dept: LAB | Facility: CLINIC | Age: 82
End: 2022-08-24
Payer: MEDICARE

## 2022-08-24 DIAGNOSIS — N39.0 URINARY TRACT INFECTION WITHOUT HEMATURIA, SITE UNSPECIFIED: ICD-10-CM

## 2022-08-24 LAB
BACTERIA UR QL AUTO: ABNORMAL /HPF
BILIRUB UR QL STRIP: NEGATIVE
CAOX CRY URNS QL MICRO: ABNORMAL /HPF
CLARITY UR: CLEAR
COLOR UR: ABNORMAL
GLUCOSE UR STRIP-MCNC: NEGATIVE MG/DL
HGB UR QL STRIP.AUTO: NEGATIVE
HYALINE CASTS #/AREA URNS LPF: ABNORMAL /LPF
KETONES UR STRIP-MCNC: NEGATIVE MG/DL
LEUKOCYTE ESTERASE UR QL STRIP: NEGATIVE
MUCOUS THREADS UR QL AUTO: ABNORMAL
NITRITE UR QL STRIP: NEGATIVE
NON-SQ EPI CELLS URNS QL MICRO: ABNORMAL /HPF
PH UR STRIP.AUTO: 6.5 [PH]
PROT UR STRIP-MCNC: ABNORMAL MG/DL
RBC #/AREA URNS AUTO: ABNORMAL /HPF
SP GR UR STRIP.AUTO: 1.02 (ref 1–1.03)
UROBILINOGEN UR STRIP-ACNC: <2 MG/DL
WBC #/AREA URNS AUTO: ABNORMAL /HPF

## 2022-08-24 PROCEDURE — 87086 URINE CULTURE/COLONY COUNT: CPT | Performed by: FAMILY MEDICINE

## 2022-08-24 PROCEDURE — 81001 URINALYSIS AUTO W/SCOPE: CPT

## 2022-08-25 LAB
BACTERIA UR CULT: ABNORMAL
Lab: ABNORMAL

## 2022-08-26 LAB — BACTERIA UR CULT: NORMAL

## 2022-08-29 DIAGNOSIS — E03.9 HYPOTHYROIDISM, UNSPECIFIED TYPE: ICD-10-CM

## 2022-08-30 RX ORDER — LEVOTHYROXINE SODIUM 0.05 MG/1
TABLET ORAL
Qty: 30 TABLET | Refills: 3 | Status: SHIPPED | OUTPATIENT
Start: 2022-08-30

## 2022-09-01 ENCOUNTER — TELEPHONE (OUTPATIENT)
Dept: FAMILY MEDICINE CLINIC | Facility: CLINIC | Age: 82
End: 2022-09-01

## 2022-09-01 NOTE — TELEPHONE ENCOUNTER
Dr Neha Reddy:    Patient is experiencing diarrhea sxs for 5 days  She feels its from the ABX that she is taking for UTI  She stopped taking her Lasix and is feeling dehydrated  She states she's drinking plenty of water  Please c/b to discuss further

## 2022-09-02 NOTE — TELEPHONE ENCOUNTER
Patient says she is feeling a little better with dehydration  Still has diarrhea  When should she return to regular dosage of warfarin?

## 2022-09-09 ENCOUNTER — OFFICE VISIT (OUTPATIENT)
Dept: FAMILY MEDICINE CLINIC | Facility: CLINIC | Age: 82
End: 2022-09-09
Payer: MEDICARE

## 2022-09-09 VITALS
WEIGHT: 176.4 LBS | TEMPERATURE: 129.2 F | RESPIRATION RATE: 16 BRPM | SYSTOLIC BLOOD PRESSURE: 110 MMHG | HEIGHT: 65 IN | BODY MASS INDEX: 29.39 KG/M2 | DIASTOLIC BLOOD PRESSURE: 60 MMHG | HEART RATE: 54 BPM

## 2022-09-09 DIAGNOSIS — R19.7 DIARRHEA, UNSPECIFIED TYPE: Primary | ICD-10-CM

## 2022-09-09 PROCEDURE — 99213 OFFICE O/P EST LOW 20 MIN: CPT | Performed by: FAMILY MEDICINE

## 2022-09-09 RX ORDER — METRONIDAZOLE 250 MG/1
250 TABLET ORAL 4 TIMES DAILY
Qty: 28 TABLET | Refills: 0 | Status: SHIPPED | OUTPATIENT
Start: 2022-09-09 | End: 2022-09-16

## 2022-09-09 NOTE — PROGRESS NOTES
Chief Complaint   Patient presents with    Diarrhea     Pt c/o diarrhea x 7 days        Patient ID: Peng Llanes is a 79 y o  female  Diarrhea   This is a new problem  The current episode started in the past 7 days  The problem occurs 5 to 10 times per day  The problem has been unchanged  The stool consistency is described as watery  The patient states that diarrhea awakens her from sleep  Pertinent negatives include no abdominal pain, arthralgias, bloating, chills, coughing, fever, headaches, increased  flatus, myalgias, sweats, URI, vomiting or weight loss  Risk factors include recent antibiotic use  She has tried increased fluids for the symptoms  The treatment provided no relief  There is no history of bowel resection, inflammatory bowel disease, irritable bowel syndrome, malabsorption, a recent abdominal surgery or short gut syndrome  The following portions of the patient's history were reviewed and updated as appropriate: allergies, current medications, past family history, past medical history, past social history, past surgical history and problem list     Review of Systems   Constitutional: Negative for chills, fever and weight loss  Respiratory: Negative for cough  Cardiovascular: Negative  Gastrointestinal: Positive for diarrhea  Negative for abdominal pain, bloating, flatus and vomiting  Musculoskeletal: Negative for arthralgias and myalgias  Neurological: Negative for headaches         Current Outpatient Medications   Medication Sig Dispense Refill    acetaminophen (TYLENOL) 650 mg CR tablet Take 1 tablet (650 mg total) by mouth 3 (three) times a day (Patient taking differently: Take 650 mg by mouth as needed) 100 tablet 0    alendronate (FOSAMAX) 70 mg tablet take 1 tablet by mouth EVERY 7 DAYS 12 tablet 3    carbidopa-levodopa (SINEMET)  mg per tablet TAKE 1 AND 1/2 TABLETS BY MOUTH 4 TIMES A DAY (Patient taking differently: Take 1 tablet by mouth 4 (four) times a day TAKE 1 AND 1/2 TABLETS IN THE MORNING 1 TABLET AT LATE MORNING 1 1/2 MID AFTER NOON  1 TABLET IN THE EVENING) 240 tablet 8    Cholecalciferol (VITAMIN D3) 2000 units TABS Take 2,000 Units by mouth every morning      donepezil (ARICEPT) 10 mg tablet Take 1 tablet (10 mg total) by mouth daily at bedtime 90 tablet 3    entacapone (COMTAN) 200 mg tablet take 1 tablet by mouth four times a day 120 tablet 4    furosemide (LASIX) 20 mg tablet Take 1/2 tablet by mouth  4 days per week  Schedule administration to be done when patient can be near toilet facilities for 4-6 hours  90 tablet 3    levothyroxine 50 mcg tablet take 1 tablet by mouth every morning 30 tablet 3    memantine (NAMENDA) 10 mg tablet take 1 tablet by mouth once daily 90 tablet 3    Multiple Vitamins-Minerals (OCUVITE ADULT 50+ PO) Take by mouth daily with breakfast      warfarin (COUMADIN) 2 5 mg tablet TAKE 1/2 TO 1 TABLET BY MOUTH DAILY OR AS DIRECTED BY PHYSICIAN 30 tablet 11    clindamycin (CLEOCIN) 150 mg capsule Take 600 mg by mouth 1 hour prior to dental appointment  (Patient not taking: No sig reported)      Diclofenac Sodium (VOLTAREN) 1 % Apply 2 g topically 4 (four) times a day as needed (Foot pain) for up to 15 days 50 g 2     No current facility-administered medications for this visit  Objective:    /60 (BP Location: Left arm, Patient Position: Sitting, Cuff Size: Large)   Pulse (!) 54   Temp (!) 129 2 °F (54 °C)   Resp 16   Ht 5' 4 5" (1 638 m)   Wt 80 kg (176 lb 6 4 oz)   BMI 29 81 kg/m²        Physical Exam  Constitutional:       Appearance: She is not ill-appearing  Cardiovascular:      Rate and Rhythm: Normal rate and regular rhythm  Pulmonary:      Effort: Pulmonary effort is normal  No respiratory distress  Abdominal:      Palpations: Abdomen is soft  Tenderness: There is no abdominal tenderness  Neurological:      Mental Status: She is alert                   Assessment/Plan:         Diagnoses and all orders for this visit:    Diarrhea, unspecified type  -     metroNIDAZOLE (FLAGYL) 250 mg tablet;  Take 1 tablet (250 mg total) by mouth 4 (four) times a day for 7 days    will Tx empirically  Florastor OTC     Brat diet     phone f/u in 3 days    rto prn                     Gene Og MD

## 2022-09-12 ENCOUNTER — TELEPHONE (OUTPATIENT)
Dept: FAMILY MEDICINE CLINIC | Facility: CLINIC | Age: 82
End: 2022-09-12

## 2022-09-12 NOTE — TELEPHONE ENCOUNTER
Dr Priscilla Domínguez    Patient say Fabiana Franco is helping, she has not had any diarrhea since yesterday morning  Should she continue taking med? Please advise

## 2022-09-22 ENCOUNTER — OFFICE VISIT (OUTPATIENT)
Dept: PODIATRY | Facility: CLINIC | Age: 82
End: 2022-09-22
Payer: MEDICARE

## 2022-09-22 ENCOUNTER — APPOINTMENT (OUTPATIENT)
Dept: LAB | Facility: CLINIC | Age: 82
End: 2022-09-22
Payer: MEDICARE

## 2022-09-22 ENCOUNTER — ANTICOAG VISIT (OUTPATIENT)
Dept: CARDIOLOGY CLINIC | Facility: CLINIC | Age: 82
End: 2022-09-22

## 2022-09-22 VITALS — BODY MASS INDEX: 29.32 KG/M2 | RESPIRATION RATE: 17 BRPM | WEIGHT: 176 LBS | HEIGHT: 65 IN

## 2022-09-22 DIAGNOSIS — R20.2 PARESTHESIA AND PAIN OF EXTREMITY: ICD-10-CM

## 2022-09-22 DIAGNOSIS — I73.9 PERIPHERAL VASCULAR DISEASE, UNSPECIFIED (HCC): Primary | ICD-10-CM

## 2022-09-22 DIAGNOSIS — M79.609 PARESTHESIA AND PAIN OF EXTREMITY: ICD-10-CM

## 2022-09-22 DIAGNOSIS — Z95.2 H/O MITRAL VALVE REPLACEMENT WITH MECHANICAL VALVE: Primary | ICD-10-CM

## 2022-09-22 DIAGNOSIS — R60.0 LOCALIZED EDEMA: ICD-10-CM

## 2022-09-22 DIAGNOSIS — L60.0 INGROWN TOENAIL: ICD-10-CM

## 2022-09-22 DIAGNOSIS — I48.20 CHRONIC ATRIAL FIBRILLATION (HCC): ICD-10-CM

## 2022-09-22 DIAGNOSIS — M79.671 PAIN IN BOTH FEET: ICD-10-CM

## 2022-09-22 DIAGNOSIS — B35.1 ONYCHOMYCOSIS: ICD-10-CM

## 2022-09-22 DIAGNOSIS — M79.672 PAIN IN BOTH FEET: ICD-10-CM

## 2022-09-22 PROCEDURE — 99213 OFFICE O/P EST LOW 20 MIN: CPT | Performed by: PODIATRIST

## 2022-09-22 NOTE — PROGRESS NOTES
Assessment/Plan:  Pain upon ambulation  Ingrown toenail bilateral hallux  Parkinson's disease  Peripheral artery disease  Chronic edema  Paresthesia secondary to radiculopathy and possible Parkinson's  Paronychia hallux bilateral    Plan  Foot exam performed  Patient educated on condition  All nails debrided without pain or complication  Patient will consider compression hose  Return for follow-up         Diagnoses and all orders for this visit:    Peripheral vascular disease, unspecified ([address] )    Paresthesia and pain of extremity    Onychomycosis    Pain in both feet    Localized edema    Ingrown toenail          Subjective:  Patient has pain  Patient has pain in her feet and toes with ambulation  She states her feet swell at the end of the day  No history of trauma    Allergies   Allergen Reactions    Amantadine Tongue Swelling    Artane [Trihexyphenidyl] Other (See Comments)     Felt "disconnected", "out if it", shaky   Did not feel right     Glycopyrrolate      Nose bleeds    Pollen Extract          Current Outpatient Medications:     acetaminophen (TYLENOL) 650 mg CR tablet, Take 1 tablet (650 mg total) by mouth 3 (three) times a day (Patient taking differently: Take 650 mg by mouth as needed), Disp: 100 tablet, Rfl: 0    alendronate (FOSAMAX) 70 mg tablet, take 1 tablet by mouth EVERY 7 DAYS, Disp: 12 tablet, Rfl: 3    carbidopa-levodopa (SINEMET)  mg per tablet, TAKE 1 AND 1/2 TABLETS BY MOUTH 4 TIMES A DAY (Patient taking differently: Take 1 tablet by mouth 4 (four) times a day TAKE 1 AND 1/2 TABLETS IN THE MORNING 1 TABLET AT LATE MORNING 1 1/2 MID AFTER NOON  1 TABLET IN THE EVENING), Disp: 240 tablet, Rfl: 8    Cholecalciferol (VITAMIN D3) 2000 units TABS, Take 2,000 Units by mouth every morning, Disp: , Rfl:     Diclofenac Sodium (VOLTAREN) 1 %, Apply 2 g topically 4 (four) times a day as needed (Foot pain) for up to 15 days, Disp: 50 g, Rfl: 2    donepezil (ARICEPT) 10 mg tablet, Take 1 tablet (10 mg total) by mouth daily at bedtime, Disp: 90 tablet, Rfl: 3    entacapone (COMTAN) 200 mg tablet, take 1 tablet by mouth four times a day, Disp: 120 tablet, Rfl: 4    furosemide (LASIX) 20 mg tablet, Take 1/2 tablet by mouth  4 days per week  Schedule administration to be done when patient can be near toilet facilities for 4-6 hours  , Disp: 90 tablet, Rfl: 3    levothyroxine 50 mcg tablet, take 1 tablet by mouth every morning, Disp: 30 tablet, Rfl: 3    memantine (NAMENDA) 10 mg tablet, take 1 tablet by mouth once daily, Disp: 90 tablet, Rfl: 3    Multiple Vitamins-Minerals (OCUVITE ADULT 50+ PO), Take by mouth daily with breakfast, Disp: , Rfl:     warfarin (COUMADIN) 2 5 mg tablet, TAKE 1/2 TO 1 TABLET BY MOUTH DAILY OR AS DIRECTED BY PHYSICIAN, Disp: 30 tablet, Rfl: 11    Patient Active Problem List   Diagnosis    H/O mitral valve replacement with mechanical valve    Atrial fibrillation (HCC) [I48 91]    Chronic hepatitis C (HCC)    Chronic right-sided low back pain without sciatica    Drug induced insomnia (HCC)    Dupuytren's contracture    Generalized osteoarthritis    Chronic combined systolic and diastolic heart failure (HCC)    Hypothyroidism    Memory impairment    Mitral valve disorder    Parkinson's disease ([address] )    Peripheral vascular disease [facility] [address]    Seasonal allergies    Transient ischemic attack    Vitamin D insufficiency    Suprapatellar bursitis of right knee    Numbness and tingling in right hand    Other microscopic hematuria    Cardiomyopathy, dilated, nonischemic (HCC)    Left bundle branch block (LBBB)    Asymptomatic PVCs    On continuous oral anticoagulation    Mixed dyslipidemia    High risk medication use    Other chronic pain    Orthostatic hypotension    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    Weakness    Diarrhea due to drug    Alzheimer's dementia without behavioral disturbance, unspecified timing of dementia onset [facility]          Patient ID: Dwayne Sanches is a 79 y o  female  HPI    The following portions of the patient's history were reviewed and updated as appropriate:     family history includes Arthritis in her brother; Depression in her daughter; Diabetes in her daughter; Heart disease in her brother and mother; Lupus in her daughter; Parkinsonism in her brother; Pneumonia in her father  reports that she quit smoking about 52 years ago  She has a 1 00 pack-year smoking history  She has never used smokeless tobacco  She reports that she does not drink alcohol and does not use drugs  Vitals:    09/22/22 1115   Resp: 17       Review of Systems      Objective:  Patient's shoes and socks removed  Foot ExamPhysical Exam               Physical Exam  Constitutional:       General: She is not in acute distress  Appearance: She is well-developed  She is not ill-appearing, toxic-appearing or diaphoretic  HENT:      Head: Normocephalic  Cardiovascular:      Pulses:           Dorsalis pedis pulses are 1+ on the right side and 1+ on the left side  Posterior tibial pulses are 0 on the right side and 0 on the left side  Comments: Palpable DP pulse, nonpalpable PT pulse, CFT is less than 3 seconds, temperature gradient within normal limit, a trophic skin changes noted with skin thinning in shiny, patient report occasional claudication to bilateral calf, localized edema foot and ankle Q9  Pulmonary:      Effort: Pulmonary effort is normal    Musculoskeletal:         General: Tenderness and deformity present  Comments: Pes planus type foot pain on palpation and range of motion of the 1st MPJ, metatarsal heads bilateral foot, pain on palpation on range of motion of the ST joint, crepitus noted in midfoot joint  Skin:     General: Skin is dry  Capillary Refill: Capillary refill takes 2 to 3 seconds        Comments: Trophic skin changes bilateral foot and ankle, alternating hypopigmentation and hyperpigmentation patches around the foot and ankle on anterior leg, skin is shiny and thin, absent hair growth, atrophy of fat pad  Diffuse xerosis bilateral lower extremity, dystrophic discolored thickened toenails onychomycotic, onychocryptosis noted as well, malodor and subungual debris  Neurological:      Mental Status: She is alert and oriented to person, place, and time  Sensory: Sensory deficit present  Motor: Atrophy present        Deep Tendon Reflexes: Reflexes abnormal      Foot Exam     Right Foot/Ankle      Neurovascular  Dorsalis pedis: 1+  Posterior tibial: 0        Left Foot/Ankle       Neurovascular  Dorsalis pedis: 1+  Posterior tibial: 0

## 2022-09-30 ENCOUNTER — CLINICAL SUPPORT (OUTPATIENT)
Dept: FAMILY MEDICINE CLINIC | Facility: CLINIC | Age: 82
End: 2022-09-30
Payer: MEDICARE

## 2022-09-30 VITALS — TEMPERATURE: 97.4 F

## 2022-09-30 DIAGNOSIS — Z23 NEED FOR INFLUENZA VACCINATION: Primary | ICD-10-CM

## 2022-09-30 PROCEDURE — 90662 IIV NO PRSV INCREASED AG IM: CPT

## 2022-09-30 PROCEDURE — G0008 ADMIN INFLUENZA VIRUS VAC: HCPCS

## 2022-10-03 ENCOUNTER — OFFICE VISIT (OUTPATIENT)
Dept: UROLOGY | Facility: CLINIC | Age: 82
End: 2022-10-03
Payer: MEDICARE

## 2022-10-03 VITALS
SYSTOLIC BLOOD PRESSURE: 110 MMHG | WEIGHT: 175.8 LBS | HEART RATE: 80 BPM | BODY MASS INDEX: 30.01 KG/M2 | OXYGEN SATURATION: 96 % | HEIGHT: 64 IN | DIASTOLIC BLOOD PRESSURE: 70 MMHG

## 2022-10-03 DIAGNOSIS — R32 URINARY INCONTINENCE, UNSPECIFIED TYPE: Primary | ICD-10-CM

## 2022-10-03 DIAGNOSIS — R35.0 URINARY FREQUENCY: ICD-10-CM

## 2022-10-03 DIAGNOSIS — R31.9 HEMATURIA, UNSPECIFIED TYPE: ICD-10-CM

## 2022-10-03 LAB
BACTERIA UR QL AUTO: ABNORMAL /HPF
BILIRUB UR QL STRIP: NEGATIVE
CAOX CRY URNS QL MICRO: ABNORMAL /HPF
CLARITY UR: ABNORMAL
COLOR UR: ABNORMAL
GLUCOSE UR STRIP-MCNC: NEGATIVE MG/DL
HGB UR QL STRIP.AUTO: NEGATIVE
HYALINE CASTS #/AREA URNS LPF: ABNORMAL /LPF
KETONES UR STRIP-MCNC: NEGATIVE MG/DL
LEUKOCYTE ESTERASE UR QL STRIP: NEGATIVE
MUCOUS THREADS UR QL AUTO: ABNORMAL
NITRITE UR QL STRIP: NEGATIVE
NON-SQ EPI CELLS URNS QL MICRO: ABNORMAL /HPF
PH UR STRIP.AUTO: 6.5 [PH]
POST-VOID RESIDUAL VOLUME, ML POC: 50 ML
PROT UR STRIP-MCNC: ABNORMAL MG/DL
RBC #/AREA URNS AUTO: ABNORMAL /HPF
SL AMB  POCT GLUCOSE, UA: NORMAL
SL AMB LEUKOCYTE ESTERASE,UA: NORMAL
SL AMB POCT BILIRUBIN,UA: NORMAL
SL AMB POCT BLOOD,UA: NORMAL
SL AMB POCT CLARITY,UA: CLEAR
SL AMB POCT COLOR,UA: NORMAL
SL AMB POCT KETONES,UA: 80
SL AMB POCT NITRITE,UA: NORMAL
SL AMB POCT PH,UA: 6.5
SL AMB POCT SPECIFIC GRAVITY,UA: 1020
SL AMB POCT URINE PROTEIN: 30
SL AMB POCT UROBILINOGEN: 0.2
SP GR UR STRIP.AUTO: 1.02 (ref 1–1.03)
UROBILINOGEN UR STRIP-ACNC: <2 MG/DL
WBC #/AREA URNS AUTO: ABNORMAL /HPF

## 2022-10-03 PROCEDURE — 99203 OFFICE O/P NEW LOW 30 MIN: CPT | Performed by: PHYSICIAN ASSISTANT

## 2022-10-03 PROCEDURE — 51798 US URINE CAPACITY MEASURE: CPT | Performed by: PHYSICIAN ASSISTANT

## 2022-10-03 PROCEDURE — 87086 URINE CULTURE/COLONY COUNT: CPT | Performed by: PHYSICIAN ASSISTANT

## 2022-10-03 PROCEDURE — 81001 URINALYSIS AUTO W/SCOPE: CPT | Performed by: PHYSICIAN ASSISTANT

## 2022-10-03 PROCEDURE — 81002 URINALYSIS NONAUTO W/O SCOPE: CPT | Performed by: PHYSICIAN ASSISTANT

## 2022-10-03 NOTE — PROGRESS NOTES
1  Urinary incontinence, unspecified type  Ambulatory Referral to Urology    Urine culture    Urinalysis with microscopic   2  Urinary frequency  Ambulatory Referral to Urology    POCT urine dip    POCT Measure PVR    Mirabegron ER 50 MG TB24    Urine culture    Urinalysis with microscopic   3  Hematuria, unspecified type  Ambulatory Referral to Urology    Urine culture    Urinalysis with microscopic       Assessment and plan:       1  Urinary frequency   - I reviewed with the patient and her  the anticholinergics are contraindicated given her medical comorbidities and medications that she is on  - proper hydration dietary modifications advised  - we specifically discussed compression stockings for her lower extremity edema as she is having some nocturia  - trial of Myrbetriq  Use and side effect profile reviewed  - follow-up for reassessment    [facility] [id number]      Chief Complaint     Urinary frequency    History of Present Illness     Delroy Whatley is a 79 y o  female presenting today for consultation of urinary frequency  Patient reports increasing urinary frequency and urgency with some urinary incontinence associated with this  Is wearing multiple large pads a day  No dysuria or gross hematuria  No prior  surgical manipulation    CT abdomen and pelvic with contrast (3/14/22) normal       Patient has a documented history of microscopic hematuria however her last 2 urine specimens (08/24/2022, 03/14/2022) were negative for microscopic hematuria  Medical comorbidities include peripheral vascular disease, orthostatic hypotension, cardiomyopathy, atrial fibrillation, Parkinson's disease, Alzheimer's  PVR 50mL    Laboratory     Lab Results   Component Value Date    CREATININE 1 03 03/14/2022       Review of Systems     Review of Systems   Constitutional: Negative for activity change, appetite change, chills, diaphoresis, fatigue, fever and unexpected weight change     Respiratory: Negative for chest tightness and shortness of breath  Cardiovascular: Negative for chest pain, palpitations and leg swelling  Gastrointestinal: Negative for abdominal distention, abdominal pain, constipation, diarrhea, nausea and vomiting  Genitourinary: Positive for frequency and urgency  Negative for decreased urine volume, difficulty urinating, dysuria, enuresis, flank pain, genital sores and hematuria  Musculoskeletal: Negative for back pain, gait problem and myalgias  Skin: Negative for color change, pallor, rash and wound  Psychiatric/Behavioral: Negative for behavioral problems  The patient is not nervous/anxious  Allergies     Allergies   Allergen Reactions    Amantadine Tongue Swelling    Artane [Trihexyphenidyl] Other (See Comments)     Felt "disconnected", "out if it", shaky  Did not feel right     Glycopyrrolate      Nose bleeds    Pollen Extract        Physical Exam     Physical Exam  Constitutional:       General: She is not in acute distress  Appearance: Normal appearance  She is normal weight  She is not ill-appearing, toxic-appearing or diaphoretic  HENT:      Head: Normocephalic and atraumatic  Eyes:      General:         Right eye: No discharge  Left eye: No discharge  Conjunctiva/sclera: Conjunctivae normal    Pulmonary:      Effort: Pulmonary effort is normal  No respiratory distress  Genitourinary:     Comments: Ambulates with walker assistance  Musculoskeletal:         General: No swelling or tenderness  Normal range of motion  Skin:     General: Skin is warm and dry  Coloration: Skin is not jaundiced or pale  Neurological:      General: No focal deficit present  Mental Status: She is alert and oriented to person, place, and time     Psychiatric:         Mood and Affect: Mood normal          Behavior: Behavior normal            Vital Signs     Vitals:    10/03/22 0759   BP: 110/70   BP Location: Left arm   Patient Position: Sitting Cuff Size: Standard   Pulse: 80   SpO2: 96%   Weight: 79 7 kg (175 lb 12 8 oz)   Height: 5' 4" (1 626 m)         Current Medications       Current Outpatient Medications:     acetaminophen (TYLENOL) 650 mg CR tablet, Take 1 tablet (650 mg total) by mouth 3 (three) times a day (Patient taking differently: Take 650 mg by mouth as needed), Disp: 100 tablet, Rfl: 0    alendronate (FOSAMAX) 70 mg tablet, take 1 tablet by mouth EVERY 7 DAYS, Disp: 12 tablet, Rfl: 3    carbidopa-levodopa (SINEMET)  mg per tablet, TAKE 1 AND 1/2 TABLETS BY MOUTH 4 TIMES A DAY (Patient taking differently: Take 1 tablet by mouth 4 (four) times a day TAKE 1 AND 1/2 TABLETS IN THE MORNING 1 TABLET AT LATE MORNING 1 1/2 MID AFTER NOON  1 TABLET IN THE EVENING), Disp: 240 tablet, Rfl: 8    Cholecalciferol (VITAMIN D3) 2000 units TABS, Take 2,000 Units by mouth every morning, Disp: , Rfl:     donepezil (ARICEPT) 10 mg tablet, Take 1 tablet (10 mg total) by mouth daily at bedtime, Disp: 90 tablet, Rfl: 3    entacapone (COMTAN) 200 mg tablet, take 1 tablet by mouth four times a day, Disp: 120 tablet, Rfl: 4    furosemide (LASIX) 20 mg tablet, Take 1/2 tablet by mouth  4 days per week  Schedule administration to be done when patient can be near toilet facilities for 4-6 hours  , Disp: 90 tablet, Rfl: 3    levothyroxine 50 mcg tablet, take 1 tablet by mouth every morning, Disp: 30 tablet, Rfl: 3    memantine (NAMENDA) 10 mg tablet, take 1 tablet by mouth once daily, Disp: 90 tablet, Rfl: 3    Mirabegron ER 50 MG TB24, Take 1 tablet (50 mg total) by mouth daily, Disp: 30 tablet, Rfl: 3    Multiple Vitamins-Minerals (OCUVITE ADULT 50+ PO), Take by mouth daily with breakfast, Disp: , Rfl:     warfarin (COUMADIN) 2 5 mg tablet, TAKE 1/2 TO 1 TABLET BY MOUTH DAILY OR AS DIRECTED BY PHYSICIAN, Disp: 30 tablet, Rfl: 11    Diclofenac Sodium (VOLTAREN) 1 %, Apply 2 g topically 4 (four) times a day as needed (Foot pain) for up to 15 days, Disp: 50 g, Rfl: 2      Active Problems     Patient Active Problem List   Diagnosis    H/O mitral valve replacement with mechanical valve    Atrial fibrillation (HCC) [I48 91]    Chronic hepatitis C (HCC)    Chronic right-sided low back pain without sciatica    Drug induced insomnia ([facility] [address]    Dupuytren's contracture    Generalized osteoarthritis    Chronic combined systolic and diastolic heart failure (HCC)    Hypothyroidism    Memory impairment    Mitral valve disorder    Parkinson's disease ([facility] [address] [facility]    Peripheral vascular disease ([facility] [address] [facility]    Seasonal allergies    Transient ischemic attack    Vitamin D insufficiency    Suprapatellar bursitis of right knee    Numbness and tingling in right hand    Other microscopic hematuria    Cardiomyopathy, dilated, nonischemic (HCC)    Left bundle branch block (LBBB)    Asymptomatic PVCs    On continuous oral anticoagulation    Mixed dyslipidemia    High risk medication use    Other chronic pain    Orthostatic hypotension    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    Weakness    Diarrhea due to drug    Alzheimer's dementia without behavioral disturbance, unspecified timing of dementia onset         Past Medical History     Past Medical History:   Diagnosis Date    Anal fissure     Arthritis     osteo joints    Asthma with acute exacerbation     Blepharitis     Breast lump     Chalazion     CHF, chronic ([facility] [address] )     Difficulty walking     Disease of thyroid gland     Drooling     Dysphagia     Fall 05/04/2018    Fibromyalgia, primary     Glaucoma     Normal pressure    History of transfusion 1996    Hordeolum externum     Hx of transient ischemic attack (TIA)     Hypophonia     Infectious viral hepatitis     Hep C dx 1996     Lyme carditis     Murmur, cardiac     Parkinsons (HCC)     Rotator cuff tendinitis     Seasonal allergies     Urinary frequency          Surgical History     Past Surgical History:   Procedure Laterality Date  BREAST BIOPSY Right 2018    benign    BREAST CYST EXCISION Left     benign    BREAST SURGERY N/A     benign, calcium    CARDIAC SURGERY  1990    mitral valve replacement    CATARACT EXTRACTION Right 2015    CATARACT EXTRACTION Left 2014    CHEST TUBE INSERTION Right     post pleural effusion    CHOLECYSTECTOMY  2000    lap    HYSTERECTOMY  1973    partial    MS ARTHROCENTESIS ASPIR&/INJ MAJOR JT/BURSA W/O US Left 3/19/2021    Procedure: Sacroiliac Joint Injection ( 98207 ); Surgeon: Sarah Schilling MD;  Location: [facility] MAIN OR;  Service: Pain Management     MS COLSC FLX W/RMVL OF TUMOR POLYP LESION SNARE TQ N/A 7/18/2017    Procedure: COLONOSCOPY and biopsy ;  Surgeon: Vinnie Zamudio MD;  Location: [facility] GI LAB;   Service: Gastroenterology    SKIN BIOPSY      pre cancerous on face    TONSILLECTOMY      US GUIDED BREAST BIOPSY RIGHT COMPLETE Right 2/13/2018         Family History     Family History   Problem Relation Age of Onset    Heart disease Mother         murmur Cardiomegaly age 64    Pneumonia Father     Heart disease Brother         mitrsl valve    Parkinsonism Brother     Arthritis Brother     Lupus Daughter     Diabetes Daughter     Depression Daughter          Social History     Social History       Radiology

## 2022-10-04 LAB — BACTERIA UR CULT: NORMAL

## 2022-10-11 DIAGNOSIS — R41.89 COGNITIVE IMPAIRMENT: ICD-10-CM

## 2022-10-11 PROBLEM — K52.1 DIARRHEA DUE TO DRUG: Status: RESOLVED | Noted: 2022-07-05 | Resolved: 2022-10-11

## 2022-10-11 RX ORDER — MEMANTINE HYDROCHLORIDE 10 MG/1
TABLET ORAL
Qty: 90 TABLET | Refills: 3 | Status: SHIPPED | OUTPATIENT
Start: 2022-10-11

## 2022-10-20 ENCOUNTER — APPOINTMENT (OUTPATIENT)
Dept: LAB | Facility: CLINIC | Age: 82
End: 2022-10-20
Payer: MEDICARE

## 2022-10-20 ENCOUNTER — ANTICOAG VISIT (OUTPATIENT)
Dept: CARDIOLOGY CLINIC | Facility: CLINIC | Age: 82
End: 2022-10-20

## 2022-10-20 DIAGNOSIS — Z95.2 H/O MITRAL VALVE REPLACEMENT WITH MECHANICAL VALVE: Primary | ICD-10-CM

## 2022-10-20 DIAGNOSIS — I48.20 CHRONIC ATRIAL FIBRILLATION (HCC): ICD-10-CM

## 2022-10-20 DIAGNOSIS — I48.20 CHRONIC ATRIAL FIBRILLATION (HCC): Primary | ICD-10-CM

## 2022-10-31 ENCOUNTER — APPOINTMENT (OUTPATIENT)
Dept: LAB | Facility: CLINIC | Age: 82
End: 2022-10-31

## 2022-10-31 ENCOUNTER — ANTICOAG VISIT (OUTPATIENT)
Dept: CARDIOLOGY CLINIC | Facility: CLINIC | Age: 82
End: 2022-10-31

## 2022-10-31 DIAGNOSIS — E78.2 MIXED DYSLIPIDEMIA: ICD-10-CM

## 2022-10-31 DIAGNOSIS — Z95.2 H/O MITRAL VALVE REPLACEMENT WITH MECHANICAL VALVE: Primary | ICD-10-CM

## 2022-10-31 DIAGNOSIS — I50.42 CHRONIC COMBINED SYSTOLIC AND DIASTOLIC HEART FAILURE (HCC): ICD-10-CM

## 2022-10-31 DIAGNOSIS — I95.1 ORTHOSTATIC HYPOTENSION: ICD-10-CM

## 2022-10-31 DIAGNOSIS — I48.20 CHRONIC ATRIAL FIBRILLATION (HCC): ICD-10-CM

## 2022-10-31 LAB
ALBUMIN SERPL BCP-MCNC: 3.3 G/DL (ref 3.5–5)
ALP SERPL-CCNC: 70 U/L (ref 46–116)
ALT SERPL W P-5'-P-CCNC: 12 U/L (ref 12–78)
ANION GAP SERPL CALCULATED.3IONS-SCNC: 5 MMOL/L (ref 4–13)
AST SERPL W P-5'-P-CCNC: 19 U/L (ref 5–45)
BILIRUB SERPL-MCNC: 1.46 MG/DL (ref 0.2–1)
BUN SERPL-MCNC: 15 MG/DL (ref 5–25)
CALCIUM ALBUM COR SERPL-MCNC: 10.5 MG/DL (ref 8.3–10.1)
CALCIUM SERPL-MCNC: 9.9 MG/DL (ref 8.3–10.1)
CHLORIDE SERPL-SCNC: 111 MMOL/L (ref 96–108)
CHOLEST SERPL-MCNC: 131 MG/DL
CO2 SERPL-SCNC: 24 MMOL/L (ref 21–32)
CREAT SERPL-MCNC: 1 MG/DL (ref 0.6–1.3)
GFR SERPL CREATININE-BSD FRML MDRD: 52 ML/MIN/1.73SQ M
GLUCOSE P FAST SERPL-MCNC: 113 MG/DL (ref 65–99)
HDLC SERPL-MCNC: 54 MG/DL
LDLC SERPL CALC-MCNC: 61 MG/DL (ref 0–100)
NONHDLC SERPL-MCNC: 77 MG/DL
POTASSIUM SERPL-SCNC: 4 MMOL/L (ref 3.5–5.3)
PROT SERPL-MCNC: 6.4 G/DL (ref 6.4–8.4)
SODIUM SERPL-SCNC: 140 MMOL/L (ref 135–147)
TRIGL SERPL-MCNC: 79 MG/DL

## 2022-11-03 ENCOUNTER — OFFICE VISIT (OUTPATIENT)
Dept: FAMILY MEDICINE CLINIC | Facility: CLINIC | Age: 82
End: 2022-11-03

## 2022-11-03 VITALS
DIASTOLIC BLOOD PRESSURE: 70 MMHG | WEIGHT: 177 LBS | RESPIRATION RATE: 16 BRPM | HEIGHT: 64 IN | HEART RATE: 88 BPM | BODY MASS INDEX: 30.22 KG/M2 | TEMPERATURE: 96.6 F | SYSTOLIC BLOOD PRESSURE: 116 MMHG

## 2022-11-03 DIAGNOSIS — Z78.0 POSTMENOPAUSAL: ICD-10-CM

## 2022-11-03 DIAGNOSIS — R06.2 WHEEZING: Primary | ICD-10-CM

## 2022-11-03 DIAGNOSIS — Z00.00 MEDICARE ANNUAL WELLNESS VISIT, SUBSEQUENT: ICD-10-CM

## 2022-11-03 PROBLEM — G30.9 ALZHEIMER'S DEMENTIA WITHOUT BEHAVIORAL DISTURBANCE (HCC): Status: RESOLVED | Noted: 2022-07-28 | Resolved: 2022-11-03

## 2022-11-03 PROBLEM — F02.80 ALZHEIMER'S DEMENTIA WITHOUT BEHAVIORAL DISTURBANCE (HCC): Status: RESOLVED | Noted: 2022-07-28 | Resolved: 2022-11-03

## 2022-11-03 PROBLEM — F19.982 DRUG INDUCED INSOMNIA (HCC): Status: RESOLVED | Noted: 2017-05-08 | Resolved: 2022-11-03

## 2022-11-03 RX ORDER — MONTELUKAST SODIUM 10 MG/1
10 TABLET ORAL
Qty: 30 TABLET | Refills: 5 | Status: SHIPPED | OUTPATIENT
Start: 2022-11-03

## 2022-11-03 NOTE — PROGRESS NOTES
Assessment and Plan:     Problem List Items Addressed This Visit    None     Visit Diagnoses     Wheezing    -  Primary    Relevant Medications    montelukast (SINGULAIR) 10 mg tablet    Medicare annual wellness visit, subsequent        Postmenopausal        Relevant Orders    DXA bone density spine hip and pelvis        BMI Counseling: Body mass index is 30 38 kg/m²  The BMI is above normal  Nutrition recommendations include decreasing portion sizes, encouraging healthy choices of fruits and vegetables, consuming healthier snacks, moderation in carbohydrate intake and increasing intake of lean protein  Rationale for BMI follow-up plan is due to patient being overweight or obese  Preventive health issues were discussed with patient, and age appropriate screening tests were ordered as noted in patient's After Visit Summary  Personalized health advice and appropriate referrals for health education or preventive services given if needed, as noted in patient's After Visit Summary  History of Present Illness:     Patient presents for a Medicare Wellness Visit    Wheezing  The current episode started 1 to 4 weeks ago  The problem occurs daily  The problem is unchanged  The problem is mild  Associated symptoms include wheezing  Pertinent negatives include no chest pain, chest pressure, coughing, dizziness, fatigue, hoarseness of voice, leg swelling, orthopnea, palpitations, rhinorrhea, sore throat, stridor or sweats  The symptoms are aggravated by a supine position  There was no intake of a foreign body  Past treatments include nothing  The treatment provided no relief  Her past medical history is significant for allergies  There is no history of anxiety/panic attacks, asthma, DVT, GERD, PE, spontaneous pneumothorax or tobacco use        Patient Care Team:  Pawan Leos MD as PCP - MD Noemi Stephens MD Mansfield Duran, MD Nereida Mallick, MD Nereida Mallick, MD as Endoscopist     Review of Systems:     Review of Systems   Constitutional: Negative  Negative for fatigue  HENT: Negative for hoarse voice, rhinorrhea and sore throat  Respiratory: Positive for wheezing  Negative for cough, chest tightness, shortness of breath and stridor  Cardiovascular: Negative  Negative for chest pain, palpitations, orthopnea and leg swelling  Gastrointestinal: Negative  Neurological: Negative for dizziness          Problem List:     Patient Active Problem List   Diagnosis   • H/O mitral valve replacement with mechanical valve   • Atrial fibrillation ([facility]) [I48 [id number]]   • Chronic right-sided low back pain without sciatica   • Dupuytren's contracture   • Generalized osteoarthritis   • Chronic combined systolic and diastolic heart failure ([facility])   • Hypothyroidism   • Memory impairment   • Mitral valve disorder   • Parkinson's disease ([facility] )   • Peripheral vascular disease ([facility] [address] )   • Seasonal allergies   • Transient ischemic attack   • Vitamin D insufficiency   • Suprapatellar bursitis of right knee   • Numbness and tingling in right hand   • Other microscopic hematuria   • Cardiomyopathy, dilated, nonischemic ([facility])   • Left bundle branch block (LBBB)   • Asymptomatic PVCs   • On continuous oral anticoagulation   • Mixed dyslipidemia   • High risk medication use   • Other chronic pain   • Orthostatic hypotension   • 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   • Weakness      Past Medical and Surgical History:     Past Medical History:   Diagnosis Date   • Anal fissure    • Arthritis     osteo joints   • Asthma with acute exacerbation    • Blepharitis    • Breast lump    • Chalazion    • CHF, chronic ([facility])    • Difficulty walking    • Disease of thyroid gland    • Drooling    • Dysphagia    • Fall 05/04/2018   • Fibromyalgia, primary    • Glaucoma     Normal pressure   • History of transfusion 1996   • Hordeolum externum    • Hx of transient ischemic attack (TIA)    • Hypophonia    • Infectious viral hepatitis     Hep C dx     • Lyme carditis    • Murmur, cardiac    • Parkinsons (HCC)    • Rotator cuff tendinitis    • Seasonal allergies    • Urinary frequency      Past Surgical History:   Procedure Laterality Date   • BREAST BIOPSY Right 2018    benign   • BREAST CYST EXCISION Left     benign   • BREAST SURGERY N/A     benign, calcium   • CARDIAC SURGERY      mitral valve replacement   • CATARACT EXTRACTION Right 2015   • CATARACT EXTRACTION Left    • CHEST TUBE INSERTION Right     post pleural effusion   • CHOLECYSTECTOMY      lap   • HYSTERECTOMY  1973    partial   • NE ARTHROCENTESIS ASPIR&/INJ MAJOR JT/BURSA W/O US Left 3/19/2021    Procedure: Sacroiliac Joint Injection ( 22235 ); Surgeon: Kyaw Brown MD;  Location: [facility] MAIN OR;  Service: Pain Management    • NE COLSC FLX W/RMVL OF TUMOR POLYP LESION SNARE TQ N/A 2017    Procedure: COLONOSCOPY and biopsy ;  Surgeon: Estee Brandt MD;  Location: [facility] GI LAB;   Service: Gastroenterology   • SKIN BIOPSY      pre cancerous on face   • TONSILLECTOMY     • US GUIDED BREAST BIOPSY RIGHT COMPLETE Right 2018      Family History:     Family History   Problem Relation Age of Onset   • Heart disease Mother         murmur Cardiomegaly age 64   • Pneumonia Father    • Heart disease Brother         mitrsl valve   • Parkinsonism Brother    • Arthritis Brother    • Lupus Daughter    • Diabetes Daughter    • Depression Daughter       Social History:     Social History     Socioeconomic History   • Marital status: /Civil Union     Spouse name: None   • Number of children: None   • Years of education: None   • Highest education level: None   Occupational History   • None   Tobacco Use   • Smoking status: Former Smoker     Packs/day: 0 10     Years: 10 00     Pack years: 1 00     Quit date:      Years since quittin 8   • Smokeless tobacco: Never Used   Vaping Use   • Vaping Use: Never used   Substance and Sexual Activity   • Alcohol use: No   • Drug use: No   • Sexual activity: Not Currently   Other Topics Concern   • None   Social History Narrative   • None     Social Determinants of Health     Financial Resource Strain: Low Risk    • Difficulty of Paying Living Expenses: Not hard at all   Food Insecurity: Not on file   Transportation Needs: No Transportation Needs   • Lack of Transportation (Medical): No   • Lack of Transportation (Non-Medical): No   Physical Activity: Not on file   Stress: Not on file   Social Connections: Not on file   Intimate Partner Violence: Not on file   Housing Stability: Not on file      Medications and Allergies:     Current Outpatient Medications   Medication Sig Dispense Refill   • acetaminophen (TYLENOL) 650 mg CR tablet Take 1 tablet (650 mg total) by mouth 3 (three) times a day (Patient taking differently: Take 650 mg by mouth as needed) 100 tablet 0   • alendronate (FOSAMAX) 70 mg tablet take 1 tablet by mouth EVERY 7 DAYS 12 tablet 3   • carbidopa-levodopa (SINEMET)  mg per tablet TAKE 1 AND 1/2 TABLETS BY MOUTH 4 TIMES A DAY (Patient taking differently: Take 1 tablet by mouth 4 (four) times a day TAKE 1 AND 1/2 TABLETS IN THE MORNING 1 TABLET AT LATE MORNING 1 1/2 MID AFTER NOON  1 TABLET IN THE EVENING) 240 tablet 8   • Cholecalciferol (VITAMIN D3) 2000 units TABS Take 2,000 Units by mouth every morning     • entacapone (COMTAN) 200 mg tablet take 1 tablet by mouth four times a day 120 tablet 4   • furosemide (LASIX) 20 mg tablet Take 1/2 tablet by mouth  4 days per week  Schedule administration to be done when patient can be near toilet facilities for 4-6 hours   90 tablet 3   • levothyroxine 50 mcg tablet take 1 tablet by mouth every morning 30 tablet 3   • memantine (NAMENDA) 10 mg tablet take 1 tablet by mouth once daily 90 tablet 3   • Mirabegron ER 50 MG TB24 Take 1 tablet (50 mg total) by mouth daily 30 tablet 3   • Multiple Vitamins-Minerals ([address] 50+ PO) Take by mouth daily with breakfast     • warfarin (COUMADIN) 2 5 mg tablet TAKE 1/2 TO 1 TABLET BY MOUTH DAILY OR AS DIRECTED BY PHYSICIAN 30 tablet 11   • Diclofenac Sodium (VOLTAREN) 1 % Apply 2 g topically 4 (four) times a day as needed (Foot pain) for up to 15 days 50 g 2     No current facility-administered medications for this visit  Allergies   Allergen Reactions   • Amantadine Tongue Swelling   • Artane [Trihexyphenidyl] Other (See Comments)     Felt "disconnected", "out if it", shaky  Did not feel right    • Glycopyrrolate      Nose bleeds   • Pollen Extract       Immunizations:     Immunization History   Administered Date(s) Administered   • COVID-19 PFIZER VACCINE 0 3 ML IM 02/06/2021, 02/28/2021, 10/16/2021   • H1N1, All Formulations 10/26/2009   • Influenza Split High Dose Preservative Free IM 09/25/2014, 10/08/2015, 09/26/2016, 08/22/2017   • Influenza, high dose seasonal 0 7 mL 09/10/2018, 10/02/2019, 10/01/2020, 09/24/2021, 09/30/2022   • Influenza, seasonal, injectable 10/04/2005, 10/31/2006, 10/25/2007, 09/13/2008, 09/17/2009, 09/24/2010, 09/17/2012, 10/01/2013   • Pneumococcal Conjugate 13-Valent 11/21/2016   • Pneumococcal Polysaccharide PPV23 03/19/2007, 09/03/2014   • Tdap 06/17/2008      Health Maintenance:         Topic Date Due   • Breast Cancer Screening: Mammogram  04/21/2023         Topic Date Due   • COVID-19 Vaccine (4 - Booster for [employer] series) 02/16/2022      Medicare Screening Tests and Risk Assessments:     Lizzy Bledsoe is here for her Subsequent Wellness visit  Health Risk Assessment:   Patient rates overall health as fair  Patient feels that their physical health rating is much worse  Patient is dissatisfied with their life  Eyesight was rated as slightly worse  Hearing was rated as same  Patient feels that their emotional and mental health rating is slightly worse  Patients states they are never, rarely angry  Patient states they are often unusually tired/fatigued   Pain experienced in the last 7 days has been some  Patient's pain rating has been 3/10  Patient states that she has experienced no weight loss or gain in last 6 months  Fall Risk Screening: In the past year, patient has experienced: no history of falling in past year      Urinary Incontinence Screening:   Patient has leaked urine accidently in the last six months  Home Safety:  Patient does not have trouble with stairs inside or outside of their home  Patient has working smoke alarms and has working carbon monoxide detector  Home safety hazards include: none  Nutrition:   Current diet is Regular  Medications:   Patient is currently taking over-the-counter supplements  OTC medications include: see medication list  Patient is able to manage medications  Activities of Daily Living (ADLs)/Instrumental Activities of Daily Living (IADLs):   Walk and transfer into and out of bed and chair?: Yes  Dress and groom yourself?: Yes    Bathe or shower yourself?: Yes    Feed yourself? Yes  Do your laundry/housekeeping?: Yes  Manage your money, pay your bills and track your expenses?: Yes  Make your own meals?: Yes    Do your own shopping?: Yes    Previous Hospitalizations:   Any hospitalizations or ED visits within the last 12 months?: No      Advance Care Planning:   Living will: Yes    Durable POA for healthcare:  Yes    Advanced directive: Yes      Cognitive Screening:   Provider or family/friend/caregiver concerned regarding cognition?: No    PREVENTIVE SCREENINGS      Cardiovascular Screening:    General: Screening Current      Diabetes Screening:     General: Screening Current      Colorectal Cancer Screening:     General: Screening Not Indicated      Breast Cancer Screening:     General: Screening Current      Cervical Cancer Screening:    General: Screening Not Indicated      Osteoporosis Screening:      Due for: DXA Axial      Abdominal Aortic Aneurysm (AAA) Screening:        General: Screening Not Indicated      Lung Cancer Screening:     General: Screening Not Indicated      Hepatitis C Screening:    General: Screening Not Indicated    Screening, Brief Intervention, and Referral to Treatment (SBIRT)    Screening  Typical number of drinks in a day: 0  Typical number of drinks in a week: 0  Interpretation: Low risk drinking behavior  Single Item Drug Screening:  How often have you used an illegal drug (including marijuana) or a prescription medication for non-medical reasons in the past year? never    Single Item Drug Screen Score: 0  Interpretation: Negative screen for possible drug use disorder    No exam data present     Physical Exam:     /70 (BP Location: Left arm, Patient Position: Sitting, Cuff Size: Large)   Pulse 88   Temp (!) 96 6 °F (35 9 °C)   Resp 16   Ht 5' 4" (1 626 m)   Wt 80 3 kg (177 lb)   BMI 30 38 kg/m²     Physical Exam  Constitutional:       Appearance: She is not ill-appearing  HENT:      Nose: No congestion or rhinorrhea  Cardiovascular:      Rate and Rhythm: Normal rate and regular rhythm  Pulmonary:      Effort: Pulmonary effort is normal  No respiratory distress  Breath sounds: No wheezing, rhonchi or rales  Neurological:      Mental Status: She is alert            Nilda José MD

## 2022-11-03 NOTE — PATIENT INSTRUCTIONS
Medicare Preventive Visit Patient Instructions  Thank you for completing your Welcome to Medicare Visit or Medicare Annual Wellness Visit today  Your next wellness visit will be due in one year (11/4/2023)  The screening/preventive services that you may require over the next 5-10 years are detailed below  Some tests may not apply to you based off risk factors and/or age  Screening tests ordered at today's visit but not completed yet may show as past due  Also, please note that scanned in results may not display below  Preventive Screenings:  Service Recommendations Previous Testing/Comments   Colorectal Cancer Screening  * Colonoscopy    * Fecal Occult Blood Test (FOBT)/Fecal Immunochemical Test (FIT)  * Fecal DNA/Cologuard Test  * Flexible Sigmoidoscopy Age: 39-70 years old   Colonoscopy: every 10 years (may be performed more frequently if at higher risk)  OR  FOBT/FIT: every 1 year  OR  Cologuard: every 3 years  OR  Sigmoidoscopy: every 5 years  Screening may be recommended earlier than age 39 if at higher risk for colorectal cancer  Also, an individualized decision between you and your healthcare provider will decide whether screening between the ages of 74-80 would be appropriate  Colonoscopy: Not on file  FOBT/FIT: Not on file  Cologuard: Not on file  Sigmoidoscopy: Not on file          Breast Cancer Screening Age: 36 years old  Frequency: every 1-2 years  Not required if history of left and right mastectomy Mammogram: 04/21/2022        Cervical Cancer Screening Between the ages of 21-29, pap smear recommended once every 3 years  Between the ages of 33-67, can perform pap smear with HPV co-testing every 5 years     Recommendations may differ for women with a history of total hysterectomy, cervical cancer, or abnormal pap smears in past  Pap Smear: Not on file        Hepatitis C Screening Once for adults born between [facility]  More frequently in patients at high risk for Hepatitis C Hep C Antibody: Not on file        Diabetes Screening 1-2 times per year if you're at risk for diabetes or have pre-diabetes Fasting glucose: 113 mg/dL (10/31/2022)  A1C: 5 9 % (7/20/2020)      Cholesterol Screening Once every 5 years if you don't have a lipid disorder  May order more often based on risk factors  Lipid panel: 10/31/2022          Other Preventive Screenings Covered by Medicare:  1  Abdominal Aortic Aneurysm (AAA) Screening: covered once if your at risk  You're considered to be at risk if you have a family history of AAA  2  Lung Cancer Screening: covers low dose CT scan once per year if you meet all of the following conditions: (1) Age 50-69; (2) No signs or symptoms of lung cancer; (3) Current smoker or have quit smoking within the last 15 years; (4) You have a tobacco smoking history of at least 20 pack years (packs per day multiplied by number of years you smoked); (5) You get a written order from a healthcare provider  3  Glaucoma Screening: covered annually if you're considered high risk: (1) You have diabetes OR (2) Family history of glaucoma OR (3)  aged 48 and older OR (3)  American aged 72 and older  3  Osteoporosis Screening: covered every 2 years if you meet one of the following conditions: (1) You're estrogen deficient and at risk for osteoporosis based off medical history and other findings; (2) Have a vertebral abnormality; (3) On glucocorticoid therapy for more than 3 months; (4) Have primary hyperparathyroidism; (5) On osteoporosis medications and need to assess response to drug therapy  · Last bone density test (DXA Scan): 08/13/2020   5  HIV Screening: covered annually if you're between the age of 15-65  Also covered annually if you are younger than 13 and older than 72 with risk factors for HIV infection  For pregnant patients, it is covered up to 3 times per pregnancy      Immunizations:  Immunization Recommendations   Influenza Vaccine Annual influenza vaccination during flu season is recommended for all persons aged >= 6 months who do not have contraindications   Pneumococcal Vaccine   * Pneumococcal conjugate vaccine = PCV13 (Prevnar 13), PCV15 (Vaxneuvance), PCV20 (Prevnar 20)  * Pneumococcal polysaccharide vaccine = PPSV23 (Pneumovax) Adults 25-60 years old: 1-3 doses may be recommended based on certain risk factors  Adults 72 years old: 1-2 doses may be recommended based off what pneumonia vaccine you previously received   Hepatitis B Vaccine 3 dose series if at intermediate or high risk (ex: diabetes, end stage renal disease, liver disease)   Tetanus (Td) Vaccine - COST NOT COVERED BY MEDICARE PART B Following completion of primary series, a booster dose should be given every 10 years to maintain immunity against tetanus  Td may also be given as tetanus wound prophylaxis  Tdap Vaccine - COST NOT COVERED BY MEDICARE PART B Recommended at least once for all adults  For pregnant patients, recommended with each pregnancy  Shingles Vaccine (Shingrix) - COST NOT COVERED BY MEDICARE PART B  2 shot series recommended in those aged 48 and above     Health Maintenance Due:      Topic Date Due   • Breast Cancer Screening: Mammogram  04/21/2023   • Hepatitis C Screening  Discontinued     Immunizations Due:      Topic Date Due   • Hepatitis A Vaccine (1 of 2 - Risk 2-dose series) Never done   • Hepatitis B Vaccine (1 of 3 - Risk 3-dose series) Never done   • COVID-19 Vaccine (4 - Booster for [employer] series) 02/16/2022     Advance Directives   What are advance directives? Advance directives are legal documents that state your wishes and plans for medical care  These plans are made ahead of time in case you lose your ability to make decisions for yourself  Advance directives can apply to any medical decision, such as the treatments you want, and if you want to donate organs  What are the types of advance directives?   There are many types of advance directives, and each state has rules about how to use them  You may choose a combination of any of the following:  · Living will: This is a written record of the treatment you want  You can also choose which treatments you do not want, which to limit, and which to stop at a certain time  This includes surgery, medicine, IV fluid, and tube feedings  · Durable power of  for healthcare [facility]): This is a written record that states who you want to make healthcare choices for you when you are unable to make them for yourself  This person, called a proxy, is usually a family member or a friend  You may choose more than 1 proxy  · Do not resuscitate (DNR) order:  A DNR order is used in case your heart stops beating or you stop breathing  It is a request not to have certain forms of treatment, such as CPR  A DNR order may be included in other types of advance directives  · Medical directive: This covers the care that you want if you are in a coma, near death, or unable to make decisions for yourself  You can list the treatments you want for each condition  Treatment may include pain medicine, surgery, blood transfusions, dialysis, IV or tube feedings, and a ventilator (breathing machine)  · Values history: This document has questions about your views, beliefs, and how you feel and think about life  This information can help others choose the care that you would choose  Why are advance directives important? An advance directive helps you control your care  Although spoken wishes may be used, it is better to have your wishes written down  Spoken wishes can be misunderstood, or not followed  Treatments may be given even if you do not want them  An advance directive may make it easier for your family to make difficult choices about your care     Weight Management   Why it is important to manage your weight:  Being overweight increases your risk of health conditions such as heart disease, high blood pressure, type 2 diabetes, and certain types of cancer  It can also increase your risk for osteoarthritis, sleep apnea, and other respiratory problems  Aim for a slow, steady weight loss  Even a small amount of weight loss can lower your risk of health problems  How to lose weight safely:  A safe and healthy way to lose weight is to eat fewer calories and get regular exercise  You can lose up about 1 pound a week by decreasing the number of calories you eat by 500 calories each day  Healthy meal plan for weight management:  A healthy meal plan includes a variety of foods, contains fewer calories, and helps you stay healthy  A healthy meal plan includes the following:  · Eat whole-grain foods more often  A healthy meal plan should contain fiber  Fiber is the part of grains, fruits, and vegetables that is not broken down by your body  Whole-grain foods are healthy and provide extra fiber in your diet  Some examples of whole-grain foods are whole-wheat breads and pastas, oatmeal, brown rice, and bulgur  · Eat a variety of vegetables every day  Include dark, leafy greens such as spinach, kale, gaston greens, and mustard greens  Eat yellow and orange vegetables such as carrots, sweet potatoes, and winter squash  · Eat a variety of fruits every day  Choose fresh or canned fruit (canned in its own juice or light syrup) instead of juice  Fruit juice has very little or no fiber  · Eat low-fat dairy foods  Drink fat-free (skim) milk or 1% milk  Eat fat-free yogurt and low-fat cottage cheese  Try low-fat cheeses such as mozzarella and other reduced-fat cheeses  · Choose meat and other protein foods that are low in fat  Choose beans or other legumes such as split peas or lentils  Choose fish, skinless poultry (chicken or turkey), or lean cuts of red meat (beef or pork)  Before you cook meat or poultry, cut off any visible fat  · Use less fat and oil  Try baking foods instead of frying them   Add less fat, such as margarine, sour cream, regular salad dressing and mayonnaise to foods  Eat fewer high-fat foods  Some examples of high-fat foods include french fries, doughnuts, ice cream, and cakes  · Eat fewer sweets  Limit foods and drinks that are high in sugar  This includes candy, cookies, regular soda, and sweetened drinks  Exercise:  Exercise at least 30 minutes per day on most days of the week  Some examples of exercise include walking, biking, dancing, and swimming  You can also fit in more physical activity by taking the stairs instead of the elevator or parking farther away from stores  Ask your healthcare provider about the best exercise plan for you  © Copyright [employer] 2018 Information is for End User's use only and may not be sold, redistributed or otherwise used for commercial purposes   All illustrations and images included in CareNotes® are the copyrighted property of A D A M , Inc  or [address] [place] [address] [employer] [facility]

## 2022-11-15 ENCOUNTER — ANTICOAG VISIT (OUTPATIENT)
Dept: CARDIOLOGY CLINIC | Facility: CLINIC | Age: 82
End: 2022-11-15

## 2022-11-15 ENCOUNTER — APPOINTMENT (OUTPATIENT)
Dept: LAB | Facility: CLINIC | Age: 82
End: 2022-11-15

## 2022-11-15 DIAGNOSIS — I48.20 CHRONIC ATRIAL FIBRILLATION (HCC): ICD-10-CM

## 2022-11-15 DIAGNOSIS — Z95.2 H/O MITRAL VALVE REPLACEMENT WITH MECHANICAL VALVE: Primary | ICD-10-CM

## 2022-11-16 ENCOUNTER — OFFICE VISIT (OUTPATIENT)
Dept: FAMILY MEDICINE CLINIC | Facility: CLINIC | Age: 82
End: 2022-11-16

## 2022-11-16 VITALS
OXYGEN SATURATION: 97 % | DIASTOLIC BLOOD PRESSURE: 60 MMHG | RESPIRATION RATE: 16 BRPM | TEMPERATURE: 96.3 F | SYSTOLIC BLOOD PRESSURE: 100 MMHG | HEART RATE: 78 BPM

## 2022-11-16 DIAGNOSIS — J06.9 UPPER RESPIRATORY TRACT INFECTION, UNSPECIFIED TYPE: Primary | ICD-10-CM

## 2022-11-16 DIAGNOSIS — R05.9 COUGH, UNSPECIFIED TYPE: ICD-10-CM

## 2022-11-16 NOTE — PROGRESS NOTES
Chief Complaint   Patient presents with   • Fatigue   • Cough   • Shortness of Breath   • Fever   • Nasal Congestion        Patient ID: Jennifer Osei is a 79 y o  female  Cough  Associated symptoms include rhinorrhea and shortness of breath (with exertion )  Pertinent negatives include no chest pain, ear pain, headaches, rash, sore throat or wheezing  Shortness of Breath  Associated symptoms include coughing, fatigue and rhinorrhea  Pertinent negatives include no chest pain, sore throat or wheezing  Fever  Associated symptoms include congestion, coughing and fatigue  Pertinent negatives include no abdominal pain, chest pain, headaches, nausea, neck pain, rash, sore throat, swollen glands or vomiting  URI   This is a new problem  The current episode started in the past 7 days (5-6 days ago)  The problem has been unchanged  There has been no fever (resolved )  Associated symptoms include congestion, coughing and rhinorrhea  Pertinent negatives include no abdominal pain, chest pain, diarrhea, dysuria, ear pain, headaches, joint pain, joint swelling, nausea, neck pain, plugged ear sensation, rash, sinus pain, sneezing, sore throat, swollen glands, vomiting or wheezing  She has tried nothing for the symptoms  The treatment provided no relief    negative home COVID test     The following portions of the patient's history were reviewed and updated as appropriate: allergies, current medications, past family history, past medical history, past social history, past surgical history and problem list     Review of Systems   Constitutional: Positive for activity change, appetite change and fatigue  HENT: Positive for congestion and rhinorrhea  Negative for ear pain, sinus pain, sneezing and sore throat  Respiratory: Positive for cough and shortness of breath (with exertion )  Negative for chest tightness and wheezing  Cardiovascular: Negative for chest pain     Gastrointestinal: Negative for abdominal pain, diarrhea, nausea and vomiting  Genitourinary: Negative for dysuria  Musculoskeletal: Negative for joint pain and neck pain  Skin: Negative for rash  Neurological: Negative for headaches  Current Outpatient Medications   Medication Sig Dispense Refill   • acetaminophen (TYLENOL) 650 mg CR tablet Take 1 tablet (650 mg total) by mouth 3 (three) times a day (Patient taking differently: Take 650 mg by mouth as needed) 100 tablet 0   • alendronate (FOSAMAX) 70 mg tablet take 1 tablet by mouth EVERY 7 DAYS 12 tablet 3   • carbidopa-levodopa (SINEMET)  mg per tablet TAKE 1 AND 1/2 TABLETS BY MOUTH 4 TIMES A DAY (Patient taking differently: Take 1 tablet by mouth 4 (four) times a day TAKE 1 AND 1/2 TABLETS IN THE MORNING 1 TABLET AT LATE MORNING 1 1/2 MID AFTER NOON  1 TABLET IN THE EVENING) 240 tablet 8   • Cholecalciferol (VITAMIN D3) 2000 units TABS Take 2,000 Units by mouth every morning     • entacapone (COMTAN) 200 mg tablet take 1 tablet by mouth four times a day 120 tablet 4   • furosemide (LASIX) 20 mg tablet Take 1/2 tablet by mouth  4 days per week  Schedule administration to be done when patient can be near toilet facilities for 4-6 hours  90 tablet 3   • levothyroxine 50 mcg tablet take 1 tablet by mouth every morning 30 tablet 3   • memantine (NAMENDA) 10 mg tablet take 1 tablet by mouth once daily 90 tablet 3   • Mirabegron ER 50 MG TB24 Take 1 tablet (50 mg total) by mouth daily 30 tablet 3   • montelukast (SINGULAIR) 10 mg tablet Take 1 tablet (10 mg total) by mouth daily at bedtime 30 tablet 5   • Multiple Vitamins-Minerals (OCUVITE ADULT 50+ PO) Take by mouth daily with breakfast     • warfarin (COUMADIN) 2 5 mg tablet TAKE 1/2 TO 1 TABLET BY MOUTH DAILY OR AS DIRECTED BY PHYSICIAN 30 tablet 11   • Diclofenac Sodium (VOLTAREN) 1 % Apply 2 g topically 4 (four) times a day as needed (Foot pain) for up to 15 days 50 g 2     No current facility-administered medications for this visit  Objective:    /60 (BP Location: Left arm, Patient Position: Sitting, Cuff Size: Large)   Pulse 78   Temp (!) 96 3 °F (35 7 °C)   Resp 16   SpO2 97%        Physical Exam  Constitutional:       General: She is not in acute distress  Appearance: She is not ill-appearing  HENT:      Nose: Congestion present  No rhinorrhea  Mouth/Throat:      Pharynx: No oropharyngeal exudate or posterior oropharyngeal erythema  Cardiovascular:      Rate and Rhythm: Normal rate and regular rhythm  Pulmonary:      Effort: Pulmonary effort is normal  No respiratory distress  Breath sounds: No wheezing, rhonchi or rales  Neurological:      Mental Status: She is alert and oriented to person, place, and time                   Assessment/Plan:         Diagnoses and all orders for this visit:    Upper respiratory tract infection, unspecified type  -     Covid/Flu- Office Collect    Cough, unspecified type        rto prn                       Flaca Brown MD

## 2022-11-17 LAB — SARS-COV-2 RNA RESP QL NAA+PROBE: NEGATIVE

## 2022-11-18 ENCOUNTER — TELEPHONE (OUTPATIENT)
Dept: CARDIOLOGY CLINIC | Facility: CLINIC | Age: 82
End: 2022-11-18

## 2022-11-18 NOTE — TELEPHONE ENCOUNTER
Patient called had to sharmila her appt to end of dec due to not feeling well   Would like someone to call with her test results sees dr Maya Ng

## 2022-11-25 DIAGNOSIS — G20 PARKINSON DISEASE (HCC): ICD-10-CM

## 2022-11-26 RX ORDER — ENTACAPONE 200 MG/1
TABLET ORAL
Qty: 120 TABLET | Refills: 4 | Status: SHIPPED | OUTPATIENT
Start: 2022-11-26

## 2022-11-30 ENCOUNTER — ANTICOAG VISIT (OUTPATIENT)
Dept: CARDIOLOGY CLINIC | Facility: CLINIC | Age: 82
End: 2022-11-30

## 2022-11-30 ENCOUNTER — TELEPHONE (OUTPATIENT)
Dept: CARDIOLOGY CLINIC | Facility: CLINIC | Age: 82
End: 2022-11-30

## 2022-11-30 ENCOUNTER — APPOINTMENT (OUTPATIENT)
Dept: LAB | Facility: CLINIC | Age: 82
End: 2022-11-30

## 2022-11-30 DIAGNOSIS — Z95.2 H/O MITRAL VALVE REPLACEMENT WITH MECHANICAL VALVE: Primary | ICD-10-CM

## 2022-11-30 DIAGNOSIS — I48.20 CHRONIC ATRIAL FIBRILLATION (HCC): ICD-10-CM

## 2022-12-05 ENCOUNTER — TELEMEDICINE (OUTPATIENT)
Dept: FAMILY MEDICINE CLINIC | Facility: CLINIC | Age: 82
End: 2022-12-05

## 2022-12-05 DIAGNOSIS — J11.1 INFLUENZA: Primary | ICD-10-CM

## 2022-12-05 DIAGNOSIS — N18.30 STAGE 3 CHRONIC KIDNEY DISEASE, UNSPECIFIED WHETHER STAGE 3A OR 3B CKD (HCC): ICD-10-CM

## 2022-12-05 RX ORDER — OSELTAMIVIR PHOSPHATE 30 MG/1
30 CAPSULE ORAL EVERY 12 HOURS SCHEDULED
Qty: 10 CAPSULE | Refills: 0 | Status: SHIPPED | OUTPATIENT
Start: 2022-12-05 | End: 2022-12-10

## 2022-12-05 NOTE — PROGRESS NOTES
Virtual Brief Visit    Patient is located in the following state in which I hold an active license [place]      Assessment/Plan:    Problem List Items Addressed This Visit    None  Visit Diagnoses     Influenza    -  Primary    Relevant Medications    oseltamivir (TAMIFLU) 30 MG capsule    Stage 3 chronic kidney disease, unspecified whether stage 3a or 3b CKD ([facility] [address] )            Discussed symptoms over the phone with patient, concern for influenza given myalgias, fever, sinus congestion, cough  Symptoms been have been less than 48 hours, recommend Tamiflu  Creatinine Clearance 54, recommend dosage of 30 mg b i d  x5 days  Continue OTC symptomatic management  If symptoms worsen or not improving recommend office evaluation or present to emergency care facility  Recent Visits  No visits were found meeting these conditions  Showing recent visits within past 7 days and meeting all other requirements  Today's Visits  Date Type Provider Dept   12/05/22 1501 [facility] today's visits and meeting all other requirements  Future Appointments  No visits were found meeting these conditions    Showing future appointments within next 150 days and meeting all other requirements         Visit Time    Total time of visit 12 minutes

## 2022-12-06 ENCOUNTER — TELEPHONE (OUTPATIENT)
Dept: NEUROLOGY | Facility: CLINIC | Age: 82
End: 2022-12-06

## 2022-12-06 NOTE — TELEPHONE ENCOUNTER
Patient is requesting a transfer of care back to Dr Moon Ram  Patient has tried to do virtual with Dr Kana Alvarado and is unable to due to being in another state  Informed patient that we would have to reschedule her in [facility] to be seen  Patient states that is too far for her to  Travel and would like to go back to seeing Dr Moon aRm in [place] due to it being closer to her home

## 2022-12-06 NOTE — PROGRESS NOTES
Virtual Regular Visit    Verification of patient location:    Patient is located in the following state in which I hold an active license {[facility] virtual patient location:[id number]}      Assessment/Plan:    Problem List Items Addressed This Visit    None           Reason for visit is   Chief Complaint   Patient presents with   • Virtual Regular Visit        Encounter provider Jennifer Arana MD    Provider located at [place] [id number]  [address]  [facility] [address] [phone]      Recent Visits  No visits were found meeting these conditions.  Showing recent visits within past 7 days and meeting all other requirements  Future Appointments  No visits were found meeting these conditions.  Showing future appointments within next 150 days and meeting all other requirements       The patient was identified by name and date of birth. Jesica Turcios was informed that this is a telemedicine visit and that the visit is being conducted through {[employer] VIRTUAL VISIT MEDIUM:49192}.  {Telemedicine confidentiality :50489} {Telemedicine participants:48565}  She acknowledged consent and understanding of privacy and security of the video platform. The patient has agreed to participate and understands they can discontinue the visit at any time.    Patient is aware this is a billable service.     Subjective  Jesica Turcios is a 82 y.o. female *** .      HPI     Past Medical History:   Diagnosis Date   • Anal fissure    • Arthritis     osteo joints   • Asthma with acute exacerbation    • Blepharitis    • Breast lump    • Chalazion    • CHF, chronic (HCC)    • Difficulty walking    • Disease of thyroid gland    • Drooling    • Dysphagia    • Fall 05/04/2018   • Fibromyalgia, primary    • Glaucoma     Normal pressure   • History of transfusion 1996   • Hordeolum externum    • Hx of transient ischemic attack (TIA)    • Hypophonia    • Infectious viral hepatitis     Hep C dx 1996     • Lyme carditis    • Murmur, cardiac    • Parkinsons (HCC)    • Rotator cuff tendinitis    • Seasonal allergies    • Urinary frequency        Past Surgical History:   Procedure Laterality Date   • BREAST BIOPSY Right 2018    benign   • BREAST CYST EXCISION Left     benign   • BREAST SURGERY N/A     benign, calcium   • CARDIAC SURGERY  1990    mitral valve replacement   • CATARACT EXTRACTION Right 2015   • CATARACT EXTRACTION Left 2014   • CHEST TUBE INSERTION Right     post pleural effusion   • CHOLECYSTECTOMY  2000    lap   • HYSTERECTOMY  1973    partial   • FL ARTHROCENTESIS ASPIR&/INJ MAJOR JT/BURSA W/O US Left 3/19/2021    Procedure: Sacroiliac Joint Injection ( 71150 );  Surgeon: Raymond Moeller MD;  Location: [facility] MAIN OR;  Service: Pain Management    • FL COLSC FLX W/RMVL OF TUMOR POLYP LESION SNARE TQ N/A 7/18/2017    Procedure: COLONOSCOPY and biopsy.;  Surgeon: Dimitrios Castrejon MD;  Location: [facility] GI LAB;  Service: Gastroenterology   • SKIN BIOPSY      pre cancerous on face   • TONSILLECTOMY     • US GUIDED BREAST BIOPSY RIGHT COMPLETE Right 2/13/2018       Current Outpatient Medications   Medication Sig Dispense Refill   • acetaminophen (TYLENOL) 650 mg CR tablet Take 1 tablet (650 mg total) by mouth 3 (three) times a day (Patient taking differently: Take 650 mg by mouth as needed) 100 tablet 0   • alendronate (FOSAMAX) 70 mg tablet take 1 tablet by mouth EVERY 7 DAYS 12 tablet 3   • carbidopa-levodopa (SINEMET)  mg per tablet TAKE 1 AND 1/2 TABLETS BY MOUTH 4 TIMES A DAY (Patient taking differently: Take 1 tablet by mouth 4 (four) times a day TAKE 1 AND 1/2 TABLETS IN THE MORNING 1 TABLET AT LATE MORNING 1 1/2 MID AFTER NOON  1 TABLET IN THE EVENING) 240 tablet 8   • Cholecalciferol (VITAMIN D3) 2000 units TABS Take 2,000 Units by mouth every morning     • Diclofenac Sodium (VOLTAREN) 1 % Apply 2 g topically 4 (four) times a day as needed (Foot pain) for up to 15 days 50 g 2   •  "entacapone (COMTAN) 200 mg tablet take 1 tablet by mouth four times a day 120 tablet 4   • furosemide (LASIX) 20 mg tablet Take 1/2 tablet by mouth  4 days per week. Schedule administration to be done when patient can be near toilet facilities for 4-6 hours. 90 tablet 3   • levothyroxine 50 mcg tablet take 1 tablet by mouth every morning 30 tablet 3   • memantine (NAMENDA) 10 mg tablet take 1 tablet by mouth once daily 90 tablet 3   • Multiple Vitamins-Minerals (OCUVITE ADULT 50+ PO) Take by mouth daily with breakfast     • oseltamivir (TAMIFLU) 30 MG capsule Take 1 capsule (30 mg total) by mouth every 12 (twelve) hours for 5 days 10 capsule 0   • warfarin (COUMADIN) 2.5 mg tablet TAKE 1/2 TO 1 TABLET BY MOUTH DAILY OR AS DIRECTED BY PHYSICIAN 30 tablet 11   • Mirabegron ER 50 MG TB24 Take 1 tablet (50 mg total) by mouth daily (Patient not taking: Reported on 12/6/2022) 30 tablet 3   • montelukast (SINGULAIR) 10 mg tablet Take 1 tablet (10 mg total) by mouth daily at bedtime (Patient not taking: Reported on 12/6/2022) 30 tablet 5     No current facility-administered medications for this visit.        Allergies   Allergen Reactions   • Amantadine Tongue Swelling   • Artane [Trihexyphenidyl] Other (See Comments)     Felt \"disconnected\", \"out if it\", shaky. Did not feel right    • Glycopyrrolate      Nose bleeds   • Pollen Extract        Review of Systems   Constitutional: Positive for appetite change (Not much of an appetite) and fatigue. Negative for fever.   HENT: Positive for trouble swallowing (feels like something stuck) and voice change. Negative for hearing loss and tinnitus.    Eyes: Negative.  Negative for photophobia, pain and visual disturbance.   Respiratory: Negative.  Negative for shortness of breath.    Cardiovascular: Negative.  Negative for palpitations.   Gastrointestinal: Negative.  Negative for nausea and vomiting.   Endocrine: Negative.  Negative for cold intolerance.   Genitourinary: Negative.  " Negative for dysuria, frequency and urgency.   Musculoskeletal: Positive for gait problem (Shuffling feet), myalgias (feet and hands), neck pain and neck stiffness.        Balance Issues   Skin: Negative for rash.        Feet swelling   Allergic/Immunologic: Negative.    Neurological: Positive for tremors (Hands and head / neck area, handwritng has gotten worse), weakness (Legs) and numbness (some in hands / fingers but does not occur often). Negative for dizziness, seizures, syncope, facial asymmetry, speech difficulty, light-headedness and headaches.   Hematological: Bruises/bleeds easily.   Psychiatric/Behavioral: Positive for sleep disturbance (Interrupted sleep 2-3 times a night). Negative for confusion and hallucinations.   All other systems reviewed and are negative.      Video Exam    There were no vitals filed for this visit.    Physical Exam     {Time Spent:81227}

## 2022-12-16 ENCOUNTER — OFFICE VISIT (OUTPATIENT)
Dept: PODIATRY | Facility: CLINIC | Age: 82
End: 2022-12-16

## 2022-12-16 VITALS
DIASTOLIC BLOOD PRESSURE: 60 MMHG | RESPIRATION RATE: 17 BRPM | SYSTOLIC BLOOD PRESSURE: 100 MMHG | WEIGHT: 177 LBS | HEIGHT: 64 IN | BODY MASS INDEX: 30.22 KG/M2

## 2022-12-16 DIAGNOSIS — I73.9 PERIPHERAL VASCULAR DISEASE, UNSPECIFIED (HCC): Primary | ICD-10-CM

## 2022-12-16 DIAGNOSIS — B35.1 ONYCHOMYCOSIS: ICD-10-CM

## 2022-12-16 DIAGNOSIS — M79.672 PAIN IN BOTH FEET: ICD-10-CM

## 2022-12-16 DIAGNOSIS — I87.2 DEEP VENOUS INSUFFICIENCY: ICD-10-CM

## 2022-12-16 DIAGNOSIS — R60.9 CHRONIC EDEMA: ICD-10-CM

## 2022-12-16 DIAGNOSIS — M79.671 PAIN IN BOTH FEET: ICD-10-CM

## 2022-12-16 DIAGNOSIS — R60.0 LOCALIZED EDEMA: ICD-10-CM

## 2022-12-16 NOTE — PROGRESS NOTES
Assessment/Plan:  Pain upon ambulation  Ingrown toenail bilateral hallux  Parkinson's disease  Peripheral artery disease  Chronic edema  Paresthesia secondary to radiculopathy and possible Parkinson's  Paronychia hallux bilateral   Chronic edema  Rule out deep venous insufficiency     Plan  Foot exam performed  Patient educated on condition  All nails debrided without pain or complication  Patient will consider compression hose  Return for follow-up  To rule out causes of chronic edema venous Doppler ordered  Patient also followed up with cardiology            Diagnoses and all orders for this visit:     Peripheral vascular disease, unspecified (HCC)     Paresthesia and pain of extremity     Onychomycosis     Pain in both feet     Localized edema     Ingrown toenail            Subjective:  Patient has pain  Patient has pain in her feet and toes with ambulation  She states her feet swell at the end of the day  No history of trauma  Patient is also concerned with swelling of her feet and legs            Allergies   Allergen Reactions   • Amantadine Tongue Swelling   • Artane [Trihexyphenidyl] Other (See Comments)       Felt "disconnected", "out if it", shaky   Did not feel right    • Glycopyrrolate         Nose bleeds   • Pollen Extract              Current Outpatient Medications:   •  acetaminophen (TYLENOL) 650 mg CR tablet, Take 1 tablet (650 mg total) by mouth 3 (three) times a day (Patient taking differently: Take 650 mg by mouth as needed), Disp: 100 tablet, Rfl: 0  •  alendronate (FOSAMAX) 70 mg tablet, take 1 tablet by mouth EVERY 7 DAYS, Disp: 12 tablet, Rfl: 3  •  carbidopa-levodopa (SINEMET)  mg per tablet, TAKE 1 AND 1/2 TABLETS BY MOUTH 4 TIMES A DAY (Patient taking differently: Take 1 tablet by mouth 4 (four) times a day TAKE 1 AND 1/2 TABLETS IN THE MORNING 1 TABLET AT LATE MORNING 1 1/2 MID AFTER NOON  1 TABLET IN THE EVENING), Disp: 240 tablet, Rfl: 8  •  Cholecalciferol (VITAMIN D3) 2000 units TABS, Take 2,000 Units by mouth every morning, Disp: , Rfl:   •  Diclofenac Sodium (VOLTAREN) 1 %, Apply 2 g topically 4 (four) times a day as needed (Foot pain) for up to 15 days, Disp: 50 g, Rfl: 2  •  donepezil (ARICEPT) 10 mg tablet, Take 1 tablet (10 mg total) by mouth daily at bedtime, Disp: 90 tablet, Rfl: 3  •  entacapone (COMTAN) 200 mg tablet, take 1 tablet by mouth four times a day, Disp: 120 tablet, Rfl: 4  •  furosemide (LASIX) 20 mg tablet, Take 1/2 tablet by mouth  4 days per week  Schedule administration to be done when patient can be near toilet facilities for 4-6 hours  , Disp: 90 tablet, Rfl: 3  •  levothyroxine 50 mcg tablet, take 1 tablet by mouth every morning, Disp: 30 tablet, Rfl: 3  •  memantine (NAMENDA) 10 mg tablet, take 1 tablet by mouth once daily, Disp: 90 tablet, Rfl: 3  •  Multiple Vitamins-Minerals (OCUVITE ADULT 50+ PO), Take by mouth daily with breakfast, Disp: , Rfl:   •  warfarin (COUMADIN) 2 5 mg tablet, TAKE 1/2 TO 1 TABLET BY MOUTH DAILY OR AS DIRECTED BY PHYSICIAN, Disp: 30 tablet, Rfl: 11         Patient Active Problem List   Diagnosis   • H/O mitral valve replacement with mechanical valve   • Atrial fibrillation (HCC) [I48 91]   • Chronic hepatitis C (HCC)   • Chronic right-sided low back pain without sciatica   • Drug induced insomnia (HCC)   • Dupuytren's contracture   • Generalized osteoarthritis   • Chronic combined systolic and diastolic heart failure (HCC)   • Hypothyroidism   • Memory impairment   • Mitral valve disorder   • Parkinson's disease (HCC)   • Peripheral vascular disease (HCC)   • Seasonal allergies   • Transient ischemic attack   • Vitamin D insufficiency   • Suprapatellar bursitis of right knee   • Numbness and tingling in right hand   • Other microscopic hematuria   • Cardiomyopathy, dilated, nonischemic (HCC)   • Left bundle branch block (LBBB)   • Asymptomatic PVCs   • On continuous oral anticoagulation   • Mixed dyslipidemia • High risk medication use   • Other chronic pain   • Orthostatic hypotension   • 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   • Weakness   • Diarrhea due to drug   • Alzheimer's dementia without behavioral disturbance, unspecified timing of dementia onset [facility])             Patient ID: Tony Granados is a 79 y o  female      HPI     The following portions of the patient's history were reviewed and updated as appropriate:      family history includes Arthritis in her brother; Depression in her daughter; Diabetes in her daughter; Heart disease in her brother and mother; Lupus in her daughter; Parkinsonism in her brother; Pneumonia in her father        reports that she quit smoking about 52 years ago  She has a 1 00 pack-year smoking history  She has never used smokeless tobacco  She reports that she does not drink alcohol and does not use drugs          Vitals:     09/22/22 1115   Resp: 17         Review of Systems       Objective:  Patient's shoes and socks removed  Foot ExamPhysical Exam                 Physical Exam  Constitutional:       General: She is not in acute distress      Appearance: She is well-developed  She is not ill-appearing, toxic-appearing or diaphoretic  HENT:      Head: Normocephalic     Cardiovascular:      Pulses:           Dorsalis pedis pulses are 1+ on the right side and 1+ on the left side         Posterior tibial pulses are 0 on the right side and 0 on the left side       Comments: Palpable DP pulse, nonpalpable PT pulse, CFT is less than 3 seconds, temperature gradient within normal limit, a trophic skin changes noted with skin thinning in shiny, patient report occasional claudication to bilateral calf, localized edema foot and ankle Q9  Pulmonary:      Effort: Pulmonary effort is normal    Musculoskeletal:         General: Tenderness and deformity present       Comments: Pes planus type foot pain on palpation and range of motion of the 1st MPJ, metatarsal heads bilateral foot, pain on palpation on range of motion of the ST joint, crepitus noted in midfoot joint    Skin:     General: Skin is dry       Capillary Refill: Capillary refill takes 2 to 3 seconds       Comments: Trophic skin changes bilateral foot and ankle, alternating hypopigmentation and hyperpigmentation patches around the foot and ankle on anterior leg, skin is shiny and thin, absent hair growth, atrophy of fat pad      Diffuse xerosis bilateral lower extremity, dystrophic discolored thickened toenails onychomycotic, onychocryptosis noted as well, malodor and subungual debris    Neurological:      Mental Status: She is alert and oriented to person, place, and time       Sensory: Sensory deficit present       Motor: Atrophy present    Sallyanne Race Tendon Reflexes: Reflexes abnormal      Foot Exam     Right Foot/Ankle      Neurovascular  Dorsalis pedis: 1+  Posterior tibial: 0        Left Foot/Ankle       Neurovascular  Dorsalis pedis: 1+  Posterior tibial: 0    Patient demonstrates 3/4 pitting edema bilateral   Negative Homans' sign

## 2022-12-20 ENCOUNTER — APPOINTMENT (OUTPATIENT)
Dept: LAB | Facility: CLINIC | Age: 82
End: 2022-12-20

## 2022-12-20 DIAGNOSIS — E03.9 HYPOTHYROIDISM, UNSPECIFIED TYPE: ICD-10-CM

## 2022-12-20 RX ORDER — LEVOTHYROXINE SODIUM 0.05 MG/1
TABLET ORAL
Qty: 30 TABLET | Refills: 3 | Status: SHIPPED | OUTPATIENT
Start: 2022-12-20

## 2022-12-21 ENCOUNTER — APPOINTMENT (INPATIENT)
Dept: RADIOLOGY | Facility: HOSPITAL | Age: 82
End: 2022-12-21

## 2022-12-21 ENCOUNTER — ANTICOAG VISIT (OUTPATIENT)
Dept: CARDIOLOGY CLINIC | Facility: CLINIC | Age: 82
End: 2022-12-21

## 2022-12-21 ENCOUNTER — HOSPITAL ENCOUNTER (INPATIENT)
Facility: HOSPITAL | Age: 82
LOS: 3 days | Discharge: NURSING, REHAB OR LONG-TERM CARE | End: 2022-12-24
Attending: EMERGENCY MEDICINE | Admitting: INTERNAL MEDICINE

## 2022-12-21 ENCOUNTER — APPOINTMENT (EMERGENCY)
Dept: RADIOLOGY | Facility: HOSPITAL | Age: 82
End: 2022-12-21

## 2022-12-21 DIAGNOSIS — Z95.2 H/O MITRAL VALVE REPLACEMENT WITH MECHANICAL VALVE: Primary | ICD-10-CM

## 2022-12-21 DIAGNOSIS — R09.02 HYPOXIA: ICD-10-CM

## 2022-12-21 DIAGNOSIS — I50.9 CHF (CONGESTIVE HEART FAILURE) (HCC): Primary | ICD-10-CM

## 2022-12-21 DIAGNOSIS — M25.561 ACUTE PAIN OF RIGHT KNEE: ICD-10-CM

## 2022-12-21 DIAGNOSIS — U07.1 COVID: ICD-10-CM

## 2022-12-21 DIAGNOSIS — R53.1 WEAKNESS: ICD-10-CM

## 2022-12-21 DIAGNOSIS — R41.3 MEMORY IMPAIRMENT: ICD-10-CM

## 2022-12-21 DIAGNOSIS — M17.12 PRIMARY OSTEOARTHRITIS OF LEFT KNEE: ICD-10-CM

## 2022-12-21 DIAGNOSIS — I48.20 CHRONIC ATRIAL FIBRILLATION (HCC): ICD-10-CM

## 2022-12-21 PROBLEM — M25.569 KNEE PAIN: Status: ACTIVE | Noted: 2022-12-21

## 2022-12-21 LAB
2HR DELTA HS TROPONIN: 0 NG/L
4HR DELTA HS TROPONIN: 4 NG/L
ALBUMIN SERPL BCP-MCNC: 3.4 G/DL (ref 3.5–5)
ALP SERPL-CCNC: 82 U/L (ref 46–116)
ALT SERPL W P-5'-P-CCNC: 7 U/L (ref 12–78)
ANION GAP SERPL CALCULATED.3IONS-SCNC: 8 MMOL/L (ref 4–13)
APTT PPP: 39 SECONDS (ref 23–37)
AST SERPL W P-5'-P-CCNC: 23 U/L (ref 5–45)
BASOPHILS # BLD AUTO: 0.03 THOUSANDS/ÂΜL (ref 0–0.1)
BASOPHILS NFR BLD AUTO: 1 % (ref 0–1)
BILIRUB SERPL-MCNC: 1.58 MG/DL (ref 0.2–1)
BUN SERPL-MCNC: 16 MG/DL (ref 5–25)
CALCIUM ALBUM COR SERPL-MCNC: 10.4 MG/DL (ref 8.3–10.1)
CALCIUM SERPL-MCNC: 9.9 MG/DL (ref 8.3–10.1)
CARDIAC TROPONIN I PNL SERPL HS: 31 NG/L
CARDIAC TROPONIN I PNL SERPL HS: 31 NG/L
CARDIAC TROPONIN I PNL SERPL HS: 35 NG/L
CHLORIDE SERPL-SCNC: 107 MMOL/L (ref 96–108)
CO2 SERPL-SCNC: 28 MMOL/L (ref 21–32)
CREAT SERPL-MCNC: 0.99 MG/DL (ref 0.6–1.3)
EOSINOPHIL # BLD AUTO: 0.06 THOUSAND/ÂΜL (ref 0–0.61)
EOSINOPHIL NFR BLD AUTO: 1 % (ref 0–6)
ERYTHROCYTE [DISTWIDTH] IN BLOOD BY AUTOMATED COUNT: 16 % (ref 11.6–15.1)
FLUAV RNA RESP QL NAA+PROBE: NEGATIVE
FLUBV RNA RESP QL NAA+PROBE: NEGATIVE
GFR SERPL CREATININE-BSD FRML MDRD: 52 ML/MIN/1.73SQ M
GLUCOSE SERPL-MCNC: 105 MG/DL (ref 65–140)
HCT VFR BLD AUTO: 36 % (ref 34.8–46.1)
HGB BLD-MCNC: 11.2 G/DL (ref 11.5–15.4)
IMM GRANULOCYTES # BLD AUTO: 0.03 THOUSAND/UL (ref 0–0.2)
IMM GRANULOCYTES NFR BLD AUTO: 1 % (ref 0–2)
INR PPP: 3.6 (ref 0.84–1.19)
LYMPHOCYTES # BLD AUTO: 0.92 THOUSANDS/ÂΜL (ref 0.6–4.47)
LYMPHOCYTES NFR BLD AUTO: 14 % (ref 14–44)
MAGNESIUM SERPL-MCNC: 2 MG/DL (ref 1.6–2.6)
MCH RBC QN AUTO: 27.7 PG (ref 26.8–34.3)
MCHC RBC AUTO-ENTMCNC: 31.1 G/DL (ref 31.4–37.4)
MCV RBC AUTO: 89 FL (ref 82–98)
MONOCYTES # BLD AUTO: 0.76 THOUSAND/ÂΜL (ref 0.17–1.22)
MONOCYTES NFR BLD AUTO: 12 % (ref 4–12)
NEUTROPHILS # BLD AUTO: 4.7 THOUSANDS/ÂΜL (ref 1.85–7.62)
NEUTS SEG NFR BLD AUTO: 71 % (ref 43–75)
NRBC BLD AUTO-RTO: 0 /100 WBCS
NT-PROBNP SERPL-MCNC: 6674 PG/ML
PLATELET # BLD AUTO: 195 THOUSANDS/UL (ref 149–390)
PLATELET # BLD AUTO: 199 THOUSANDS/UL (ref 149–390)
PMV BLD AUTO: 12 FL (ref 8.9–12.7)
PMV BLD AUTO: 12.1 FL (ref 8.9–12.7)
POTASSIUM SERPL-SCNC: 4.5 MMOL/L (ref 3.5–5.3)
PROT SERPL-MCNC: 6.3 G/DL (ref 6.4–8.4)
PROTHROMBIN TIME: 35.9 SECONDS (ref 11.6–14.5)
QRS AXIS: 139 DEGREES
QRSD INTERVAL: 156 MS
QT INTERVAL: 404 MS
QTC INTERVAL: 480 MS
RBC # BLD AUTO: 4.04 MILLION/UL (ref 3.81–5.12)
RSV RNA RESP QL NAA+PROBE: NEGATIVE
SARS-COV-2 RNA RESP QL NAA+PROBE: POSITIVE
SODIUM SERPL-SCNC: 143 MMOL/L (ref 135–147)
T WAVE AXIS: 60 DEGREES
VENTRICULAR RATE: 85 BPM
WBC # BLD AUTO: 6.5 THOUSAND/UL (ref 4.31–10.16)

## 2022-12-21 PROCEDURE — XW033E5 INTRODUCTION OF REMDESIVIR ANTI-INFECTIVE INTO PERIPHERAL VEIN, PERCUTANEOUS APPROACH, NEW TECHNOLOGY GROUP 5: ICD-10-PCS | Performed by: INTERNAL MEDICINE

## 2022-12-21 RX ORDER — FUROSEMIDE 10 MG/ML
40 INJECTION INTRAMUSCULAR; INTRAVENOUS DAILY
Status: DISCONTINUED | OUTPATIENT
Start: 2022-12-22 | End: 2022-12-23

## 2022-12-21 RX ORDER — HEPARIN SODIUM 5000 [USP'U]/ML
5000 INJECTION, SOLUTION INTRAVENOUS; SUBCUTANEOUS EVERY 8 HOURS SCHEDULED
Status: DISCONTINUED | OUTPATIENT
Start: 2022-12-21 | End: 2022-12-24 | Stop reason: HOSPADM

## 2022-12-21 RX ORDER — ACETAMINOPHEN 325 MG/1
650 TABLET ORAL EVERY 6 HOURS PRN
Status: DISCONTINUED | OUTPATIENT
Start: 2022-12-21 | End: 2022-12-24 | Stop reason: HOSPADM

## 2022-12-21 RX ORDER — DEXAMETHASONE SODIUM PHOSPHATE 4 MG/ML
6 INJECTION, SOLUTION INTRA-ARTICULAR; INTRALESIONAL; INTRAMUSCULAR; INTRAVENOUS; SOFT TISSUE EVERY 24 HOURS
Status: DISCONTINUED | OUTPATIENT
Start: 2022-12-21 | End: 2022-12-23

## 2022-12-21 RX ORDER — ONDANSETRON 2 MG/ML
4 INJECTION INTRAMUSCULAR; INTRAVENOUS EVERY 6 HOURS PRN
Status: DISCONTINUED | OUTPATIENT
Start: 2022-12-21 | End: 2022-12-24 | Stop reason: HOSPADM

## 2022-12-21 RX ORDER — MEMANTINE HYDROCHLORIDE 10 MG/1
10 TABLET ORAL DAILY
Status: DISCONTINUED | OUTPATIENT
Start: 2022-12-22 | End: 2022-12-24 | Stop reason: HOSPADM

## 2022-12-21 RX ORDER — OXYCODONE HYDROCHLORIDE 5 MG/1
2.5 TABLET ORAL EVERY 6 HOURS PRN
Status: DISCONTINUED | OUTPATIENT
Start: 2022-12-21 | End: 2022-12-24 | Stop reason: HOSPADM

## 2022-12-21 RX ORDER — LEVOTHYROXINE SODIUM 0.05 MG/1
50 TABLET ORAL
Status: DISCONTINUED | OUTPATIENT
Start: 2022-12-22 | End: 2022-12-24 | Stop reason: HOSPADM

## 2022-12-21 RX ORDER — POLYVINYL ALCOHOL 14 MG/ML
1 SOLUTION/ DROPS OPHTHALMIC AS NEEDED
COMMUNITY

## 2022-12-21 RX ORDER — ENTACAPONE 200 MG/1
200 TABLET ORAL 4 TIMES DAILY
Status: DISCONTINUED | OUTPATIENT
Start: 2022-12-21 | End: 2022-12-24 | Stop reason: HOSPADM

## 2022-12-21 RX ORDER — FUROSEMIDE 10 MG/ML
40 INJECTION INTRAMUSCULAR; INTRAVENOUS ONCE
Status: COMPLETED | OUTPATIENT
Start: 2022-12-21 | End: 2022-12-21

## 2022-12-21 RX ADMIN — REMDESIVIR 200 MG: 100 INJECTION, POWDER, LYOPHILIZED, FOR SOLUTION INTRAVENOUS at 22:27

## 2022-12-21 RX ADMIN — DICLOFENAC SODIUM TOPICAL GEL, 1%, 2 G: 10 GEL TOPICAL at 21:34

## 2022-12-21 RX ADMIN — CARBIDOPA AND LEVODOPA 1.5 TABLET: 25; 100 TABLET ORAL at 17:47

## 2022-12-21 RX ADMIN — FUROSEMIDE 40 MG: 10 INJECTION, SOLUTION INTRAMUSCULAR; INTRAVENOUS at 14:13

## 2022-12-21 RX ADMIN — HEPARIN SODIUM 5000 UNITS: 5000 INJECTION INTRAVENOUS; SUBCUTANEOUS at 17:47

## 2022-12-21 RX ADMIN — DEXAMETHASONE SODIUM PHOSPHATE 6 MG: 4 INJECTION, SOLUTION INTRAMUSCULAR; INTRAVENOUS at 21:34

## 2022-12-21 NOTE — ASSESSMENT & PLAN NOTE
POA  · Patient with chronic atrial fibrillation and EKG on admission revealed controlled A  fib  · Patient on Coumadin 2 5 mg daily  · Holding Coumadin at this time for INR of 3 60  · Continue to monitor

## 2022-12-21 NOTE — ASSESSMENT & PLAN NOTE
· History of mechanical mitral valve replacement September,1990  · On Coumadin 2 5 mg daily  · Target INR of 2 5-3 5    · INR on admission 3 60  · Will hold Coumadin  · Daily INR

## 2022-12-21 NOTE — ED NOTES
Pt placed on purewick and repositioned in bed  Pt now resting with family at beside        Ruth Isaac RN  12/21/22 1104

## 2022-12-21 NOTE — ASSESSMENT & PLAN NOTE
· Patient complains of left knee pain  On assessment, knee noted to be swollen and warm    · Will order vascular Doppler to rule out DVT  · Will order left knee x-ray  · Pain management

## 2022-12-21 NOTE — ED NOTES
Pt's O2 sat in upper 80s and low 90s on RA  Pt put on 2 L/min NC for comfort  O2 sat at 95%  Pt requested to increase the O2  Pt put on 4 L/min NC  O2 sat at 97%       Stefania Kim, RN  12/21/22 9976 [facility] 100, RN  12/21/22 9715

## 2022-12-21 NOTE — ASSESSMENT & PLAN NOTE
Wt Readings from Last 3 Encounters:   12/21/22 81 4 kg (179 lb 7 3 oz)   12/16/22 80 3 kg (177 lb)   11/03/22 80 3 kg (177 lb)     · Patient presented with shortness of breath that has been ongoing since November  · BNP noted to be > 6,000  · Chest x-ray showed Cardiomegaly, probable small bibasilar pleural effusions    · Echocardiogram of 4/11/2022 showed EF of 50 to 55% and normal MVR function  · ED gave Lasix IV 40 mg x 1 dose  · Will continue with Lasix IV 40 mg daily  · Daily weight, strict I&O  · Cardiac diet with 2 g sodium  · Fluid restriction 1500 mL

## 2022-12-21 NOTE — ASSESSMENT & PLAN NOTE
· Patient presented with shortness of breath to the ED, with saturation in the 80s on arrival and was placed on 4L nasal cannula O2 with saturation in the 90s    Patient stated that in November she tested negative for COVID at home and she had a virtual appointment with her PCP who prescribed Tamiflu which she had completed  · Tested positive for COVID-19 today in ED  · Patient on 2 L O2 with saturation in the upper 90s, wean off as tolerated  · There is no indication at this time to start COVID pathway, will continue to monitor

## 2022-12-21 NOTE — H&P
[address] 44  H&P- Easton Hagen 1940, 79 y o  female MRN: [id number]  Unit/Bed#: [address] [place] [address] Encounter: [id number]  Primary Care Provider: Ambar Stone MD   Date and time admitted to hospital: 12/21/2022 12:52 PM    * Chronic combined systolic and diastolic heart failure [facility])  Assessment & Plan  Wt Readings from Last 3 Encounters:   12/21/22 81 4 kg (179 lb 7 3 oz)   12/16/22 80 3 kg (177 lb)   11/03/22 80 3 kg (177 lb)     · Patient presented with shortness of breath that has been ongoing since November  · BNP noted to be > 6,000  · Chest x-ray showed Cardiomegaly, probable small bibasilar pleural effusions  · Echocardiogram of 4/11/2022 showed EF of 50 to 55% and normal MVR function  · ED gave Lasix IV 40 mg x 1 dose  · Will continue with Lasix IV 40 mg daily  · Daily weight, strict I&O  · Cardiac diet with 2 g sodium  · Fluid restriction 1500 mL      COVID-19  Assessment & Plan  · Patient presented with shortness of breath to the ED, with saturation in the 80s on arrival and was placed on 4L nasal cannula O2 with saturation in the 90s  Patient stated that in November she tested negative for COVID at home and she had a virtual appointment with her PCP who prescribed Tamiflu which she had completed  · Tested positive for COVID-19 today in ED  · Patient on 2 L O2 with saturation in the upper 90s, wean off as tolerated  · Will start mild COVID pathway as patient was noted with hypoxia  · Remdesivir 200 mg x 1 and 100 mg x 4 days  · Decadron x10 days  · Continue to monitor    Knee pain  Assessment & Plan  · Patient complains of  Bilateral knee pain  On assessment,  right knee noted to be swollen and warm    · Will order vascular Doppler to rule out DVT  · Will order bilateral knee x-ray  · Pain management    Parkinson's disease ([address] )  Assessment & Plan  · Continue Sinemet and entacapone    Hypothyroidism  Assessment & Plan  · Continue levothyroxine    Atrial fibrillation ([facility] [address] [address] ) [I48 91]  Assessment & Plan  POA  · Patient with chronic atrial fibrillation and EKG on admission revealed controlled A  fib  · Patient on Coumadin 2 5 mg daily  · Holding Coumadin at this time for INR of 3 60  · Continue to monitor    H/O mitral valve replacement with mechanical valve  Assessment & Plan  · History of mechanical mitral valve replacement September,1990  · On Coumadin 2 5 mg daily  · Target INR of 2 5-3 5  · INR on admission 3 60  · Will hold Coumadin  · Daily INR    VTE Pharmacologic Prophylaxis:   High Risk (Score >/= 5) - Pharmacological DVT Prophylaxis Ordered: heparin  Sequential Compression Devices Ordered  Code Status: Level 1 - Full Code     Discussion with family: Updated  () via phone  Anticipated Length of Stay: Patient will be admitted on an inpatient basis with an anticipated length of stay of greater than 2 midnights secondary to CHF exacerbation  Total Time for Visit, including Counseling / Coordination of Care: 45 minutes Greater than 50% of this total time spent on direct patient counseling and coordination of care  Chief Complaint: Shortness of breath    History of Present Illness:  Devin Farris is a 79 y o  female with a PMH of CHF, atrial fibrillation, MVR, Parkinson and hypothyroidism, who presents with shortness of breath which she stated has been ongoing since November  On arrival patient was satting in the [de-identified] and was placed on 4 L nasal cannula O2 with saturation in the 90s  Pro NT BNP is noted to be 6,674 and ED administered Lasix IV 40 mg with good result  Patient reported left knee pain which was noted on assessment to be swollen  Vascular Doppler to rule out DVT and knee x-ray will be ordered  Denies chest pain, lightheadedness, abdominal pain, nausea or vomiting  Review of Systems:  Review of Systems   Constitutional: Positive for activity change  Negative for fever  HENT: Negative for congestion and sore throat      Respiratory: Positive for cough and shortness of breath  Negative for wheezing  Cardiovascular: Positive for leg swelling  Negative for chest pain  Gastrointestinal: Negative for abdominal distention  Genitourinary: Negative for difficulty urinating  Neurological: Positive for weakness  Negative for light-headedness  Psychiatric/Behavioral: Negative for agitation  Past Medical and Surgical History:   Past Medical History:   Diagnosis Date   • Anal fissure    • Arthritis     osteo joints   • Asthma with acute exacerbation    • Blepharitis    • Breast lump    • Chalazion    • CHF, chronic (HCC)    • Difficulty walking    • Disease of thyroid gland    • Drooling    • Dysphagia    • Fall 05/04/2018   • Fibromyalgia, primary    • Glaucoma     Normal pressure   • History of transfusion 1996   • Hordeolum externum    • Hx of transient ischemic attack (TIA)    • Hypophonia    • Infectious viral hepatitis     Hep C dx 1996    • Lyme carditis    • Murmur, cardiac    • Parkinsons (HCC)    • Rotator cuff tendinitis    • Seasonal allergies    • Urinary frequency        Past Surgical History:   Procedure Laterality Date   • BREAST BIOPSY Right 2018    benign   • BREAST CYST EXCISION Left     benign   • BREAST SURGERY N/A     benign, calcium   • CARDIAC SURGERY  1990    mitral valve replacement   • CATARACT EXTRACTION Right 2015   • CATARACT EXTRACTION Left 2014   • CHEST TUBE INSERTION Right     post pleural effusion   • CHOLECYSTECTOMY  2000    lap   • HYSTERECTOMY  1973    partial   • [place] ARTHROCENTESIS ASPIR&/INJ MAJOR JT/BURSA W/O US Left 3/19/2021    Procedure: Sacroiliac Joint Injection ( 90721 ); Surgeon: Bing Noonan MD;  [facility] MAIN OR;  Service: Pain Management    • [place] COLSC FLX W/RMVL OF TUMOR POLYP LESION SNARE TQ N/A 7/18/2017    Procedure: COLONOSCOPY and biopsy ;  Surgeon: Sadaf Garcia MD;  Location: [facility] GI LAB;   Service: Gastroenterology   • SKIN BIOPSY      pre cancerous on face   • TONSILLECTOMY     • US GUIDED BREAST BIOPSY RIGHT COMPLETE Right 2/13/2018       Meds/Allergies:  Prior to Admission medications    Medication Sig Start Date End Date Taking? Authorizing Provider   polyvinyl alcohol (LIQUIFILM TEARS) 1 4 % ophthalmic solution 1 drop as needed for dry eyes   Yes Historical Provider, MD   acetaminophen (TYLENOL) 650 mg CR tablet Take 1 tablet (650 mg total) by mouth 3 (three) times a day  Patient taking differently: Take 650 mg by mouth as needed 6/6/19   Cindi Ganser, MD   alendronate (FOSAMAX) 70 mg tablet take 1 tablet by mouth EVERY 7 DAYS 6/1/22   Howard Gonzalez MD   carbidopa-levodopa (SINEMET)  mg per tablet TAKE 1 AND 1/2 TABLETS BY MOUTH 4 TIMES A DAY  Patient taking differently: Take 1 tablet by mouth 4 (four) times a day TAKE 1 AND 1/2 TABLETS IN THE MORNING 1 TABLET AT LATE MORNING 1 1/2 MID AFTER NOON  1 TABLET IN THE EVENING 3/22/22   Flora Dubose MD   Cholecalciferol (VITAMIN D3) 2000 units TABS Take 2,000 Units by mouth every morning    Historical Provider, MD   Diclofenac Sodium (VOLTAREN) 1 % Apply 2 g topically 4 (four) times a day as needed (Foot pain) for up to 15 days 1/21/22 12/6/22  Ashlyn Leigh DPM   entacapone (COMTAN) 200 mg tablet take 1 tablet by mouth four times a day 11/26/22   Orrie Leventhal, PA-C   furosemide (LASIX) 20 mg tablet Take 1/2 tablet by mouth  4 days per week  Schedule administration to be done when patient can be near toilet facilities for 4-6 hours   8/23/21   Cindi Ganser, MD   levothyroxine 50 mcg tablet take 1 tablet by mouth every morning 12/20/22   Howard Gonzalez MD   [facility]) 10 mg tablet take 1 tablet by mouth once daily 10/11/22   Orrie Leventhal, PA-C   Mirabegron ER 50 MG TB24 Take 1 tablet (50 mg total) by mouth daily  Patient not taking: Reported on 12/6/2022 10/3/22   Laura Pal PA-C   montelukast (SINGULAIR) 10 mg tablet Take 1 tablet (10 mg total) by mouth daily at bedtime  Patient not taking: Reported on 2022 11/3/22   Sandoval Gunn MD   Multiple Vitamins-Minerals (OCUVITE ADULT 50+ PO) Take by mouth daily with breakfast    Historical Provider, MD   warfarin (COUMADIN) 2 5 mg tablet TAKE 1/2 TO 1 TABLET BY MOUTH DAILY OR AS DIRECTED BY PHYSICIAN 5/10/22   Ever Harris MD     I have reviewed home medications with patient personally  Allergies: Allergies   Allergen Reactions   • Amantadine Tongue Swelling   • Artane [Trihexyphenidyl] Other (See Comments)     Felt "disconnected", "out if it", shaky  Did not feel right    • Glycopyrrolate      Nose bleeds   • Pollen Extract        Social History:  Marital Status: /Civil Union   Occupation:   Patient Pre-hospital Living Situation:   Patient Pre-hospital Level of Mobility:   Patient Pre-hospital Diet Restrictions:   Substance Use History:   Social History     Substance and Sexual Activity   Alcohol Use No     Social History     Tobacco Use   Smoking Status Former   • Packs/day: 0 10   • Years: 10 00   • Pack years: 1 00   • Types: Cigarettes   • Quit date: 5   • Years since quittin 0   Smokeless Tobacco Never     Social History     Substance and Sexual Activity   Drug Use No       Family History:  Family History   Problem Relation Age of Onset   • Heart disease Mother         murmur Cardiomegaly age 64   • Pneumonia Father    • Heart disease Brother         mitrsl valve   • Parkinsonism Brother    • Arthritis Brother    • Lupus Daughter    • Diabetes Daughter    • Depression Daughter        Physical Exam:     Vitals:   Blood Pressure: 111/59 (22)  Pulse: 66 (22)  Temperature: 98 3 °F (36 8 °C) (22)  Temp Source: Oral (22)  Respirations: (!) 25 (22)  Weight - Scale: 81 4 kg (179 lb 7 3 oz) (22 1349)  SpO2: 97 % (22)    Physical Exam  Constitutional:       Appearance: She is obese  She is not ill-appearing     HENT:      Nose: No congestion  Mouth/Throat:      Mouth: Mucous membranes are dry  Eyes:      Conjunctiva/sclera: Conjunctivae normal    Cardiovascular:      Rate and Rhythm: Normal rate  Rhythm irregular  Heart sounds: Normal heart sounds  Pulmonary:      Effort: No respiratory distress  Breath sounds: No wheezing  Abdominal:      General: Bowel sounds are normal  There is no distension  Palpations: Abdomen is soft  Musculoskeletal:         General: Swelling and tenderness present  Cervical back: Normal range of motion  Right lower leg: Edema present  Left lower leg: Edema present  Skin:     General: Skin is dry  Capillary Refill: Capillary refill takes 2 to 3 seconds  Coloration: Skin is pale  Neurological:      Mental Status: She is alert and oriented to person, place, and time     Psychiatric:         Behavior: Behavior normal          Additional Data:     Lab Results:  Results from last 7 days   Lab Units 12/21/22  1746 12/21/22  1314   WBC Thousand/uL  --  6 50   HEMOGLOBIN g/dL  --  11 2*   HEMATOCRIT %  --  36 0   PLATELETS Thousands/uL 199 195   NEUTROS PCT %  --  71   LYMPHS PCT %  --  14   MONOS PCT %  --  12   EOS PCT %  --  1     Results from last 7 days   Lab Units 12/21/22  1314   SODIUM mmol/L 143   POTASSIUM mmol/L 4 5   CHLORIDE mmol/L 107   CO2 mmol/L 28   BUN mg/dL 16   CREATININE mg/dL 0 99   ANION GAP mmol/L 8   CALCIUM mg/dL 9 9   ALBUMIN g/dL 3 4*   TOTAL BILIRUBIN mg/dL 1 58*   ALK PHOS U/L 82   ALT U/L 7*   AST U/L 23   GLUCOSE RANDOM mg/dL 105     Results from last 7 days   Lab Units 12/21/22  1314   INR  3 60*                   Lines/Drains:  Invasive Devices     Peripheral Intravenous Line  Duration           Peripheral IV 12/21/22 Right Antecubital <1 day                    Imaging: Reviewed radiology reports from this admission including: chest xray  XR chest 1 view portable   Final Result by Anel Lowry MD (12/21 1500) Cardiomegaly  Probable small bibasilar pleural effusions  Workstation performed: [id number]         VAS lower limb venous duplex study, complete bilateral    (Results Pending)   XR knee 1 or 2 vw left    (Results Pending)   XR knee 1 or 2 vw right    (Results Pending)       EKG and Other Studies Reviewed on Admission:   · EKG: Atrial fibrillation  HR 80's  ** Please Note: This note has been constructed using a voice recognition system   **

## 2022-12-21 NOTE — ED PROCEDURE NOTE
PROCEDURE  ECG 12 Lead Documentation Only    Date/Time: 12/21/2022 1:18 PM  Performed by: Mini Foster DO  Authorized by: Mini Foster DO     ECG reviewed by me, the ED Provider: yes    Patient location:  ED  Previous ECG:     Previous ECG:  Compared to current    Comparison ECG info:  Previous left bundle branch block and A  fib  Interpretation:     Interpretation: abnormal    Rate:     ECG rate:  85    ECG rate assessment: normal    Rhythm:     Rhythm: atrial fibrillation    Ectopy:     Ectopy: none    QRS:     QRS axis:  Right  Conduction:     Conduction: abnormal      Abnormal conduction: complete LBBB           Mini Foster DO  12/21/22 [address], DO  12/21/22 131

## 2022-12-22 ENCOUNTER — APPOINTMENT (INPATIENT)
Dept: RADIOLOGY | Facility: HOSPITAL | Age: 82
End: 2022-12-22

## 2022-12-22 LAB
ANION GAP SERPL CALCULATED.3IONS-SCNC: 7 MMOL/L (ref 4–13)
BUN SERPL-MCNC: 14 MG/DL (ref 5–25)
CALCIUM SERPL-MCNC: 9.4 MG/DL (ref 8.3–10.1)
CHLORIDE SERPL-SCNC: 104 MMOL/L (ref 96–108)
CO2 SERPL-SCNC: 27 MMOL/L (ref 21–32)
CREAT SERPL-MCNC: 0.92 MG/DL (ref 0.6–1.3)
CRP SERPL QL: 2.3 MG/L
D DIMER PPP FEU-MCNC: 0.48 UG/ML FEU
ERYTHROCYTE [DISTWIDTH] IN BLOOD BY AUTOMATED COUNT: 15.9 % (ref 11.6–15.1)
GFR SERPL CREATININE-BSD FRML MDRD: 57 ML/MIN/1.73SQ M
GLUCOSE SERPL-MCNC: 151 MG/DL (ref 65–140)
HCT VFR BLD AUTO: 36.2 % (ref 34.8–46.1)
HGB BLD-MCNC: 11.3 G/DL (ref 11.5–15.4)
INR PPP: 2.91 (ref 0.84–1.19)
MAGNESIUM SERPL-MCNC: 1.9 MG/DL (ref 1.6–2.6)
MCH RBC QN AUTO: 27.4 PG (ref 26.8–34.3)
MCHC RBC AUTO-ENTMCNC: 31.2 G/DL (ref 31.4–37.4)
MCV RBC AUTO: 88 FL (ref 82–98)
PLATELET # BLD AUTO: 198 THOUSANDS/UL (ref 149–390)
PMV BLD AUTO: 12.2 FL (ref 8.9–12.7)
POTASSIUM SERPL-SCNC: 3.9 MMOL/L (ref 3.5–5.3)
PROCALCITONIN SERPL-MCNC: <0.05 NG/ML
PROTHROMBIN TIME: 30.4 SECONDS (ref 11.6–14.5)
RBC # BLD AUTO: 4.12 MILLION/UL (ref 3.81–5.12)
SODIUM SERPL-SCNC: 138 MMOL/L (ref 135–147)
WBC # BLD AUTO: 3.22 THOUSAND/UL (ref 4.31–10.16)

## 2022-12-22 RX ORDER — WARFARIN SODIUM 2.5 MG/1
1.25 TABLET ORAL
Status: DISCONTINUED | OUTPATIENT
Start: 2022-12-22 | End: 2022-12-24 | Stop reason: HOSPADM

## 2022-12-22 RX ORDER — LIDOCAINE 50 MG/G
1 PATCH TOPICAL DAILY
Status: DISCONTINUED | OUTPATIENT
Start: 2022-12-23 | End: 2022-12-24 | Stop reason: HOSPADM

## 2022-12-22 RX ADMIN — DICLOFENAC SODIUM TOPICAL GEL, 1%, 2 G: 10 GEL TOPICAL at 21:27

## 2022-12-22 RX ADMIN — HEPARIN SODIUM 5000 UNITS: 5000 INJECTION INTRAVENOUS; SUBCUTANEOUS at 05:52

## 2022-12-22 RX ADMIN — MEMANTINE 10 MG: 10 TABLET ORAL at 08:02

## 2022-12-22 RX ADMIN — DEXAMETHASONE SODIUM PHOSPHATE 6 MG: 4 INJECTION, SOLUTION INTRAMUSCULAR; INTRAVENOUS at 19:57

## 2022-12-22 RX ADMIN — CARBIDOPA AND LEVODOPA 1.5 TABLET: 25; 100 TABLET ORAL at 21:27

## 2022-12-22 RX ADMIN — ENTACAPONE 200 MG: 200 TABLET ORAL at 21:27

## 2022-12-22 RX ADMIN — ENTACAPONE 200 MG: 200 TABLET ORAL at 17:22

## 2022-12-22 RX ADMIN — REMDESIVIR 100 MG: 100 INJECTION, POWDER, LYOPHILIZED, FOR SOLUTION INTRAVENOUS at 20:00

## 2022-12-22 RX ADMIN — ENTACAPONE 200 MG: 200 TABLET ORAL at 11:15

## 2022-12-22 RX ADMIN — DICLOFENAC SODIUM TOPICAL GEL, 1%, 2 G: 10 GEL TOPICAL at 08:02

## 2022-12-22 RX ADMIN — LEVOTHYROXINE SODIUM 50 MCG: 50 TABLET ORAL at 05:52

## 2022-12-22 RX ADMIN — HEPARIN SODIUM 5000 UNITS: 5000 INJECTION INTRAVENOUS; SUBCUTANEOUS at 13:35

## 2022-12-22 RX ADMIN — WARFARIN SODIUM 1.25 MG: 2.5 TABLET ORAL at 17:22

## 2022-12-22 RX ADMIN — DICLOFENAC SODIUM TOPICAL GEL, 1%, 2 G: 10 GEL TOPICAL at 11:15

## 2022-12-22 RX ADMIN — FUROSEMIDE 40 MG: 10 INJECTION, SOLUTION INTRAMUSCULAR; INTRAVENOUS at 08:01

## 2022-12-22 RX ADMIN — ENTACAPONE 200 MG: 200 TABLET ORAL at 08:02

## 2022-12-22 RX ADMIN — CARBIDOPA AND LEVODOPA 1.5 TABLET: 25; 100 TABLET ORAL at 11:15

## 2022-12-22 RX ADMIN — CARBIDOPA AND LEVODOPA 1.5 TABLET: 25; 100 TABLET ORAL at 08:01

## 2022-12-22 RX ADMIN — DICLOFENAC SODIUM TOPICAL GEL, 1%, 2 G: 10 GEL TOPICAL at 17:23

## 2022-12-22 RX ADMIN — HEPARIN SODIUM 5000 UNITS: 5000 INJECTION INTRAVENOUS; SUBCUTANEOUS at 21:27

## 2022-12-22 RX ADMIN — CARBIDOPA AND LEVODOPA 1.5 TABLET: 25; 100 TABLET ORAL at 17:22

## 2022-12-22 NOTE — PROGRESS NOTES
[address] 44  Progress Note - Mellissa Severino 1940, 79 y o  female MRN: [id number]  Unit/Bed#: [address] [place] [address] Encounter: [id number]  Primary Care Provider: Terri Sacks, MD   Date and time admitted to hospital: 12/21/2022 12:52 PM    * Chronic combined systolic and diastolic heart failure [facility])  Assessment & Plan  Wt Readings from Last 3 Encounters:   12/21/22 81 4 kg (179 lb 7 3 oz)   12/16/22 80 3 kg (177 lb)   11/03/22 80 3 kg (177 lb)     · Patient presented with shortness of breath that has been ongoing since November  · BNP noted to be > 6,000  · Chest x-ray showed Cardiomegaly, probable small bibasilar pleural effusions  · Echocardiogram of 4/11/2022 showed EF of 50 to 55% and normal MVR function  · ED gave Lasix IV 40 mg x 1 dose  · Will continue with Lasix IV 40 mg daily  · Daily weight, strict I&O  · Cardiac diet with 2 g sodium  · Fluid restriction 1500 mL      COVID-19  Assessment & Plan  · Patient presented with shortness of breath to the ED, with saturation in the 80s on arrival and was placed on 4L nasal cannula O2 with saturation in the 90s    Patient stated that in November she tested negative for COVID at home and she had a virtual appointment with her PCP who prescribed Tamiflu which she had completed  · Tested positive for COVID-19 today in ED  · Patient on 2 L O2 with saturation in the upper 90s, wean off as tolerated  · Follow mild COVID pathway as patient was noted with hypoxia  · Remdesivir 200 mg x 1 and 100 mg x 4 days  · Decadron x10 days  · Continue to monitor    H/O mitral valve replacement with mechanical valve  Assessment & Plan  · History of mechanical mitral valve replacement September,1990  · On warfarin 1 25mg Sun, Tues, Thurs and 2 5 MWF, Sat  · Target INR of 2 5-3 5  · INR on admission 3 60, warfarin held  · Therapeutic today, start 1 25mg daily given supratherapeutic on admission  · Daily INR    Atrial fibrillation ([address] ) [I48 91]  Assessment & Plan  POA  · Patient with chronic atrial fibrillation and EKG on admission revealed controlled A  fib  · Patient on Coumadin  · INR 3 6 on admission  · Therapeutic today, resume 1 25mg  · Continue to monitor    Knee pain  Assessment & Plan  · Patient complains of  Bilateral knee pain  On assessment,  right knee noted to be swollen and warm  · Right knee xray: Meniscal chondrocalcinosis  Questionable trace joint effusion  · Left knee xray: Meniscal chondrocalcinosis  Generalized swelling  · Pain management  · PT/OT eval pending    Parkinson's disease ([facility] [address] )  Assessment & Plan  · Continue Sinemet and entacapone    Hypothyroidism  Assessment & Plan  · Continue levothyroxine      VTE Pharmacologic Prophylaxis:   Moderate Risk (Score 3-4) - Pharmacological DVT Prophylaxis Ordered: warfarin (Coumadin)  Patient Centered Rounds: I performed bedside rounds with nursing staff today  Discussions with Specialists or Other Care Team Provider: nursing, case management    Education and Discussions with Family / Patient: Updated  () via phone  Time Spent for Care: 30 minutes  More than 50% of total time spent on counseling and coordination of care as described above  Current Length of Stay: 1 day(s)  Current Patient Status: Inpatient   Certification Statement: The patient will continue to require additional inpatient hospital stay due to IV lasix, improvement of respiratory status  Discharge Plan: Anticipate discharge in 24-48 hrs to discharge location to be determined pending rehab evaluations  Code Status: Level 1 - Full Code    Subjective:   Pt was seen and examined at bedside  Although her supplemental oxygen requirement has decreased, she reports she still feels just as short of breath as yesterday  She also states she has right knee pain that radiates down the back of her leg  She denies chest pain, fever, chills, palpitations, abdominal pain      Objective:     Vitals:   Temp (24hrs), Av 3 °F (36 8 °C), Min:97 7 °F (36 5 °C), Max:98 7 °F (37 1 °C)    Temp:  [97 7 °F (36 5 °C)-98 7 °F (37 1 °C)] 98 °F (36 7 °C)  HR:  [66-81] 77  Resp:  [16-25] 16  BP: (111-135)/(52-63) 114/52  SpO2:  [94 %-99 %] 95 %  Body mass index is 30 27 kg/m²  Input and Output Summary (last 24 hours): Intake/Output Summary (Last 24 hours) at 2022 1552  Last data filed at 2022 1135  Gross per 24 hour   Intake 260 ml   Output 3000 ml   Net -2740 ml       Physical Exam:   Physical Exam  Vitals and nursing note reviewed  Constitutional:       General: She is not in acute distress  Appearance: She is well-developed  HENT:      Head: Normocephalic and atraumatic  Eyes:      Conjunctiva/sclera: Conjunctivae normal    Cardiovascular:      Rate and Rhythm: Normal rate  Rhythm irregular  Heart sounds: No murmur heard  Pulmonary:      Effort: Pulmonary effort is normal  No respiratory distress  Breath sounds: Normal breath sounds  Abdominal:      Palpations: Abdomen is soft  Tenderness: There is no abdominal tenderness  Musculoskeletal:         General: No swelling  Cervical back: Neck supple  Skin:     General: Skin is warm and dry  Capillary Refill: Capillary refill takes less than 2 seconds  Neurological:      General: No focal deficit present  Mental Status: She is alert and oriented to person, place, and time  Psychiatric:         Mood and Affect: Mood normal          Behavior: Behavior normal           Additional Data:     Labs:  Results from last 7 days   Lab Units 22  0601 22  1746 22  1314   WBC Thousand/uL 3 22*  --  6 50   HEMOGLOBIN g/dL 11 3*  --  11 2*   HEMATOCRIT % 36 2  --  36 0   PLATELETS Thousands/uL 198   < > 195   NEUTROS PCT %  --   --  71   LYMPHS PCT %  --   --  14   MONOS PCT %  --   --  12   EOS PCT %  --   --  1    < > = values in this interval not displayed       Results from last 7 days   Lab Units 22  0601 12/21/22  1314   SODIUM mmol/L 138 143   POTASSIUM mmol/L 3 9 4 5   CHLORIDE mmol/L 104 107   CO2 mmol/L 27 28   BUN mg/dL 14 16   CREATININE mg/dL 0 92 0 99   ANION GAP mmol/L 7 8   CALCIUM mg/dL 9 4 9 9   ALBUMIN g/dL  --  3 4*   TOTAL BILIRUBIN mg/dL  --  1 58*   ALK PHOS U/L  --  82   ALT U/L  --  7*   AST U/L  --  23   GLUCOSE RANDOM mg/dL 151* 105     Results from last 7 days   Lab Units 12/22/22  0601   INR  2 91*             Results from last 7 days   Lab Units 12/22/22  0601   PROCALCITONIN ng/ml <0 05       Lines/Drains:  Invasive Devices     Peripheral Intravenous Line  Duration           Peripheral IV 12/21/22 Right Antecubital 1 day          Drain  Duration           External Urinary Catheter <1 day                  Telemetry:  Telemetry Orders (From admission, onward)             48 Hour Telemetry Monitoring  (ED Bridging Orders Panel)  Continuous x 48 hours        References:    Telemetry Guidelines   Question:  Reason for 48 Hour Telemetry  Answer:  Acute Decompensated CHF (continuous diuretic infusion or total diuretic dose > 200 mg daily, associated electrolyte derangement, ionotropic drip, history of ventricular arrhythmia, or new EF <35%)                 Telemetry Reviewed: Atrial fibrillation   HR averaging 70-80  Indication for Continued Telemetry Use: Arrthymias requiring medical therapy              Imaging: Personally reviewed the following imaging: xray(s)    Recent Cultures (last 7 days):         Last 24 Hours Medication List:   Current Facility-Administered Medications   Medication Dose Route Frequency Provider Last Rate   • acetaminophen  650 mg Oral Q6H PRN STACY Michaels     • carbidopa-levodopa  1 5 tablet Oral 4x Daily Olayide D STACY Rose     • dexamethasone  6 mg Intravenous Q24H Olayide D STACY Rose     • Diclofenac Sodium  2 g Topical 4x Daily Olayide D STACY Rose     • entacapone  200 mg Oral 4x Daily Olayide D STACY Rose     • furosemide  40 mg Intravenous Daily STACY Sagastume     • heparin (porcine)  5,000 Units Subcutaneous [facility] STACY Sagastume     • levothyroxine  50 mcg Oral Early Morning STACY Sagastume     • memantine  10 mg Oral Daily STACY Oreilly     • ondansetron  4 mg Intravenous Q6H PRN STACY Sagastume     • oxyCODONE  2 5 mg Oral Q6H PRN STACY Sagastume     • remdesivir  100 mg Intravenous Q24H STACY Sagastume     • warfarin  1 25 mg Oral Daily (warfarin) Bertrand Lange PA-C          Today, Patient Was Seen By: Bertrand Lange PA-C    **Please Note: This note may have been constructed using a voice recognition system  **

## 2022-12-22 NOTE — PLAN OF CARE
Problem: Potential for Falls  Goal: Patient will remain free of falls  Description: INTERVENTIONS:  - Educate patient/family on patient safety including physical limitations  - Instruct patient to call for assistance with activity   - Consult OT/PT to assist with strengthening/mobility   - Keep Call bell within reach  - Keep bed low and locked with side rails adjusted as appropriate  - Keep care items and personal belongings within reach  - Initiate and maintain comfort rounds  - Make Fall Risk Sign visible to staff  - Offer Toileting every 2 Hours, in advance of need  - Initiate/Maintain bed alarm  - Obtain necessary fall risk management equipment: rolling walker  - Apply yellow socks and bracelet for high fall risk patients  - Consider moving patient to room near nurses station  Outcome: Progressing     Problem: MOBILITY - ADULT  Goal: Maintain or return to baseline ADL function  Description: INTERVENTIONS:  -  Assess patient's ability to carry out ADLs; assess patient's baseline for ADL function and identify physical deficits which impact ability to perform ADLs (bathing, care of mouth/teeth, toileting, grooming, dressing, etc )  - Assess/evaluate cause of self-care deficits   - Assess range of motion  - Assess patient's mobility; develop plan if impaired  - Assess patient's need for assistive devices and provide as appropriate  - Encourage maximum independence but intervene and supervise when necessary  - Involve family in performance of ADLs  - Assess for home care needs following discharge   - Consider OT consult to assist with ADL evaluation and planning for discharge  - Provide patient education as appropriate  Outcome: Progressing  Goal: Maintains/Returns to pre admission functional level  Description: INTERVENTIONS:  - Perform BMAT or MOVE assessment daily    - Set and communicate daily mobility goal to care team and patient/family/caregiver     - Collaborate with rehabilitation services on mobility goals if consulted  - Perform Range of Motion 3 times a day  - Reposition patient every 2 hours    - Dangle patient 3 times a day  - Out of bed to chair 3 times a day   - Out of bed for meals 3 times a day  - Out of bed for toileting  - Record patient progress and toleration of activity level   Outcome: Progressing     Problem: PAIN - ADULT  Goal: Verbalizes/displays adequate comfort level or baseline comfort level  Description: Interventions:  - Encourage patient to monitor pain and request assistance  - Assess pain using appropriate pain scale  - Administer analgesics based on type and severity of pain and evaluate response  - Implement non-pharmacological measures as appropriate and evaluate response  - Consider cultural and social influences on pain and pain management  - Notify physician/advanced practitioner if interventions unsuccessful or patient reports new pain  Outcome: Progressing     Problem: INFECTION - ADULT  Goal: Absence or prevention of progression during hospitalization  Description: INTERVENTIONS:  - Assess and monitor for signs and symptoms of infection  - Monitor lab/diagnostic results  - Monitor all insertion sites, i e  indwelling lines, tubes, and drains  - Monitor endotracheal if appropriate and nasal secretions for changes in amount and color  - [place] appropriate cooling/warming therapies per order  - Administer medications as ordered  - Instruct and encourage patient and family to use good hand hygiene technique  - Identify and instruct in appropriate isolation precautions for identified infection/condition  Outcome: Progressing  Goal: Absence of fever/infection during neutropenic period  Description: INTERVENTIONS:  - Monitor WBC    Outcome: Progressing     Problem: SAFETY ADULT  Goal: Patient will remain free of falls  Description: INTERVENTIONS:  - Educate patient/family on patient safety including physical limitations  - Instruct patient to call for assistance with activity   - Consult OT/PT to assist with strengthening/mobility   - Keep Call bell within reach  - Keep bed low and locked with side rails adjusted as appropriate  - Keep care items and personal belongings within reach  - Initiate and maintain comfort rounds  - Make Fall Risk Sign visible to staff  - Offer Toileting every 2 Hours, in advance of need  - Initiate/Maintain bed alarm  - Obtain necessary fall risk management equipment: rolling walker  - Apply yellow socks and bracelet for high fall risk patients  - Consider moving patient to room near nurses station  Outcome: Progressing  Goal: Maintain or return to baseline ADL function  Description: INTERVENTIONS:  -  Assess patient's ability to carry out ADLs; assess patient's baseline for ADL function and identify physical deficits which impact ability to perform ADLs (bathing, care of mouth/teeth, toileting, grooming, dressing, etc )  - Assess/evaluate cause of self-care deficits   - Assess range of motion  - Assess patient's mobility; develop plan if impaired  - Assess patient's need for assistive devices and provide as appropriate  - Encourage maximum independence but intervene and supervise when necessary  - Involve family in performance of ADLs  - Assess for home care needs following discharge   - Consider OT consult to assist with ADL evaluation and planning for discharge  - Provide patient education as appropriate  Outcome: Progressing  Goal: Maintains/Returns to pre admission functional level  Description: INTERVENTIONS:  - Perform BMAT or MOVE assessment daily    - Set and communicate daily mobility goal to care team and patient/family/caregiver  - Collaborate with rehabilitation services on mobility goals if consulted  - Perform Range of Motion 3 times a day  - Reposition patient every 2 hours    - Dangle patient 3 times a day  - Out of bed to chair 3 times a day   - Out of bed for meals 3 times a day  - Out of bed for toileting  - Record patient progress and toleration of activity level   Outcome: Progressing     Problem: DISCHARGE PLANNING  Goal: Discharge to home or other facility with appropriate resources  Description: INTERVENTIONS:  - Identify barriers to discharge w/patient and caregiver  - Arrange for needed discharge resources and transportation as appropriate  - Identify discharge learning needs (meds, wound care, etc )  - Arrange for interpretive services to assist at discharge as needed  - Refer to Case Management Department for coordinating discharge planning if the patient needs post-hospital services based on physician/advanced practitioner order or complex needs related to functional status, cognitive ability, or social support system  Outcome: Progressing     Problem: Knowledge Deficit  Goal: Patient/family/caregiver demonstrates understanding of disease process, treatment plan, medications, and discharge instructions  Description: Complete learning assessment and assess knowledge base    Interventions:  - Provide teaching at level of understanding  - Provide teaching via preferred learning methods  Outcome: Progressing

## 2022-12-22 NOTE — ASSESSMENT & PLAN NOTE
· Patient complains of  Bilateral knee pain  On assessment,  right knee noted to be swollen and warm  · Right knee xray: Meniscal chondrocalcinosis  Questionable trace joint effusion  · Left knee xray: Meniscal chondrocalcinosis    Generalized swelling  · Pain management  · PT/OT eval pending

## 2022-12-22 NOTE — CASE MANAGEMENT
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 [address] ) 2021   • Left bundle branch block (LBBB) 2021   • Asymptomatic PVCs 2021   • On continuous oral anticoagulation 2021   • Mixed dyslipidemia 2021   • High risk medication use 2021   • Other microscopic hematuria 2018   • Numbness and tingling in right hand    • Suprapatellar bursitis of right knee 2018   • H/O mitral valve replacement with mechanical valve 2018   • Atrial fibrillation ([facility] [address] ) [I48 91] 2018   • Generalized osteoarthritis 2017   • Chronic right-sided low back pain without sciatica 10/23/2017   • Chronic combined systolic and diastolic heart failure ([address] ) 2017   • Memory impairment 02/15/2017   • Seasonal allergies 2016   • Vitamin D insufficiency 2015   • Transient ischemic attack 2014   • Parkinson's disease ([address] ) 2013   • Hypothyroidism 2013   • Peripheral vascular disease ([address] ) 2012   • Dupuytren's contracture 2012   • Mitral valve disorder 2012      LOS (days): 1  Geometric Mean LOS (GMLOS) (days): 5 20  Days to GMLOS:4 1     OBJECTIVE:  Risk of Unplanned Readmission Score: 12 92       Current admission status: Inpatient   Preferred Pharmacy:   [facility] [phone] [address]  Phone: [phone] Fax: [phone]    [facility] [address] [place] [address], [address] [place] [address] [address] [facility] [address]  Phone: [phone] Fax: [phone]    Primary Care Provider: Geremias Payne MD    Primary Insurance: MEDICARE  Secondary Insurance: BLUE CROSS    DISCHARGE DETAILS:    Discharge planning discussed with[de-identified] patient  Freedom of Choice: Yes  Comments - Freedom of Choice: cm spoke with patient this afternoon to discuss therapy recs for str  Other Referral/Resources/Interventions Provided:  Referral Comments: CM called patient on her cell phone, introduced self, role, and purpose of phone call, discussed therapy recs for STR, patient stated she needs to discuss this with her  before making a decision and she also asked about hhc  Informed her about referrals being sent for hhc, and also reminded about therapy recs for STR  CM informed patient that CM will call her back tomm morning, meanwhile she can have a discussion with her family about it  Patient agreeable

## 2022-12-22 NOTE — QUICK NOTE
71-year-old female with past medical's of CHF, atrial fibrillation, MVR, dyslipidemia, asthma, GERD, Parkinson's disease who presented emergency room complaining of shortness of breath for the past 1 month  Patient also reports leg swelling which has been ongoing for the past few days  Patient also complains of significant right leg pain and difficulty walking on the same  Patient otherwise denies any chest pain, abdominal pain, nausea or vomiting  In the ED patient was tachypneic and patient currently on 4 L oxygen with O2 saturation in high 90s  Labs showed a proBNP of 6600, INR of 3 6 and patient also tested positive for COVID  Chest x-ray showed cardiomegaly with probable small bibasilar pleural effusions    Physical examination:  Cardiovascular-S1-S2 heard irregularly irregular  Respiratory-clear to auscultation bilaterally  Abdomen-positive bowel sounds soft nondistended nontender  Extremities-bilateral +2 pedal edema with some calf tenderness in the right leg  There is patchy area of redness on the left leg but has no warmth or tenderness    Assessment and plan:  1  Acute on chronic combined systolic and diastolic heart failure-patient presented with worsening shortness of breath and currently on 4 L oxygen  proBNP was 6000  Chest x-ray showed small bilateral pleural effusions with some pedal edema  Patient was given a dose of Lasix 40 mg in the ED  Continue Lasix 40 mg IV daily  Monitor strict I&O Daily weight  Continue fluid restriction  2  COVID-19 infection-patient's O2 saturation was in 80s on arrival to the ED  Check CRP and D-dimer  We will follow mild pathway  Patient will be started on remdesivir and Decadron  3  Left leg pain-check knee x-ray and lower extremity venous Dopplers  INR was therapeutic so low likelihood of DVT  4  History of chronic atrial fibrillation-heart rate is currently controlled  Hold Coumadin as INR is supratherapeutic    5  History of Parkinson's disease-continue Sinemet

## 2022-12-22 NOTE — ASSESSMENT & PLAN NOTE
POA  · Patient with chronic atrial fibrillation and EKG on admission revealed controlled A  fib  · Patient on Coumadin  · INR 3 6 on admission  · Therapeutic today, resume 1 25mg  · Continue to monitor

## 2022-12-22 NOTE — ASSESSMENT & PLAN NOTE
· History of mechanical mitral valve replacement September,1990  · On warfarin 1 25mg Sun, Tues, Thurs and 2 5 MWF, Sat  · Target INR of 2 5-3 5  · INR on admission 3 60, warfarin held  · Therapeutic today, start 1 25mg daily given supratherapeutic on admission  · Daily INR

## 2022-12-22 NOTE — PLAN OF CARE
Problem: Potential for Falls  Goal: Patient will remain free of falls  Description: INTERVENTIONS:  - Educate patient/family on patient safety including physical limitations  - Instruct patient to call for assistance with activity   - Consult OT/PT to assist with strengthening/mobility   - Keep Call bell within reach  - Keep bed low and locked with side rails adjusted as appropriate  - Keep care items and personal belongings within reach  - Initiate and maintain comfort rounds  - Make Fall Risk Sign visible to staff  - Offer Toileting every 2 Hours, in advance of need  - Initiate/Maintain bed alarm  - Obtain necessary fall risk management equipment: rolling walker  - Apply yellow socks and bracelet for high fall risk patients  - Consider moving patient to room near nurses station  Outcome: Progressing     Problem: MOBILITY - ADULT  Goal: Maintain or return to baseline ADL function  Description: INTERVENTIONS:  -  Assess patient's ability to carry out ADLs; assess patient's baseline for ADL function and identify physical deficits which impact ability to perform ADLs (bathing, care of mouth/teeth, toileting, grooming, dressing, etc )  - Assess/evaluate cause of self-care deficits   - Assess range of motion  - Assess patient's mobility; develop plan if impaired  - Assess patient's need for assistive devices and provide as appropriate  - Encourage maximum independence but intervene and supervise when necessary  - Involve family in performance of ADLs  - Assess for home care needs following discharge   - Consider OT consult to assist with ADL evaluation and planning for discharge  - Provide patient education as appropriate  Outcome: Progressing  Goal: Maintains/Returns to pre admission functional level  Description: INTERVENTIONS:  - Perform BMAT or MOVE assessment daily    - Set and communicate daily mobility goal to care team and patient/family/caregiver     - Collaborate with rehabilitation services on mobility goals if consulted  - Perform Range of Motion 3 times a day  - Reposition patient every 2 hours    - Dangle patient 3 times a day  - Out of bed to chair 3 times a day   - Out of bed for meals 3 times a day  - Out of bed for toileting  - Record patient progress and toleration of activity level   Outcome: Progressing     Problem: PAIN - ADULT  Goal: Verbalizes/displays adequate comfort level or baseline comfort level  Description: Interventions:  - Encourage patient to monitor pain and request assistance  - Assess pain using appropriate pain scale  - Administer analgesics based on type and severity of pain and evaluate response  - Implement non-pharmacological measures as appropriate and evaluate response  - Consider cultural and social influences on pain and pain management  - Notify physician/advanced practitioner if interventions unsuccessful or patient reports new pain  Outcome: Progressing     Problem: INFECTION - ADULT  Goal: Absence or prevention of progression during hospitalization  Description: INTERVENTIONS:  - Assess and monitor for signs and symptoms of infection  - Monitor lab/diagnostic results  - Monitor all insertion sites, i e  indwelling lines, tubes, and drains  - Monitor endotracheal if appropriate and nasal secretions for changes in amount and color  - [place] appropriate cooling/warming therapies per order  - Administer medications as ordered  - Instruct and encourage patient and family to use good hand hygiene technique  - Identify and instruct in appropriate isolation precautions for identified infection/condition  Outcome: Progressing  Goal: Absence of fever/infection during neutropenic period  Description: INTERVENTIONS:  - Monitor WBC    Outcome: Progressing     Problem: SAFETY ADULT  Goal: Patient will remain free of falls  Description: INTERVENTIONS:  - Educate patient/family on patient safety including physical limitations  - Instruct patient to call for assistance with activity   - Consult OT/PT to assist with strengthening/mobility   - Keep Call bell within reach  - Keep bed low and locked with side rails adjusted as appropriate  - Keep care items and personal belongings within reach  - Initiate and maintain comfort rounds  - Make Fall Risk Sign visible to staff  - Offer Toileting every 2 Hours, in advance of need  - Initiate/Maintain bed alarm  - Obtain necessary fall risk management equipment: rolling walker  - Apply yellow socks and bracelet for high fall risk patients  - Consider moving patient to room near nurses station  Outcome: Progressing  Goal: Maintain or return to baseline ADL function  Description: INTERVENTIONS:  -  Assess patient's ability to carry out ADLs; assess patient's baseline for ADL function and identify physical deficits which impact ability to perform ADLs (bathing, care of mouth/teeth, toileting, grooming, dressing, etc )  - Assess/evaluate cause of self-care deficits   - Assess range of motion  - Assess patient's mobility; develop plan if impaired  - Assess patient's need for assistive devices and provide as appropriate  - Encourage maximum independence but intervene and supervise when necessary  - Involve family in performance of ADLs  - Assess for home care needs following discharge   - Consider OT consult to assist with ADL evaluation and planning for discharge  - Provide patient education as appropriate  Outcome: Progressing  Goal: Maintains/Returns to pre admission functional level  Description: INTERVENTIONS:  - Perform BMAT or MOVE assessment daily    - Set and communicate daily mobility goal to care team and patient/family/caregiver  - Collaborate with rehabilitation services on mobility goals if consulted  - Perform Range of Motion 3 times a day  - Reposition patient every 2 hours    - Dangle patient 3 times a day  - Out of bed to chair 3 times a day   - Out of bed for meals 3 times a day  - Out of bed for toileting  - Record patient progress and toleration of activity level   Outcome: Progressing     Problem: DISCHARGE PLANNING  Goal: Discharge to home or other facility with appropriate resources  Description: INTERVENTIONS:  - Identify barriers to discharge w/patient and caregiver  - Arrange for needed discharge resources and transportation as appropriate  - Identify discharge learning needs (meds, wound care, etc )  - Arrange for interpretive services to assist at discharge as needed  - Refer to Case Management Department for coordinating discharge planning if the patient needs post-hospital services based on physician/advanced practitioner order or complex needs related to functional status, cognitive ability, or social support system  Outcome: Progressing     Problem: Knowledge Deficit  Goal: Patient/family/caregiver demonstrates understanding of disease process, treatment plan, medications, and discharge instructions  Description: Complete learning assessment and assess knowledge base    Interventions:  - Provide teaching at level of understanding  - Provide teaching via preferred learning methods  Outcome: Progressing     Problem: Prexisting or High Potential for Compromised Skin Integrity  Goal: Skin integrity is maintained or improved  Description: INTERVENTIONS:  - Identify patients at risk for skin breakdown  - Assess and monitor skin integrity  - Assess and monitor nutrition and hydration status  - Monitor labs   - Assess for incontinence   - Turn and reposition patient  - Assist with mobility/ambulation  - Relieve pressure over bony prominences  - Avoid friction and shearing  - Provide appropriate hygiene as needed including keeping skin clean and dry  - Evaluate need for skin moisturizer/barrier cream  - Collaborate with interdisciplinary team   - Patient/family teaching  - Consider wound care consult   Outcome: Progressing

## 2022-12-22 NOTE — ED PROVIDER NOTES
History  Chief Complaint   Patient presents with   • Shortness of Breath     C/o some shortness of breath and leg swelling over past 4-5 days  Also c/o R leg pain when standing, hx of chf     Patient presents for evaluation of increasing shortness of breath and leg swelling over the last 4 to 5 days  She also has increased pain in the right leg when standing  Patient has a history of congestive heart failure  States her and her  recently got over what she thinks was RSV last week and then afterwards she started feeling this way  Denies any chest pain  History provided by:  Patient   used: No    Shortness of Breath  Associated symptoms: cough    Associated symptoms: no abdominal pain, no chest pain, no ear pain, no fever, no rash, no sore throat and no vomiting        Prior to Admission Medications   Prescriptions Last Dose Informant Patient Reported? Taking?    Cholecalciferol (VITAMIN D3) 2000 units TABS   Yes No   Sig: Take 2,000 Units by mouth every morning   Diclofenac Sodium (VOLTAREN) 1 %   No No   Sig: Apply 2 g topically 4 (four) times a day as needed (Foot pain) for up to 15 days   Mirabegron ER 50 MG TB24   No No   Sig: Take 1 tablet (50 mg total) by mouth daily   Patient not taking: Reported on 12/6/2022   Multiple Vitamins-Minerals (OCUVITE ADULT 50+ PO)   Yes No   Sig: Take by mouth daily with breakfast   acetaminophen (TYLENOL) 650 mg CR tablet   No No   Sig: Take 1 tablet (650 mg total) by mouth 3 (three) times a day   Patient taking differently: Take 650 mg by mouth as needed   alendronate (FOSAMAX) 70 mg tablet   No No   Sig: take 1 tablet by mouth EVERY 7 DAYS   carbidopa-levodopa (SINEMET)  mg per tablet   No No   Sig: TAKE 1 AND 1/2 TABLETS BY MOUTH 4 TIMES A DAY   Patient taking differently: Take 1 tablet by mouth 4 (four) times a day TAKE 1 AND 1/2 TABLETS IN THE MORNING 1 TABLET AT LATE MORNING 1 1/2 MID AFTER NOON  1 TABLET IN THE EVENING   entacapone (COMTAN) 200 mg tablet   No No   Sig: take 1 tablet by mouth four times a day   furosemide (LASIX) 20 mg tablet   No No   Sig: Take 1/2 tablet by mouth  4 days per week  Schedule administration to be done when patient can be near toilet facilities for 4-6 hours     levothyroxine 50 mcg tablet   No No   Sig: take 1 tablet by mouth every morning   memantine (NAMENDA) 10 mg tablet   No No   Sig: take 1 tablet by mouth once daily   montelukast (SINGULAIR) 10 mg tablet   No No   Sig: Take 1 tablet (10 mg total) by mouth daily at bedtime   Patient not taking: Reported on 2022   polyvinyl alcohol (LIQUIFILM TEARS) 1 4 % ophthalmic solution   Yes Yes   Si drop as needed for dry eyes   warfarin (COUMADIN) 2 5 mg tablet   No No   Sig: TAKE 1/2 TO 1 TABLET BY MOUTH DAILY OR AS DIRECTED BY PHYSICIAN      Facility-Administered Medications: None       Past Medical History:   Diagnosis Date   • Anal fissure    • Arthritis     osteo joints   • Asthma with acute exacerbation    • Blepharitis    • Breast lump    • Chalazion    • CHF, chronic (HCC)    • Difficulty walking    • Disease of thyroid gland    • Drooling    • Dysphagia    • Fall 2018   • Fibromyalgia, primary    • Glaucoma     Normal pressure   • History of transfusion    • Hordeolum externum    • Hx of transient ischemic attack (TIA)    • Hypophonia    • Infectious viral hepatitis     Hep C dx     • Lyme carditis    • Murmur, cardiac    • Parkinsons (HCC)    • Rotator cuff tendinitis    • Seasonal allergies    • Urinary frequency        Past Surgical History:   Procedure Laterality Date   • BREAST BIOPSY Right 2018    benign   • BREAST CYST EXCISION Left     benign   • BREAST SURGERY N/A     benign, calcium   • CARDIAC SURGERY      mitral valve replacement   • CATARACT EXTRACTION Right    • CATARACT EXTRACTION Left    • CHEST TUBE INSERTION Right     post pleural effusion   • CHOLECYSTECTOMY      lap   • HYSTERECTOMY  1973    partial • SC ARTHROCENTESIS ASPIR&/INJ MAJOR JT/BURSA W/O US Left 3/19/2021    Procedure: Sacroiliac Joint Injection ( 48956 ); Surgeon: Cal Lundberg MD;  Location: [facility] MAIN OR;  Service: Pain Management    • SC COLSC FLX W/RMVL OF TUMOR POLYP LESION SNARE TQ N/A 2017    Procedure: COLONOSCOPY and biopsy ;  Surgeon: Adilene Padgett MD;  Location: [facility] GI LAB; Service: Gastroenterology   • SKIN BIOPSY      pre cancerous on face   • TONSILLECTOMY     • US GUIDED BREAST BIOPSY RIGHT COMPLETE Right 2018       Family History   Problem Relation Age of Onset   • Heart disease Mother         murmur Cardiomegaly age 64   • Pneumonia Father    • Heart disease Brother         mitrsl valve   • Parkinsonism Brother    • Arthritis Brother    • Lupus Daughter    • Diabetes Daughter    • Depression Daughter      I have reviewed and agree with the history as documented  E-Cigarette/Vaping   • E-Cigarette Use Never User      E-Cigarette/Vaping Substances   • Nicotine No    • THC No    • CBD No    • Flavoring No    • Other No    • Unknown No      Social History     Tobacco Use   • Smoking status: Former     Packs/day: 0 10     Years: 10 00     Pack years: 1 00     Types: Cigarettes     Quit date: 1970     Years since quittin 0   • Smokeless tobacco: Never   Vaping Use   • Vaping Use: Never used   Substance Use Topics   • Alcohol use: Not Currently   • Drug use: No       Review of Systems   Constitutional: Negative for chills and fever  HENT: Positive for congestion  Negative for ear pain and sore throat  Eyes: Negative for pain and visual disturbance  Respiratory: Positive for cough and shortness of breath  Cardiovascular: Positive for leg swelling  Negative for chest pain and palpitations  Gastrointestinal: Negative for abdominal pain and vomiting  Genitourinary: Negative for dysuria and hematuria  Musculoskeletal: Negative for arthralgias and back pain     Skin: Negative for color change and rash  Neurological: Negative for seizures and syncope  All other systems reviewed and are negative  Physical Exam  Physical Exam  Vitals and nursing note reviewed  Constitutional:       General: She is in acute distress  HENT:      Head: Atraumatic  Right Ear: External ear normal       Left Ear: External ear normal       Nose: Nose normal       Mouth/Throat:      Mouth: Mucous membranes are moist       Pharynx: Oropharynx is clear  Eyes:      General: No scleral icterus  Conjunctiva/sclera: Conjunctivae normal    Cardiovascular:      Rate and Rhythm: Normal rate and regular rhythm  Pulses: Normal pulses  Pulmonary:      Effort: Respiratory distress present  Breath sounds: Rales present  Comments: Mild respiratory distress with bibasilar rales  Abdominal:      General: Abdomen is flat  Bowel sounds are normal  There is no distension  Palpations: Abdomen is soft  Tenderness: There is no abdominal tenderness  There is no guarding or rebound  Musculoskeletal:         General: No deformity  Normal range of motion  Right lower leg: Edema present  Left lower leg: Edema present  Skin:     Capillary Refill: Capillary refill takes less than 2 seconds  Findings: No rash  Neurological:      General: No focal deficit present  Mental Status: She is alert and oriented to person, place, and time           Vital Signs  ED Triage Vitals [12/21/22 1247]   Temperature Pulse Respirations Blood Pressure SpO2   98 °F (36 7 °C) 86 (!) 24 133/75 98 %      Temp Source Heart Rate Source Patient Position - Orthostatic VS BP Location FiO2 (%)   Tympanic Monitor Sitting Right arm --      Pain Score       No Pain           Vitals:    12/21/22 2214 12/22/22 0300 12/22/22 0727 12/22/22 1122   BP: 111/55 111/63 135/63 115/56   Pulse: 76 75 81 70   Patient Position - Orthostatic VS: Lying Lying Sitting Sitting         Visual Acuity      ED Medications  Medications carbidopa-levodopa (SINEMET)  mg per tablet 1 5 tablet (1 5 tablets Oral Given 12/22/22 1115)   entacapone (COMTAN) tablet 200 mg (200 mg Oral Given 12/22/22 1115)   levothyroxine tablet 50 mcg (50 mcg Oral Given 12/22/22 0552)   memantine (NAMENDA) tablet 10 mg (10 mg Oral Given 12/22/22 0802)   acetaminophen (TYLENOL) tablet 650 mg (has no administration in time range)   ondansetron (ZOFRAN) injection 4 mg (has no administration in time range)   heparin (porcine) subcutaneous injection 5,000 Units (5,000 Units Subcutaneous Given 12/22/22 0552)   furosemide (LASIX) injection 40 mg (40 mg Intravenous Given 12/22/22 0801)   oxyCODONE (ROXICODONE) IR tablet 2 5 mg (has no administration in time range)   remdesivir (Veklury) 200 mg in sodium chloride 0 9 % 290 mL IVPB (200 mg Intravenous Given 12/21/22 2227)     Followed by   remdesivir Debbi Atif) 100 mg in sodium chloride 0 9 % 270 mL IVPB (has no administration in time range)   dexamethasone (DECADRON) injection 6 mg (6 mg Intravenous Given 12/21/22 2134)   Diclofenac Sodium (VOLTAREN) 1 % topical gel 2 g (2 g Topical Given 12/22/22 1115)   furosemide (LASIX) injection 40 mg (40 mg Intravenous Given 12/21/22 1413)       Diagnostic Studies  Results Reviewed     Procedure Component Value Units Date/Time    HS Troponin I 4hr [[id number]]  (Normal) Collected: 12/21/22 1746    Lab Status: Final result Specimen: Blood from Hand, Right Updated: 12/21/22 1826     hs TnI 4hr 35 ng/L      Delta 4hr hsTnI 4 ng/L     HS Troponin I 2hr [[id number]]  (Normal) Collected: 12/21/22 1513    Lab Status: Final result Specimen: Blood from Arm, Right Updated: 12/21/22 1543     hs TnI 2hr 31 ng/L      Delta 2hr hsTnI 0 ng/L     FLU/RSV/COVID - if FLU/RSV clinically relevant [[id number]]  (Abnormal) Collected: 12/21/22 1314    Lab Status: Final result Specimen: Nares from Nose Updated: 12/21/22 1424     SARS-CoV-2 Positive     INFLUENZA A PCR Negative     INFLUENZA B PCR Negative RSV PCR Negative    Narrative:      FOR PEDIATRIC PATIENTS - copy/paste COVID Guidelines URL to browser: [email or web address]    SARS-CoV-2 assay is a Nucleic Acid Amplification assay intended for the  qualitative detection of nucleic acid from SARS-CoV-2 in nasopharyngeal  swabs  Results are for the presumptive identification of SARS-CoV-2 RNA  Positive results are indicative of infection with SARS-CoV-2, the virus  causing COVID-19, but do not rule out bacterial infection or co-infection  with other viruses  Laboratories within the [place] and its  territories are required to report all positive results to the appropriate  public health authorities  Negative results do not preclude SARS-CoV-2  infection and should not be used as the sole basis for treatment or other  patient management decisions  Negative results must be combined with  clinical observations, patient history, and epidemiological information  This test has not been FDA cleared or approved  This test has been authorized by FDA under an Emergency Use Authorization  (EUA)  This test is only authorized for the duration of time the  declaration that circumstances exist justifying the authorization of the  emergency use of an in vitro diagnostic tests for detection of SARS-CoV-2  virus and/or diagnosis of COVID-19 infection under section 564(b)(1) of  the Act, 21 U  S C  478XNS-5(X)(4), unless the authorization is terminated  or revoked sooner  The test has been validated but independent review by FDA  and CLIA is pending  Test performed using [employer] GeneXpert: This RT-PCR assay targets N2,  a region unique to SARS-CoV-2  A conserved region in the E-gene was chosen  for pan-Sarbecovirus detection which includes SARS-CoV-2  According to CMS-2020-01-R, this platform meets the definition of high-throughput technology      HS Troponin 0hr (reflex protocol) [[id number]]  (Normal) Collected: 12/21/22 1314    Lab Status: Final result Specimen: Blood from Arm, Right Updated: 12/21/22 1415     hs TnI 0hr 31 ng/L     NT-BNP PRO [[id number]]  (Abnormal) Collected: 12/21/22 1314    Lab Status: Final result Specimen: Blood from Arm, Right Updated: 12/21/22 1413     NT-proBNP 6,674 pg/mL     Magnesium [[id number]]  (Normal) Collected: 12/21/22 1314    Lab Status: Final result Specimen: Blood from Arm, Right Updated: 12/21/22 1413     Magnesium 2 0 mg/dL     Comprehensive metabolic panel [[id number]]  (Abnormal) Collected: 12/21/22 1314    Lab Status: Final result Specimen: Blood from Arm, Right Updated: 12/21/22 1407     Sodium 143 mmol/L      Potassium 4 5 mmol/L      Chloride 107 mmol/L      CO2 28 mmol/L      ANION GAP 8 mmol/L      BUN 16 mg/dL      Creatinine 0 99 mg/dL      Glucose 105 mg/dL      Calcium 9 9 mg/dL      Corrected Calcium 10 4 mg/dL      AST 23 U/L      ALT 7 U/L      Alkaline Phosphatase 82 U/L      Total Protein 6 3 g/dL      Albumin 3 4 g/dL      Total Bilirubin 1 58 mg/dL      eGFR 52 ml/min/1 73sq m     Narrative:      [place] guidelines for Chronic Kidney Disease (CKD):   •  Stage 1 with normal or high GFR (GFR > 90 mL/min/1 73 square meters)  •  Stage 2 Mild CKD (GFR = 60-89 mL/min/1 73 square meters)  •  Stage 3A Moderate CKD (GFR = 45-59 mL/min/1 73 square meters)  •  Stage 3B Moderate CKD (GFR = 30-44 mL/min/1 73 square meters)  •  Stage 4 Severe CKD (GFR = 15-29 mL/min/1 73 square meters)  •  Stage 5 End Stage CKD (GFR <15 mL/min/1 73 square meters)  Note: GFR calculation is accurate only with a steady state creatinine    Protime-INR [[id number]]  (Abnormal) Collected: 12/21/22 1314    Lab Status: Final result Specimen: Blood from Arm, Right Updated: 12/21/22 1405     Protime 35 9 seconds      INR 3 60    APTT [[id number]]  (Abnormal) Collected: 12/21/22 1314    Lab Status: Final result Specimen: Blood from Arm, Right Updated: 12/21/22 1405     PTT 39 seconds     CBC and differential [[id number]]  (Abnormal) Collected: 12/21/22 1314    Lab Status: Final result Specimen: Blood from Arm, Right Updated: 12/21/22 1342     WBC 6 50 Thousand/uL      RBC 4 04 Million/uL      Hemoglobin 11 2 g/dL      Hematocrit 36 0 %      MCV 89 fL      MCH 27 7 pg      MCHC 31 1 g/dL      RDW 16 0 %      MPV 12 0 fL      Platelets 708 Thousands/uL      nRBC 0 /100 WBCs      Neutrophils Relative 71 %      Immat GRANS % 1 %      Lymphocytes Relative 14 %      Monocytes Relative 12 %      Eosinophils Relative 1 %      Basophils Relative 1 %      Neutrophils Absolute 4 70 Thousands/µL      Immature Grans Absolute 0 03 Thousand/uL      Lymphocytes Absolute 0 92 Thousands/µL      Monocytes Absolute 0 76 Thousand/µL      Eosinophils Absolute 0 06 Thousand/µL      Basophils Absolute 0 03 Thousands/µL                  XR chest 1 view portable   Final Result by Angel Estevez MD (12/21 1500)      Cardiomegaly  Probable small bibasilar pleural effusions                    Workstation performed: [id number]         XR knee 1 or 2 vw left    (Results Pending)   XR knee 1 or 2 vw right    (Results Pending)              Procedures  CriticalCare Time  Performed by: Johnathan Machado DO  Authorized by: Johnathan Machado DO     Critical care provider statement:     Critical care time (minutes):  35    Critical care time was exclusive of:  Separately billable procedures and treating other patients    Critical care was necessary to treat or prevent imminent or life-threatening deterioration of the following conditions:  Respiratory failure    Critical care was time spent personally by me on the following activities:  Blood draw for specimens, obtaining history from patient or surrogate, development of treatment plan with patient or surrogate, discussions with consultants, evaluation of patient's response to treatment, examination of patient, review of old charts, re-evaluation of patient's condition, ordering and review of radiographic studies, ordering and review of laboratory studies, ordering and performing treatments and interventions and interpretation of cardiac output measurements  Comments:      Patient respiratory distress and hypoxic on arrival   Patient treated with supplemental oxygen and IV diuretics  ED Course                               SBIRT 20yo+    Flowsheet Row Most Recent Value   SBIRT (25 yo +)    In order to provide better care to our patients, we are screening all of our patients for alcohol and drug use  Would it be okay to ask you these screening questions? No Filed at: 12/21/2022 1321                    MDM  Number of Diagnoses or Management Options  CHF (congestive heart failure) ([place] [address] )  COVID  Hypoxia  Diagnosis management comments: Pulse ox 89% on room air indicating adequate oxygenation  Pulse ox 98% on nasal cannula indicating adequate oxygenation    CXR: Cardiomegaly mild PVC as read by me       Amount and/or Complexity of Data Reviewed  Clinical lab tests: reviewed and ordered  Tests in the radiology section of CPT®: ordered and reviewed  Decide to obtain previous medical records or to obtain history from someone other than the patient: yes  Review and summarize past medical records: yes  Discuss the patient with other providers: yes  Independent visualization of images, tracings, or specimens: yes        Disposition  Final diagnoses:   CHF (congestive heart failure) ([place] [address] )   Hypoxia   COVID     Time reflects when diagnosis was documented in both MDM as applicable and the Disposition within this note     Time User Action Codes Description Comment    12/21/2022  2:28 PM Ivette [address] [I50 9] CHF (congestive heart failure) ([place] [address] )     12/21/2022  2:28 PM Carmina King Add [R09 02] Hypoxia     12/21/2022  2:29 PM Lewis Mccarthy [address] [U07 1] COVID     12/21/2022  5:07 PM Dillon Perez Add [R41 3] Memory impairment     12/21/2022  7:03 PM Gaston Slight K Add [M25 561] Acute pain of right knee     12/21/2022  7:03 PM Joyce Swanson FUENTES Add [R53 1] Weakness       ED Disposition     ED Disposition   Admit    Condition   Stable    Date/Time   Wed Dec 21, 2022  2:29 PM    Comment   Case was discussed with Dr Kellie Quiros and the patient's admission status was agreed to be Admission Status: inpatient status to the service of Dr Kellie Quiros  Follow-up Information    None         Current Discharge Medication List      CONTINUE these medications which have NOT CHANGED    Details   polyvinyl alcohol (LIQUIFILM TEARS) 1 4 % ophthalmic solution 1 drop as needed for dry eyes      acetaminophen (TYLENOL) 650 mg CR tablet Take 1 tablet (650 mg total) by mouth 3 (three) times a day  Qty: 100 tablet, Refills: 0    Associated Diagnoses: Primary osteoarthritis of left knee      alendronate (FOSAMAX) 70 mg tablet take 1 tablet by mouth EVERY 7 DAYS  Qty: 12 tablet, Refills: 3    Associated Diagnoses: Osteopenia with high risk of fracture      carbidopa-levodopa (SINEMET)  mg per tablet TAKE 1 AND 1/2 TABLETS BY MOUTH 4 TIMES A DAY  Qty: 240 tablet, Refills: 8    Associated Diagnoses: Parkinson disease (HCC)      Cholecalciferol (VITAMIN D3) 2000 units TABS Take 2,000 Units by mouth every morning      Diclofenac Sodium (VOLTAREN) 1 % Apply 2 g topically 4 (four) times a day as needed (Foot pain) for up to 15 days  Qty: 50 g, Refills: 2    Associated Diagnoses: Paresthesia and pain of extremity      entacapone (COMTAN) 200 mg tablet take 1 tablet by mouth four times a day  Qty: 120 tablet, Refills: 4    Associated Diagnoses: Parkinson disease (HCC)      furosemide (LASIX) 20 mg tablet Take 1/2 tablet by mouth  4 days per week  Schedule administration to be done when patient can be near toilet facilities for 4-6 hours  Qty: 90 tablet, Refills: 3    Associated Diagnoses: Chronic combined systolic and diastolic heart failure ([address] );  Edema of both legs      levothyroxine 50 mcg tablet take 1 tablet by mouth every morning  Qty: 30 tablet, Refills: 3    Associated Diagnoses: Hypothyroidism, unspecified type      memantine (NAMENDA) 10 mg tablet take 1 tablet by mouth once daily  Qty: 90 tablet, Refills: 3    Associated Diagnoses: Cognitive impairment      Mirabegron ER 50 MG TB24 Take 1 tablet (50 mg total) by mouth daily  Qty: 30 tablet, Refills: 3    Associated Diagnoses: Urinary frequency      montelukast (SINGULAIR) 10 mg tablet Take 1 tablet (10 mg total) by mouth daily at bedtime  Qty: 30 tablet, Refills: 5    Associated Diagnoses: Wheezing      Multiple Vitamins-Minerals (OCUVITE ADULT 50+ PO) Take by mouth daily with breakfast      warfarin (COUMADIN) 2 5 mg tablet TAKE 1/2 TO 1 TABLET BY MOUTH DAILY OR AS DIRECTED BY PHYSICIAN  Qty: 30 tablet, Refills: 11    Associated Diagnoses: Anticoagulated; S/P MVR (mitral valve replacement)             No discharge procedures on file      PDMP Review     None          ED Provider  Electronically Signed by           Estrellita Yanes DO  12/22/22 3514

## 2022-12-22 NOTE — CASE MANAGEMENT
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 2022   • Orthostatic hypotension 2021   • Other chronic pain 06/15/2021   • Cardiomyopathy, dilated, nonischemic ([facility] [address] ) 2021   • Left bundle branch block (LBBB) 2021   • Asymptomatic PVCs 2021   • On continuous oral anticoagulation 2021   • Mixed dyslipidemia 2021   • High risk medication use 2021   • Other microscopic hematuria 2018   • Numbness and tingling in right hand    • Suprapatellar bursitis of right knee 2018   • H/O mitral valve replacement with mechanical valve 2018   • Atrial fibrillation ([facility] [address] ) [I48 91] 2018   • Generalized osteoarthritis 2017   • Chronic right-sided low back pain without sciatica 10/23/2017   • Chronic combined systolic and diastolic heart failure ([address] ) 2017   • Memory impairment 02/15/2017   • Seasonal allergies 2016   • Vitamin D insufficiency 2015   • Transient ischemic attack 2014   • Parkinson's disease ([address] ) 2013   • Hypothyroidism 2013   • Peripheral vascular disease ([facility] [address] ) 2012   • Dupuytren's contracture 2012   • Mitral valve disorder 2012      LOS (days): 1  Geometric Mean LOS (GMLOS) (days): 5 20  Days to GMLOS:4 2     OBJECTIVE:    Risk of Unplanned Readmission Score: 12 75         Current admission status: Inpatient  Referral Reason: Other (Discharge planning)    [facility]   [facility] [address] [facility]  Phone: [phone] Fax: [phone] [address]  [address] [place] [address]  Phone: [phone] Fax: [phone]    Primary Care Provider: Geremias Payne MD    Primary Insurance: MEDICARE  Secondary Insurance: BLUE CROSS    ASSESSMENT:  Active Health Care Proxies    There are no active Health Care Proxies on file  Readmission Root Cause  30 Day Readmission: No    Patient Information  Admitted from[de-identified] Home  Mental Status: Alert  During Assessment patient was accompanied by: Not accompanied during assessment  Assessment information provided by[de-identified] Spouse  Primary Caregiver: Spouse  Caregiver's Name[de-identified] Maya Clock Relationship to Patient[de-identified] Significant Other  Caregiver's Telephone Number[de-identified] [phone]  Support Systems: Spouse/significant other, Shreya Nicole Dr of Residence: [address] [place] [address] do you live in?: [facility] entry access options   Select all that apply : No steps to enter home  Type of Current Residence: Apartment  Floor Level: 1  Upon entering residence, is there a bedroom on the main floor (no further steps)?: Yes  Upon entering residence, is there a bathroom on the main floor (no further steps)?: Yes  In the last 12 months, was there a time when you were not able to pay the mortgage or rent on time?: No  In the last 12 months, how many places have you lived?: 1  In the last 12 months, was there a time when you did not have a steady place to sleep or slept in a shelter (including now)?: No  Homeless/housing insecurity resource given?: N/A  Living Arrangements: Lives w/ Spouse/significant other    Activities of Daily Living Prior to Admission  Functional Status: Assistance  Completes ADLs independently?: No  Level of ADL dependence: Assistance  Ambulates independently?: Yes  Does patient use assisted devices?: Yes  Assisted Devices (DME) used: Straight Chestine Alvarez, Shower Chair  Does patient currently own DME?: Yes  What DME does the patient currently own?: Shower Chair, [address], [place]  Does patient currently have [facility]?: No     Patient Information Continued  Does patient have prescription coverage?: Yes  Within the past 12 months, you worried that your food would run out before you got the money to buy more : Never true  Within the past 12 months, the food you bought just didn't last and you didn't have money to get more : Never true  Food insecurity resource given?: N/A  Does patient receive dialysis treatments?: No  Does patient have a history of Mental Health Diagnosis?: No     Means of Transportation  Means of Transport to Appts[de-identified] Family transport  In the past 12 months, has lack of transportation kept you from medical appointments or from getting medications?: No  In the past 12 months, has lack of transportation kept you from meetings, work, or from getting things needed for daily living?: No  Was application for public transport provided?: N/A    DISCHARGE DETAILS:    Discharge planning discussed with[de-identified] Ann Clark (spouse)        CM contacted family/caregiver?: Yes     Contacts  Patient Contacts: Ann Clark  Relationship to Patient[de-identified] Family  Contact Method: Phone  Phone Number: [phone]  Reason/Outcome: Discharge Planning, Emergency Contact    Other Referral/Resources/Interventions Provided:  Interventions: [facility]  Referral Comments: CM made multiple attempts to call patient at her room phone and her cell phone, no response  Left vm introducing self, role and purpose of phone call  CM called spouse Megan Nava, introduced self, role, purpose of phone call, initial assessment and discharge planning  Megan Nava mentioned patient uses walker and cane at home and has a showerbench  She is able to bathe & dress independently but needs assistance with wearing sock, due to edema   Discussed discharge planning pending clinical progress and Megan Nava open to assistance in home such as home health care services and would appreciate having SN, PT, OT, HHA in home for patient  Bubba Fatima made aware that CM will reach out to him and patient again for discharge disposition, pending therapy eval and recs  Robin agreeable for the same  CM to place referrals via [place] for [facility]

## 2022-12-22 NOTE — ASSESSMENT & PLAN NOTE
· Patient presented with shortness of breath to the ED, with saturation in the 80s on arrival and was placed on 4L nasal cannula O2 with saturation in the 90s    Patient stated that in November she tested negative for COVID at home and she had a virtual appointment with her PCP who prescribed Tamiflu which she had completed  · Tested positive for COVID-19 today in ED  · Patient on 2 L O2 with saturation in the upper 90s, wean off as tolerated  · Follow mild COVID pathway as patient was noted with hypoxia  · Remdesivir 200 mg x 1 and 100 mg x 4 days  · Decadron x10 days  · Continue to monitor

## 2022-12-22 NOTE — PHYSICAL THERAPY NOTE
PHYSICAL THERAPY EVALUATION/TREATMENT     12/22/22 8329   Note Type   Note type Evaluation   Pain Assessment   Pain Assessment Tool 0-10   Pain Score 7  (Right knee pain with weightbearing)   Restrictions/Precautions   Other Precautions Contact/isolation; Airborne/isolation; Chair Alarm; Bed Alarm; Fall Risk;Pain   Home Living   Type of Home Other (Comment)  (Condo)   Home Layout One level;Elevator   Home Equipment Walker   Additional Comments Patient reports using a  Rollator prior to admission   Prior Function   Level of [place] Independent with ADLs; Needs assistance with IADLS   Lives With Spouse   Receives Help From [facility] in the last 6 months 1 to 4   Comments Patient states independence in ADLs, requiring increased assist with IADLs in the recent past   General   Additional Pertinent History Chart reviewed, patient admitted with congestive heart failure, COVID-positive    Patient also presents with right knee pain with weightbearing and gait   Family/Caregiver Present No   Cognition   Overall Cognitive Status WFL   Arousal/Participation Cooperative   Following Commands Follows all commands and directions without difficulty   Subjective   Subjective Patient reports bilateral knee pain at 1 point now right greater than left   RLE Assessment   RLE Assessment   (Range of motion within functional limits, strength 3+/4 - hip and ankle with quads 3/3+)   LLE Assessment   LLE Assessment   (Range of motion within functional limits, strength 3+/5)   Coordination   Movements are Fluid and Coordinated 0   Bed Mobility   Supine to Sit 4  Minimal assistance   Additional items Assist x 1   Sit to Supine 3  Moderate assistance   Additional items Assist x 1;Verbal cues;LE management   Transfers   Sit to Stand 3  Moderate assistance   Additional items Assist x 1   Stand to Sit 3  Moderate assistance   Additional items Assist x 1   Ambulation/Elevation   Gait Assistance 3  Moderate assist   Additional items Assist x 1;Verbal cues; Tactile cues   Assistive Device Rolling walker   Distance 10 feet with change in direction, antalgic gait patterning due to right knee pain with knee buckling at times   Balance   Static Sitting Fair   Dynamic Sitting Fair -   Static Standing Fair -   Dynamic Standing Poor +   Ambulatory Poor +   Activity Tolerance   Activity Tolerance Patient limited by fatigue;Patient limited by pain   Nurse Made Aware yes   Assessment   Prognosis Good   Problem List Decreased strength;Decreased range of motion;Decreased endurance; Impaired balance;Decreased mobility; Decreased coordination;Pain   Assessment Patient seen for Physical Therapy evaluation  Patient admitted with Chronic combined systolic and diastolic heart failure ([facility] [address] )  Comorbidities affecting patient's physical performance include:   Personal factors affecting patient at time of initial evaluation include: ambulating with assistive device, inability to ambulate household distances, inability to navigate community distances, inability to navigate level surfaces without external assistance, inability to perform dynamic tasks in community, limited home support, positive fall history, inability to perform physical activity, inability to perform ADLS and inability to perform IADLS   Prior to admission, patient was independent with functional mobility with walker, independent with ADLS, requiring assist for IADLS, ambulating household distance and home with family assist   Please find objective findings from Physical Therapy assessment regarding body systems outlined above with impairments and limitations including weakness, decreased ROM, impaired balance, decreased endurance, impaired coordination, gait deviations, pain, decreased activity tolerance, decreased functional mobility tolerance and fall risk    The Barthel Index was used as a functional outcome tool presenting with a score of Barthel Index Score: 40 today indicating marked limitations of functional mobility and ADLS  Patient's clinical presentation is currently unstable/unpredictable as seen in patient's presentation of vital sign response, changing level of pain, increased fall risk, new onset of impairment of functional mobility, decreased endurance and new onset of weakness  Pt would benefit from continued Physical Therapy treatment to address deficits as defined above and maximize level of functional mobility  As demonstrated by objective findings, the assigned level of complexity for this evaluation is high  The patient's -[facility] Basic Mobility Inpatient Short Form Raw Score is 14  A Raw score of less than or equal to 16 suggests the patient may benefit from discharge to post-acute rehabilitation services  Please also refer to the recommendation of the Physical Therapist for safe discharge planning  Goals   Patient Goals To decrease knee pain and start walking again   STG Expiration Date 12/29/22   Short Term Goal #1 Transfers and gait with roller walker with min assist   Short Term Goal #2 Gait endurance to 50 feet, decrease right knee pain by 50%   LTG Expiration Date 01/05/23   Long Term Goal #1 Transfers and gait independently with roller walker for functional household distances   Plan   Treatment/Interventions ADL retraining;Functional transfer training;LE strengthening/ROM; Therapeutic exercise; Endurance training;Patient/family training;Equipment eval/education; Bed mobility;Gait training; Compensatory technique education   PT Frequency Other (Comment)  (5 times per week)   Recommendation   PT Discharge Recommendation Post acute rehabilitation services   [facility] Basic Mobility Inpatient   Turning in Bed Without Bedrails 4   Lying on Back to Sitting on Edge of Flat Bed 3   Moving Bed to Chair 2   Standing Up From Chair 2   Walk in Room 2   Climb 3-5 Stairs 1   Basic Mobility Inpatient Raw Score 14   Basic Mobility Standardized Score 35 55   Highest Level Of Mobility   [facility] Goal 4: Move to chair/commode   -[facility] Achieved 6: Walk 10 steps or more   Barthel Index   Feeding 10   Bathing 0   Grooming Score 0   Dressing Score 5   Bladder Score 5   Bowels Score 10   Toilet Use Score 5   Transfers (Bed/Chair) Score 5   Mobility (Level Surface) Score 0   Stairs Score 0   Barthel Index Score 40   Additional Treatment Session   Start Time 1400   End Time 1415   Treatment Assessment S: 7 out of 10 pain in right knee with weightbearing O: Bilateral lower extremity exercises completed as listed below a: Patient will benefit from increasing functional mobility and continued physical therapy with progression as tolerated as well as short-term rehab prior to returning home   Exercises   Hamstring Stretch Supine;5 reps;PROM   Quad Sets Supine;5 reps   Heelslides Supine;10 reps   Glute Sets Sitting;5 reps   Hip Flexion Sitting;10 reps   Hip Abduction Sitting;10 reps   Knee AROM Long Arc Quad Sitting;5 reps   Marching Standing;5 reps  (With antalgic patterning due to right knee pain)   Licensure   [place] License Number  Niki Enamorado, PT 4 0 [id number] [id number]

## 2022-12-23 LAB
ANION GAP SERPL CALCULATED.3IONS-SCNC: 8 MMOL/L (ref 4–13)
BUN SERPL-MCNC: 26 MG/DL (ref 5–25)
CALCIUM SERPL-MCNC: 9.7 MG/DL (ref 8.3–10.1)
CHLORIDE SERPL-SCNC: 108 MMOL/L (ref 96–108)
CO2 SERPL-SCNC: 28 MMOL/L (ref 21–32)
CREAT SERPL-MCNC: 0.94 MG/DL (ref 0.6–1.3)
ERYTHROCYTE [DISTWIDTH] IN BLOOD BY AUTOMATED COUNT: 16 % (ref 11.6–15.1)
GFR SERPL CREATININE-BSD FRML MDRD: 56 ML/MIN/1.73SQ M
GLUCOSE SERPL-MCNC: 157 MG/DL (ref 65–140)
HCT VFR BLD AUTO: 34.1 % (ref 34.8–46.1)
HGB BLD-MCNC: 10.7 G/DL (ref 11.5–15.4)
INR PPP: 2.83 (ref 0.84–1.19)
MCH RBC QN AUTO: 27.8 PG (ref 26.8–34.3)
MCHC RBC AUTO-ENTMCNC: 31.4 G/DL (ref 31.4–37.4)
MCV RBC AUTO: 89 FL (ref 82–98)
PLATELET # BLD AUTO: 192 THOUSANDS/UL (ref 149–390)
PMV BLD AUTO: 12.4 FL (ref 8.9–12.7)
POTASSIUM SERPL-SCNC: 4.1 MMOL/L (ref 3.5–5.3)
PROTHROMBIN TIME: 29.8 SECONDS (ref 11.6–14.5)
RBC # BLD AUTO: 3.85 MILLION/UL (ref 3.81–5.12)
SODIUM SERPL-SCNC: 144 MMOL/L (ref 135–147)
WBC # BLD AUTO: 6.09 THOUSAND/UL (ref 4.31–10.16)

## 2022-12-23 RX ORDER — FUROSEMIDE 20 MG/1
20 TABLET ORAL DAILY
Status: DISCONTINUED | OUTPATIENT
Start: 2022-12-24 | End: 2022-12-24 | Stop reason: HOSPADM

## 2022-12-23 RX ADMIN — ENTACAPONE 200 MG: 200 TABLET ORAL at 19:47

## 2022-12-23 RX ADMIN — ENTACAPONE 200 MG: 200 TABLET ORAL at 22:42

## 2022-12-23 RX ADMIN — HEPARIN SODIUM 5000 UNITS: 5000 INJECTION INTRAVENOUS; SUBCUTANEOUS at 15:45

## 2022-12-23 RX ADMIN — ENTACAPONE 200 MG: 200 TABLET ORAL at 15:46

## 2022-12-23 RX ADMIN — LIDOCAINE 5% 1 PATCH: 700 PATCH TOPICAL at 11:03

## 2022-12-23 RX ADMIN — DICLOFENAC SODIUM TOPICAL GEL, 1%, 2 G: 10 GEL TOPICAL at 19:46

## 2022-12-23 RX ADMIN — CARBIDOPA AND LEVODOPA 1.5 TABLET: 25; 100 TABLET ORAL at 22:41

## 2022-12-23 RX ADMIN — FUROSEMIDE 40 MG: 10 INJECTION, SOLUTION INTRAMUSCULAR; INTRAVENOUS at 10:58

## 2022-12-23 RX ADMIN — REMDESIVIR 100 MG: 100 INJECTION, POWDER, LYOPHILIZED, FOR SOLUTION INTRAVENOUS at 20:17

## 2022-12-23 RX ADMIN — CARBIDOPA AND LEVODOPA 1.5 TABLET: 25; 100 TABLET ORAL at 15:45

## 2022-12-23 RX ADMIN — DICLOFENAC SODIUM TOPICAL GEL, 1%, 2 G: 10 GEL TOPICAL at 15:48

## 2022-12-23 RX ADMIN — DICLOFENAC SODIUM TOPICAL GEL, 1%, 2 G: 10 GEL TOPICAL at 22:42

## 2022-12-23 RX ADMIN — LEVOTHYROXINE SODIUM 50 MCG: 50 TABLET ORAL at 05:09

## 2022-12-23 RX ADMIN — ENTACAPONE 200 MG: 200 TABLET ORAL at 11:01

## 2022-12-23 RX ADMIN — CARBIDOPA AND LEVODOPA 1.5 TABLET: 25; 100 TABLET ORAL at 10:58

## 2022-12-23 RX ADMIN — MEMANTINE 10 MG: 10 TABLET ORAL at 11:01

## 2022-12-23 RX ADMIN — CARBIDOPA AND LEVODOPA 1.5 TABLET: 25; 100 TABLET ORAL at 19:45

## 2022-12-23 RX ADMIN — HEPARIN SODIUM 5000 UNITS: 5000 INJECTION INTRAVENOUS; SUBCUTANEOUS at 22:41

## 2022-12-23 RX ADMIN — WARFARIN SODIUM 1.25 MG: 2.5 TABLET ORAL at 18:23

## 2022-12-23 RX ADMIN — HEPARIN SODIUM 5000 UNITS: 5000 INJECTION INTRAVENOUS; SUBCUTANEOUS at 05:09

## 2022-12-23 NOTE — PROGRESS NOTES
Transferred from [facility] per bed  Oriented to staffs and to the unit  [facility]  [facility] in placed  Call bell in reach  Maintain  on airborne precaution  Patient with no c/o pain or discomfort at this time, not in respiratory distress  Continue plan of care

## 2022-12-23 NOTE — ASSESSMENT & PLAN NOTE
Wt Readings from Last 3 Encounters:   12/23/22 77 3 kg (170 lb 6 4 oz)   12/16/22 80 3 kg (177 lb)   11/03/22 80 3 kg (177 lb)     · Patient presented with shortness of breath that has been ongoing since November  · BNP noted to be > 6,000  · Chest x-ray showed Cardiomegaly, probable small bibasilar pleural effusions    · Echocardiogram of 4/11/2022 showed EF of 50 to 55% and normal MVR function  · ED gave Lasix IV 40 mg x 1 dose  · D/c IV Lasix 40 mg  · Start 20 mg lasix po  · Daily weight, strict I&O  · Cardiac diet with 2 g sodium  · Fluid restriction 1500 mL

## 2022-12-23 NOTE — PLAN OF CARE
Problem: Potential for Falls  Goal: Patient will remain free of falls  Description: INTERVENTIONS:  - Educate patient/family on patient safety including physical limitations  - Instruct patient to call for assistance with activity   - Consult OT/PT to assist with strengthening/mobility   - Keep Call bell within reach  - Keep bed low and locked with side rails adjusted as appropriate  - Keep care items and personal belongings within reach  - Initiate and maintain comfort rounds  - Make Fall Risk Sign visible to staff  - Offer Toileting every 2 Hours, in advance of need  - Initiate/Maintain bed alarm  - Obtain necessary fall risk management equipment: rolling walker  - Apply yellow socks and bracelet for high fall risk patients  - Consider moving patient to room near nurses station  Outcome: Progressing     Problem: MOBILITY - ADULT  Goal: Maintain or return to baseline ADL function  Description: INTERVENTIONS:  -  Assess patient's ability to carry out ADLs; assess patient's baseline for ADL function and identify physical deficits which impact ability to perform ADLs (bathing, care of mouth/teeth, toileting, grooming, dressing, etc )  - Assess/evaluate cause of self-care deficits   - Assess range of motion  - Assess patient's mobility; develop plan if impaired  - Assess patient's need for assistive devices and provide as appropriate  - Encourage maximum independence but intervene and supervise when necessary  - Involve family in performance of ADLs  - Assess for home care needs following discharge   - Consider OT consult to assist with ADL evaluation and planning for discharge  - Provide patient education as appropriate  Outcome: Progressing  Goal: Maintains/Returns to pre admission functional level  Description: INTERVENTIONS:  - Perform BMAT or MOVE assessment daily    - Set and communicate daily mobility goal to care team and patient/family/caregiver     - Collaborate with rehabilitation services on mobility goals if consulted  - Perform Range of Motion 3 times a day  - Reposition patient every 2 hours    - Dangle patient 3 times a day  - Out of bed to chair 3 times a day   - Out of bed for meals 3 times a day  - Out of bed for toileting  - Record patient progress and toleration of activity level   Outcome: Progressing     Problem: PAIN - ADULT  Goal: Verbalizes/displays adequate comfort level or baseline comfort level  Description: Interventions:  - Encourage patient to monitor pain and request assistance  - Assess pain using appropriate pain scale  - Administer analgesics based on type and severity of pain and evaluate response  - Implement non-pharmacological measures as appropriate and evaluate response  - Consider cultural and social influences on pain and pain management  - Notify physician/advanced practitioner if interventions unsuccessful or patient reports new pain  Outcome: Progressing     Problem: INFECTION - ADULT  Goal: Absence or prevention of progression during hospitalization  Description: INTERVENTIONS:  - Assess and monitor for signs and symptoms of infection  - Monitor lab/diagnostic results  - Monitor all insertion sites, i e  indwelling lines, tubes, and drains  - Monitor endotracheal if appropriate and nasal secretions for changes in amount and color  - [place] appropriate cooling/warming therapies per order  - Administer medications as ordered  - Instruct and encourage patient and family to use good hand hygiene technique  - Identify and instruct in appropriate isolation precautions for identified infection/condition  Outcome: Progressing  Goal: Absence of fever/infection during neutropenic period  Description: INTERVENTIONS:  - Monitor WBC    Outcome: Progressing     Problem: SAFETY ADULT  Goal: Patient will remain free of falls  Description: INTERVENTIONS:  - Educate patient/family on patient safety including physical limitations  - Instruct patient to call for assistance with activity   - Consult OT/PT to assist with strengthening/mobility   - Keep Call bell within reach  - Keep bed low and locked with side rails adjusted as appropriate  - Keep care items and personal belongings within reach  - Initiate and maintain comfort rounds  - Make Fall Risk Sign visible to staff  - Offer Toileting every 2 Hours, in advance of need  - Initiate/Maintain bed alarm  - Obtain necessary fall risk management equipment: rolling walker  - Apply yellow socks and bracelet for high fall risk patients  - Consider moving patient to room near nurses station  Outcome: Progressing  Goal: Maintain or return to baseline ADL function  Description: INTERVENTIONS:  -  Assess patient's ability to carry out ADLs; assess patient's baseline for ADL function and identify physical deficits which impact ability to perform ADLs (bathing, care of mouth/teeth, toileting, grooming, dressing, etc )  - Assess/evaluate cause of self-care deficits   - Assess range of motion  - Assess patient's mobility; develop plan if impaired  - Assess patient's need for assistive devices and provide as appropriate  - Encourage maximum independence but intervene and supervise when necessary  - Involve family in performance of ADLs  - Assess for home care needs following discharge   - Consider OT consult to assist with ADL evaluation and planning for discharge  - Provide patient education as appropriate  Outcome: Progressing  Goal: Maintains/Returns to pre admission functional level  Description: INTERVENTIONS:  - Perform BMAT or MOVE assessment daily    - Set and communicate daily mobility goal to care team and patient/family/caregiver  - Collaborate with rehabilitation services on mobility goals if consulted  - Perform Range of Motion 3 times a day  - Reposition patient every 2 hours    - Dangle patient 3 times a day  - Out of bed to chair 3 times a day   - Out of bed for meals 3 times a day  - Out of bed for toileting  - Record patient progress and toleration of activity level   Outcome: Progressing     Problem: DISCHARGE PLANNING  Goal: Discharge to home or other facility with appropriate resources  Description: INTERVENTIONS:  - Identify barriers to discharge w/patient and caregiver  - Arrange for needed discharge resources and transportation as appropriate  - Identify discharge learning needs (meds, wound care, etc )  - Arrange for interpretive services to assist at discharge as needed  - Refer to Case Management Department for coordinating discharge planning if the patient needs post-hospital services based on physician/advanced practitioner order or complex needs related to functional status, cognitive ability, or social support system  Outcome: Progressing     Problem: Knowledge Deficit  Goal: Patient/family/caregiver demonstrates understanding of disease process, treatment plan, medications, and discharge instructions  Description: Complete learning assessment and assess knowledge base    Interventions:  - Provide teaching at level of understanding  - Provide teaching via preferred learning methods  Outcome: Progressing     Problem: Prexisting or High Potential for Compromised Skin Integrity  Goal: Skin integrity is maintained or improved  Description: INTERVENTIONS:  - Identify patients at risk for skin breakdown  - Assess and monitor skin integrity  - Assess and monitor nutrition and hydration status  - Monitor labs   - Assess for incontinence   - Turn and reposition patient  - Assist with mobility/ambulation  - Relieve pressure over bony prominences  - Avoid friction and shearing  - Provide appropriate hygiene as needed including keeping skin clean and dry  - Evaluate need for skin moisturizer/barrier cream  - Collaborate with interdisciplinary team   - Patient/family teaching  - Consider wound care consult   Outcome: Progressing     Problem: METABOLIC, FLUID AND ELECTROLYTES - ADULT  Goal: Electrolytes maintained within normal limits  Description: INTERVENTIONS:  - Monitor labs and assess patient for signs and symptoms of electrolyte imbalances  - Administer electrolyte replacement as ordered  - Monitor response to electrolyte replacements, including repeat lab results as appropriate  - Instruct patient on fluid and nutrition as appropriate  Outcome: Progressing  Goal: Fluid balance maintained  Description: INTERVENTIONS:  - Monitor labs   - Monitor I/O and WT  - Instruct patient on fluid and nutrition as appropriate  - Assess for signs & symptoms of volume excess or deficit  Outcome: Progressing

## 2022-12-23 NOTE — PLAN OF CARE
Problem: Potential for Falls  Goal: Patient will remain free of falls  Description: INTERVENTIONS:  - Educate patient/family on patient safety including physical limitations  - Instruct patient to call for assistance with activity   - Consult OT/PT to assist with strengthening/mobility   - Keep Call bell within reach  - Keep bed low and locked with side rails adjusted as appropriate  - Keep care items and personal belongings within reach  - Initiate and maintain comfort rounds  - Make Fall Risk Sign visible to staff  - Offer Toileting every 2 Hours, in advance of need  - Initiate/Maintain bed alarm  - Obtain necessary fall risk management equipment: rolling walker  - Apply yellow socks and bracelet for high fall risk patients  - Consider moving patient to room near nurses station  Outcome: Progressing     Problem: MOBILITY - ADULT  Goal: Maintains/Returns to pre admission functional level  Description: INTERVENTIONS:  -  Assess patient's ability to carry out ADLs; assess patient's baseline for ADL function and identify physical deficits which impact ability to perform ADLs (bathing, care of mouth/teeth, toileting, grooming, dressing, etc )  - Assess/evaluate cause of self-care deficits   - Assess range of motion  - Assess patient's mobility; develop plan if impaired  - Assess patient's need for assistive devices and provide as appropriate  - Encourage maximum independence but intervene and supervise when necessary  - Involve family in performance of ADLs  - Assess for home care needs following discharge   - Consider OT consult to assist with ADL evaluation and planning for discharge  - Provide patient education as appropriate  Outcome: Progressing     Problem: PAIN - ADULT  Goal: Verbalizes/displays adequate comfort level or baseline comfort level  Description: Interventions:  - Encourage patient to monitor pain and request assistance  - Assess pain using appropriate pain scale  - Administer analgesics based on type and severity of pain and evaluate response  - Implement non-pharmacological measures as appropriate and evaluate response  - Consider cultural and social influences on pain and pain management  - Notify physician/advanced practitioner if interventions unsuccessful or patient reports new pain  Outcome: Progressing     Problem: INFECTION - ADULT  Goal: Absence of fever/infection during neutropenic period  Description: INTERVENTIONS:  - Monitor WBC    Outcome: Progressing     Problem: INFECTION - ADULT  Goal: Absence or prevention of progression during hospitalization  Description: INTERVENTIONS:  - Assess and monitor for signs and symptoms of infection  - Monitor lab/diagnostic results  - Monitor all insertion sites, i e  indwelling lines, tubes, and drains  - Monitor endotracheal if appropriate and nasal secretions for changes in amount and color  - [place] appropriate cooling/warming therapies per order  - Administer medications as ordered  - Instruct and encourage patient and family to use good hand hygiene technique  - Identify and instruct in appropriate isolation precautions for identified infection/condition  Outcome: Progressing     Problem: DISCHARGE PLANNING  Goal: Discharge to home or other facility with appropriate resources  Description: INTERVENTIONS:  - Identify barriers to discharge w/patient and caregiver  - Arrange for needed discharge resources and transportation as appropriate  - Identify discharge learning needs (meds, wound care, etc )  - Arrange for interpretive services to assist at discharge as needed  - Refer to Case Management Department for coordinating discharge planning if the patient needs post-hospital services based on physician/advanced practitioner order or complex needs related to functional status, cognitive ability, or social support system  Outcome: Progressing     Problem: Knowledge Deficit  Goal: Patient/family/caregiver demonstrates understanding of disease process, treatment plan, medications, and discharge instructions  Description: Complete learning assessment and assess knowledge base    Interventions:  - Provide teaching at level of understanding  - Provide teaching via preferred learning methods  Outcome: Progressing     Problem: Prexisting or High Potential for Compromised Skin Integrity  Goal: Skin integrity is maintained or improved  Description: INTERVENTIONS:  - Identify patients at risk for skin breakdown  - Assess and monitor skin integrity  - Assess and monitor nutrition and hydration status  - Monitor labs   - Assess for incontinence   - Turn and reposition patient  - Assist with mobility/ambulation  - Relieve pressure over bony prominences  - Avoid friction and shearing  - Provide appropriate hygiene as needed including keeping skin clean and dry  - Evaluate need for skin moisturizer/barrier cream  - Collaborate with interdisciplinary team   - Patient/family teaching  - Consider wound care consult   Outcome: Progressing

## 2022-12-23 NOTE — ASSESSMENT & PLAN NOTE
· Patient complains of  Bilateral knee pain  · Right knee xray: Meniscal chondrocalcinosis  Questionable trace joint effusion  · Left knee xray: Meniscal chondrocalcinosis    Generalized swelling  · Pain management  · Lidocaine patch  · Patient reports improvement of knee pain today  · PT/OT recommending short term rehab

## 2022-12-23 NOTE — PLAN OF CARE
Problem: METABOLIC, FLUID AND ELECTROLYTES - ADULT  Goal: Fluid balance maintained  Description: INTERVENTIONS:  - Monitor labs   - Monitor I/O and WT  - Instruct patient on fluid and nutrition as appropriate  - Assess for signs & symptoms of volume excess or deficit  Outcome: Progressing     Problem: METABOLIC, FLUID AND ELECTROLYTES - ADULT  Goal: Electrolytes maintained within normal limits  Description: INTERVENTIONS:  - Monitor labs and assess patient for signs and symptoms of electrolyte imbalances  - Administer electrolyte replacement as ordered  - Monitor response to electrolyte replacements, including repeat lab results as appropriate  - Instruct patient on fluid and nutrition as appropriate  Outcome: Progressing

## 2022-12-23 NOTE — CASE MANAGEMENT
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 2022   • Orthostatic hypotension 2021   • Other chronic pain 06/15/2021   • Cardiomyopathy, dilated, nonischemic ([facility] [address] ) 2021   • Left bundle branch block (LBBB) 2021   • Asymptomatic PVCs 2021   • On continuous oral anticoagulation 2021   • Mixed dyslipidemia 2021   • High risk medication use 2021   • Other microscopic hematuria 2018   • Numbness and tingling in right hand    • Suprapatellar bursitis of right knee 2018   • H/O mitral valve replacement with mechanical valve 2018   • Atrial fibrillation ([facility] [address] ) [I48 91] 2018   • Generalized osteoarthritis 2017   • Chronic right-sided low back pain without sciatica 10/23/2017   • Chronic combined systolic and diastolic heart failure ([facility] [address] ) 2017   • Memory impairment 02/15/2017   • Seasonal allergies 2016   • Vitamin D insufficiency 2015   • Transient ischemic attack 2014   • Parkinson's disease ([address] ) 2013   • Hypothyroidism 2013   • Peripheral vascular disease ([facility] [address] ) 2012   • Dupuytren's contracture 2012   • Mitral valve disorder 2012      LOS (days): 2  Geometric Mean LOS (GMLOS) (days): 5 20  Days to GMLOS:3 1     OBJECTIVE:  Risk of Unplanned Readmission Score: 13 87         Current admission status: Inpatient   Preferred Pharmacy:   [address] [place] [address] [id number] [place] [address]  Phone: [phone] Fax: [phone] [place] [address], [address] [facility] 10[facility] [address]  Phone: [phone] Fax: [phone]    Primary Care Provider: Ambar Stone MD    Primary Insurance: MEDICARE  Secondary Insurance: BLUE CROSS    DISCHARGE DETAILS:    Discharge planning discussed with[de-identified] patient and  Torey Howe        CM contacted family/caregiver?: Yes  Were Treatment Team discharge recommendations reviewed with patient/caregiver?: Yes  Did patient/caregiver verbalize understanding of patient care needs?: Yes  Were patient/caregiver advised of the risks associated with not following Treatment Team discharge recommendations?: Yes    Contacts  Patient Contacts: Torey Howe  Relationship to Patient[de-identified] Family  Contact Method: Phone  Phone Number: [phone]  Reason/Outcome: Discharge [address]         Is the patient interested in [facility] at discharge?: No    DME Referral Provided  Referral made for DME?: No    Other Referral/Resources/Interventions Provided:  Interventions: Short Term Rehab, Transportation  Referral Comments: Cm attempted to call patient on cell phone  no answer, left vm  Called patient's spouse Henry Chaidez to discuss bed availability at [facility] and was agreeable  Also provided him with phone number for [facility] per his request and informed him that cm will arrage for transportation for [facility] when patient read to be discharged edwar Chaidez agreeable  CM reserved [facility] via [facility] and requested wcv transportation for tentative 1400 on sat 12/24, awaiting confirmation  MEI Mariee made aware       Treatment Team Recommendation: Short Term Rehab  Discharge Destination Plan[de-identified] Short Term Rehab  Transport at Discharge : Wheelchair van  Dispatcher Contacted: Yes  Number/Name of Dispatcher: Roundtrip     ETA of Transport (Date): 12/24/22  ETA of Transport (Time): 1400     Transfer Mode: Wheelchair

## 2022-12-23 NOTE — PROGRESS NOTES
[address]  Progress Note - Easton Hagen 1940, 79 y o  female MRN: [id number]  Unit/Bed#: [address] [facility] Encounter: [id number]  Primary Care Provider: Ambar Stone MD   Date and time admitted to hospital: 12/21/2022 12:52 PM    * Chronic combined systolic and diastolic heart failure [facility])  Assessment & Plan  Wt Readings from Last 3 Encounters:   12/23/22 77 3 kg (170 lb 6 4 oz)   12/16/22 80 3 kg (177 lb)   11/03/22 80 3 kg (177 lb)     · Patient presented with shortness of breath that has been ongoing since November  · BNP noted to be > 6,000  · Chest x-ray showed Cardiomegaly, probable small bibasilar pleural effusions  · Echocardiogram of 4/11/2022 showed EF of 50 to 55% and normal MVR function  · ED gave Lasix IV 40 mg x 1 dose  · D/c IV Lasix 40 mg  · Start 20 mg lasix po  · Daily weight, strict I&O  · Cardiac diet with 2 g sodium  · Fluid restriction 1500 mL      COVID-19  Assessment & Plan  · Patient presented with shortness of breath to the ED, with saturation in the 80s on arrival and was placed on 4L nasal cannula O2 with saturation in the 90s    Patient stated that in November she tested negative for COVID at home and she had a virtual appointment with her PCP who prescribed Tamiflu which she had completed  · Tested positive for COVID-19 today in ED  · Patient currently off oxygen, satting mid-high 90s  · Follow mild COVID pathway as patient was noted with hypoxia  · Remdesivir 200 mg x 1 and 100 mg x 4 days  · Decadron d/c on 12/23, crp wnl, no wheezing noted one exam  · Continue to monitor    H/O mitral valve replacement with mechanical valve  Assessment & Plan  · History of mechanical mitral valve replacement September,1990  · On warfarin 1 25mg Sun, Tues, Thurs and 2 5 MWF, Sat  · Target INR of 2 5-3 5  · INR on admission 3 60, warfarin held  · Therapeutic today, start 1 25mg daily given supratherapeutic on admission  · Daily INR    Atrial fibrillation ([address] ) [I48 91]  Assessment & Plan  POA  · Patient with chronic atrial fibrillation and EKG on admission revealed controlled A  fib  · Patient on Coumadin  · INR 3 6 on admission  · Therapeutic today, resume 1 25mg  · Continue to monitor    Knee pain  Assessment & Plan  · Patient complains of  Bilateral knee pain  · Right knee xray: Meniscal chondrocalcinosis  Questionable trace joint effusion  · Left knee xray: Meniscal chondrocalcinosis  Generalized swelling  · Pain management  · Lidocaine patch  · Patient reports improvement of knee pain today  · PT/OT recommending short term rehab    Parkinson's disease ([facility] [address] )  Assessment & Plan  · Continue Sinemet and entacapone    Hypothyroidism  Assessment & Plan  · Continue levothyroxine    VTE Pharmacologic Prophylaxis:   Moderate Risk (Score 3-4) - Pharmacological DVT Prophylaxis Ordered: warfarin (Coumadin)  Patient Centered Rounds: I performed bedside rounds with nursing staff today  Discussions with Specialists or Other Care Team Provider: case management, nursing    Education and Discussions with Family / Patient: Updated  () via phone  Time Spent for Care: 30 minutes  More than 50% of total time spent on counseling and coordination of care as described above  Current Length of Stay: 2 day(s)  Current Patient Status: Inpatient   Certification Statement: The patient will continue to require additional inpatient hospital stay due to CHF exacerbation, monitor respiratory status off oxygen  Discharge Plan: Anticipate discharge tomorrow to rehab facility  Code Status: Level 1 - Full Code    Subjective:   Pt reports she feels significantly better today  She says she can breathe much better and feels less weak and fatigued  She says she is leaning toward short term rehab vs  home with home services, as she wants to be strong enough to go home  She denies chest pain, shortness of breath, or other complaints      Objective:     Vitals:   Temp (24hrs), Av 2 °F (36 8 °C), Min:98 °F (36 7 °C), Max:98 5 °F (36 9 °C)    Temp:  [98 °F (36 7 °C)-98 5 °F (36 9 °C)] 98 1 °F (36 7 °C)  HR:  [62-77] 62  Resp:  [16-19] 17  BP: (110-116)/(52-73) 116/70  SpO2:  [95 %-98 %] 96 %  Body mass index is 29 25 kg/m²  Input and Output Summary (last 24 hours): Intake/Output Summary (Last 24 hours) at 2022 1316  Last data filed at 2022 0601  Gross per 24 hour   Intake 370 ml   Output 1000 ml   Net -630 ml       Physical Exam:   Physical Exam  Vitals and nursing note reviewed  Constitutional:       General: She is not in acute distress  Appearance: She is well-developed  HENT:      Head: Normocephalic and atraumatic  Eyes:      Conjunctiva/sclera: Conjunctivae normal    Cardiovascular:      Rate and Rhythm: Normal rate  Rhythm irregular  Heart sounds: Normal heart sounds  No murmur heard  Pulmonary:      Effort: Pulmonary effort is normal  No respiratory distress  Breath sounds: Normal breath sounds  Abdominal:      Palpations: Abdomen is soft  Tenderness: There is no abdominal tenderness  Musculoskeletal:         General: No swelling  Cervical back: Neck supple  Right lower leg: Edema present  Left lower leg: Edema present  Skin:     General: Skin is warm and dry  Capillary Refill: Capillary refill takes less than 2 seconds  Neurological:      General: No focal deficit present  Mental Status: She is alert and oriented to person, place, and time     Psychiatric:         Mood and Affect: Mood normal           Additional Data:     Labs:  Results from last 7 days   Lab Units 22  0526 22  1746 22  1314   WBC Thousand/uL 6 09   < > 6 50   HEMOGLOBIN g/dL 10 7*   < > 11 2*   HEMATOCRIT % 34 1*   < > 36 0   PLATELETS Thousands/uL 192   < > 195   NEUTROS PCT %  --   --  71   LYMPHS PCT %  --   --  14   MONOS PCT %  --   --  12   EOS PCT %  --   --  1    < > = values in this interval not displayed  Results from last 7 days   Lab Units 12/23/22  0526 12/22/22  0601 12/21/22  1314   SODIUM mmol/L 144   < > 143   POTASSIUM mmol/L 4 1   < > 4 5   CHLORIDE mmol/L 108   < > 107   CO2 mmol/L 28   < > 28   BUN mg/dL 26*   < > 16   CREATININE mg/dL 0 94   < > 0 99   ANION GAP mmol/L 8   < > 8   CALCIUM mg/dL 9 7   < > 9 9   ALBUMIN g/dL  --   --  3 4*   TOTAL BILIRUBIN mg/dL  --   --  1 58*   ALK PHOS U/L  --   --  82   ALT U/L  --   --  7*   AST U/L  --   --  23   GLUCOSE RANDOM mg/dL 157*   < > 105    < > = values in this interval not displayed  Results from last 7 days   Lab Units 12/23/22  0526   INR  2 83*             Results from last 7 days   Lab Units 12/22/22  0601   PROCALCITONIN ng/ml <0 05       Lines/Drains:  Invasive Devices     Peripheral Intravenous Line  Duration           Peripheral IV 12/21/22 Right Antecubital 2 days          Drain  Duration           External Urinary Catheter <1 day                  Telemetry:  Telemetry Orders (From admission, onward)             48 Hour Telemetry Monitoring  (ED Bridging Orders Panel)  Continuous x 48 hours        Expiring   References:    Telemetry Guidelines   Question:  Reason for 48 Hour Telemetry  Answer:  Acute Decompensated CHF (continuous diuretic infusion or total diuretic dose > 200 mg daily, associated electrolyte derangement, ionotropic drip, history of ventricular arrhythmia, or new EF <35%)                 Telemetry Reviewed: Atrial fibrillation  HR averaging 60-70  Indication for Continued Telemetry Use: Arrthymias requiring medical therapy             Imaging: No pertinent imaging reviewed      Recent Cultures (last 7 days):         Last 24 Hours Medication List:   Current Facility-Administered Medications   Medication Dose Route Frequency Provider Last Rate   • acetaminophen  650 mg Oral Q6H PRN STACY Ling     • carbidopa-levodopa  1 5 tablet Oral 4x Daily STACY Oreilly     • Diclofenac Sodium  2 g Topical 4x Daily MELISSA MuñozNP     • entacapone  200 mg Oral 4x Daily Gaby RossisSTACY     • [START ON 12/24/2022] furosemide  20 mg Oral Daily Rony Cummings PA-C     • heparin (porcine)  5,000 Units Subcutaneous Q8H [facility] Gaby Sicks, CRNP     • levothyroxine  50 mcg Oral Early Morning Gaby RossisMELISSANP     • lidocaine  1 patch Topical Daily Rony Cummings PA-C     • memantine  10 mg Oral Daily Olayide STACY Ibanez     • ondansetron  4 mg Intravenous Q6H PRN Gaby Rossis, MELISSANP     • oxyCODONE  2 5 mg Oral Q6H PRN Gaby RossisSTACY     • remdesivir  100 mg Intravenous Q24H STACY Muñoz     • warfarin  1 25 mg Oral Daily (warfarin) Rony Cummings PA-C          Today, Patient Was Seen By: Rony Cummings PA-C    **Please Note: This note may have been constructed using a voice recognition system  **

## 2022-12-23 NOTE — CASE MANAGEMENT
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 2022   • Orthostatic hypotension 2021   • Other chronic pain 06/15/2021   • Cardiomyopathy, dilated, nonischemic ([address] ) 2021   • Left bundle branch block (LBBB) 2021   • Asymptomatic PVCs 2021   • On continuous oral anticoagulation 2021   • Mixed dyslipidemia 2021   • High risk medication use 2021   • Other microscopic hematuria 2018   • Numbness and tingling in right hand    • Suprapatellar bursitis of right knee 2018   • H/O mitral valve replacement with mechanical valve 2018   • Atrial fibrillation ([facility] [address] ) [I48 91] 2018   • Generalized osteoarthritis 2017   • Chronic right-sided low back pain without sciatica 10/23/2017   • Chronic combined systolic and diastolic heart failure ([address] ) 2017   • Memory impairment 02/15/2017   • Seasonal allergies 2016   • Vitamin D insufficiency 2015   • Transient ischemic attack 2014   • Parkinson's disease ([address] ) 2013   • Hypothyroidism 2013   • Peripheral vascular disease ([address] ) 2012   • Dupuytren's contracture 2012   • Mitral valve disorder 2012      LOS (days): 2  Geometric Mean LOS (GMLOS) (days): 5 20  Days to GMLOS:3 3     OBJECTIVE:  Risk of Unplanned Readmission Score: 14 92       Current admission status: Inpatient   Preferred Pharmacy:   [address] [place] [address] [phone] [address]  Phone: [phone] Fax: [phone] [address] [place] [address], [address] [place] [address] [address] [facility] [address]  Phone: [phone] Fax: [phone]    Primary Care Provider: Barbara Henning MD    Primary Insurance: MEDICARE  Secondary Insurance: BLUE CROSS    DISCHARGE DETAILS:    Discharge planning discussed with[de-identified] patient  Freedom of Choice: Yes  Comments - Freedom of Choice: cm spoke with patient this morning and agreeable for str per therapy recs  Preference for [email or web address]  CM contacted family/caregiver?: Yes  Were Treatment Team discharge recommendations reviewed with patient/caregiver?: Yes  Did patient/caregiver verbalize understanding of patient care needs?: Yes  Were patient/caregiver advised of the risks associated with not following Treatment Team discharge recommendations?: Yes    Contacts  Patient Contacts: Chiquita Kong  Relationship to Patient[de-identified] Family  Contact Method: Phone  Phone Number: [phone]  Reason/Outcome: Discharge Planning    Other Referral/Resources/Interventions Provided:  Interventions: Short Term Rehab  Referral Comments: Cm called and spoke with patient to follow up if she had a chance to discuss with her  about str vs home with [facility]  Patient mentioned she wants to go to str per therapy recommendations  Voiced preference for [email or web address]  CM informed patient that [email or web address] may not be accepting covid patients, but will find out  CM placed referral for STR via [address]  awaiting response

## 2022-12-23 NOTE — ASSESSMENT & PLAN NOTE
· Patient presented with shortness of breath to the ED, with saturation in the 80s on arrival and was placed on 4L nasal cannula O2 with saturation in the 90s    Patient stated that in November she tested negative for COVID at home and she had a virtual appointment with her PCP who prescribed Tamiflu which she had completed  · Tested positive for COVID-19 today in ED  · Patient currently off oxygen, satting mid-high 90s  · Follow mild COVID pathway as patient was noted with hypoxia  · Remdesivir 200 mg x 1 and 100 mg x 4 days  · Decadron d/c on 12/23, crp wnl, no wheezing noted one exam  · Continue to monitor

## 2022-12-24 VITALS
HEART RATE: 77 BPM | BODY MASS INDEX: 29.5 KG/M2 | OXYGEN SATURATION: 93 % | RESPIRATION RATE: 20 BRPM | DIASTOLIC BLOOD PRESSURE: 73 MMHG | HEIGHT: 64 IN | TEMPERATURE: 98 F | WEIGHT: 172.8 LBS | SYSTOLIC BLOOD PRESSURE: 133 MMHG

## 2022-12-24 LAB
ANION GAP SERPL CALCULATED.3IONS-SCNC: 7 MMOL/L (ref 4–13)
BUN SERPL-MCNC: 25 MG/DL (ref 5–25)
CALCIUM SERPL-MCNC: 9.4 MG/DL (ref 8.3–10.1)
CHLORIDE SERPL-SCNC: 104 MMOL/L (ref 96–108)
CO2 SERPL-SCNC: 29 MMOL/L (ref 21–32)
CREAT SERPL-MCNC: 0.9 MG/DL (ref 0.6–1.3)
ERYTHROCYTE [DISTWIDTH] IN BLOOD BY AUTOMATED COUNT: 16 % (ref 11.6–15.1)
GFR SERPL CREATININE-BSD FRML MDRD: 59 ML/MIN/1.73SQ M
GLUCOSE SERPL-MCNC: 114 MG/DL (ref 65–140)
HCT VFR BLD AUTO: 34.4 % (ref 34.8–46.1)
HGB BLD-MCNC: 11.1 G/DL (ref 11.5–15.4)
INR PPP: 2.43 (ref 0.84–1.19)
MAGNESIUM SERPL-MCNC: 1.8 MG/DL (ref 1.6–2.6)
MCH RBC QN AUTO: 28.1 PG (ref 26.8–34.3)
MCHC RBC AUTO-ENTMCNC: 32.3 G/DL (ref 31.4–37.4)
MCV RBC AUTO: 87 FL (ref 82–98)
PLATELET # BLD AUTO: 201 THOUSANDS/UL (ref 149–390)
PMV BLD AUTO: 12.3 FL (ref 8.9–12.7)
POTASSIUM SERPL-SCNC: 3.4 MMOL/L (ref 3.5–5.3)
PROTHROMBIN TIME: 26.5 SECONDS (ref 11.6–14.5)
RBC # BLD AUTO: 3.95 MILLION/UL (ref 3.81–5.12)
SODIUM SERPL-SCNC: 140 MMOL/L (ref 135–147)
WBC # BLD AUTO: 7.16 THOUSAND/UL (ref 4.31–10.16)

## 2022-12-24 RX ORDER — POTASSIUM CHLORIDE 20 MEQ/1
40 TABLET, EXTENDED RELEASE ORAL ONCE
Status: COMPLETED | OUTPATIENT
Start: 2022-12-24 | End: 2022-12-24

## 2022-12-24 RX ADMIN — MAGNESIUM OXIDE TAB 400 MG (241.3 MG ELEMENTAL MG) 400 MG: 400 (241.3 MG) TAB at 09:17

## 2022-12-24 RX ADMIN — POTASSIUM CHLORIDE 40 MEQ: 1500 TABLET, EXTENDED RELEASE ORAL at 09:17

## 2022-12-24 RX ADMIN — FUROSEMIDE 20 MG: 20 TABLET ORAL at 08:22

## 2022-12-24 RX ADMIN — ENTACAPONE 200 MG: 200 TABLET ORAL at 14:27

## 2022-12-24 RX ADMIN — CARBIDOPA AND LEVODOPA 1.5 TABLET: 25; 100 TABLET ORAL at 13:00

## 2022-12-24 RX ADMIN — LEVOTHYROXINE SODIUM 50 MCG: 50 TABLET ORAL at 05:19

## 2022-12-24 RX ADMIN — DICLOFENAC SODIUM TOPICAL GEL, 1%, 2 G: 10 GEL TOPICAL at 12:40

## 2022-12-24 RX ADMIN — DICLOFENAC SODIUM TOPICAL GEL, 1%, 2 G: 10 GEL TOPICAL at 08:24

## 2022-12-24 RX ADMIN — CARBIDOPA AND LEVODOPA 1.5 TABLET: 25; 100 TABLET ORAL at 08:22

## 2022-12-24 RX ADMIN — ENTACAPONE 200 MG: 200 TABLET ORAL at 08:25

## 2022-12-24 RX ADMIN — MEMANTINE 10 MG: 10 TABLET ORAL at 08:22

## 2022-12-24 RX ADMIN — HEPARIN SODIUM 5000 UNITS: 5000 INJECTION INTRAVENOUS; SUBCUTANEOUS at 05:19

## 2022-12-24 RX ADMIN — LIDOCAINE 5% 1 PATCH: 700 PATCH TOPICAL at 08:22

## 2022-12-24 NOTE — ASSESSMENT & PLAN NOTE
Wt Readings from Last 3 Encounters:   12/24/22 78 4 kg (172 lb 12 8 oz)   12/16/22 80 3 kg (177 lb)   11/03/22 80 3 kg (177 lb)     · Patient presented with shortness of breath that has been ongoing since November  · BNP noted to be > 6,000  · Chest x-ray showed Cardiomegaly, probable small bibasilar pleural effusions    · Echocardiogram of 4/11/2022 showed EF of 50 to 55% and normal MVR function  · ED gave Lasix IV 40 mg x 1 dose  · D/c IV Lasix 40 mg  · Continue 20 mg lasix po  · Daily weight, strict I&O  · Cardiac diet with 2 g sodium  · Fluid restriction 1500 mL

## 2022-12-24 NOTE — ASSESSMENT & PLAN NOTE
· Patient complains of  Bilateral knee pain  · Right knee xray: Meniscal chondrocalcinosis  Questionable trace joint effusion  · Left knee xray: Meniscal chondrocalcinosis    Generalized swelling  · Pain management  · Lidocaine patch  · Patient reports improvement of knee pain today  · PT/OT recommending short term rehab  · Discharging to skilled nursing facility

## 2022-12-24 NOTE — CASE MANAGEMENT
Case Management Progress Note    Patient name Deshawn Bishop  Location [address] 407/[address] Radha 407-* MRN [id number]  : 1940 Date 2022       LOS (days): 3  Geometric Mean LOS (GMLOS) (days): 5 20  Days to GMLOS:2 2        OBJECTIVE:        Current admission status: Inpatient  Preferred Pharmacy:   Kavitha Haver #[id number] [facility] [id number] [place] [id number]  Phone: [phone] Fax: [address], [address] [facility] [address] [address]  Phone: [phone] Fax: [phone]    Primary Care Provider: Ana Caldwell MD    Primary Insurance: MEDICARE  Secondary Insurance: BLUE CROSS    PROGRESS NOTE:    Per careline ok for pt to admit today to [facility]  Transport is set up for 1400   RN to call report

## 2022-12-24 NOTE — PHYSICAL THERAPY NOTE
PT TREATMENT     12/23/22 8581   Note Type   Note Type Treatment   Pain Assessment   Pain Assessment Tool 0-10   Pain Score 6  (R knee area with weight bearing)   Restrictions/Precautions   Other Precautions Chair Alarm; Bed Alarm;Contact/isolation; Airborne/isolation; Fall Risk;Pain   General   Chart Reviewed Yes   Family/Caregiver Present No   Cognition   Arousal/Participation Cooperative   Subjective   Subjective patient states slightly improved R knee pain at this time   Bed Mobility   Supine to Sit 4  Minimal assistance   Additional items Assist x 1   Transfers   Sit to Stand 4  Minimal assistance   Additional items Assist x 1   Stand to Sit 4  Minimal assistance   Additional items Assist x 1   Ambulation/Elevation   Gait Assistance 3  Moderate assist   Additional items Assist x 1;Verbal cues; Tactile cues   Assistive Device Rolling walker   Distance 20 feet with change in direction, narrow base of support and shortened stride length  Antalgic patterning with R knee pain   Balance   Static Sitting Fair +   Dynamic Sitting Fair   Static Standing Fair -   Dynamic Standing Poor +   Ambulatory Poor +   Activity Tolerance   Activity Tolerance Patient limited by pain; Patient limited by fatigue   Nurse Made Aware yes   Exercises   Hamstring Stretch Supine;5 reps;PROM   Quad Sets Supine;5 reps   Heelslides Supine;5 reps   Hip Flexion Sitting;15 reps   Hip Abduction Sitting;15 reps   Knee AROM Short Arc Quad Supine;10 reps   Knee AROM Long Arc Quad Supine;10 reps   Ankle Pumps Supine;10 reps   Balance training  sidestepping and backward walking completed   Assessment   Assessment patient cooperative and motivated, demonstrating improving gait balance and endurance but with continued R knee pain  patient will benefit from continued PT with progression as tolerated  The patient's AM-PAC Basic Mobility Inpatient Short Form Raw Score is 12   A Raw score of less than or equal to 16 suggests the patient may benefit from discharge to post-acute rehabilitation services  Please also refer to the recommendation of the Physical Therapist for safe discharge planning  Plan   Treatment/Interventions ADL retraining;Functional transfer training;LE strengthening/ROM; Therapeutic exercise; Endurance training;Patient/family training;Equipment eval/education; Bed mobility;Gait training; Compensatory technique education   PT Frequency Other (Comment)  (5week)   Recommendation   PT Discharge Recommendation Post acute rehabilitation services   AM-PAC Basic Mobility Inpatient   Turning in Bed Without Bedrails 3   Lying on Back to Sitting on Edge of Flat Bed 2   Moving Bed to Chair 2   Standing Up From Chair 2   Walk in Room 2   Climb 3-5 Stairs 1   Basic Mobility Inpatient Raw Score 12   Basic Mobility Standardized Score 32 23   Highest Level Of Mobility   JH-HLM Goal 4: Move to chair/commode   JH-HLM Achieved 6: Walk 10 steps or more   Education   Patient Demonstrates acceptance/verbal understanding;Explanation/teachback used;Demonstrates verbal understanding   Licensure   [place] License Number  Jonas Mis PT [id number]

## 2022-12-24 NOTE — PLAN OF CARE
Problem: PAIN - ADULT  Goal: Verbalizes/displays adequate comfort level or baseline comfort level  Description: Interventions:  - Encourage patient to monitor pain and request assistance  - Assess pain using appropriate pain scale  - Administer analgesics based on type and severity of pain and evaluate response  - Implement non-pharmacological measures as appropriate and evaluate response  - Consider cultural and social influences on pain and pain management  - Notify physician/advanced practitioner if interventions unsuccessful or patient reports new pain  Outcome: Progressing     Problem: INFECTION - ADULT  Goal: Absence or prevention of progression during hospitalization  Description: INTERVENTIONS:  - Assess and monitor for signs and symptoms of infection  - Monitor lab/diagnostic results  - Monitor all insertion sites, i e  indwelling lines, tubes, and drains  - Monitor endotracheal if appropriate and nasal secretions for changes in amount and color  - [place] appropriate cooling/warming therapies per order  - Administer medications as ordered  - Instruct and encourage patient and family to use good hand hygiene technique  - Identify and instruct in appropriate isolation precautions for identified infection/condition  Outcome: Progressing  Goal: Absence of fever/infection during neutropenic period  Description: INTERVENTIONS:  - Monitor WBC    Outcome: Progressing     Problem: DISCHARGE PLANNING  Goal: Discharge to home or other facility with appropriate resources  Description: INTERVENTIONS:  - Identify barriers to discharge w/patient and caregiver  - Arrange for needed discharge resources and transportation as appropriate  - Identify discharge learning needs (meds, wound care, etc )  - Arrange for interpretive services to assist at discharge as needed  - Refer to Case Management Department for coordinating discharge planning if the patient needs post-hospital services based on physician/advanced practitioner order or complex needs related to functional status, cognitive ability, or social support system  Outcome: Progressing     Problem: Knowledge Deficit  Goal: Patient/family/caregiver demonstrates understanding of disease process, treatment plan, medications, and discharge instructions  Description: Complete learning assessment and assess knowledge base    Interventions:  - Provide teaching at level of understanding  - Provide teaching via preferred learning methods  Outcome: Progressing

## 2022-12-24 NOTE — PLAN OF CARE
Problem: Potential for Falls  Goal: Patient will remain free of falls  Description: INTERVENTIONS:  - Educate patient/family on patient safety including physical limitations  - Instruct patient to call for assistance with activity   - Consult OT/PT to assist with strengthening/mobility   - Keep Call bell within reach  - Keep bed low and locked with side rails adjusted as appropriate  - Keep care items and personal belongings within reach  - Initiate and maintain comfort rounds  - Make Fall Risk Sign visible to staff  - Offer Toileting every 2 Hours, in advance of need  - Initiate/Maintain bed alarm  - Obtain necessary fall risk management equipment: rolling walker  - Apply yellow socks and bracelet for high fall risk patients  - Consider moving patient to room near nurses station  Outcome: Progressing     Problem: MOBILITY - ADULT  Goal: Maintain or return to baseline ADL function  Description: INTERVENTIONS:  -  Assess patient's ability to carry out ADLs; assess patient's baseline for ADL function and identify physical deficits which impact ability to perform ADLs (bathing, care of mouth/teeth, toileting, grooming, dressing, etc )  - Assess/evaluate cause of self-care deficits   - Assess range of motion  - Assess patient's mobility; develop plan if impaired  - Assess patient's need for assistive devices and provide as appropriate  - Encourage maximum independence but intervene and supervise when necessary  - Involve family in performance of ADLs  - Assess for home care needs following discharge   - Consider OT consult to assist with ADL evaluation and planning for discharge  - Provide patient education as appropriate  Outcome: Progressing  Goal: Maintains/Returns to pre admission functional level  Description: INTERVENTIONS:  - Perform BMAT or MOVE assessment daily    - Set and communicate daily mobility goal to care team and patient/family/caregiver     - Collaborate with rehabilitation services on mobility goals if consulted  - Perform Range of Motion 3 times a day  - Reposition patient every 2 hours    - Dangle patient 3 times a day  - Out of bed to chair 3 times a day   - Out of bed for meals 3 times a day  - Out of bed for toileting  - Record patient progress and toleration of activity level   Outcome: Progressing     Problem: PAIN - ADULT  Goal: Verbalizes/displays adequate comfort level or baseline comfort level  Description: Interventions:  - Encourage patient to monitor pain and request assistance  - Assess pain using appropriate pain scale  - Administer analgesics based on type and severity of pain and evaluate response  - Implement non-pharmacological measures as appropriate and evaluate response  - Consider cultural and social influences on pain and pain management  - Notify physician/advanced practitioner if interventions unsuccessful or patient reports new pain  Outcome: Progressing     Problem: INFECTION - ADULT  Goal: Absence or prevention of progression during hospitalization  Description: INTERVENTIONS:  - Assess and monitor for signs and symptoms of infection  - Monitor lab/diagnostic results  - Monitor all insertion sites, i e  indwelling lines, tubes, and drains  - Monitor endotracheal if appropriate and nasal secretions for changes in amount and color  - [place] appropriate cooling/warming therapies per order  - Administer medications as ordered  - Instruct and encourage patient and family to use good hand hygiene technique  - Identify and instruct in appropriate isolation precautions for identified infection/condition  Outcome: Progressing  Goal: Absence of fever/infection during neutropenic period  Description: INTERVENTIONS:  - Monitor WBC    Outcome: Progressing     Problem: SAFETY ADULT  Goal: Patient will remain free of falls  Description: INTERVENTIONS:  - Educate patient/family on patient safety including physical limitations  - Instruct patient to call for assistance with activity   - Consult OT/PT to assist with strengthening/mobility   - Keep Call bell within reach  - Keep bed low and locked with side rails adjusted as appropriate  - Keep care items and personal belongings within reach  - Initiate and maintain comfort rounds  - Make Fall Risk Sign visible to staff  - Offer Toileting every 2 Hours, in advance of need  - Initiate/Maintain bed alarm  - Obtain necessary fall risk management equipment: rolling walker  - Apply yellow socks and bracelet for high fall risk patients  - Consider moving patient to room near nurses station  Outcome: Progressing  Goal: Maintain or return to baseline ADL function  Description: INTERVENTIONS:  -  Assess patient's ability to carry out ADLs; assess patient's baseline for ADL function and identify physical deficits which impact ability to perform ADLs (bathing, care of mouth/teeth, toileting, grooming, dressing, etc )  - Assess/evaluate cause of self-care deficits   - Assess range of motion  - Assess patient's mobility; develop plan if impaired  - Assess patient's need for assistive devices and provide as appropriate  - Encourage maximum independence but intervene and supervise when necessary  - Involve family in performance of ADLs  - Assess for home care needs following discharge   - Consider OT consult to assist with ADL evaluation and planning for discharge  - Provide patient education as appropriate  Outcome: Progressing  Goal: Maintains/Returns to pre admission functional level  Description: INTERVENTIONS:  - Perform BMAT or MOVE assessment daily    - Set and communicate daily mobility goal to care team and patient/family/caregiver  - Collaborate with rehabilitation services on mobility goals if consulted  - Perform Range of Motion 3 times a day  - Reposition patient every 2 hours    - Dangle patient 3 times a day  - Out of bed to chair 3 times a day   - Out of bed for meals 3 times a day  - Out of bed for toileting  - Record patient progress and toleration of activity level   Outcome: Progressing     Problem: DISCHARGE PLANNING  Goal: Discharge to home or other facility with appropriate resources  Description: INTERVENTIONS:  - Identify barriers to discharge w/patient and caregiver  - Arrange for needed discharge resources and transportation as appropriate  - Identify discharge learning needs (meds, wound care, etc )  - Arrange for interpretive services to assist at discharge as needed  - Refer to Case Management Department for coordinating discharge planning if the patient needs post-hospital services based on physician/advanced practitioner order or complex needs related to functional status, cognitive ability, or social support system  Outcome: Progressing     Problem: Knowledge Deficit  Goal: Patient/family/caregiver demonstrates understanding of disease process, treatment plan, medications, and discharge instructions  Description: Complete learning assessment and assess knowledge base    Interventions:  - Provide teaching at level of understanding  - Provide teaching via preferred learning methods  Outcome: Progressing     Problem: Prexisting or High Potential for Compromised Skin Integrity  Goal: Skin integrity is maintained or improved  Description: INTERVENTIONS:  - Identify patients at risk for skin breakdown  - Assess and monitor skin integrity  - Assess and monitor nutrition and hydration status  - Monitor labs   - Assess for incontinence   - Turn and reposition patient  - Assist with mobility/ambulation  - Relieve pressure over bony prominences  - Avoid friction and shearing  - Provide appropriate hygiene as needed including keeping skin clean and dry  - Evaluate need for skin moisturizer/barrier cream  - Collaborate with interdisciplinary team   - Patient/family teaching  - Consider wound care consult   Outcome: Progressing     Problem: METABOLIC, FLUID AND ELECTROLYTES - ADULT  Goal: Electrolytes maintained within normal limits  Description: INTERVENTIONS:  - Monitor labs and assess patient for signs and symptoms of electrolyte imbalances  - Administer electrolyte replacement as ordered  - Monitor response to electrolyte replacements, including repeat lab results as appropriate  - Instruct patient on fluid and nutrition as appropriate  Outcome: Progressing  Goal: Fluid balance maintained  Description: INTERVENTIONS:  - Monitor labs   - Monitor I/O and WT  - Instruct patient on fluid and nutrition as appropriate  - Assess for signs & symptoms of volume excess or deficit  Outcome: Progressing

## 2022-12-24 NOTE — ASSESSMENT & PLAN NOTE
· History of mechanical mitral valve replacement September,1990  · On warfarin 1 25mg Sun, Tues, Thurs and 2 5 MWF, Sat  · Target INR of 2 5-3 5  · INR on admission 3 60, warfarin held  · Therapeutic today, start 1 25mg daily given supratherapeutic on admission  · Monitor INR outpatient

## 2022-12-24 NOTE — DISCHARGE SUMMARY
Soco 44  Discharge- Easton Hunt 1940, 79 y o  female MRN: [id number]  Unit/Bed#: [address] [address] Encounter: [id number]  Primary Care Provider: Ambar Stone MD   Date and time admitted to hospital: 12/21/2022 12:52 PM    * Chronic combined systolic and diastolic heart failure [facility])  Assessment & Plan  Wt Readings from Last 3 Encounters:   12/24/22 78 4 kg (172 lb 12 8 oz)   12/16/22 80 3 kg (177 lb)   11/03/22 80 3 kg (177 lb)     · Patient presented with shortness of breath that has been ongoing since November  · BNP noted to be > 6,000  · Chest x-ray showed Cardiomegaly, probable small bibasilar pleural effusions  · Echocardiogram of 4/11/2022 showed EF of 50 to 55% and normal MVR function  · ED gave Lasix IV 40 mg x 1 dose  · D/c IV Lasix 40 mg  · Continue 20 mg lasix po  · Daily weight, strict I&O  · Cardiac diet with 2 g sodium  · Fluid restriction 1500 mL      COVID-19  Assessment & Plan  · Patient presented with shortness of breath to the ED, with saturation in the 80s on arrival and was placed on 4L nasal cannula O2 with saturation in the 90s  Patient stated that in November she tested negative for COVID at home and she had a virtual appointment with her PCP who prescribed Tamiflu which she had completed  · Tested positive for COVID-19 today in ED  · Patient currently off oxygen, satting mid-high 90s  · Followed mild COVID pathway as patient was noted with hypoxia  · Remdesivir 200 mg x 1 and 100 mg x 4 days  · Decadron d/c on 12/23, crp wnl, no wheezing noted one exam  · Discharging to skilled nursing facility in stable condition    Knee pain  Assessment & Plan  · Patient complains of  Bilateral knee pain  · Right knee xray: Meniscal chondrocalcinosis  Questionable trace joint effusion  · Left knee xray: Meniscal chondrocalcinosis    Generalized swelling  · Pain management  · Lidocaine patch  · Patient reports improvement of knee pain today  · PT/OT recommending short term rehab  · Discharging to skilled nursing facility    Parkinson's disease [facility])  Assessment & Plan  · Continue Sinemet and entacapone    Hypothyroidism  Assessment & Plan  · Continue levothyroxine    Atrial fibrillation (HCC) [I48 91]  Assessment & Plan  POA  · Patient with chronic atrial fibrillation and EKG on admission revealed controlled A  fib  · Patient on Coumadin  · INR 3 6 on admission  · Therapeutic today, resume 1 25mg  · Continue to monitor    H/O mitral valve replacement with mechanical valve  Assessment & Plan  · History of mechanical mitral valve replacement September,1990  · On warfarin 1 25mg Sun, Tues, Thurs and 2 5 MWF, Sat  · Target INR of 2 5-3 5  · INR on admission 3 60, warfarin held  · Therapeutic today, start 1 25mg daily given supratherapeutic on admission  · Monitor INR outpatient        Medical Problems     Resolved Problems  Date Reviewed: 12/24/2022   None       Discharging Physician / Practitioner: STACY Cam  PCP: Erendira Peter MD  Admission Date:   Admission Orders (From admission, onward)     Ordered        12/21/22 1444  INPATIENT ADMISSION  Once                      Discharge Date: 12/24/22    Consultations During Hospital Stay:  · None    Procedures Performed:   · None    Significant Findings / Test Results:   · Left knee x-ray: Meniscal chondrocalcinosis with generalized swelling  Right knee x-ray: Meniscal chondrocalcinosis  Questionable trace joint effusion  Test Results Pending at Discharge (will require follow up): · None     Outpatient Tests Requested:  · INR    Complications: None      Reason for Admission: Shortness of breath    Hospital Course:   Fabian Ambriz is a 79 y o  female patient who originally presented to the hospital on 12/21/2022 due to shortness of breath  On arrival patient was noted to be hypoxic requiring 4 L nasal cannula oxygen  Pro NT BNP is noted to be 6,674    Lasix IV 40 mg was administered in ED with good result and patient was continue with same on the floor and was transitioned to 20 mg p o  Lasix today  Patient tested positive for COVID-19 and was started on mild COVID pathway  Decadron was discontinued as CRP was within normal limit and remdesivir continued until day of discharge  Patient reported bilateral knee pain, vascular Doppler to rule out DVT was negative and x-ray showed meniscal chondrocalcinosis  Voltaren gel was ordered 4 times daily  Patient has been discharged to acute rehab in stable condition, with saturation of mid to high  90s on room air      Please see above list of diagnoses and related plan for additional information  Condition at Discharge: stable    Discharge Day Visit / Exam:     Subjective: Patient seen and examined at bedside, reports feeling a lot better and hoping to get stronger at the rehab place  Denies chest pain, abdominal pain, diarrhea, nausea or vomiting  Vitals: Blood Pressure: 133/73 (12/24/22 0831)  Pulse: 77 (12/24/22 1000)  Temperature: 98 °F (36 7 °C) (12/24/22 0831)  Temp Source: Oral (12/23/22 1602)  Respirations: 20 (12/24/22 0831)  Height: 5' 4" (162 6 cm) (12/22/22 0727)  Weight - Scale: 78 4 kg (172 lb 12 8 oz) (12/24/22 0536)  SpO2: 93 % (12/24/22 1000)  Exam:   Physical Exam  Vitals and nursing note reviewed  Constitutional:       General: She is not in acute distress  Appearance: She is well-developed  HENT:      Head: Normocephalic and atraumatic  Eyes:      Conjunctiva/sclera: Conjunctivae normal    Cardiovascular:      Rate and Rhythm: Normal rate  Rhythm irregular  Heart sounds: Normal heart sounds  No murmur heard  Pulmonary:      Effort: Pulmonary effort is normal  No respiratory distress  Breath sounds: Normal breath sounds  Abdominal:      Palpations: Abdomen is soft  Tenderness: There is no abdominal tenderness  Musculoskeletal:         General: No swelling  Cervical back: Neck supple        Right lower leg: Edema present  Left lower leg: Edema present  Skin:     General: Skin is warm and dry  Capillary Refill: Capillary refill takes less than 2 seconds  Neurological:      General: No focal deficit present  Mental Status: She is alert and oriented to person, place, and time  Psychiatric:         Mood and Affect: Mood normal    Discussion with Family: Updated  () via phone  Discharge instructions/Information to patient and family:   See after visit summary for information provided to patient and family  Provisions for Follow-Up Care:  See after visit summary for information related to follow-up care and any pertinent home health orders  Disposition:   [facility]    Planned Readmission: No     Discharge Statement:  I spent 30 minutes discharging the patient  This time was spent on the day of discharge  I had direct contact with the patient on the day of discharge  Greater than 50% of the total time was spent examining patient, answering all patient questions, arranging and discussing plan of care with patient as well as directly providing post-discharge instructions  Additional time then spent on discharge activities  Discharge Medications:  See after visit summary for reconciled discharge medications provided to patient and/or family        **Please Note: This note may have been constructed using a voice recognition system**

## 2022-12-27 ENCOUNTER — TRANSITIONAL CARE MANAGEMENT (OUTPATIENT)
Dept: FAMILY MEDICINE CLINIC | Facility: CLINIC | Age: 82
End: 2022-12-27

## 2023-01-01 DIAGNOSIS — Z12.31 ENCOUNTER FOR SCREENING MAMMOGRAM FOR MALIGNANT NEOPLASM OF BREAST: Primary | ICD-10-CM

## 2023-01-01 DIAGNOSIS — E87.6 HYPOKALEMIA: Primary | ICD-10-CM

## 2023-01-01 DIAGNOSIS — I50.42 CHRONIC COMBINED SYSTOLIC AND DIASTOLIC HEART FAILURE (HCC): Primary | ICD-10-CM

## 2023-01-01 DIAGNOSIS — H91.90 HEARING LOSS, UNSPECIFIED HEARING LOSS TYPE, UNSPECIFIED LATERALITY: Primary | ICD-10-CM

## 2023-01-01 DIAGNOSIS — I50.42 CHRONIC COMBINED SYSTOLIC AND DIASTOLIC HEART FAILURE (HCC): ICD-10-CM

## 2023-01-01 RX ORDER — TORSEMIDE 20 MG/1
TABLET ORAL
Qty: 30 TABLET | Refills: 3
Start: 2023-01-01 | End: 2023-01-01

## 2023-01-01 RX ORDER — POTASSIUM CHLORIDE 20 MEQ/1
20 TABLET, EXTENDED RELEASE ORAL DAILY
Qty: 10 TABLET | Refills: 0 | Status: SHIPPED | OUTPATIENT
Start: 2023-01-01 | End: 2023-01-01 | Stop reason: SDUPTHER

## 2023-01-05 ENCOUNTER — ANTICOAG VISIT (OUTPATIENT)
Dept: CARDIOLOGY CLINIC | Facility: CLINIC | Age: 83
End: 2023-01-05

## 2023-01-05 ENCOUNTER — TELEPHONE (OUTPATIENT)
Dept: FAMILY MEDICINE CLINIC | Facility: CLINIC | Age: 83
End: 2023-01-05

## 2023-01-05 DIAGNOSIS — I48.20 CHRONIC ATRIAL FIBRILLATION (HCC): ICD-10-CM

## 2023-01-05 DIAGNOSIS — Z95.2 H/O MITRAL VALVE REPLACEMENT WITH MECHANICAL VALVE: Primary | ICD-10-CM

## 2023-01-05 LAB — INR PPP: 1.8 (ref 0.84–1.19)

## 2023-01-05 RX ORDER — WARFARIN SODIUM 1 MG/1
TABLET ORAL
Qty: 90 TABLET | Refills: 3 | Status: SHIPPED | OUTPATIENT
Start: 2023-01-05

## 2023-01-05 NOTE — TELEPHONE ENCOUNTER
Daren Chappell called from [facility]  As patient is being discharged there tomorrow at 10 am  I scheduled IMER 1/10 at 8am with Dr Garcia Reid  Please call patient for TCM tomorrow

## 2023-01-06 ENCOUNTER — TRANSITIONAL CARE MANAGEMENT (OUTPATIENT)
Dept: FAMILY MEDICINE CLINIC | Facility: CLINIC | Age: 83
End: 2023-01-06

## 2023-01-09 ENCOUNTER — OFFICE VISIT (OUTPATIENT)
Dept: UROLOGY | Facility: CLINIC | Age: 83
End: 2023-01-09

## 2023-01-09 VITALS
SYSTOLIC BLOOD PRESSURE: 128 MMHG | BODY MASS INDEX: 29.84 KG/M2 | HEIGHT: 64 IN | WEIGHT: 174.8 LBS | OXYGEN SATURATION: 98 % | HEART RATE: 72 BPM | DIASTOLIC BLOOD PRESSURE: 78 MMHG | RESPIRATION RATE: 18 BRPM

## 2023-01-09 DIAGNOSIS — R31.9 HEMATURIA, UNSPECIFIED TYPE: Primary | ICD-10-CM

## 2023-01-09 RX ORDER — ALBUTEROL SULFATE 90 UG/1
AEROSOL, METERED RESPIRATORY (INHALATION)
COMMUNITY
Start: 2023-01-06

## 2023-01-09 NOTE — PROGRESS NOTES
1/9/2023    Peng Llanes  1940  [id number]      Chief Complaint: Follow-up for urinary frequency    History of Present Illness  Peng Llanes is a 79 y o  female with a history of urinary frequency  Urinary symptoms are urinary frequency, urgency and incontinence  She was started on Myrbetriq and returns to discuss her symptoms  Since her last office visit, she has been hospitalized for COVID and was in SNF for a period of time  She has been home for the past 3 days  She states sometime since her last visit with us she stopped taking the medication because it made her drowsy  Her symptoms have been stable/tolerbale  She is satisfied with her symptoms as they are  She does wear protective pad  Her  has a question about the color of her urine and would like to know if the coloration is a medication she is taking  On review of her urine there is a very orange/red color  This possibly represents hematuria  The patient is not on Pyridium  The patient did have a urinalysis until the third 2022 which was negative for blood      Past Medical History  Past Medical History:   Diagnosis Date   • Anal fissure    • Arthritis     osteo joints   • Asthma with acute exacerbation    • Blepharitis    • Breast lump    • Chalazion    • CHF, chronic (HCC)    • Difficulty walking    • Disease of thyroid gland    • Drooling    • Dysphagia    • Fall 05/04/2018   • Fibromyalgia, primary    • Glaucoma     Normal pressure   • History of transfusion 1996   • Hordeolum externum    • Hx of transient ischemic attack (TIA)    • Hypophonia    • Infectious viral hepatitis     Hep C dx 1996    • Lyme carditis    • Murmur, cardiac    • Parkinsons (HCC)    • Rotator cuff tendinitis    • Seasonal allergies    • Urinary frequency        Past Surgical History  Past Surgical History:   Procedure Laterality Date   • BREAST BIOPSY Right 2018    benign   • BREAST CYST EXCISION Left     benign   • BREAST SURGERY N/A     benign, calcium   • CARDIAC SURGERY  1990    mitral valve replacement   • CATARACT EXTRACTION Right 2015   • CATARACT EXTRACTION Left 2014   • CHEST TUBE INSERTION Right     post pleural effusion   • CHOLECYSTECTOMY  2000    lap   • HYSTERECTOMY  1973    partial   • NC ARTHROCENTESIS ASPIR&/INJ MAJOR JT/BURSA W/O US Left 3/19/2021    Procedure: Sacroiliac Joint Injection ( 98167 ); Surgeon: Madi Dinh MD;  Location: [facility] MAIN OR;  Service: Pain Management    • NC COLSC FLX W/RMVL OF TUMOR POLYP LESION SNARE TQ N/A 7/18/2017    Procedure: COLONOSCOPY and biopsy ;  Surgeon: Nabor Aguayo MD;  Location: [facility] GI LAB; Service: Gastroenterology   • SKIN BIOPSY      pre cancerous on face   • TONSILLECTOMY     • US GUIDED BREAST BIOPSY RIGHT COMPLETE Right 2/13/2018       Physical Exam  /78 (BP Location: Right arm, Patient Position: Sitting, Cuff Size: Large)   Pulse 72   Resp 18   Ht 5' 4" (1 626 m)   Wt 79 3 kg (174 lb 12 8 oz)   SpO2 98%   BMI 30 00 kg/m²     General:  Healthy appearing female in no acute distress  Head:  Normocephalic, atraumatic  ENMT: Nares patent, moist mucous membranes  Cardiovascular:  Regular rate  Respiratory:  Patient has unlabored respirations  Results  I have personally reviewed all pertinent lab results and reviewed with patient  CT ABDOMEN AND PELVIS WITH IV CONTRAST     INDICATION:  Diarrhea/syncope      COMPARISON:  CT abdomen and pelvis 7/12/2018     TECHNIQUE:  CT examination of the abdomen and pelvis was performed  Axial, sagittal, and coronal 2D reformatted images were created from the source data and submitted for interpretation      Radiation dose length product (DLP) for this visit:  572 mGy-cm    This examination, like all CT scans performed in the [facility], was performed utilizing techniques to minimize radiation dose exposure, including the use of iterative   reconstruction and automated exposure control      IV Contrast:  100 mL of iohexol (OMNIPAQUE)  Enteric Contrast:  Enteric contrast was not administered      FINDINGS:     ABDOMEN     LOWER CHEST:  Cardiomegaly with mitral valve replacement      LIVER/BILIARY TREE:  Subcentimeter hypodensity dome of the left lobe too small to accurately characterize, but statistically most likely to represent subcentimeter cyst   No suspicious solid hepatic lesion is identified  Hepatic contours are normal   No   biliary dilatation      GALLBLADDER:  Gallbladder is surgically absent      SPLEEN:  Unremarkable      PANCREAS:  Mild fatty replacement of the pancreas without acute findings      ADRENAL GLANDS:  The right adrenal gland is normal   Mild thickening of the left adrenal gland, chronic and likely benign      KIDNEYS/URETERS:  No hydronephrosis or urinary tract calculus  One or more sharply circumscribed subcentimeter renal hypodensities are present, too small to accurately characterize, and statistically most likely benign findings  According to recent   literature (Radiology 2019) no further workup of these findings is recommended      STOMACH AND BOWEL:    Evaluation of the stomach is limited by underdistention  No focal pathology seen within limitations      The small bowel is normal caliber  Scattered colonic diverticulosis without evidence of diverticulitis      APPENDIX:  No findings to suggest appendicitis      ABDOMINOPELVIC CAVITY:  No ascites  No pneumoperitoneum  No pathologic lymphadenopathy      VESSELS: Atherosclerotic changes abdominal aorta without evidence of aneurysm       PELVIS     REPRODUCTIVE ORGANS:  Status post hysterectomy  No adnexal masses      URINARY BLADDER:  Largely collapsed, otherwise unremarkable      ABDOMINAL WALL/INGUINAL REGIONS:  Tiny fat-containing umbilical hernia      OSSEOUS STRUCTURES:  No acute fracture or destructive osseous lesion  Stable sclerosis within the posterior left iliac bone, nonspecific but possibly reflecting Paget's disease   Degenerative changes of the spine  Traditional L5 vertebra which appears   partially lumbarized and partially sacralized  Grade 1 spondylolisthesis L4 on L5 secondary to facet arthropathy         IMPRESSION:     No acute intra-abdominal abnormality      Colonic diverticulosis without evidence of diverticulitis  Assessment  82yoF with urinary urgency/frequency secondary to overactive bladder versus neurogenic bladder secondary to Parkinson's  The patient is currently happy with her current voiding status off of medications and would like to avoid medications if possible  I personally reviewed her CT abdomen and pelvis which did not demonstrate any abnormal findings from urologic perspective  I looked at the urine that she gave today and it appears blood-tinged to me  We will send this out for a urinalysis although she did not have microhematuria in the past   If her urine is positive for blood I will recommend hematuria work-up  Plan  1  Urinalysis and urine culture sent today to evaluate for hematuria and/or infection  2  Will call if results are abnormal and hematuria work-up is indicated otherwise the patient can follow-up as needed  Yosi Malik MD  [facility]    Portions of the above record have been created with voice recognition software  Occasional wrong word or "sound alike" substitution may have occurred due to the inherent limitations of voice recognition software  Please read the chart carefully and recognize, using context, where substitution may have occurred  For further clarification, please contact me directly

## 2023-01-10 ENCOUNTER — OFFICE VISIT (OUTPATIENT)
Dept: FAMILY MEDICINE CLINIC | Facility: CLINIC | Age: 83
End: 2023-01-10

## 2023-01-10 ENCOUNTER — APPOINTMENT (OUTPATIENT)
Dept: RADIOLOGY | Facility: CLINIC | Age: 83
End: 2023-01-10

## 2023-01-10 ENCOUNTER — APPOINTMENT (OUTPATIENT)
Dept: LAB | Facility: CLINIC | Age: 83
End: 2023-01-10

## 2023-01-10 VITALS
HEART RATE: 84 BPM | OXYGEN SATURATION: 99 % | WEIGHT: 175 LBS | HEIGHT: 64 IN | RESPIRATION RATE: 20 BRPM | SYSTOLIC BLOOD PRESSURE: 130 MMHG | BODY MASS INDEX: 29.88 KG/M2 | TEMPERATURE: 97.2 F | DIASTOLIC BLOOD PRESSURE: 72 MMHG

## 2023-01-10 DIAGNOSIS — M25.561 CHRONIC PAIN OF RIGHT KNEE: ICD-10-CM

## 2023-01-10 DIAGNOSIS — I48.20 CHRONIC ATRIAL FIBRILLATION, UNSPECIFIED (HCC): ICD-10-CM

## 2023-01-10 DIAGNOSIS — G20 PARKINSON'S DISEASE (HCC): ICD-10-CM

## 2023-01-10 DIAGNOSIS — G89.29 CHRONIC PAIN OF RIGHT KNEE: ICD-10-CM

## 2023-01-10 DIAGNOSIS — I50.42 CHRONIC COMBINED SYSTOLIC AND DIASTOLIC HEART FAILURE (HCC): ICD-10-CM

## 2023-01-10 DIAGNOSIS — G89.29 OTHER CHRONIC PAIN: ICD-10-CM

## 2023-01-10 DIAGNOSIS — I50.42 CHRONIC COMBINED SYSTOLIC AND DIASTOLIC HEART FAILURE (HCC): Primary | ICD-10-CM

## 2023-01-10 PROBLEM — U07.1 COVID-19: Status: RESOLVED | Noted: 2022-12-21 | Resolved: 2023-01-10

## 2023-01-10 LAB
BACTERIA UR QL AUTO: ABNORMAL /HPF
BILIRUB UR QL STRIP: NEGATIVE
CAOX CRY URNS QL MICRO: ABNORMAL /HPF
CLARITY UR: ABNORMAL
COLOR UR: ABNORMAL
GLUCOSE UR STRIP-MCNC: NEGATIVE MG/DL
HGB UR QL STRIP.AUTO: NEGATIVE
HYALINE CASTS #/AREA URNS LPF: ABNORMAL /LPF
KETONES UR STRIP-MCNC: ABNORMAL MG/DL
LEUKOCYTE ESTERASE UR QL STRIP: ABNORMAL
MUCOUS THREADS UR QL AUTO: ABNORMAL
NITRITE UR QL STRIP: NEGATIVE
NON-SQ EPI CELLS URNS QL MICRO: ABNORMAL /HPF
PH UR STRIP.AUTO: 6.5 [PH]
PROT UR STRIP-MCNC: ABNORMAL MG/DL
RBC #/AREA URNS AUTO: ABNORMAL /HPF
SP GR UR STRIP.AUTO: 1.02 (ref 1–1.03)
UROBILINOGEN UR STRIP-ACNC: <2 MG/DL
WBC #/AREA URNS AUTO: ABNORMAL /HPF

## 2023-01-10 RX ORDER — TORSEMIDE 20 MG/1
20 TABLET ORAL DAILY
Qty: 30 TABLET | Refills: 3 | Status: SHIPPED | OUTPATIENT
Start: 2023-01-10

## 2023-01-10 NOTE — PROGRESS NOTES
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 Yes    Scheduled for follow up? Yes    Referrals needed  Dr Rosa Lakhani, and Dr Lucero Michaels    Did you obtain your prescribed medications  Yes    Do you need help managing your prescriptions or medications  Yes    Is transportation to your appointment needed  No    I have advised the patient to call PCP with any new or worsening symptoms  Thelma ross/arturo 1//6/23    Living Arrangements  Spouse or Significiant other    Are you recieving any outpatient services  No    Are you recieving home care services  Yes    Types of home care services  Home PT    Are you using any community resources  No    Current waiver services  No    Have you fallen in the last 12 months  No    Interperter language line needed  No           Patient ID: Irene Vogt is a 79 y o  female  HPI      The following portions of the patient's history were reviewed and updated as appropriate: allergies, current medications, past family history, past medical history, past social history, past surgical history and problem list     Review of Systems   Constitutional: Negative  Respiratory: Positive for shortness of breath  Negative for cough, chest tightness and wheezing  Cardiovascular: Positive for leg swelling  Negative for chest pain and palpitations     Gastrointestinal: Negative  Genitourinary: Negative  Musculoskeletal: Positive for arthralgias (R knee pain x several months ) and gait problem  Negative for back pain  Neurological: Positive for weakness (chronic  -  using a walker )  Negative for seizures  Current Outpatient Medications   Medication Sig Dispense Refill   • acetaminophen (TYLENOL) 650 mg CR tablet Take 1 tablet (650 mg total) by mouth 3 (three) times a day (Patient taking differently: Take 650 mg by mouth as needed) 100 tablet 0   • alendronate (FOSAMAX) 70 mg tablet take 1 tablet by mouth EVERY 7 DAYS 12 tablet 3   • carbidopa-levodopa (SINEMET)  mg per tablet TAKE 1 AND 1/2 TABLETS BY MOUTH 4 TIMES A DAY (Patient taking differently: Take 1 tablet by mouth 4 (four) times a day TAKE 1 AND 1/2 TABLETS IN THE MORNING 1 TABLET AT LATE MORNING 1 1/2 MID AFTER NOON  1 TABLET IN THE EVENING) 240 tablet 8   • Cholecalciferol (VITAMIN D3) 2000 units TABS Take 2,000 Units by mouth every morning     • entacapone (COMTAN) 200 mg tablet take 1 tablet by mouth four times a day 120 tablet 4   • furosemide (LASIX) 20 mg tablet Take 1/2 tablet by mouth  4 days per week  Schedule administration to be done when patient can be near toilet facilities for 4-6 hours   90 tablet 3   • levothyroxine 50 mcg tablet take 1 tablet by mouth every morning 30 tablet 3   • memantine (NAMENDA) 10 mg tablet take 1 tablet by mouth once daily 90 tablet 3   • Multiple Vitamins-Minerals (OCUVITE ADULT 50+ PO) Take by mouth daily with breakfast     • polyvinyl alcohol (LIQUIFILM TEARS) 1 4 % ophthalmic solution 1 drop as needed for dry eyes     • warfarin (Coumadin) 1 mg tablet Pt to take 3 tablets per day or as directed 90 tablet 3   • warfarin (COUMADIN) 2 5 mg tablet TAKE 1/2 TO 1 TABLET BY MOUTH DAILY OR AS DIRECTED BY PHYSICIAN 30 tablet 11   • albuterol (PROVENTIL HFA,VENTOLIN HFA) 90 mcg/act inhaler  (Patient not taking: Reported on 1/10/2023)     • Diclofenac Sodium (VOLTAREN) 1 % Apply 2 g topically 4 (four) times a day as needed (Foot pain) for up to 15 days 50 g 2     No current facility-administered medications for this visit  Objective:    /72 (BP Location: Left arm, Patient Position: Sitting, Cuff Size: Standard)   Pulse 84   Temp (!) 97 2 °F (36 2 °C)   Resp 20   Ht 5' 4" (1 626 m)   Wt 79 4 kg (175 lb)   SpO2 99%   BMI 30 04 kg/m²        Physical Exam  Constitutional:       General: She is not in acute distress  Appearance: She is not ill-appearing  Cardiovascular:      Rate and Rhythm: Normal rate  Rhythm irregularly irregular  Heart sounds: No murmur heard  Pulmonary:      Effort: Accessory muscle usage present  No tachypnea  Breath sounds: No decreased breath sounds, wheezing, rhonchi or rales  Musculoskeletal:      Right lower leg: Edema (+ 2 up to knee level ) present  Left lower leg: Edema (+ 2 up to knee level ) present  Neurological:      Mental Status: She is alert  Labs in chart were reviewed  Assessment/Plan:         Diagnoses and all orders for this visit:    Chronic combined systolic and diastolic heart failure ([address] )  -   Was advised to take 60 mg of Lasix today the start Torsemide tomorrow   torsemide (DEMADEX) 20 mg tablet; Take 1 tablet (20 mg total) by mouth daily  -     sacubitril-valsartan (ENTRESTO) 24-26 MG TABS; Take 1 tablet by mouth 2 (two) times a day  -     XR chest pa & lateral; Future -  Stat     Other chronic pain    Chronic pain of right knee  -     Ambulatory Referral to Orthopedic Surgery; Future  -     XR knee 3 vw right non injury;  Future    Parkinson's disease ([address] )    Chronic atrial fibrillation, unspecified ([facility] [address] )        rto in 1 wk                     Tang Jain MD

## 2023-01-11 ENCOUNTER — ANTICOAG VISIT (OUTPATIENT)
Dept: CARDIOLOGY CLINIC | Facility: CLINIC | Age: 83
End: 2023-01-11

## 2023-01-11 DIAGNOSIS — Z95.2 H/O MITRAL VALVE REPLACEMENT WITH MECHANICAL VALVE: Primary | ICD-10-CM

## 2023-01-11 DIAGNOSIS — I48.20 CHRONIC ATRIAL FIBRILLATION (HCC): ICD-10-CM

## 2023-01-11 LAB — BACTERIA UR CULT: NORMAL

## 2023-01-12 ENCOUNTER — HOSPITAL ENCOUNTER (OUTPATIENT)
Dept: RADIOLOGY | Facility: HOSPITAL | Age: 83
Discharge: HOME | End: 2023-01-12
Attending: PODIATRIST

## 2023-01-12 DIAGNOSIS — R60.9 CHRONIC EDEMA: ICD-10-CM

## 2023-01-13 ENCOUNTER — TELEPHONE (OUTPATIENT)
Dept: FAMILY MEDICINE CLINIC | Facility: CLINIC | Age: 83
End: 2023-01-13

## 2023-01-13 NOTE — TELEPHONE ENCOUNTER
Dr Aguilera [place]:    ZEHRA:  Patient called to let you know that her weight for this week is:    1/10 - 175  1/11 - 168  1/12 - 167 4  1/13 - 168 6    Patient is aware that you are out of office today

## 2023-01-16 ENCOUNTER — TELEPHONE (OUTPATIENT)
Dept: FAMILY MEDICINE CLINIC | Facility: CLINIC | Age: 83
End: 2023-01-16

## 2023-01-16 NOTE — TELEPHONE ENCOUNTER
Pt weight from 1-10 to 1-16  1-10 176  1-11 168  1-12 167 4  1-13 168 6  1-14 166 3  1-15 166 5  1-16 164 3

## 2023-01-17 ENCOUNTER — PATIENT OUTREACH (OUTPATIENT)
Dept: CASE MANAGEMENT | Facility: HOSPITAL | Age: 83
End: 2023-01-17

## 2023-01-17 DIAGNOSIS — I50.42 CHRONIC COMBINED SYSTOLIC AND DIASTOLIC HEART FAILURE (HCC): Primary | ICD-10-CM

## 2023-01-17 NOTE — PROGRESS NOTES
Outpatient Care Manager Note: Patient identified for SNF/ROB Pathway  BMP order placed and PCP notified  Chart review completed  Patient discharged from [facility]  on 1/6/23

## 2023-01-17 NOTE — PROGRESS NOTES
Outpatient Care Manager Note: Chart notes reviewed  Call made to home and spoke with patient and her   ROB pathway interventions reviewed  including:  • PCP visit within 1 week- saw 1/10/23  • Avoiding NSAIDs-uses Tylenol  • Adequate nutrition and hydration- appetite poor-fair  recommneded ensure/boost and avoiding high sodium processed foods   aware and cooking homemade meals  • Keeping BP >130 if normal BP not lower- not checking, but has machine and recommended to check daily  Call with low readings  • Checking temperature  • Assessing for weight gain or loss and edema changes since home- states chronic edema improved and weight down abut 12# since on enntresto and torsemide  • Medication Review- reviewed with  who is managing medcation  • BMP- ordered and to get tomorrow [facility] along with INR  Patient and  state feel that skilled nursing services are needed for assistance with CHF education and management  Also would benefit from PT/OT  They state someone is coming tomorrow from a physical therapy company ordered by Ted/Mildred  Call made to [facility], spoke with Presley Shelby in [address] [facility] department who confirms is "[facility] to you"- a [address]  I did request she see if can change to [facility] and I will notify PCP  Will follow up  Call to [facility] Intake, spoke with Flor and confirmed they did receive a referral and are working with Bruce Cervantes from [facility] for required orders and will reach out to PCP if needed  Tentative start date 1/19/22

## 2023-01-18 ENCOUNTER — ANTICOAG VISIT (OUTPATIENT)
Dept: CARDIOLOGY CLINIC | Facility: CLINIC | Age: 83
End: 2023-01-18

## 2023-01-18 ENCOUNTER — APPOINTMENT (OUTPATIENT)
Dept: LAB | Facility: CLINIC | Age: 83
End: 2023-01-18

## 2023-01-18 DIAGNOSIS — I50.42 CHRONIC COMBINED SYSTOLIC AND DIASTOLIC HEART FAILURE (HCC): ICD-10-CM

## 2023-01-18 DIAGNOSIS — Z95.2 H/O MITRAL VALVE REPLACEMENT WITH MECHANICAL VALVE: Primary | ICD-10-CM

## 2023-01-18 DIAGNOSIS — I48.20 CHRONIC ATRIAL FIBRILLATION (HCC): ICD-10-CM

## 2023-01-18 LAB
ANION GAP SERPL CALCULATED.3IONS-SCNC: 5 MMOL/L (ref 4–13)
BUN SERPL-MCNC: 22 MG/DL (ref 5–25)
CALCIUM SERPL-MCNC: 10.2 MG/DL (ref 8.3–10.1)
CHLORIDE SERPL-SCNC: 107 MMOL/L (ref 96–108)
CO2 SERPL-SCNC: 30 MMOL/L (ref 21–32)
CREAT SERPL-MCNC: 1.14 MG/DL (ref 0.6–1.3)
GFR SERPL CREATININE-BSD FRML MDRD: 44 ML/MIN/1.73SQ M
GLUCOSE P FAST SERPL-MCNC: 126 MG/DL (ref 65–99)
POTASSIUM SERPL-SCNC: 3.7 MMOL/L (ref 3.5–5.3)
SODIUM SERPL-SCNC: 142 MMOL/L (ref 135–147)

## 2023-01-19 ENCOUNTER — TELEPHONE (OUTPATIENT)
Dept: FAMILY MEDICINE CLINIC | Facility: CLINIC | Age: 83
End: 2023-01-19

## 2023-01-19 NOTE — TELEPHONE ENCOUNTER
Dr Ken Nicole    [facility] needs order placed which they will pull from Epic to start homecare for nusring, PT and OT  I advised that Dr Ken Nicole is not in office at this time

## 2023-01-25 ENCOUNTER — PATIENT OUTREACH (OUTPATIENT)
Dept: CASE MANAGEMENT | Facility: HOSPITAL | Age: 83
End: 2023-01-25

## 2023-01-25 NOTE — PROGRESS NOTES
SNF/ROB Pathway Outpatient Care Manager Note: Received call from patient looking for Social work assistance to find out what community programs she might qualify for  Spoke with , Melvi Olson as well and he described, meals and transportation  I had already encouraged to call the [facility]  I did give him the phone # and he agreed to call  I will follow up and see if he needs any further assistance once has called  [facility] start of Care Yesterday and will have RN, PT and OT  with possible Tele-monitoring for HF in future  Patient reports feeling"ok"- still shaky  States weight is 158  9# today and edema is "much improved"  She can now fit into her shoes  Of note her weight on 1/10 on her home scale was 175#  She has lost 16# since starting Entresto and switching from lasix to torsemide on 1/10  She denies increased dizziness beyond her baseline even on standing  States she checks her BP and it has been "good", but unsure of exact readings  BMP done 1/18 shows modest increase in creatine to 1 14 from   90  Patient to see Dr Jhon Soto on 1/31 and will update him and PCP  Appetite since home has been only fair and states, "sometimes it just doesn't want to go down"  Encouraged small frequent meals and ensure/boost drink daily  Will follow up

## 2023-01-27 ENCOUNTER — OFFICE VISIT (OUTPATIENT)
Dept: OBGYN CLINIC | Facility: CLINIC | Age: 83
End: 2023-01-27

## 2023-01-27 VITALS
HEIGHT: 64 IN | BODY MASS INDEX: 27.31 KG/M2 | WEIGHT: 160 LBS | DIASTOLIC BLOOD PRESSURE: 60 MMHG | SYSTOLIC BLOOD PRESSURE: 120 MMHG | HEART RATE: 66 BPM

## 2023-01-27 DIAGNOSIS — I50.42 CHRONIC COMBINED SYSTOLIC AND DIASTOLIC HEART FAILURE (HCC): ICD-10-CM

## 2023-01-27 DIAGNOSIS — M25.561 CHRONIC PAIN OF RIGHT KNEE: ICD-10-CM

## 2023-01-27 DIAGNOSIS — M25.461 EFFUSION OF RIGHT KNEE: ICD-10-CM

## 2023-01-27 DIAGNOSIS — M17.11 PRIMARY OSTEOARTHRITIS OF RIGHT KNEE: Primary | ICD-10-CM

## 2023-01-27 DIAGNOSIS — G89.29 CHRONIC PAIN OF RIGHT KNEE: ICD-10-CM

## 2023-01-27 DIAGNOSIS — M11.20 CHONDROCALCINOSIS: ICD-10-CM

## 2023-01-27 RX ORDER — BUPIVACAINE HYDROCHLORIDE 5 MG/ML
6 INJECTION, SOLUTION EPIDURAL; INTRACAUDAL
Status: COMPLETED | OUTPATIENT
Start: 2023-01-27 | End: 2023-01-27

## 2023-01-27 RX ORDER — TRIAMCINOLONE ACETONIDE 40 MG/ML
80 INJECTION, SUSPENSION INTRA-ARTICULAR; INTRAMUSCULAR
Status: COMPLETED | OUTPATIENT
Start: 2023-01-27 | End: 2023-01-27

## 2023-01-27 RX ADMIN — TRIAMCINOLONE ACETONIDE 80 MG: 40 INJECTION, SUSPENSION INTRA-ARTICULAR; INTRAMUSCULAR at 10:37

## 2023-01-27 RX ADMIN — BUPIVACAINE HYDROCHLORIDE 6 ML: 5 INJECTION, SOLUTION EPIDURAL; INTRACAUDAL at 10:37

## 2023-01-27 NOTE — PROGRESS NOTES
Assessment/Plan:  1  Primary osteoarthritis of right knee  Large joint arthrocentesis: R knee      2  Chronic pain of right knee  Ambulatory Referral to Orthopedic Surgery    XR knee 3 vw right non injury    Large joint arthrocentesis: R knee      3  Effusion of right knee  Large joint arthrocentesis: R knee      4  Chondrocalcinosis  Large joint arthrocentesis: R knee        Scribe Attestation    I,:  Rosalinaaries Corrigan am acting as a scribe while in the presence of the attending physician :       I,:  Renetta Cortez, DO personally performed the services described in this documentation    as scribed in my presence :         Easton Hagen is a 79 y o  female who presents with right knee osteoarthritis  XR reviewed with the patient today  After revieweing the imaging, completing a thorough physical exam and history, I offered to provide the patient with a corticosteroid injection and aspiration of the right knee  Patient consented underwent the procedure without issues or complications  Approximately 15 cc of benign joint fluid was yielded from the aspiration  Postinjection instructions provided to the patient today  Discussed that this procedure can do no sooner than 3 months from today  I would like to see the patient back as needed if symptoms worsen or fail to improve  Large joint arthrocentesis: R knee  Universal Protocol:  Consent: Verbal consent obtained  Written consent not obtained    Risks and benefits: risks, benefits and alternatives were discussed  Consent given by: patient  Patient understanding: patient states understanding of the procedure being performed  Site marked: the operative site was marked  Patient identity confirmed: verbally with patient    Supporting Documentation  Indications: pain   Procedure Details  Location: knee - R knee  Preparation: Patient was prepped and draped in the usual sterile fashion  Needle size: 18 G  Ultrasound guidance: no  Approach: superior lateral   Medications administered: 6 mL bupivacaine (PF) 0 5 %; 80 mg triamcinolone acetonide 40 mg/mL    Aspirate amount: 15 mL    Patient tolerance: patient tolerated the procedure well with no immediate complications  Dressing:  Sterile dressing applied            Subjective: Right knee pain    Patient ID: Sharol Hatchet is a 79 y o  female dents the office today with chronic right knee pain  Patient reports her symptoms have increased in the past 6 months and denies any injury or trauma  She notes diffuse anterior knee pain that is increased with prolonged walking  She currently uses a walker to ambulate at this time  Tylenol is her main pain medication which she takes as needed to help alleviate her symptoms  PMHX of Parkinson's syndrome  Review of Systems   Constitutional: Positive for activity change  Negative for chills, fever and unexpected weight change  HENT: Negative for hearing loss, nosebleeds and sore throat  Eyes: Negative for pain, redness and visual disturbance  Respiratory: Negative for cough, shortness of breath and wheezing  Cardiovascular: Negative for chest pain, palpitations and leg swelling  Gastrointestinal: Negative for abdominal pain, nausea and vomiting  Endocrine: Negative for polydipsia and polyuria  Genitourinary: Negative for dysuria and hematuria  Musculoskeletal: Positive for arthralgias and myalgias  See HPI   Skin: Negative for rash and wound  Neurological: Negative for dizziness, numbness and headaches  Psychiatric/Behavioral: Negative for decreased concentration and suicidal ideas  The patient is not nervous/anxious            Past Medical History:   Diagnosis Date   • Anal fissure    • Arthritis     osteo joints   • Asthma with acute exacerbation    • Blepharitis    • Breast lump    • Chalazion    • CHF, chronic (HCC)    • Difficulty walking    • Disease of thyroid gland    • Drooling    • Dysphagia    • Fall 05/04/2018   • Fibromyalgia, primary    • Glaucoma     Normal pressure   • History of transfusion    • Hordeolum externum    • Hx of transient ischemic attack (TIA)    • Hypophonia    • Infectious viral hepatitis     Hep C dx     • Lyme carditis    • Murmur, cardiac    • Parkinsons (HCC)    • Rotator cuff tendinitis    • Seasonal allergies    • Urinary frequency        Past Surgical History:   Procedure Laterality Date   • BREAST BIOPSY Right 2018    benign   • BREAST CYST EXCISION Left     benign   • BREAST SURGERY N/A     benign, calcium   • CARDIAC SURGERY      mitral valve replacement   • CATARACT EXTRACTION Right 2015   • CATARACT EXTRACTION Left    • CHEST TUBE INSERTION Right     post pleural effusion   • CHOLECYSTECTOMY      lap   • HYSTERECTOMY  1973    partial   • ID ARTHROCENTESIS ASPIR&/INJ MAJOR JT/BURSA W/O US Left 3/19/2021    Procedure: Sacroiliac Joint Injection ( 88[address] ); Surgeon: Stuart Moore MD;  Location: [facility] MAIN OR;  Service: Pain Management    • ID COLSC FLX W/RMVL OF TUMOR POLYP LESION SNARE TQ N/A 2017    Procedure: COLONOSCOPY and biopsy ;  Surgeon: Emily Sol MD;  Location: [facility];   Service: Gastroenterology   • SKIN BIOPSY      pre cancerous on face   • TONSILLECTOMY     • US GUIDED BREAST BIOPSY RIGHT COMPLETE Right 2018       Family History   Problem Relation Age of Onset   • Heart disease Mother         murmur Cardiomegaly age 64   • Pneumonia Father    • Heart disease Brother         mitrsl valve   • Parkinsonism Brother    • Arthritis Brother    • Lupus Daughter    • Diabetes Daughter    • Depression Daughter        Social History     Occupational History   • Not on file   Tobacco Use   • Smoking status: Former     Packs/day: 0 10     Years: 10 00     Pack years: 1 00     Types: Cigarettes     Quit date:      Years since quittin 1   • Smokeless tobacco: Never   Vaping Use   • Vaping Use: Never used   Substance and Sexual Activity   • Alcohol use: Not Currently • Drug use: No   • Sexual activity: Not Currently         Current Outpatient Medications:   •  acetaminophen (TYLENOL) 650 mg CR tablet, Take 1 tablet (650 mg total) by mouth 3 (three) times a day (Patient taking differently: Take 650 mg by mouth as needed), Disp: 100 tablet, Rfl: 0  •  albuterol (PROVENTIL HFA,VENTOLIN HFA) 90 mcg/act inhaler, , Disp: , Rfl:   •  alendronate (FOSAMAX) 70 mg tablet, take 1 tablet by mouth EVERY 7 DAYS, Disp: 12 tablet, Rfl: 3  •  carbidopa-levodopa (SINEMET)  mg per tablet, TAKE 1 AND 1/2 TABLETS BY MOUTH 4 TIMES A DAY (Patient taking differently: Take 1 tablet by mouth 4 (four) times a day TAKE 1 AND 1/2 TABLETS IN THE MORNING 1 TABLET AT LATE MORNING 1 1/2 MID AFTER NOON  1 TABLET IN THE EVENING), Disp: 240 tablet, Rfl: 8  •  Cholecalciferol (VITAMIN D3) 2000 units TABS, Take 2,000 Units by mouth every morning, Disp: , Rfl:   •  Diclofenac Sodium (VOLTAREN) 1 %, Apply 2 g topically 4 (four) times a day as needed (Foot pain) for up to 15 days, Disp: 50 g, Rfl: 2  •  entacapone (COMTAN) 200 mg tablet, take 1 tablet by mouth four times a day, Disp: 120 tablet, Rfl: 4  •  levothyroxine 50 mcg tablet, take 1 tablet by mouth every morning, Disp: 30 tablet, Rfl: 3  •  memantine (NAMENDA) 10 mg tablet, take 1 tablet by mouth once daily, Disp: 90 tablet, Rfl: 3  •  Multiple Vitamins-Minerals (OCUVITE ADULT 50+ PO), Take by mouth daily with breakfast, Disp: , Rfl:   •  polyvinyl alcohol (LIQUIFILM TEARS) 1 4 % ophthalmic solution, 1 drop as needed for dry eyes, Disp: , Rfl:   •  sacubitril-valsartan (ENTRESTO) 24-26 MG TABS, Take 1 tablet by mouth 2 (two) times a day, Disp: 60 tablet, Rfl: 5  •  torsemide (DEMADEX) 20 mg tablet, Take 1 tablet (20 mg total) by mouth daily, Disp: 30 tablet, Rfl: 3  •  warfarin (Coumadin) 1 mg tablet, Pt to take 3 tablets per day or as directed, Disp: 90 tablet, Rfl: 3  •  warfarin (COUMADIN) 2 5 mg tablet, TAKE 1/2 TO 1 TABLET BY MOUTH DAILY OR AS DIRECTED BY PHYSICIAN (Patient not taking: Reported on 1/17/2023), Disp: 30 tablet, Rfl: 11    Allergies   Allergen Reactions   • Amantadine Tongue Swelling   • Artane [Trihexyphenidyl] Other (See Comments)     Felt "disconnected", "out if it", shaky  Did not feel right    • Glycopyrrolate      Nose bleeds   • Pollen Extract        Objective:  Vitals:    01/27/23 1006   BP: 120/60   Pulse: 66       Body mass index is 27 46 kg/m²  Right Knee Exam     Tenderness   The patient is experiencing tenderness in the medial joint line and lateral joint line  Range of Motion   Extension: 0   Flexion: 120     Tests   Varus: negative Valgus: negative  Drawer:  Anterior - negative        Other   Erythema: absent  Scars: absent  Sensation: normal  Pulse: present  Swelling: mild  Effusion: effusion present    Comments:  Parapatellar crepitus present  Stable at 0,30,90 degrees   Effusion present  No erythema or warmth  Baker's cyst present             Observations     Right Knee   Positive for effusion  Physical Exam  Vitals and nursing note reviewed  Constitutional:       Appearance: Normal appearance  She is well-developed  HENT:      Head: Normocephalic and atraumatic  Right Ear: External ear normal       Left Ear: External ear normal    Eyes:      General: No scleral icterus  Extraocular Movements: Extraocular movements intact  Conjunctiva/sclera: Conjunctivae normal    Cardiovascular:      Rate and Rhythm: Normal rate  Pulmonary:      Effort: Pulmonary effort is normal  No respiratory distress  Musculoskeletal:      Cervical back: Normal range of motion and neck supple  Right knee: Effusion present  Comments: See Ortho exam   Skin:     General: Skin is warm and dry  Neurological:      Mental Status: She is alert and oriented to person, place, and time  Psychiatric:         Behavior: Behavior normal            I have personally reviewed pertinent films in PACS        X-rays of the right knee taken on 12/22/2022 to demonstrate no fracture or dislocation  No lytic or blastic lesions  Medial meniscus chondrocalcinosis present  Moderate osteoarthritis present  There is tricompartmental narrowing, sclerosis, and osteophytosis present  This document was created using speech voice recognition software  Grammatical errors, random word insertions, pronoun errors, and incomplete sentences are an occasional consequence of this system due to software limitations, ambient noise, and hardware issues  Any formal questions or concerns about content, text, or information contained within the body of this dictation should be directly addressed to the provider for clarification

## 2023-01-27 NOTE — TELEPHONE ENCOUNTER
sacubitril-valsartan (ENTRESTO) 24-26 MG TABS  Going to [facility]    Patient is out of this med medication going on second day now    will come to office to  a bottle to hold her over     Lot#[id number]  Exp 2/2025  Entresto 24/26mg    Gave one bottle to hold over the weekend as Dr Obdulio Carter is not in the office today

## 2023-01-30 ENCOUNTER — APPOINTMENT (OUTPATIENT)
Dept: LAB | Facility: CLINIC | Age: 83
End: 2023-01-30

## 2023-01-30 ENCOUNTER — ANTICOAG VISIT (OUTPATIENT)
Dept: CARDIOLOGY CLINIC | Facility: CLINIC | Age: 83
End: 2023-01-30

## 2023-01-30 DIAGNOSIS — Z95.2 H/O MITRAL VALVE REPLACEMENT WITH MECHANICAL VALVE: Primary | ICD-10-CM

## 2023-01-30 DIAGNOSIS — I48.20 CHRONIC ATRIAL FIBRILLATION (HCC): ICD-10-CM

## 2023-01-31 ENCOUNTER — OFFICE VISIT (OUTPATIENT)
Dept: CARDIOLOGY CLINIC | Facility: CLINIC | Age: 83
End: 2023-01-31

## 2023-01-31 VITALS
BODY MASS INDEX: 27.14 KG/M2 | SYSTOLIC BLOOD PRESSURE: 132 MMHG | HEART RATE: 74 BPM | WEIGHT: 159 LBS | TEMPERATURE: 97.3 F | OXYGEN SATURATION: 99 % | DIASTOLIC BLOOD PRESSURE: 68 MMHG | HEIGHT: 64 IN

## 2023-01-31 DIAGNOSIS — G20 PARKINSON'S DISEASE (HCC): ICD-10-CM

## 2023-01-31 DIAGNOSIS — I49.3 ASYMPTOMATIC PVCS: ICD-10-CM

## 2023-01-31 DIAGNOSIS — E78.2 MIXED DYSLIPIDEMIA: ICD-10-CM

## 2023-01-31 DIAGNOSIS — I50.42 CHRONIC COMBINED SYSTOLIC AND DIASTOLIC HEART FAILURE (HCC): Primary | ICD-10-CM

## 2023-01-31 DIAGNOSIS — E83.52 HYPERCALCEMIA: ICD-10-CM

## 2023-01-31 DIAGNOSIS — R73.01 IMPAIRED FASTING GLUCOSE: ICD-10-CM

## 2023-01-31 DIAGNOSIS — E03.9 ACQUIRED HYPOTHYROIDISM: ICD-10-CM

## 2023-01-31 DIAGNOSIS — I87.2 VENOUS INSUFFICIENCY OF BOTH LOWER EXTREMITIES: ICD-10-CM

## 2023-01-31 DIAGNOSIS — Z79.01 WARFARIN ANTICOAGULATION: ICD-10-CM

## 2023-01-31 DIAGNOSIS — R79.89 PRERENAL AZOTEMIA: ICD-10-CM

## 2023-01-31 DIAGNOSIS — I48.20 CHRONIC ATRIAL FIBRILLATION (HCC): ICD-10-CM

## 2023-01-31 DIAGNOSIS — N18.32 STAGE 3B CHRONIC KIDNEY DISEASE (HCC): ICD-10-CM

## 2023-01-31 DIAGNOSIS — R53.82 CHRONIC FATIGUE: ICD-10-CM

## 2023-01-31 DIAGNOSIS — Z86.16 HISTORY OF COVID-19: ICD-10-CM

## 2023-01-31 DIAGNOSIS — R63.4 ABNORMAL WEIGHT LOSS: ICD-10-CM

## 2023-01-31 DIAGNOSIS — Z95.2 HISTORY OF MITRAL VALVE REPLACEMENT WITH MECHANICAL VALVE: ICD-10-CM

## 2023-01-31 NOTE — PATIENT INSTRUCTIONS
Continue present medications  On or after February 20, 2023, please go for a basic metabolic panel blood test   We will give you a call regarding the results and any further adjustments needed in medication  Cardiology follow-up approximately 2 months with no EKG or lab work

## 2023-02-01 NOTE — PROGRESS NOTES
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  2   Resolution of  bilateral pretibial/ankle/pedal edema with onset approximately 3 1+ years ago associated  with 32 pound weight loss in 2 3+ years and now with 20 pound weight loss in approximately 7 months with no other evidence of heart failure   This coincides with current use of torsemide, which is now being taken at dose of 20 milligrams daily  3   Chronic combined systolic and diastolic heart failure EBEI 76-92% LVEF and normal MVR function on 04/11/2022 and 06/22/2019 transthoracic echocardiograms   Significant improvement from 34% ejection fraction on 7/5/16 MUGA scan/underlying nonischemic dilated cardiomyopathy but currently with more exertional fatigue, last dyspnea on exertion, and  recurrence  of exhaustion and exertional cold sweats,  with suppression of occasional nocturnal wheezing   Initially improved on starting dose of Entresto, but had vasovagal type near syncope on 01/18/2019 with no recurrence   Patient with less frequent lightheadedness and  low blood pressures in the 90s-100's   The current symptoms are partially related to physical deconditioning  4   History of severe diarrhea approximately 7 months ago superimposed on chronic intermittent diarrhea, likely causing dehydration  5  Chronic atrial fibrillation, controlled, and chronic left bundle-branch block with no EKG changes in approximately 3 3+ years  6  History of mechanical mitral valve replacement September, 1990, with labile oral warfarin anticoagulation with target INR of 2 5-3 5   Latest INR 4 69 on 1/30/2023 with appropriate reduction of warfarin dose  7  Moderately frequent asymptomatic PVCs and otherwise benign Holter monitor December, 2013    8  41 9 pound weight loss in approximately 8 2+ years,  and recent weight loss of 32  lbs in 3 1+ years with 20 pound weight loss in 7 months  9  Controlled mixed dyslipidemia  10  Mild obstructive sleep apnea, previously suspected  11  History of asthma  12  History of GERD  13  History of benign positional vertigo with recurrence 22 months ago and again 21+ months ago  14  History of Lyme disease  15  Hypothyroidism with  prior normal TSH level, which could have contributed to previous extreme fatigue  16  History of hepatitis C   17  History of Parkinson's disease/gait dysfunction with slowly progressive symptoms    18  Resolved nonproductive cough and postural dizziness with  flu-like illness nearly 4 45 years ago  19  Chronic fatigue and malaise, multifactorial, improving  20  Drug-induced insomnia  21  History of fall with subsequent left distal tibial fracture and left ankle sprain with right periorbital ecchymoses and laceration and left wrist sprain on 7/30/17, with no recurrence, possibly related to transient drop in blood pressure with upright position in hot weather after prolonged sitting  22  Mildly impaired fasting glucose of 126 on 1/18/2023 with A1c of 5 9% on 07/20/2020  23   Worsened chronic kidney disease, stage IIIb, probably related to daily torsemide  24  History of prior near syncope and cold sweats with presumed hypotension, possibly secondary to previous worsening diarrhea approximately 7 months ago  Higher morning blood pressures on prior measurement, with normal pressures rest of day  Recurrent dehydration/hypotension and residual mild dizziness as well as resolution of acute kidney injury since 11/18/16   Patient currently off midodrine  Plan       Patient Instructions     1  Continue present medications    2  On or after February 20, 2023, please go for a basic metabolic panel blood test   We will give you a call regarding the results and any further adjustments needed in medication  3  Cardiology follow-up approximately 2 months with no EKG or lab work  HPI    This 79 y o  female is seen in follow-up of recent hospitalization at [address] [place] [address] from 12/21 through 12/24/2022  She presented with dyspnea and had NT-proBNP of 6674, small bibasilar pleural effusions with atelectasis and received both IV and oral furosemide  She also tested positive for COVID-19 by PCR and had oxygen saturations in the 80s in the emergency room and was treated with oxygen and the mild COVID pathway, including remdesivir and Decadron  She complained of bilateral knee pain, right worse than left and had outpatient right knee arthrocentesis and joint injection by Dr Peewee Tinoco on 1/27/2023 with good relief of her pain  She was discharged to [facility] for 2 weeks on 12/24/2022 and just began with some home physical and occupational therapy and visiting nurses about 1 week ago  Because the patient's INR was elevated yesterday, her warfarin dose was reduced to 1 25 mg 3 days a week and 2 5 mg 4 days a week as of yesterday  The patient does complain of having easy fatigue but does have a better energy level than she did when she was admitted to the hospital   She feels she is eating only half of her food portions for the past several months and complains of a diminished appetite  She has much less edema now than she had previously  She denies any severe dyspnea at this time  There has been no chest pain or palpitation  Encounter Diagnoses   Name Primary?    • Chronic combined systolic and diastolic heart failure (HCC) Yes   • Chronic atrial fibrillation (HCC)    • History of mitral valve replacement with mechanical valve    • Prerenal azotemia    • Venous insufficiency of both lower extremities    • Warfarin anticoagulation    • Asymptomatic PVCs    • Abnormal weight loss    • Mixed dyslipidemia    • Acquired hypothyroidism    • Parkinson's disease (HCC)    • Chronic fatigue    • Impaired fasting glucose    • History of COVID-19    • Hypercalcemia    • Stage 3b chronic kidney disease ([facility] [address] [address] )         Review of Systems    All other systems negative, except as noted in history of present illness    Historical Information   Past Medical History:   Diagnosis Date   • Anal fissure    • Arthritis     osteo joints   • Asthma with acute exacerbation    • Blepharitis    • Breast lump    • Chalazion    • CHF, chronic (HCC)    • Difficulty walking    • Disease of thyroid gland    • Drooling    • Dysphagia    • Fall 05/04/2018   • Fibromyalgia, primary    • Glaucoma     Normal pressure   • History of transfusion 1996   • Hordeolum externum    • Hx of transient ischemic attack (TIA)    • Hypophonia    • Infectious viral hepatitis     Hep C dx 1996    • Lyme carditis    • Murmur, cardiac    • Parkinsons ([facility] [address] )    • Rotator cuff tendinitis    • Seasonal allergies    • Urinary frequency      Past Surgical History:   Procedure Laterality Date   • BREAST BIOPSY Right 2018    benign   • BREAST CYST EXCISION Left     benign   • BREAST SURGERY N/A     benign, calcium   • CARDIAC SURGERY  1990    mitral valve replacement   • CATARACT EXTRACTION Right 2015   • CATARACT EXTRACTION Left 2014   • CHEST TUBE INSERTION Right     post pleural effusion   • CHOLECYSTECTOMY  2000    lap   • HYSTERECTOMY  1973    partial   • MO ARTHROCENTESIS ASPIR&/INJ MAJOR JT/BURSA W/O US Left 3/19/2021    Procedure: Sacroiliac Joint Injection ( 67033 ); Surgeon: Perez Francisco MD;  Location: [facility] MAIN OR;  Service: Pain Management    • MO COLSC FLX W/RMVL OF TUMOR POLYP LESION SNARE TQ N/A 7/18/2017    Procedure: COLONOSCOPY and biopsy ;  Surgeon: Franklin Hurt MD;  Location: [facility] GI LAB;   Service: Gastroenterology   • SKIN BIOPSY      pre cancerous on face   • TONSILLECTOMY     • US GUIDED BREAST BIOPSY RIGHT COMPLETE Right 2/13/2018     Social History     Substance and Sexual Activity   Alcohol Use Not Currently     Social History     Substance and Sexual Activity   Drug Use No     Social History     Tobacco Use   Smoking Status Former   • Packs/day: 0 10   • Years: 10 00   • Pack years: 1 00   • Types: Cigarettes   • Quit date: 5   • Years since quittin 1   Smokeless Tobacco Never       Family History:  Family History   Problem Relation Age of Onset   • Heart disease Mother         murmur Cardiomegaly age 64   • Pneumonia Father    • Heart disease Brother         mitrsl valve   • Parkinsonism Brother    • Arthritis Brother    • Lupus Daughter    • Diabetes Daughter    • Depression Daughter          Meds/Allergies     Prior to Admission medications    Medication Sig Start Date End Date Taking?  Authorizing Provider   acetaminophen (TYLENOL) 650 mg CR tablet Take 1 tablet (650 mg total) by mouth 3 (three) times a day  Patient taking differently: Take 650 mg by mouth as needed 19  Yes Adam Trujillo MD   alendronate (FOSAMAX) 70 mg tablet take 1 tablet by mouth EVERY 7 DAYS 22  Yes Lizette Tinajero MD   carbidopa-levodopa (SINEMET)  mg per tablet TAKE 1 AND 1/2 TABLETS BY MOUTH 4 TIMES A DAY  Patient taking differently: Take 1 tablet by mouth 4 (four) times a day TAKE 1 AND 1/2 TABLETS IN THE MORNING 1 TABLET AT LATE MORNING 1 1/2 MID AFTER NOON  1 TABLET IN THE EVENING 3/22/22  Yes Alexi Lizz Arana MD   Cholecalciferol (VITAMIN D3) 2000 units TABS Take 2,000 Units by mouth every morning   Yes Historical Provider, MD   entacapone (COMTAN) 200 mg tablet take 1 tablet by mouth four times a day 22  Yes Bart Shine PA-JERICHO   levothyroxine 50 mcg tablet take 1 tablet by mouth every morning 22  Yes Lizette Tinajero MD   memantine [facility]) 10 mg tablet take 1 tablet by mouth once daily 10/11/22  Yes Bart Shine PA-C   Multiple Vitamins-Minerals (OCUVITE ADULT 50+ PO) Take by mouth daily with breakfast   Yes Historical Provider, MD   polyvinyl alcohol (LIQUIFILM TEARS) 1 4 % ophthalmic solution 1 drop as needed for dry eyes   Yes Historical Provider, MD   sacubitril-valsartan (ENTRESTO) 24-26 MG TABS Take 1 tablet by mouth 2 (two) times a day 1/27/23  Yes Elizabeth Bhatia MD   torsemide (DEMADEX) 20 mg tablet Take 1 tablet (20 mg total) by mouth daily 1/10/23  Yes Carol Sousa MD   warfarin (COUMADIN) 2 5 mg tablet TAKE 1/2 TO 1 TABLET BY MOUTH DAILY OR AS DIRECTED BY PHYSICIAN 5/10/22  Yes Elizabeth Bhatia MD   albuterol (PROVENTIL HFA,VENTOLIN HFA) 90 mcg/act inhaler  1/6/23   Historical Provider, MD   Diclofenac Sodium (VOLTAREN) 1 % Apply 2 g topically 4 (four) times a day as needed (Foot pain) for up to 15 days 1/21/22 12/6/22  Shellie John DPM   warfarin (Coumadin) 1 mg tablet Pt to take 3 tablets per day or as directed  Patient not taking: Reported on 1/31/2023 1/5/23   Elizabeth Bhatia MD       Allergies   Allergen Reactions   • Amantadine Tongue Swelling   • Artane [Trihexyphenidyl] Other (See Comments)     Felt "disconnected", "out if it", shaky  Did not feel right    • Glycopyrrolate      Nose bleeds   • Pollen Extract          Vitals:    01/31/23 0833   BP: 132/68   BP Location: Left arm   Patient Position: Sitting   Cuff Size: Standard   Pulse: 74   Temp: (!) 97 3 °F (36 3 °C)   SpO2: 99%   Weight: 72 1 kg (159 lb)   Height: 5' 4" (1 626 m)       Body mass index is 27 29 kg/m²  20 pound weight loss in approximately 7 months    Physical Exam:    General Appearance:  Alert, cooperative, no distress, appears stated age and looks chronically ill  She is mildly overweight     Head:  Normocephalic, without obvious abnormality, atraumatic   Eyes:  PERRL, conjunctiva/corneas clear, EOM's intact,   both eyes   Ears:  Normal TM's and external ear canals, both ears   Nose: Nares normal, septum midline, mucosa normal, no drainage or sinus tenderness   Throat: Lips, mucosa, and tongue normal; teeth and gums normal   Neck: Supple, symmetrical, trachea midline, no adenopathy, thyroid: not enlarged, symmetric, no tenderness/mass/nodules, no carotid bruit or JVD   Back:   Symmetric, no curvature, ROM normal, no CVA tenderness   Lungs:   Clear to auscultation bilaterally, respirations unlabored   Chest Wall:  No tenderness or deformity   Heart:  Irregularly irregular cardiac rhythm, S1, S2 normal, no murmur, rub or gallop  Unvarying mitral valve prosthetic click coinciding with S1    Abdomen:   Soft, non-tender, bowel sounds active all four quadrants,  no masses, no organomegaly  Mild abdominal obesity  Extremities: Extremities normal, atraumatic, no cyanosis with nonpitting bilateral low pretibial and ankle edema   Pulses: 2+ and symmetric   Skin: Skin showed normal color, texture, turgor and no rashes or lesions   Lymph nodes: Cervical, supraclavicular, and axillary nodes normal   Neurologic: Normal         Cardiographics    ECG 01/31/23:    Atrial fibrillation with average ventricular rate 74 bpm  Left bundle branch block with secondary ST-T abnormalities  Inferolateral T inversion  No major changes from 12/21/2022    IMAGING:    XR chest pa & lateral 1/10/2023:    Result Date: 1/10/2023  Impression Trace bilateral pleural effusions with bibasilar atelectasis   Workstation performed: [id number]     Lower extremity venous duplex scan with reflux assessment 1/12/2023:    Bilateral great saphenous venous incompetence  No evidence of deep venous incompetence, DVT or superficial venous thrombosis bilaterally        LAB REVIEW:      Lab Results   Component Value Date    SODIUM 142 01/18/2023    K 3 7 01/18/2023     01/18/2023    CO2 30 01/18/2023    ANIONGAP 9 8 (L) 12/07/2015    BUN 22 01/18/2023    CREATININE 1 14 01/18/2023    GLUCOSE 91 12/07/2015    GLUF 126 (H) 01/18/2023    CALCIUM 10 2 (H) 01/18/2023    CORRECTEDCA 10 4 (H) 12/21/2022    AST 23 12/21/2022    ALT 7 (L) 12/21/2022    ALKPHOS 82 12/21/2022    PROT 6 9 12/07/2015    BILITOT 1 6 (H) 12/07/2015    EGFR 44 01/18/2023   Glucose 1/18/2023: 126    Lab Results   Component Value Date    CHOLESTEROL 131 10/31/2022    CHOLESTEROL 159 02/09/2021    CHOLESTEROL 184 11/12/2020     Lab Results   Component Value Date    HDL 54 10/31/2022    HDL 60 02/09/2021    HDL 67 11/12/2020     No results found for: LDLCHOLEST  Lab Results   Component Value Date    LDLCALC 61 10/31/2022    LDLCALC 82 02/09/2021    LDLCALC 97 11/12/2020     No components found for: [facility]  Lab Results   Component Value Date    TRIG 79 10/31/2022    TRIG 86 02/09/2021    TRIG 102 11/12/2020     PT/INR 1/30/2023: 44 4/4 69  CBC 12/24/2022: H/H 11 1/34 4 with normal WBC and platelets  SARS-CoV-2 PCR 12/21/2022: Positive  NT-proBNP 12/21/2022: 6674, increased from baseline of 1073 on 12/14/2017        Stanton Plascencia MD

## 2023-02-02 ENCOUNTER — TELEPHONE (OUTPATIENT)
Dept: CARDIOLOGY CLINIC | Facility: CLINIC | Age: 83
End: 2023-02-02

## 2023-02-02 NOTE — TELEPHONE ENCOUNTER
Nurse called today ( OT )   10AM - was doing well, walked into bedroom - sudden weakness  Vitals - HR 47-70, and back down  BP 76/47   (10:30am) Nurse did not do much of anything  Walked ten feet into the bedroom and had these symptom  Oxygen was fine, had c/o sob  Was advised to yawn and cough, bp came up to 109/48 (10:45am)  Very weak, unsteady, lightheaded  Patient has had orange juice, tito brandon muffin with egg and sausage  Some water today with meds  I spoke to Dr Alex Tolbert regarding this issue verbally  Entresto was taken around 7am this morning and bp was taken around 11am  I did make nurse aware that entresto can cause low bp  Also, initial pulse was taken via a pulse ox, asked her to do pulse manually  She reports that pulse is 70, irregular  Advised to call the patient  I will also call nurse for update as well

## 2023-02-03 ENCOUNTER — TELEPHONE (OUTPATIENT)
Dept: FAMILY MEDICINE CLINIC | Facility: CLINIC | Age: 83
End: 2023-02-03

## 2023-02-03 NOTE — TELEPHONE ENCOUNTER
I reached out to patient  She reports that she is still wobbly, bp low but not as low  Still feels weak, but not as bad as yesterday  She feels slightly lightheaded and not stable  She is winded with the slightest amount of exertion  She states that she has a dexascan on Monday, can she hold off on meds until it's completed? No chest pain, no palpitations

## 2023-02-06 ENCOUNTER — ANTICOAG VISIT (OUTPATIENT)
Dept: CARDIOLOGY CLINIC | Facility: CLINIC | Age: 83
End: 2023-02-06

## 2023-02-06 ENCOUNTER — LAB (OUTPATIENT)
Dept: LAB | Facility: CLINIC | Age: 83
End: 2023-02-06

## 2023-02-06 ENCOUNTER — HOSPITAL ENCOUNTER (OUTPATIENT)
Dept: RADIOLOGY | Facility: HOSPITAL | Age: 83
Discharge: HOME | End: 2023-02-06
Attending: FAMILY MEDICINE

## 2023-02-06 VITALS — BODY MASS INDEX: 27.14 KG/M2 | HEIGHT: 64 IN | WEIGHT: 159 LBS

## 2023-02-06 DIAGNOSIS — I48.20 CHRONIC ATRIAL FIBRILLATION (HCC): ICD-10-CM

## 2023-02-06 DIAGNOSIS — Z78.0 POSTMENOPAUSAL: ICD-10-CM

## 2023-02-06 DIAGNOSIS — R79.89 PRERENAL AZOTEMIA: ICD-10-CM

## 2023-02-06 DIAGNOSIS — Z95.2 H/O MITRAL VALVE REPLACEMENT WITH MECHANICAL VALVE: Primary | ICD-10-CM

## 2023-02-07 ENCOUNTER — PATIENT OUTREACH (OUTPATIENT)
Dept: CASE MANAGEMENT | Facility: HOSPITAL | Age: 83
End: 2023-02-07

## 2023-02-07 NOTE — PROGRESS NOTES
Heart Failure Outpatient Care Coordinator Note: Chart notes reviewed  Patient saw Chanel Arciniega and no med changes  She does have order for repeat BMP next week  She had and episode or presyncope and low HF/BP that was self limiting when walking with the Occupational therapist  Today Johnsoncatherine Edwards reports ongoing "edge of dizziness from time to time" and not related to standing " She checked her BP while on phone with me and was 120/62  Her weight is down further to 153  3- was 159#  2 weeks ago  She is about 22# down from hospital discharge  She is eating and drinking well  States Glory Ducking MSW is coming from [employer] to discuss potential community resourses with her and her   Patient encouraged to continue daily weights and BP checks and to let Dr Perdomo know if having recurrent pre-syncopal symptoms, SBP remains consistently below 110 or weight drops to 150 or less   Will follow up

## 2023-02-12 ENCOUNTER — TELEPHONE (OUTPATIENT)
Dept: OTHER | Facility: OTHER | Age: 83
End: 2023-02-12

## 2023-02-12 NOTE — TELEPHONE ENCOUNTER
Paged on call provider  Patient was complaining of low blood pressure and racing heart  HR 84 irregular BP 92/60 which was lower yesterday

## 2023-02-13 ENCOUNTER — ANTICOAG VISIT (OUTPATIENT)
Dept: CARDIOLOGY CLINIC | Facility: CLINIC | Age: 83
End: 2023-02-13

## 2023-02-13 ENCOUNTER — APPOINTMENT (OUTPATIENT)
Dept: LAB | Facility: CLINIC | Age: 83
End: 2023-02-13

## 2023-02-13 DIAGNOSIS — Z95.2 H/O MITRAL VALVE REPLACEMENT WITH MECHANICAL VALVE: Primary | ICD-10-CM

## 2023-02-13 DIAGNOSIS — I48.20 CHRONIC ATRIAL FIBRILLATION (HCC): ICD-10-CM

## 2023-02-13 LAB
ANION GAP SERPL CALCULATED.3IONS-SCNC: 3 MMOL/L (ref 4–13)
BUN SERPL-MCNC: 36 MG/DL (ref 5–25)
CALCIUM SERPL-MCNC: 9.9 MG/DL (ref 8.3–10.1)
CHLORIDE SERPL-SCNC: 110 MMOL/L (ref 96–108)
CO2 SERPL-SCNC: 27 MMOL/L (ref 21–32)
CREAT SERPL-MCNC: 1.22 MG/DL (ref 0.6–1.3)
GFR SERPL CREATININE-BSD FRML MDRD: 41 ML/MIN/1.73SQ M
GLUCOSE P FAST SERPL-MCNC: 111 MG/DL (ref 65–99)
POTASSIUM SERPL-SCNC: 4 MMOL/L (ref 3.5–5.3)
SODIUM SERPL-SCNC: 140 MMOL/L (ref 135–147)

## 2023-02-13 NOTE — TELEPHONE ENCOUNTER
Pt called stated dr Nikos Birch has her taking 1 torsemide every other day  Pt states her bp has been low  Pt states she has been dizzy and very fatigued   She would like to know if she should continue that until her appt with Dr Santhosh Simeon on 3/23

## 2023-02-14 ENCOUNTER — TELEPHONE (OUTPATIENT)
Dept: CARDIOLOGY CLINIC | Facility: CLINIC | Age: 83
End: 2023-02-14

## 2023-02-14 NOTE — TELEPHONE ENCOUNTER
----- Message from Marivel Hodge MD sent at 2/13/2023  9:36 PM EST -----  Notify patient that blood work from 2/13/2023 was satisfactory  She can continue to take the same medication for now but let us know if her dizziness gets worse before her upcoming appointment or if her systolic blood pressure is consistently below 90

## 2023-02-14 NOTE — TELEPHONE ENCOUNTER
Pt states when she gets up to do anything she gets very tired and out of breath and dizzy  OT was at the house while pt was feeling this way  Therapist stated it was low, but Brenda Lucio did not have it written down so she wasn't sure the numbers

## 2023-02-14 NOTE — RESULT ENCOUNTER NOTE
Notify patient that blood work from 2/13/2023 was satisfactory  She can continue to take the same medication for now but let us know if her dizziness gets worse before her upcoming appointment or if her systolic blood pressure is consistently below 90

## 2023-02-15 ENCOUNTER — TELEPHONE (OUTPATIENT)
Dept: FAMILY MEDICINE CLINIC | Facility: CLINIC | Age: 83
End: 2023-02-15

## 2023-02-15 NOTE — TELEPHONE ENCOUNTER
Dr Ken Nicole:    Patient needs an order for Audiology placed in her chart  Patient is scheduled on 2/27  Please advise when order is ready

## 2023-02-16 ENCOUNTER — TELEPHONE (OUTPATIENT)
Dept: CARDIOLOGY CLINIC | Facility: CLINIC | Age: 83
End: 2023-02-16

## 2023-02-16 ENCOUNTER — PATIENT OUTREACH (OUTPATIENT)
Dept: CASE MANAGEMENT | Facility: HOSPITAL | Age: 83
End: 2023-02-16

## 2023-02-16 NOTE — PROGRESS NOTES
SNF/ROB Pathway Outpatient Care Manager Note: Chart notes reviewed and call made to home and spoke with patient and her  who manages her medications  Updated them on changes to torsemide 20 mg- hold till Monday 2/20/23 and then resume 20 mg on a Lalarp-Aeiyhlovs-Dcwakj only schedule  I did encourage him to call Dr Jd Martinez office with any further low BP or pre-syncopal symptoms  Also updated [facility] and made them aware of changes  They will send an RN to see patient tomorrow

## 2023-02-16 NOTE — PROGRESS NOTES
SNF/ROB Pathway Outpatient Care Manager Note: Chart notes reviewed and call made to patient  She reports low BP today-78/53 HR 77  Weight is 151#- down an additional 2 pounds since last week and has lost total of nearly 25# since 1/10/23 when she saw her PCP and started [facility] and her lasix 10 mg 4x/week was changed to torsemide 20 mg daily  On 2//12/23 Home care RN called cardiology office and torsemide decreased to 20 mg qod  She did already take her 20 mg torsemide today  Her repeat BMP done 2/13/23  Shows gradual increase in BUN/Cr, now 36/1  22  Patient reports ongoing mild dizziness  She is eating and drinking adequately  Patient is on tele- monitoring with [facility] for HF management  Will contact cardiology office to make Dr Karis Saavedra aware and follow up

## 2023-02-16 NOTE — TELEPHONE ENCOUNTER
Pt's Nurse Alberto call to inform that Pt's BP is very low ranging from 78/53-94/53 with the HR 77  She c/o dizziness and losing weight dramatically she was 175 lbs a little over 2 months she is now 151 lbs  The Nurse is thinking one of her meds has to go and she's thinking Torsemide       Pls advise

## 2023-02-20 ENCOUNTER — APPOINTMENT (OUTPATIENT)
Dept: LAB | Facility: CLINIC | Age: 83
End: 2023-02-20

## 2023-02-20 ENCOUNTER — ANTICOAG VISIT (OUTPATIENT)
Dept: CARDIOLOGY CLINIC | Facility: CLINIC | Age: 83
End: 2023-02-20

## 2023-02-20 DIAGNOSIS — I48.20 CHRONIC ATRIAL FIBRILLATION (HCC): ICD-10-CM

## 2023-02-20 DIAGNOSIS — Z95.2 H/O MITRAL VALVE REPLACEMENT WITH MECHANICAL VALVE: Primary | ICD-10-CM

## 2023-02-21 ENCOUNTER — TELEPHONE (OUTPATIENT)
Dept: NEUROLOGY | Facility: CLINIC | Age: 83
End: 2023-02-21

## 2023-02-23 ENCOUNTER — TELEPHONE (OUTPATIENT)
Dept: CARDIOLOGY CLINIC | Facility: CLINIC | Age: 83
End: 2023-02-23

## 2023-02-24 ENCOUNTER — TELEPHONE (OUTPATIENT)
Dept: FAMILY MEDICINE CLINIC | Facility: CLINIC | Age: 83
End: 2023-02-24

## 2023-02-24 ENCOUNTER — TELEPHONE (OUTPATIENT)
Dept: CARDIOLOGY CLINIC | Facility: CLINIC | Age: 83
End: 2023-02-24

## 2023-02-24 ENCOUNTER — OFFICE VISIT (OUTPATIENT)
Dept: PODIATRY | Facility: CLINIC | Age: 83
End: 2023-02-24

## 2023-02-24 VITALS
BODY MASS INDEX: 27.14 KG/M2 | HEIGHT: 64 IN | WEIGHT: 159 LBS | DIASTOLIC BLOOD PRESSURE: 68 MMHG | RESPIRATION RATE: 17 BRPM | SYSTOLIC BLOOD PRESSURE: 132 MMHG

## 2023-02-24 DIAGNOSIS — R60.9 CHRONIC EDEMA: ICD-10-CM

## 2023-02-24 DIAGNOSIS — I87.2 DEEP VENOUS INSUFFICIENCY: ICD-10-CM

## 2023-02-24 DIAGNOSIS — I73.9 PERIPHERAL VASCULAR DISEASE, UNSPECIFIED (HCC): ICD-10-CM

## 2023-02-24 DIAGNOSIS — M79.672 PAIN IN BOTH FEET: Primary | ICD-10-CM

## 2023-02-24 DIAGNOSIS — B35.1 ONYCHOMYCOSIS: ICD-10-CM

## 2023-02-24 DIAGNOSIS — M79.671 PAIN IN BOTH FEET: Primary | ICD-10-CM

## 2023-02-24 NOTE — PROGRESS NOTES
Assessment/Plan:  Pain upon ambulation   Ingrown toenail bilateral hallux   Parkinson's disease   Peripheral artery disease   Chronic edema   Paresthesia secondary to radiculopathy and possible Parkinson's   Paronychia hallux bilateral   Chronic edema  Rule out deep venous insufficiency     Plan   Foot exam performed   Patient educated on condition   All nails debrided without pain or complication   Patient will consider compression hose   Return for follow-up  To help with edema, patient will elevate feet daily  She will follow cardiology for edema as well                 Diagnoses and all orders for this visit:     Peripheral vascular disease, unspecified (HCC)     Paresthesia and pain of extremity     Onychomycosis     Pain in both feet     Localized edema     Ingrown toenail            Subjective:  Patient has pain   Patient has pain in her feet and toes with ambulation   She states her feet swell at the end of the day   No history of trauma  Patient is also concerned with swelling of her feet and legs                Allergies   Allergen Reactions   • Amantadine Tongue Swelling   • Artane [Trihexyphenidyl] Other (See Comments)       Felt "disconnected", "out if it", shaky   Did not feel right    • Glycopyrrolate         Nose bleeds   • Pollen Extract              Current Outpatient Medications:   •  acetaminophen (TYLENOL) 650 mg CR tablet, Take 1 tablet (650 mg total) by mouth 3 (three) times a day (Patient taking differently: Take 650 mg by mouth as needed), Disp: 100 tablet, Rfl: 0  •  alendronate (FOSAMAX) 70 mg tablet, take 1 tablet by mouth EVERY 7 DAYS, Disp: 12 tablet, Rfl: 3  •  carbidopa-levodopa (SINEMET)  mg per tablet, TAKE 1 AND 1/2 TABLETS BY MOUTH 4 TIMES A DAY (Patient taking differently: Take 1 tablet by mouth 4 (four) times a day TAKE 1 AND 1/2 TABLETS IN THE MORNING 1 TABLET AT LATE MORNING 1 1/2 MID AFTER NOON  1 TABLET IN THE EVENING), Disp: 240 tablet, Rfl: 8  •  Cholecalciferol (VITAMIN D3) 2000 units TABS, Take 2,000 Units by mouth every morning, Disp: , Rfl:   •  Diclofenac Sodium (VOLTAREN) 1 %, Apply 2 g topically 4 (four) times a day as needed (Foot pain) for up to 15 days, Disp: 50 g, Rfl: 2  •  donepezil (ARICEPT) 10 mg tablet, Take 1 tablet (10 mg total) by mouth daily at bedtime, Disp: 90 tablet, Rfl: 3  •  entacapone (COMTAN) 200 mg tablet, take 1 tablet by mouth four times a day, Disp: 120 tablet, Rfl: 4  •  furosemide (LASIX) 20 mg tablet, Take 1/2 tablet by mouth  4 days per week  Schedule administration to be done when patient can be near toilet facilities for 4-6 hours  , Disp: 90 tablet, Rfl: 3  •  levothyroxine 50 mcg tablet, take 1 tablet by mouth every morning, Disp: 30 tablet, Rfl: 3  •  memantine (NAMENDA) 10 mg tablet, take 1 tablet by mouth once daily, Disp: 90 tablet, Rfl: 3  •  Multiple Vitamins-Minerals (OCUVITE ADULT 50+ PO), Take by mouth daily with breakfast, Disp: , Rfl:   •  warfarin (COUMADIN) 2 5 mg tablet, TAKE 1/2 TO 1 TABLET BY MOUTH DAILY OR AS DIRECTED BY PHYSICIAN, Disp: 30 tablet, Rfl: 11           Patient Active Problem List   Diagnosis   • H/O mitral valve replacement with mechanical valve   • Atrial fibrillation (HCC) [I48 91]   • Chronic hepatitis C (HCC)   • Chronic right-sided low back pain without sciatica   • Drug induced insomnia (HCC)   • Dupuytren's contracture   • Generalized osteoarthritis   • Chronic combined systolic and diastolic heart failure (HCC)   • Hypothyroidism   • Memory impairment   • Mitral valve disorder   • Parkinson's disease (HCC)   • Peripheral vascular disease (HCC)   • Seasonal allergies   • Transient ischemic attack   • Vitamin D insufficiency   • Suprapatellar bursitis of right knee   • Numbness and tingling in right hand   • Other microscopic hematuria   • Cardiomyopathy, dilated, nonischemic (HCC)   • Left bundle branch block (LBBB)   • Asymptomatic PVCs   • On continuous oral anticoagulation   • Mixed dyslipidemia   • High risk medication use   • Other chronic pain   • Orthostatic hypotension   • 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   • Weakness   • Diarrhea due to drug   • Alzheimer's dementia without behavioral disturbance, unspecified timing of dementia onset ([facility] )             Patient ID: Jesica Turcios is a 79 y  o  female      HPI     The following portions of the patient's history were reviewed and updated as appropriate:      family history includes Arthritis in her brother; Depression in her daughter; Diabetes in her daughter; Heart disease in her brother and mother; Lupus in her daughter; Parkinsonism in her brother; Pneumonia in her father        reports that she quit smoking about 52 years ago  She has a 1 00 pack-year smoking history  She has never used smokeless tobacco  She reports that she does not drink alcohol and does not use drugs            Objective:  Patient's shoes and socks removed    Foot ExamPhysical Exam                 Physical Exam  Constitutional:       General: She is not in acute distress      Appearance: She is well-developed  She is not ill-appearing, toxic-appearing or diaphoretic  HENT:      Head: Normocephalic     Cardiovascular:      Pulses:           Dorsalis pedis pulses are 1+ on the right side and 1+ on the left side         Posterior tibial pulses are 0 on the right side and 0 on the left side       Comments: Palpable DP pulse, nonpalpable PT pulse, CFT is less than 3 seconds, temperature gradient within normal limit, a trophic skin changes noted with skin thinning in shiny, patient report occasional claudication to bilateral calf, localized edema foot and ankle Q9  Pulmonary:      Effort: Pulmonary effort is normal    Musculoskeletal:         General: Tenderness and deformity present       Comments: Pes planus type foot pain on palpation and range of motion of the 1st MPJ, metatarsal heads bilateral foot, pain on palpation on range of motion of the ST joint, crepitus noted in midfoot joint    Skin:     General: Skin is dry       Capillary Refill: Capillary refill takes 2 to 3 seconds       Comments: Trophic skin changes bilateral foot and ankle, alternating hypopigmentation and hyperpigmentation patches around the foot and ankle on anterior leg, skin is shiny and thin, absent hair growth, atrophy of fat pad      Diffuse xerosis bilateral lower extremity, dystrophic discolored thickened toenails onychomycotic, onychocryptosis noted as well, malodor and subungual debris    Neurological:      Mental Status: She is alert and oriented to person, place, and time       Sensory: Sensory deficit present       Motor: Atrophy present    Jeremias Jock Tendon Reflexes: Reflexes abnormal      Foot Exam     Right Foot/Ankle      Neurovascular  Dorsalis pedis: 1+  Posterior tibial: 0        Left Foot/Ankle       Neurovascular  Dorsalis pedis: 1+  Posterior tibial: 0     Patient demonstrates 3/4 pitting edema bilateral   Negative Homans' sign

## 2023-02-24 NOTE — TELEPHONE ENCOUNTER
Chanel Vanessa from [facility] called stating they stopped PT services due to low BP, lightheaded and dizziness  Pt BP was 86/52 and pulse has been 49 to 60  They put in a call to the pts cardiologist and she has an appt on Monday 02/27

## 2023-02-24 NOTE — TELEPHONE ENCOUNTER
I spoke with Vinicius Freitas, made aware of pending appointment and addressing of that after her appointment

## 2023-02-27 ENCOUNTER — APPOINTMENT (OUTPATIENT)
Dept: LAB | Facility: CLINIC | Age: 83
End: 2023-02-27

## 2023-02-27 ENCOUNTER — OFFICE VISIT (OUTPATIENT)
Dept: AUDIOLOGY | Facility: CLINIC | Age: 83
End: 2023-02-27

## 2023-02-27 ENCOUNTER — OFFICE VISIT (OUTPATIENT)
Dept: CARDIOLOGY CLINIC | Facility: CLINIC | Age: 83
End: 2023-02-27

## 2023-02-27 ENCOUNTER — ANTICOAG VISIT (OUTPATIENT)
Dept: CARDIOLOGY CLINIC | Facility: CLINIC | Age: 83
End: 2023-02-27

## 2023-02-27 VITALS
DIASTOLIC BLOOD PRESSURE: 56 MMHG | OXYGEN SATURATION: 100 % | WEIGHT: 154 LBS | HEIGHT: 64 IN | BODY MASS INDEX: 26.29 KG/M2 | HEART RATE: 93 BPM | SYSTOLIC BLOOD PRESSURE: 97 MMHG

## 2023-02-27 DIAGNOSIS — G20 PARKINSON'S DISEASE (HCC): ICD-10-CM

## 2023-02-27 DIAGNOSIS — R63.4 ABNORMAL WEIGHT LOSS: ICD-10-CM

## 2023-02-27 DIAGNOSIS — I49.3 ASYMPTOMATIC PVCS: ICD-10-CM

## 2023-02-27 DIAGNOSIS — Z95.2 HISTORY OF MITRAL VALVE REPLACEMENT WITH MECHANICAL VALVE: ICD-10-CM

## 2023-02-27 DIAGNOSIS — I48.20 ATRIAL FIBRILLATION, CHRONIC (HCC): ICD-10-CM

## 2023-02-27 DIAGNOSIS — H90.3 SENSORY HEARING LOSS, BILATERAL: Primary | ICD-10-CM

## 2023-02-27 DIAGNOSIS — R73.01 IMPAIRED FASTING GLUCOSE: ICD-10-CM

## 2023-02-27 DIAGNOSIS — I50.42 CHRONIC COMBINED SYSTOLIC AND DIASTOLIC HEART FAILURE (HCC): ICD-10-CM

## 2023-02-27 DIAGNOSIS — R79.89 PRERENAL AZOTEMIA: ICD-10-CM

## 2023-02-27 DIAGNOSIS — E78.2 MIXED DYSLIPIDEMIA: ICD-10-CM

## 2023-02-27 DIAGNOSIS — G47.33 OBSTRUCTIVE SLEEP APNEA: ICD-10-CM

## 2023-02-27 DIAGNOSIS — E86.0 DEHYDRATION: Primary | ICD-10-CM

## 2023-02-27 DIAGNOSIS — Z95.2 H/O MITRAL VALVE REPLACEMENT WITH MECHANICAL VALVE: Primary | ICD-10-CM

## 2023-02-27 DIAGNOSIS — I48.20 CHRONIC ATRIAL FIBRILLATION (HCC): ICD-10-CM

## 2023-02-27 DIAGNOSIS — N18.32 STAGE 3B CHRONIC KIDNEY DISEASE (HCC): ICD-10-CM

## 2023-02-27 DIAGNOSIS — Z86.16 HISTORY OF COVID-19: ICD-10-CM

## 2023-02-27 RX ORDER — TORSEMIDE 20 MG/1
TABLET ORAL
Qty: 30 TABLET | Refills: 3
Start: 2023-02-27

## 2023-02-27 RX ORDER — SACUBITRIL AND VALSARTAN 24; 26 MG/1; MG/1
1 TABLET, FILM COATED ORAL 2 TIMES DAILY
COMMUNITY
End: 2023-02-27 | Stop reason: SINTOL

## 2023-02-27 NOTE — PROGRESS NOTES
HEARING EVALUATION    Name:  Fabian Ambriz  :  1940  Age:  79 y o  Date of Evaluation: 23     History:  Hearing difficulties noted by patient and family   Reason for visit: Fabian Ambriz is being seen today at the request of Dr Margy Castillo for an evaluation of hearing  Patient reports not hearing her  well as he "mumbles"  She was evaluated previously, by this center, and recalls having normal/near normal hearing (results not available)  Patient has Parkinson's which is managed by Dr Ariadna Andres, Neurologist       EVALUATION:    Otoscopic Evaluation:   Right Ear: Clear and healthy ear canal and tympanic membrane   Left Ear: Clear and healthy ear canal and tympanic membrane    Tympanometry:   Right: Type A - normal middle ear pressure and compliance   Left: Type A - normal middle ear pressure and compliance    Audiogram Results:  Mild sloping to severe, sensory hearing loss bilaterally, slightly greater in the right ear, 4727-1415 Hz  Word recognition scores, in quiet, were obtained at 100% at 70 dBHL bilaterally  *see attached audiogram    RECOMMENDATIONS:  1  Consider hearing aid evaluation  Provided   And Mrs Holly Thompson written information regarding devices (Zircon 1 mini R)  2  Annual audiological evaluations for monitoring purposes     PATIENT EDUCATION:   Discussed results and recommendations with   And Mrs Holly Thompson  Questions were addressed and the patient was encouraged to contact our department should concerns arise      Savanna Tan , [place], [place]# [id number]  Clinical Audiologist

## 2023-02-27 NOTE — PATIENT INSTRUCTIONS
Reduce torsemide to 20 mg 3 days a week starting on Wednesday and continuing every Monday, Wednesday, and Friday  Discontinue torsemide today until Wednesday  2    Discontinue Entresto for now  3    At the moment, you do not need to follow a strict salt or sodium restriction  Some extra sodium in your food for now would be satisfactory  4    Continue to drink ample amounts of fluid on a daily basis  5    We will check you again in approximately 1 month to see if your hydration has improved as well as your blood pressure

## 2023-02-28 NOTE — PROGRESS NOTES
Office Cardiology Progress Note  Juany Calzada 79 y o  female MRN: [id number]  02/27/23  10:42 PM      ASSESSMENT:    1   Dehydration manifest by abnormal weight loss of 25 pounds over the past 8 months, worsening prerenal azotemia, hypotension, caused by overdiuresis and Entresto  2   Resolution of  bilateral pretibial/ankle/pedal edema with onset approximately 3 2+ years ago associated  with 32 pound weight loss in 2 3+ years and now with 25 pound weight loss in approximately 8 months with no other evidence of heart failure   This coincides with current use of torsemide, which is now being taken at dose of 20 milligrams daily  3   Chronic combined systolic and diastolic heart failure ZEYA 76-09% LVEF and normal MVR function on 04/11/2022 and 06/22/2019 transthoracic echocardiograms   Significant improvement from 34% ejection fraction on 7/5/16 MUGA scan/underlying nonischemic dilated cardiomyopathy but currently with more exertional fatigue, less dyspnea on exertion, and  recurrence  of exhaustion and exertional cold sweats,  with suppression of occasional nocturnal wheezing   Initially improved on starting dose of Entresto, but had vasovagal type near syncope on 01/18/2019 with no recurrence   Patient with less frequent lightheadedness and  low blood pressures in the 80's/40-60's   The current symptoms are partially related to physical deconditioning  4   History of severe diarrhea approximately8 months ago superimposed on chronic intermittent diarrhea, likely causing dehydration  5  Chronic atrial fibrillation, controlled, and chronic left bundle-branch block with no EKG changes in approximately 3 4+ years  6  History of mechanical mitral valve replacement September, 1990, with labile oral warfarin anticoagulation with target INR of 2 5-3 5   Latest INR 1 69 on 2/20/2023 with appropriate reduction of warfarin dose    7  Moderately frequent asymptomatic PVCs and otherwise benign Holter monitor December, 2013   8  46 9 pound weight loss in approximately 8 3+ years,  and recent weight loss of 37  lbs in 3 2+ years with 25 pound weight loss in 8 months  9  Controlled mixed dyslipidemia  10  Mild obstructive sleep apnea, previously suspected  11  History of asthma  12  History of GERD  13  History of benign positional vertigo with recurrence 23 months ago and again 22+ months ago  14  History of Lyme disease  15  Hypothyroidism with  prior normal TSH level, which could have contributed to previous extreme fatigue  16  History of hepatitis C   17  History of Parkinson's disease/gait dysfunction with slowly progressive symptoms    18  Resolved nonproductive cough and postural dizziness with  flu-like illness nearly 4 55 years ago  19  Chronic fatigue and malaise, multifactorial, improving  20  Drug-induced insomnia  21  History of fall with subsequent left distal tibial fracture and left ankle sprain with right periorbital ecchymoses and laceration and left wrist sprain on 7/30/17, with no recurrence, possibly related to transient drop in blood pressure with upright position in hot weather after prolonged sitting  22  Mildly impaired fasting glucose of 126 on 1/18/2023 with A1c of 5 9% on 07/20/2020  23   Worsened chronic kidney disease, stage IIIb, probably related to daily torsemide  24  History of prior near syncope and cold sweats with presumed hypotension, possibly secondary to previous worsening diarrhea approximately8 months ago  Anita Jose De Jesus morning blood pressures on prior measurement, with normal pressures rest of day  Recurrent dehydration/hypotension and residual mild dizziness as well as resolution of acute kidney injury since 11/18/16   Patient currently off midodrine           Plan       Patient Instructions     1  Reduce torsemide to 20 mg 3 days a week starting on Wednesday and continuing every Monday, Wednesday, and Friday  Discontinue torsemide today until Wednesday    2    Discontinue Entresto for now  3    At the moment, you do not need to follow a strict salt or sodium restriction  Some extra sodium in your food for now would be satisfactory  4    Continue to drink ample amounts of fluid on a daily basis  5    We will check you again in approximately 1 month to see if your hydration has improved as well as your blood pressure  HPI    This 79 y o  female has been complaining of dizziness, lightheadedness, low blood pressure, and weakness  Her blood pressures have been in the 80s/40s-50s  In addition, she has exhibited progressive prerenal azotemia  Although I had left a message for the patient to reduce her dose of torsemide and to hold her Cheng Patter, she never got the message and has continued on her usual dose of daily torsemide and Entresto  There has been no recent chest pain, palpitations, dyspnea, syncope, falls  The patient is also being seen in follow-up of the below listed diagnoses  Encounter Diagnoses   Name Primary?    • Dehydration Yes   • Abnormal weight loss    • Prerenal azotemia    • Stage 3b chronic kidney disease (HCC)    • Chronic combined systolic and diastolic heart failure (HCC)    • Atrial fibrillation, chronic ([facility])    • History of mitral valve replacement with mechanical valve    • Asymptomatic PVCs    • Mixed dyslipidemia    • History of COVID-19    • Obstructive sleep apnea    • Parkinson's disease ([facility] [address] [facility] )    • Impaired fasting glucose         Review of Systems    All other systems negative, except as noted in history of present illness    Historical Information   Past Medical History:   Diagnosis Date   • Anal fissure    • Arthritis     osteo joints   • Asthma with acute exacerbation    • Blepharitis    • Breast lump    • Chalazion    • CHF, chronic (HCC)    • Difficulty walking    • Disease of thyroid gland    • Drooling    • Dysphagia    • Fall 05/04/2018   • Fibromyalgia, primary    • Glaucoma     Normal pressure   • History of transfusion 1996   • Hordeolum externum    • Hx of transient ischemic attack (TIA)    • Hypophonia    • Infectious viral hepatitis     Hep C dx     • Lyme carditis    • Murmur, cardiac    • Parkinsons (HCC)    • Rotator cuff tendinitis    • Seasonal allergies    • Urinary frequency      Past Surgical History:   Procedure Laterality Date   • BREAST BIOPSY Right 2018    benign   • BREAST CYST EXCISION Left     benign   • BREAST SURGERY N/A     benign, calcium   • CARDIAC SURGERY      mitral valve replacement   • CATARACT EXTRACTION Right 2015   • CATARACT EXTRACTION Left 2014   • CHEST TUBE INSERTION Right     post pleural effusion   • CHOLECYSTECTOMY      lap   • HYSTERECTOMY  1973    partial   • MN ARTHROCENTESIS ASPIR&/INJ MAJOR JT/BURSA W/O US Left 3/19/2021    Procedure: Sacroiliac Joint Injection ( 86542 ); Surgeon: Kailee Feng MD;  Location: [facility] MAIN OR;  Service: Pain Management    • MN COLSC FLX W/RMVL OF TUMOR POLYP LESION SNARE TQ N/A 2017    Procedure: COLONOSCOPY and biopsy ;  Surgeon: Bennie Martin MD;  Location: [place] [id number] GI LAB;   Service: Gastroenterology   • SKIN BIOPSY      pre cancerous on face   • TONSILLECTOMY     • US GUIDED BREAST BIOPSY RIGHT COMPLETE Right 2018     Social History     Substance and Sexual Activity   Alcohol Use Not Currently     Social History     Substance and Sexual Activity   Drug Use No     Social History     Tobacco Use   Smoking Status Former   • Packs/day: 0 10   • Years: 10 00   • Pack years: 1 00   • Types: Cigarettes   • Quit date: 5   • Years since quittin 1   Smokeless Tobacco Never       Family History:  Family History   Problem Relation Age of Onset   • Heart disease Mother         murmur Cardiomegaly age 64   • Pneumonia Father    • Heart disease Brother         mitrsl valve   • Parkinsonism Brother    • Arthritis Brother    • Lupus Daughter    • Diabetes Daughter    • Depression Daughter          Meds/Allergies     Prior to Admission medications    Medication Sig Start Date End Date Taking? Authorizing Provider   alendronate (FOSAMAX) 70 mg tablet take 1 tablet by mouth EVERY 7 DAYS 6/1/22  Yes Horace Goyal MD   carbidopa-levodopa (SINEMET)  mg per tablet TAKE 1 AND 1/2 TABLETS BY MOUTH 4 TIMES A DAY  Patient taking differently: Take 1 tablet by mouth 4 (four) times a day TAKE 1 AND 1/2 TABLETS IN THE MORNING 1 TABLET AT LATE MORNING 1 1/2 MID AFTER NOON  1 TABLET IN THE EVENING 3/22/22  Yes Nan Arana MD   Cholecalciferol (VITAMIN D3) 2000 units TABS Take 2,000 Units by mouth every morning   Yes Historical Provider, MD   entacapone (COMTAN) 200 mg tablet take 1 tablet by mouth four times a day 11/26/22  Yes Panda Jaramillo PA-C   levothyroxine 50 mcg tablet take 1 tablet by mouth every morning 12/20/22  Yes Horace Goyal MD   [facility]) 10 mg tablet take 1 tablet by mouth once daily 10/11/22  Yes Panda Jaramillo PA-C   Multiple Vitamins-Minerals (OCUVITE ADULT 50+ PO) Take by mouth daily with breakfast   Yes Historical Provider, MD   torsemide (DEMADEX) 20 mg tablet Hold torsemide until 2/20/2023 and then resume at dose of 1 tablet 3 days a week every Monday, Wednesday, Friday 2/27/23  Yes Rosalba Dan MD   warfarin (COUMADIN) 2 5 mg tablet TAKE 1/2 TO 1 TABLET BY MOUTH DAILY OR AS DIRECTED BY PHYSICIAN 5/10/22  Yes Rosalba Dan MD   sacubitril-valsartan Ralph Messer) 24-26 MG TABS Take 1 tablet by mouth 2 (two) times a day  2/27/23 Yes Historical Provider, MD   torsemide (DEMADEX) 20 mg tablet Hold torsemide until 2/20/2023 and then resume at dose of 1 tablet 3 days a week every Monday, Wednesday, Friday    Patient taking differently: Daily 2/16/23 2/27/23 Yes Rosalba Dan MD   Diclofenac Sodium (VOLTAREN) 1 % Apply 2 g topically 4 (four) times a day as needed (Foot pain) for up to 15 days 1/21/22 12/6/22  Jerry Craig DPM   warfarin (Coumadin) 1 mg tablet Pt to take 3 tablets per day or as directed  Patient not taking: Reported on 1/31/2023 1/5/23   Marivel Hodge MD   acetaminophen (TYLENOL) 650 mg CR tablet Take 1 tablet (650 mg total) by mouth 3 (three) times a day  Patient not taking: Reported on 2/27/2023 6/6/19 2/27/23  Marivel Hodge MD   albuterol (PROVENTIL HFA,[facility]) 90 mcg/act inhaler  1/6/23 2/27/23  Historical Provider, MD   polyvinyl alcohol (LIQUIFILM TEARS) 1 4 % ophthalmic solution 1 drop as needed for dry eyes  Patient not taking: Reported on 2/27/2023 2/27/23  Historical Provider, MD       Allergies   Allergen Reactions   • Amantadine Tongue Swelling   • Artane [Trihexyphenidyl] Other (See Comments)     Felt "disconnected", "out if it", shaky  Did not feel right    • Glycopyrrolate      Nose bleeds   • Pollen Extract          Vitals:    02/27/23 1107   BP: 97/56   BP Location: Left arm   Patient Position: Sitting   Cuff Size: Standard   Pulse: 93   SpO2: 100%   Weight: 69 9 kg (154 lb)   Height: 5' 4" (1 626 m)       Body mass index is 26 43 kg/m²    5 pound weight loss in approximately 4 weeks and 25 pound weight loss in approximately 8 months    Physical Exam:    General Appearance:  Alert, cooperative, no distress, appears stated age and is mildly overweight   Head:  Normocephalic, without obvious abnormality, atraumatic   Eyes:  PERRL, conjunctiva/corneas clear, EOM's intact,   both eyes   Ears:  Normal TM's and external ear canals, both ears   Nose: Nares normal, septum midline, mucosa normal, no drainage or sinus tenderness   Throat: Lips, mucosa, and tongue normal; teeth and gums normal   Neck: Supple, symmetrical, trachea midline, no adenopathy, thyroid: not enlarged, symmetric, no tenderness/mass/nodules, no carotid bruit or JVD   Back:   Symmetric, no curvature, ROM normal, no CVA tenderness   Lungs:   Clear to auscultation bilaterally, respirations unlabored   Chest Wall:  No tenderness or deformity   Heart:  Irregularly irregular cardiac rhythm, with unvarying mitral valve closure click coincident with S1, S2 normal, no murmur, rub or gallop   Abdomen:   Soft, non-tender, bowel sounds active all four quadrants,  no masses, no organomegaly   Extremities: Extremities normal, atraumatic, no cyanosis or edema   Pulses: 2+ and symmetric   Skin: Skin showed normal color, texture, turgor and no rashes or lesions   Lymph nodes: Cervical, supraclavicular, and axillary nodes normal   Neurologic: Normal         Cardiographics    ECG 02/27/23:    No ECG done today    IMAGING:    XR chest pa & lateral    Result Date: 1/10/2023  Impression Trace bilateral pleural effusions with bibasilar atelectasis   Workstation performed: [id number]     DXA bone density scan 2/6/2023:    Consistent with osteoporosis with increase in bone mineral density compared to 8/13/20            LAB REVIEW:      Lab Results   Component Value Date    SODIUM 140 02/13/2023    K 4 0 02/13/2023     (H) 02/13/2023    CO2 27 02/13/2023    ANIONGAP 9 8 (L) 12/07/2015    BUN 36 (H) 02/13/2023    CREATININE 1 22 02/13/2023    GLUCOSE 91 12/07/2015    GLUF 111 (H) 02/13/2023    CALCIUM 9 9 02/13/2023    CORRECTEDCA 10 4 (H) 12/21/2022    AST 23 12/21/2022    ALT 7 (L) 12/21/2022    ALKPHOS 82 12/21/2022    PROT 6 9 12/07/2015    BILITOT 1 6 (H) 12/07/2015    EGFR 41 02/13/2023     Lab Results   Component Value Date    CHOLESTEROL 131 10/31/2022    CHOLESTEROL 159 02/09/2021    CHOLESTEROL 184 11/12/2020     Lab Results   Component Value Date    HDL 54 10/31/2022    HDL 60 02/09/2021    HDL 67 11/12/2020       Lab Results   Component Value Date    LDLCALC 61 10/31/2022    LDLCALC 82 02/09/2021    LDLCALC 97 11/12/2020     No components found for: [facility]  Lab Results   Component Value Date    TRIG 79 10/31/2022    TRIG 86 02/09/2021    TRIG 102 11/12/2020     INR 2/20/2023: 1 69          Rosario Middleton MD

## 2023-03-01 ENCOUNTER — PATIENT OUTREACH (OUTPATIENT)
Dept: CASE MANAGEMENT | Facility: HOSPITAL | Age: 83
End: 2023-03-01

## 2023-03-01 NOTE — PROGRESS NOTES
SNF/ROB Pathway Outpatient Care Manager Note: Chart notes reviewed and call made to patient  She reports she is now off Entresto as of yesterday  She did not BP yet today  Her weight has stabilized and is 152  6# today  She states not sleeping well- waking up every hour and causing her to feel very sleepy during the day  She sees neurologist tomorrow and suggested she discuss with him  Will follow up

## 2023-03-02 ENCOUNTER — OFFICE VISIT (OUTPATIENT)
Dept: NEUROLOGY | Facility: CLINIC | Age: 83
End: 2023-03-02

## 2023-03-02 VITALS
BODY MASS INDEX: 25.95 KG/M2 | HEIGHT: 64 IN | HEART RATE: 82 BPM | DIASTOLIC BLOOD PRESSURE: 56 MMHG | WEIGHT: 152 LBS | TEMPERATURE: 96.3 F | OXYGEN SATURATION: 98 % | SYSTOLIC BLOOD PRESSURE: 98 MMHG

## 2023-03-02 DIAGNOSIS — G20 PARKINSON DISEASE (HCC): Primary | ICD-10-CM

## 2023-03-02 DIAGNOSIS — G24.9 DYSKINESIA: ICD-10-CM

## 2023-03-02 DIAGNOSIS — I95.1 ORTHOSTATIC HYPOTENSION: ICD-10-CM

## 2023-03-02 RX ORDER — MIDODRINE HYDROCHLORIDE 2.5 MG/1
2.5 TABLET ORAL
Qty: 90 TABLET | Refills: 3 | Status: SHIPPED | OUTPATIENT
Start: 2023-03-02

## 2023-03-02 NOTE — PROGRESS NOTES
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        History obtained from:  Patient and      HPI/Subjective:    Deshawn Bishop is an 78 yo F with PMH of PD presents as f/u  Per my previous reports, her disease is manifested by masked facies, hypophonic voice, bradykinesia  Patient's speech was first affected  She was diagnosed about 9 years ago  Patient was initially following with Dr Annemarie Contreras  Patient was last seen by us in May of 2020  She has has softer speech for past few years  Over the years, she was declining physically so we wanted her to be seen by our movement specialist  She was seen by Dr Lizzy Mitchell  Initially no changes were made  She had been on stalevo for a long time  Then she was placed on sinemet 25/100 1 5 tabs qid  She is also placed on entacapone 200mg qid  She had been on stalevo combination for a long time prior to seeing movement specialist  She wasn't very happy with care after that and has issues so she decided to return to us  Today she has dyskinesia and says has had it for at least a few months  She is constantly moving and can't rest   Over past 2 years she has developed orthostatic hypotension  Her systolic now doesn't go above 100  She struggles to put compression stockings  She uses walker to walk     In terms of memory disturbance, she's stable  She's on namenda 10mg daily  Denies hallucinations        Still  denies any hallucinations  Denies delusions  Eliezer Harris does now flail a little in her sleep for past 2 months     For essential tremors, she is on primidone 50mg nightly          In July she had EMG of RUE which was suggestive of mild median mononeuropathy and suggestive of cervical radiculopathy           Past Medical History:   Diagnosis Date   • Anal fissure    • Arthritis     osteo joints   • Asthma with acute exacerbation    • Blepharitis    • Breast lump • Chalazion    • CHF, chronic ([facility] [address] )    • Difficulty walking    • Disease of thyroid gland    • Drooling    • Dysphagia    • Fall 2018   • Fibromyalgia, primary    • Glaucoma     Normal pressure   • History of transfusion    • Hordeolum externum    • Hx of transient ischemic attack (TIA)    • Hypophonia    • Infectious viral hepatitis     Hep C dx     • Lyme carditis    • Murmur, cardiac    • Parkinsons (HCC)    • Rotator cuff tendinitis    • Seasonal allergies    • Urinary frequency      Social History     Socioeconomic History   • Marital status: /Civil Union     Spouse name: Not on file   • Number of children: Not on file   • Years of education: Not on file   • Highest education level: Not on file   Occupational History   • Not on file   Tobacco Use   • Smoking status: Former     Packs/day: 0 10     Years: 10 00     Pack years: 1 00     Types: Cigarettes     Quit date: 1970     Years since quittin 2   • Smokeless tobacco: Never   Vaping Use   • Vaping Use: Never used   Substance and Sexual Activity   • Alcohol use: Not Currently   • Drug use: No   • Sexual activity: Not Currently   Other Topics Concern   • Not on file   Social History Narrative   • Not on file     Social Determinants of Health     Financial Resource Strain: Low Risk    • Difficulty of Paying Living Expenses: Not hard at all   Food Insecurity: No Food Insecurity   • Worried About Running Out of Food in the Last Year: Never true   • Ran Out of Food in the Last Year: Never true   Transportation Needs: No Transportation Needs   • Lack of Transportation (Medical): No   • Lack of Transportation (Non-Medical):  No   Physical Activity: Not on file   Stress: Not on file   Social Connections: Not on file   Intimate Partner Violence: Not on file   Housing Stability: Low Risk    • Unable to Pay for Housing in the Last Year: No   • Number of Places Lived in the Last Year: 1   • Unstable Housing in the Last Year: No     Family History Problem Relation Age of Onset   • Heart disease Mother         murmur Cardiomegaly age 64   • Pneumonia Father    • Heart disease Brother         mitrsl valve   • Parkinsonism Brother    • Arthritis Brother    • Lupus Daughter    • Diabetes Daughter    • Depression Daughter      Allergies   Allergen Reactions   • Amantadine Tongue Swelling   • Artane [Trihexyphenidyl] Other (See Comments)     Felt "disconnected", "out if it", shaky  Did not feel right    • Glycopyrrolate      Nose bleeds   • Pollen Extract      Current Outpatient Medications on File Prior to Visit   Medication Sig Dispense Refill   • alendronate (FOSAMAX) 70 mg tablet take 1 tablet by mouth EVERY 7 DAYS 12 tablet 3   • carbidopa-levodopa (SINEMET)  mg per tablet TAKE 1 AND 1/2 TABLETS BY MOUTH 4 TIMES A DAY (Patient taking differently: Take 1 tablet by mouth 4 (four) times a day TAKE 1 AND 1/2 TABLETS 4 times daily ) 240 tablet 8   • Cholecalciferol (VITAMIN D3) 2000 units TABS Take 2,000 Units by mouth every morning     • Diclofenac Sodium (VOLTAREN) 1 % Apply 2 g topically 4 (four) times a day as needed (Foot pain) for up to 15 days 50 g 2   • entacapone (COMTAN) 200 mg tablet take 1 tablet by mouth four times a day 120 tablet 4   • levothyroxine 50 mcg tablet take 1 tablet by mouth every morning 30 tablet 3   • memantine (NAMENDA) 10 mg tablet take 1 tablet by mouth once daily 90 tablet 3   • Multiple Vitamins-Minerals (OCUVITE ADULT 50+ PO) Take by mouth daily with breakfast     • torsemide (DEMADEX) 20 mg tablet Hold torsemide until 2/20/2023 and then resume at dose of 1 tablet 3 days a week every Monday, Wednesday, Friday   30 tablet 3   • warfarin (COUMADIN) 2 5 mg tablet TAKE 1/2 TO 1 TABLET BY MOUTH DAILY OR AS DIRECTED BY PHYSICIAN 30 tablet 11   • warfarin (Coumadin) 1 mg tablet Pt to take 3 tablets per day or as directed (Patient not taking: Reported on 1/31/2023) 90 tablet 3     No current facility-administered medications on file prior to visit  Review of Systems   Refer to positive review of systems in HPI  Review of Systems    Constitutional- No fever  Eyes- No visual change  ENT- Hearing normal  CV- No chest pain  Resp- No Shortness of breath  GI- No diarrhea  - Bladder normal  MS- No Arthritis   Skin- No rash  Psych- No depression  Endo- No DM  Heme- No nodes    Vitals:    03/02/23 0944   BP: 98/56   BP Location: Left arm   Patient Position: Sitting   Cuff Size: Standard   Pulse: 82   Temp: (!) 96 3 °F (35 7 °C)   TempSrc: Tympanic   SpO2: 98%   Weight: 68 9 kg (152 lb)   Height: 5' 4" (1 626 m)       PHYSICAL EXAM:  Appearance: No Acute Distress  Ophthalmoscopic: Disc Flat, Normal fundus  Mental status:  Orientation: Awake, Alert, and Orientedx3  Memory: Registation 3/3 Recall 3/3  Attention: normal  Knowledge: good  Language: No aphasia  Speech: No dysarthria  Cranial Nerves:  2 No Visual Defect on Confrontation, Pupils round, equal, reactive to light  3,4,6 Extraocular Movements Intact, no nystagmus  5 Facial Sensation Intact  7 No facial asymmetry  8 Intact hearing  9,10 Palate symmetric, normal gag  11 Good shoulder shrug  12 Tongue Midline  Gait: uses walker   Coordination: No ataxia with finger to nose testing, and heel to shin  Sensory: Intact, Symmetric to pinprick, light touch, vibration, and joint position  Muscle Tone: Normal              Muscle exam:  Arm Right Left Leg Right Left   Deltoid 5/5 5/5 Iliopsoas 5/5 5/5   Biceps 5/5 5/5 Quads 5/5 5/5   Triceps 5/5 5/5 Hamstrings 5/5 5/5   Wrist Extension 5/5 5/5 Ankle Dorsi Flexion 5/5 5/5   Wrist Flexion 5/5 5/5 Ankle Plantar Flexion 5/5 5/5   Interossei 5/5 5/5 Ankle Eversion 5/5 5/5   APB 5/5 5/5 Ankle Inversion 5/5 5/5       Reflexes   RJ BJ TJ KJ AJ Plantars Ayala's   Right 2+ 2+ 2+ 2+ 2+ Downgoing Not present   Left 2+ 2+ 2+ 2+ 2+ Downgoing Not present     Personal review of  Labs:                    Diagnoses and all orders for this visit:        1  Parkinson disease ([facility] [address] )        2  Orthostatic hypotension  midodrine (PROAMATINE) 2 5 mg tablet      3  Dyskinesia            Patient is showing signs of dyskinesia  Her tone is more than normal   Will need to lower dose of sinemet to 1 tab qid from 1 5tabs qid  She is to resume entacapone as is  If needed will add amantadine to her regimen  For hypotension, adding low dose midodrine 2 5mg tid  Once her dyskinesia is better controlled, will refer her to outpatient PT to walk better and hopefully without walker                 Total time of encounter:  40 min  More than 50% of the time was used in counseling and/or coordination of care  Extent of counseling and/or coordination of care        Celina Willis MD  [facility]  [address]  [address]  [phone]

## 2023-03-03 ENCOUNTER — TELEPHONE (OUTPATIENT)
Dept: CARDIOLOGY CLINIC | Facility: CLINIC | Age: 83
End: 2023-03-03

## 2023-03-03 NOTE — TELEPHONE ENCOUNTER
(Dr Stewart Braxton pt )  Phys Therapist needs to know the parameters for physical therapy due to her problem with hypotension, not sure of her limitations    Phone# [phone]

## 2023-03-06 ENCOUNTER — PATIENT OUTREACH (OUTPATIENT)
Dept: CASE MANAGEMENT | Facility: HOSPITAL | Age: 83
End: 2023-03-06

## 2023-03-06 ENCOUNTER — APPOINTMENT (OUTPATIENT)
Dept: LAB | Facility: CLINIC | Age: 83
End: 2023-03-06

## 2023-03-06 NOTE — PROGRESS NOTES
Outpatient Care Manager Note: Chart notes reviewed  SNF/ORB Pathway completed and Complex episode resolved

## 2023-03-06 NOTE — TELEPHONE ENCOUNTER
Jus Francois called to report that since decreasing diuretic Jesica's legs are starting to swell again

## 2023-03-07 ENCOUNTER — ANTICOAG VISIT (OUTPATIENT)
Dept: CARDIOLOGY CLINIC | Facility: CLINIC | Age: 83
End: 2023-03-07

## 2023-03-07 DIAGNOSIS — Z95.2 HISTORY OF MITRAL VALVE REPLACEMENT WITH MECHANICAL VALVE: Primary | ICD-10-CM

## 2023-03-07 DIAGNOSIS — I48.20 ATRIAL FIBRILLATION, CHRONIC (HCC): ICD-10-CM

## 2023-03-07 NOTE — TELEPHONE ENCOUNTER
Spoke with , made aware of recommendations surrounding her sleepiness  He will ensure that linda does not take diuretic more than three days a week and he will call back with any additional concerns

## 2023-03-07 NOTE — TELEPHONE ENCOUNTER
Spoke with Henry Chaidez, he states BP this morning was 116/64  [place] Keena was started on Midodrine 3x per day  Henry Chaidez states she is very tired all the time and falls asleep throughout the day  Henry Chaidez states when she is alseep she is in a very deep sleep

## 2023-03-16 ENCOUNTER — ANTICOAG VISIT (OUTPATIENT)
Dept: CARDIOLOGY CLINIC | Facility: CLINIC | Age: 83
End: 2023-03-16

## 2023-03-16 ENCOUNTER — LAB (OUTPATIENT)
Dept: LAB | Facility: CLINIC | Age: 83
End: 2023-03-16

## 2023-03-16 DIAGNOSIS — Z95.2 HISTORY OF MITRAL VALVE REPLACEMENT WITH MECHANICAL VALVE: Primary | ICD-10-CM

## 2023-03-16 DIAGNOSIS — E86.0 DEHYDRATION: ICD-10-CM

## 2023-03-16 DIAGNOSIS — I48.20 ATRIAL FIBRILLATION, CHRONIC (HCC): ICD-10-CM

## 2023-03-16 DIAGNOSIS — R63.4 ABNORMAL WEIGHT LOSS: ICD-10-CM

## 2023-03-16 DIAGNOSIS — R79.89 PRERENAL AZOTEMIA: ICD-10-CM

## 2023-03-16 LAB
ANION GAP SERPL CALCULATED.3IONS-SCNC: 0 MMOL/L (ref 4–13)
BUN SERPL-MCNC: 24 MG/DL (ref 5–25)
CALCIUM SERPL-MCNC: 10.3 MG/DL (ref 8.3–10.1)
CHLORIDE SERPL-SCNC: 111 MMOL/L (ref 96–108)
CO2 SERPL-SCNC: 29 MMOL/L (ref 21–32)
CREAT SERPL-MCNC: 1.11 MG/DL (ref 0.6–1.3)
GFR SERPL CREATININE-BSD FRML MDRD: 46 ML/MIN/1.73SQ M
GLUCOSE P FAST SERPL-MCNC: 125 MG/DL (ref 65–99)
POTASSIUM SERPL-SCNC: 3.5 MMOL/L (ref 3.5–5.3)
SODIUM SERPL-SCNC: 140 MMOL/L (ref 135–147)

## 2023-03-17 ENCOUNTER — TELEPHONE (OUTPATIENT)
Dept: CARDIOLOGY CLINIC | Facility: CLINIC | Age: 83
End: 2023-03-17

## 2023-03-17 NOTE — RESULT ENCOUNTER NOTE
Notify  of patient that latest basic metabolic panel blood test on 3/16/2023 was satisfactory  Will review in detail at next appointment      Dr Iraida Lindsey

## 2023-03-17 NOTE — TELEPHONE ENCOUNTER
----- Message from Adam Trujillo MD sent at 3/16/2023 10:15 PM EDT -----  Notify  of patient that latest basic metabolic panel blood test on 3/16/2023 was satisfactory  Will review in detail at next appointment      Dr Meme Osei

## 2023-03-22 ENCOUNTER — APPOINTMENT (OUTPATIENT)
Dept: LAB | Facility: CLINIC | Age: 83
End: 2023-03-22

## 2023-03-22 ENCOUNTER — ANTICOAG VISIT (OUTPATIENT)
Dept: CARDIOLOGY CLINIC | Facility: CLINIC | Age: 83
End: 2023-03-22

## 2023-03-22 DIAGNOSIS — I48.20 ATRIAL FIBRILLATION, CHRONIC (HCC): ICD-10-CM

## 2023-03-22 DIAGNOSIS — Z95.2 HISTORY OF MITRAL VALVE REPLACEMENT WITH MECHANICAL VALVE: Primary | ICD-10-CM

## 2023-03-24 ENCOUNTER — TELEPHONE (OUTPATIENT)
Dept: CARDIOLOGY CLINIC | Facility: CLINIC | Age: 83
End: 2023-03-24

## 2023-03-24 NOTE — TELEPHONE ENCOUNTER
Una Boland from [facility] called and left a voicemail looking for an update for pt  States pt has been SOB and very fatigued  I called Una Boland and left a message for her to call back so I could get some more information

## 2023-03-24 NOTE — TELEPHONE ENCOUNTER
Sayra Smith called back, Pablo Lind is feeling weak, and exhausted  States it has been about a week  bp was wnl, had some edema in her legs  Stew Carrillo stated the fatigue has been getting worse as time goes on  The only thing that has changed is the entresto was stopped

## 2023-03-27 ENCOUNTER — OFFICE VISIT (OUTPATIENT)
Dept: CARDIOLOGY CLINIC | Facility: CLINIC | Age: 83
End: 2023-03-27

## 2023-03-27 VITALS
SYSTOLIC BLOOD PRESSURE: 110 MMHG | HEART RATE: 84 BPM | HEIGHT: 64 IN | OXYGEN SATURATION: 99 % | BODY MASS INDEX: 26.29 KG/M2 | WEIGHT: 154 LBS | DIASTOLIC BLOOD PRESSURE: 62 MMHG

## 2023-03-27 DIAGNOSIS — M17.11 PRIMARY OSTEOARTHRITIS OF RIGHT KNEE: ICD-10-CM

## 2023-03-27 DIAGNOSIS — N18.31 STAGE 3A CHRONIC KIDNEY DISEASE (HCC): ICD-10-CM

## 2023-03-27 DIAGNOSIS — I50.32 CHRONIC DIASTOLIC HEART FAILURE (HCC): Primary | ICD-10-CM

## 2023-03-27 DIAGNOSIS — G20 PARKINSON'S DISEASE (HCC): ICD-10-CM

## 2023-03-27 DIAGNOSIS — E78.2 MIXED DYSLIPIDEMIA: ICD-10-CM

## 2023-03-27 DIAGNOSIS — Z86.79 HISTORY OF LEFT BUNDLE BRANCH BLOCK (LBBB): ICD-10-CM

## 2023-03-27 DIAGNOSIS — Z95.2 H/O MITRAL VALVE REPLACEMENT WITH MECHANICAL VALVE: ICD-10-CM

## 2023-03-27 DIAGNOSIS — N18.32 STAGE 3B CHRONIC KIDNEY DISEASE (HCC): ICD-10-CM

## 2023-03-27 DIAGNOSIS — I48.20 ATRIAL FIBRILLATION, CHRONIC (HCC): ICD-10-CM

## 2023-03-27 DIAGNOSIS — R53.82 CHRONIC FATIGUE: ICD-10-CM

## 2023-03-27 DIAGNOSIS — R60.0 EDEMA OF BOTH LOWER EXTREMITIES: ICD-10-CM

## 2023-03-27 DIAGNOSIS — Z79.01 WARFARIN ANTICOAGULATION: ICD-10-CM

## 2023-03-27 RX ORDER — TORSEMIDE 20 MG/1
TABLET ORAL
Qty: 30 TABLET | Refills: 3
Start: 2023-03-27

## 2023-03-27 NOTE — PROGRESS NOTES
Office Cardiology Progress Note  Denise Bryson 79 y o  female MRN: [id number]  03/27/23  12:06 PM      ASSESSMENT:    1   Stabilized dehydration manifest by abnormal weight loss of 25 pounds over the past 9 months, worsening prerenal azotemia, hypotension, caused by overdiuresis and Entresto  2   Recurrence of  bilateral pretibial/ankle/pedal edema with onset approximately 3 3+ years ago associated  with 32 pound weight loss in 2 3+ years and now with 25 pound weight loss in approximately 9 months with no other evidence of heart failure   This coincides with current use of torsemide, which is now being taken at dose of 20 milligrams 3 days/week  Higher dose torsemide seems to worsen her renal function and hypotension  3   Chronic combined  diastolic heart failure WYAK 02-72% LVEF and normal MVR function on 04/11/2022 and 06/22/2019 transthoracic echocardiograms   Significant improvement from 34% ejection fraction on 7/5/16 MUGA scan/underlying nonischemic dilated cardiomyopathy but currently with more exertional fatigue, less dyspnea on exertion, and  recurrence  of exhaustion and exertional cold sweats,  with suppression of occasional nocturnal wheezing   Initially improved on starting dose of Entresto, but had vasovagal type near syncope on 01/18/2019 with no recurrence   Patient with less frequent lightheadedness and  low blood pressures in the 80's/40-60's   The current symptoms are partially related to physical deconditioning  Cherelle Dock has recently been discontinued  4   History of severe diarrhea approximately 9 months ago superimposed on chronic intermittent diarrhea, likely contributing to dehydration  5  Chronic atrial fibrillation, controlled, and chronic left bundle-branch block with no EKG changes in approximately 3 5+ years    6  History of mechanical mitral valve replacement September, 1990, with labile oral warfarin anticoagulation with target INR of 2 5-3 5   Latest INR 3 99 on 3/22/2023 with appropriate reduction of warfarin dose  7  Moderately frequent asymptomatic PVCs and otherwise benign Holter monitor December, 2013  8  46 9 pound weight loss in approximately 8 4+ years,  and recent weight loss of 37  lbs in 3 3+ years with 25 pound weight loss in 9 months  9  Controlled mixed dyslipidemia  10  Mild obstructive sleep apnea, previously suspected  11  History of asthma  12  History of GERD  13  History of benign positional vertigo with recurrence 2 years ago     14  History of Lyme disease  15  Hypothyroidism with  prior normal TSH level, which could have contributed to previous extreme fatigue  16  History of hepatitis C   17  History of Parkinson's disease/gait dysfunction with slowly progressive symptoms    18  Resolved nonproductive cough and postural dizziness with  flu-like illness nearly 4 65 years ago  19  Chronic fatigue and malaise, multifactorial, improving  20  Drug-induced insomnia  21  History of fall with subsequent left distal tibial fracture and left ankle sprain with right periorbital ecchymoses and laceration and left wrist sprain on 7/30/17, with no recurrence, possibly related to transient drop in blood pressure with upright position in hot weather after prolonged sitting  22  Mildly impaired fasting glucose of 126 on 1/18/2023 with A1c of 5 9% on 07/20/2020  23   Improved chronic kidney disease, stage IIIa, probably related to lower dose of torsemide  24  History of prior near syncope and cold sweats with presumed hypotension, possibly secondary to previous worsening diarrhea approximately 9 months ago   Higher morning blood pressures on prior measurement, with normal pressures rest of day  Recurrent dehydration/hypotension and residual mild dizziness as well as resolution of acute kidney injury since 11/18/16   Patient currently on midodrine and off Entresto with resolution of hypotension  Plan       Patient Instructions     1   We have ordered a CBC to be done with your next prohrombin time blood test   2  As there is no indication of new cardiac decompensation, I recommend a trial of physical therapy to build up endurance and energy and will send a message to Dr Spencer Marie to initiate that order  I will let her know your current cardiac status  3  You may take outdoor or indoor garage walks as long as the weather permits  4  Cardiology follow-up approximately 2 months with EKG  5  Use compression hosiery that is knee-high to help with leg swelling  HPI    This 79 y o  female  denies new cardiopulmonary symptoms  As reported by visiting nurse, she continues to complain of leg edema and progressive fatigue with normal activity  She denies dizziness, lightheadedness, low blood pressure, faintness, dyspnea, orthopnea, paroxysmal nocturnal dyspnea  She denies any recent chest pain, palpitations, syncope  She is being seen in follow-up of the below listed diagnoses  She is no longer taking Entresto and limits her torsemide to 3 days a week  Encounter Diagnoses   Name Primary?    • Chronic diastolic heart failure (HCC) Yes   • Atrial fibrillation, chronic (HCC)    • Chronic fatigue    • Stage 3b chronic kidney disease (HCC)    • Warfarin anticoagulation    • History of left bundle branch block (LBBB)    • H/O mitral valve replacement with mechanical valve    • Mixed dyslipidemia    • Primary osteoarthritis of right knee    • Parkinson's disease ([facility] [address] )    • Stage 3a chronic kidney disease (HCC)    • Edema of both lower extremities         Review of Systems    All other systems negative, except as noted in history of present illness    Historical Information   Past Medical History:   Diagnosis Date   • Anal fissure    • Arthritis     osteo joints   • Asthma with acute exacerbation    • Blepharitis    • Breast lump    • Chalazion    • CHF, chronic (HCC)    • Difficulty walking    • Disease of thyroid gland    • Drooling    • Dysphagia    • Fall 05/04/2018 • Fibromyalgia, primary    • Glaucoma     Normal pressure   • History of transfusion    • Hordeolum externum    • Hx of transient ischemic attack (TIA)    • Hypophonia    • Infectious viral hepatitis     Hep C dx     • Lyme carditis    • Murmur, cardiac    • Parkinsons (HCC)    • Rotator cuff tendinitis    • Seasonal allergies    • Urinary frequency      Past Surgical History:   Procedure Laterality Date   • BREAST BIOPSY Right 2018    benign   • BREAST CYST EXCISION Left     benign   • BREAST SURGERY N/A     benign, calcium   • CARDIAC SURGERY      mitral valve replacement   • CATARACT EXTRACTION Right    • CATARACT EXTRACTION Left    • CHEST TUBE INSERTION Right     post pleural effusion   • CHOLECYSTECTOMY      lap   • HYSTERECTOMY  1973    partial   • WI ARTHROCENTESIS ASPIR&/INJ MAJOR JT/BURSA W/O US Left 3/19/2021    Procedure: Sacroiliac Joint Injection ( 06819 ); Surgeon: Iman Padilla MD;  Location: [facility] MAIN OR;  Service: Pain Management    • WI COLSC FLX W/RMVL OF TUMOR POLYP LESION SNARE TQ N/A 2017    Procedure: COLONOSCOPY and biopsy ;  Surgeon: Jabari Andersen MD;  Location: [facility] GI LAB;   Service: Gastroenterology   • SKIN BIOPSY      pre cancerous on face   • TONSILLECTOMY     • US GUIDED BREAST BIOPSY RIGHT COMPLETE Right 2018     Social History     Substance and Sexual Activity   Alcohol Use Not Currently     Social History     Substance and Sexual Activity   Drug Use No     Social History     Tobacco Use   Smoking Status Former   • Packs/day: 0 10   • Years: 10 00   • Pack years: 1 00   • Types: Cigarettes   • Quit date: 5   • Years since quittin 4   Smokeless Tobacco Never       Family History:  Family History   Problem Relation Age of Onset   • Heart disease Mother         murmur Cardiomegaly age 64   • Pneumonia Father    • Heart disease Brother         mitrsl valve   • Parkinsonism Brother    • Arthritis Brother    • Lupus Daughter    • Diabetes Daughter    • Depression Daughter          Meds/Allergies     Prior to Admission medications    Medication Sig Start Date End Date Taking? Authorizing Provider   alendronate (FOSAMAX) 70 mg tablet take 1 tablet by mouth EVERY 7 DAYS 6/1/22  Yes Lázaro Barnes MD   carbidopa-levodopa (SINEMET)  mg per tablet TAKE 1 AND 1/2 TABLETS BY MOUTH 4 TIMES A DAY  Patient taking differently: Take 1 tablet by mouth 4 (four) times a day TAKE 1 AND 1/2 TABLETS 4 times daily  3/22/22  Yes Patrick Arana MD   Cholecalciferol (VITAMIN D3) 2000 units TABS Take 2,000 Units by mouth every morning   Yes Historical Provider, MD   entacapone (COMTAN) 200 mg tablet take 1 tablet by mouth four times a day 11/26/22  Yes Floresita Wu PA-C   levothyroxine 50 mcg tablet take 1 tablet by mouth every morning 12/20/22  Yes Lázaro Barnes MD   [facility]) 10 mg tablet take 1 tablet by mouth once daily 10/11/22  Yes Floresita Wu PA-C   midodrine (PROAMATINE) 2 5 mg tablet Take 1 tablet (2 5 mg total) by mouth 3 (three) times a day before meals 3/2/23  Yes Teetee Currie MD   Multiple Vitamins-Minerals (OCUVITE ADULT 50+ PO) Take by mouth daily with breakfast   Yes Historical Provider, MD   torsemide (DEMADEX) 20 mg tablet Take torsemide 20 mg 1 tablet 3 days a week every Monday, Wednesday, Friday and an extra dose once a week as needed for leg swelling  3/27/23  Yes Ros Jane MD   warfarin (Coumadin) 1 mg tablet Pt to take 3 tablets per day or as directed 1/5/23  Yes Ros Jane MD   warfarin (COUMADIN) 2 5 mg tablet TAKE 1/2 TO 1 TABLET BY MOUTH DAILY OR AS DIRECTED BY PHYSICIAN 5/10/22  Yes Ros Jane MD   torsemide [facility]) 20 mg tablet Hold torsemide until 2/20/2023 and then resume at dose of 1 tablet 3 days a week every Monday, Wednesday, Friday   2/27/23 3/27/23 Yes Ros Jane MD   Diclofenac Sodium (VOLTAREN) 1 % Apply 2 g topically 4 (four) times a day as needed (Foot pain) for "up to 15 days 1/21/22 3/2/23  Víctor Puls, DPM       Allergies   Allergen Reactions   • Amantadine Tongue Swelling   • Artane [Trihexyphenidyl] Other (See Comments)     Felt \"disconnected\", \"out if it\", evelyn  Did not feel right    • Glycopyrrolate      Nose bleeds   • Pollen Extract          Vitals:    03/27/23 1044   BP: 110/62   BP Location: Left arm   Patient Position: Sitting   Cuff Size: Standard   Pulse: 84   SpO2: 99%   Weight: 69 9 kg (154 lb)   Height: 5' 4\" (1 626 m)       Body mass index is 26 43 kg/m²  No change in weight in 1 month with 25 pound weight loss in approximately 9 months    Physical Exam:    General Appearance:  Alert, cooperative, no distress, appears stated age and is mildly overweight  Head:  Normocephalic, without obvious abnormality, atraumatic   Eyes:  PERRL, conjunctiva/corneas clear, EOM's intact,   both eyes   Ears:  Normal TM's and external ear canals, both ears   Nose: Nares normal, septum midline, mucosa normal, no drainage or sinus tenderness   Throat: Lips, mucosa, and tongue normal; teeth and gums normal   Neck: Supple, symmetrical, trachea midline, no adenopathy, thyroid: not enlarged, symmetric, no tenderness/mass/nodules, no carotid bruit or JVD   Back:   Symmetric, no curvature, ROM normal, no CVA tenderness   Lungs:   Clear to auscultation bilaterally, respirations unlabored   Chest Wall:  No tenderness or deformity   Heart:  Irregularly irregular cardiac rhythm with apical rate 78 bpm, S1, S2 normal, no murmur, rub or gallop   Abdomen:   Soft, non-tender, bowel sounds active all four quadrants,  no masses, no organomegaly   Extremities: Extremities normal, atraumatic, no cyanosis with 2+ right more than left pretibial, ankle and pedal edema bilaterally  The right knee joint shows primary osteoarthritic changes with probable joint effusion and warmth     Pulses: 2+ and symmetric   Skin: Skin showed normal color, texture, turgor and no rashes or lesions   Lymph nodes: " Cervical, supraclavicular, and axillary nodes normal   Neurologic: Normal         Cardiographics    ECG 03/27/23:    No ECG done today  IMAGING:    XR chest pa & lateral    Result Date: 1/10/2023  Impression Trace bilateral pleural effusions with bibasilar atelectasis   Workstation performed: [id number]             LAB REVIEW:      Lab Results   Component Value Date    SODIUM 140 03/16/2023    K 3 5 03/16/2023     (H) 03/16/2023    CO2 29 03/16/2023    ANIONGAP 9 8 (L) 12/07/2015    BUN 24 03/16/2023    CREATININE 1 11 03/16/2023    GLUCOSE 91 12/07/2015    GLUF 125 (H) 03/16/2023    CALCIUM 10 3 (H) 03/16/2023    CORRECTEDCA 10 4 (H) 12/21/2022    AST 23 12/21/2022    ALT 7 (L) 12/21/2022    ALKPHOS 82 12/21/2022    PROT 6 9 12/07/2015    BILITOT 1 6 (H) 12/07/2015    EGFR 46 03/16/2023     Lab Results   Component Value Date    CHOLESTEROL 131 10/31/2022    CHOLESTEROL 159 02/09/2021    CHOLESTEROL 184 11/12/2020     Lab Results   Component Value Date    HDL 54 10/31/2022    HDL 60 02/09/2021    HDL 67 11/12/2020       Lab Results   Component Value Date    LDLCALC 61 10/31/2022    LDLCALC 82 02/09/2021    LDLCALC 97 11/12/2020     No components found for: [facility]  Lab Results   Component Value Date    TRIG 79 10/31/2022    TRIG 86 02/09/2021    TRIG 102 11/12/2020       PT/INR 3/22/2023: 39 2/3 99        Nick Becker MD

## 2023-03-27 NOTE — PATIENT INSTRUCTIONS
We have ordered a CBC to be done with your next prohrombin time blood test   As there is no indication of new cardiac decompensation, I recommend a trial of physical therapy to build up endurance and energy and will send a message to Dr Shannan Baca to initiate that order  I will let her know your current cardiac status  You may take outdoor or indoor garage walks as long as the weather permits  Cardiology follow-up approximately 2 months with EKG  Use compression hosiery that is knee-high to help with leg swelling

## 2023-03-29 ENCOUNTER — TELEPHONE (OUTPATIENT)
Dept: CARDIOLOGY CLINIC | Facility: CLINIC | Age: 83
End: 2023-03-29

## 2023-03-29 ENCOUNTER — APPOINTMENT (OUTPATIENT)
Dept: LAB | Facility: CLINIC | Age: 83
End: 2023-03-29

## 2023-03-29 ENCOUNTER — ANTICOAG VISIT (OUTPATIENT)
Dept: CARDIOLOGY CLINIC | Facility: CLINIC | Age: 83
End: 2023-03-29

## 2023-03-29 DIAGNOSIS — Z95.2 H/O MITRAL VALVE REPLACEMENT WITH MECHANICAL VALVE: Primary | ICD-10-CM

## 2023-03-29 DIAGNOSIS — I48.20 ATRIAL FIBRILLATION, CHRONIC (HCC): ICD-10-CM

## 2023-03-29 LAB
ERYTHROCYTE [DISTWIDTH] IN BLOOD BY AUTOMATED COUNT: 18.9 % (ref 11.6–15.1)
HCT VFR BLD AUTO: 33 % (ref 34.8–46.1)
HGB BLD-MCNC: 10.8 G/DL (ref 11.5–15.4)
MCH RBC QN AUTO: 31.4 PG (ref 26.8–34.3)
MCHC RBC AUTO-ENTMCNC: 32.7 G/DL (ref 31.4–37.4)
MCV RBC AUTO: 96 FL (ref 82–98)
PLATELET # BLD AUTO: 203 THOUSANDS/UL (ref 149–390)
PMV BLD AUTO: 12.6 FL (ref 8.9–12.7)
RBC # BLD AUTO: 3.44 MILLION/UL (ref 3.81–5.12)
WBC # BLD AUTO: 5.74 THOUSAND/UL (ref 4.31–10.16)

## 2023-03-29 NOTE — TELEPHONE ENCOUNTER
----- Message from Elise Ferguson MD sent at 3/29/2023  4:45 PM EDT -----  Notify  of patient that her blood work did not show any major differences from prior results with only very minimal anemia, which would not likely be responsible for her current complaints      Dr Sadie Rajan

## 2023-03-29 NOTE — RESULT ENCOUNTER NOTE
Notify  of patient that her blood work did not show any major differences from prior results with only very minimal anemia, which would not likely be responsible for her current complaints      Dr Mikal Michael

## 2023-04-05 ENCOUNTER — OFFICE VISIT (OUTPATIENT)
Dept: OBGYN CLINIC | Facility: CLINIC | Age: 83
End: 2023-04-05

## 2023-04-05 VITALS
BODY MASS INDEX: 26.29 KG/M2 | WEIGHT: 154 LBS | DIASTOLIC BLOOD PRESSURE: 61 MMHG | HEIGHT: 64 IN | HEART RATE: 73 BPM | SYSTOLIC BLOOD PRESSURE: 100 MMHG

## 2023-04-05 DIAGNOSIS — M25.561 CHRONIC PAIN OF RIGHT KNEE: ICD-10-CM

## 2023-04-05 DIAGNOSIS — M25.461 EFFUSION OF RIGHT KNEE: ICD-10-CM

## 2023-04-05 DIAGNOSIS — G89.29 CHRONIC PAIN OF RIGHT KNEE: ICD-10-CM

## 2023-04-05 DIAGNOSIS — M17.11 PRIMARY OSTEOARTHRITIS OF RIGHT KNEE: Primary | ICD-10-CM

## 2023-04-05 NOTE — PROGRESS NOTES
Assessment/Plan:  1  Primary osteoarthritis of right knee  Ambulatory Referral to Physical Therapy      2  Chronic pain of right knee  Ambulatory Referral to Physical Therapy      3  Effusion of right knee  Ambulatory Referral to Physical Therapy        Scribe Attestation    I,:  Trung Abdi PA-C am acting as a scribe while in the presence of the attending physician :       I,:  Reta Ivory, DO personally performed the services described in this documentation    as scribed in my presence :         Lyly Campos is a pleasant 43-year-old presenting today for follow-up of her active related right knee pain with known underlying osteoarthritis  She did respond well to the cortisone injection initially, and it sounds like she had a flare of discomfort recently which is since subsided  Since her pain is 3 out of 10 currently, we will defer escalation to viscosupplementation  Encouraged her to use extra strength Tylenol every 8 hours as needed for pain  She can return in 3-4 weeks for a repeat aspiration and cortisone injection if her pain worsens  She expressed understanding all of her questions were addressed    Subjective: Right knee follow up    Patient ID: Devota Leyden is a 79 y o  female presenting today for follow-up little over 2 months after undergoing a right knee cortisone injection for bilateral knee osteoarthritis  She reports that she initially saw significant relief  However, approximately week ago she had a flare of discomfort which has since started to subside  She rates her pain currently as 3 out of 10 on a daily basis  She continues to ambulate with a walker  She denies any new injuries or falls  She is here with her daughter who is helping supplement the history    Review of Systems   Constitutional: Negative  HENT: Negative  Eyes: Negative  Respiratory: Negative  Cardiovascular: Negative  Gastrointestinal: Negative  Endocrine: Negative  Genitourinary: Negative  Musculoskeletal: Positive for arthralgias, joint swelling and myalgias  Skin: Negative  Allergic/Immunologic: Negative  Neurological: Negative  Hematological: Negative  Psychiatric/Behavioral: Negative  Past Medical History:   Diagnosis Date   • Anal fissure    • Arthritis     osteo joints   • Asthma with acute exacerbation    • Blepharitis    • Breast lump    • Chalazion    • CHF, chronic (HCC)    • Difficulty walking    • Disease of thyroid gland    • Drooling    • Dysphagia    • Fall 05/04/2018   • Fibromyalgia, primary    • Glaucoma     Normal pressure   • History of transfusion 1996   • Hordeolum externum    • Hx of transient ischemic attack (TIA)    • Hypophonia    • Infectious viral hepatitis     Hep C dx 1996    • Lyme carditis    • Murmur, cardiac    • Parkinsons (HCC)    • Rotator cuff tendinitis    • Seasonal allergies    • Urinary frequency        Past Surgical History:   Procedure Laterality Date   • BREAST BIOPSY Right 2018    benign   • BREAST CYST EXCISION Left     benign   • BREAST SURGERY N/A     benign, calcium   • CARDIAC SURGERY  1990    mitral valve replacement   • CATARACT EXTRACTION Right 2015   • CATARACT EXTRACTION Left 2014   • CHEST TUBE INSERTION Right     post pleural effusion   • CHOLECYSTECTOMY  2000    lap   • HYSTERECTOMY  1973    partial   • TN ARTHROCENTESIS ASPIR&/INJ MAJOR JT/BURSA W/O US Left 3/19/2021    Procedure: Sacroiliac Joint Injection ( 53920 ); Surgeon: Supriya Trejo MD;  Location: [facility] MAIN OR;  Service: Pain Management    • TN COLSC FLX W/RMVL OF TUMOR POLYP LESION SNARE TQ N/A 7/18/2017    Procedure: COLONOSCOPY and biopsy ;  Surgeon: Dom Coburn MD;  Location: [facility] GI LAB;   Service: Gastroenterology   • SKIN BIOPSY      pre cancerous on face   • TONSILLECTOMY     • US GUIDED BREAST BIOPSY RIGHT COMPLETE Right 2/13/2018       Family History   Problem Relation Age of Onset   • Heart disease Mother         murmur Cardiomegaly age 64   • Pneumonia Father    • Heart disease Brother         mitrsl valve   • Parkinsonism Brother    • Arthritis Brother    • Lupus Daughter    • Diabetes Daughter    • Depression Daughter        Social History     Occupational History   • Not on file   Tobacco Use   • Smoking status: Former     Packs/day: 0 10     Years: 10 00     Pack years: 1 00     Types: Cigarettes     Quit date: 1970     Years since quittin 2   • Smokeless tobacco: Never   Vaping Use   • Vaping Use: Never used   Substance and Sexual Activity   • Alcohol use: Not Currently   • Drug use: No   • Sexual activity: Not Currently         Current Outpatient Medications:   •  alendronate (FOSAMAX) 70 mg tablet, take 1 tablet by mouth EVERY 7 DAYS, Disp: 12 tablet, Rfl: 3  •  carbidopa-levodopa (SINEMET)  mg per tablet, TAKE 1 AND 1/2 TABLETS BY MOUTH 4 TIMES A DAY (Patient taking differently: Take 1 tablet by mouth 4 (four) times a day TAKE 1 AND 1/2 TABLETS 4 times daily ), Disp: 240 tablet, Rfl: 8  •  Cholecalciferol (VITAMIN D3) 2000 units TABS, Take 2,000 Units by mouth every morning, Disp: , Rfl:   •  entacapone (COMTAN) 200 mg tablet, take 1 tablet by mouth four times a day, Disp: 120 tablet, Rfl: 4  •  levothyroxine 50 mcg tablet, take 1 tablet by mouth every morning, Disp: 30 tablet, Rfl: 3  •  memantine (NAMENDA) 10 mg tablet, take 1 tablet by mouth once daily, Disp: 90 tablet, Rfl: 3  •  midodrine (PROAMATINE) 2 5 mg tablet, Take 1 tablet (2 5 mg total) by mouth 3 (three) times a day before meals, Disp: 90 tablet, Rfl: 3  •  Multiple Vitamins-Minerals (OCUVITE ADULT 50+ PO), Take by mouth daily with breakfast, Disp: , Rfl:   •  torsemide (DEMADEX) 20 mg tablet, Take torsemide 20 mg 1 tablet 3 days a week every Monday, Wednesday, Friday and an extra dose once a week as needed for leg swelling , Disp: 30 tablet, Rfl: 3  •  warfarin (Coumadin) 1 mg tablet, Pt to take 3 tablets per day or as directed, Disp: 90 tablet, Rfl: 3  • "Diclofenac Sodium (VOLTAREN) 1 %, Apply 2 g topically 4 (four) times a day as needed (Foot pain) for up to 15 days, Disp: 50 g, Rfl: 2  •  warfarin (COUMADIN) 2 5 mg tablet, TAKE 1/2 TO 1 TABLET BY MOUTH DAILY OR AS DIRECTED BY PHYSICIAN, Disp: 30 tablet, Rfl: 11    Allergies   Allergen Reactions   • Amantadine Tongue Swelling   • Artane [Trihexyphenidyl] Other (See Comments)     Felt \"disconnected\", \"out if it\", shaky  Did not feel right    • Glycopyrrolate      Nose bleeds   • Pollen Extract        Objective:  Vitals:    04/05/23 1440   BP: 100/61   Pulse: 73       Body mass index is 26 43 kg/m²  Right Knee Exam     Muscle Strength   The patient has normal right knee strength  Tenderness   The patient is experiencing tenderness in the medial joint line  Range of Motion   Extension:  0 normal   Flexion:  120 normal     Tests   Varus: negative Valgus: negative  Drawer:  Anterior - negative      Patellar apprehension: negative    Other   Erythema: absent  Scars: absent  Sensation: normal  Pulse: present  Swelling: none  Effusion: effusion (1+) present    Comments:  Parapatellar crepitus present  Stable at 0, 30, 90 degrees   Effusion present  No erythema or warmth  Baker's cyst present             Observations     Right Knee   Positive for effusion (1+)  Physical Exam  Vitals and nursing note reviewed  Constitutional:       Appearance: Normal appearance  She is well-developed  Comments: Body mass index is 26 43 kg/m²  HENT:      Head: Normocephalic and atraumatic  Right Ear: External ear normal       Left Ear: External ear normal    Eyes:      Extraocular Movements: Extraocular movements intact  Conjunctiva/sclera: Conjunctivae normal    Cardiovascular:      Rate and Rhythm: Normal rate  Pulses: Normal pulses  Pulmonary:      Effort: Pulmonary effort is normal    Musculoskeletal:      Cervical back: Normal range of motion  Right knee: Effusion (1+) present        Comments: " See ortho exam   Skin:     General: Skin is warm and dry  Neurological:      General: No focal deficit present  Mental Status: She is alert and oriented to person, place, and time  Mental status is at baseline  Psychiatric:         Mood and Affect: Mood normal          Behavior: Behavior normal          Thought Content: Thought content normal          Judgment: Judgment normal        This document was created using speech voice recognition software  Grammatical errors, random word insertions, pronoun errors, and incomplete sentences are an occasional consequence of this system due to software limitations, ambient noise, and hardware issues  Any formal questions or concerns about content, text, or information contained within the body of this dictation should be directly addressed to the provider for clarification

## 2023-04-06 ENCOUNTER — ANTICOAG VISIT (OUTPATIENT)
Dept: CARDIOLOGY CLINIC | Facility: CLINIC | Age: 83
End: 2023-04-06

## 2023-04-06 DIAGNOSIS — I48.20 ATRIAL FIBRILLATION, CHRONIC (HCC): ICD-10-CM

## 2023-04-06 DIAGNOSIS — Z95.2 H/O MITRAL VALVE REPLACEMENT WITH MECHANICAL VALVE: Primary | ICD-10-CM

## 2023-04-20 ENCOUNTER — OFFICE VISIT (OUTPATIENT)
Dept: FAMILY MEDICINE CLINIC | Facility: CLINIC | Age: 83
End: 2023-04-20

## 2023-04-20 VITALS
WEIGHT: 152 LBS | BODY MASS INDEX: 25.95 KG/M2 | RESPIRATION RATE: 16 BRPM | HEART RATE: 62 BPM | HEIGHT: 64 IN | SYSTOLIC BLOOD PRESSURE: 102 MMHG | OXYGEN SATURATION: 98 % | TEMPERATURE: 97.9 F | DIASTOLIC BLOOD PRESSURE: 70 MMHG

## 2023-04-20 DIAGNOSIS — R05.9 COUGH, UNSPECIFIED TYPE: Primary | ICD-10-CM

## 2023-04-20 DIAGNOSIS — I50.42 CHRONIC COMBINED SYSTOLIC AND DIASTOLIC HEART FAILURE (HCC): ICD-10-CM

## 2023-04-20 DIAGNOSIS — N18.31 STAGE 3A CHRONIC KIDNEY DISEASE (HCC): ICD-10-CM

## 2023-04-20 RX ORDER — AMOXICILLIN 500 MG/1
500 CAPSULE ORAL EVERY 12 HOURS SCHEDULED
Qty: 14 CAPSULE | Refills: 0 | Status: SHIPPED | OUTPATIENT
Start: 2023-04-20 | End: 2023-04-27

## 2023-04-20 NOTE — PROGRESS NOTES
Gabriel Adames 1940 female MRN: [id number]    FAMILY PRACTICE OFFICE VISIT  [facility] - [address]      ASSESSMENT/PLAN  Gabriel Adames is a 79 y o  female presents to the office for    Diagnoses and all orders for this visit:    Cough, unspecified type  -     CBC and differential; Future  -     Comprehensive metabolic panel; Future  -     XR chest pa & lateral; Future  -     NT-BNP PRO; Future  -     amoxicillin (AMOXIL) 500 mg capsule; Take 1 capsule (500 mg total) by mouth every 12 (twelve) hours for 7 days    Chronic combined systolic and diastolic heart failure (HCC)  -     CBC and differential; Future  -     Comprehensive metabolic panel; Future  -     NT-BNP PRO; Future    Stage 3a chronic kidney disease (HCC)     Cough: Significant wheezing on exam with minimal crackles  Highly recommend antibiotics and also rule out acute on chronic CHF  Pending labs we will alter patient's diuretics    Recommended close follow-up by her PCP           Future Appointments   Date Time Provider Teresa Cooperi   4/26/2023 11:45 AM Bella Fernandez PTA [facility] [place]   4/26/2023  2:00 PM Todd Macias MD [address] [place] [address] [place]   4/28/2023  9:30 AM Delcie Co, PT [facility] [place]   5/3/2023  3:30 PM Delcie Co, PT [facility] [place]   5/4/2023  1:00 PM Cookson Mask, DO ORTHO [facility]   5/5/2023 10:15 AM Delcie Co, PT [facility] [place]   5/10/2023 11:45 AM Delcie Co, PT [facility] [place]   5/12/2023  8:00 AM Delcie Co, PT [facility] [place]   5/12/2023 10:40 AM Todd Macias MD [address] [place] [address]   5/31/2023 11:00 AM Letty Beasley MD CARD [facility]   6/8/2023 11:00 AM Pk Centeno MD NEURO [facility]   6/29/2023 10:00 AM RAVI VELARDE [facility]          SUBJECTIVE  CC: Cough (Pt states she has dry cough feels like there is a blockage)      HPI:  Gabriel Adames is a 79 y o  female who presents for an acute appointment  3 days ago coughing, been producing white phelghm  Worse when laying down flat  Feeling tired  98 averaging little higher then her normal    COVID test negative       Review of Systems   Constitutional: Negative for activity change, appetite change, chills, fatigue and fever  HENT: Negative for congestion  Respiratory: Positive for cough, chest tightness and wheezing  Negative for shortness of breath  Cardiovascular: Negative for chest pain and leg swelling  Gastrointestinal: Negative for abdominal distention, abdominal pain, constipation, diarrhea, nausea and vomiting  All other systems reviewed and are negative        Historical Information   The patient history was reviewed as follows:  Past Medical History:   Diagnosis Date   • Anal fissure    • Arthritis     osteo joints   • Asthma with acute exacerbation    • Blepharitis    • Breast lump    • Chalazion    • CHF, chronic (HCC)    • Difficulty walking    • Disease of thyroid gland    • Drooling    • Dysphagia    • Fall 05/04/2018   • Fibromyalgia, primary    • Glaucoma     Normal pressure   • History of transfusion 1996   • Hordeolum externum    • Hx of transient ischemic attack (TIA)    • Hypophonia    • Infectious viral hepatitis     Hep C dx 1996    • Lyme carditis    • Murmur, cardiac    • Parkinsons (HCC)    • Rotator cuff tendinitis    • Seasonal allergies    • Urinary frequency          Medications:     Current Outpatient Medications:   •  alendronate (FOSAMAX) 70 mg tablet, take 1 tablet by mouth EVERY 7 DAYS, Disp: 12 tablet, Rfl: 3  •  amoxicillin (AMOXIL) 500 mg capsule, Take 1 capsule (500 mg total) by mouth every 12 (twelve) hours for 7 days, Disp: 14 capsule, Rfl: 0  •  carbidopa-levodopa (SINEMET)  mg per tablet, TAKE 1 AND 1/2 TABLETS BY MOUTH 4 TIMES A DAY (Patient taking differently: Take 1 tablet by mouth 4 (four) times a day TAKE 1 AND 1/2 TABLETS 4 times daily ), Disp: 240 tablet, Rfl: "8  •  Cholecalciferol (VITAMIN D3) 2000 units TABS, Take 2,000 Units by mouth every morning, Disp: , Rfl:   •  entacapone (COMTAN) 200 mg tablet, take 1 tablet by mouth four times a day, Disp: 120 tablet, Rfl: 4  •  levothyroxine 50 mcg tablet, take 1 tablet by mouth every morning, Disp: 30 tablet, Rfl: 3  •  memantine (NAMENDA) 10 mg tablet, take 1 tablet by mouth once daily, Disp: 90 tablet, Rfl: 3  •  midodrine (PROAMATINE) 2 5 mg tablet, Take 1 tablet (2 5 mg total) by mouth 3 (three) times a day before meals, Disp: 90 tablet, Rfl: 3  •  Multiple Vitamins-Minerals (OCUVITE ADULT 50+ PO), Take by mouth daily with breakfast, Disp: , Rfl:   •  torsemide (DEMADEX) 20 mg tablet, Take torsemide 20 mg 1 tablet 3 days a week every Monday, Wednesday, Friday and an extra dose once a week as needed for leg swelling , Disp: 30 tablet, Rfl: 3  •  warfarin (Coumadin) 1 mg tablet, Pt to take 3 tablets per day or as directed, Disp: 90 tablet, Rfl: 3  •  warfarin (COUMADIN) 2 5 mg tablet, TAKE 1/2 TO 1 TABLET BY MOUTH DAILY OR AS DIRECTED BY PHYSICIAN, Disp: 30 tablet, Rfl: 11  •  Diclofenac Sodium (VOLTAREN) 1 %, Apply 2 g topically 4 (four) times a day as needed (Foot pain) for up to 15 days, Disp: 50 g, Rfl: 2  •  oxybutynin (DITROPAN-XL) 5 mg 24 hr tablet, Take 1 tablet (5 mg total) by mouth daily (Patient not taking: Reported on 4/20/2023), Disp: 30 tablet, Rfl: 3  •  potassium chloride (K-DUR,KLOR-CON) 20 mEq tablet, Take 1 tablet (20 mEq total) by mouth daily, Disp: 10 tablet, Rfl: 0    Allergies   Allergen Reactions   • Amantadine Tongue Swelling   • Artane [Trihexyphenidyl] Other (See Comments)     Felt \"disconnected\", \"out if it\", shaky   Did not feel right    • Glycopyrrolate      Nose bleeds   • Pollen Extract        OBJECTIVE  Vitals:   Vitals:    04/20/23 1447   BP: 102/70   BP Location: Left arm   Patient Position: Sitting   Cuff Size: Standard   Pulse: 62   Resp: 16   Temp: 97 9 °F (36 6 °C)   TempSrc: Temporal " "  SpO2: 98%   Weight: 68 9 kg (152 lb)   Height: 5' 4\" (1 626 m)         Physical Exam  Vitals reviewed  Constitutional:       Appearance: She is well-developed  HENT:      Head: Normocephalic and atraumatic  Eyes:      Conjunctiva/sclera: Conjunctivae normal       Pupils: Pupils are equal, round, and reactive to light  Cardiovascular:      Rate and Rhythm: Normal rate and regular rhythm  Heart sounds: Normal heart sounds  Pulmonary:      Effort: Pulmonary effort is normal  No respiratory distress  Breath sounds: Wheezing (with fine crackles) present  Musculoskeletal:         General: Normal range of motion  Cervical back: Normal range of motion and neck supple  Right lower leg: No edema (at baseline per patient)  Left lower leg: No edema  Skin:     General: Skin is warm  Capillary Refill: Capillary refill takes less than 2 seconds  Neurological:      Mental Status: She is alert and oriented to person, place, and time                      Duyen Sharma MD,   [facility]  4/23/2023      "

## 2023-04-21 ENCOUNTER — APPOINTMENT (OUTPATIENT)
Dept: LAB | Facility: CLINIC | Age: 83
End: 2023-04-21

## 2023-04-21 ENCOUNTER — APPOINTMENT (OUTPATIENT)
Dept: RADIOLOGY | Facility: CLINIC | Age: 83
End: 2023-04-21

## 2023-04-21 DIAGNOSIS — R05.9 COUGH, UNSPECIFIED TYPE: ICD-10-CM

## 2023-04-21 DIAGNOSIS — I50.42 CHRONIC COMBINED SYSTOLIC AND DIASTOLIC HEART FAILURE (HCC): ICD-10-CM

## 2023-04-21 LAB
ALBUMIN SERPL BCP-MCNC: 4.1 G/DL (ref 3.5–5)
ALP SERPL-CCNC: 72 U/L (ref 46–116)
ALT SERPL W P-5'-P-CCNC: 9 U/L (ref 12–78)
ANION GAP SERPL CALCULATED.3IONS-SCNC: 0 MMOL/L (ref 4–13)
AST SERPL W P-5'-P-CCNC: 18 U/L (ref 5–45)
BASOPHILS # BLD AUTO: 0.04 THOUSANDS/ΜL (ref 0–0.1)
BASOPHILS NFR BLD AUTO: 1 % (ref 0–1)
BILIRUB SERPL-MCNC: 1.9 MG/DL (ref 0.2–1)
BUN SERPL-MCNC: 18 MG/DL (ref 5–25)
CALCIUM SERPL-MCNC: 10.4 MG/DL (ref 8.3–10.1)
CHLORIDE SERPL-SCNC: 107 MMOL/L (ref 96–108)
CO2 SERPL-SCNC: 29 MMOL/L (ref 21–32)
CREAT SERPL-MCNC: 1.15 MG/DL (ref 0.6–1.3)
EOSINOPHIL # BLD AUTO: 0.04 THOUSAND/ΜL (ref 0–0.61)
EOSINOPHIL NFR BLD AUTO: 1 % (ref 0–6)
ERYTHROCYTE [DISTWIDTH] IN BLOOD BY AUTOMATED COUNT: 16.1 % (ref 11.6–15.1)
GFR SERPL CREATININE-BSD FRML MDRD: 44 ML/MIN/1.73SQ M
GLUCOSE SERPL-MCNC: 114 MG/DL (ref 65–140)
HCT VFR BLD AUTO: 35.1 % (ref 34.8–46.1)
HGB BLD-MCNC: 11.3 G/DL (ref 11.5–15.4)
IMM GRANULOCYTES # BLD AUTO: 0.04 THOUSAND/UL (ref 0–0.2)
IMM GRANULOCYTES NFR BLD AUTO: 1 % (ref 0–2)
LYMPHOCYTES # BLD AUTO: 0.91 THOUSANDS/ΜL (ref 0.6–4.47)
LYMPHOCYTES NFR BLD AUTO: 13 % (ref 14–44)
MCH RBC QN AUTO: 32 PG (ref 26.8–34.3)
MCHC RBC AUTO-ENTMCNC: 32.2 G/DL (ref 31.4–37.4)
MCV RBC AUTO: 99 FL (ref 82–98)
MONOCYTES # BLD AUTO: 0.65 THOUSAND/ΜL (ref 0.17–1.22)
MONOCYTES NFR BLD AUTO: 10 % (ref 4–12)
NEUTROPHILS # BLD AUTO: 5.1 THOUSANDS/ΜL (ref 1.85–7.62)
NEUTS SEG NFR BLD AUTO: 74 % (ref 43–75)
NRBC BLD AUTO-RTO: 0 /100 WBCS
NT-PROBNP SERPL-MCNC: 7915 PG/ML
PLATELET # BLD AUTO: 169 THOUSANDS/UL (ref 149–390)
PMV BLD AUTO: 12.6 FL (ref 8.9–12.7)
POTASSIUM SERPL-SCNC: 3.3 MMOL/L (ref 3.5–5.3)
PROT SERPL-MCNC: 6.8 G/DL (ref 6.4–8.4)
RBC # BLD AUTO: 3.53 MILLION/UL (ref 3.81–5.12)
SODIUM SERPL-SCNC: 136 MMOL/L (ref 135–147)
WBC # BLD AUTO: 6.78 THOUSAND/UL (ref 4.31–10.16)

## 2023-04-25 ENCOUNTER — OFFICE VISIT (OUTPATIENT)
Dept: FAMILY MEDICINE CLINIC | Facility: CLINIC | Age: 83
End: 2023-04-25

## 2023-04-25 VITALS
TEMPERATURE: 97.9 F | OXYGEN SATURATION: 97 % | DIASTOLIC BLOOD PRESSURE: 72 MMHG | WEIGHT: 147.8 LBS | HEART RATE: 84 BPM | BODY MASS INDEX: 25.23 KG/M2 | RESPIRATION RATE: 16 BRPM | HEIGHT: 64 IN | SYSTOLIC BLOOD PRESSURE: 122 MMHG

## 2023-04-25 DIAGNOSIS — E87.6 HYPOKALEMIA: Primary | ICD-10-CM

## 2023-04-25 DIAGNOSIS — R13.10 DYSPHAGIA, UNSPECIFIED TYPE: ICD-10-CM

## 2023-04-25 DIAGNOSIS — R63.4 WEIGHT LOSS: ICD-10-CM

## 2023-04-25 DIAGNOSIS — D64.9 ANEMIA, UNSPECIFIED TYPE: ICD-10-CM

## 2023-04-26 ENCOUNTER — APPOINTMENT (OUTPATIENT)
Dept: PHYSICAL THERAPY | Facility: CLINIC | Age: 83
End: 2023-04-26

## 2023-04-28 ENCOUNTER — APPOINTMENT (OUTPATIENT)
Dept: PHYSICAL THERAPY | Facility: CLINIC | Age: 83
End: 2023-04-28

## 2023-04-28 PROBLEM — E86.0 DEHYDRATION: Status: RESOLVED | Noted: 2023-02-27 | Resolved: 2023-04-28

## 2023-04-30 DIAGNOSIS — E03.9 HYPOTHYROIDISM, UNSPECIFIED TYPE: ICD-10-CM

## 2023-05-01 ENCOUNTER — APPOINTMENT (OUTPATIENT)
Dept: LAB | Facility: CLINIC | Age: 83
End: 2023-05-01

## 2023-05-01 ENCOUNTER — ANTICOAG VISIT (OUTPATIENT)
Dept: CARDIOLOGY CLINIC | Facility: CLINIC | Age: 83
End: 2023-05-01

## 2023-05-01 DIAGNOSIS — D64.9 ANEMIA, UNSPECIFIED TYPE: ICD-10-CM

## 2023-05-01 DIAGNOSIS — Z95.2 H/O MITRAL VALVE REPLACEMENT WITH MECHANICAL VALVE: Primary | ICD-10-CM

## 2023-05-01 DIAGNOSIS — I48.20 ATRIAL FIBRILLATION, CHRONIC (HCC): ICD-10-CM

## 2023-05-01 DIAGNOSIS — E87.6 HYPOKALEMIA: ICD-10-CM

## 2023-05-01 LAB
ANION GAP SERPL CALCULATED.3IONS-SCNC: -3 MMOL/L (ref 4–13)
BUN SERPL-MCNC: 25 MG/DL (ref 5–25)
CALCIUM SERPL-MCNC: 11 MG/DL (ref 8.3–10.1)
CHLORIDE SERPL-SCNC: 112 MMOL/L (ref 96–108)
CO2 SERPL-SCNC: 30 MMOL/L (ref 21–32)
CREAT SERPL-MCNC: 1.18 MG/DL (ref 0.6–1.3)
GFR SERPL CREATININE-BSD FRML MDRD: 42 ML/MIN/1.73SQ M
GLUCOSE P FAST SERPL-MCNC: 124 MG/DL (ref 65–99)
IRON SERPL-MCNC: 80 UG/DL (ref 50–170)
POTASSIUM SERPL-SCNC: 3.5 MMOL/L (ref 3.5–5.3)
SODIUM SERPL-SCNC: 139 MMOL/L (ref 135–147)

## 2023-05-01 RX ORDER — LEVOTHYROXINE SODIUM 0.05 MG/1
TABLET ORAL
Qty: 30 TABLET | Refills: 3 | Status: SHIPPED | OUTPATIENT
Start: 2023-05-01

## 2023-05-02 ENCOUNTER — APPOINTMENT (EMERGENCY)
Dept: RADIOLOGY | Facility: HOSPITAL | Age: 83
End: 2023-05-02

## 2023-05-02 ENCOUNTER — HOSPITAL ENCOUNTER (EMERGENCY)
Facility: HOSPITAL | Age: 83
Discharge: HOME | End: 2023-05-02
Attending: EMERGENCY MEDICINE

## 2023-05-02 VITALS
OXYGEN SATURATION: 99 % | RESPIRATION RATE: 18 BRPM | BODY MASS INDEX: 25.97 KG/M2 | HEART RATE: 73 BPM | HEIGHT: 64 IN | DIASTOLIC BLOOD PRESSURE: 58 MMHG | TEMPERATURE: 98.4 F | SYSTOLIC BLOOD PRESSURE: 116 MMHG | WEIGHT: 152.12 LBS

## 2023-05-02 DIAGNOSIS — R53.1 WEAKNESS: Primary | ICD-10-CM

## 2023-05-02 DIAGNOSIS — R26.9 GAIT DISTURBANCE: ICD-10-CM

## 2023-05-02 LAB
2HR DELTA HS TROPONIN: -2 NG/L
4HR DELTA HS TROPONIN: -2 NG/L
ALBUMIN SERPL BCP-MCNC: 3.7 G/DL (ref 3.5–5)
ALP SERPL-CCNC: 61 U/L (ref 34–104)
ALT SERPL W P-5'-P-CCNC: 3 U/L (ref 7–52)
ANION GAP SERPL CALCULATED.3IONS-SCNC: -1 MMOL/L (ref 4–13)
APTT PPP: 34 SECONDS (ref 23–37)
AST SERPL W P-5'-P-CCNC: 13 U/L (ref 13–39)
BASOPHILS # BLD AUTO: 0.02 THOUSANDS/ΜL (ref 0–0.1)
BASOPHILS NFR BLD AUTO: 0 % (ref 0–1)
BILIRUB SERPL-MCNC: 1.31 MG/DL (ref 0.2–1)
BILIRUB UR QL STRIP: NEGATIVE
BUN SERPL-MCNC: 26 MG/DL (ref 5–25)
CALCIUM SERPL-MCNC: 9.5 MG/DL (ref 8.4–10.2)
CARDIAC TROPONIN I PNL SERPL HS: 39 NG/L
CARDIAC TROPONIN I PNL SERPL HS: 39 NG/L
CARDIAC TROPONIN I PNL SERPL HS: 41 NG/L
CHLORIDE SERPL-SCNC: 112 MMOL/L (ref 96–108)
CLARITY UR: CLEAR
CO2 SERPL-SCNC: 30 MMOL/L (ref 21–32)
COLOR UR: ABNORMAL
CREAT SERPL-MCNC: 1.17 MG/DL (ref 0.6–1.3)
EOSINOPHIL # BLD AUTO: 0.05 THOUSAND/ΜL (ref 0–0.61)
EOSINOPHIL NFR BLD AUTO: 1 % (ref 0–6)
ERYTHROCYTE [DISTWIDTH] IN BLOOD BY AUTOMATED COUNT: 14.8 % (ref 11.6–15.1)
GFR SERPL CREATININE-BSD FRML MDRD: 43 ML/MIN/1.73SQ M
GLUCOSE SERPL-MCNC: 114 MG/DL (ref 65–140)
GLUCOSE UR STRIP-MCNC: NEGATIVE MG/DL
HCT VFR BLD AUTO: 34.5 % (ref 34.8–46.1)
HGB BLD-MCNC: 11.6 G/DL (ref 11.5–15.4)
HGB UR QL STRIP.AUTO: NEGATIVE
IMM GRANULOCYTES # BLD AUTO: 0.05 THOUSAND/UL (ref 0–0.2)
IMM GRANULOCYTES NFR BLD AUTO: 1 % (ref 0–2)
INR PPP: 2.69 (ref 0.84–1.19)
KETONES UR STRIP-MCNC: ABNORMAL MG/DL
LEUKOCYTE ESTERASE UR QL STRIP: NEGATIVE
LYMPHOCYTES # BLD AUTO: 1.04 THOUSANDS/ΜL (ref 0.6–4.47)
LYMPHOCYTES NFR BLD AUTO: 15 % (ref 14–44)
MCH RBC QN AUTO: 33.7 PG (ref 26.8–34.3)
MCHC RBC AUTO-ENTMCNC: 33.6 G/DL (ref 31.4–37.4)
MCV RBC AUTO: 100 FL (ref 82–98)
MONOCYTES # BLD AUTO: 0.61 THOUSAND/ΜL (ref 0.17–1.22)
MONOCYTES NFR BLD AUTO: 9 % (ref 4–12)
NEUTROPHILS # BLD AUTO: 5.19 THOUSANDS/ΜL (ref 1.85–7.62)
NEUTS SEG NFR BLD AUTO: 74 % (ref 43–75)
NITRITE UR QL STRIP: NEGATIVE
NRBC BLD AUTO-RTO: 0 /100 WBCS
PH UR STRIP.AUTO: 7.5 [PH]
PLATELET # BLD AUTO: 164 THOUSANDS/UL (ref 149–390)
PMV BLD AUTO: 11.6 FL (ref 8.9–12.7)
POTASSIUM SERPL-SCNC: 3.1 MMOL/L (ref 3.5–5.3)
PROT SERPL-MCNC: 5.9 G/DL (ref 6.4–8.4)
PROT UR STRIP-MCNC: NEGATIVE MG/DL
PROTHROMBIN TIME: 28.6 SECONDS (ref 11.6–14.5)
QRS AXIS: 68 DEGREES
QRSD INTERVAL: 170 MS
QT INTERVAL: 432 MS
QTC INTERVAL: 475 MS
RBC # BLD AUTO: 3.44 MILLION/UL (ref 3.81–5.12)
SARS-COV-2 RNA RESP QL NAA+PROBE: NEGATIVE
SODIUM SERPL-SCNC: 141 MMOL/L (ref 135–147)
SP GR UR STRIP.AUTO: 1.01 (ref 1–1.03)
T WAVE AXIS: -60 DEGREES
TSH SERPL DL<=0.05 MIU/L-ACNC: 2.56 UIU/ML (ref 0.45–4.5)
UROBILINOGEN UR QL STRIP.AUTO: 0.2 E.U./DL
VENTRICULAR RATE: 73 BPM
WBC # BLD AUTO: 6.96 THOUSAND/UL (ref 4.31–10.16)

## 2023-05-02 RX ORDER — POTASSIUM CHLORIDE 20 MEQ/1
40 TABLET, EXTENDED RELEASE ORAL ONCE
Status: COMPLETED | OUTPATIENT
Start: 2023-05-02 | End: 2023-05-02

## 2023-05-02 RX ADMIN — SODIUM CHLORIDE 1000 ML: 0.9 INJECTION, SOLUTION INTRAVENOUS at 11:33

## 2023-05-02 RX ADMIN — SODIUM CHLORIDE 1000 ML: 0.9 INJECTION, SOLUTION INTRAVENOUS at 08:32

## 2023-05-02 RX ADMIN — POTASSIUM CHLORIDE 40 MEQ: 1500 TABLET, EXTENDED RELEASE ORAL at 10:50

## 2023-05-02 NOTE — PHYSICAL THERAPY NOTE
"   PT EVALUATION     05/02/23 1200   Note Type   Note type Evaluation   Pain Assessment   Pain Assessment Tool 0-10   Pain Score No Pain   Restrictions/Precautions   Other Precautions Fall Risk   Home Living   Type of Home Other (Comment)  (condo)   Home Layout One level;Elevator   Home Equipment Other (Comment)  (rollator)   Prior Function   Level of [place] Other (Comment)  (ambulates with a rolling walker, assist to dress, supervision for showering)   Lives With [address] in the last 6 months 0  (however pt reports many near falls)   Vocational Retired   General   Additional Pertinent History Pt admitted with acute onset of weakness  Pt was unable to get up off of the toilet today  Pt has Parkinsons disease, R knee OA, and reports issues with hypotension  Pt reports she is feeling a little better since being admitted to the ED  Pt was attending OP PT for her R knee pain but stopped due to \"medical issues\"  Family/Caregiver Present Yes  (spouse)   Cognition   Overall Cognitive Status WFL   Following Commands Follows one step commands with increased time or repetition   Subjective   Subjective \"I am so weak\"   RLE Assessment   RLE Assessment   (ROM WFL x mariam tightness, MMT hip and knee 3+/5, ankle 2/5)   LLE Assessment   LLE Assessment   (ROM WFL x mariam tightness, MMT hip and knee 3+/5, ankle 2/5)   Coordination   Movements are Fluid and Coordinated   (+ bradykinesia)   Bed Mobility   Supine to Sit 4  Minimal assistance  (with HOB elevated)   Additional items Increased time required   Sit to Supine 3  Moderate assistance   Additional items LE management   Transfers   Sit to Stand   (min assist from stretcher, mod assist from chair)   Stand to Sit 4  Minimal assistance   Ambulation/Elevation   Gait pattern Forward Flexion; Excessively slow  (shuffling, decreased heel strike)   Gait Assistance 4  Minimal assist   Assistive Device Rolling walker   Distance 15 feet " "  Balance   Static Sitting Fair +   Static Standing Fair   Activity Tolerance   Activity Tolerance Patient limited by fatigue   Assessment   Prognosis Good   Problem List Decreased strength;Decreased endurance; Impaired balance;Decreased mobility   Assessment Patient is an 79y o  year old female seen for Physical Therapy evaluation  Patient admitted with generalized weakness  Comorbidities affecting patient's physical performance include: Parkinsons disease, R knee OA, CHF, afib, orhtostatic hypotension  Personal factors affecting patient at time of initial evaluation include: ambulating with assistive device, inability to ambulate household distances and inability to perform ADLS  Prior to admission, patient was independent with functional mobility with rollator and requiring assist for ADLS  Please find objective findings from Physical Therapy assessment regarding body systems outlined above with impairments and limitations including weakness, decreased ROM, impaired balance, decreased endurance, decreased activity tolerance, decreased functional mobility tolerance and fall risk  The Barthel Index was used as a functional outcome tool presenting with a score of Barthel Index Score: 35 today indicating marked limitations of functional mobility and ADLS  Patient's clinical presentation is currently unstable/unpredictable as seen in patient's presentation of increased fall risk, new onset of impairment of functional mobility and new onset of weakness  Pt would benefit from continued Physical Therapy treatment to address deficits as defined above and maximize level of functional mobility  As demonstrated by objective findings, the assigned level of complexity for this evaluation is high  The patient's AM-[facility] Basic Mobility Inpatient Short Form Raw Score is 13  A Raw score of less than or equal to 16 suggests the patient may benefit from discharge to post-acute rehabilitation services     Goals   Patient Goals \"feel " "better\"   STG Expiration Date 05/09/23   Short Term Goal #1 indepenent bed mobility, supervision transfers, supervision ambulation with a walker 25 feet   LTG Expiration Date 05/16/23   Long Term Goal #1 no falls, independent ambulation with a walker 100 feet so pt can get out of her house to her MD appointments,   Plan   Treatment/Interventions LE strengthening/ROM; Elevations; Functional transfer training;Gait training;Patient/family training;Equipment eval/education; Bed mobility; Therapeutic exercise;Spoke to nursing;Spoke to case management   PT Frequency Other (Comment)  (5x/week)   Recommendation   PT Discharge Recommendation Post acute rehabilitation services   AM-PAC Basic Mobility Inpatient   Turning in Flat Bed Without Bedrails 2   Lying on Back to Sitting on Edge of Flat Bed Without Bedrails 2   Moving Bed to Chair 2   Standing Up From Chair Using Arms 3   Walk in Room 3   Climb 3-5 Stairs With Railing 1   Basic Mobility Inpatient Raw Score 13   Basic Mobility Standardized Score 33 99   Highest Level Of Mobility   -[facility] Goal 4: Move to chair/commode   -[facility] Achieved 6: Walk 10 steps or more   Barthel Index   Feeding 5   Bathing 0   Grooming Score 0   Dressing Score 5   Bladder Score 5   Bowels Score 10   Toilet Use Score 5   Transfers (Bed/Chair) Score 5   Mobility (Level Surface) Score 0   Stairs Score 0   Barthel Index Score 28   Licensure   [place] License Number  [id number] ORTIZ [id number]     "

## 2023-05-02 NOTE — ED NOTES
Pt stated has difficulty swallowing pills, per Dr Paul Buck advised ok to crush potassium given with applesauce (pt tolerated well)     Mike Contreras RN  05/02/23 9268

## 2023-05-02 NOTE — PLAN OF CARE
Problem: PHYSICAL THERAPY ADULT  Goal: Performs mobility at highest level of function for planned discharge setting  See evaluation for individualized goals  Description: Treatment/Interventions: LE strengthening/ROM, Elevations, Functional transfer training, Gait training, Patient/family training, Equipment eval/education, Bed mobility, Therapeutic exercise, Spoke to nursing, Spoke to case management          See flowsheet documentation for full assessment, interventions and recommendations  Note: Prognosis: Good  Problem List: Decreased strength, Decreased endurance, Impaired balance, Decreased mobility  Assessment: Patient is an 79y o  year old female seen for Physical Therapy evaluation  Patient admitted with generalized weakness  Comorbidities affecting patient's physical performance include: Parkinsons disease, R knee OA, CHF, afib, orhtostatic hypotension  Personal factors affecting patient at time of initial evaluation include: ambulating with assistive device, inability to ambulate household distances and inability to perform ADLS  Prior to admission, patient was independent with functional mobility with rollator and requiring assist for ADLS  Please find objective findings from Physical Therapy assessment regarding body systems outlined above with impairments and limitations including weakness, decreased ROM, impaired balance, decreased endurance, decreased activity tolerance, decreased functional mobility tolerance and fall risk  The Barthel Index was used as a functional outcome tool presenting with a score of Barthel Index Score: 35 today indicating marked limitations of functional mobility and ADLS  Patient's clinical presentation is currently unstable/unpredictable as seen in patient's presentation of increased fall risk, new onset of impairment of functional mobility and new onset of weakness   Pt would benefit from continued Physical Therapy treatment to address deficits as defined above and maximize level of functional mobility  As demonstrated by objective findings, the assigned level of complexity for this evaluation is high  The patient's AM-PAC Basic Mobility Inpatient Short Form Raw Score is 13  A Raw score of less than or equal to 16 suggests the patient may benefit from discharge to post-acute rehabilitation services  PT Discharge Recommendation: Post acute rehabilitation services    See flowsheet documentation for full assessment

## 2023-05-02 NOTE — CASE MANAGEMENT
Case Management Discharge Planning Note    Patient name Megan Henriquez  Location ED 06/ED 06 MRN [id number]  : 1940 Date 2023       Current Admission Date: 2023  Current Admission Diagnosis:Weakness generalized   Patient Active Problem List    Diagnosis Date Noted    History of left bundle branch block (LBBB) 2023    Primary osteoarthritis of right knee 2023    Stage 3a chronic kidney disease ([facility] [address] ) 2023    Edema of both lower extremities 2023    Obstructive sleep apnea 2023    Prerenal azotemia 2023    Warfarin anticoagulation 2023    Abnormal weight loss 2023    Chronic fatigue 2023    Impaired fasting glucose 2023    History of COVID-19 2023    Hypercalcemia 2023    Stage 3b chronic kidney disease ([facility] [address] ) 2023    Knee pain 2022    Weakness 2022    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 2022    Orthostatic hypotension 2021    Other chronic pain 06/15/2021    Cardiomyopathy, dilated, nonischemic ([facility] [address] ) 2021    Left bundle branch block (LBBB) 2021    Asymptomatic PVCs 2021    On continuous oral anticoagulation 2021    Mixed dyslipidemia 2021    High risk medication use 2021    Other microscopic hematuria 2018    Numbness and tingling in right hand     Suprapatellar bursitis of right knee 2018    H/O mitral valve replacement with mechanical valve 2018    Atrial fibrillation, chronic ([facility] [address] ) 2018    Generalized osteoarthritis 2017    Chronic right-sided low back pain without sciatica 10/23/2017    Chronic combined systolic and diastolic heart failure ([facility] [address] ) 2017    Memory impairment 02/15/2017    Seasonal allergies 2016    Vitamin D insufficiency 2015    Transient ischemic attack 2014    Parkinson's disease ([facility] [address] ) 2013    Hypothyroidism 2013    Venous insufficiency of both lower extremities 12/03/2012    Dupuytren's contracture 09/04/2012    Mitral valve disorder 09/04/2012      LOS (days): 0  Geometric Mean LOS (GMLOS) (days):   Days to GMLOS:     OBJECTIVE:      Current admission status: Emergency   Preferred Pharmacy:   [id number] [address] [address] [facility] [address]  Phone: [phone] Fax: [address], [address] [facility] [address]  Phone: [phone] Fax: [phone]    Primary Care Provider: Volodymyr Talavera MD    Primary Insurance: MEDICARE  Secondary Insurance: BLUE CROSS    DISCHARGE DETAILS:    Discharge planning discussed with[de-identified] Patient, Spouse  Freedom of Choice: Yes  Comments - Freedom of Choice: SW met bedside with patient and spoke with spouse via phone to review accepting STR list  Both patient and  express #1 choice is [facility]  CM contacted family/caregiver?: Yes  Were Treatment Team discharge recommendations reviewed with patient/caregiver?: Yes  Did patient/caregiver verbalize understanding of patient care needs?: N/A- going to facility  Were patient/caregiver advised of the risks associated with not following Treatment Team discharge recommendations?: Yes    Contacts  Patient Contacts: Marytano Pantojarle  Relationship to Patient[de-identified] Family  Contact Method: Phone  Phone Number: [phone]  Reason/Outcome: Emergency Contact, Continuity of Care, Discharge Planning    Other Referral/Resources/Interventions Provided:  Interventions: Short Term Rehab, Transportation  Referral Comments: [facility] reserved in [address] [place] [address]  Confirmed availability for admission tonight  Treatment Team Recommendation: Short Term Rehab  Discharge Destination Plan[de-identified] Short Term Rehab  Transport at Discharge : [facility] Ambulance     Number/Name of Dispatcher: [facility]  Transported by [employer] and Unit #):  [facility]  ETA of Transport (Date): 05/02/23  ETA of Transport (Time): [address], [address] : [facility]  Receiving Facility/Agency Phone Number: [phone]

## 2023-05-02 NOTE — OCCUPATIONAL THERAPY NOTE
Occupational Therapy Evaluation       05/02/23 6295   Note Type   Note type Evaluation   Pain Assessment   Pain Assessment Tool 0-10   Pain Score No Pain   Restrictions/Precautions   Other Precautions Fall Risk   Home Living   Type of Home Other (Comment)  (Madhu ([place]))   Home Layout One level;Elevator   Bathroom Shower/Tub Walk-in shower   Bathroom Toilet Standard   Bathroom Equipment Grab bars in shower; Shower chair;Hand-held shower   Home Equipment Other (Comment)  (Rollator)   Prior Function   Level of [place] Independent with functional mobility; Needs assistance with ADLs   Lives With Spouse   Receives Help From Family   IADLs Family/Friend/Other provides transportation; Family/Friend/Other provides meals; Family/Friend/Other provides medication management   Falls in the last 6 months 0  (Mutliple near falls per patient)   Comments Patient reports PTA independent in ambulation with rollator, needs assist lower body dressing, gets supervision for bathing; Reports recently requiring increased assist with ADLs and some assist mobility and transfers   General   Additional Pertinent History Patient presented to ED with c/o weakness, reports earlier this AM unable to get up off toilet, called EMS for assist and brought to ED   ADL   Eating Assistance 5  Supervision/Setup   Grooming Assistance 5  Supervision/Setup   UB Bathing Assistance 4  Minimal Assistance   LB Bathing Assistance 2  Maximal Assistance   UB Dressing Assistance 4  Minimal Assistance   LB Dressing Assistance 2  Maximal Assistance   LB Dressing Deficit Don/doff R sock; Don/doff L sock   Toileting Assistance  3  Moderate Assistance   Bed Mobility   Supine to Sit 4  Minimal assistance   Additional items Assist x 1; Increased time required  (HOB elevated)   Sit to Supine 3  Moderate assistance   Additional items Assist x 1;LE management   Transfers   Sit to Stand 3  Moderate assistance   Stand to Sit 4  Minimal assistance   Additional Comments STS x several trials, min assist to stand from elevated stretcher, mod assist to stand from standard chair; use of RW for support   Functional Mobility   Functional Mobility 4  Minimal assistance   Additional Comments Patient ambulated short household distance in room with RW; increased time, shuffling gait, cues for safety   Balance   Static Sitting Fair +   Dynamic Sitting Fair   Static Standing Fair   Activity Tolerance   Activity Tolerance Patient limited by fatigue; Other (Comment)  (Limited by generalized weakness)   RUE Assessment   RUE Assessment WFL   LUE Assessment   LUE Assessment WFL   Cognition   Overall Cognitive Status WFL   Arousal/Participation Alert; Cooperative   Orientation Level Oriented X4   Following Commands Follows multistep commands with increased time or repetition   Comments SLow to process at times, easily distracted   Assessment   Limitation Decreased ADL status; Decreased UE strength;Decreased Safe judgement during ADL;Decreased endurance;Decreased self-care trans;Decreased high-level ADLs   Prognosis Good   Assessment Patient evaluated by Occupational Therapy  Patient admitted with weakness  The patients occupational profile, medical and therapy history includes a extensive additional review of physical, cognitive, or psychosocial history related to current functional performance  Comorbidities affecting functional mobility and ADLS include: Parkinson's, CHF, arthritis, fibromyalgia, dysphagia, glaucoma    Prior to admission, patient was independent with functional mobility with rollator, requiring assist for ADLS, requiring assist for IADLS and home with family assist   The evaluation identifies the following performance deficits: weakness, impaired balance, decreased endurance, increased fall risk, new onset of impairment of functional mobility, decreased ADLS, decreased IADLS, decreased activity tolerance, decreased safety awareness, impaired judgement and decreased strength, that result in activity limitations and/or participation restrictions  This evaluation requires clinical decision making of high complexity, because the patient presents with comorbidites that affect occupational performance and required significant modification of tasks or assistance with consideration of multiple treatment options  The Barthel Index was used as a functional outcome tool presenting with a score of Barthel Index Score: 35, indicating marked limitations of functional mobility and ADLS  The patient's raw score on the -PAC Daily Activity Inpatient Short Form is 17  A raw score of less than 19 suggests the patient may benefit from discharge to post-acute rehabilitation services  Please refer to the recommendation of the Occupational Therapist for safe discharge planning  Please refer to the recommendation of the Occupational Therapist for safe DC planning  Patient will benefit from skilled Occupational Therapy services to address above deficits and facilitate a safe return to prior level of function  Goals   Patient Goals 'Get stronger'   STG Time Frame   (1-7 days)   Short Term Goal  Goals established to promote Patient Goals: 'Get stronger':  Eating: independent; Grooming: independent seated; Bathing: mod assist; Upper Body Dressing supervision; Lower Body Dressing: mod assist; Toileting: min assist; Patient will increase ambulatory standard toilet transfer to mod assist with rolling walker to increase performance and safety with ADLS and functional mobility; Patient will increase standing tolerance to 5 minutes during ADL task to decrease assistance level and decrease fall risk; Patient will increase bed mobility to min assist in preparation for ADLS and transfers;  Patient will increase functional mobility to and from bathroom with rolling walker with min assist to increase performance with ADLS and to use a toilet; Patient will tolerate 5 minutes of UE ROM/strengthening to increase general activity tolerance and performance in ADLS/IADLS; Patient will improve functional activity tolerance to 5 minutes of sustained functional tasks to increase participation in basic self-care and decrease assistance level;  Patient will be able to to verbalize understanding and perform energy conservation/proper body mechanics during ADLS and functional mobility at least 75% of the time with minimal cueing to decrease signs of fatigue and increase stamina to return to prior level of function; Patient will increase dynamic sitting balance to fair+ to improve the ability to sit at edge of bed or on a chair for ADLS;  Patient will increase static/dynamic standing balance to fair+ to improve postural stability and decrease fall risk during standing ADLS and transfers  LTG Time Frame   (8-14 days)   Long Term Goal Grooming: independent standing at sink; Bathing: min assist; Upper Body Dressing independent; Lower Body Dressing: min assist; Toileting: supervision; Patient will increase ambulatory standard toilet transfer to min assist with rolling walker to increase performance and safety with ADLS and functional mobility; Patient will increase standing tolerance to 10 minutes during ADL task to decrease assistance level and decrease fall risk; Patient will increase bed mobility to supervision in preparation for ADLS and transfers;  Patient will increase functional mobility to and from bathroom with rolling walker with supervision to increase performance with ADLS and to use a toilet; Patient will tolerate 10 minutes of UE ROM/strengthening to increase general activity tolerance and performance in ADLS/IADLS; Patient will improve functional activity tolerance to 10 minutes of sustained functional tasks to increase participation in basic self-care and decrease assistance level;  Patient will be able to to verbalize understanding and perform energy conservation/proper body mechanics during ADLS and functional mobility at least 90% of the time with no cueing to decrease signs of fatigue and increase stamina to return to prior level of function; Patient will increase static/dynamic sitting balance to good to improve the ability to sit at edge of bed or on a chair for ADLS;  Patient will increase static/dynamic standing balance to good to improve postural stability and decrease fall risk during standing ADLS and transfers  Pt will score >/= 21/24 on AM-PAC Daily Activity Inpatient scale to promote safe independence with ADLs and functional mobility; Pt will score >/= 65/100 on Barthel Index in order to decrease caregiver assistance needed and increase ability to perform ADLs and functional mobility  Plan   Treatment Interventions ADL retraining;Functional transfer training;UE strengthening/ROM; Endurance training;Patient/family training;Equipment evaluation/education; Compensatory technique education;Continued evaluation; Energy conservation; Activityengagement   Goal Expiration Date 05/16/23   OT Frequency 3-5x/wk   Recommendation   OT Discharge Recommendation Post acute rehabilitation services   AM-[facility] Daily Activity Inpatient   Lower Body Dressing 2   Bathing 2   Toileting 2   Upper Body Dressing 3   Grooming 4   Eating 4   Daily Activity Raw Score 17   Daily Activity Standardized Score (Calc for Raw Score >=11) 37 26   AM-PAC Applied Cognition Inpatient   Following a Speech/Presentation 4   Understanding Ordinary Conversation 4   Taking Medications 3   Remembering Where Things Are Placed or Put Away 4   Remembering List of 4-5 Errands 3   Taking Care of Complicated Tasks 3   Applied Cognition Raw Score 21   Applied Cognition Standardized Score 44 3   Barthel Index   Feeding 5   Bathing 0   Grooming Score 0   Dressing Score 5   Bladder Score 5   Bowels Score 10   Toilet Use Score 5   Transfers (Bed/Chair) Score 5   Mobility (Level Surface) Score 0   Stairs Score 0   Barthel Index Score 35   Licensure   [place] License Number  Chandlercesarmaximino Gates Anya Roldan, OTR/L [id number]

## 2023-05-02 NOTE — CASE MANAGEMENT
Case Management Assessment & Discharge Planning Note    Patient name Isela Kong  Location ED 06/ED 06 MRN [id number]  : 1940 Date 2023       Current Admission Date: 2023  Current Admission Diagnosis:Weakness generalized   Patient Active Problem List    Diagnosis Date Noted    History of left bundle branch block (LBBB) 2023    Primary osteoarthritis of right knee 2023    Stage 3a chronic kidney disease ([facility] [address] ) 2023    Edema of both lower extremities 2023    Obstructive sleep apnea 2023    Prerenal azotemia 2023    Warfarin anticoagulation 2023    Abnormal weight loss 2023    Chronic fatigue 2023    Impaired fasting glucose 2023    History of COVID-19 2023    Hypercalcemia 2023    Stage 3b chronic kidney disease ([facility] [address] ) 2023    Knee pain 2022    Weakness 2022    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 2022    Orthostatic hypotension 2021    Other chronic pain 06/15/2021    Cardiomyopathy, dilated, nonischemic ([facility] [address] ) 2021    Left bundle branch block (LBBB) 2021    Asymptomatic PVCs 2021    On continuous oral anticoagulation 2021    Mixed dyslipidemia 2021    High risk medication use 2021    Other microscopic hematuria 2018    Numbness and tingling in right hand     Suprapatellar bursitis of right knee 2018    H/O mitral valve replacement with mechanical valve 2018    Atrial fibrillation, chronic ([facility] [address] ) 2018    Generalized osteoarthritis 2017    Chronic right-sided low back pain without sciatica 10/23/2017    Chronic combined systolic and diastolic heart failure ([facility] [address] ) 2017    Memory impairment 02/15/2017    Seasonal allergies 2016    Vitamin D insufficiency 2015    Transient ischemic attack 2014    Parkinson's disease ([facility] [address] ) 2013    Hypothyroidism 2013    Venous insufficiency of both lower extremities 12/03/2012    Dupuytren's contracture 09/04/2012    Mitral valve disorder 09/04/2012      LOS (days): 0  Geometric Mean LOS (GMLOS) (days):   Days to GMLOS:     OBJECTIVE:     Current admission status: Emergency  Referral Reason: Rehab    Preferred Pharmacy:   [facility][id number] [address] [address] [facility] [address]  Phone: [phone] Fax: [address], [address] [facility] [address]  Phone: [phone] Fax: [phone]    Primary Care Provider: Jah Cote MD    Primary Insurance: MEDICARE  Secondary Insurance: BLUE CROSS    ASSESSMENT:  Yamila 26 Proxies    There are no active Health Care Proxies on file  Readmission Root Cause  30 Day Readmission: No    Patient Information  Admitted from[de-identified] Home  Mental Status: Alert (mild confusion; hx of dementia)  During Assessment patient was accompanied by: Spouse  Assessment information provided by[de-identified] Spouse, Patient  Primary Caregiver: Spouse  Caregiver's Name[de-identified] Anthony King ()  Caregiver's Relationship to Patient[de-identified] Significant Other  Caregiver's Telephone Number[de-identified] [phone]  Support Systems: Spouse/significant other, Shreya Nicole  of Residence: [address] [place] [address] do you live in?: [facility]:    Discharge planning discussed with[de-identified] Patient, Spouse  Freedom of Choice: Yes  Comments - Freedom of Choice: SW met bedside in ED with patient and spouse to discuss PT recommendation for STR  Both patient and  verbally expressed understanding and agreement to move forward with STR placement  Both consenting to blanket referrals to local area  Both aware that pt is medically stable for discharge and will be discharged directly from ED to STR if arrangements can be made    CM contacted family/caregiver?: Yes  Were Treatment Team discharge recommendations reviewed with patient/caregiver?: Yes  Did patient/caregiver verbalize understanding of patient care needs?: N/A- going to facility  Were patient/caregiver advised of the risks associated with not following Treatment Team discharge recommendations?: Yes    Contacts  Patient Contacts: Sheridan Duff  Relationship to Patient[de-identified] Family  Contact Method: In Person  Reason/Outcome: Emergency Contact, Continuity of Care, Discharge Planning    Other Referral/Resources/Interventions Provided:  Interventions: Short Term Rehab  Referral Comments: [place] STR referrals sent in [place]  Responses pending       Treatment Team Recommendation: Short Term Rehab  Discharge Destination Plan[de-identified] Short Term Rehab

## 2023-05-02 NOTE — ED PROVIDER NOTES
"History  Chief Complaint   Patient presents with    Weakness - Generalized     Patient c/o generalized weakness for several weeks that has gotten worse over the past several weeks to the point of not being able to stand up today  Also c/o \"slight\" SOB x several weeks  Denies pain      Patient's  explains that she has Parkinson's disease and multiple other medical problems  She apparently got up to use the bathroom this morning, but then could not get off the toilet seat due to generalized weakness  Unable to lift her up, they called for an ambulance  There was no fall  Patient arrives awake and alert states that she feels like she has to urinate but is unable to  She is also quite dry and thirsty  Patient has had a history of mitral valve replacement and is on Coumadin  She denies chest pain but states that she has a \"empty feeling inside \"  No shortness of breath          Prior to Admission Medications   Prescriptions Last Dose Informant Patient Reported? Taking? Cholecalciferol (VITAMIN D3) 2000 units TABS 5/1/2023  Yes Yes   Sig: Take 2,000 Units by mouth every morning   Diclofenac Sodium (VOLTAREN) 1 %   No No   Sig: Apply 2 g topically 4 (four) times a day as needed (Foot pain) for up to 15 days   Multiple Vitamins-Minerals (OCUVITE ADULT 50+ PO) 5/1/2023  Yes Yes   Sig: Take by mouth daily with breakfast   alendronate (FOSAMAX) 70 mg tablet 5/1/2023  No Yes   Sig: take 1 tablet by mouth EVERY 7 DAYS   carbidopa-levodopa (SINEMET)  mg per tablet 5/1/2023  No Yes   Sig: TAKE 1 AND 1/2 TABLETS BY MOUTH 4 TIMES A DAY   Patient taking differently: Take 1 tablet by mouth 4 (four) times a day TAKE 1 AND 1/2 TABLETS 4 times daily     entacapone (COMTAN) 200 mg tablet 5/1/2023  No Yes   Sig: take 1 tablet by mouth four times a day   levothyroxine 50 mcg tablet 5/1/2023  No Yes   Sig: take 1 tablet by mouth every morning   memantine (NAMENDA) 10 mg tablet 5/1/2023  No Yes   Sig: take 1 tablet by " mouth once daily   midodrine (PROAMATINE) 2 5 mg tablet 5/1/2023  No Yes   Sig: Take 1 tablet (2 5 mg total) by mouth 3 (three) times a day before meals   oxybutynin (DITROPAN-XL) 5 mg 24 hr tablet Not Taking  No No   Sig: Take 1 tablet (5 mg total) by mouth daily   Patient not taking: Reported on 4/20/2023   potassium chloride (K-DUR,KLOR-CON) 20 mEq tablet Not Taking  No No   Sig: Take 1 tablet (20 mEq total) by mouth daily   Patient not taking: Reported on 5/2/2023   torsemide (DEMADEX) 20 mg tablet 5/1/2023  No Yes   Sig: Take torsemide 20 mg 1 tablet 3 days a week every Monday, Wednesday, Friday and an extra dose once a week as needed for leg swelling     warfarin (COUMADIN) 2 5 mg tablet 5/1/2023  No Yes   Sig: TAKE 1/2 TO 1 TABLET BY MOUTH DAILY OR AS DIRECTED BY PHYSICIAN   warfarin (Coumadin) 1 mg tablet 5/1/2023  No Yes   Sig: Pt to take 3 tablets per day or as directed      Facility-Administered Medications: None       Past Medical History:   Diagnosis Date    Anal fissure     Arthritis     osteo joints    Asthma with acute exacerbation     Blepharitis     Breast lump     Chalazion     CHF, chronic (HCC)     Difficulty walking     Disease of thyroid gland     Drooling     Dysphagia     Fall 05/04/2018    Fibromyalgia, primary     Glaucoma     Normal pressure    History of transfusion 1996    Hordeolum externum     Hx of transient ischemic attack (TIA)     Hypophonia     Infectious viral hepatitis     Hep C dx 1996     Lyme carditis     Murmur, cardiac     Parkinsons (HCC)     Rotator cuff tendinitis     Seasonal allergies     Urinary frequency        Past Surgical History:   Procedure Laterality Date    BREAST BIOPSY Right 2018    benign    BREAST CYST EXCISION Left     benign    BREAST SURGERY N/A     benign, calcium    CARDIAC SURGERY  1990    mitral valve replacement    CATARACT EXTRACTION Right 2015    CATARACT EXTRACTION Left 2014    CHEST TUBE INSERTION Right     post pleural effusion    CHOLECYSTECTOMY  2000    lap    HYSTERECTOMY  1973    partial    HI ARTHROCENTESIS ASPIR&/INJ MAJOR JT/BURSA W/O US Left 3/19/2021    Procedure: Sacroiliac Joint Injection ( 46027 ); Surgeon: Gerda Mary MD;  Location: [facility] MA[facility];  Service: Pain Management     HI COLSC FLX W/RMVL OF TUMOR POLYP LESION SNARE TQ N/A 2017    Procedure: COLONOSCOPY and biopsy ;  Surgeon: Mirtha Logan MD;  Location[facility] GI LAB; Service: Gastroenterology    SKIN BIOPSY      pre cancerous on face    TONSILLECTOMY      US GUIDED BREAST BIOPSY RIGHT COMPLETE Right 2018       Family History   Problem Relation Age of Onset    Heart disease Mother         murmur Cardiomegaly age 64    Pneumonia Father     Heart disease Brother         mitrsl valve    Parkinsonism Brother     Arthritis Brother     Lupus Daughter     Diabetes Daughter     Depression Daughter      I have reviewed and agree with the history as documented  E-Cigarette/Vaping    E-Cigarette Use Never User      E-Cigarette/Vaping Substances    Nicotine No     THC No     CBD No     Flavoring No     Other No     Unknown No      Social History     Tobacco Use    Smoking status: Former     Packs/day: 0 10     Years: 10 00     Pack years: 1 00     Types: Cigarettes     Quit date:      Years since quittin 1    Smokeless tobacco: Never   Vaping Use    Vaping Use: Never used   Substance Use Topics    Alcohol use: Not Currently    Drug use: No       Review of Systems   Constitutional: Negative for chills and fever  HENT: Negative for congestion and sore throat  Eyes: Negative for visual disturbance  Respiratory: Negative for cough and shortness of breath  Cardiovascular: Negative for chest pain and leg swelling  Gastrointestinal: Negative for abdominal pain and vomiting  Genitourinary: Positive for difficulty urinating  Negative for dysuria  Musculoskeletal: Negative for back pain  Skin: Negative for rash  Neurological: Positive for weakness  Negative for syncope and headaches  Hematological: Bruises/bleeds easily  Psychiatric/Behavioral: Positive for confusion  All other systems reviewed and are negative  Physical Exam  Physical Exam  Vitals and nursing note reviewed  Constitutional:       Appearance: Normal appearance  HENT:      Head: Normocephalic  Right Ear: External ear normal       Left Ear: External ear normal       Nose: Nose normal       Mouth/Throat:      Mouth: Mucous membranes are dry  Pharynx: Oropharynx is clear  Eyes:      Conjunctiva/sclera: Conjunctivae normal    Cardiovascular:      Rate and Rhythm: Normal rate and regular rhythm  Heart sounds: Murmur heard  Pulmonary:      Effort: Pulmonary effort is normal       Breath sounds: Normal breath sounds  Abdominal:      Palpations: Abdomen is soft  Tenderness: There is no abdominal tenderness  Musculoskeletal:         General: Normal range of motion  Cervical back: Normal range of motion  Skin:     General: Skin is warm and dry  Capillary Refill: Capillary refill takes less than 2 seconds  Neurological:      General: No focal deficit present  Mental Status: She is alert     Psychiatric:         Mood and Affect: Mood normal          Vital Signs  ED Triage Vitals [05/02/23 0748]   Temperature Pulse Respirations Blood Pressure SpO2   98 4 °F (36 9 °C) 68 18 126/56 95 %      Temp Source Heart Rate Source Patient Position - Orthostatic VS BP Location FiO2 (%)   Tympanic Monitor Lying Left arm --      Pain Score       No Pain           Vitals:    05/02/23 0930 05/02/23 1058 05/02/23 1300 05/02/23 1430   BP: 129/61 138/65 123/57 116/58   Pulse:  72 78 73   Patient Position - Orthostatic VS:  Lying           Visual Acuity  Visual Acuity    Flowsheet Row Most Recent Value   L Pupil Size (mm) 3   R Pupil Size (mm) 3          ED Medications  Medications   sodium chloride 0 9 % bolus 1,000 mL (0 mL Intravenous Stopped 5/2/23 1133)   potassium chloride (K-DUR,KLOR-CON) CR tablet 40 mEq (40 mEq Oral Given 5/2/23 1050)   sodium chloride 0 9 % bolus 1,000 mL (0 mL Intravenous Stopped 5/2/23 1257)       Diagnostic Studies  Results Reviewed     Procedure Component Value Units Date/Time    HS Troponin I 4hr [[id number]]  (Normal) Collected: 05/02/23 1259    Lab Status: Final result Specimen: Blood from Arm, Left Updated: 05/02/23 1334     hs TnI 4hr 39 ng/L      Delta 4hr hsTnI -2 ng/L     COVID only [[id number]]  (Normal) Collected: 05/02/23 1254    Lab Status: Final result Specimen: Nares from Nose Updated: 05/02/23 1331     SARS-CoV-2 Negative    Narrative:      FOR PEDIATRIC PATIENTS - copy/paste COVID Guidelines URL to browser: [email or web address]  [employer]    SARS-CoV-2 assay is a Nucleic Acid Amplification assay intended for the  qualitative detection of nucleic acid from SARS-CoV-2 in nasopharyngeal  swabs  Results are for the presumptive identification of SARS-CoV-2 RNA  Positive results are indicative of infection with SARS-CoV-2, the virus  causing COVID-19, but do not rule out bacterial infection or co-infection  with other viruses  Laboratories within the [place] and its  territories are required to report all positive results to the appropriate  public health authorities  Negative results do not preclude SARS-CoV-2  infection and should not be used as the sole basis for treatment or other  patient management decisions  Negative results must be combined with  clinical observations, patient history, and epidemiological information  This test has not been FDA cleared or approved  This test has been authorized by FDA under an Emergency Use Authorization  (EUA)   This test is only authorized for the duration of time the  declaration that circumstances exist justifying the authorization of the  emergency use of an in vitro diagnostic tests for detection of SARS-CoV-2  virus and/or diagnosis of COVID-19 infection under section 564(b)(1) of  the Act, 21 U  S C  856SGG-1(U)(1), unless the authorization is terminated  or revoked sooner  The test has been validated but independent review by FDA  and CLIA is pending  Test performed using [employer] Gene[employer]pert: This RT-PCR assay targets N2,  a region unique to SARS-CoV-2  A conserved region in the E-gene was chosen  for pan-Sarbecovirus detection which includes SARS-CoV-2  According to CMS-2020-01-R, this platform meets the definition of high-throughput technology      UA (URINE) with reflex to Scope [[id number]]  (Abnormal) Collected: 05/02/23 1220    Lab Status: Final result Specimen: Urine, Clean Catch Updated: 05/02/23 1230     Color, UA Pamella     Clarity, UA Clear     Specific Gravity, UA 1 010     pH, UA 7 5     Leukocytes, UA Negative     Nitrite, UA Negative     Protein, UA Negative mg/dl      Glucose, UA Negative mg/dl      Ketones, UA Trace mg/dl      Urobilinogen, UA 0 2 E U /dl      Bilirubin, UA Negative     Occult Blood, UA Negative    HS Troponin I 2hr [[id number]]  (Normal) Collected: 05/02/23 0926    Lab Status: Final result Specimen: Blood from Arm, Left Updated: 05/02/23 1011     hs TnI 2hr 39 ng/L      Delta 2hr hsTnI -2 ng/L     Comprehensive metabolic panel [[id number]]  (Abnormal) Collected: 05/02/23 0926    Lab Status: Final result Specimen: Blood from Arm, Left Updated: 05/02/23 1003     Sodium 141 mmol/L      Potassium 3 1 mmol/L      Chloride 112 mmol/L      CO2 30 mmol/L      ANION GAP -1 mmol/L      BUN 26 mg/dL      Creatinine 1 17 mg/dL      Glucose 114 mg/dL      Calcium 9 5 mg/dL      AST 13 U/L      ALT 3 U/L      Alkaline Phosphatase 61 U/L      Total Protein 5 9 g/dL      Albumin 3 7 g/dL      Total Bilirubin 1 31 mg/dL      eGFR 43 ml/min/1 73sq m     Narrative:      [place] guidelines for Chronic Kidney Disease (CKD):     Stage 1 with normal or high GFR (GFR > 90 mL/min/1 73 square meters)    Stage 2 Mild CKD (GFR = 60-89 mL/min/1 73 square meters)    Stage 3A Moderate CKD (GFR = 45-59 mL/min/1 73 square meters)    Stage 3B Moderate CKD (GFR = 30-44 mL/min/1 73 square meters)    Stage 4 Severe CKD (GFR = 15-29 mL/min/1 73 square meters)    Stage 5 End Stage CKD (GFR <15 mL/min/1 73 square meters)  Note: GFR calculation is accurate only with a steady state creatinine    TSH [[id number]]  (Normal) Collected: 05/02/23 0830    Lab Status: Final result Specimen: Blood from Arm, Left Updated: 05/02/23 0913     TSH 3RD GENERATON 2 559 uIU/mL     HS Troponin 0hr (reflex protocol) [[id number]]  (Normal) Collected: 05/02/23 0830    Lab Status: Final result Specimen: Blood from Arm, Left Updated: 05/02/23 0905     hs TnI 0hr 41 ng/L     Protime-INR [[id number]]  (Abnormal) Collected: 05/02/23 0830    Lab Status: Final result Specimen: Blood from Arm, Left Updated: 05/02/23 0851     Protime 28 6 seconds      INR 2 69    APTT [[id number]]  (Normal) Collected: 05/02/23 0830    Lab Status: Final result Specimen: Blood from Arm, Left Updated: 05/02/23 0851     PTT 34 seconds     CBC and differential [[id number]]  (Abnormal) Collected: 05/02/23 0830    Lab Status: Final result Specimen: Blood from Arm, Left Updated: 05/02/23 0839     WBC 6 96 Thousand/uL      RBC 3 44 Million/uL      Hemoglobin 11 6 g/dL      Hematocrit 34 5 %       fL      MCH 33 7 pg      MCHC 33 6 g/dL      RDW 14 8 %      MPV 11 6 fL      Platelets 314 Thousands/uL      nRBC 0 /100 WBCs      Neutrophils Relative 74 %      Immat GRANS % 1 %      Lymphocytes Relative 15 %      Monocytes Relative 9 %      Eosinophils Relative 1 %      Basophils Relative 0 %      Neutrophils Absolute 5 19 Thousands/µL      Immature Grans Absolute 0 05 Thousand/uL      Lymphocytes Absolute 1 04 Thousands/µL      Monocytes Absolute 0 61 Thousand/µL      Eosinophils Absolute 0 05 Thousand/µL Basophils Absolute 0 02 Thousands/µL                  XR chest 1 view portable   Final Result by Sandra Xiong MD (05/02 1034)      No acute cardiopulmonary disease  Workstation performed: [id number]                    Procedures  Procedures         ED Course                               SBIRT 22yo+    Flowsheet Row Most Recent Value   Initial Alcohol Screen: US AUDIT-C     1  How often do you have a drink containing alcohol? 0 Filed at: 05/02/2023 0808   2  How many drinks containing alcohol do you have on a typical day you are drinking? 0 Filed at: 05/02/2023 0808   3a  Male UNDER 65: How often do you have five or more drinks on one occasion? 0 Filed at: 05/02/2023 0808   3b  FEMALE Any Age, or MALE 65+: How often do you have 4 or more drinks on one occassion? 0 Filed at: 05/02/2023 7755   Audit-C Score 0 Filed at: 05/02/2023 6277   KIA: How many times in the past year have you    Used an illegal drug or used a prescription medication for non-medical reasons? Never Filed at: 05/02/2023 0808                    Medical Decision Making  Generalized weakness with what appears to be clinical dehydration possible UTI  We will check metabolic profile  PT OT evaluation for gait    Amount and/or Complexity of Data Reviewed  Labs: ordered  Radiology: ordered  Disposition  Final diagnoses:   Weakness   Gait disturbance     Time reflects when diagnosis was documented in both MDM as applicable and the Disposition within this note     Time User Action Codes Description Comment    5/2/2023  3:12 PM Kenny Reamer Add [R53 1] Weakness     5/2/2023  3:13 PM Kenny Reamer Add [R26 9] Gait disturbance       ED Disposition     ED Disposition   Discharge    Condition   Stable    Date/Time   Tue May 2, 2023  3:12 PM    Comment   Trinh Garcia discharge to home/self care                 Follow-up Information     Follow up With Specialties Details Why Contact Info    Bettye Hillman MD [facility] Medicine Schedule an appointment as soon as possible for a visit   [address] [facility] [address]  [phone]            Patient's Medications   Discharge Prescriptions    No medications on file       No discharge procedures on file      PDMP Review     None          ED Provider  Electronically Signed by           Scott Mora MD  05/02/23 7582

## 2023-05-03 ENCOUNTER — VBI (OUTPATIENT)
Dept: ADMINISTRATIVE | Facility: OTHER | Age: 83
End: 2023-05-03

## 2023-05-03 ENCOUNTER — TELEPHONE (OUTPATIENT)
Dept: CARDIOLOGY CLINIC | Facility: CLINIC | Age: 83
End: 2023-05-03

## 2023-05-03 NOTE — TELEPHONE ENCOUNTER
Tanisha Macedo    ED Visit Information     Ed visit date: 5/2/23  Diagnosis Description:   Weakness   In Network? Yes Aurelia Law  Discharge status: Home  Discharged with meds ? No  Number of ED visits to date: 1  ED Severity:NA     Outreach Information    Outreach successful: Yes 1  Date letter mailed:NA  Date Finalized:5/3/23    Care Coordination    Follow up appointment with pcp: yes patient scheduled 5/26  Transportation issues ? No    Value [employer] type: 7 Day Outreach  [facility] PCP: Yes  Transportation: Friend/Family Transport  Ambulance - Was Pt given choice of of ED: No  If able to choose an ED  Would you have chosen [facility]: Yes  Called PCP first?: No  Feels able to call PCP for urgent problems ?: Yes  Understands what emergencies can be handled by PCP ?: Yes  Ever any problems getting appointment with PCP for minor emergency/urgency problems?: No  Practice Contacted Patient ?: No  Pt had ED follow up with pcp/staff ?: No    Seen for follow-up out of network ?: No  Reason Patient went to ED instead of Urgent Care or PCP?: Perceived Severity of Illness  05/03/2023 01:58 PM EDT by Linda Villanueva 05/03/2023 01:58 PM EDT by Robin Salas ([place] [address]) [phone] ([facility])[phone] (Mobile) [phone] (Mobile) Remove- Call Complete    Spoke with patient  who stated patient is doing well  Patient has a PCP follow up scheduled 5/26 and made  aware to keep it for now and can cancel when its closer to the appointment to see how patient is doing

## 2023-05-03 NOTE — TELEPHONE ENCOUNTER
stopped in Britney Paola is in Rehab wanted you to be aware that she had an episode yesterday and ended up in ED  He would like you to review the chart and see if the appt on May 31 with you Is ok    Please advise

## 2023-05-04 DIAGNOSIS — E87.6 HYPOKALEMIA: ICD-10-CM

## 2023-05-04 RX ORDER — POTASSIUM CHLORIDE 20 MEQ/1
20 TABLET, EXTENDED RELEASE ORAL DAILY
Qty: 30 TABLET | Refills: 5
Start: 2023-05-04

## 2023-05-05 ENCOUNTER — TELEPHONE (OUTPATIENT)
Dept: CARDIOLOGY CLINIC | Facility: CLINIC | Age: 83
End: 2023-05-05

## 2023-05-05 NOTE — TELEPHONE ENCOUNTER
Pt's spouse wanted to make sure his wife keeps the appt that is scheduled for her  Also he wants Dr Kurt Maher to look in Pt's chart to stay updated with her Rehab, she's doing much better

## 2023-05-08 NOTE — PROGRESS NOTES
Chief Complaint   Patient presents with   • Follow-up     Follow up weight loss        Patient ID: Casey Hall is a 79 y o  female  HPI  Pt is seeing for f/u Hypokalemia, Anemia, Dysphagia -  Noticed weight loss over 12 m  -  Has CHF and Afib -  Under cardiologist care     The following portions of the patient's history were reviewed and updated as appropriate: allergies, current medications, past family history, past medical history, past social history, past surgical history and problem list     Review of Systems   Constitutional: Positive for activity change, appetite change, fatigue and unexpected weight change  HENT: Negative  Respiratory: Negative  Cardiovascular: Negative for chest pain and palpitations  Gastrointestinal: Negative  Genitourinary: Negative  Musculoskeletal: Negative  Neurological: Negative  Psychiatric/Behavioral: Negative  Current Outpatient Medications   Medication Sig Dispense Refill   • alendronate (FOSAMAX) 70 mg tablet take 1 tablet by mouth EVERY 7 DAYS 12 tablet 3   • carbidopa-levodopa (SINEMET)  mg per tablet TAKE 1 AND 1/2 TABLETS BY MOUTH 4 TIMES A DAY (Patient taking differently: Take 1 tablet by mouth 4 (four) times a day TAKE 1 AND 1/2 TABLETS 4 times daily ) 240 tablet 8   • Cholecalciferol (VITAMIN D3) 2000 units TABS Take 2,000 Units by mouth every morning     • entacapone (COMTAN) 200 mg tablet take 1 tablet by mouth four times a day 120 tablet 4   • memantine (NAMENDA) 10 mg tablet take 1 tablet by mouth once daily 90 tablet 3   • midodrine (PROAMATINE) 2 5 mg tablet Take 1 tablet (2 5 mg total) by mouth 3 (three) times a day before meals 90 tablet 3   • Multiple Vitamins-Minerals (OCUVITE ADULT 50+ PO) Take by mouth daily with breakfast     • torsemide (DEMADEX) 20 mg tablet Take torsemide 20 mg 1 tablet 3 days a week every Monday, Wednesday, Friday and an extra dose once a week as needed for leg swelling   30 tablet 3   • warfarin "(Coumadin) 1 mg tablet Pt to take 3 tablets per day or as directed 90 tablet 3   • warfarin (COUMADIN) 2 5 mg tablet TAKE 1/2 TO 1 TABLET BY MOUTH DAILY OR AS DIRECTED BY PHYSICIAN 30 tablet 11   • Diclofenac Sodium (VOLTAREN) 1 % Apply 2 g topically 4 (four) times a day as needed (Foot pain) for up to 15 days 50 g 2   • levothyroxine 50 mcg tablet take 1 tablet by mouth every morning 30 tablet 3   • oxybutynin (DITROPAN-XL) 5 mg 24 hr tablet Take 1 tablet (5 mg total) by mouth daily (Patient not taking: Reported on 4/20/2023) 30 tablet 3   • potassium chloride (K-DUR,KLOR-CON) 20 mEq tablet Take 1 tablet (20 mEq total) by mouth daily 30 tablet 5     No current facility-administered medications for this visit  Objective:    /72 (BP Location: Left arm, Patient Position: Sitting, Cuff Size: Standard)   Pulse 84   Temp 97 9 °F (36 6 °C)   Resp 16   Ht 5' 4\" (1 626 m)   Wt 67 kg (147 lb 12 8 oz)   SpO2 97%   BMI 25 37 kg/m²        Physical Exam  Constitutional:       Appearance: She is not ill-appearing  Cardiovascular:      Rate and Rhythm: Normal rate and regular rhythm  Abdominal:      Palpations: Abdomen is soft  Tenderness: There is no abdominal tenderness  There is no right CVA tenderness or left CVA tenderness  Musculoskeletal:      Right lower leg: Edema (+1) present  Left lower leg: Edema (+1) present  Neurological:      Mental Status: She is alert and oriented to person, place, and time  Psychiatric:         Mood and Affect: Mood normal                  Assessment/Plan:         Diagnoses and all orders for this visit:    Hypokalemia  -     Basic metabolic panel; Future    Anemia, unspecified type  -     Iron; Future    Dysphagia, unspecified type  -     Ambulatory Referral to Gastroenterology; Future  -     Ambulatory Referral to Otolaryngology;  Future    Weight loss      rto in 1 m                     Camryn Navarro MD      "

## 2023-05-18 ENCOUNTER — RA CDI HCC (OUTPATIENT)
Dept: OTHER | Facility: HOSPITAL | Age: 83
End: 2023-05-18

## 2023-05-18 NOTE — PROGRESS NOTES
I42 0   [facility]  coding opportunities          Chart Reviewed number of suggestions sent to Provider: 1     Patients Insurance     Medicare Insurance: [employer]

## 2023-05-19 ENCOUNTER — TELEPHONE (OUTPATIENT)
Dept: FAMILY MEDICINE CLINIC | Facility: CLINIC | Age: 83
End: 2023-05-19

## 2023-05-19 NOTE — TELEPHONE ENCOUNTER
Patient is being discharged from [facility] 5/19 Scheduled for ANIYAH MUSTAFA 5/23 @ 11:00

## 2023-05-22 ENCOUNTER — TRANSITIONAL CARE MANAGEMENT (OUTPATIENT)
Dept: FAMILY MEDICINE CLINIC | Facility: CLINIC | Age: 83
End: 2023-05-22

## 2023-05-23 ENCOUNTER — TELEPHONE (OUTPATIENT)
Dept: NEUROLOGY | Facility: CLINIC | Age: 83
End: 2023-05-23

## 2023-05-23 ENCOUNTER — OFFICE VISIT (OUTPATIENT)
Dept: FAMILY MEDICINE CLINIC | Facility: CLINIC | Age: 83
End: 2023-05-23
Payer: MEDICARE

## 2023-05-23 VITALS
BODY MASS INDEX: 25.44 KG/M2 | TEMPERATURE: 98.1 F | WEIGHT: 149 LBS | RESPIRATION RATE: 18 BRPM | SYSTOLIC BLOOD PRESSURE: 116 MMHG | HEART RATE: 76 BPM | HEIGHT: 64 IN | DIASTOLIC BLOOD PRESSURE: 64 MMHG

## 2023-05-23 DIAGNOSIS — I48.20 ATRIAL FIBRILLATION, CHRONIC (HCC): ICD-10-CM

## 2023-05-23 DIAGNOSIS — I50.42 CHRONIC COMBINED SYSTOLIC AND DIASTOLIC HEART FAILURE (HCC): ICD-10-CM

## 2023-05-23 DIAGNOSIS — R53.1 WEAKNESS: Primary | ICD-10-CM

## 2023-05-23 DIAGNOSIS — R60.0 EDEMA OF BOTH LOWER EXTREMITIES: ICD-10-CM

## 2023-05-23 DIAGNOSIS — R53.82 CHRONIC FATIGUE: ICD-10-CM

## 2023-05-23 PROCEDURE — 99495 TRANSJ CARE MGMT MOD F2F 14D: CPT | Performed by: FAMILY MEDICINE

## 2023-05-23 NOTE — TELEPHONE ENCOUNTER
Contacted the patient to confirm the appointment on 06/08/23, the patient requested a sooner appointment  Appointment rescheduled for 05/24/23

## 2023-05-24 ENCOUNTER — OFFICE VISIT (OUTPATIENT)
Dept: NEUROLOGY | Facility: CLINIC | Age: 83
End: 2023-05-24

## 2023-05-24 VITALS
TEMPERATURE: 96.3 F | DIASTOLIC BLOOD PRESSURE: 57 MMHG | HEART RATE: 67 BPM | BODY MASS INDEX: 25.27 KG/M2 | HEIGHT: 64 IN | OXYGEN SATURATION: 98 % | SYSTOLIC BLOOD PRESSURE: 106 MMHG | WEIGHT: 148 LBS

## 2023-05-24 DIAGNOSIS — G20 PARKINSON DISEASE (HCC): Primary | ICD-10-CM

## 2023-05-24 DIAGNOSIS — G24.9 DYSKINESIA: ICD-10-CM

## 2023-05-24 RX ORDER — AMANTADINE HYDROCHLORIDE 100 MG/1
CAPSULE, GELATIN COATED ORAL
Qty: 60 CAPSULE | Refills: 4 | Status: SHIPPED | OUTPATIENT
Start: 2023-05-24

## 2023-05-24 NOTE — PROGRESS NOTES
Return NeuroOutpatient Note        Diomedes Lockwood  [id number]  69 y o   1940       Dyskinesia, Orthostatic hypotension, and Parkinson's Disease        History obtained from:  Patient and wife     HPI/Subjective:  Diomedes Lockwood is an 78 yo F with PMH of PD presents as f/u  Per my previous reports, her disease is manifested by masked facies, hypophonic voice, bradykinesia  Patient's speech was first affected   She was diagnosed about 9-10 years ago  Patient was initially following with Dr Mensah Poster She has has softer speech for past few years  Over the years, she was declining physically so we wanted her to be seen by our movement specialist  She was seen by Dr Snehal Braxton  Initially no changes were made  She had been on stalevo for a long time  Then she was placed on sinemet 25/100 1 5 tabs qid  She is also placed on entacapone 200mg qid  She had been on stalevo combination for a long time prior to seeing movement specialist  She wasn't very happy with care after that and has issues so she decided to return to us  Today she returns as f/u   states that slowly patient is deteriorating  She is still exhibiting dyskinesia       Today she has dyskinesia and says has had it for at least a few months  She is constantly moving and can't rest   Over past 2 years she has developed orthostatic hypotension  Her systolic now doesn't go above 100  She struggles to put compression stockings       She uses walker to walk     In terms of memory disturbance, she's stable  She's on namenda 10mg daily      Denies hallucinations        Still  denies any hallucinations   Denies delusions  Selina Jorgensen does now flail a little in her sleep for past 2 months    For essential tremors, she is on primidone 50mg nightly           In July she had EMG of RUE which was suggestive of mild median mononeuropathy and suggestive of cervical radiculopathy           Past Medical History:   Diagnosis Date   • Anal fissure    • Arthritis     osteo joints   • Asthma with acute exacerbation    • Blepharitis    • Breast lump    • Chalazion    • CHF, chronic (HCC)    • Difficulty walking    • Disease of thyroid gland    • Drooling    • Dysphagia    • Fall 2018   • Fibromyalgia, primary    • Glaucoma     Normal pressure   • History of transfusion    • Hordeolum externum    • Hx of transient ischemic attack (TIA)    • Hypophonia    • Infectious viral hepatitis     Hep C dx     • Lyme carditis    • Murmur, cardiac    • Parkinsons (HCC)    • Rotator cuff tendinitis    • Seasonal allergies    • Urinary frequency      Social History     Socioeconomic History   • Marital status: /Civil Union     Spouse name: Not on file   • Number of children: Not on file   • Years of education: Not on file   • Highest education level: Not on file   Occupational History   • Not on file   Tobacco Use   • Smoking status: Former     Packs/day: 0 10     Years: 10 00     Total pack years: 1 00     Types: Cigarettes     Quit date:      Years since quittin 4   • Smokeless tobacco: Never   Vaping Use   • Vaping Use: Never used   Substance and Sexual Activity   • Alcohol use: Not Currently   • Drug use: No   • Sexual activity: Not Currently   Other Topics Concern   • Not on file   Social History Narrative   • Not on file     Social Determinants of Health     Financial Resource Strain: Low Risk  (11/3/2022)    Overall Financial Resource Strain (CARDIA)    • Difficulty of Paying Living Expenses: Not hard at all   Food Insecurity: No Food Insecurity (2022)    Hunger Vital Sign    • Worried About Running Out of Food in the Last Year: Never true    • Ran Out of Food in the Last Year: Never true   Transportation Needs: No Transportation Needs (2022)    PRAPARE - Transportation    • Lack of Transportation (Medical): No    • Lack of Transportation (Non-Medical):  No   Physical Activity: Not on file   Stress: Not on file   Social Connections: Not on file "  Intimate Partner Violence: Not on file   Housing Stability: Low Risk  (12/22/2022)    Housing Stability Vital Sign    • Unable to Pay for Housing in the Last Year: No    • Number of Places Lived in the Last Year: Yes    • Unstable Housing in the Last Year: 2     Family History   Problem Relation Age of Onset   • Heart disease Mother         murmur Cardiomegaly age 64   • Pneumonia Father    • Heart disease Brother         mitrsl valve   • Parkinsonism Brother    • Arthritis Brother    • Lupus Daughter    • Diabetes Daughter    • Depression Daughter      Allergies   Allergen Reactions   • Artane [Trihexyphenidyl] Other (See Comments)     Felt \"disconnected\", \"out if it\", shaky  Did not feel right    • Glycopyrrolate      Nose bleeds   • Pollen Extract      Current Outpatient Medications on File Prior to Visit   Medication Sig Dispense Refill   • alendronate (FOSAMAX) 70 mg tablet take 1 tablet by mouth EVERY 7 DAYS 12 tablet 3   • Cholecalciferol (VITAMIN D3) 2000 units TABS Take 2,000 Units by mouth every morning     • Diclofenac Sodium (VOLTAREN) 1 % Apply 2 g topically 4 (four) times a day as needed (Foot pain) for up to 15 days 50 g 2   • IRON, FERROUS SULFATE, PO Take by mouth     • levothyroxine 50 mcg tablet take 1 tablet by mouth every morning 30 tablet 3   • memantine (NAMENDA) 10 mg tablet take 1 tablet by mouth once daily 90 tablet 3   • midodrine (PROAMATINE) 2 5 mg tablet Take 1 tablet (2 5 mg total) by mouth 3 (three) times a day before meals 90 tablet 3   • Multiple Vitamins-Minerals (OCUVITE ADULT 50+ PO) Take by mouth in the morning     • Polyethylene Glycol 3350 (MIRALAX PO) Take by mouth as needed     • torsemide (DEMADEX) 20 mg tablet Take torsemide 20 mg 1 tablet 3 days a week every Monday, Wednesday, Friday and an extra dose once a week as needed for leg swelling   30 tablet 3   • warfarin (Coumadin) 1 mg tablet Pt to take 3 tablets per day or as directed 90 tablet 3   • warfarin (COUMADIN) 2 5 " "mg tablet TAKE 1/2 TO 1 TABLET BY MOUTH DAILY OR AS DIRECTED BY PHYSICIAN (Patient taking differently: 2 5 mg Tue Thurs 2 5mg other days 1/2 tablet) 30 tablet 11   • [DISCONTINUED] carbidopa-levodopa (SINEMET)  mg per tablet TAKE 1 AND 1/2 TABLETS BY MOUTH 4 TIMES A DAY (Patient taking differently: Take 1 tablet by mouth 4 (four) times a day TAKE 1 AND  4 times daily ) 240 tablet 8   • [DISCONTINUED] entacapone (COMTAN) 200 mg tablet take 1 tablet by mouth four times a day 120 tablet 4   • Multiple Vitamins-Minerals (OCUVITE ADULT 50+ PO) Take by mouth daily with breakfast (Patient not taking: Reported on 5/23/2023)     • oxybutynin (DITROPAN-XL) 5 mg 24 hr tablet Take 1 tablet (5 mg total) by mouth daily (Patient not taking: Reported on 4/20/2023) 30 tablet 3   • potassium chloride (K-DUR,KLOR-CON) 20 mEq tablet Take 1 tablet (20 mEq total) by mouth daily (Patient not taking: Reported on 5/24/2023) 30 tablet 5     No current facility-administered medications on file prior to visit  Review of Systems   Refer to positive review of systems in HPI     Review of Systems    Constitutional- No fever  Eyes- No visual change  ENT- Hearing normal  CV- No chest pain  Resp- No Shortness of breath  GI- No diarrhea  - Bladder normal  MS- No Arthritis   Skin- No rash  Psych- No depression  Endo- No DM  Heme- No nodes    Vitals:    05/24/23 1115   BP: 106/57   BP Location: Left arm   Patient Position: Sitting   Cuff Size: Standard   Pulse: 67   Temp: (!) 96 3 °F (35 7 °C)   TempSrc: Tympanic   SpO2: 98%   Weight: 67 1 kg (148 lb)   Height: 5' 4\" (1 626 m)       PHYSICAL EXAM:  Appearance: No Acute Distress, +masked facies  Ophthalmoscopic: Disc Flat, Normal fundus  Mental status:  Orientation: Awake, Alert, and Orientedx3  Memory: Registation 3/3 Recall 2/3  Attention: normal  Knowledge: good  Language: No aphasia  Speech: No dysarthria ; ++hypophonia   Cranial Nerves:  2 No Visual Defect on Confrontation, Pupils " round, equal, reactive to light  3,4,6 Extraocular Movements Intact, no nystagmus  5 Facial Sensation Intact  7 No facial asymmetry  8 Intact hearing  9,10 Palate symmetric, normal gag  11 Good shoulder shrug  12 Tongue Midline  Gait: Stable  Coordination: No ataxia with finger to nose testing, and heel to shin  Sensory: Intact, Symmetric to pinprick, light touch, vibration, and joint position  Muscle Tone: no rigidity               Muscle exam:  Arm Right Left Leg Right Left   Deltoid 5/5 5/5 Iliopsoas 5/5 5/5   Biceps 5/5 5/5 Quads 5/5 5/5   Triceps 5/5 5/5 Hamstrings 5/5 5/5   Wrist Extension 5/5 5/5 Ankle Dorsi Flexion 5/5 5/5   Wrist Flexion 5/5 5/5 Ankle Plantar Flexion 5/5 5/5   Interossei 5/5 5/5 Ankle Eversion 5/5 5/5   APB 5/5 5/5 Ankle Inversion 5/5 5/5       Reflexes   RJ BJ TJ KJ AJ Plantars Ayala's   Right 2+ 2+ 2+ 2+  Downgoing Not present   Left 2+ 2+ 2+ 2+  Downgoing Not present     Personal review of  Labs:                  Diagnoses and all orders for this visit:      1  Parkinson disease ([address] )  amantadine (SYMMETREL) 100 mg capsule    carbidopa-levodopa (SINEMET)  mg per tablet    Ambulatory Referral to Physical Therapy      2  Dyskinesia              Patient is slowly deteriorating  Discussed that disease does peak and worsen by 7 years on average  She's been dx for 10 yrs  She is still showing signs of dyskinesia so will reduce sinemet to tid and d/c entacapone  Will add amantadine to her regimen  I would like her to get some PT               Total time of encounter:  40 min  More than 50% of the time was used in counseling and/or coordination of care  Extent of counseling and/or coordination of care        MD Fritz Moore [facility]  [address]  [address]  [phone]

## 2023-05-25 ENCOUNTER — APPOINTMENT (OUTPATIENT)
Dept: LAB | Facility: CLINIC | Age: 83
End: 2023-05-25

## 2023-05-26 ENCOUNTER — ANTICOAG VISIT (OUTPATIENT)
Dept: CARDIOLOGY CLINIC | Facility: CLINIC | Age: 83
End: 2023-05-26

## 2023-05-26 DIAGNOSIS — I48.20 ATRIAL FIBRILLATION, CHRONIC (HCC): ICD-10-CM

## 2023-05-26 DIAGNOSIS — Z95.2 H/O MITRAL VALVE REPLACEMENT WITH MECHANICAL VALVE: Primary | ICD-10-CM

## 2023-05-31 ENCOUNTER — OFFICE VISIT (OUTPATIENT)
Dept: CARDIOLOGY CLINIC | Facility: CLINIC | Age: 83
End: 2023-05-31

## 2023-05-31 VITALS
BODY MASS INDEX: 25.27 KG/M2 | HEART RATE: 65 BPM | OXYGEN SATURATION: 97 % | WEIGHT: 148 LBS | SYSTOLIC BLOOD PRESSURE: 120 MMHG | HEIGHT: 64 IN | DIASTOLIC BLOOD PRESSURE: 62 MMHG

## 2023-05-31 DIAGNOSIS — Z86.39 HISTORY OF DEHYDRATION: ICD-10-CM

## 2023-05-31 DIAGNOSIS — R73.01 IMPAIRED FASTING GLUCOSE: ICD-10-CM

## 2023-05-31 DIAGNOSIS — Z86.79 HISTORY OF LEFT BUNDLE BRANCH BLOCK (LBBB): ICD-10-CM

## 2023-05-31 DIAGNOSIS — Z86.39 HISTORY OF HYPOKALEMIA: ICD-10-CM

## 2023-05-31 DIAGNOSIS — Z95.2 H/O MITRAL VALVE REPLACEMENT WITH MECHANICAL VALVE: Primary | ICD-10-CM

## 2023-05-31 DIAGNOSIS — E03.9 ACQUIRED HYPOTHYROIDISM: ICD-10-CM

## 2023-05-31 DIAGNOSIS — E78.2 MIXED DYSLIPIDEMIA: ICD-10-CM

## 2023-05-31 DIAGNOSIS — I48.20 ATRIAL FIBRILLATION, CHRONIC (HCC): ICD-10-CM

## 2023-05-31 DIAGNOSIS — G20 PARKINSON'S DISEASE (HCC): ICD-10-CM

## 2023-05-31 DIAGNOSIS — N18.32 STAGE 3B CHRONIC KIDNEY DISEASE (HCC): ICD-10-CM

## 2023-05-31 DIAGNOSIS — I50.32 CHRONIC DIASTOLIC HEART FAILURE (HCC): ICD-10-CM

## 2023-05-31 NOTE — PATIENT INSTRUCTIONS
We have requested a CBC and BMP fasting blood test to be done at your convenience at any [facility] facility you wish to use  We will contact you with the results  Extra water should be drunk the morning of the test   Continue present medication  Cardiology follow-up approximately 3 months with rhythm strip

## 2023-05-31 NOTE — PROGRESS NOTES
Office Cardiology Progress Note  Caroline Adler 79 y o  female MRN: [id number]  05/31/23  11:52 AM      ASSESSMENT:    1  Stabilized dehydration manifest by abnormal weight loss of 31 pounds over the past 11+ months, with previous worsening prerenal azotemia, hypotension, caused by overdiuresis and Entresto  2   Resolution of bilateral pretibial/ankle/pedal edema with onset approximately 3 5+ years ago associated with 38 pound weight loss in 2 5+ years and now with 31 pound weight loss in approximately 11+ months with no other evidence of heart failure  This coincides with current use of torsemide, which is now being taken at dose of 20 milligrams 3 days/week  Higher dose torsemide seems to worsen her renal function and hypotension  3  Chronic  diastolic heart failure with 50-55% LVEF and normal MVR function on 04/11/2022 and 06/22/2019 transthoracic echocardiograms  Significant improvement from 34% ejection fraction on 7/5/16 MUGA scan/underlying nonischemic dilated cardiomyopathy but currently with more exertional fatigue, less dyspnea on exertion, and recurrence of exhaustion and exertional cold sweats, with suppression of occasional nocturnal wheezing  Initially improved on starting dose of Entresto, but had vasovagal type near syncope on 01/18/2019 with no recurrence  Patient with less frequent lightheadedness and low blood pressures in the 80's/40-60's  The current symptoms are partially related to physical deconditioning  Kathryn Puente has previously been discontinued  4  History of severe diarrhea approximately 11 months ago superimposed on chronic intermittent diarrhea, likely contributing to dehydration  5  Chronic atrial fibrillation, controlled, and chronic left bundle-branch block with no EKG changes in approximately 3 7+ years  6  History of mechanical mitral valve replacement September, 1990, with labile oral warfarin anticoagulation with target INR of 2 5-3 5   Latest INR 1 63 on 5/25/2023 with [facility] adjustment of warfarin dose  7  Moderately frequent asymptomatic PVCs and otherwise benign Holter monitor December, 2013  8  52 9 pound weight loss in approximately 8 6+ years, and recent weight loss of 43 lbs in 3 5+ years with 31 pound weight loss in 11+ months  9  Controlled mixed dyslipidemia  10  Mild obstructive sleep apnea, previously suspected  11  History of asthma  12  History of GERD  13  History of benign positional vertigo with recurrence 2 2 years ago  14  History of Lyme disease  15  Hypothyroidism with prior normal TSH level, which could have contributed to previous extreme fatigue  16  History of hepatitis C    17  History of Parkinson's disease/gait dysfunction with slowly progressive symptoms  18  Resolved nonproductive cough and postural dizziness with flu-like illness nearly 4 85 years ago  19  Chronic fatigue and malaise, multifactorial    20  Drug-induced insomnia  21  History of fall with subsequent left distal tibial fracture and left ankle sprain with right periorbital ecchymoses and laceration and left wrist sprain on 7/30/17, with no recurrence, possibly related to transient drop in blood pressure with upright position in hot weather after prolonged sitting  22  Mildly impaired fasting glucose of 114 on 5/2/2023 with A1c of 5 9% on 07/20/2020  23  Chronic kidney disease, stage IIIb, probably related to  torsemide  24  History of prior near syncope and cold sweats with presumed hypotension, possibly secondary to previous worsening diarrhea approximately 9 months ago  Higher morning blood pressures on prior measurement, with normal pressures rest of day  Recurrent dehydration/hypotension and residual mild dizziness as well as resolution of acute kidney injury since 11/18/16  Patient currently on midodrine and off Entresto with resolution of hypotension  Plan       Patient Instructions     1   We have requested a CBC and BMP "fasting blood test to be done at your convenience at any [facility] facility you wish to use  We will contact you with the results  Extra water should be drunk the morning of the test   2  Continue present medication  3  Cardiology follow-up approximately 3 months with rhythm strip  HPI    This 79 y o  female  denies new cardiopulmonary and medical symptoms  She has developed progressive weakness in recent months and was seen at [facility] emergency department on 5/2/2023 with the same complaint and was thought to have dehydration and possible UTI  She was discharged in subsequently went to [facility] short-term rehab at [facility] for about 2 weeks  She was discharged on 5/19/2023 and is eating better there  Her Parkinson's medications are being adjusted by neurology with some chronic medication reductions and introduction of new medication  It is felt that she is going \"downhill\" from a neurologic standpoint  She has occasional chest flutters  Home physical therapy twice a week  Reading for her is somewhat difficult  She has had no falls, severe shortness of breath and no significant chest pain  She is also being seen in follow-up of the below listed diagnoses  Encounter Diagnoses   Name Primary?    • H/O mitral valve replacement with mechanical valve Yes   • Atrial fibrillation, chronic (HCC)    • Chronic diastolic heart failure (HCC)    • History of left bundle branch block (LBBB)    • Mixed dyslipidemia    • Parkinson's disease ([facility] [address] )    • Acquired hypothyroidism    • Stage [facility]    • Impaired fasting glucose    • History of hypokalemia    • History of dehydration         Review of Systems    All other systems negative, except as noted in history of present illness    Historical Information   Past Medical History:   Diagnosis Date   • Anal fissure    • Arthritis     osteo joints   • Asthma with acute exacerbation    • Blepharitis    • " Breast lump    • Chalazion    • CHF, chronic (HCC)    • Difficulty walking    • Disease of thyroid gland    • Drooling    • Dysphagia    • Fall 2018   • Fibromyalgia, primary    • Glaucoma     Normal pressure   • History of transfusion    • Hordeolum externum    • Hx of transient ischemic attack (TIA)    • Hypophonia    • Infectious viral hepatitis     Hep C dx     • Lyme carditis    • Murmur, cardiac    • Parkinsons (HCC)    • Rotator cuff tendinitis    • Seasonal allergies    • Urinary frequency      Past Surgical History:   Procedure Laterality Date   • BREAST BIOPSY Right 2018    benign   • BREAST CYST EXCISION Left     benign   • BREAST SURGERY N/A     benign, calcium   • CARDIAC SURGERY      mitral valve replacement   • CATARACT EXTRACTION Right 2015   • CATARACT EXTRACTION Left 2014   • CHEST TUBE INSERTION Right     post pleural effusion   • CHOLECYSTECTOMY      lap   • HYSTERECTOMY  1973    partial   • NH ARTHROCENTESIS ASPIR&/INJ MAJOR JT/BURSA W/O US Left 3/19/2021    Procedure: Sacroiliac Joint Injection ( 98541 ); Surgeon: Ginna Ramsay MD;  Location: [facility] MAIN OR;  Service: Pain Management    • NH COLSC FLX W/RMVL OF TUMOR POLYP LESION SNARE TQ N/A 2017    Procedure: COLONOSCOPY and biopsy ;  Surgeon: Virginie Dale MD;  Location: [facility] GI LAB;   Service: Gastroenterology   • SKIN BIOPSY      pre cancerous on face   • TONSILLECTOMY     • US GUIDED BREAST BIOPSY RIGHT COMPLETE Right 2018     Social History     Substance and Sexual Activity   Alcohol Use Not Currently     Social History     Substance and Sexual Activity   Drug Use No     Social History     Tobacco Use   Smoking Status Former   • Packs/day: 0 10   • Years: 10    • Total pack years: 1 00   • Types: Cigarettes   • Quit date:    • Years since quittin 4   Smokeless Tobacco Never       Family History:  Family History   Problem Relation Age of Onset   • Heart disease Mother         murmur Cardiomegaly age 64   • Pneumonia Father    • Heart disease Brother         mitrsl valve   • Parkinsonism Brother    • Arthritis Brother    • Lupus Daughter    • Diabetes Daughter    • Depression Daughter          Meds/Allergies     Prior to Admission medications    Medication Sig Start Date End Date Taking? Authorizing Provider   alendronate (FOSAMAX) 70 mg tablet take 1 tablet by mouth EVERY 7 DAYS 6/1/22  Yes Philippe Varma MD   amantadine (SYMMETREL) 100 mg capsule Take 1 capsule a day for 2 weeks and then take 1 capsule twice daily  5/24/23  Yes Jey Wright MD   carbidopa-levodopa (SINEMET)  mg per tablet Take 1 tab three times daily  Last dose before dinner time   5/24/23  Yes Jey Wright MD   Cholecalciferol (VITAMIN D3) 2000 units TABS Take 2,000 Units by mouth every morning   Yes Historical Provider, MD   IRON, FERROUS SULFATE, PO Take by mouth   Yes Historical Provider, MD   levothyroxine 50 mcg tablet take 1 tablet by mouth every morning 5/1/23  Yes Philippe Varma MD   memantine [facility]) 10 mg tablet take 1 tablet by mouth once daily 10/11/22  Yes Roel Garay PA-C   midodrine (PROAMATINE) 2 5 mg tablet Take 1 tablet (2 5 mg total) by mouth 3 (three) times a day before meals 3/2/23  Yes Jey Wright MD   Multiple Vitamins-Minerals (OCUVITE ADULT 50+ PO) Take by mouth in the morning   Yes Historical Provider, MD   Polyethylene Glycol 3350 (MIRALAX PO) Take by mouth as needed   Yes Historical Provider, MD   torsemide (DEMADEX) 20 mg tablet Take torsemide 20 mg 1 tablet 3 days a week every Monday, Wednesday, Friday and an extra dose once a week as needed for leg swelling  3/27/23  Yes Marcelo Moise MD   warfarin (Coumadin) 1 mg tablet Pt to take 3 tablets per day or as directed 1/5/23  Yes Marcelo Moise MD   warfarin (COUMADIN) 2 5 mg tablet TAKE 1/2 TO 1 TABLET BY MOUTH DAILY OR AS DIRECTED BY PHYSICIAN  Patient taking differently: 2 5 mg Tue Thurs 2 5mg other days 1/2 tablet "5/10/22  Yes Saman Redman MD   Diclofenac Sodium (VOLTAREN) 1 % Apply 2 g topically 4 (four) times a day as needed (Foot pain) for up to 15 days 1/21/22 5/24/23  Lilian Thurman DPM   oxybutynin (DITROPAN-XL) 5 mg 24 hr tablet Take 1 tablet (5 mg total) by mouth daily  Patient not taking: Reported on 4/20/2023 4/12/23   Kenneth Nagy MD   entacapone (COMTAN) 200 mg tablet take 1 tablet by mouth four times a day 11/26/22 5/24/23  Lisbeth Roman PA-C   Multiple Vitamins-Minerals (OCUVITE ADULT 50+ PO) Take by mouth daily with breakfast  Patient not taking: Reported on 5/23/2023 5/31/23  Historical Provider, MD   potassium chloride (K-DUR,KLOR-CON) 20 mEq tablet Take 1 tablet (20 mEq total) by mouth daily  Patient not taking: Reported on 5/24/2023 5/4/23 5/31/23  Saman Redman MD       Allergies   Allergen Reactions   • Artane [Trihexyphenidyl] Other (See Comments)     Felt \"disconnected\", \"out if it\", shaky  Did not feel right    • Glycopyrrolate      Nose bleeds   • Pollen Extract          Vitals:    05/31/23 1050   BP: 120/62   BP Location: Left arm   Patient Position: Sitting   Cuff Size: Standard   Pulse: 65   SpO2: 97%   Weight: 67 1 kg (148 lb)   Height: 5' 4\" (1 626 m)       Body mass index is 25 4 kg/m²  6 pound weight loss in approximately 2+ months and 31 pound weight loss in approximately 11+ months    Physical Exam:    General Appearance:  Alert, cooperative, no distress, appears stated age and is minimally overweight     Head:  Normocephalic, without obvious abnormality, atraumatic   Eyes:  PERRL, conjunctiva/corneas clear, EOM's intact,   both eyes   Ears:  Normal TM's and external ear canals, both ears   Nose: Nares normal, septum midline, mucosa normal, no drainage or sinus tenderness   Throat: Lips, mucosa, and tongue normal; teeth and gums normal   Neck: Supple, symmetrical, trachea midline, no adenopathy, thyroid: not enlarged, symmetric, no tenderness/mass/nodules, no carotid bruit or JVD " Back:   Symmetric, no curvature, ROM normal, no CVA tenderness   Lungs:   Clear to auscultation bilaterally, respirations unlabored   Chest Wall:  No tenderness or deformity   Heart:  Irregularly irregular cardiac rhythm, S1 mitral prosthetic click with no variability, S2 normal, no murmur, rub or gallop   Abdomen:   Soft, non-tender, bowel sounds active all four quadrants,  no masses, no organomegaly   Extremities: Extremities normal, atraumatic, no cyanosis with no significant pitting edema  Primary osteoarthritic changes of right knee  Pulses: 2+ and symmetric   Skin: Skin showed normal color, texture, turgor and no rashes or lesions   Lymph nodes: Cervical, supraclavicular, and axillary nodes normal   Neurologic: Normal         Cardiographics    ECG 05/31/23:    Controlled atrial fibrillation with average ventricular rate 65 bpm  Left bundle branch block with secondary ST-T abnormalities  Abnormal ECG, unchanged in any significant way from 5/2/2023 and 1/31/2023    IMAGING:    XR chest pa & lateral    Result Date: 4/21/2023  Impression No acute cardiopulmonary disease  Workstation performed: [id number]     XR chest pa & lateral    Result Date: 1/10/2023  Impression Trace bilateral pleural effusions with bibasilar atelectasis   Workstation performed: [id number]     Portable chest x-ray, 1 view 5/2/2023:    Median sternotomy wires  Enlarged heart shadow  Cholecystectomy clips  No acute cardiopulmonary disease        LAB REVIEW:      Lab Results   Component Value Date    ALKPHOS 61 05/02/2023    ALT 3 (L) 05/02/2023    ANIONGAP 9 8 (L) 12/07/2015    AST 13 05/02/2023    BILITOT 1 6 (H) 12/07/2015    BUN 26 (H) 05/02/2023    CALCIUM 9 5 05/02/2023     (H) 05/02/2023    CO2 30 05/02/2023    CORRECTEDCA 10 4 (H) 12/21/2022    CREATININE 1 17 05/02/2023    EGFR 43 05/02/2023    GLUCOSE 91 12/07/2015    GLUF 124 (H) 05/01/2023    K 3 1 (L) 05/02/2023    PROT 6 9 12/07/2015    SODIUM 141 05/02/2023   Glucose "5/2/2023: 114, total bilirubin 5/2/2023: 1 31  Lab Results   Component Value Date    CHOLESTEROL 131 10/31/2022    CHOLESTEROL 159 02/09/2021    CHOLESTEROL 184 11/12/2020     Lab Results   Component Value Date    HDL 54 10/31/2022    HDL 60 02/09/2021    HDL 67 11/12/2020       Lab Results   Component Value Date    LDLCALC 61 10/31/2022    LDLCALC 82 02/09/2021    LDLCALC 97 11/12/2020     No components found for: \"DIRECTLDLREFLEX\"  Lab Results   Component Value Date    TRIG 79 10/31/2022    TRIG 86 02/09/2021    TRIG 102 11/12/2020     PT/INR 5/25/2023: 19 6/1 63  CBC 5/2/2023: H/H-11 6/34 5 with normal WBC and platelets  PTT, TSH, HS TRN x 3, COVID-19, UA 5/2/2023:  All normal or negative  NT-proBNP 4/21/2023: 7915 with prior level of 6674 on 12/21/2022          Amy Wood MD  "

## 2023-06-01 ENCOUNTER — APPOINTMENT (OUTPATIENT)
Dept: LAB | Facility: CLINIC | Age: 83
End: 2023-06-01
Payer: MEDICARE

## 2023-06-01 ENCOUNTER — OFFICE VISIT (OUTPATIENT)
Age: 83
End: 2023-06-01

## 2023-06-01 ENCOUNTER — ANTICOAG VISIT (OUTPATIENT)
Dept: CARDIOLOGY CLINIC | Facility: CLINIC | Age: 83
End: 2023-06-01

## 2023-06-01 VITALS — WEIGHT: 138 LBS | TEMPERATURE: 96.6 F | HEIGHT: 64 IN | BODY MASS INDEX: 23.56 KG/M2

## 2023-06-01 DIAGNOSIS — I48.20 ATRIAL FIBRILLATION, CHRONIC (HCC): ICD-10-CM

## 2023-06-01 DIAGNOSIS — I89.0 LYMPHEDEMA DUE TO CHRONIC INFLAMMATION: ICD-10-CM

## 2023-06-01 DIAGNOSIS — I50.32 CHRONIC DIASTOLIC HEART FAILURE (HCC): Primary | ICD-10-CM

## 2023-06-01 DIAGNOSIS — N18.32 STAGE 3B CHRONIC KIDNEY DISEASE (HCC): ICD-10-CM

## 2023-06-01 DIAGNOSIS — L21.9 SEBORRHEIC DERMATITIS: Primary | ICD-10-CM

## 2023-06-01 DIAGNOSIS — B07.9 VERRUCA VULGARIS: ICD-10-CM

## 2023-06-01 DIAGNOSIS — L57.0 ACTINIC KERATOSIS: ICD-10-CM

## 2023-06-01 DIAGNOSIS — Z95.2 H/O MITRAL VALVE REPLACEMENT WITH MECHANICAL VALVE: Primary | ICD-10-CM

## 2023-06-01 DIAGNOSIS — D18.01 CHERRY ANGIOMA: ICD-10-CM

## 2023-06-01 DIAGNOSIS — L81.4 SOLAR LENTIGO: ICD-10-CM

## 2023-06-01 DIAGNOSIS — E87.6 HYPOKALEMIA: ICD-10-CM

## 2023-06-01 LAB
ANION GAP SERPL CALCULATED.3IONS-SCNC: -2 MMOL/L (ref 4–13)
BUN SERPL-MCNC: 25 MG/DL (ref 5–25)
CALCIUM SERPL-MCNC: 10.3 MG/DL (ref 8.3–10.1)
CHLORIDE SERPL-SCNC: 111 MMOL/L (ref 96–108)
CO2 SERPL-SCNC: 29 MMOL/L (ref 21–32)
CREAT SERPL-MCNC: 1.31 MG/DL (ref 0.6–1.3)
ERYTHROCYTE [DISTWIDTH] IN BLOOD BY AUTOMATED COUNT: 14 % (ref 11.6–15.1)
GFR SERPL CREATININE-BSD FRML MDRD: 37 ML/MIN/1.73SQ M
GLUCOSE P FAST SERPL-MCNC: 117 MG/DL (ref 65–99)
HCT VFR BLD AUTO: 34.5 % (ref 34.8–46.1)
HGB BLD-MCNC: 11.4 G/DL (ref 11.5–15.4)
MCH RBC QN AUTO: 33.7 PG (ref 26.8–34.3)
MCHC RBC AUTO-ENTMCNC: 33 G/DL (ref 31.4–37.4)
MCV RBC AUTO: 102 FL (ref 82–98)
PLATELET # BLD AUTO: 165 THOUSANDS/UL (ref 149–390)
PMV BLD AUTO: 12.7 FL (ref 8.9–12.7)
POTASSIUM SERPL-SCNC: 3.1 MMOL/L (ref 3.5–5.3)
RBC # BLD AUTO: 3.38 MILLION/UL (ref 3.81–5.12)
SODIUM SERPL-SCNC: 138 MMOL/L (ref 135–147)
WBC # BLD AUTO: 7.31 THOUSAND/UL (ref 4.31–10.16)

## 2023-06-01 RX ORDER — MOMETASONE FUROATE 1 MG/ML
SOLUTION TOPICAL DAILY
Qty: 60 ML | Refills: 0 | Status: SHIPPED | OUTPATIENT
Start: 2023-06-01

## 2023-06-01 RX ORDER — KETOCONAZOLE 20 MG/ML
1 SHAMPOO TOPICAL 2 TIMES WEEKLY
Qty: 120 ML | Refills: 1 | Status: SHIPPED | OUTPATIENT
Start: 2023-06-01

## 2023-06-01 RX ORDER — SPIRONOLACTONE 25 MG/1
12.5 TABLET ORAL DAILY
Qty: 30 TABLET | Refills: 5 | Status: SHIPPED | OUTPATIENT
Start: 2023-06-01

## 2023-06-01 RX ORDER — HYDROCORTISONE 25 MG/ML
LOTION TOPICAL
Qty: 59 ML | Refills: 1 | Status: SHIPPED | OUTPATIENT
Start: 2023-06-01

## 2023-06-01 NOTE — PATIENT INSTRUCTIONS
"SEBORRHEIC DERMATITIS    Assessment and Plan:  Based on a thorough discussion of this condition and the management approach to it (including a comprehensive discussion of the known risks, side effects and potential benefits of treatment), the patient (family) agrees to implement the following specific plan:  FOR SCALP:  Apply Mometasone 1 % solution nightly to scalp  Wash with Ketoconazole 2$% shampoo every other day    FOR FACE:   Apply hydrocortizone 2 5 % cream to face at night      Seborrheic Dermatitis   Seborrheic dermatitis is a common, chronic or relapsing form of eczema/dermatitis that mainly affects the sebaceous, gland-rich regions of the scalp, face, and trunk  There are infantile and adult forms of seborrhoeic dermatitis  It is sometimes associated with psoriasis and, in that clinical scenario, may be referred to as \"sebo-psoriasis  \"  Seborrheic dermatitis is also known as \"seborrheic eczema  \"  Dandruff (also called \"pityriasis capitis\") is an uninflamed form of seborrhoeic dermatitis  Dandruff presents as bran-like scaly patches scattered within hair-bearing areas of the scalp  In an infant, this condition may be referred to as \"cradle cap  \"  The cause of seborrheic dermatitis is not completely understood  It is associated with proliferation of various species of the skin commensal Malassezia, in its yeast (non-pathogenic) form  Its metabolites (such as the fatty acids oleic acid, malssezin, and indole-3-carbaldehyde) may cause an inflammatory reaction  Differences in skin barrier lipid content and function may account for individual presentations  Infantile Seborrheic Dermatitis  Infantile seborrheic dermatitis affects babies under the age of 1 months and usually resolves by 1012 months of age  Infantile seborrheic dermatitis causes \"cradle cap\" (diffuse, greasy scaling on scalp)  The rash may spread to affect armpit and groin folds (a type of \"napkin dermatitis\")    There may be associated " salmon-pink colored patches that may flake or peel  The rash in this case is usually not especially itchy, so the baby often appears undisturbed by the rash, even when more generalized  Adult Seborrheic Dermatitis  Adult seborrheic dermatitis tends to begin in late adolescence; prevalence is greatest in young adults and in the elderly  It is more common in males than in females  The following factors are sometimes associated with severe adult seborrheic dermatitis:  Oily skin  Familial tendency to seborrhoeic dermatitis or a family history of psoriasis  Immunosuppression: organ transplant recipient, human immunodeficiency virus (HIV) infection and patients with lymphoma  Neurological and psychiatric diseases: Parkinson disease, tardive dyskinesia, depression, epilepsy, facial nerve palsy, spinal cord injury and congenital disorders such as Down syndrome  Treatment for psoriasis with psoralen and ultraviolet A (PUVA) therapy  Lack of sleep  Stressful events  In adults, seborrheic dermatitis may typically affect the scalp, face (creases around the nose, behind ears, within eyebrows) and upper trunk  Typical clinical features include: Winter flares, improving in summer following sun exposure  Minimal itch most of the time  Combination oily and dry mid-facial skin  Ill-defined localized scaly patches or diffuse scale in the scalp  Blepharitis; scaly red eyelid margins  Houston-pink, thin, scaly, and ill-defined plaques in skin folds on both sides of the face  Petal or ring-shaped flaky patches on hair-line and on anterior chest  Rash in armpits, under the breasts, in the groin folds and genital creases  Superficial folliculitis (inflamed hair follicles) on cheeks and upper trunk    Seborrheic dermatitis is diagnosed by its clinical appearance and behavior  Skin biopsy may be helpful but is rarely necessary to make this diagnosis        LENTIGO     Assessment and Plan:  Based on a thorough discussion of this condition and the management approach to it (including a comprehensive discussion of the known risks, side effects and potential benefits of treatment), the patient (family) agrees to implement the following specific plan:  Monitor for changes   When outside we recommend using a wide brim hat, sunglasses, long sleeve and pants, sunscreen with SPF 71+ with reapplication every 2 hours, or SPF specific clothing   Routine skin exam recommended yearly      What is a lentigo? A lentigo is a pigmented flat or slightly raised lesion with a clearly defined edge  Unlike an ephelis (freckle), it does not fade in the winter months  There are several kinds of lentigo  The name lentigo originally referred to its appearance resembling a small lentil  The plural of lentigo is lentigines, although “lentigos” is also in common use  Who gets lentigines? Lentigines can affect males and females of all ages and races  Solar lentigines are especially prevalent in fair skinned adults  Lentigines associated with syndromes are present at birth or arise during childhood  What causes lentigines? Common forms of lentigo are due to exposure to ultraviolet radiation:  Sun damage including sunburn   Indoor tanning   Phototherapy, especially photochemotherapy (PUVA)     Ionizing radiation, eg radiation therapy, can also cause lentigines  Several familial syndromes associated with widespread lentigines originate from mutations in Dino-MAP kinase, mTOR signaling and PTEN pathways  What are the clinical features of lentigines? Lentigines have been classified into several different types depending on what they look like, where they appear on the body, causative factors, and whether they are associated to other diseases or conditions  Lentigines may be solitary or more often, multiple  Most lentigines are smaller than 5 mm in diameter       Lentigo simplex  A precursor to junctional naevus   Arises during childhood and early adult life Found on trunk and limbs   Small brown round or oval macule or thin plaque   Jagged or smooth edge   May have a dry surface   May disappear in time  Solar lentigo  A precursor to seborrhoeic keratosis   Found on chronically sun exposed sites such as hands, face, lower legs   May also follow sunburn to shoulders   Yellow, light or dark brown regular or irregular macule or thin plaque   May have a dry surface   Often has moth-eaten outline   Can slowly enlarge to several centimeters in diameter   May disappear, often through the process known as lichenoid keratosis   When atypical in appearance, may be difficult to distinguish from melanoma in situ  Ink spot lentigo  Also known as reticulated lentigo   Few in number compared to solar lentigines   Follows sunburn in very fair skinned individuals   Dark brown to black irregular ink spot-like macule  PUVA lentigo  Similar to ink spot lentigo but follows photochemotherapy (PUVA)   Location anywhere exposed to PUVA  Tanning bed lentigo  Similar to ink spot lentigo but follows indoor tanning   Location anywhere exposed to tanning bed  Radiation lentigo  Occurs in site of irradiation (accidental or therapeutic)   Associated with late-stage radiation dermatitis: epidermal atrophy, subcutaneous fibrosis, keratosis, telangiectasias  Melanotic macule  Mucosal surfaces or adjacent glabrous skin eg lip, vulva, penis, anus   Light to dark brown   Also called mucosal melanosis  Generalised lentigines  Found on any exposed or covered site from early childhood   Small macules may merge to form larger patches   Not associated with a syndrome   Also called lentigines profusa, multiple lentigines  Agminated lentigines  Naevoid eruption of lentigos confined to a single segmental area   Sharp demarcation in midline   May have associated neurological and developmental abnormalities  Patterned lentigines  Inherited tendency to lentigines on face, lips, buttocks, palms, soles   Recognised mainly in people of  ethnicity  Centrofacial neurodysraphic lentiginosis  Associated with mental retardation  Lentiginosis syndromes  Syndromes include LEOPARD/Saltillo, Peutz-Jeghers, Laugier-Hunziker, Moynahan, Xeroderma pigmentosum, myxoma syndromes (TRIVEDI, NAME, Gauthier), Ruvalcaba-Myhre-Nicole, Bannayan-Zonnana syndrome, Cowden disease (multiple hamartoma syndrome )   Inheritance is autosomal dominant; sporadic cases common   Widespread lentigines present at birth or arise in early childhood   Associated with neural, endocrine, and mesenchymal tumors     How is the diagnosis made? Lentigines are usually diagnosed clinically by their typical appearance  Concern regarding possibility of melanoma may lead to:  Dermatoscopy   Diagnostic excision biopsy     Histopathology of a lentigo shows: Thickened epidermis   An increased number of melanocytes along the basal layer of epidermis   Unlike junctional melanocytic naevus, there are no nests of melanocytes   Increased melanin pigment within the keratinocytes   Additional features depending on type of lentigo     In contrast, an ephelis (freckle) shows sun-induced increased melanin within the keratinocytes, without an increase in number of cells  What is the treatment for lentigines? Most lentigines are left alone  Attempts to lighten them may not be successful  The following approaches are used:  SPF 50+ broad-spectrum sunscreen   Hydroquinone bleaching cream   Alpha hydroxy acids   Vitamin C   Retinoids   Azelaic acid   Chemical peels  Individual lesions can be permanently removed using:  Cryotherapy   Intense pulsed light   Pigment lasers     How can lentigines be prevented? Lentigines associated with exposure ultraviolet radiation can be prevented by very careful sun protection  Clothing is more successful at preventing new lentigines than are sunscreens  What is the outlook for lentigines?   Lentigines usually persist  They may increase in number with "age and sun exposure  Some in sun-protected sites may fade and disappear  SHINE ANGIOMAS   Assessment and Plan:  Based on a thorough discussion of this condition and the management approach to it (including a comprehensive discussion of the known risks, side effects and potential benefits of treatment), the patient (family) agrees to implement the following specific plan:  Reassured benign      Assessment and Plan:    Cherry angioma, also known as Lawernce Fusi spots, are benign vascular skin lesions  A \"cherry angioma\" is a firm red, blue or purple papule, 0 1-1 cm in diameter  When thrombosed, they can appear black in colour until evaluated with a dermatoscope when the red or purple colour is more easily seen  Cherry angioma may develop on any part of the body but most often appear on the scalp, face, lips and trunk  An angioma is due to proliferating endothelial cells; these are the cells that line the inside of a blood vessel  Angiomas can arise in early life or later in life; the most common type of angioma is a cherry angioma  Cherry angiomas are very common in males and females of any age or race  They are more noticeable in white skin than in skin of colour  They markedly increase in number from about the age of 36  There may be a family history of similar lesions  Eruptive cherry angiomas have been rarely reported to be associated with internal malignancy  The cause of angiomas is unknown  Genetic analysis of cherry angiomas has shown that they frequently carry specific somatic missense mutations in the GNAQ and GNA11 (Q209H) genes, which are involved in other vascular and melanocytic proliferations  Cherry angioma is usually diagnosed clinically and no investigations are necessary for the majority of lesions  It has a characteristic red-clod or lobular pattern on dermatoscopy (called lacunar pattern using conventional pattern analysis)    When there is uncertainty about the diagnosis, a " biopsy may be performed  The angioma is composed of venules in a thickened papillary dermis  Collagen bundles may be prominent between the lobules  Cherry angiomas are harmless, so they do not usually have to be treated  Occasionally, they are removed to exclude a malignant skin lesion such as a nodular melanoma or because they are irritated or bleeding (and a subsequent risk for infection)  To decrease friction over the lesions, we recommend Neutrogena Daily Defense SPF 50+ at least 3 times a day  ACTINIC KERATOSIS    Assessment and Plan:  Based on a thorough discussion of this condition and the management approach to it (including a comprehensive discussion of the known risks, side effects and potential benefits of treatment), the patient (family) agrees to implement the following specific plan:    Liquid nitrogen was applied for 10-12 seconds to the skin lesion and the expected blistering or scabbing reaction explained  Do not pick at the area  Patient reminded to expect hypopigmented scars from the procedure  Return if lesion fails to fully resolve  Actinic keratoses are very common on sites repeatedly exposed to the sun, especially the backs of the hands and the face  They are considered precancers and have a low risk of turning into squamous cell carcinoma  It is rare for a solitary actinic keratosis to evolve into a squamous cell carcinoma (SCC), but the risk is 10-15% when more than 10 actinic keratoses are present  A tender, thickened, ulcerated or enlarging actinic keratosis is suspicious of SCC  Actinic keratoses may be prevented by strict sun protection  If already present, keratoses may improve with a very high sun protection factor (50+) broad-spectrum sunscreen applied at least daily to affected areas, year-round  We recommend that sun protective clothing and hats and sunglasses be worn whenever possible    Note that you can make you own UPF 30 rate clothing using just your own "washing machine with a product called sun guard    There are several different options for treating actinic keratoses    Topical “medications such as 5-fluorouracil or Aldara  - good for field treatment ie treats what's seen and not seen    Cryotherapy - good for single spots but treats Catr[place]ita what we see” versus a field treatment    Photodynamic therapy - involves application of a light sensitizing medicine and then exposure to a special light, also a good field treatment      CHRONIC erythema and edema  Assessment and Plan:  Based on a thorough discussion of this condition and the management approach to it (including a comprehensive discussion of the known risks, side effects and potential benefits of treatment), the patient (family) agrees to implement the following specific plan:  Referral to Physical therapy; [facility]; [address] [facility]  Evaluate and treat consider lyphomedma     VERRUCA VULGARIS (\"Common Warts\")    Assessment and Plan:  Based on a thorough discussion of this condition and the management approach to it (including a comprehensive discussion of the known risks, side effects and potential benefits of treatment), the patient (family) agrees to implement the following specific plan:  Liquid nitrogen was applied for 10-12 seconds to the skin lesion and the expected blistering or scabbing reaction explained  Do not pick at the area  Patient reminded to expect hypopigmented scars from the procedure  Return if lesion fails to fully resolve  Verruca Vulgaris  A verruca is a common growth of the skin caused by infection by human papilloma virus (HPV)  There are many strains of the virus that cause different types of warts on the body  The virus infects the most superficial layers of the skin, causing increased production of skin cells and thickening  Warts can be spread through direct contact with infected skin and may spread to other parts of the body if scratched or picked       A verruca is more " "commonly called a \"wart  \" Warts are particularly common in school-aged children but can arise at any age  Patients who have a history of eczema are especially prone due to impaired skin barrier  Those taking immunosuppressive drugs or with HIV infections may experience prolonged symptoms despite treatment  Warts generally have a rough surface with a tiny black dot sometimes observed in the middle of each scaly spot  They can range in size from a small bump to large scaly growths  Common warts are often found on the backs of fingers or toes, around the nails, and on the knees  Plantar warts can grow inwardly on the soles of the feet causing pain  There are many possible ways to treat warts and sometimes several different treatments are needed to get the warts to go away completely  There is no single perfect treatment for warts, and successful treatment can take many months  In-office treatments usually require multiple visits, and include:  Cryotherapy  a cold spray with liquid nitrogen will destroy the infected cells but may lead to discomfort and blistering  It may also leave a permanent white senia or scar  Electrosurgery (curettage and cautery) can be used for large resistant warts which involves shaving the growth down and burning the base  It is performed under local anesthesia and may leave a permanent scar    Candida (“yeast”) antigen injections  These are extracts of the common yeast (Candida) that cannot cause an infection  The medication is injected into/under the wart  It is thought to stimulate the immune system to recognize the wart virus and attack it  Multiple injections are often needed about one month apart  There are also several at-home wart treatments:    Soak the warts in warm water for 5 minutes every night followed by gentle filing with a nail file or pumice stone      Topical salicylic acid or similar compounds work by removing the dead surface skin cells  Apply the " medicine directly to the wart, wait for it to dry completely, then cover with duct tape overnight   Repeat until the wart is gone, which can take 2-4 months  Do not use on the face or groin area   If the wart paint makes the skin sore, stop treatment until the discomfort has settled, then recommence as above  Take care to keep the chemical off normal skin  Podophyllin is a cytotoxic agent used in some products and must not be used in pregnancy or women considering pregnancy  Some prescription medications include   Topical retinoids (adapalene, tretinoin, tazarotene), 5-fluorouracil (Efudex) or imiquimod (Aldara) creams are sometimes used to treat flat warts or warts on the face and other sensitive anatomical areas  They are usually applied directly to the warts once a day for 2-4 months and can be irritating  These treatments should only be used as directed by your health care provider  Systemic treatment with oral cimetidine (Tagamet) may help boost the immune system against the wart virus in patients, some of the time  Initiation of cimetidine therapy should ONLY be done under the supervision of your health care provider, who can discuss possible side effects and drug-to-drug interactions of this specific treatment

## 2023-06-01 NOTE — PROGRESS NOTES
"[facility]     Patient Name: Genie Olivera  Encounter Date: 6/1/23    Have you been cared for by a [place] [address] Dermatologist in the last 3 years and, if so, which description applies to you? Yes  I have been here within the last 3 years, and my medical history has NOT changed since that time  I am FEMALE/of child-bearing potential     REVIEW OF SYSTEMS:  Have you recently had or currently have any of the following? · No changes in my recent health  PAST MEDICAL HISTORY:  Have you personally ever had or currently have any of the following? If \"YES,\" then please provide more detail  · No changes in my medical history  FAMILY HISTORY:  Any \"first degree relatives\" (parent, brother, sister, or child) with the following? • No changes in my family's known health  PATIENT EXPERIENCE:    • Do you want the Dermatologist to perform a COMPLETE skin exam today including a clinical examination under the \"bra and underwear\" areas? Yes  • If necessary, do we have your permission to call and leave a detailed message on your Preferred Phone number that includes your specific medical information? Yes      Allergies   Allergen Reactions   • Artane [Trihexyphenidyl] Other (See Comments)     Felt \"disconnected\", \"out if it\", shaky  Did not feel right    • Glycopyrrolate      Nose bleeds   • Pollen Extract       Current Outpatient Medications:   •  alendronate (FOSAMAX) 70 mg tablet, take 1 tablet by mouth EVERY 7 DAYS, Disp: 12 tablet, Rfl: 3  •  amantadine (SYMMETREL) 100 mg capsule, Take 1 capsule a day for 2 weeks and then take 1 capsule twice daily  , Disp: 60 capsule, Rfl: 4  •  carbidopa-levodopa (SINEMET)  mg per tablet, Take 1 tab three times daily  Last dose before dinner time  , Disp: 90 tablet, Rfl: 4  •  Cholecalciferol (VITAMIN D3) 2000 units TABS, Take 2,000 Units by mouth every morning, Disp: , Rfl:   •  IRON, FERROUS SULFATE, PO, Take by mouth, Disp: , Rfl:   •  levothyroxine " 50 mcg tablet, take 1 tablet by mouth every morning, Disp: 30 tablet, Rfl: 3  •  memantine (NAMENDA) 10 mg tablet, take 1 tablet by mouth once daily, Disp: 90 tablet, Rfl: 3  •  midodrine (PROAMATINE) 2 5 mg tablet, Take 1 tablet (2 5 mg total) by mouth 3 (three) times a day before meals, Disp: 90 tablet, Rfl: 3  •  Multiple Vitamins-Minerals (OCUVITE ADULT 50+ PO), Take by mouth in the morning, Disp: , Rfl:   •  Polyethylene Glycol 3350 (MIRALAX PO), Take by mouth as needed, Disp: , Rfl:   •  torsemide (DEMADEX) 20 mg tablet, Take torsemide 20 mg 1 tablet 3 days a week every Monday, Wednesday, Friday and an extra dose once a week as needed for leg swelling , Disp: 30 tablet, Rfl: 3  •  warfarin (Coumadin) 1 mg tablet, Pt to take 3 tablets per day or as directed, Disp: 90 tablet, Rfl: 3  •  Diclofenac Sodium (VOLTAREN) 1 %, Apply 2 g topically 4 (four) times a day as needed (Foot pain) for up to 15 days, Disp: 50 g, Rfl: 2  •  oxybutynin (DITROPAN-XL) 5 mg 24 hr tablet, Take 1 tablet (5 mg total) by mouth daily (Patient not taking: Reported on 4/20/2023), Disp: 30 tablet, Rfl: 3  •  warfarin (COUMADIN) 2 5 mg tablet, TAKE 1/2 TO 1 TABLET BY MOUTH DAILY OR AS DIRECTED BY PHYSICIAN (Patient taking differently: 2 5 mg Tue Thurs 2 5mg other days 1/2 tablet), Disp: 30 tablet, Rfl: 11          • Whom besides the patient is providing clinical information about today's encounter?   o NO ADDITIONAL HISTORIAN (patient alone provided history)    Physical Exam and Assessment/Plan by Diagnosis:    SEBORRHEIC DERMATITIS    Physical Exam:  • Anatomic Location Affected:  scalp  • Morphological Description:  erythema crusted erosions with scale  • Pertinent Positives:  • Pertinent Negatives:     Additional History of Present Condition:  Patient notes that scalp is very itchy ad dry    Assessment and Plan:  Based on a thorough discussion of this condition and the management approach to it (including a comprehensive discussion of the "known risks, side effects and potential benefits of treatment), the patient (family) agrees to implement the following specific plan:  FOR SCALP:  • Apply Mometasone 1 % solution nightly to scalp  • Wash with Ketoconazole 2$% shampoo every other day    FOR FACE:   • Apply hydrocortizone 2 5 % cream to face at night      Seborrheic Dermatitis   Seborrheic dermatitis is a common, chronic or relapsing form of eczema/dermatitis that mainly affects the sebaceous, gland-rich regions of the scalp, face, and trunk  There are infantile and adult forms of seborrhoeic dermatitis  It is sometimes associated with psoriasis and, in that clinical scenario, may be referred to as \"sebo-psoriasis  \"  Seborrheic dermatitis is also known as \"seborrheic eczema  \"  Dandruff (also called \"pityriasis capitis\") is an uninflamed form of seborrhoeic dermatitis  Dandruff presents as bran-like scaly patches scattered within hair-bearing areas of the scalp  In an infant, this condition may be referred to as \"cradle cap  \"  The cause of seborrheic dermatitis is not completely understood  It is associated with proliferation of various species of the skin commensal Malassezia, in its yeast (non-pathogenic) form  Its metabolites (such as the fatty acids oleic acid, malssezin, and indole-3-carbaldehyde) may cause an inflammatory reaction  Differences in skin barrier lipid content and function may account for individual presentations  Infantile Seborrheic Dermatitis  Infantile seborrheic dermatitis affects babies under the age of 1 months and usually resolves by 1012 months of age  Infantile seborrheic dermatitis causes \"cradle cap\" (diffuse, greasy scaling on scalp)  The rash may spread to affect armpit and groin folds (a type of \"napkin dermatitis\")  There may be associated salmon-pink colored patches that may flake or peel    The rash in this case is usually not especially itchy, so the baby often appears undisturbed by the rash, even when more " generalized  Adult Seborrheic Dermatitis  Adult seborrheic dermatitis tends to begin in late adolescence; prevalence is greatest in young adults and in the elderly  It is more common in males than in females  The following factors are sometimes associated with severe adult seborrheic dermatitis:  • Oily skin  • Familial tendency to seborrhoeic dermatitis or a family history of psoriasis  • Immunosuppression: organ transplant recipient, human immunodeficiency virus (HIV) infection and patients with lymphoma  • Neurological and psychiatric diseases: Parkinson disease, tardive dyskinesia, depression, epilepsy, facial nerve palsy, spinal cord injury and congenital disorders such as Down syndrome  • Treatment for psoriasis with psoralen and ultraviolet A (PUVA) therapy  • Lack of sleep  • Stressful events  In adults, seborrheic dermatitis may typically affect the scalp, face (creases around the nose, behind ears, within eyebrows) and upper trunk  Typical clinical features include:  • Winter flares, improving in summer following sun exposure  • Minimal itch most of the time  • Combination oily and dry mid-facial skin  • Ill-defined localized scaly patches or diffuse scale in the scalp  • Blepharitis; scaly red eyelid margins  • Wyatt-pink, thin, scaly, and ill-defined plaques in skin folds on both sides of the face  • Petal or ring-shaped flaky patches on hair-line and on anterior chest  • Rash in armpits, under the breasts, in the groin folds and genital creases  • Superficial folliculitis (inflamed hair follicles) on cheeks and upper trunk    Seborrheic dermatitis is diagnosed by its clinical appearance and behavior  Skin biopsy may be helpful but is rarely necessary to make this diagnosis        LENTIGO     Physical Exam:  • Anatomic Location Affected:  Trunk, upper and lower extremities and face   • Morphological Description:  Several tan brown macules   • Pertinent Positives:  • Pertinent Negatives:     Additional History of Present Condition:  Patient is here for full skin exam      Assessment and Plan:  Based on a thorough discussion of this condition and the management approach to it (including a comprehensive discussion of the known risks, side effects and potential benefits of treatment), the patient (family) agrees to implement the following specific plan:  • Monitor for changes   • When outside we recommend using a wide brim hat, sunglasses, long sleeve and pants, sunscreen with SPF 53+ with reapplication every 2 hours, or SPF specific clothing   • Routine skin exam recommended yearly      What is a lentigo? A lentigo is a pigmented flat or slightly raised lesion with a clearly defined edge  Unlike an ephelis (freckle), it does not fade in the winter months  There are several kinds of lentigo  The name lentigo originally referred to its appearance resembling a small lentil  The plural of lentigo is lentigines, although “lentigos” is also in common use      Who gets lentigines? Lentigines can affect males and females of all ages and races  Solar lentigines are especially prevalent in fair skinned adults  Lentigines associated with syndromes are present at birth or arise during childhood      What causes lentigines? Common forms of lentigo are due to exposure to ultraviolet radiation:  • Sun damage including sunburn   • Indoor tanning   • Phototherapy, especially photochemotherapy (PUVA)     Ionizing radiation, eg radiation therapy, can also cause lentigines  Several familial syndromes associated with widespread lentigines originate from mutations in Dino-MAP kinase, mTOR signaling and PTEN pathways      What are the clinical features of lentigines? Lentigines have been classified into several different types depending on what they look like, where they appear on the body, causative factors, and whether they are associated to other diseases or conditions    Lentigines may be solitary or more often, multiple   Most lentigines are smaller than 5 mm in diameter      Lentigo simplex  • A precursor to junctional naevus   • Arises during childhood and early adult life   • Found on trunk and limbs   • Small brown round or oval macule or thin plaque   • Jagged or smooth edge   • May have a dry surface   • May disappear in time  Solar lentigo  • A precursor to seborrhoeic keratosis   • Found on chronically sun exposed sites such as hands, face, lower legs   • May also follow sunburn to shoulders   • Yellow, light or dark brown regular or irregular macule or thin plaque   • May have a dry surface   • Often has moth-eaten outline   • Can slowly enlarge to several centimeters in diameter   • May disappear, often through the process known as lichenoid keratosis   • When atypical in appearance, may be difficult to distinguish from melanoma in situ  Ink spot lentigo  • Also known as reticulated lentigo   • Few in number compared to solar lentigines   • Follows sunburn in very fair skinned individuals   • Dark brown to black irregular ink spot-like macule  PUVA lentigo  • Similar to ink spot lentigo but follows photochemotherapy (PUVA)   • Location anywhere exposed to PUVA  Tanning bed lentigo  • Similar to ink spot lentigo but follows indoor tanning   • Location anywhere exposed to tanning bed  Radiation lentigo  • Occurs in site of irradiation (accidental or therapeutic)   • Associated with late-stage radiation dermatitis: epidermal atrophy, subcutaneous fibrosis, keratosis, telangiectasias  Melanotic macule  • Mucosal surfaces or adjacent glabrous skin eg lip, vulva, penis, anus   • Light to dark brown   • Also called mucosal melanosis  Generalised lentigines  • Found on any exposed or covered site from early childhood   • Small macules may merge to form larger patches   • Not associated with a syndrome   • Also called lentigines profusa, multiple lentigines  Agminated lentigines  • Naevoid eruption of lentigos confined to a single segmental area   • Sharp demarcation in midline   • May have associated neurological and developmental abnormalities  Patterned lentigines  • Inherited tendency to lentigines on face, lips, buttocks, palms, soles   • Recognised mainly in people of  ethnicity  Centrofacial neurodysraphic lentiginosis  Associated with mental retardation  Lentiginosis syndromes  • Syndromes include LEOPARD/Dariusz, Peutz-Jeghers, Laugier-Hunziker, Moynahan, Xeroderma pigmentosum, myxoma syndromes (TRIVEDI, NAME, Gauthier), Ruvalcaba-Myhre-Nicole, Bannayan-Zonnana syndrome, Cowden disease (multiple hamartoma syndrome )   • Inheritance is autosomal dominant; sporadic cases common   • Widespread lentigines present at birth or arise in early childhood   • Associated with neural, endocrine, and mesenchymal tumors     How is the diagnosis made? Lentigines are usually diagnosed clinically by their typical appearance  Concern regarding possibility of melanoma may lead to:  • Dermatoscopy   • Diagnostic excision biopsy     Histopathology of a lentigo shows:  • Thickened epidermis   • An increased number of melanocytes along the basal layer of epidermis   • Unlike junctional melanocytic naevus, there are no nests of melanocytes   • Increased melanin pigment within the keratinocytes   • Additional features depending on type of lentigo     In contrast, an ephelis (freckle) shows sun-induced increased melanin within the keratinocytes, without an increase in number of cells  What is the treatment for lentigines? Most lentigines are left alone  Attempts to lighten them may not be successful   The following approaches are used:  • SPF 50+ broad-spectrum sunscreen   • Hydroquinone bleaching cream   • Alpha hydroxy acids   • Vitamin C   • Retinoids   • Azelaic acid   • Chemical peels  Individual lesions can be permanently removed using:  • Cryotherapy   • Intense pulsed light   • Pigment lasers     How can lentigines be "prevented? Lentigines associated with exposure ultraviolet radiation can be prevented by very careful sun protection  Clothing is more successful at preventing new lentigines than are sunscreens      What is the outlook for lentigines? Lentigines usually persist  They may increase in number with age and sun exposure  Some in sun-protected sites may fade and disappear      SHINE ANGIOMAS     Physical Exam:  • Anatomic Location Affected:  Trunk   • Morphological Description:  Scattered cherry red, 1-4 mm papules  • Pertinent Positives:  • Pertinent Negatives:     Additional History of Present Condition:  Patient is here for full skin exam      Assessment and Plan:  Based on a thorough discussion of this condition and the management approach to it (including a comprehensive discussion of the known risks, side effects and potential benefits of treatment), the patient (family) agrees to implement the following specific plan:  • Reassured benign      Assessment and Plan:    Cherry angioma, also known as Noemi Allegra spots, are benign vascular skin lesions  A \"cherry angioma\" is a firm red, blue or purple papule, 0 1-1 cm in diameter  When thrombosed, they can appear black in colour until evaluated with a dermatoscope when the red or purple colour is more easily seen  Cherry angioma may develop on any part of the body but most often appear on the scalp, face, lips and trunk  An angioma is due to proliferating endothelial cells; these are the cells that line the inside of a blood vessel      Angiomas can arise in early life or later in life; the most common type of angioma is a cherry angioma  Cherry angiomas are very common in males and females of any age or race  They are more noticeable in white skin than in skin of colour  They markedly increase in number from about the age of 36  There may be a family history of similar lesions   Eruptive cherry angiomas have been rarely reported to be associated with " internal malignancy  The cause of angiomas is unknown  Genetic analysis of cherry angiomas has shown that they frequently carry specific somatic missense mutations in the GNAQ and GNA11 (Q209H) genes, which are involved in other vascular and melanocytic proliferations      Cherry angioma is usually diagnosed clinically and no investigations are necessary for the majority of lesions  It has a characteristic red-clod or lobular pattern on dermatoscopy (called lacunar pattern using conventional pattern analysis)  When there is uncertainty about the diagnosis, a biopsy may be performed  The angioma is composed of venules in a thickened papillary dermis  Collagen bundles may be prominent between the lobules      Cherry angiomas are harmless, so they do not usually have to be treated  Occasionally, they are removed to exclude a malignant skin lesion such as a nodular melanoma or because they are irritated or bleeding (and a subsequent risk for infection)  To decrease friction over the lesions, we recommend Neutrogena Daily Defense SPF 50+ at least 3 times a day      ACTINIC KERATOSIS    Physical Exam:  • Anatomic Location Affected:  Left shin  • Morphological Description:  Red scaly macule    Additional History of Present Condition:  Discovered upon skin exam    Assessment and Plan:  Based on a thorough discussion of this condition and the management approach to it (including a comprehensive discussion of the known risks, side effects and potential benefits of treatment), the patient (family) agrees to implement the following specific plan:    • Liquid nitrogen was applied for 10-12 seconds to the skin lesion and the expected blistering or scabbing reaction explained  Do not pick at the area  Patient reminded to expect hypopigmented scars from the procedure  Return if lesion fails to fully resolve        Actinic keratoses are very common on sites repeatedly exposed to the sun, especially the backs of the hands and the face   They are considered precancers and have a low risk of turning into squamous cell carcinoma  It is rare for a solitary actinic keratosis to evolve into a squamous cell carcinoma (SCC), but the risk is 10-15% when more than 10 actinic keratoses are present  A tender, thickened, ulcerated or enlarging actinic keratosis is suspicious of SCC  Actinic keratoses may be prevented by strict sun protection  If already present, keratoses may improve with a very high sun protection factor (50+) broad-spectrum sunscreen applied at least daily to affected areas, year-round  We recommend that sun protective clothing and hats and sunglasses be worn whenever possible  Note that you can make you own UPF 30 rate clothing using just your own washing machine with a product called sun guard    There are several different options for treating actinic keratoses    • Topical “medications such as 5-fluorouracil or Aldara  - good for field treatment ie treats what's seen and not seen    • Cryotherapy - good for single spots but treats [place] what we see” versus a field treatment    • Photodynamic therapy - involves application of a light sensitizing medicine and then exposure to a special light, also a good field treatment    PROCEDURE:  DESTRUCTION OF PRE-MALIGNANT LESIONS  After a thorough discussion of treatment options and risk/benefits/alternatives (including but not limited to local pain, scarring, dyspigmentation, blistering, and possible superinfection), verbal and written consent were obtained and the aforementioned lesions were treated on with cryotherapy using liquid nitrogen x 1 cycle for 5-10 seconds  • TOTAL NUMBER of 1 pre-malignant lesions were treated today on the ANATOMIC LOCATION: left shin  The patient tolerated the procedure well, and after-care instructions were provided        CHRONIC erythema and edema  Physical Exam:  • Anatomic Location Affected:  LEFT LEG  • Morphological Description:  Pitting edema "with mild redness, no scale  • Pertinent Positives:  • Pertinent Negatives: Additional History of Present Condition:  Patient  Has hx of congestive heart failure; patient cant not wear support stockings, improves with diuretics but never goes away completely  Assessment and Plan:  Based on a thorough discussion of this condition and the management approach to it (including a comprehensive discussion of the known risks, side effects and potential benefits of treatment), the patient (family) agrees to implement the following specific plan:  • Referral to Physical therapy; Gaylin Habermann; [facility]  • Evaluate and treat consider lyphomedma     VERRUCA VULGARIS (\"Common Warts\")    Physical Exam:  • Anatomic Location Affected:  Right forarm  • Morphological Description:  verruca vulgaris  • Pertinent Positives:  • Pertinent Negatives: Additional History of Present Condition:  Discovered upon skin exam    Assessment and Plan:  Based on a thorough discussion of this condition and the management approach to it (including a comprehensive discussion of the known risks, side effects and potential benefits of treatment), the patient (family) agrees to implement the following specific plan:  • Liquid nitrogen was applied for 10-12 seconds to the skin lesion and the expected blistering or scabbing reaction explained  Do not pick at the area  Patient reminded to expect hypopigmented scars from the procedure  Return if lesion fails to fully resolve  Verruca Vulgaris  A verruca is a common growth of the skin caused by infection by human papilloma virus (HPV)  There are many strains of the virus that cause different types of warts on the body  The virus infects the most superficial layers of the skin, causing increased production of skin cells and thickening  Warts can be spread through direct contact with infected skin and may spread to other parts of the body if scratched or picked       A verruca is more commonly " "called a \"wart  \" Warts are particularly common in school-aged children but can arise at any age  Patients who have a history of eczema are especially prone due to impaired skin barrier  Those taking immunosuppressive drugs or with HIV infections may experience prolonged symptoms despite treatment  Warts generally have a rough surface with a tiny black dot sometimes observed in the middle of each scaly spot  They can range in size from a small bump to large scaly growths  Common warts are often found on the backs of fingers or toes, around the nails, and on the knees  Plantar warts can grow inwardly on the soles of the feet causing pain  There are many possible ways to treat warts and sometimes several different treatments are needed to get the warts to go away completely  There is no single perfect treatment for warts, and successful treatment can take many months  In-office treatments usually require multiple visits, and include:  1) Cryotherapy  a cold spray with liquid nitrogen will destroy the infected cells but may lead to discomfort and blistering  It may also leave a permanent white senia or scar  2) Electrosurgery (curettage and cautery) can be used for large resistant warts which involves shaving the growth down and burning the base  It is performed under local anesthesia and may leave a permanent scar    3) Candida (“yeast”) antigen injections  These are extracts of the common yeast (Candida) that cannot cause an infection  The medication is injected into/under the wart  It is thought to stimulate the immune system to recognize the wart virus and attack it  Multiple injections are often needed about one month apart  There are also several at-home wart treatments:    1) Soak the warts in warm water for 5 minutes every night followed by gentle filing with a nail file or pumice stone  2) Topical salicylic acid or similar compounds work by removing the dead surface skin cells  a   Apply " the medicine directly to the wart, wait for it to dry completely, then cover with duct tape overnight   b  Repeat until the wart is gone, which can take 2-4 months  c  Do not use on the face or groin area   d  If the wart paint makes the skin sore, stop treatment until the discomfort has settled, then recommence as above   e  Take care to keep the chemical off normal skin  3) Podophyllin is a cytotoxic agent used in some products and must not be used in pregnancy or women considering pregnancy  4) Some prescription medications include   a  Topical retinoids (adapalene, tretinoin, tazarotene), 5-fluorouracil (Efudex) or imiquimod (Aldara) creams are sometimes used to treat flat warts or warts on the face and other sensitive anatomical areas  They are usually applied directly to the warts once a day for 2-4 months and can be irritating  These treatments should only be used as directed by your health care provider  b  Systemic treatment with oral cimetidine (Tagamet) may help boost the immune system against the wart virus in patients, some of the time  Initiation of cimetidine therapy should ONLY be done under the supervision of your health care provider, who can discuss possible side effects and drug-to-drug interactions of this specific treatment  PROCEDURE:  DESTRUCTION OF BENIGN LESIONS WITH CRYOTHERAPY  After a thorough discussion of treatment options and risk/benefits/alternatives (including but not limited to local pain, scarring, dyspigmentation, blistering, and possible superinfection), verbal and written consent were obtained and the aforementioned lesions were treated on with cryotherapy using liquid nitrogen x 1 cycle for 5-10 seconds  TOTAL NUMBER of 1 lesions were treated today on the ANATOMIC LOCATION: right forearm  The patient tolerated the procedure well, and after-care instructions were provided        Scribe Attestation    I,:  Gerald Rodas am acting as a scribe while in the presence of the attending physician :       I,:  Ana Rosa Hackett MD personally performed the services described in this documentation    as scribed in my presence :

## 2023-06-02 ENCOUNTER — TELEPHONE (OUTPATIENT)
Dept: CARDIOLOGY CLINIC | Facility: CLINIC | Age: 83
End: 2023-06-02

## 2023-06-02 NOTE — RESULT ENCOUNTER NOTE
Message sent to patient and  on 6/1/2023:      Mr  and Mrs Anastasiia Flores,    Your potassium continues to run much too low at 3 1 with moderate reduction in kidney function  I have taken the liberty of ordering spironolactone 25 mg 1/2 tablet daily, which should help bring up the potassium level, though the dose may have to be increased  I have also ordered a repeat basic metabolic panel blood test to be done on or just after  6/22/2023 to recheck the potassium and kidney function  Please get that done as close as possible to that date  Use a pill cutter to cut your spironolactone tablets in half  Let us know if there are any additional questions or concerns

## 2023-06-02 NOTE — TELEPHONE ENCOUNTER
----- Message from Colin Crowe MD sent at 6/1/2023  9:51 PM EDT -----  Message sent to patient and  on 6/1/2023:      Mr  and Mrs Beryle Ply,    Your potassium continues to run much too low at 3 1 with moderate reduction in kidney function  I have taken the liberty of ordering spironolactone 25 mg 1/2 tablet daily, which should help bring up the potassium level, though the dose may have to be increased  I have also ordered a repeat basic metabolic panel blood test to be done on or just after  6/22/2023 to recheck the potassium and kidney function  Please get that done as close as possible to that date  Use a pill cutter to cut your spironolactone tablets in half  Let us know if there are any additional questions or concerns

## 2023-06-07 ENCOUNTER — OFFICE VISIT (OUTPATIENT)
Dept: GASTROENTEROLOGY | Facility: CLINIC | Age: 83
End: 2023-06-07
Payer: MEDICARE

## 2023-06-07 VITALS
BODY MASS INDEX: 25.44 KG/M2 | DIASTOLIC BLOOD PRESSURE: 69 MMHG | WEIGHT: 149 LBS | SYSTOLIC BLOOD PRESSURE: 112 MMHG | HEART RATE: 69 BPM | HEIGHT: 64 IN

## 2023-06-07 DIAGNOSIS — R13.10 DYSPHAGIA, UNSPECIFIED TYPE: Primary | ICD-10-CM

## 2023-06-07 DIAGNOSIS — G20 PARKINSON'S DISEASE (HCC): ICD-10-CM

## 2023-06-07 DIAGNOSIS — Z79.01 WARFARIN ANTICOAGULATION: ICD-10-CM

## 2023-06-07 DIAGNOSIS — I48.20 ATRIAL FIBRILLATION, CHRONIC (HCC): ICD-10-CM

## 2023-06-07 PROCEDURE — 99204 OFFICE O/P NEW MOD 45 MIN: CPT | Performed by: INTERNAL MEDICINE

## 2023-06-07 NOTE — PROGRESS NOTES
[facility] - Outpatient Consultation  Lillian Vega 79 y o  female MRN: [id number]  Encounter: [id number]          ASSESSMENT AND PLAN:      1  Dysphagia, unspecified type  -     Ambulatory Referral to Gastroenterology  -     FL barium swallow; Future; Expected date: 06/07/2023    2  Warfarin anticoagulation    3  Parkinson's disease ([address] )    4  Atrial fibrillation, chronic (HCC)      History of oropharyngeal dysphagia, in the past same video barium swallow and speech evaluation suggestive of esophageal dysmotility  Patient denies any weight loss GI bleeding or excessive coughing spells  This may be spasms dysmotility, will plan barium swallow with the pill for any stricture or luminal pathology  In the meanwhile soft diet chew the food well and eat slowly  History of Parkinson's A-fib metallic aortic valve, on Coumadin, sees Dr May Dominguez  Discussed with the patient and her , further plans of possible EGD/dilatation after barium swallow  ______________________________________________________________________    HPI:     Patient came in for evaluation of her dysphagia, she has sensation of food sticking in the throat upper chest, denies any nausea vomiting regurgitation GI bleeding hematemesis melena  Denies any history of bronchitis pneumonias, occasional cough  She has had speech and modified's and a barium swallow in the past with impression of esophageal dysmotility  Patient does have a history of Parkinson's  Bowels are irregular, sometimes pebble-like stools no apparent blood  Last colonoscopy as per patient and her  probably 5 years ago, I could not find those records  No melena hematochezia  Does have history of mitral valve metallic replacement 3199, A-fib on Coumadin  Diet medications more than 10 systems reviewed  REVIEW OF SYSTEMS:    CONSTITUTIONAL: Denies any fever, chills, rigors, and weight loss    HEENT: No earache or tinnitus  CARDIOVASCULAR: No chest pain or palpitations  RESPIRATORY: Denies any cough, hemoptysis, shortness of breath or dyspnea on exertion  GASTROINTESTINAL: As noted in the History of Present Illness  GENITOURINARY: Denies any hematuria or dysuria  NEUROLOGIC: No dizziness or vertigo  MUSCULOSKELETAL: Denies any joint swellings  SKIN: Denies skin rashes or itching  ENDOCRINE: Denies excessive thirst  Denies intolerance to heat or cold  PSYCHOSOCIAL: Denies depression or anxiety  Denies any recent memory loss  Historical Information   Past Medical History:   Diagnosis Date   • Anal fissure    • Arthritis     osteo joints   • Asthma with acute exacerbation    • Blepharitis    • Breast lump    • Chalazion    • CHF, chronic (HCC)    • Difficulty walking    • Disease of thyroid gland    • Drooling    • Dysphagia    • Fall 05/04/2018   • Fibromyalgia, primary    • Glaucoma     Normal pressure   • History of transfusion 1996   • Hordeolum externum    • Hx of transient ischemic attack (TIA)    • Hypophonia    • Infectious viral hepatitis     Hep C dx 1996    • Lyme carditis    • Murmur, cardiac    • Parkinsons (HCC)    • Rotator cuff tendinitis    • Seasonal allergies    • Urinary frequency      Past Surgical History:   Procedure Laterality Date   • BREAST BIOPSY Right 2018    benign   • BREAST CYST EXCISION Left     benign   • BREAST SURGERY N/A     benign, calcium   • CARDIAC SURGERY  1990    mitral valve replacement   • CATARACT EXTRACTION Right 2015   • CATARACT EXTRACTION Left 2014   • CHEST TUBE INSERTION Right     post pleural effusion   • CHOLECYSTECTOMY  2000    lap   • HYSTERECTOMY  1973    partial   • SD ARTHROCENTESIS ASPIR&/INJ MAJOR JT/BURSA W/O US Left 3/19/2021    Procedure: Sacroiliac Joint Injection ( 40419 );   Surgeon: Sravani Madsen MD;  Location: [facility] MAIN OR;  Service: Pain Management    • SD COLSC FLX W/RMVL OF TUMOR POLYP LESION SNARE TQ N/A 7/18/2017    Procedure: "COLONOSCOPY and biopsy ;  Surgeon: Autumn John MD;  Location: [facility] GI LAB; Service: Gastroenterology   • SKIN BIOPSY      pre cancerous on face   • TONSILLECTOMY     • US GUIDED BREAST BIOPSY RIGHT COMPLETE Right 2018     Social History   Social History     Substance and Sexual Activity   Alcohol Use Not Currently     Social History     Substance and Sexual Activity   Drug Use No     Social History     Tobacco Use   Smoking Status Former   • Packs/day: 0 10   • Years: 10 00   • Total pack years: 1 00   • Types: Cigarettes   • Quit date:    • Years since quittin 4   Smokeless Tobacco Never     Family History   Problem Relation Age of Onset   • Heart disease Mother         murmur Cardiomegaly age 64   • Pneumonia Father    • Heart disease Brother         mitrsl valve   • Parkinsonism Brother    • Arthritis Brother    • Lupus Daughter    • Diabetes Daughter    • Depression Daughter        Meds/Allergies       Current Outpatient Medications:   •  alendronate (FOSAMAX) 70 mg tablet  •  amantadine (SYMMETREL) 100 mg capsule  •  carbidopa-levodopa (SINEMET)  mg per tablet  •  Cholecalciferol (VITAMIN D3) 2000 units TABS  •  hydrocortisone 2 5 % lotion  •  IRON, FERROUS SULFATE, PO  •  ketoconazole (NIZORAL) 2 % shampoo  •  levothyroxine 50 mcg tablet  •  memantine (NAMENDA) 10 mg tablet  •  midodrine (PROAMATINE) 2 5 mg tablet  •  mometasone (ELOCON) 0 1 % lotion  •  Multiple Vitamins-Minerals (OCUVITE ADULT 50+ PO)  •  Polyethylene Glycol 3350 (MIRALAX PO)  •  spironolactone (ALDACTONE) 25 mg tablet  •  torsemide (DEMADEX) 20 mg tablet  •  warfarin (Coumadin) 1 mg tablet  •  warfarin (COUMADIN) 2 5 mg tablet  •  Diclofenac Sodium (VOLTAREN) 1 %  •  oxybutynin (DITROPAN-XL) 5 mg 24 hr tablet    Allergies   Allergen Reactions   • Artane [Trihexyphenidyl] Other (See Comments)     Felt \"disconnected\", \"out if it\", shaky   Did not feel right    • Glycopyrrolate      Nose bleeds   • Pollen Extract  " "          Objective     Blood pressure 112/69, pulse 69, height 5' 4\" (1 626 m), weight 67 6 kg (149 lb)  Body mass index is 25 58 kg/m²  PHYSICAL EXAM:      General Appearance:   Alert, cooperative, no distress   HEENT:   Normocephalic, atraumatic, anicteric  Neck:  Supple, symmetrical, trachea midline   Lungs:   Clear to auscultation bilaterally; no rales, rhonchi or wheezing; respirations unlabored    Heart[de-identified]   Regular rate and rhythm; no murmur  Abdomen:   Soft, non-tender, non-distended; normal bowel sounds; no masses, no organomegaly    Genitalia:   Deferred    Rectal:   Deferred    Extremities:  No cyanosis, clubbing or edema    Skin:  No jaundice, rashes, or lesions    Lymph nodes:  No palpable cervical lymphadenopathy        Lab Results:   No visits with results within 1 Day(s) from this visit  Latest known visit with results is:   Office Visit on 05/31/2023   Component Date Value   • WBC 06/01/2023 7 31    • RBC 06/01/2023 3 38 (L)    • Hemoglobin 06/01/2023 11 4 (L)    • Hematocrit 06/01/2023 34 5 (L)    • MCV 06/01/2023 102 (H)    • MCH 06/01/2023 33 7    • MCHC 06/01/2023 33 0    • RDW 06/01/2023 14 0    • Platelets 88/19/7873 165    • MPV 06/01/2023 12 7    • Sodium 06/01/2023 138    • Potassium 06/01/2023 3 1 (L)    • Chloride 06/01/2023 111 (H)    • CO2 06/01/2023 29    • ANION GAP 06/01/2023 -2 (L)    • BUN 06/01/2023 25    • Creatinine 06/01/2023 1 31 (H)    • Glucose, Fasting 06/01/2023 117 (H)    • Calcium 06/01/2023 10 3 (H)    • eGFR 06/01/2023 37          Radiology Results:   No results found    "

## 2023-06-08 ENCOUNTER — TELEPHONE (OUTPATIENT)
Dept: NEUROLOGY | Facility: CLINIC | Age: 83
End: 2023-06-08

## 2023-06-08 NOTE — TELEPHONE ENCOUNTER
Received VM transcription: Oh hi, this is Chen Hernandez calling on behalf of Christian Chapman, my wife  Date of birth 1940  She visited Dr Palomo Perez about 2 weeks ago  Amantadine was prescribed, another med change  Dr Palomo Perez asked her give her a call in about 2 weeks time to see how the Amantadine was working  She's currently on one capsule  There's a possibility that it could be raised to two, it seems to be working very well  The extraneous movements are largely gone  Call us back when you have an opportunity  Thank you   --------------------------------------------------------    Spoke with pt and advised pt's  that Dr Palomo Perez would be informed of same  Says it's working very well, jerking motions have stopped almost entirely  Sleeping is a little better  Asking if pt should proceed with increasing to BID or stay at 1 cap daily  Dr Palomo Perez - Please advise  Best cb [phone], ok to leave detailed message

## 2023-06-09 ENCOUNTER — APPOINTMENT (OUTPATIENT)
Dept: LAB | Facility: CLINIC | Age: 83
End: 2023-06-09
Payer: MEDICARE

## 2023-06-09 NOTE — TELEPHONE ENCOUNTER
Called [phone] and advised pt of all of the below  She verbalized clear understanding of all instructions  Erin Moore MD  to Rita Das RN        6/8/23  4:26 PM  Yes if tolerated  She may surely increase it to 1 capsule twice daily

## 2023-06-12 ENCOUNTER — ANTICOAG VISIT (OUTPATIENT)
Dept: CARDIOLOGY CLINIC | Facility: CLINIC | Age: 83
End: 2023-06-12

## 2023-06-12 DIAGNOSIS — Z95.2 H/O MITRAL VALVE REPLACEMENT WITH MECHANICAL VALVE: Primary | ICD-10-CM

## 2023-06-12 DIAGNOSIS — I48.20 ATRIAL FIBRILLATION, CHRONIC (HCC): ICD-10-CM

## 2023-06-14 ENCOUNTER — HOSPITAL ENCOUNTER (OUTPATIENT)
Dept: RADIOLOGY | Facility: HOSPITAL | Age: 83
Discharge: HOME | End: 2023-06-14
Attending: INTERNAL MEDICINE
Payer: MEDICARE

## 2023-06-14 DIAGNOSIS — R13.10 DYSPHAGIA, UNSPECIFIED TYPE: ICD-10-CM

## 2023-06-14 PROCEDURE — 74220 X-RAY XM ESOPHAGUS 1CNTRST: CPT

## 2023-06-15 NOTE — RESULT ENCOUNTER NOTE
Please call the patient regarding her abnormal result  Patient has some esophageal muscles distal motility  This can happen with age  No blockage or mass tumor seen  If patient has significant difficulty with swallowing, we can schedule an EGD with dilatation    Follow up in my office as needed
rolling walker

## 2023-06-20 ENCOUNTER — OFFICE VISIT (OUTPATIENT)
Dept: FAMILY MEDICINE CLINIC | Facility: CLINIC | Age: 83
End: 2023-06-20
Payer: MEDICARE

## 2023-06-20 VITALS
DIASTOLIC BLOOD PRESSURE: 60 MMHG | SYSTOLIC BLOOD PRESSURE: 102 MMHG | TEMPERATURE: 98 F | HEIGHT: 64 IN | RESPIRATION RATE: 16 BRPM | BODY MASS INDEX: 25.68 KG/M2 | OXYGEN SATURATION: 98 % | WEIGHT: 150.4 LBS | HEART RATE: 86 BPM

## 2023-06-20 DIAGNOSIS — R53.1 WEAKNESS: ICD-10-CM

## 2023-06-20 DIAGNOSIS — I50.42 CHRONIC COMBINED SYSTOLIC AND DIASTOLIC HEART FAILURE (HCC): Primary | ICD-10-CM

## 2023-06-20 DIAGNOSIS — E03.9 HYPOTHYROIDISM, UNSPECIFIED TYPE: ICD-10-CM

## 2023-06-20 DIAGNOSIS — R05.1 ACUTE COUGH: ICD-10-CM

## 2023-06-20 DIAGNOSIS — I48.20 ATRIAL FIBRILLATION, CHRONIC (HCC): ICD-10-CM

## 2023-06-20 PROBLEM — Z86.16 HISTORY OF COVID-19: Status: RESOLVED | Noted: 2023-01-31 | Resolved: 2023-06-20

## 2023-06-20 PROCEDURE — 99214 OFFICE O/P EST MOD 30 MIN: CPT | Performed by: FAMILY MEDICINE

## 2023-06-20 RX ORDER — LEVOTHYROXINE SODIUM 0.05 MG/1
50 TABLET ORAL EVERY MORNING
Qty: 90 TABLET | Refills: 3 | Status: SHIPPED | OUTPATIENT
Start: 2023-06-20
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
## 2023-06-20 NOTE — PROGRESS NOTES
Chief Complaint   Patient presents with   • Transition of Care Management     TCM Call     Date and time call was made  5/22/2023 11:57 AM    Hospital care reviewed  Records reviewed    Patient was hospitialized at  [address]    Date of Admission  05/02/23    Date of discharge  05/19/23    Diagnosis  weakness gait disturbance d/c from [facility] 5-19-23    Disposition  Home    Were the patients medications reviewed and updated  Yes    Current Symptoms  Weakness    Cough Severity  Mild    Weakness severity  Mild      TCM Call     Post hospital issues  None    Should patient be enrolled in anticoag monitoring? No    Scheduled for follow up? Yes    Referrals needed  Dr Monika Márquez, and Dr Dowell Most    Did you obtain your prescribed medications  Yes    Do you need help managing your prescriptions or medications  Yes    Is transportation to your appointment needed  Yes    I have advised the patient to call PCP with any new or worsening symptoms  [id number]    Living Arrangements  Spouse or Significiant other    Are you recieving any outpatient services  No    Are you recieving home care services  No    Types of home care services  Home PT    Are you using any community resources  No    Current waiver services  No    Have you fallen in the last 12 months  No    Interperter language line needed  No    Comments  needs OP physical therapy           Patient ID: My Yin is a 79 y o  female  HPI   pt is seeing for ER stay for 12 h due to worsening weakness -   DX with clinical dehydration  and possible UTI      The following portions of the patient's history were reviewed and updated as appropriate: allergies, current medications, past family history, past medical history, past social history, past surgical history and problem list     Review of Systems   Constitutional: Positive for fatigue  HENT: Negative  Respiratory: Negative  Cardiovascular: Positive for leg swelling   Negative for chest pain and palpitations  Gastrointestinal: Negative  Genitourinary: Negative  Musculoskeletal: Positive for gait problem (using a walker )  Neurological: Positive for tremors and weakness  Negative for dizziness, light-headedness and headaches  Psychiatric/Behavioral: Negative  Current Outpatient Medications   Medication Sig Dispense Refill   • alendronate (FOSAMAX) 70 mg tablet take 1 tablet by mouth EVERY 7 DAYS 12 tablet 3   • Cholecalciferol (VITAMIN D3) 2000 units TABS Take 2,000 Units by mouth every morning     • memantine (NAMENDA) 10 mg tablet take 1 tablet by mouth once daily (Patient taking differently: 2 tablets 2 (two) times a day) 90 tablet 3   • midodrine (PROAMATINE) 2 5 mg tablet Take 1 tablet (2 5 mg total) by mouth 3 (three) times a day before meals 90 tablet 3   • Multiple Vitamins-Minerals (OCUVITE ADULT 50+ PO) Take by mouth in the morning     • torsemide (DEMADEX) 20 mg tablet Take torsemide 20 mg 1 tablet 3 days a week every Monday, Wednesday, Friday and an extra dose once a week as needed for leg swelling  30 tablet 3   • warfarin (COUMADIN) 2 5 mg tablet TAKE 1/2 TO 1 TABLET BY MOUTH DAILY OR AS DIRECTED BY PHYSICIAN (Patient taking differently: 2 5 mg Tue Thurs 2 5mg other days 1/2 tablet) 30 tablet 11   • amantadine (SYMMETREL) 100 mg capsule Take 1 capsule a day for 2 weeks and then take 1 capsule twice daily  60 capsule 4   • carbidopa-levodopa (SINEMET)  mg per tablet Take 1 tab three times daily  Last dose before dinner time  90 tablet 4   • Diclofenac Sodium (VOLTAREN) 1 % Apply 2 g topically 4 (four) times a day as needed (Foot pain) for up to 15 days 50 g 2   • hydrocortisone 2 5 % lotion To face once daily for 2 weeks for seborrhea 59 mL 1   • IRON, FERROUS SULFATE, PO Take by mouth     • ketoconazole (NIZORAL) 2 % shampoo Apply 1 application   topically 2 (two) times a week Wash Every other day to scalp 120 mL 1   • levothyroxine 50 mcg tablet Take 1 tablet "(50 mcg total) by mouth every morning 90 tablet 3   • mometasone (ELOCON) 0 1 % lotion Apply topically daily 60 mL 0   • oxybutynin (DITROPAN-XL) 5 mg 24 hr tablet Take 1 tablet (5 mg total) by mouth daily (Patient not taking: Reported on 4/20/2023) 30 tablet 3   • Polyethylene Glycol 3350 (MIRALAX PO) Take by mouth as needed     • spironolactone (ALDACTONE) 25 mg tablet Take 0 5 tablets (12 5 mg total) by mouth daily 30 tablet 5   • warfarin (Coumadin) 1 mg tablet Pt to take 3 tablets per day or as directed 90 tablet 3     No current facility-administered medications for this visit  Objective:    /64 (BP Location: Left arm, Patient Position: Sitting, Cuff Size: Standard)   Pulse 76   Temp 98 1 °F (36 7 °C)   Resp 18   Ht 5' 4\" (1 626 m)   Wt 67 6 kg (149 lb)   BMI 25 58 kg/m²        Physical Exam  Constitutional:       Appearance: She is not ill-appearing  Cardiovascular:      Rate and Rhythm: Normal rate  Rhythm regularly irregular  Abdominal:      Palpations: Abdomen is soft  Tenderness: There is no abdominal tenderness  Musculoskeletal:      Right lower leg: Edema (+1) present  Left lower leg: Edema (+1) present  Neurological:      Mental Status: She is alert and oriented to person, place, and time  Psychiatric:         Mood and Affect: Mood normal            Labs in chart were reviewed        Assessment/Plan:         Diagnoses and all orders for this visit:    Weakness    Edema of both lower extremities    Chronic fatigue    Atrial fibrillation, chronic (HCC)    Chronic combined systolic and diastolic heart failure ([facility] [address] )        All stable  F/u in 1 m                     Jeannette Macias MD      "

## 2023-06-22 ENCOUNTER — ANTICOAG VISIT (OUTPATIENT)
Dept: CARDIOLOGY CLINIC | Facility: CLINIC | Age: 83
End: 2023-06-22

## 2023-06-22 ENCOUNTER — LAB (OUTPATIENT)
Dept: LAB | Facility: CLINIC | Age: 83
End: 2023-06-22
Payer: MEDICARE

## 2023-06-22 DIAGNOSIS — I50.32 CHRONIC DIASTOLIC HEART FAILURE (HCC): ICD-10-CM

## 2023-06-22 DIAGNOSIS — N18.32 STAGE 3B CHRONIC KIDNEY DISEASE (HCC): ICD-10-CM

## 2023-06-22 DIAGNOSIS — I48.20 ATRIAL FIBRILLATION, CHRONIC (HCC): ICD-10-CM

## 2023-06-22 DIAGNOSIS — E87.6 HYPOKALEMIA: ICD-10-CM

## 2023-06-22 DIAGNOSIS — Z95.2 H/O MITRAL VALVE REPLACEMENT WITH MECHANICAL VALVE: Primary | ICD-10-CM

## 2023-06-22 LAB
ANION GAP SERPL CALCULATED.3IONS-SCNC: 3 MMOL/L
BUN SERPL-MCNC: 30 MG/DL (ref 5–25)
CALCIUM SERPL-MCNC: 10.3 MG/DL (ref 8.3–10.1)
CHLORIDE SERPL-SCNC: 111 MMOL/L (ref 96–108)
CO2 SERPL-SCNC: 26 MMOL/L (ref 21–32)
CREAT SERPL-MCNC: 1.68 MG/DL (ref 0.6–1.3)
GFR SERPL CREATININE-BSD FRML MDRD: 27 ML/MIN/1.73SQ M
GLUCOSE P FAST SERPL-MCNC: 129 MG/DL (ref 65–99)
POTASSIUM SERPL-SCNC: 3.5 MMOL/L (ref 3.5–5.3)
SODIUM SERPL-SCNC: 140 MMOL/L (ref 135–147)

## 2023-06-22 PROCEDURE — 80048 BASIC METABOLIC PNL TOTAL CA: CPT

## 2023-06-23 DIAGNOSIS — N18.32 STAGE 3B CHRONIC KIDNEY DISEASE (HCC): ICD-10-CM

## 2023-06-23 DIAGNOSIS — E87.6 HYPOKALEMIA: Primary | ICD-10-CM

## 2023-06-23 NOTE — RESULT ENCOUNTER NOTE
Please call the patient's  by telephone and inform him that latest blood work on 6/22/2023 showed improvement in potassium from 3 1 to new level of 3 5, which is within normal range  The only concern was kidney function was a little worse, for which I would recommend she drink a little bit more fluid on a daily basis  I have also ordered another basic metabolic panel blood test to be done in about 1 month  If it is done at a [facility] lab, it will be in the computer system and they will not need a slip for it  They can be mailed a requisition slip or pick one up if they prefer

## 2023-06-26 ENCOUNTER — TELEPHONE (OUTPATIENT)
Dept: CARDIOLOGY CLINIC | Facility: CLINIC | Age: 83
End: 2023-06-26

## 2023-06-26 NOTE — TELEPHONE ENCOUNTER
----- Message from Bishnu Simon MD sent at 6/23/2023  4:52 PM EDT -----  Please call the patient's  by telephone and inform him that latest blood work on 6/22/2023 showed improvement in potassium from 3 1 to new level of 3 5, which is within normal range  The only concern was kidney function was a little worse, for which I would recommend she drink a little bit more fluid on a daily basis  I have also ordered another basic metabolic panel blood test to be done in about 1 month  [facility], it will be in the computer system and they will not need a slip for it  They can be mailed a requisition slip or pick one up if they prefer

## 2023-06-27 DIAGNOSIS — L21.9 SEBORRHEIC DERMATITIS: Primary | ICD-10-CM

## 2023-06-27 RX ORDER — BETAMETHASONE DIPROPIONATE 0.5 MG/ML
LOTION, AUGMENTED TOPICAL 2 TIMES DAILY
Qty: 60 ML | Refills: 2 | Status: ON HOLD | OUTPATIENT
Start: 2023-06-27

## 2023-07-06 ENCOUNTER — TELEPHONE (OUTPATIENT)
Dept: NEUROLOGY | Facility: CLINIC | Age: 83
End: 2023-07-06

## 2023-07-06 ENCOUNTER — HOSPITAL ENCOUNTER (INPATIENT)
Facility: HOSPITAL | Age: 83
LOS: 7 days | DRG: 056 | End: 2023-07-13
Attending: EMERGENCY MEDICINE | Admitting: INTERNAL MEDICINE
Payer: MEDICARE

## 2023-07-06 ENCOUNTER — LAB (OUTPATIENT)
Dept: LAB | Facility: CLINIC | Age: 83
DRG: 056 | End: 2023-07-06
Payer: MEDICARE

## 2023-07-06 ENCOUNTER — APPOINTMENT (INPATIENT)
Dept: RADIOLOGY | Facility: HOSPITAL | Age: 83
DRG: 056 | End: 2023-07-06
Payer: MEDICARE

## 2023-07-06 ENCOUNTER — TELEPHONE (OUTPATIENT)
Dept: OTHER | Facility: OTHER | Age: 83
End: 2023-07-06

## 2023-07-06 ENCOUNTER — APPOINTMENT (EMERGENCY)
Dept: RADIOLOGY | Facility: HOSPITAL | Age: 83
DRG: 056 | End: 2023-07-06
Payer: MEDICARE

## 2023-07-06 DIAGNOSIS — R29.90 STROKE-LIKE SYMPTOMS: Primary | ICD-10-CM

## 2023-07-06 DIAGNOSIS — I50.32 CHRONIC DIASTOLIC HEART FAILURE (HCC): ICD-10-CM

## 2023-07-06 DIAGNOSIS — R53.1 WEAKNESS: Primary | ICD-10-CM

## 2023-07-06 DIAGNOSIS — N18.32 STAGE 3B CHRONIC KIDNEY DISEASE (HCC): ICD-10-CM

## 2023-07-06 DIAGNOSIS — R79.1 ELEVATED INR: ICD-10-CM

## 2023-07-06 DIAGNOSIS — Z51.5 COMFORT MEASURES ONLY STATUS: ICD-10-CM

## 2023-07-06 DIAGNOSIS — R29.90 STROKE-LIKE SYMPTOMS: ICD-10-CM

## 2023-07-06 DIAGNOSIS — E87.6 HYPOKALEMIA: ICD-10-CM

## 2023-07-06 DIAGNOSIS — N18.31 STAGE 3A CHRONIC KIDNEY DISEASE (HCC): ICD-10-CM

## 2023-07-06 DIAGNOSIS — R77.8 ELEVATED TROPONIN: ICD-10-CM

## 2023-07-06 DIAGNOSIS — Z71.89 GOALS OF CARE, COUNSELING/DISCUSSION: ICD-10-CM

## 2023-07-06 DIAGNOSIS — R13.10 DYSPHAGIA, UNSPECIFIED TYPE: ICD-10-CM

## 2023-07-06 DIAGNOSIS — R79.89 ELEVATED LFTS: ICD-10-CM

## 2023-07-06 DIAGNOSIS — G20 PARKINSON'S DISEASE (HCC): ICD-10-CM

## 2023-07-06 DIAGNOSIS — R74.8 ELEVATED LIVER ENZYMES: ICD-10-CM

## 2023-07-06 PROBLEM — N17.9 AKI (ACUTE KIDNEY INJURY) (HCC): Status: ACTIVE | Noted: 2023-07-06

## 2023-07-06 LAB
2HR DELTA HS TROPONIN: -13 NG/L
4HR DELTA HS TROPONIN: -13 NG/L
ALBUMIN SERPL BCP-MCNC: 3.5 G/DL (ref 3.5–5)
ALP SERPL-CCNC: 80 U/L (ref 34–104)
ALT SERPL W P-5'-P-CCNC: 246 U/L (ref 7–52)
ANION GAP SERPL CALCULATED.3IONS-SCNC: 5 MMOL/L
ANION GAP SERPL CALCULATED.3IONS-SCNC: 6 MMOL/L
APTT PPP: 54 SECONDS (ref 23–37)
AST SERPL W P-5'-P-CCNC: 304 U/L (ref 13–39)
BASOPHILS # BLD AUTO: 0.02 THOUSANDS/ÂΜL (ref 0–0.1)
BASOPHILS NFR BLD AUTO: 0 % (ref 0–1)
BILIRUB SERPL-MCNC: 3.2 MG/DL (ref 0.2–1)
BNP SERPL-MCNC: 2023 PG/ML (ref 0–100)
BUN SERPL-MCNC: 48 MG/DL (ref 5–25)
BUN SERPL-MCNC: 52 MG/DL (ref 5–25)
CALCIUM SERPL-MCNC: 10.5 MG/DL (ref 8.4–10.2)
CALCIUM SERPL-MCNC: 10.7 MG/DL (ref 8.3–10.1)
CARDIAC TROPONIN I PNL SERPL HS: 243 NG/L
CARDIAC TROPONIN I PNL SERPL HS: 243 NG/L
CARDIAC TROPONIN I PNL SERPL HS: 256 NG/L
CHLORIDE SERPL-SCNC: 103 MMOL/L (ref 96–108)
CHLORIDE SERPL-SCNC: 106 MMOL/L (ref 96–108)
CO2 SERPL-SCNC: 26 MMOL/L (ref 21–32)
CO2 SERPL-SCNC: 27 MMOL/L (ref 21–32)
CREAT SERPL-MCNC: 2.15 MG/DL (ref 0.6–1.3)
CREAT SERPL-MCNC: 2.33 MG/DL (ref 0.6–1.3)
EOSINOPHIL # BLD AUTO: 0.01 THOUSAND/ÂΜL (ref 0–0.61)
EOSINOPHIL NFR BLD AUTO: 0 % (ref 0–6)
ERYTHROCYTE [DISTWIDTH] IN BLOOD BY AUTOMATED COUNT: 16.5 % (ref 11.6–15.1)
GFR SERPL CREATININE-BSD FRML MDRD: 18 ML/MIN/1.73SQ M
GFR SERPL CREATININE-BSD FRML MDRD: 20 ML/MIN/1.73SQ M
GLUCOSE P FAST SERPL-MCNC: 101 MG/DL (ref 65–99)
GLUCOSE SERPL-MCNC: 133 MG/DL (ref 65–140)
GLUCOSE SERPL-MCNC: 142 MG/DL (ref 65–140)
HCT VFR BLD AUTO: 35.8 % (ref 34.8–46.1)
HGB BLD-MCNC: 11.7 G/DL (ref 11.5–15.4)
IMM GRANULOCYTES # BLD AUTO: 0.03 THOUSAND/UL (ref 0–0.2)
IMM GRANULOCYTES NFR BLD AUTO: 0 % (ref 0–2)
INR PPP: 14.85 (ref 0.84–1.19)
INR PPP: >15 (ref 0.84–1.19)
LYMPHOCYTES # BLD AUTO: 0.74 THOUSANDS/ÂΜL (ref 0.6–4.47)
LYMPHOCYTES NFR BLD AUTO: 10 % (ref 14–44)
MCH RBC QN AUTO: 33.9 PG (ref 26.8–34.3)
MCHC RBC AUTO-ENTMCNC: 32.7 G/DL (ref 31.4–37.4)
MCV RBC AUTO: 104 FL (ref 82–98)
MONOCYTES # BLD AUTO: 0.81 THOUSAND/ÂΜL (ref 0.17–1.22)
MONOCYTES NFR BLD AUTO: 11 % (ref 4–12)
NEUTROPHILS # BLD AUTO: 5.58 THOUSANDS/ÂΜL (ref 1.85–7.62)
NEUTS SEG NFR BLD AUTO: 79 % (ref 43–75)
NRBC BLD AUTO-RTO: 0 /100 WBCS
PLATELET # BLD AUTO: 120 THOUSANDS/UL (ref 149–390)
PMV BLD AUTO: 11.9 FL (ref 8.9–12.7)
POTASSIUM SERPL-SCNC: 3.4 MMOL/L (ref 3.5–5.3)
POTASSIUM SERPL-SCNC: 3.8 MMOL/L (ref 3.5–5.3)
PROT SERPL-MCNC: 5.6 G/DL (ref 6.4–8.4)
PROTHROMBIN TIME: 107.7 SECONDS (ref 11.6–14.5)
PROTHROMBIN TIME: 109.8 SECONDS (ref 11.6–14.5)
RBC # BLD AUTO: 3.45 MILLION/UL (ref 3.81–5.12)
SODIUM SERPL-SCNC: 136 MMOL/L (ref 135–147)
SODIUM SERPL-SCNC: 137 MMOL/L (ref 135–147)
WBC # BLD AUTO: 7.19 THOUSAND/UL (ref 4.31–10.16)

## 2023-07-06 PROCEDURE — 99223 1ST HOSP IP/OBS HIGH 75: CPT | Performed by: INTERNAL MEDICINE

## 2023-07-06 PROCEDURE — 85730 THROMBOPLASTIN TIME PARTIAL: CPT | Performed by: EMERGENCY MEDICINE

## 2023-07-06 PROCEDURE — G1004 CDSM NDSC: HCPCS

## 2023-07-06 PROCEDURE — 80048 BASIC METABOLIC PNL TOTAL CA: CPT

## 2023-07-06 PROCEDURE — 85025 COMPLETE CBC W/AUTO DIFF WBC: CPT | Performed by: EMERGENCY MEDICINE

## 2023-07-06 PROCEDURE — 99285 EMERGENCY DEPT VISIT HI MDM: CPT

## 2023-07-06 PROCEDURE — 85610 PROTHROMBIN TIME: CPT

## 2023-07-06 PROCEDURE — 82948 REAGENT STRIP/BLOOD GLUCOSE: CPT

## 2023-07-06 PROCEDURE — 70450 CT HEAD/BRAIN W/O DYE: CPT

## 2023-07-06 PROCEDURE — 76705 ECHO EXAM OF ABDOMEN: CPT

## 2023-07-06 PROCEDURE — 85610 PROTHROMBIN TIME: CPT | Performed by: EMERGENCY MEDICINE

## 2023-07-06 PROCEDURE — 96360 HYDRATION IV INFUSION INIT: CPT

## 2023-07-06 PROCEDURE — 99223 1ST HOSP IP/OBS HIGH 75: CPT | Performed by: PSYCHIATRY & NEUROLOGY

## 2023-07-06 PROCEDURE — 96361 HYDRATE IV INFUSION ADD-ON: CPT

## 2023-07-06 PROCEDURE — 80053 COMPREHEN METABOLIC PANEL: CPT | Performed by: EMERGENCY MEDICINE

## 2023-07-06 PROCEDURE — 83880 ASSAY OF NATRIURETIC PEPTIDE: CPT | Performed by: PSYCHIATRY & NEUROLOGY

## 2023-07-06 PROCEDURE — 93005 ELECTROCARDIOGRAM TRACING: CPT

## 2023-07-06 PROCEDURE — 74176 CT ABD & PELVIS W/O CONTRAST: CPT

## 2023-07-06 PROCEDURE — 84484 ASSAY OF TROPONIN QUANT: CPT | Performed by: EMERGENCY MEDICINE

## 2023-07-06 RX ORDER — SODIUM CHLORIDE 9 MG/ML
75 INJECTION, SOLUTION INTRAVENOUS CONTINUOUS
Status: DISCONTINUED | OUTPATIENT
Start: 2023-07-06 | End: 2023-07-07

## 2023-07-06 RX ORDER — PHYTONADIONE 5 MG/1
5 TABLET ORAL ONCE
Status: COMPLETED | OUTPATIENT
Start: 2023-07-06 | End: 2023-07-06

## 2023-07-06 RX ORDER — LEVOTHYROXINE SODIUM 0.05 MG/1
50 TABLET ORAL EVERY MORNING
Status: CANCELLED | OUTPATIENT
Start: 2023-07-07

## 2023-07-06 RX ORDER — CHOLECALCIFEROL (VITAMIN D3) 125 MCG
2000 CAPSULE ORAL EVERY MORNING
Status: CANCELLED | OUTPATIENT
Start: 2023-07-07

## 2023-07-06 RX ORDER — POTASSIUM CHLORIDE 20 MEQ/1
40 TABLET, EXTENDED RELEASE ORAL ONCE
Status: COMPLETED | OUTPATIENT
Start: 2023-07-06 | End: 2023-07-06

## 2023-07-06 RX ORDER — DOXYCYCLINE HYCLATE 50 MG/1
324 CAPSULE, GELATIN COATED ORAL
Status: DISCONTINUED | OUTPATIENT
Start: 2023-07-07 | End: 2023-07-13 | Stop reason: HOSPADM

## 2023-07-06 RX ORDER — MEMANTINE HYDROCHLORIDE 10 MG/1
10 TABLET ORAL DAILY
Status: DISCONTINUED | OUTPATIENT
Start: 2023-07-07 | End: 2023-07-13 | Stop reason: HOSPADM

## 2023-07-06 RX ORDER — FERROUS SULFATE 325(65) MG
325 TABLET ORAL
Status: CANCELLED | OUTPATIENT
Start: 2023-07-07

## 2023-07-06 RX ORDER — MEMANTINE HYDROCHLORIDE 10 MG/1
10 TABLET ORAL DAILY
Status: CANCELLED | OUTPATIENT
Start: 2023-07-07

## 2023-07-06 RX ORDER — LEVOTHYROXINE SODIUM 0.05 MG/1
50 TABLET ORAL
Status: DISCONTINUED | OUTPATIENT
Start: 2023-07-07 | End: 2023-07-13 | Stop reason: HOSPADM

## 2023-07-06 RX ORDER — AMANTADINE HYDROCHLORIDE 100 MG/1
100 CAPSULE, GELATIN COATED ORAL 2 TIMES DAILY
Status: DISCONTINUED | OUTPATIENT
Start: 2023-07-06 | End: 2023-07-13 | Stop reason: HOSPADM

## 2023-07-06 RX ORDER — AMANTADINE HYDROCHLORIDE 100 MG/1
100 CAPSULE, GELATIN COATED ORAL 2 TIMES DAILY
Status: CANCELLED | OUTPATIENT
Start: 2023-07-06

## 2023-07-06 RX ADMIN — SODIUM CHLORIDE 1000 ML: 0.9 INJECTION, SOLUTION INTRAVENOUS at 11:20

## 2023-07-06 RX ADMIN — AMANTADINE HYDROCHLORIDE 100 MG: 100 CAPSULE ORAL at 17:16

## 2023-07-06 RX ADMIN — SODIUM CHLORIDE 75 ML/HR: 0.9 INJECTION, SOLUTION INTRAVENOUS at 16:21

## 2023-07-06 RX ADMIN — PHYTONADIONE 5 MG: 5 TABLET ORAL at 20:51

## 2023-07-06 RX ADMIN — CARBIDOPA AND LEVODOPA 1 TABLET: 25; 100 TABLET ORAL at 16:37

## 2023-07-06 RX ADMIN — POTASSIUM CHLORIDE 40 MEQ: 1500 TABLET, EXTENDED RELEASE ORAL at 16:37

## 2023-07-06 RX ADMIN — CARBIDOPA AND LEVODOPA 1 TABLET: 25; 100 TABLET ORAL at 20:47

## 2023-07-06 NOTE — ASSESSMENT & PLAN NOTE
· Baseline cr appears around 1.0-1.2  · Cr on admission 2.15  · CT a/p pending  · Gentle IVF  · Hold diuretics  · Monitor bmp in am

## 2023-07-06 NOTE — ASSESSMENT & PLAN NOTE
Tbili 3.20 on admission  · Patient denies abdominal pain or other GI symptoms  · Denies alcohol use or prior hepatitis  · CT a/p pending  · GI consult

## 2023-07-06 NOTE — ASSESSMENT & PLAN NOTE
Wt Readings from Last 3 Encounters:   06/20/23 68.2 kg (150 lb 6.4 oz)   06/07/23 67.6 kg (149 lb)   06/01/23 62.6 kg (138 lb)     ECHO 4/2022: EF 50-26%, systolic function low/normal, left atrium severely dilated, mitral valve prosthesis functioning normally  · On torsemide 20 mg daily and spironolactone 12.5 mg daily  · Hold diuretics in setting of ROB  · Monitor on IVF  · Monitor Is/Os and daily weights  · Salt and fluid restriction  · Appears euvolemic

## 2023-07-06 NOTE — CONSULTS
[address]   Neurology Initial Consult    Ralph Chávez is a 79 y.o. female  [facility]-*          Information obtained from:   Chief Complaint   Patient presents with   • Weakness - Generalized     Pt states hx of parkinson's disease. States has been getting weaker and has been falling more often even with walker assistance. Assessment/Plan:    1. Generalized weakness   2. Stroke like symptoms  3. Elevated troponins  4. Supra therapeutic INR  5. ROB  6. CHF  7. Parkinson's disease     Patient is well known to our service for PD. Her current symptoms appear to be multifactorial from multiple etiologies as mentioned above. We will r/o stroke though suspicion is low. Resume sinemet at home dose. Amantadine was introduced in May for dyskinesia and fatigue. She had mentioned it was working well with her. Didn't find any adverse reaction of liver injury from this. Added BNP. Cardiology is following. GI will be consulted. Will follow after MRI brain  Supportive care per primary team      Total time of encounter: 70 min  More than 50% of time was spent in counseling and coordination of care of patient. HPI:  Ralph Chávez is an 80 yo F with PMH of PD, MVR on coumadin, A fib, systolic and diastolic CHF presents with generalized weakness and reported left facial droop and left sided weakness. Patient has been getting slower lately and now can't walk even with walker. She has been reporting extreme fatigue for past 2-3 months. Her  had noted left facial droop and left sided weakness so was sent to ED. Patient's INR is 14-15. It was wnl on 6/22. She's on very low dose of coumadin and doesn't think any error had occurred taking it. Denies change in diet as well. She does report lack of appetite lately. She is also noted to have elevated troponin, alt/ast, billirubin level, ROB and small amount of pleural effusions and abdominopelvic ascites.    During encounter, patient was noted to have mild right arm weakness and subtle right facial asymmetry. Past Medical History:   Diagnosis Date   • Anal fissure    • Arthritis     osteo joints   • Asthma with acute exacerbation    • Blepharitis    • Breast lump    • Chalazion    • CHF, chronic (HCC)    • Difficulty walking    • Disease of thyroid gland    • Drooling    • Dysphagia    • Fall 05/04/2018   • Fibromyalgia, primary    • Glaucoma     Normal pressure   • History of transfusion 1996   • Hordeolum externum    • Hx of transient ischemic attack (TIA)    • Hypophonia    • Infectious viral hepatitis     Hep C dx 1996    • Lyme carditis    • Murmur, cardiac    • Parkinsons (HCC)    • Rotator cuff tendinitis    • Seasonal allergies    • Urinary frequency        Past Surgical History:   Procedure Laterality Date   • BREAST BIOPSY Right 2018    benign   • BREAST CYST EXCISION Left     benign   • BREAST SURGERY N/A     benign, calcium   • CARDIAC SURGERY  1990    mitral valve replacement   • CATARACT EXTRACTION Right 2015   • CATARACT EXTRACTION Left 2014   • CHEST TUBE INSERTION Right     post pleural effusion   • CHOLECYSTECTOMY  2000    lap   • HYSTERECTOMY  1973    partial   • DC ARTHROCENTESIS ASPIR&/INJ MAJOR JT/BURSA W/O US Left 3/19/2021    Procedure: Sacroiliac Joint Injection ( [id number] ); Surgeon: Tanna Reardon MD;  Location: [facility] MAIN OR;  Service: Pain Management    • DC COLSC FLX W/RMVL OF TUMOR POLYP LESION SNARE TQ N/A 7/18/2017    Procedure: COLONOSCOPY and biopsy.;  Surgeon: Daren North MD;  Location: [address] [place] [address] GI LAB; Service: Gastroenterology   • SKIN BIOPSY      pre cancerous on face   • TONSILLECTOMY     • US GUIDED BREAST BIOPSY RIGHT COMPLETE Right 2/13/2018       Allergies   Allergen Reactions   • Artane [Trihexyphenidyl] Other (See Comments)     Felt "disconnected", "out if it", shaky.  Did not feel right    • Glycopyrrolate      Nose bleeds   • Pollen Extract          Current Facility-Administered Medications:   •  amantadine (SYMMETREL) capsule 100 mg, 100 mg, Oral, BID, Alea Cardona PA-C, 100 mg at 23 1716  •  carbidopa-levodopa (SINEMET)  mg per tablet 1 tablet, 1 tablet, Oral, TID, Alea Cardona PA-C, 1 tablet at 23 1637  •  [START ON 2023] ferrous gluconate (FERGON) tablet 324 mg, 324 mg, Oral, Daily Before Breakfast, Alea Cardona PA-C  •  [START ON 2023] levothyroxine tablet 50 mcg, 50 mcg, Oral, Early Morning, Alea Cardona PA-C  •  [START ON 2023] memantine (NAMENDA) tablet 10 mg, 10 mg, Oral, Daily, Alea Cardona PA-C  •  sodium chloride 0.9 % infusion, 75 mL/hr, Intravenous, Continuous, Alea Cardona PA-C, Last Rate: 75 mL/hr at 23 1621, 75 mL/hr at 23 1621    Social History     Socioeconomic History   • Marital status: /Civil Union     Spouse name: Not on file   • Number of children: Not on file   • Years of education: Not on file   • Highest education level: Not on file   Occupational History   • Not on file   Tobacco Use   • Smoking status: Former     Packs/day: 0.10     Years: 10.00     Total pack years: 1.00     Types: Cigarettes     Quit date: 1970     Years since quittin.5   • Smokeless tobacco: Never   Vaping Use   • Vaping Use: Never used   Substance and Sexual Activity   • Alcohol use: Not Currently   • Drug use: No   • Sexual activity: Not Currently   Other Topics Concern   • Not on file   Social History Narrative   • Not on file     Social Determinants of Health     Financial Resource Strain: Low Risk  (11/3/2022)    Overall Financial Resource Strain (CARDIA)    • Difficulty of Paying Living Expenses: Not hard at all   Food Insecurity: No Food Insecurity (2022)    Hunger Vital Sign    • Worried About Running Out of Food in the Last Year: Never true    • Ran Out of Food in the Last Year: Never true   Transportation Needs: No Transportation Needs (2022)    PRAPARE - Transportation    • Lack of Transportation (Medical): No    • Lack of Transportation (Non-Medical): No   Physical Activity: Not on file   Stress: Not on file   Social Connections: Not on file   Intimate Partner Violence: Not on file   Housing Stability: Low Risk  (12/22/2022)    Housing Stability Vital Sign    • Unable to Pay for Housing in the Last Year: No    • Number of Places Lived in the Last Year: 1    • Unstable Housing in the Last Year: No       Family History   Problem Relation Age of Onset   • Heart disease Mother         murmur Cardiomegaly age 64   • Pneumonia Father    • Heart disease Brother         mitrsl valve   • Parkinsonism Brother    • Arthritis Brother    • Lupus Daughter    • Diabetes Daughter    • Depression Daughter          Review of systems:  Please see HPI for positive symptoms. No fever, no chills, no weight change. Ocular: No drainage, no blurred vision. HEENT:  No sore throat, earache, or congestion. No neck pain. COR:  No chest pain. No palpitations. Lungs:  + sob, wheezing,  GI:  no  nausea, no vomiting, no diarrhea, + constipation, no anorexia. :  No dysuria, frequency, or urgency. No hematuria. Musculoskeletal:  No joint pain or swelling or edema. Skin:  No rash or itching. Psychiatric:  no anxiety, no depression. Endocrine:  No polyuria or polydipsia. Physical examination:  Vitals:    07/06/23 1745   BP: 118/72   Pulse: 92   Resp: 20   Temp:    SpO2: 97%       GENERAL APPEARANCE:  The patient is alert, oriented. +masked facies He is in no acute distress. HEENT:  Head is normocephalic. The sinuses are otherwise nontender. Pupils are equal and reactive. NECK:  Supple without lymphadenopathy. HEART:  Regular rate and rhythm. LUNGS:  clear to auscultation. No crackles or wheezes are heard. ABDOMEN:  Soft, nontender, nondistended with good bowel sounds heard. EXTREMITIES:  Without cyanosis, clubbing or edema. Mental status: The patient is alert, attentive, and oriented.  Speech is clear and fluent, good repetition, comprehension, and naming. he recalls 3/3 objects at 5 minutes. Cranial nerves:  CN II: Visual fields are full to confrontation. Fundoscopic exam is normal with sharp discs and no vascular changes. . Pupils are 3 mm and  reactive to light. CN III, IV, VI: At primary gaze, there is no eye deviation. CN V: Facial sensation is intact to pinprick in all 3 divisions bilaterally. Corneal responses are intact. CN VII: trace right facial asymmetry. CN VIII: Hearing is normal to rubbing fingers  CN IX, X: Palate elevates symmetrically. Phonation is normal.  CN XI: Head turning and shoulder shrug are intact  CN XII: Tongue is midline with normal movements and no atrophy. Motor: There is no pronator drift of out-stretched arms. Muscle bulk and tone are normal.     Muscle exam  Arm Right Left Leg Right Left   Deltoid 4/5 5/5 Iliopsoas 5/5 5/5   Biceps 4/5 5/5 Quads 5/5 5/5   Triceps 4/5 5/5 Hamstrings 5/5 5/5   Wrist Extension 4/5 5/5 Ankle Dorsi Flexion 5/5 5/5   Wrist Flexion 4/5 5/5 Ankle Plantar Flexion 5/5 5/5   Interossei 4/5 5/5 Ankle Eversion 5/5 5/5   APB 4/5 5/5 Ankle Inversion 5/5 5/5       Reflexes   RJ BJ TJ KJ AJ Plantars Ayala's   Right 2+ 2+ 2+ 2+  Downgoing Not present   Left 2+ 2+ 2+ 2+  Downgoing Not present     Sensory:  Light touch, pinprick, position sense, and vibration sense are intact in fingers and toes. Coordination:  Coordination intact.     Gait/Stance:  Deferred due to fall risk     Lab Results   Component Value Date    WBC 7.19 07/06/2023    HGB 11.7 07/06/2023    HCT 35.8 07/06/2023     (H) 07/06/2023     (L) 07/06/2023     Lab Results   Component Value Date    HGBA1C 5.9 (H) 07/20/2020     Lab Results   Component Value Date     (H) 07/06/2023     (H) 07/06/2023    ALKPHOS 80 07/06/2023    BILITOT 1.6 (H) 12/07/2015     Lab Results   Component Value Date    GLUCOSE 91 12/07/2015    CALCIUM 10.5 (H) 07/06/2023     12/07/2015 K 3.4 (L) 07/06/2023    CO2 27 07/06/2023     07/06/2023    BUN 52 (H) 07/06/2023    CREATININE 2.15 (H) 07/06/2023         Radiology          Review of reports and notes reveal:  CT abdomen pelvis wo contrast    Result Date: 7/6/2023  Small bilateral pleural effusions and small volume abdominopelvic ascites. Otherwise, no acute abnormality in the abdomen or pelvis. Workstation performed: [id number]     Independent Interpretation of images or specimens:    CT head wo contrast    Result Date: 7/6/2023  1. No acute intracranial abnormality. 2.  Chronic cerebellar lacunar infarcts best seen on the prior MRI examination. Workstation performed: [id number]             Thank you for this consult. Marcial Melton M.D.   [facility]  [address]

## 2023-07-06 NOTE — ASSESSMENT & PLAN NOTE
· INR 14.85 on admission  · No signs of active bleeding  · CT head negative  · Hold warfarin  · Continue to monitor  · Consider vitamin K administration if no improvement

## 2023-07-06 NOTE — TELEPHONE ENCOUNTER
Hello patient's  called in and was very concerned that patient's current condition has changed. She has a slight droop of her left side of face and has lost control in motion. I asked if he took her to the ER he stated he has not because he didn't think it was that of an emergency, but would to see if maybe she can be seen by Dr Ishan Epperson soon.  Please reach out to  Kenny Hall [phone]

## 2023-07-06 NOTE — ASSESSMENT & PLAN NOTE
· Hx mechanical valve replacement in 1990  · On warfarin, INR 14 on admission  · Hold warfarin  · Continue to monitor INR

## 2023-07-06 NOTE — H&P
[id number] [email or web address]  H&P  Name: Maximo Claire 79 y.o. female I MRN: [id number]  Unit/Bed#: 913 [facility] 414-01 I Date of Admission: 7/6/2023   Date of Service: 7/6/2023 I Hospital Day: 0      Assessment/Plan   * Generalized weakness  Assessment & Plan  Patient presented to ED with months history of generalized weakness, worsening in past few weeks. Patient now unable to ambulate, usually can ambulate with walker. Family reports left sided facial droop this morning along with speech difficulties.   CT head: No acute intracranial abnormality  · Possibly secondary to worsening of Parkinson's disease, will rule out acute stroke  · MRI brain ordered  · ECHO w/ bubble study ordered  · Monitor on telemetry  · Neuro checks  · PT/OT evaluation  · Neurology consulted    ROB (acute kidney injury) ([address])  Assessment & Plan  · Baseline cr appears around 1.0-1.2  · Cr on admission 2.15  · CT a/p pending  · Gentle IVF  · Hold diuretics  · Check PVR  · Urinary retention protocol  · Monitor bmp in am    Elevated LFTs  Assessment & Plan    Tbili 3.20 on admission  · Patient denies abdominal pain or other GI symptoms  · Denies alcohol use or prior hepatitis  · CT a/p pending  · GI consult    Supratherapeutic INR  Assessment & Plan  · INR 14.85 on admission  · No signs of active bleeding  · CT head negative  · Hold warfarin  · Continue to monitor  · Consider vitamin K administration if no improvement    Elevated troponin  Assessment & Plan  · troponins 256 > 243  · Follow up 4 hour troponin  · EKG shows a fib HR 79  · Denies chest pain  · Cardiology consult    Parkinson's disease [facility])  Assessment & Plan  · Follow with neurology outpatient  · Continue Sinemet and amantadine  · Gradual weakness possibly secondary to worsening of Parkinson's disease  · Neurology consulted    Hypothyroidism  Assessment & Plan  · Continue levothyroxine    Chronic combined systolic and diastolic heart failure ([address])  Assessment & Plan  Wt Readings from Last 3 Encounters:   06/20/23 68.2 kg (150 lb 6.4 oz)   06/07/23 67.6 kg (149 lb)   06/01/23 62.6 kg (138 lb)     ECHO 4/2022: EF 86-31%, systolic function low/normal, left atrium severely dilated, mitral valve prosthesis functioning normally  · On torsemide 20 mg daily and spironolactone 12.5 mg daily  · Hold diuretics in setting of ROB  · Monitor on IVF  · Monitor Is/Os and daily weights  · Salt and fluid restriction  · Appears euvolemic    Atrial fibrillation, chronic (HCC)  Assessment & Plan  · History of chronic a fib, EKG on admission showed controlled a fib  · On warfarin, INR 14 on admission  · Hold warfarin  · Monitor INR    H/O mitral valve replacement with mechanical valve  Assessment & Plan  · Hx mechanical valve replacement in 1990  · On warfarin, INR 14 on admission  · Hold warfarin  · Continue to monitor INR       VTE Pharmacologic Prophylaxis:   Moderate Risk (Score 3-4) - Pharmacological DVT Prophylaxis Contraindicated. Sequential Compression Devices Ordered. Code Status: Level 3 - DNAR and DNI  Discussion with family: Updated  () at bedside. Anticipated Length of Stay: Patient will be admitted on an inpatient basis with an anticipated length of stay of greater than 2 midnights secondary to generalized weakness, stroke pathway, elevated LFTs, elevated INR, ROB. Total Time Spent on Date of Encounter in care of patient: 75 minutes This time was spent on one or more of the following: performing physical exam; counseling and coordination of care; obtaining or reviewing history; documenting in the medical record; reviewing/ordering tests, medications or procedures; communicating with other healthcare professionals and discussing with patient's family/caregivers.     Chief Complaint: generalized weakness    History of Present Illness:  Vanessa Michelle is a 79 y.o. female with a PMH of Parkinson's disease, mitral valve replacement with mechanical valve, chronic a fib on warfarin, combined systolic and diastolic CHF, hypothyroidism who presents with generalized weakness for the past year worsening in the past few weeks. She reports she has been falling at home and in the past few days she has not even been able to walk with her walker and her  has to assist her with all her ADLs. This morning her  and daughter noticed left-sided facial droop and difficulty speaking with increased weakness. Reports she has difficulty speaking partly due to the fact that she is so tired and sometimes has trouble "getting her words out."  She denies dizziness, lightheadedness, chest pain, shortness of breath, palpitations, nausea, vomiting, abdominal pain, or diarrhea. Review of Systems:  Review of Systems   Constitutional: Positive for activity change and fatigue. Negative for chills and fever. HENT: Negative for ear pain and sore throat. Eyes: Negative for pain and visual disturbance. Respiratory: Negative for cough and shortness of breath. Cardiovascular: Negative for chest pain and palpitations. Gastrointestinal: Negative for abdominal pain and vomiting. Genitourinary: Negative for dysuria and hematuria. Musculoskeletal: Negative for arthralgias and back pain. Skin: Negative for color change and rash. Neurological: Positive for weakness. Negative for dizziness, seizures, syncope and light-headedness. All other systems reviewed and are negative.       Past Medical and Surgical History:   Past Medical History:   Diagnosis Date   • Anal fissure    • Arthritis     osteo joints   • Asthma with acute exacerbation    • Blepharitis    • Breast lump    • Chalazion    • CHF, chronic (HCC)    • Difficulty walking    • Disease of thyroid gland    • Drooling    • Dysphagia    • Fall 05/04/2018   • Fibromyalgia, primary    • Glaucoma     Normal pressure   • History of transfusion 1996   • Hordeolum externum    • Hx of transient ischemic attack (TIA)    • Hypophonia    • Infectious viral hepatitis     Hep C dx 1996    • Lyme carditis    • Murmur, cardiac    • Parkinsons (HCC)    • Rotator cuff tendinitis    • Seasonal allergies    • Urinary frequency        Past Surgical History:   Procedure Laterality Date   • BREAST BIOPSY Right 2018    benign   • BREAST CYST EXCISION Left     benign   • BREAST SURGERY N/A     benign, calcium   • CARDIAC SURGERY  1990    mitral valve replacement   • CATARACT EXTRACTION Right 2015   • CATARACT EXTRACTION Left 2014   • CHEST TUBE INSERTION Right     post pleural effusion   • CHOLECYSTECTOMY  2000    lap   • HYSTERECTOMY  1973    partial   • AK ARTHROCENTESIS ASPIR&/INJ MAJOR JT/BURSA W/O US Left 3/19/2021    Procedure: Sacroiliac Joint Injection ( 16496 ); Surgeon: Nate Madrigal MD;  Location: [facility] MAIN OR;  Service: Pain Management    • AK COLSC FLX W/RMVL OF TUMOR POLYP LESION SNARE TQ N/A 7/18/2017    Procedure: COLONOSCOPY and biopsy.;  Surgeon: Paramjit Olivas MD;  Location: [address] [place] [address] GI LAB; Service: Gastroenterology   • SKIN BIOPSY      pre cancerous on face   • TONSILLECTOMY     • US GUIDED BREAST BIOPSY RIGHT COMPLETE Right 2/13/2018       Meds/Allergies:  Prior to Admission medications    Medication Sig Start Date End Date Taking? Authorizing Provider   alendronate (FOSAMAX) 70 mg tablet take 1 tablet by mouth EVERY 7 DAYS 6/1/22   Terri Fritz MD   amantadine (SYMMETREL) 100 mg capsule Take 1 capsule a day for 2 weeks and then take 1 capsule twice daily. 5/24/23   Malick Salas MD   betamethasone, augmented, (DIPROLENE) 0.05 % lotion Apply topically 2 (two) times a day As needed for itching 6/27/23   Shay Junior MD   carbidopa-levodopa (SINEMET)  mg per tablet Take 1 tab three times daily. Last dose before dinner time.  5/24/23   Malick Salas MD   Cholecalciferol (VITAMIN D3) 2000 units TABS Take 2,000 Units by mouth every morning    Historical Provider, MD   Diclofenac Sodium (VOLTAREN) 1 % Apply 2 g topically 4 (four) times a day as needed (Foot pain) for up to 15 days 1/21/22 5/24/23  Loree Sandoval DPM   hydrocortisone 2.5 % lotion To face once daily for 2 weeks for seborrhea 6/1/23   Liang Staton MD   IRON, FERROUS SULFATE, PO Take by mouth    Historical Provider, MD   ketoconazole (NIZORAL) 2 % shampoo Apply 1 application.  topically 2 (two) times a week Wash Every other day to scalp 6/1/23   Liang Staton MD   levothyroxine 50 mcg tablet Take 1 tablet (50 mcg total) by mouth every morning 6/20/23   Elizabeth Dave MD   [facility]) 10 mg tablet take 1 tablet by mouth once daily  Patient taking differently: 2 tablets 2 (two) times a day 10/11/22   Supriya Chou PA-C   midodrine (PROAMATINE) 2.5 mg tablet Take 1 tablet (2.5 mg total) by mouth 3 (three) times a day before meals 3/2/23   Rita Pino MD   Multiple Vitamins-Minerals (OCUVITE ADULT 50+ PO) Take by mouth in the morning    Historical Provider, MD   oxybutynin (DITROPAN-XL) 5 mg 24 hr tablet Take 1 tablet (5 mg total) by mouth daily  Patient not taking: Reported on 4/20/2023 4/12/23   Elizabeth Dave MD   Polyethylene Glycol 3350 (MIRALAX PO) Take by mouth as needed    Historical Provider, MD   spironolactone (ALDACTONE) 25 mg tablet Take 0.5 tablets (12.5 mg total) by mouth daily 6/1/23   Pamela Muñiz MD   torsemide (DEMADEX) 20 mg tablet Take torsemide 20 mg 1 tablet 3 days a week every Monday, Wednesday, Friday and an extra dose once a week as needed for leg swelling. 3/27/23   Pamela Muñiz MD   warfarin (Coumadin) 1 mg tablet Pt to take 3 tablets per day or as directed 1/5/23   Pamela Muñiz MD   warfarin (COUMADIN) 2.5 mg tablet TAKE 1/2 TO 1 TABLET BY MOUTH DAILY OR AS DIRECTED BY PHYSICIAN  Patient taking differently: 2.5 mg Tue Thurs 2.5mg other days 1/2 tablet 5/10/22   Pamela Muñiz MD   entacapone (COMTAN) 200 mg tablet take 1 tablet by mouth four times a day 11/26/22 5/24/23  Supriya Chou, PETE     I have reviewed home medications with patient personally. Allergies: Allergies   Allergen Reactions   • Artane [Trihexyphenidyl] Other (See Comments)     Felt "disconnected", "out if it", shaky. Did not feel right    • Glycopyrrolate      Nose bleeds   • Pollen Extract        Social History:  Marital Status: /Civil Union   Patient Pre-hospital Living Situation: Home  Patient Pre-hospital Level of Mobility: walks with walker  Patient Pre-hospital Diet Restrictions: none  Substance Use History:   Social History     Substance and Sexual Activity   Alcohol Use Not Currently     Social History     Tobacco Use   Smoking Status Former   • Packs/day: 0.10   • Years: 10.00   • Total pack years: 1.00   • Types: Cigarettes   • Quit date: 5   • Years since quittin.5   Smokeless Tobacco Never     Social History     Substance and Sexual Activity   Drug Use No       Family History:  Family History   Problem Relation Age of Onset   • Heart disease Mother         murmur Cardiomegaly age 64   • Pneumonia Father    • Heart disease Brother         mitrsl valve   • Parkinsonism Brother    • Arthritis Brother    • Lupus Daughter    • Diabetes Daughter    • Depression Daughter        Physical Exam:     Vitals:   Blood Pressure: 118/72 (23 1651)  Pulse: 73 (23 1651)  Temperature: (!) 97.1 °F (36.2 °C) (23 1549)  Temp Source: Oral (23 1549)  Respirations: 20 (23 1651)  Height: 5' 4" (162.6 cm) (23 1433)  Weight - Scale: 69.9 kg (154 lb) (23 1433)  SpO2: 99 % (23 1651)    Physical Exam  Vitals and nursing note reviewed. Constitutional:       General: She is not in acute distress. Appearance: She is well-developed. Cardiovascular:      Rate and Rhythm: Normal rate. Rhythm irregular. Heart sounds: Murmur heard. Pulmonary:      Effort: Pulmonary effort is normal.      Breath sounds: Normal breath sounds. Abdominal:      Palpations: Abdomen is soft. Tenderness: There is no abdominal tenderness. Musculoskeletal:         General: No swelling. Cervical back: Neck supple. Right lower leg: Edema (trace) present. Left lower leg: Edema (trace) present. Skin:     General: Skin is warm and dry. Capillary Refill: Capillary refill takes less than 2 seconds. Neurological:      Mental Status: She is alert. Motor: Weakness present. Psychiatric:         Mood and Affect: Mood normal.          Additional Data:     Lab Results:  Results from last 7 days   Lab Units 07/06/23  1036   WBC Thousand/uL 7.19   HEMOGLOBIN g/dL 11.7   HEMATOCRIT % 35.8   PLATELETS Thousands/uL 120*   NEUTROS PCT % 79*   LYMPHS PCT % 10*   MONOS PCT % 11   EOS PCT % 0     Results from last 7 days   Lab Units 07/06/23  1036   SODIUM mmol/L 136   POTASSIUM mmol/L 3.4*   CHLORIDE mmol/L 103   CO2 mmol/L 27   BUN mg/dL 52*   CREATININE mg/dL 2.15*   ANION GAP mmol/L 6   CALCIUM mg/dL 10.5*   ALBUMIN g/dL 3.5   TOTAL BILIRUBIN mg/dL 3.20*   ALK PHOS U/L 80   ALT U/L 246*   AST U/L 304*   GLUCOSE RANDOM mg/dL 133     Results from last 7 days   Lab Units 07/06/23  1153   INR  14.85*     Results from last 7 days   Lab Units 07/06/23  1016   POC GLUCOSE mg/dl 142*               Lines/Drains:  Invasive Devices     Peripheral Intravenous Line  Duration           Peripheral IV 07/06/23 Distal;Left;Ventral (anterior) Forearm <1 day          Drain  Duration           External Urinary Catheter <1 day                    Imaging: Reviewed radiology reports from this admission including: CT head  CT abdomen pelvis wo contrast   Final Result by Gerhardt Colorado, MD (07/06 1526)      Small bilateral pleural effusions and small volume abdominopelvic ascites. Otherwise, no acute abnormality in the abdomen or pelvis. Workstation performed: [id number]         CT head wo contrast   Final Result by Shelbi Jensen MD (07/06 1218)      1. No acute intracranial abnormality.    2.  Chronic cerebellar lacunar infarcts best seen on the prior MRI examination. Workstation performed: [id number]         MRI Inpatient Order    (Results Pending)       EKG and Other Studies Reviewed on Admission:   · EKG: Atrial fibrillation. HR 79.    ** Please Note: This note has been constructed using a voice recognition system.  **

## 2023-07-06 NOTE — TELEPHONE ENCOUNTER
Called [phone], spoke to pt's  Jeanett Klinefelter. Reporting facial droop on left side. weakness on both sides but worse on the left. He gave the phone to his dtr Jamie Yu. Spoke w/   Jamie Yu and states that pt is also slurring her speech, having memory issues-more confused and forgetful. Very unsteady gait. Pt cannot walk on her own. Double vision, more so than usual. Denies dizziness but foggy. Robin numbness. Advised best to take pt to the ER. She gave the phone back to pt's . Advised best to take pt to the ER. Both verbalized understanding.  He will take pt to [facility] ED    ADT 21 entered    fyi

## 2023-07-06 NOTE — ASSESSMENT & PLAN NOTE
Patient presented to ED with months history of generalized weakness, worsening in past few weeks. Patient now unable to ambulate, usually can ambulate with walker. Family reports left sided facial droop this morning along with speech difficulties.   CT head: No acute intracranial abnormality  · Possibly secondary to worsening of Parkinson's disease, will rule out acute stroke  · MRI brain ordered  · ECHO w/ bubble study ordered  · Monitor on telemetry  · Neuro checks  · PT/OT evaluation  · Neurology consulted

## 2023-07-06 NOTE — ASSESSMENT & PLAN NOTE
· troponins 256 > 243  · Follow up 4 hour troponin  · EKG shows a fib HR 79  · Denies chest pain  · Cardiology consult

## 2023-07-06 NOTE — ASSESSMENT & PLAN NOTE
· History of chronic a fib, EKG on admission showed controlled a fib  · On warfarin, INR 14 on admission  · Hold warfarin  · Monitor INR

## 2023-07-06 NOTE — ASSESSMENT & PLAN NOTE
· Follow with neurology outpatient  · Continue Sinemet and amantadine  · Gradual weakness possibly secondary to worsening of Parkinson's disease  · Neurology consulted

## 2023-07-06 NOTE — CONSULTS
Consultation - Cardiology   [facility]     Opal Varela 79 y.o. female MRN: [id number]  : 1940  Unit/Bed#: [address] [place] [address] Encounter: [id number]      Assessment & Plan   1. Elevated troponin. - 23 hs troponin: 256 (0 hrs), 243 (2 hrs). - 23 EKG: Atrial fibrillation, ventricular rate 72 bpm.  Noted PVCs. Left bundle branch block. - Patient currently denies chest pain, palpitations, shortness of breath, lightheadedness, dizziness, nausea, or vomiting.    - Unclear etiology at this time. Will need further cardiac testing to determine etiology. - Follow-up repeat TTE.    2. Generalized weakness.    - Presented on 23 for evaluation for worsening weakness. Was instructed by Neurology to go to ED after patient's  called reporting left facial droop and weakness.  - Patient reports she has had progressive weakness over the past several months, states is greatly affecting her ability to ambulate, and states she has had frequent falls in recent weeks. -  23 CT head without contrast demonstrated no acute intracranial abnormality, chronic cerebellar lacunar infarcts.  - Neurology consult pending. 3. Chronic combined systolic and diastolic heart failure. - Does not appear in acute phase.  - Recommend obtaining BNP.  - Follow-up repeat TTE.  - 23 CT abdomen/pelvis without contrast: Small bilateral pleural effusions and small volume abdominopelvic ascites. Otherwise, no acute abnormality in the abdomen or pelvis. - 22 TTE: LVEF 50-55%. Systolic function is low normal.  Left atrium severely dilated. Atrial septum bows into right atrium. Trace to mild aortic valve regurgitation. Mechanical mitral valve prosthesis, appears to be functioning normally. Mild to moderate tricuspid valve regurgitation.  - Review of home medication notes patient is on Aldactone 12.5 mg daily as well as torsemide 20 mg 3 times a week (Monday/Wednesday/Friday). Aldactone and torsemide on hold in setting of ROB. - Monitor I/Os. - Daily weights. 4. Transaminitis. - 7/06/23 AST: 304. - 7/06/23 ALT: 246. - Unclear etiology at this time. 5. ROB. - Creatinine appears to be between 1.0 and 1.2.   - 7/06/23 creatinine (on admission): 2.15.  - Home medication of Aldactone and torsemide on hold in setting of ROB. - Care per primary team.    6. Supratherapeutic INR.    - 7/06/23 INR: 14.85.   - Holding home medication of warfarin.  - Would consider vitamin K.  - Care per primary team.    7. Chronic atrial fibrillation.    - Not currently on beta-blocker therapy, unclear why.   - 7/06/23 EKG: Atrial fibrillation, ventricular rate 72 bpm.  Noted PVCs. Left bundle branch block. - [id number]- VASc stroke risk score: 6 points, moderate-high risk. - Home medication of warfarin on hold in setting of supratherapeutic INR on admission. 8.  Chronic left bundle branch block. - Has been documented since 2017. - 7/06/23 EKG: Atrial fibrillation, ventricular rate 72 bpm.  Noted PVCs. Left bundle branch block. 9. Mitral valve replacement. - s/p mitral valve replacement in 1990.   - 4/11/22 TTE: Mitral Valve: There is a mechanical mitral valve prosthesis. The prosthetic valve appears to be functioning normally. The prosthetic valve appears to be well-seated. Mean gradient is 3 mmHg and peak mitral velocity is 1.5 m/sec. There is trace regurgitation. There is no evidence of transvalvular regurgitation.  - Follow-up repeat TTE. 10. Hypothyroidism.    -Currently on levothyroxine 50 mcg daily. - 5/02/23 TSH: 2.559. 11. Parkinson's disease.      - Currently on Sinemet  mg 3 times daily, Namenda 10 mg daily, and Symmetrel 100 mg twice daily.  - Neurology consult pending. Summary of Recommendations: Thank you for your consultation.     Physician Requesting Consult: Brittni Jones, *    Reason for Consult / Principal Problem: Elevated troponin. Inpatient consult to Cardiology  Consult performed by: Howard Narvaez PA-C  Consult ordered by: Carmel Chambers PA-C          HPI: Magda Dougherty is a 79y.o. year old female with PMHx chronic combined systolic and diastolic heart failure, chronic atrial fibrillation (on coumadin), mitral valve replacement (1990), hypothyroidism, Parkinson's disease who presented on 7/06/23 for evaluation for worsening weakness. Was instructed by Neurology to go to ED after patient's  called reporting left facial droop and weakness. 7/06/23 CT head without contrast demonstrated no acute intracranial abnormality, chronic cerebellar lacunar infarcts. Patient reports she has been feeling increasingly fatigued and weak over the past few months. States she has had frequent falls in recent weeks. Uses a walker but still has issues ambulating. Cardiology consulted for elevated troponin. 7/06/23 hs troponin: 256 (0 hrs), 243 (2 hrs). Patient currently denies chest pain, palpitations, shortness of breath, lightheadedness, dizziness, nausea, or vomiting. She reports she is not very active at baseline due to worsening generalized weakness. Does use a walker to ambulate to the restroom, states she does not have any chest discomfort when ambulating to restroom. Review of Systems   Constitutional: Positive for activity change and fatigue. Negative for unexpected weight change. Respiratory: Negative for cough, chest tightness, shortness of breath and wheezing. Cardiovascular: Negative for chest pain, palpitations and leg swelling. Gastrointestinal: Negative for abdominal distention, abdominal pain, constipation, diarrhea, nausea and vomiting. Musculoskeletal: Positive for gait problem. Skin: Negative. Neurological: Positive for weakness. Negative for dizziness, syncope, light-headedness and numbness.        Historical Information   Past Medical History:   Diagnosis Date   • Anal fissure    • Arthritis     osteo joints   • Asthma with acute exacerbation    • Blepharitis    • Breast lump    • Chalazion    • CHF, chronic (HCC)    • Difficulty walking    • Disease of thyroid gland    • Drooling    • Dysphagia    • Fall 2018   • Fibromyalgia, primary    • Glaucoma     Normal pressure   • History of transfusion    • Hordeolum externum    • Hx of transient ischemic attack (TIA)    • Hypophonia    • Infectious viral hepatitis     Hep C dx     • Lyme carditis    • Murmur, cardiac    • Parkinsons (HCC)    • Rotator cuff tendinitis    • Seasonal allergies    • Urinary frequency      Past Surgical History:   Procedure Laterality Date   • BREAST BIOPSY Right 2018    benign   • BREAST CYST EXCISION Left     benign   • BREAST SURGERY N/A     benign, calcium   • CARDIAC SURGERY      mitral valve replacement   • CATARACT EXTRACTION Right 2015   • CATARACT EXTRACTION Left 2014   • CHEST TUBE INSERTION Right     post pleural effusion   • CHOLECYSTECTOMY      lap   • HYSTERECTOMY      partial   • OK ARTHROCENTESIS ASPIR&/INJ MAJOR JT/BURSA W/O US Left 3/19/2021    Procedure: Sacroiliac Joint Injection ( 11437 ); Surgeon: Jayne Gannon MD;  Location: [facility] MAIN OR;  Service: Pain Management    • OK COLSC FLX W/RMVL OF TUMOR POLYP LESION SNARE TQ N/A 2017    Procedure: COLONOSCOPY and biopsy.;  Surgeon: Jamie Barnhart MD;  Location: [address] [place] [address] GI LAB;   Service: Gastroenterology   • SKIN BIOPSY      pre cancerous on face   • TONSILLECTOMY     • US GUIDED BREAST BIOPSY RIGHT COMPLETE Right 2018     Social History     Substance and Sexual Activity   Alcohol Use Not Currently     Social History     Substance and Sexual Activity   Drug Use No     Social History     Tobacco Use   Smoking Status Former   • Packs/day: 0.10   • Years: 10.00   • Total pack years: 1.00   • Types: Cigarettes   • Quit date:    • Years since quittin.5   Smokeless Tobacco Never     Family History: Family History   Problem Relation Age of Onset   • Heart disease Mother         murmur Cardiomegaly age 64   • Pneumonia Father    • Heart disease Brother         mitrsl valve   • Parkinsonism Brother    • Arthritis Brother    • Lupus Daughter    • Diabetes Daughter    • Depression Daughter        Meds/Allergies    PTA meds:    Medications Prior to Admission   Medication   • alendronate (FOSAMAX) 70 mg tablet   • amantadine (SYMMETREL) 100 mg capsule   • betamethasone, augmented, (DIPROLENE) 0.05 % lotion   • carbidopa-levodopa (SINEMET)  mg per tablet   • Cholecalciferol (VITAMIN D3) 2000 units TABS   • hydrocortisone 2.5 % lotion   • IRON, FERROUS SULFATE, PO   • ketoconazole (NIZORAL) 2 % shampoo   • levothyroxine 50 mcg tablet   • memantine (NAMENDA) 10 mg tablet   • midodrine (PROAMATINE) 2.5 mg tablet   • Multiple Vitamins-Minerals (OCUVITE ADULT 50+ PO)   • Polyethylene Glycol 3350 (MIRALAX PO)   • spironolactone (ALDACTONE) 25 mg tablet   • torsemide (DEMADEX) 20 mg tablet   • warfarin (COUMADIN) 2.5 mg tablet   • Diclofenac Sodium (VOLTAREN) 1 %   • oxybutynin (DITROPAN-XL) 5 mg 24 hr tablet   • warfarin (Coumadin) 1 mg tablet      Allergies   Allergen Reactions   • Artane [Trihexyphenidyl] Other (See Comments)     Felt "disconnected", "out if it", shaky.  Did not feel right    • Glycopyrrolate      Nose bleeds   • Pollen Extract        Current Facility-Administered Medications:   •  amantadine (SYMMETREL) capsule 100 mg, 100 mg, Oral, BID, Kellie Farrar PA-C  •  carbidopa-levodopa (SINEMET)  mg per tablet 1 tablet, 1 tablet, Oral, TID, Kellie Farrar PA-C  •  [START ON 7/7/2023] ferrous gluconate (FERGON) tablet 324 mg, 324 mg, Oral, Daily Before Breakfast, Kellie Farrar PA-C  •  [START ON 7/7/2023] levothyroxine tablet 50 mcg, 50 mcg, Oral, Early Morning, Kellie Farrar PA-C  •  [START ON 7/7/2023] memantine (NAMENDA) tablet 10 mg, 10 mg, Oral, Daily, Kellie Farrar PA-C  •  potassium chloride (K-DUR,KLOR-CON) CR tablet 40 mEq, 40 mEq, Oral, Once, Qamar Junior PA-C  •  sodium chloride 0.9 % infusion, 75 mL/hr, Intravenous, Continuous, Qamar Junior PA-C    VTE Pharmacologic Prophylaxis: none. Objective:   Vitals: Blood pressure 113/69, pulse 84, resp. rate 20, SpO2 94 %. There is no height or weight on file to calculate BMI. Wt Readings from Last 3 Encounters:   06/20/23 68.2 kg (150 lb 6.4 oz)   06/07/23 67.6 kg (149 lb)   06/01/23 62.6 kg (138 lb)     BP Readings from Last 3 Encounters:   07/06/23 113/69   06/20/23 102/60   06/07/23 112/69     Orthostatic Blood Pressures    Flowsheet Row Most Recent Value   Blood Pressure 113/69 filed at 07/06/2023 1433          Intake/Output Summary (Last 24 hours) at 7/6/2023 1539  Last data filed at 7/6/2023 1335  Gross per 24 hour   Intake 1000 ml   Output 200 ml   Net 800 ml       Invasive Devices     Peripheral Intravenous Line  Duration           Peripheral IV 07/06/23 Distal;Left;Ventral (anterior) Forearm <1 day          Drain  Duration           External Urinary Catheter <1 day              Physical Exam:   Physical Exam  Vitals reviewed. Constitutional:       General: She is not in acute distress. Cardiovascular:      Rate and Rhythm: Normal rate. Rhythm irregular. Pulses: Normal pulses. Heart sounds: Murmur heard. Pulmonary:      Effort: Pulmonary effort is normal. No respiratory distress. Breath sounds: Decreased breath sounds present. Abdominal:      General: Abdomen is flat. There is no distension. Palpations: Abdomen is soft. Tenderness: There is no abdominal tenderness. Musculoskeletal:      Right lower leg: Edema present. Left lower leg: Edema present. Skin:     General: Skin is warm and dry. Neurological:      Mental Status: She is alert.        Labs:   Troponins:  Results from last 7 days   Lab Units 07/06/23  1219   HSTNI D2 ng/L -13       CBC with diff:   Results from last 7 days   Lab Units 07/06/23  1036   WBC Thousand/uL 7.19   HEMOGLOBIN g/dL 11.7   HEMATOCRIT % 35.8   MCV fL 104*   PLATELETS Thousands/uL 120*   RBC Million/uL 3.45*   MCH pg 33.9   MCHC g/dL 32.7   RDW % 16.5*   MPV fL 11.9   NRBC AUTO /100 WBCs 0       CMP:   Results from last 7 days   Lab Units 07/06/23  1036   SODIUM mmol/L 136   POTASSIUM mmol/L 3.4*   CHLORIDE mmol/L 103   CO2 mmol/L 27   ANION GAP mmol/L 6   BUN mg/dL 52*   CREATININE mg/dL 2.15*   CALCIUM mg/dL 10.5*   AST U/L 304*   ALT U/L 246*   ALK PHOS U/L 80   TOTAL PROTEIN g/dL 5.6*   ALBUMIN g/dL 3.5   TOTAL BILIRUBIN mg/dL 3.20*   EGFR ml/min/1.73sq m 20   GLUCOSE RANDOM mg/dL 133       Magnesium:     Coags:   Results from last 7 days   Lab Units 07/06/23  1153   PTT seconds 54*   INR  14.85*     TSH:      No components found for: "TSH3"  Lipid Profile:     Lipid Profile:   Lab Results   Component Value Date    CHOLESTEROL 131 10/31/2022    HDL 54 10/31/2022    LDLCALC 61 10/31/2022    TRIG 79 10/31/2022     Imaging & Testing     Cardiac testing:     Echo complete with contrast if indicated - 4/11/22  Left Ventricle Left ventricular cavity size is normal. Wall thickness is mildly increased. The left ventricular ejection fraction is 50 to 55 % by visual estimation. Systolic function is low normal.  Although no diagnostic regional wall motion abnormality was identified, this possibility cannot be completely excluded on the basis of this study. Unable to assess diastolic function. Interventricular Septum There is abnormal septal motion consistent with post-operative status. Right Ventricle Right ventricular cavity size is normal. Systolic function is normal. Wall thickness is normal.      Left Atrium The atrium is severely dilated. Right Atrium The atrium is normal in size. Atrial Septum The septum bows into the right atrium. Aortic Valve The aortic valve is trileaflet. The leaflets are mildly thickened. The leaflets are not calcified.  The leaflets exhibit normal mobility. There is trace to mild regurgitation. There is no evidence of stenosis. Mitral Valve There is a mechanical mitral valve prosthesis. The prosthetic valve appears to be functioning normally. The prosthetic valve appears to be well-seated. Mean gradient is 3 mmHg and peak mitral velocity is 1.5 m/sec. There is trace regurgitation. There is no evidence of transvalvular regurgitation. There is no evidence of stenosis. Tricuspid Valve There is mild to moderate regurgitation. Pulmonary artery pressure 35 mmHg. There is no evidence of stenosis. Pulmonic Valve There is trace regurgitation. There is no evidence of stenosis. Ascending Aorta The aortic root is normal in size. IVC/SVC The inferior vena cava is normal in size. Respirophasic changes were normal.      Pericardium There is no pericardial effusion. The pericardium is normal in appearance. Results for orders placed during the hospital encounter of 19    Echo complete with contrast if indicated    [address]  [address]  [phone]    Transthoracic Echocardiogram  2D, M-mode, Doppler, and Color Doppler    Study date:  2019    Patient: Cristela White  MR number: [id number]  Account number: [de-identified]  : 1940  Age: 77 years  Gender: Female  Status: Outpatient  Location: Echo lab  Height: 64 in  Weight: 185.7 lb  BP: 94/ 63 mmHg    Indications: Cardiomyopathy    Diagnoses: I42.9 - Cardiomyopathy, unspecified    Sonographer:  Abraham Larsen RDCS  Primary Physician:  Mel Rajan MD  Referring Physician:  Ángel Vera MD  Group:  [facility]  Ordering Physician:  Ángel Vera MD  Interpreting Physician:  Ferdinand Bernard MD    SUMMARY    LEFT VENTRICLE:  Systolic function was at the lower limits of normal. Ejection fraction was estimated in the range of 50 % to 55 % to be 55 %.   There were no regional wall motion abnormalities. Wall thickness was mildly increased. There was mild concentric hypertrophy. VENTRICULAR SEPTUM:  There was mild paradoxical motion. 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 LEFT ATRIUM:  The atrium was moderately to markedly dilated. MITRAL VALVE:  A mechanical prosthesis was present. It exhibited normal function and normal motion. There was no pannus formation, a normal-appearing sewing ring, and no rocking motion of the sewing ring. Mean gradient 4 mm of Hg and peak velocity 1.5  m/sec with trace to mild MR. There was trace regurgitation. AORTIC VALVE:  There was mild regurgitation. TRICUSPID VALVE:  There was mild regurgitation. Estimated peak PA pressure was 25 mmHg. HISTORY: PRIOR HISTORY: Mitral Valve Replacement (1990), A-Fib, Hypothyroidism    PROCEDURE: The procedure was performed in the echo lab. This was a routine study. The transthoracic approach was used. The study included complete 2D imaging, M-mode, complete spectral Doppler, and color Doppler. The heart rate was 71 bpm,  at the start of the study. Echocardiographic views were limited due to lung interference. Image quality was adequate. LEFT VENTRICLE: Size was normal. Systolic function was at the lower limits of normal. Ejection fraction was estimated in the range of 50 % to 55 % to be 55 %. There were no regional wall motion abnormalities. Wall thickness was mildly  increased. There was mild concentric hypertrophy. DOPPLER: The study was not technically sufficient to allow evaluation of LV diastolic function. VENTRICULAR SEPTUM: There was mild paradoxical motion. 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 RIGHT VENTRICLE: The size was normal. Systolic function was low normal.    LEFT ATRIUM: The atrium was moderately to markedly dilated. RIGHT ATRIUM: Size was normal.    MITRAL VALVE: A mechanical prosthesis was present.  It exhibited normal function and normal motion. There was no pannus formation, a normal-appearing sewing ring, and no rocking motion of the sewing ring. Mean gradient 4 mm of Hg and peak  velocity 1.5 m/sec with trace to mild MR. DOPPLER: There was no evidence for stenosis. There was trace regurgitation. AORTIC VALVE: The valve was trileaflet. Leaflets exhibited mildly increased thickness, mild calcification, and normal cuspal separation. DOPPLER: Transaortic velocity was within the normal range. There was no evidence for stenosis. There  was mild regurgitation. TRICUSPID VALVE: DOPPLER: There was mild regurgitation. Estimated peak PA pressure was 25 mmHg. PULMONIC VALVE: DOPPLER: There was no regurgitation. PERICARDIUM: There was no thickening or calcification. There was no pericardial effusion. AORTA: The root exhibited normal size. SYSTEMIC VEINS: IVC: The inferior vena cava was normal in size. Respirophasic changes were normal.    SYSTEM MEASUREMENT TABLES    2D mode  AoR Diam 2D: 3 cm  LA Diam (2D): 5.4 cm  LA/Ao (2D): 1.8  FS (2D Teich): 27.3 %  IVSd (2D): 1.34 cm  LVDEV: 42.8 cmï¾³  LVEDV MOD BP: 85 cmï¾³  LVESV: 19.5 cmï¾³  LVIDd(2D): 3.26 cm  LVISd (2D): 2.37 cm  LVOT Area 2D: 3.14 cmï¾²  LVPWd (2D): 1.33 cm  SV (Teich): 23.3 cmï¾³    Apical four chamber  LVEF A4C: 50 %    Apical two chamber  LVEF A2C: 54 %    Unspecified Scan Mode  MANSOOR Cont Eq (Peak Dejan): 1.97 cmï¾²  LVOT Diam.: 2 cm  LVOT Vmax: 827 mm/s  LVOT Vmax; Mean: 827 mm/s  Peak Grad.; Mean: 3 mm[Hg]  MV Peak E Dejan. Mean: 1330 mm/s  MVA (PHT): 3.01 cmï¾²  PHT: 73 ms  Max P mm[Hg]  V Max: 2420 mm/s  Vmax: 2140 mm/s  RA Area: 18.3 cmï¾²  RA Volume: 45.7 cmï¾³  TAPSE: 1.3 cm    [employer] Accredited Echocardiography Laboratory    Prepared and electronically signed by    Sariah Crawley MD  Signed 2019 07:39:37      Imaging: I have personally reviewed pertinent reports.     CT abdomen pelvis wo contrast    Result Date: 7/6/2023    Impression: Small bilateral pleural effusions and small volume abdominopelvic ascites. Otherwise, no acute abnormality in the abdomen or pelvis. CT head wo contrast    Result Date: 7/6/2023    Impression: 1. No acute intracranial abnormality. 2.  Chronic cerebellar lacunar infarcts best seen on the prior MRI examination. FL barium swallow    Result Date: 6/14/2023    Impression: Esophageal dysmotility. EKG/ Monitor: Personally reviewed. 7/06/23 EKG: Atrial fibrillation, ventricular rate 72 bpm.  Noted PVCs. Left bundle branch block.      Code Status: Level 3 - DNAR and DNI  Advance Directive and Living Will:      POLST:        Moses Dailey PA-C

## 2023-07-06 NOTE — ED PROVIDER NOTES
History  Chief Complaint   Patient presents with   • Weakness - Generalized     Pt states hx of parkinson's disease. States has been getting weaker and has been falling more often even with walker assistance. 25-year-old female past medical history significant for Parkinson's disease, A-fib on Coumadin presenting to the ED today for worsening weakness. Patient states that over the last month or so and especially over the last week she has been getting weaker and weaker. She states that this has been ongoing for the last month however when she called her neurologist they recommended that she come to the ED for evaluation secondary to worsening weakness. Patient was concerned about possible stroke. However patient has been having the symptoms for 1 week at this time. She is denying any fevers or chills. No recent illnesses. She has been on stable dosages of her Parkinson's medications.  who provides further history states that he has had to care for wife and even after bathroom use. Currently his daughter is here for the next 3 weeks however he is concerned about how he will be able to care for further. Prior to Admission Medications   Prescriptions Last Dose Informant Patient Reported? Taking? Cholecalciferol (VITAMIN D3) 2000 units TABS  Self Yes No   Sig: Take 2,000 Units by mouth every morning   Diclofenac Sodium (VOLTAREN) 1 %   No No   Sig: Apply 2 g topically 4 (four) times a day as needed (Foot pain) for up to 15 days   IRON, FERROUS SULFATE, PO  Self Yes No   Sig: Take by mouth   Multiple Vitamins-Minerals (OCUVITE ADULT 50+ PO)  Self Yes No   Sig: Take by mouth in the morning   Polyethylene Glycol 3350 (MIRALAX PO)  Self Yes No   Sig: Take by mouth as needed   alendronate (FOSAMAX) 70 mg tablet  Self No No   Sig: take 1 tablet by mouth EVERY 7 DAYS   amantadine (SYMMETREL) 100 mg capsule  Self No No   Sig: Take 1 capsule a day for 2 weeks and then take 1 capsule twice daily. betamethasone, augmented, (DIPROLENE) 0.05 % lotion   No No   Sig: Apply topically 2 (two) times a day As needed for itching   carbidopa-levodopa (SINEMET)  mg per tablet  Self No No   Sig: Take 1 tab three times daily. Last dose before dinner time. hydrocortisone 2.5 % lotion  Self No No   Sig: To face once daily for 2 weeks for seborrhea   ketoconazole (NIZORAL) 2 % shampoo  Self No No   Sig: Apply 1 application. topically 2 (two) times a week Wash Every other day to scalp   levothyroxine 50 mcg tablet   No No   Sig: Take 1 tablet (50 mcg total) by mouth every morning   memantine (NAMENDA) 10 mg tablet  Self No No   Sig: take 1 tablet by mouth once daily   Patient taking differently: 2 tablets 2 (two) times a day   midodrine (PROAMATINE) 2.5 mg tablet  Self No No   Sig: Take 1 tablet (2.5 mg total) by mouth 3 (three) times a day before meals   oxybutynin (DITROPAN-XL) 5 mg 24 hr tablet  Self No No   Sig: Take 1 tablet (5 mg total) by mouth daily   Patient not taking: Reported on 4/20/2023   spironolactone (ALDACTONE) 25 mg tablet  Self No No   Sig: Take 0.5 tablets (12.5 mg total) by mouth daily   torsemide (DEMADEX) 20 mg tablet  Self No No   Sig: Take torsemide 20 mg 1 tablet 3 days a week every Monday, Wednesday, Friday and an extra dose once a week as needed for leg swelling.    warfarin (COUMADIN) 2.5 mg tablet  Self No No   Sig: TAKE 1/2 TO 1 TABLET BY MOUTH DAILY OR AS DIRECTED BY PHYSICIAN   Patient taking differently: 2.5 mg Tue Thurs 2.5mg other days 1/2 tablet   warfarin (Coumadin) 1 mg tablet  Self No No   Sig: Pt to take 3 tablets per day or as directed      Facility-Administered Medications: None       Past Medical History:   Diagnosis Date   • Anal fissure    • Arthritis     osteo joints   • Asthma with acute exacerbation    • Blepharitis    • Breast lump    • Chalazion    • CHF, chronic (HCC)    • Difficulty walking    • Disease of thyroid gland    • Drooling    • Dysphagia    • Fall 05/04/2018   • Fibromyalgia, primary    • Glaucoma     Normal pressure   • History of transfusion 1996   • Hordeolum externum    • Hx of transient ischemic attack (TIA)    • Hypophonia    • Infectious viral hepatitis     Hep C dx 1996    • Lyme carditis    • Murmur, cardiac    • Parkinsons (HCC)    • Rotator cuff tendinitis    • Seasonal allergies    • Urinary frequency        Past Surgical History:   Procedure Laterality Date   • BREAST BIOPSY Right 2018    benign   • BREAST CYST EXCISION Left     benign   • BREAST SURGERY N/A     benign, calcium   • CARDIAC SURGERY  1990    mitral valve replacement   • CATARACT EXTRACTION Right 2015   • CATARACT EXTRACTION Left 2014   • CHEST TUBE INSERTION Right     post pleural effusion   • CHOLECYSTECTOMY  2000    lap   • HYSTERECTOMY  1973    partial   • AK ARTHROCENTESIS ASPIR&/INJ MAJOR JT/BURSA W/O US Left 3/19/2021    Procedure: Sacroiliac Joint Injection ( 39302 ); Surgeon: Yadiel Dong MD;  [facility] MAIN OR;  Service: Pain Management    • AK COLSC FLX W/RMVL OF TUMOR POLYP LESION SNARE TQ N/A 7/18/2017    Procedure: COLONOSCOPY and biopsy.;  Surgeon: Arnav Schneider MD;  Location: [address] [place] [address] [address] GI LAB; Service: Gastroenterology   • SKIN BIOPSY      pre cancerous on face   • TONSILLECTOMY     • US GUIDED BREAST BIOPSY RIGHT COMPLETE Right 2/13/2018       Family History   Problem Relation Age of Onset   • Heart disease Mother         murmur Cardiomegaly age 64   • Pneumonia Father    • Heart disease Brother         mitrsl valve   • Parkinsonism Brother    • Arthritis Brother    • Lupus Daughter    • Diabetes Daughter    • Depression Daughter      I have reviewed and agree with the history as documented.     E-Cigarette/Vaping   • E-Cigarette Use Never User      E-Cigarette/Vaping Substances   • Nicotine No    • THC No    • CBD No    • Flavoring No    • Other No    • Unknown No      Social History     Tobacco Use   • Smoking status: Former     Packs/day: 0.10 Years: 10.00     Total pack years: 1.00     Types: Cigarettes     Quit date: 5     Years since quittin.5   • Smokeless tobacco: Never   Vaping Use   • Vaping Use: Never used   Substance Use Topics   • Alcohol use: Not Currently   • Drug use: No       Review of Systems   Constitutional: Negative for chills and fever. HENT: Negative for hearing loss. Eyes: Negative for visual disturbance. Respiratory: Negative for shortness of breath. Cardiovascular: Negative for chest pain. Gastrointestinal: Negative for abdominal pain, constipation, diarrhea, nausea and vomiting. Genitourinary: Negative for difficulty urinating. Musculoskeletal: Negative for myalgias. Skin: Negative for color change. Neurological: Positive for weakness. Negative for dizziness and headaches. Psychiatric/Behavioral: Negative for agitation. All other systems reviewed and are negative. Physical Exam  Physical Exam  Vitals and nursing note reviewed. Constitutional:       General: She is not in acute distress. Appearance: Normal appearance. She is well-developed. She is not ill-appearing. HENT:      Head: Normocephalic and atraumatic. Right Ear: External ear normal.      Left Ear: External ear normal.      Nose: Nose normal. No congestion. Mouth/Throat:      Mouth: Mucous membranes are moist.      Pharynx: Oropharynx is clear. No oropharyngeal exudate. Eyes:      General:         Right eye: No discharge. Left eye: No discharge. Extraocular Movements: Extraocular movements intact. Conjunctiva/sclera: Conjunctivae normal.      Pupils: Pupils are equal, round, and reactive to light. Cardiovascular:      Rate and Rhythm: Normal rate and regular rhythm. Heart sounds: Normal heart sounds. No murmur heard. No friction rub. No gallop. Pulmonary:      Effort: Pulmonary effort is normal. No respiratory distress. Breath sounds: Normal breath sounds. No stridor. No wheezing. Abdominal:      General: Bowel sounds are normal. There is no distension. Palpations: Abdomen is soft. Tenderness: There is no abdominal tenderness. Musculoskeletal:         General: No swelling. Normal range of motion. Cervical back: Normal range of motion and neck supple. No rigidity. Skin:     General: Skin is warm and dry. Capillary Refill: Capillary refill takes less than 2 seconds. Neurological:      General: No focal deficit present. Mental Status: She is alert and oriented to person, place, and time. Mental status is at baseline. Motor: Weakness present. Comments: Generalized weakness appreciated on exam however cranial nerves II through XII are intact. No focal neurological deficits appreciated.    Psychiatric:         Mood and Affect: Mood normal.         Behavior: Behavior normal.         Vital Signs  ED Triage Vitals [07/06/23 1015]   Temp Pulse Respirations Blood Pressure SpO2   -- 85 20 113/71 100 %      Temp src Heart Rate Source Patient Position - Orthostatic VS BP Location FiO2 (%)   -- Monitor -- -- --      Pain Score       --           Vitals:    07/06/23 1130 07/06/23 1200 07/06/23 1230 07/06/23 1300   BP: 131/68 112/70 117/69 126/75   Pulse: 77 71 77 73         Visual Acuity      ED Medications  Medications   sodium chloride 0.9 % bolus 1,000 mL (1,000 mL Intravenous New Bag 7/6/23 1120)       Diagnostic Studies  Results Reviewed     Procedure Component Value Units Date/Time    HS Troponin I 2hr [[id number]]  (Abnormal) Collected: 07/06/23 1219    Lab Status: Final result Specimen: Blood from Arm, Left Updated: 07/06/23 1256     hs TnI 2hr 243 ng/L      Delta 2hr hsTnI -13 ng/L     APTT [[id number]]  (Abnormal) Collected: 07/06/23 1153    Lab Status: Final result Specimen: Blood from Arm, Left Updated: 07/06/23 1219     PTT 54 seconds     Protime-INR [[id number]]  (Abnormal) Collected: 07/06/23 1153    Lab Status: Final result Specimen: Blood from Arm, Left Updated: 07/06/23 1219     Protime 107.7 seconds      INR 14.85    HS Troponin I 4hr [[id number]]     Lab Status: No result Specimen: Blood     HS Troponin 0hr (reflex protocol) [[id number]]  (Abnormal) Collected: 07/06/23 1036    Lab Status: Final result Specimen: Blood from Arm, Left Updated: 07/06/23 1114     hs TnI 0hr 256 ng/L     Comprehensive metabolic panel [[id number]]  (Abnormal) Collected: 07/06/23 1036    Lab Status: Final result Specimen: Blood from Arm, Left Updated: 07/06/23 1105     Sodium 136 mmol/L      Potassium 3.4 mmol/L      Chloride 103 mmol/L      CO2 27 mmol/L      ANION GAP 6 mmol/L      BUN 52 mg/dL      Creatinine 2.15 mg/dL      Glucose 133 mg/dL      Calcium 10.5 mg/dL       U/L       U/L      Alkaline Phosphatase 80 U/L      Total Protein 5.6 g/dL      Albumin 3.5 g/dL      Total Bilirubin 3.20 mg/dL      eGFR 20 ml/min/1.73sq m     Narrative:      National Kid[employer] Disease Foundation guidelines for Chronic Kidney Disease (CKD):   •  Stage 1 with normal or high GFR (GFR > 90 mL/min/1.73 square meters)  •  Stage 2 Mild CKD (GFR = 60-89 mL/min/1.73 square meters)  •  Stage 3A Moderate CKD (GFR = 45-59 mL/min/1.73 square meters)  •  Stage 3B Moderate CKD (GFR = 30-44 mL/min/1.73 square meters)  •  Stage 4 Severe CKD (GFR = 15-29 mL/min/1.73 square meters)  •  Stage 5 End Stage CKD (GFR <15 mL/min/1.73 square meters)  Note: GFR calculation is accurate only with a steady state creatinine    CBC and differential [[id number]]  (Abnormal) Collected: 07/06/23 1036    Lab Status: Final result Specimen: Blood from Arm, Left Updated: 07/06/23 1055     WBC 7.19 Thousand/uL      RBC 3.45 Million/uL      Hemoglobin 11.7 g/dL      Hematocrit 35.8 %       fL      MCH 33.9 pg      MCHC 32.7 g/dL      RDW 16.5 %      MPV 11.9 fL      Platelets 720 Thousands/uL      nRBC 0 /100 WBCs      Neutrophils Relative 79 %      Immat GRANS % 0 %      Lymphocytes Relative 10 %      Monocytes Relative 11 %      Eosinophils Relative 0 %      Basophils Relative 0 %      Neutrophils Absolute 5.58 Thousands/µL      Immature Grans Absolute 0.03 Thousand/uL      Lymphocytes Absolute 0.74 Thousands/µL      Monocytes Absolute 0.81 Thousand/µL      Eosinophils Absolute 0.01 Thousand/µL      Basophils Absolute 0.02 Thousands/µL     Fingerstick Glucose (POCT) [[id number]]  (Abnormal) Collected: 07/06/23 1016    Lab Status: Final result Updated: 07/06/23 1017     POC Glucose 142 mg/dl                  CT head wo contrast   Final Result by Opal Santiago MD (07/06 1218)      1. No acute intracranial abnormality. 2.  Chronic cerebellar lacunar infarcts best seen on the prior MRI examination. Workstation performed: [id number]         CT abdomen pelvis wo contrast    (Results Pending)              Procedures  Procedures         ED Course  ED Course as of 07/06/23 1331   Thu Jul 06, 2023   1106 Creatinine(!): 2.15  Upon review of the chart, this is a steadily rising value. Concerning for ROB given Baseline is around 1.13 and has been slowly rising   1117 hs TnI 0hr(!): 256  Elevated, Will trend                                             Medical Decision Making  71-year-old female past medical history significant for Parkinson's presenting to the ED today for generalized weakness. At this time I think differential diagnosis with most likely diagnosis is worsening of her Parkinson's disease however could also be electrolyte abnormality versus anemia. Given the long duration of her weakness we will plan to do a CT head without contrast.  I initially wanted to do a CTA however when her electrolyte panel returned her GFR was acutely worsened which I think likely secondary to dehydration. Patient's INR was also greatly elevated which I think likely secondary to liver dysfunction given her elevations in her liver enzymes as well.   Regardless this patient needs admission secondary to difficulty for family to care for her at home as well as her lab abnormalities. I discussed the case with [facility] internal medicine and patient was admitted to their service for further management and care. Patient in good condition upon admission to the hospital.    Amount and/or Complexity of Data Reviewed  Labs: ordered. Decision-making details documented in ED Course. Radiology: ordered. Risk  Decision regarding hospitalization. Disposition  Final diagnoses:   Weakness   Parkinson's disease ([address])   Elevated INR   Elevated liver enzymes     Time reflects when diagnosis was documented in both MDM as applicable and the Disposition within this note     Time User Action Codes Description Comment    7/6/2023 12:37 PM Ketan Bailey Add [R53.1] Weakness     7/6/2023 12:37 PM Kushalelvia Chavarria, Hossein Add [G20] Parkinson's disease ([address])     7/6/2023 12:37 PM Ketan Bailey Add [R79.1] Elevated INR     7/6/2023 12:37 PM Ketan Bailey Add [R74.8] Elevated liver enzymes       ED Disposition     ED Disposition   Admit    Condition   Stable    Date/Time   Thu Jul 6, 2023 12:37 PM    Comment   Case was discussed with Dr Pamella Malone and the patient's admission status was agreed to be Admission Status: inpatient status to the service of Dr. Pamella Malone . Follow-up Information    None         Patient's Medications   Discharge Prescriptions    No medications on file       No discharge procedures on file.     PDMP Review     None          ED Provider  Electronically Signed by           Ketan Bailey MD  07/06/23 7384

## 2023-07-06 NOTE — PLAN OF CARE
Problem: Potential for Falls  Goal: Patient will remain free of falls  Description: INTERVENTIONS:  - Educate patient/family on patient safety including physical limitations  - Instruct patient to call for assistance with activity   - Consult OT/PT to assist with strengthening/mobility   - Keep Call bell within reach  - Keep bed low and locked with side rails adjusted as appropriate  - Keep care items and personal belongings within reach  - Initiate and maintain comfort rounds  - Make Fall Risk Sign visible to staff  - Offer Toileting every 2 Hours, in advance of need  - Initiate/Maintain bed alarm  - Obtain necessary fall risk management equipment:   - Apply yellow socks and bracelet for high fall risk patients  - Consider moving patient to room near nurses station  Outcome: Progressing     Problem: MOBILITY - ADULT  Goal: Maintain or return to baseline ADL function  Description: INTERVENTIONS:  -  Assess patient's ability to carry out ADLs; assess patient's baseline for ADL function and identify physical deficits which impact ability to perform ADLs (bathing, care of mouth/teeth, toileting, grooming, dressing, etc.)  - Assess/evaluate cause of self-care deficits   - Assess range of motion  - Assess patient's mobility; develop plan if impaired  - Assess patient's need for assistive devices and provide as appropriate  - Encourage maximum independence but intervene and supervise when necessary  - Involve family in performance of ADLs  - Assess for home care needs following discharge   - Consider OT consult to assist with ADL evaluation and planning for discharge  - Provide patient education as appropriate  Outcome: Progressing  Goal: Maintains/Returns to pre admission functional level  Description: INTERVENTIONS:  - Perform BMAT or MOVE assessment daily.   - Set and communicate daily mobility goal to care team and patient/family/caregiver.    - Collaborate with rehabilitation services on mobility goals if consulted  - Perform Range of Motion 2 times a day. - Reposition patient every 2 hours.   - Dangle patient 2 times a day  - Stand patient 2 times a day  - Ambulate patient 2 times a day  - Out of bed to chair 2 times a day   - Out of bed for meals 2 times a day  - Out of bed for toileting  - Record patient progress and toleration of activity level   Outcome: Progressing     Problem: Prexisting or High Potential for Compromised Skin Integrity  Goal: Skin integrity is maintained or improved  Description: INTERVENTIONS:  - Identify patients at risk for skin breakdown  - Assess and monitor skin integrity  - Assess and monitor nutrition and hydration status  - Monitor labs   - Assess for incontinence   - Turn and reposition patient  - Assist with mobility/ambulation  - Relieve pressure over bony prominences  - Avoid friction and shearing  - Provide appropriate hygiene as needed including keeping skin clean and dry  - Evaluate need for skin moisturizer/barrier cream  - Collaborate with interdisciplinary team   - Patient/family teaching  - Consider wound care consult   Outcome: Progressing     Problem: PAIN - ADULT  Goal: Verbalizes/displays adequate comfort level or baseline comfort level  Description: Interventions:  - Encourage patient to monitor pain and request assistance  - Assess pain using appropriate pain scale  - Administer analgesics based on type and severity of pain and evaluate response  - Implement non-pharmacological measures as appropriate and evaluate response  - Consider cultural and social influences on pain and pain management  - Notify physician/advanced practitioner if interventions unsuccessful or patient reports new pain  Outcome: Progressing     Problem: SAFETY ADULT  Goal: Patient will remain free of falls  Description: INTERVENTIONS:  - Educate patient/family on patient safety including physical limitations  - Instruct patient to call for assistance with activity   - Consult OT/PT to assist with strengthening/mobility   - Keep Call bell within reach  - Keep bed low and locked with side rails adjusted as appropriate  - Keep care items and personal belongings within reach  - Initiate and maintain comfort rounds  - Make Fall Risk Sign visible to staff  - Offer Toileting every 2 Hours, in advance of need  - Initiate/Maintain bed alarm  - Obtain necessary fall risk management equipment:   - Apply yellow socks and bracelet for high fall risk patients  - Consider moving patient to room near nurses station  Outcome: Progressing  Goal: Maintain or return to baseline ADL function  Description: INTERVENTIONS:  -  Assess patient's ability to carry out ADLs; assess patient's baseline for ADL function and identify physical deficits which impact ability to perform ADLs (bathing, care of mouth/teeth, toileting, grooming, dressing, etc.)  - Assess/evaluate cause of self-care deficits   - Assess range of motion  - Assess patient's mobility; develop plan if impaired  - Assess patient's need for assistive devices and provide as appropriate  - Encourage maximum independence but intervene and supervise when necessary  - Involve family in performance of ADLs  - Assess for home care needs following discharge   - Consider OT consult to assist with ADL evaluation and planning for discharge  - Provide patient education as appropriate  Outcome: Progressing  Goal: Maintains/Returns to pre admission functional level  Description: INTERVENTIONS:  - Perform BMAT or MOVE assessment daily.   - Set and communicate daily mobility goal to care team and patient/family/caregiver. - Collaborate with rehabilitation services on mobility goals if consulted  - Perform Range of Motion 2 times a day. - Reposition patient every 2 hours.   - Dangle patient 2 times a day  - Stand patient 2 times a day  - Ambulate patient 2 times a day  - Out of bed to chair 2 times a day   - Out of bed for meals 2 times a day  - Out of bed for toileting  - Record patient progress and toleration of activity level   Outcome: Progressing     Problem: NEUROSENSORY - ADULT  Goal: Achieves stable or improved neurological status  Description: INTERVENTIONS  - Monitor and report changes in neurological status  - Monitor vital signs such as temperature, blood pressure, glucose, and any other labs ordered   - Initiate measures to prevent increased intracranial pressure  - Monitor for seizure activity and implement precautions if appropriate      Outcome: Progressing     Problem: CARDIOVASCULAR - ADULT  Goal: Maintains optimal cardiac output and hemodynamic stability  Description: INTERVENTIONS:  - Monitor I/O, vital signs and rhythm  - Monitor for S/S and trends of decreased cardiac output  - Administer and titrate ordered vasoactive medications to optimize hemodynamic stability  - Assess quality of pulses, skin color and temperature  - Assess for signs of decreased coronary artery perfusion  - Instruct patient to report change in severity of symptoms  Outcome: Progressing  Goal: Absence of cardiac dysrhythmias or at baseline rhythm  Description: INTERVENTIONS:  - Continuous cardiac monitoring, vital signs, obtain 12 lead EKG if ordered  - Administer antiarrhythmic and heart rate control medications as ordered  - Monitor electrolytes and administer replacement therapy as ordered  Outcome: Progressing     Problem: RESPIRATORY - ADULT  Goal: Achieves optimal ventilation and oxygenation  Description: INTERVENTIONS:  - Assess for changes in respiratory status  - Assess for changes in mentation and behavior  - Position to facilitate oxygenation and minimize respiratory effort  - Oxygen administered by appropriate delivery if ordered  - Initiate smoking cessation education as indicated  - Encourage broncho-pulmonary hygiene including cough, deep breathe, Incentive Spirometry  - Assess the need for suctioning and aspirate as needed  - Assess and instruct to report SOB or any respiratory difficulty  - Respiratory Therapy support as indicated  Outcome: Progressing     Problem: GASTROINTESTINAL - ADULT  Goal: Maintains adequate nutritional intake  Description: INTERVENTIONS:  - Monitor percentage of each meal consumed  - Identify factors contributing to decreased intake, treat as appropriate  - Assist with meals as needed  - Monitor I&O, weight, and lab values if indicated  - Obtain nutrition services referral as needed  Outcome: Progressing     Problem: SKIN/TISSUE INTEGRITY - ADULT  Goal: Skin Integrity remains intact(Skin Breakdown Prevention)  Description: Assess:  -Perform Rony assessment every 4hr   -Clean and moisturize skin every incontinence episode or Q shift  -Inspect skin when repositioning, toileting, and assisting with ADLS  -Assess under medical devices such as IV every 2 hours  -Assess extremities for adequate circulation and sensation     Bed Management:  -Have minimal linens on bed & keep smooth, unwrinkled  -Change linens as needed when moist or perspiring  -Avoid sitting or lying in one position for more than 2 hours while in bed  -Keep HOB at 30degrees     Toileting:  -Offer bedside commode  -Assess for incontinence every 2 hours  -Use incontinent care products after each incontinent episode such as  protective paste    Activity:  -Mobilize patient 2 times a day  -Encourage activity and walks on unit  -Encourage or provide ROM exercises   -Turn and reposition patient every 2 Hours  -Use appropriate equipment to lift or move patient in bed  -Instruct/ Assist with weight shifting every 2 when out of bed in chair  -Consider limitation of chair time 2 hour intervals    Skin Care:  -Avoid use of baby powder, tape, friction and shearing, hot water or constrictive clothing  -Relieve pressure over bony prominences using pillows, wedge, allevyn  -Do not massage red bony areas    Next Steps:  -Teach patient strategies to minimize risks such as    -Consider consults to  interdisciplinary teams such as   Outcome: Progressing

## 2023-07-06 NOTE — TELEPHONE ENCOUNTER
Lab Result: INR 14.42   Date/Time Drawn: 07/06/2023  0743   Ordering Provider: Hiro Guadarrama   Lab Personnel's Name: [facility]       The following critical/stat result was read back to the lab as stated above and [employer] to the on-call provider. The provider confirmed receipt of the message.

## 2023-07-07 ENCOUNTER — TELEPHONE (OUTPATIENT)
Dept: FAMILY MEDICINE CLINIC | Facility: CLINIC | Age: 83
End: 2023-07-07

## 2023-07-07 ENCOUNTER — APPOINTMENT (INPATIENT)
Dept: RADIOLOGY | Facility: HOSPITAL | Age: 83
DRG: 056 | End: 2023-07-07
Payer: MEDICARE

## 2023-07-07 ENCOUNTER — TELEPHONE (OUTPATIENT)
Dept: CARDIOLOGY CLINIC | Facility: CLINIC | Age: 83
End: 2023-07-07

## 2023-07-07 ENCOUNTER — APPOINTMENT (INPATIENT)
Dept: NON INVASIVE DIAGNOSTICS | Facility: HOSPITAL | Age: 83
DRG: 056 | End: 2023-07-07
Payer: MEDICARE

## 2023-07-07 LAB
2HR DELTA HS TROPONIN: 8 NG/L
4HR DELTA HS TROPONIN: 2 NG/L
ALBUMIN SERPL BCP-MCNC: 3.1 G/DL (ref 3.5–5)
ALP SERPL-CCNC: 80 U/L (ref 34–104)
ALT SERPL W P-5'-P-CCNC: 71 U/L (ref 7–52)
ANION GAP SERPL CALCULATED.3IONS-SCNC: 7 MMOL/L
AORTIC ROOT: 2.6 CM
APICAL FOUR CHAMBER EJECTION FRACTION: 8 %
AST SERPL W P-5'-P-CCNC: 155 U/L (ref 13–39)
ATRIAL RATE: 105 BPM
ATRIAL RATE: 80 BPM
AV LVOT MEAN GRADIENT: 0 MMHG
AV LVOT PEAK GRADIENT: 1 MMHG
BILIRUB DIRECT SERPL-MCNC: 0.7 MG/DL (ref 0–0.2)
BILIRUB SERPL-MCNC: 2.46 MG/DL (ref 0.2–1)
BUN SERPL-MCNC: 47 MG/DL (ref 5–25)
CALCIUM ALBUM COR SERPL-MCNC: 10.3 MG/DL (ref 8.3–10.1)
CALCIUM SERPL-MCNC: 9.6 MG/DL (ref 8.4–10.2)
CARDIAC TROPONIN I PNL SERPL HS: 188 NG/L
CARDIAC TROPONIN I PNL SERPL HS: 190 NG/L
CARDIAC TROPONIN I PNL SERPL HS: 196 NG/L
CHLORIDE SERPL-SCNC: 105 MMOL/L (ref 96–108)
CHOLEST SERPL-MCNC: 79 MG/DL
CO2 SERPL-SCNC: 23 MMOL/L (ref 21–32)
CREAT SERPL-MCNC: 1.84 MG/DL (ref 0.6–1.3)
DOP CALC LVOT AREA: 3.14 CM2
DOP CALC LVOT CARDIAC INDEX: 1.04 L/MIN/M2
DOP CALC LVOT CARDIAC OUTPUT: 1.81 L/MIN
DOP CALC LVOT DIAMETER: 2 CM
DOP CALC LVOT PEAK VEL VTI: 7.72 CM
DOP CALC LVOT PEAK VEL: 0.49 M/S
DOP CALC LVOT STROKE INDEX: 13.1 ML/M2
DOP CALC LVOT STROKE VOLUME: 24.24
DOP CALC MV VTI: 28.08 CM
E WAVE DECELERATION TIME: 88 MS
ERYTHROCYTE [DISTWIDTH] IN BLOOD BY AUTOMATED COUNT: 16.8 % (ref 11.6–15.1)
FRACTIONAL SHORTENING: 3 (ref 28–44)
GFR SERPL CREATININE-BSD FRML MDRD: 24 ML/MIN/1.73SQ M
GLUCOSE SERPL-MCNC: 100 MG/DL (ref 65–140)
HCT VFR BLD AUTO: 34.5 % (ref 34.8–46.1)
HDLC SERPL-MCNC: 13 MG/DL
HGB BLD-MCNC: 11.4 G/DL (ref 11.5–15.4)
INR PPP: 4.91 (ref 0.84–1.19)
INR PPP: >15 (ref 0.84–1.19)
INTERVENTRICULAR SEPTUM IN DIASTOLE (PARASTERNAL SHORT AXIS VIEW): 1.4 CM
INTERVENTRICULAR SEPTUM: 1.4 CM (ref 0.6–1.1)
LAAS-AP2: 47 CM2
LAAS-AP4: 42.1 CM2
LDLC SERPL CALC-MCNC: 45 MG/DL (ref 0–100)
LEFT ATRIUM SIZE: 5.9 CM
LEFT ATRIUM VOLUME (MOD BIPLANE): 214 ML
LEFT INTERNAL DIMENSION IN SYSTOLE: 5.7 CM (ref 2.1–4)
LEFT VENTRICULAR INTERNAL DIMENSION IN DIASTOLE: 5.9 CM (ref 3.5–6)
LEFT VENTRICULAR POSTERIOR WALL IN END DIASTOLE: 1.2 CM
LEFT VENTRICULAR STROKE VOLUME: 9 ML
LVSV (TEICH): 9 ML
MAGNESIUM SERPL-MCNC: 1.9 MG/DL (ref 1.9–2.7)
MCH RBC QN AUTO: 34.7 PG (ref 26.8–34.3)
MCHC RBC AUTO-ENTMCNC: 33 G/DL (ref 31.4–37.4)
MCV RBC AUTO: 105 FL (ref 82–98)
MV E'TISSUE VEL-LAT: 7 CM/S
MV E'TISSUE VEL-SEP: 3 CM/S
MV MEAN GRADIENT: 2 MMHG
MV PEAK A VEL: 0.43 M/S
MV PEAK E VEL: 127 CM/S
MV PEAK GRADIENT: 8 MMHG
MV STENOSIS PRESSURE HALF TIME: 25 MS
MV VALVE AREA BY CONTINUITY EQUATION: 0.86 CM2
MV VALVE AREA P 1/2 METHOD: 8.8
PLATELET # BLD AUTO: 100 THOUSANDS/UL (ref 149–390)
PMV BLD AUTO: 11.9 FL (ref 8.9–12.7)
POTASSIUM SERPL-SCNC: 3.9 MMOL/L (ref 3.5–5.3)
PROT SERPL-MCNC: 5 G/DL (ref 6.4–8.4)
PROTHROMBIN TIME: 115.9 SECONDS (ref 11.6–14.5)
PROTHROMBIN TIME: 45.7 SECONDS (ref 11.6–14.5)
QRS AXIS: 110 DEGREES
QRS AXIS: 111 DEGREES
QRS AXIS: 8 DEGREES
QRSD INTERVAL: 174 MS
QRSD INTERVAL: 176 MS
QRSD INTERVAL: 182 MS
QT INTERVAL: 428 MS
QT INTERVAL: 446 MS
QT INTERVAL: 452 MS
QTC INTERVAL: 490 MS
QTC INTERVAL: 514 MS
QTC INTERVAL: 534 MS
RA PRESSURE ESTIMATED: 8 MMHG
RBC # BLD AUTO: 3.29 MILLION/UL (ref 3.81–5.12)
RIGHT ATRIUM AREA SYSTOLE A4C: 19 CM2
RIGHT VENTRICLE ID DIMENSION: 3.2 CM
RV PSP: 61 MMHG
SL CV LEFT ATRIUM LENGTH A2C: 7.3 CM
SL CV LV EF: 20
SL CV PED ECHO LEFT VENTRICLE DIASTOLIC VOLUME (MOD BIPLANE) 2D: 171 ML
SL CV PED ECHO LEFT VENTRICLE SYSTOLIC VOLUME (MOD BIPLANE) 2D: 161 ML
SODIUM SERPL-SCNC: 135 MMOL/L (ref 135–147)
T WAVE AXIS: -67 DEGREES
T WAVE AXIS: 163 DEGREES
T WAVE AXIS: 266 DEGREES
TR MAX PG: 53 MMHG
TR PEAK VELOCITY: 3.7 M/S
TRICUSPID ANNULAR PLANE SYSTOLIC EXCURSION: 1.1 CM
TRICUSPID VALVE PEAK REGURGITATION VELOCITY: 3.65 M/S
TRIGL SERPL-MCNC: 107 MG/DL
TSH SERPL DL<=0.05 MIU/L-ACNC: 3.18 UIU/ML (ref 0.45–4.5)
VENTRICULAR RATE: 79 BPM
VENTRICULAR RATE: 80 BPM
VENTRICULAR RATE: 84 BPM
WBC # BLD AUTO: 7.32 THOUSAND/UL (ref 4.31–10.16)

## 2023-07-07 PROCEDURE — 97167 OT EVAL HIGH COMPLEX 60 MIN: CPT

## 2023-07-07 PROCEDURE — 84443 ASSAY THYROID STIM HORMONE: CPT

## 2023-07-07 PROCEDURE — 99232 SBSQ HOSP IP/OBS MODERATE 35: CPT | Performed by: PSYCHIATRY & NEUROLOGY

## 2023-07-07 PROCEDURE — 85610 PROTHROMBIN TIME: CPT

## 2023-07-07 PROCEDURE — 93306 TTE W/DOPPLER COMPLETE: CPT | Performed by: INTERNAL MEDICINE

## 2023-07-07 PROCEDURE — 99232 SBSQ HOSP IP/OBS MODERATE 35: CPT | Performed by: INTERNAL MEDICINE

## 2023-07-07 PROCEDURE — 97110 THERAPEUTIC EXERCISES: CPT

## 2023-07-07 PROCEDURE — 97163 PT EVAL HIGH COMPLEX 45 MIN: CPT

## 2023-07-07 PROCEDURE — 93010 ELECTROCARDIOGRAM REPORT: CPT | Performed by: INTERNAL MEDICINE

## 2023-07-07 PROCEDURE — 70551 MRI BRAIN STEM W/O DYE: CPT

## 2023-07-07 PROCEDURE — 99222 1ST HOSP IP/OBS MODERATE 55: CPT | Performed by: INTERNAL MEDICINE

## 2023-07-07 PROCEDURE — 93005 ELECTROCARDIOGRAM TRACING: CPT

## 2023-07-07 PROCEDURE — 99232 SBSQ HOSP IP/OBS MODERATE 35: CPT

## 2023-07-07 PROCEDURE — 84484 ASSAY OF TROPONIN QUANT: CPT | Performed by: PHYSICIAN ASSISTANT

## 2023-07-07 PROCEDURE — 82248 BILIRUBIN DIRECT: CPT | Performed by: PHYSICIAN ASSISTANT

## 2023-07-07 PROCEDURE — 83036 HEMOGLOBIN GLYCOSYLATED A1C: CPT

## 2023-07-07 PROCEDURE — 85027 COMPLETE CBC AUTOMATED: CPT

## 2023-07-07 PROCEDURE — C8929 TTE W OR WO FOL WCON,DOPPLER: HCPCS

## 2023-07-07 PROCEDURE — 83735 ASSAY OF MAGNESIUM: CPT

## 2023-07-07 PROCEDURE — 80061 LIPID PANEL: CPT

## 2023-07-07 PROCEDURE — 80053 COMPREHEN METABOLIC PANEL: CPT

## 2023-07-07 PROCEDURE — 92610 EVALUATE SWALLOWING FUNCTION: CPT

## 2023-07-07 RX ORDER — BISOPROLOL FUMARATE 5 MG/1
2.5 TABLET, FILM COATED ORAL DAILY
Status: DISCONTINUED | OUTPATIENT
Start: 2023-07-07 | End: 2023-07-13 | Stop reason: HOSPADM

## 2023-07-07 RX ORDER — FUROSEMIDE 10 MG/ML
20 INJECTION INTRAMUSCULAR; INTRAVENOUS ONCE
Status: COMPLETED | OUTPATIENT
Start: 2023-07-07 | End: 2023-07-07

## 2023-07-07 RX ADMIN — MEMANTINE 10 MG: 10 TABLET ORAL at 08:19

## 2023-07-07 RX ADMIN — AMANTADINE HYDROCHLORIDE 100 MG: 100 CAPSULE ORAL at 08:20

## 2023-07-07 RX ADMIN — CARBIDOPA AND LEVODOPA 1 TABLET: 25; 100 TABLET ORAL at 16:49

## 2023-07-07 RX ADMIN — LEVOTHYROXINE SODIUM 50 MCG: 50 TABLET ORAL at 05:51

## 2023-07-07 RX ADMIN — CARBIDOPA AND LEVODOPA 1 TABLET: 25; 100 TABLET ORAL at 08:19

## 2023-07-07 RX ADMIN — BISOPROLOL FUMARATE 2.5 MG: 5 TABLET ORAL at 14:50

## 2023-07-07 RX ADMIN — AMANTADINE HYDROCHLORIDE 100 MG: 100 CAPSULE ORAL at 17:56

## 2023-07-07 RX ADMIN — PERFLUTREN 0.8 ML/MIN: 6.52 INJECTION, SUSPENSION INTRAVENOUS at 08:02

## 2023-07-07 RX ADMIN — FERROUS GLUCONATE 324 MG: 324 TABLET ORAL at 06:38

## 2023-07-07 RX ADMIN — FUROSEMIDE 20 MG: 10 INJECTION, SOLUTION INTRAVENOUS at 10:49

## 2023-07-07 RX ADMIN — PHYTONADIONE 5 MG: 10 INJECTION, EMULSION INTRAMUSCULAR; INTRAVENOUS; SUBCUTANEOUS at 08:18

## 2023-07-07 RX ADMIN — SODIUM CHLORIDE 75 ML/HR: 0.9 INJECTION, SOLUTION INTRAVENOUS at 05:48

## 2023-07-07 RX ADMIN — CARBIDOPA AND LEVODOPA 1 TABLET: 25; 100 TABLET ORAL at 20:45

## 2023-07-07 NOTE — SPEECH THERAPY NOTE
Speech-Language Pathology Bedside Swallow Evaluation      Patient Name: Semaj Price    [id number] Date: 7/7/2023     Problem List  Principal Problem:    Generalized weakness  Active Problems:    H/O mitral valve replacement with mechanical valve    Atrial fibrillation, chronic (HCC)    Chronic combined systolic and diastolic heart failure (HCC)    Hypothyroidism    Parkinson's disease (HCC)    Elevated LFTs    ROB (acute kidney injury) ([address])    Supratherapeutic INR    Elevated troponin      Past Medical History  Past Medical History:   Diagnosis Date   • Anal fissure    • Arthritis     osteo joints   • Asthma with acute exacerbation    • Blepharitis    • Breast lump    • Chalazion    • CHF, chronic (HCC)    • Difficulty walking    • Disease of thyroid gland    • Drooling    • Dysphagia    • Fall 05/04/2018   • Fibromyalgia, primary    • Glaucoma     Normal pressure   • History of transfusion 1996   • Hordeolum externum    • Hx of transient ischemic attack (TIA)    • Hypophonia    • Infectious viral hepatitis     Hep C dx 1996    • Lyme carditis    • Murmur, cardiac    • Parkinsons (HCC)    • Rotator cuff tendinitis    • Seasonal allergies    • Urinary frequency            Summary   Pt presented with functional appearing oral and pharyngeal stage swallowing skills with materials administered today. No s/s aspiration with any food or liquid today. Pt used single controlled sips with liquids independently.     Recommended Diet: regular diet and thin liquids   Recommended Form of Meds: as desired   Aspiration precautions and swallowing strategies:  - upright posture, chin down s;lightly to maximize oral control  - single or small controlled sips with liquids  Other Recommendations: Continue frequent oral care        Current Medical Status  Semaj Price is an 78 yo F with PMH of PD, MVR on coumadin, A fib, systolic and diastolic CHF presents with generalized weakness and reported left facial droop and left sided weakness. Patient has been getting slower lately and now can't walk even with walker. She has been reporting extreme fatigue for past 2-3 months and recent lack of appetite. Pt found to have elevated troponin, alt/ast, billirubin level, supra therapeutic INR, ROB and small amount of pleural effusions and abdominopelvic ascites. Rehab Team consulted at this time. Current Precautions:  Fall     Allergies:  No known food allergies    Past medical history:  Please see H&P for details    Special Studies:  7/6:  CT of head: No acute intracranial abnormality. Chronic cerebellar lacunar infarcts best seen on the prior MRI examination. 7/6 CT of abd/pelvis: Small bilateral pleural effusions and small volume abdominopelvic ascites. Otherwise, no acute abnormality in the abdomen or pelvis. 6/14/23 Esophagram:    -The esophagus is normal in caliber. Esophageal dysmotility with to and fro movement of barium significantly worsens in the right anterior oblique (WIGGINS) position. Unfortunately, cine images in the WIGGINS position were not captured due to technical factors. In this position, swallowed contrast remained in the proximal 1/2 of the esophagus. The contrast passed distally after the patient was asked to perform several dry swallows. No mucosal lesion, ulceration or evidence of fold thickening is seen. A barium   pill was swallowed without difficulty.   -Gastroesophageal reflux was not observed. -There is no hiatal hernia. Visualized stomach is within normal limits.     1/16/22 MBS/VBS: mild oropharyngeal dysphagia.   Recommendations:   Recommended Diet:  regular diet and thin liquids  Recommended Form of Medications: as desired   Aspiration precautions:  1. Sit upright and keep chin down slightly  2.  Take single sips of liquids, hold in mouth 2 secs, then "pass" to throat (do not take successive sips and go slowly with single sips) Continue f/u with Neurology    SLP Dysphagia therapy recommended: I spoke with Mariel Casas re: same and she verbalized comprehension of impressions and recommendations. She stated that she does not feel that any attempts to follow-up with the outpatient therapist with whom she is very familiar and completed Speech Therapy/ the [facility]    Social/Education/Vocational Hx:  Pt lives with family    Swallow Information   Current Risks for Dysphagia & Aspiration: h/o PD and mild oropharyngeal dysphagia, plus esophageal dysmotility  Current Diet: regular diet and thin liquids   Baseline Diet: regular diet and thin liquids      Baseline Assessment   Behavior/Cognition: alert  Speech/Language Status: able to participate in conversation c only mildly reduced volume noted  Patient Positioning: upright in bed  Pain Status/Interventions/Response to Interventions:  No report of or nonverbal indications of pain. Swallow Mechanism Exam  Facial: symmetrical  Labial: WFL  Lingual: WFL  Velum: symmetrical  Mandible: adequate ROM  Dentition: adequate  Vocal quality:min/mildly reduced volume   Respiratory Status: on RA     Consistencies Assessed and Performance  Materials administered included cottage cheese, multiple fresh fruits, thick [place] toast, tea and juice    Oral Stage:   Mastication was adequate with the materials administered today. Bolus formation and transfer were functional with no significant oral residue noted. No overt s/s reduced oral control. Pharyngeal Stage:   Swallow Mechanics:  Swallowing initiation appeared prompt. Laryngeal rise was palpated and judged to be within functional limits. No coughing, throat clearing, change in vocal quality or respiratory status noted today. Esophageal Concerns: No reported symptoms this a.m. Strategies and Efficacy: Patient took single sips of liquid slowly/with use of prep set with no cues needed.     Summary and Recommendations (see above)    Results Reviewed with: patient and RN     Treatment Recommended: None at this time    Thank you for this referral.  Please do not hesitate to contact me with any questions or concerns.     Torin Li [facility]   Speech Pathologist  [place] License [id number]  [place] License [id number]  Available via [facility] Text

## 2023-07-07 NOTE — ASSESSMENT & PLAN NOTE
· INR 14.85 on admission, >15.0 this morning  · No signs of active bleeding  · CT head negative  · Hold warfarin  · Continue to monitor  · S/p 5 mg oral vitamin K on 7/6 and 5 mg IV vitamin K on 7/7  · Improved to 4.91  · Repeat INR at midnight  · Goal INR 2.5 - 3.5

## 2023-07-07 NOTE — TELEPHONE ENCOUNTER
----- Message from Enma Crowley MD sent at 7/7/2023 12:00 PM EDT -----  Notify  of patient that blood work from 7/6/2023 showed markedly worsened kidney function, most likely from dehydration. I recommend that we reduce her torsemide as follows:    Hold it entirely for all of 1 week from today and starting after that, reduce dose of torsemide to 10 mg 3 days a week by cutting the tablets in half with a pill cutter. If  it is difficult to cut the pills in half, let us know and we will order the 10 mg tablets. I will order another basic metabolic panel blood test to be repeated in 4 or 5 weeks. Ask her  to have the patient try to drink more fluids on a daily basis.

## 2023-07-07 NOTE — PLAN OF CARE
Problem: Potential for Aspiration  Goal: Non-ventilated patient's risk of aspiration is minimized  Description: Assess and monitor vital signs, respiratory status, and labs (WBC). Monitor for signs of aspiration (tachypnea, cough, rales, wheezing, cyanosis, fever). - Assess and monitor patient's ability to swallow. - Place patient up in chair to eat if possible. - HOB up at 90 degrees to eat if unable to get patient up into chair.  - Supervise patient during oral intake. - Instruct patient/ family to take small bites. - Instruct patient/ family to take small single sips when taking liquids. - Follow patient-specific strategies generated by speech pathologist.  Outcome: Progressing     Problem: Communication Impairment  Goal: Ability to express needs and understand communication  Description: Assess patient's communication skills and ability to understand information. Patient will demonstrate use of effective communication techniques, alternative methods of communication and understanding even if not able to speak. - Encourage communication and provide alternate methods of communication as needed. - Collaborate with case management/ for discharge needs. - Include patient/family/caregiver in decisions related to communication. Outcome: Progressing     Problem: Activity Intolerance/Impaired Mobility  Goal: Mobility/activity is maintained at optimum level for patient  Description: Interventions:  - Assess and monitor patient  barriers to mobility and need for assistive/adaptive devices. - Assess patient's emotional response to limitations. - Collaborate with interdisciplinary team and initiate plans and interventions as ordered. - Encourage independent activity per ability.  - Maintain proper body alignment. - Perform active/passive rom as tolerated/ordered.   - Plan activities to conserve energy.  - Turn patient as appropriate  Outcome: Progressing     Problem: Neurological Deficit  Goal: Neurological status is stable or improving  Description: Interventions:  - Monitor and assess patient's level of consciousness, motor function, sensory function, and level of assistance needed for ADLs. - Monitor and report changes from baseline. Collaborate with interdisciplinary team to initiate plan and implement interventions as ordered. - Provide and maintain a safe environment. - Consider seizure precautions. - Consider fall precautions. - Consider aspiration precautions. - Consider bleeding precautions.   Outcome: Progressing

## 2023-07-07 NOTE — TELEPHONE ENCOUNTER
Dr. Edin Valentin:    Patient's  called to notify you that patient is currently admitted.   He is concerned about her INR at this moment and would like to discuss further with you,

## 2023-07-07 NOTE — OCCUPATIONAL THERAPY NOTE
OT EVALUATION       07/07/23 1525   Note Type   Note type Evaluation   Pain Assessment   Pain Assessment Tool 0-10   Pain Score 7   Pain Location/Orientation Orientation: Left  (flank area)   Restrictions/Precautions   Other Precautions Chair Alarm; Bed Alarm; Fall Risk   Home Living   Type of Home Other (Comment)  (condo)   Home Layout One level;Elevator   Bathroom Shower/Tub Walk-in shower   Bathroom Toilet Standard   Bathroom Equipment Shower chair;Grab bars in [address]   Additional Comments pt reports  has been assisting with sponge bathing for the last 3 weeks   Prior Function   Level of [place] Needs assistance with ADLs; Needs assistance with functional mobility; Needs assistance with IADLS   Lives With Spouse   Receives Help From Family   IADLs Family/Friend/Other provides transportation; Family/Friend/Other provides meals; Family/Friend/Other provides medication management   Falls in the last 6 months 1 to 4   Subjective   Subjective "I'm tired"   ADL   Eating Assistance 5  Supervision/Setup   Eating Deficit Setup   Grooming Assistance 5  Supervision/Setup   Grooming Deficit Setup   UB Bathing Assistance 4  Minimal Assistance   LB Bathing Assistance 3  Moderate Assistance   UB Dressing Assistance 4  Minimal Assistance   LB Dressing Assistance 3  Moderate Assistance   Toileting Assistance  3  Moderate Assistance   Bed Mobility   Rolling R 3  Moderate assistance   Rolling L 3  Moderate assistance   Supine to Sit 3  Moderate assistance   Additional items Assist x 1;Verbal cues   Sit to Supine 3  Moderate assistance   Additional items Assist x 1;Verbal cues;LE management   Transfers   Sit to Stand 3  Moderate assistance   Additional items Assist x 1   Stand to Sit 3  Moderate assistance   Additional items Assist x 1   Balance   Static Sitting Fair   Dynamic Sitting Fair -   Static Standing Fair -   Dynamic Standing Poor +   Activity Tolerance   Activity Tolerance Patient limited by fatigue   Nurse Made Aware yes, Jeromy Farris present for session   RUE Assessment   RUE Assessment WFL  (grossly 3+/5 MMT)   LUE Assessment   LUE Assessment WFL  (grossly 3+/5 MMT)   Cognition   Overall Cognitive Status WFL   Arousal/Participation Cooperative   Attention Within functional limits   Orientation Level Oriented X4   Following Commands Follows one step commands with increased time or repetition   Assessment   Limitation Decreased ADL status; Decreased UE strength;Decreased Safe judgement during ADL;Decreased endurance;Decreased self-care trans;Decreased high-level ADLs  (decreased balance and mobility)   Prognosis Good   Assessment Patient evaluated by Occupational Therapy. Patient admitted with Generalized weakness. The patients occupational profile, medical and therapy history includes a extensive additional review of physical, cognitive, or psychosocial history related to current functional performance. Comorbidities affecting functional mobility and ADLS include: arthritis, asthma, CHF, dysphagia, fibromyalgia, glaucoma and Parkinsons. Prior to admission, patient was requiring assist for ADLS and requiring assist for IADLS and ambulatory with a RW. The evaluation identifies the following performance deficits: weakness, impaired balance, decreased endurance, increased fall risk, new onset of impairment of functional mobility, decreased ADLS, decreased IADLS, pain, decreased activity tolerance, decreased safety awareness, impaired judgement and decreased strength, that result in activity limitations and/or participation restrictions. This evaluation requires clinical decision making of high complexity, because the patient presents with comorbidites that affect occupational performance and required significant modification of tasks or assistance with consideration of multiple treatment options.   The Barthel Index was used as a functional outcome tool presenting with a score of Barthel Index Score: 35, indicating marked limitations of functional mobility and ADLS. The patient's raw score on the AM-PAC Daily Activity Inpatient Short Form is 16. A raw score of less than 19 suggests the patient may benefit from discharge to post-acute rehabilitation services. Please refer to the recommendation of the Occupational Therapist for safe discharge planning. Patient will benefit from skilled Occupational Therapy services to address above deficits and facilitate a safe return to prior level of function. Goals   Patient Goals to get stronger and go home   STG Time Frame   (1-7 days)   Short Term Goal  Goals established to promote Patient Goals: to get stronger and go home:  Eating: independent; Grooming: independent seated; Bathing: min assist; Upper Body Dressing supervision; Lower Body Dressing: min assist; Toileting: min assist; Patient will increase ambulatory standard toilet transfer to min assist with rolling walker to increase performance and safety with ADLS and functional mobility; Patient will increase standing tolerance to 3 minutes during ADL task to decrease assistance level and decrease fall risk; Patient will increase bed mobility to min assist in preparation for ADLS and transfers; Patient will increase functional mobility to and from bathroom with rolling walker with min assist to increase performance with ADLS and to use a toilet; Patient will tolerate 5 minutes of UE ROM/strengthening to increase general activity tolerance and performance in ADLS/IADLS; Patient will improve functional activity tolerance to 10 minutes of sustained functional tasks to increase participation in basic self-care and decrease assistance level;  Patient will increase dynamic sitting balance to fair to improve the ability to sit at edge of bed or on a chair for ADLS;  Patient will increase dynamic standing balance to fair- to improve postural stability and decrease fall risk during standing ADLS and transfers.    LTG Time Frame (8-14 days)   Long Term Goal Bathing: supervision; Upper Body Dressing independent; Lower Body Dressing: supervision; Toileting: supervision; Patient will increase ambulatory standard toilet transfer to supervision with rolling walker to increase performance and safety with ADLS and functional mobility; Patient will increase standing tolerance to 6 minutes during ADL task to decrease assistance level and decrease fall risk; Patient will increase bed mobility to supervision in preparation for ADLS and transfers; Patient will increase functional mobility to and from bathroom with rolling walker with supervision to increase performance with ADLS and to use a toilet; Patient will tolerate 10 minutes of UE ROM/strengthening to increase general activity tolerance and performance in ADLS/IADLS; Patient will improve functional activity tolerance to 20 minutes of sustained functional tasks to increase participation in basic self-care and decrease assistance level;  Patient will increase dynamic sitting balance to fair+ to improve the ability to sit at edge of bed or on a chair for ADLS;  Patient will increase dynamic standing balance to fair to improve postural stability and decrease fall risk during standing ADLS and transfers. Pt will score >/= 20/24 on -PAC Daily Activity Inpatient scale to promote safe independence with ADLs and functional mobility; Pt will score >/= 65/100 on Barthel Index in order to decrease caregiver assistance needed and increase ability to perform ADLs and functional mobility. Plan   Treatment Interventions ADL retraining;Functional transfer training;UE strengthening/ROM; Endurance training;Patient/family training;Equipment evaluation/education; Activityengagement; Compensatory technique education   Goal Expiration Date 07/21/23   OT Frequency 3-5x/wk   Recommendation   OT Discharge Recommendation Post acute rehabilitation services   [facility]   Lower Body Dressing 2 Bathing 2   Toileting 2   Upper Body Dressing 3   Grooming 3   Eating 4   Daily Activity Raw Score 16   Daily Activity Standardized Score (Calc for Raw Score >=11) 35.96   AM-PAC Applied Cognition Inpatient   Following a Speech/Presentation 4   Understanding Ordinary Conversation 4   Taking Medications 3   Remembering Where Things Are Placed or Put Away 3   Remembering List of 4-5 Errands 3   Taking Care of Complicated Tasks 2   Applied Cognition Raw Score 19   Applied Cognition Standardized Score 39.77   Barthel Index   Feeding 10   Bathing 0   Grooming Score 0   Dressing Score 5   Bladder Score 0   Bowels Score 10   Toilet Use Score 5   Transfers (Bed/Chair) Score 5   Mobility (Level Surface) Score 0   Stairs Score 0   Barthel Index Score 35   Licensure   [place] License Number  [facility] [id number] [facility] OTR/L [id number]

## 2023-07-07 NOTE — PHYSICAL THERAPY NOTE
PHYSICAL THERAPY EVALUATION/TREATMENT     07/07/23 1125   Note Type   Note type Evaluation   Pain Assessment   Pain Assessment Tool Hunter-Baker FACES   Hunter-Baker FACES Pain Rating 4  (Left flank)   Restrictions/Precautions   Other Precautions Chair Alarm; Bed Alarm; Fall Risk;Pain  (Shortness of breath)   Home Living   Type of Home Other (Comment)  (Condo)   Home Layout One level;Elevator   Home Equipment Walker   Additional Comments Patient using roller walker prior to admission although with numerous falls recently   Prior Function   Level of [place] Needs assistance with ADLs; Needs assistance with functional mobility; Needs assistance with IADLS   Lives With Spouse   Receives Help From Family   IADLs Family/Friend/Other provides transportation; Family/Friend/Other provides meals; Family/Friend/Other provides medication management   Falls in the last 6 months 1 to 4   Comments Patient recently with declining function and increased falls with  having difficulty caring for patient. Daughter to be staying with family for the next 3 weeks as needed   General   Additional Pertinent History Chart reviewed, patient admitted with generalized weakness. Patient also with extensive history of Parkinson's with increasing falls and decreased strength.    Family/Caregiver Present No   Cognition   Overall Cognitive Status WFL   Arousal/Participation Cooperative   Attention Within functional limits   Orientation Level Oriented to person;Oriented to place   Following Commands Follows one step commands with increased time or repetition   Subjective   Subjective Patient states left flank pain from her fall   RLE Assessment   RLE Assessment   (Range of motion within functional limits, strength 3+/5)   LLE Assessment   LLE Assessment   (Range of motion within functional limits, strength 3+/5)   Coordination   Movements are Fluid and Coordinated 0   Bed Mobility   Supine to Sit 3  Moderate assistance   Additional items Assist x 1;Verbal cues;LE management   Additional Comments Patient out of bed in bedside chair at end of session   Transfers   Sit to Stand 3  Moderate assistance   Additional items Assist x 1;Verbal cues   Stand to Sit 3  Moderate assistance   Additional items Assist x 1;Verbal cues   Ambulation/Elevation   Gait Assistance 3  Moderate assist   Additional items Assist x 1;Verbal cues; Tactile cues   Assistive Device Rolling walker   Distance 5 to 10 feet with shuffling type gait pattern and assist required for weight shifting to advance lower extremities at times   Balance   Static Sitting Fair   Dynamic Sitting Fair -   Static Standing Fair -   Dynamic Standing Poor +   Ambulatory Poor +   Activity Tolerance   Activity Tolerance Patient limited by fatigue;Treatment limited secondary to medical complications (Comment)   Nurse Made Aware Yes   Assessment   Prognosis Good   Problem List Decreased strength;Decreased range of motion;Decreased endurance; Impaired balance;Decreased mobility; Decreased coordination;Pain   Assessment Patient seen for Physical Therapy evaluation. Patient admitted with Generalized weakness. Comorbidities affecting patient's physical performance include: . Personal factors affecting patient at time of initial evaluation include: ambulating with assistive device, inability to ambulate household distances, inability to navigate community distances, inability to navigate level surfaces without external assistance, inability to perform dynamic tasks in community, decreased cognition, limited home support, positive fall history, inability to perform physical activity, inability to perform ADLS and inability to perform IADLS .  Prior to admission, patient was requiring assist for functional mobility with walker, requiring assist for ADLS, requiring assist for IADLS, ambulating household distance and home with family assist.  Please find objective findings from Physical Therapy assessment regarding body systems outlined above with impairments and limitations including weakness, decreased ROM, impaired balance, decreased endurance, impaired coordination, gait deviations, decreased activity tolerance, decreased functional mobility tolerance, fall risk and decreased cognition. The Barthel Index was used as a functional outcome tool presenting with a score of Barthel Index Score: 35 today indicating marked limitations of functional mobility and ADLS. Patient's clinical presentation is currently unstable/unpredictable as seen in patient's presentation of vital sign response, changing level of pain, increased fall risk, new onset of impairment of functional mobility, decreased endurance and new onset of weakness. Pt would benefit from continued Physical Therapy treatment to address deficits as defined above and maximize level of functional mobility. As demonstrated by objective findings, the assigned level of complexity for this evaluation is high. The patient's AM-[facility] Basic Mobility Inpatient Short Form Raw Score is 12. A Raw score of less than or equal to 16 suggests the patient may benefit from discharge to post-acute rehabilitation services. Please also refer to the recommendation of the Physical Therapist for safe discharge planning. Goals   Patient Goals To get stronger and go home   STG Expiration Date 07/14/23   Short Term Goal #1 Transfers and gait with rolling walker with min assist   Short Term Goal #2 Gait endurance to 40 feet   LTG Expiration Date 07/21/23   Long Term Goal #1 Strength bilateral lower extremities 3+/4 -   Long Term Goal #2 Transfers and gait with roller walker with supervision for functional household distances   Plan   Treatment/Interventions ADL retraining;Functional transfer training;LE strengthening/ROM; Therapeutic exercise; Endurance training;Patient/family training;Equipment eval/education; Bed mobility;Gait training; Compensatory technique education   PT Frequency Other (Comment)  (5 times per week)   Recommendation   PT Discharge Recommendation Post acute rehabilitation services   AM-PAC Basic Mobility Inpatient   Turning in Flat Bed Without Bedrails 3   Lying on Back to Sitting on Edge of Flat Bed Without Bedrails 2   Moving Bed to Chair 2   Standing Up From Chair Using Arms 2   Walk in Room 2   Climb 3-5 Stairs With Railing 1   Basic Mobility Inpatient Raw Score 12   Basic Mobility Standardized Score 32.23   Highest Level Of Mobility   -HLM Goal 4: Move to chair/commode   JH-HLM Achieved 4: Move to chair/commode   Barthel Index   Feeding 10   Bathing 0   Grooming Score 0   Dressing Score 5   Bladder Score 5   Bowels Score 10   Toilet Use Score 5   Transfers (Bed/Chair) Score 5   Mobility (Level Surface) Score 0   Stairs Score 0   Barthel Index Score 40   Additional Treatment Session   Start Time 1110   End Time 1125   Treatment Assessment s: Patient with left flank pain from fall O: Bilateral lower extremity exercise completed as listed below A: Patient will benefit from continued physical therapy with progression as tolerated patient will also require postacute rehab services prior to return home   Exercises   Hip Flexion Sitting;10 reps;Bilateral   Hip Abduction Sitting;10 reps;Bilateral   Knee AROM Long Arc Quad Sitting;10 reps;Bilateral   Ankle Pumps Sitting;10 reps;Bilateral   Licensure   [place] License Number  Tiff Cano, PT 4 [id number]

## 2023-07-07 NOTE — ASSESSMENT & PLAN NOTE
· troponins 256 > 243 > 243  · EKG shows a fib HR 79  · Denies chest pain  · Cardiology consult  · 7/7 patient noted to have run of NSVT with active chest pain  · Started on bisoprolol by cardiology, troponins pending  · Repeat EKG no significant change

## 2023-07-07 NOTE — RESULT ENCOUNTER NOTE
Notify  of patient that blood work from 7/6/2023 showed markedly worsened kidney function, most likely from dehydration. I recommend that we reduce her torsemide as follows:    Hold it entirely for all of 1 week from today and starting after that, reduce dose of torsemide to 10 mg 3 days a week by cutting the tablets in half with a pill cutter. If  it is difficult to cut the pills in half, let us know and we will order the 10 mg tablets. I will order another basic metabolic panel blood test to be repeated in 4 or 5 weeks. Ask her  to have the patient try to drink more fluids on a daily basis.

## 2023-07-07 NOTE — CONSULTS
Consultation -[facility]   Jae Lan 79 y.o. female MRN: [id number]    Unit/Bed#: 913 [facility] 414-01 Encounter: [id number]\      Physician Requesting Consult: Dr Elva Dominguez    Reason for Consult / Principal Problem: elevated LFTs     HPI:   This is a 79y.o. year old female with PMHx chronic combined systolic and diastolic heart failure, chronic atrial fibrillation (on coumadin), mitral valve replacement (1990), hypothyroidism, Parkinson's disease who presented on 7/06/23 for evaluation for worsening weakness. Was instructed by Neurology to go to ED after patient's  called reporting left facial droop and weakness. CT head without contrast demonstrated no acute intracranial abnormality, chronic cerebellar lacunar infarcts. Patient reports she has been feeling increasingly fatigued and weak over the past few months. States she has had frequent falls in recent weeks. Patient noted to have elevated LFTs. BNP was elevated on admission at 2000 and also noted to have positive troponin. She is being seen by cardiology who did order Lasix for diuresis. We were consulted regarding elevated LFTs. Right upper quadrant ultrasound showed no acute findings with absent gallbladder. CAT scan showed small bilateral pleural effusions and small volume abdominal pelvic ascites but otherwise no acute findings. Patient only is complaining of some left-sided abdominal pain but states that she fell on that side. Does have some difficulty with eating and still does have difficulty with swallowing and had been seen by Dr. Dwayne Llanos in the past.  She reports she also has some constipation and she believes that she takes MiraLAX at home. Denies any history of liver disease. Reports that she has not been taking the Coumadin incorrectly. Allergies: Allergies   Allergen Reactions   • Artane [Trihexyphenidyl] Other (See Comments)     Felt "disconnected", "out if it", shaky.  Did not feel right    • Glycopyrrolate      Nose bleeds   • Pollen Extract        Medications:  Current Facility-Administered Medications:   •  amantadine (SYMMETREL) capsule 100 mg, 100 mg, Oral, BID, Alea Cardona PA-C, 100 mg at 07/07/23 0820  •  carbidopa-levodopa (SINEMET)  mg per tablet 1 tablet, 1 tablet, Oral, TID, Alea Cardona PA-C, 1 tablet at 07/07/23 6861  •  ferrous gluconate (FERGON) tablet 324 mg, 324 mg, Oral, Daily Before Breakfast, Alea Cadrona PA-C, 324 mg at 07/07/23 1449  •  levothyroxine tablet 50 mcg, 50 mcg, Oral, Early Morning, Alea Cardona PA-C, 50 mcg at 07/07/23 0551  •  memantine (NAMENDA) tablet 10 mg, 10 mg, Oral, Daily, Alea Cardona PA-C, 10 mg at 07/07/23 1504    Past Medical history:  Past Medical History:   Diagnosis Date   • Anal fissure    • Arthritis     osteo joints   • Asthma with acute exacerbation    • Blepharitis    • Breast lump    • Chalazion    • CHF, chronic (HCC)    • Difficulty walking    • Disease of thyroid gland    • Drooling    • Dysphagia    • Fall 05/04/2018   • Fibromyalgia, primary    • Glaucoma     Normal pressure   • History of transfusion 1996   • Hordeolum externum    • Hx of transient ischemic attack (TIA)    • Hypophonia    • Infectious viral hepatitis     Hep C dx 1996    • Lyme carditis    • Murmur, cardiac    • Parkinsons ([address])    • Rotator cuff tendinitis    • Seasonal allergies    • Urinary frequency        Past Surgical History:   Past Surgical History:   Procedure Laterality Date   • BREAST BIOPSY Right 2018    benign   • BREAST CYST EXCISION Left     benign   • BREAST SURGERY N/A     benign, calcium   • CARDIAC SURGERY  1990    mitral valve replacement   • CATARACT EXTRACTION Right 2015   • CATARACT EXTRACTION Left 2014   • CHEST TUBE INSERTION Right     post pleural effusion   • CHOLECYSTECTOMY  2000    lap   • HYSTERECTOMY  1973    partial   • ND ARTHROCENTESIS ASPIR&/INJ MAJOR JT/BURSA W/O US Left 3/19/2021    Procedure: Sacroiliac Joint Injection ( 93925 ); Surgeon: Sobia Rodriguez MD;  Location: [facility] MAIN OR;  Service: Pain Management    • [place] COLSC FLX W/RMVL OF TUMOR POLYP LESION SNARE TQ N/A 2017    Procedure: COLONOSCOPY and biopsy.;  Surgeon: Igor Holm MD;  Location: [address] [place] [address] GI LAB;   Service: Gastroenterology   • SKIN BIOPSY      pre cancerous on face   • TONSILLECTOMY     • US GUIDED BREAST BIOPSY RIGHT COMPLETE Right 2018       Family History:   Family History   Problem Relation Age of Onset   • Heart disease Mother         murmur Cardiomegaly age 64   • Pneumonia Father    • Heart disease Brother         mitrsl valve   • Parkinsonism Brother    • Arthritis Brother    • Lupus Daughter    • Diabetes Daughter    • Depression Daughter        Social history:   Social History     Socioeconomic History   • Marital status: /Civil Union     Spouse name: Not on file   • Number of children: Not on file   • Years of education: Not on file   • Highest education level: Not on file   Occupational History   • Not on file   Tobacco Use   • Smoking status: Former     Packs/day: 0.10     Years: 10.00     Total pack years: 1.00     Types: Cigarettes     Quit date: 1970     Years since quittin.5   • Smokeless tobacco: Never   Vaping Use   • Vaping Use: Never used   Substance and Sexual Activity   • Alcohol use: Not Currently   • Drug use: No   • Sexual activity: Not Currently   Other Topics Concern   • Not on file   Social History Narrative   • Not on file     Social Determinants of Health     Financial Resource Strain: Low Risk  (11/3/2022)    Overall Financial Resource Strain (CARDIA)    • Difficulty of Paying Living Expenses: Not hard at all   Food Insecurity: No Food Insecurity (2022)    Hunger Vital Sign    • Worried About Running Out of Food in the Last Year: Never true    • Ran Out of Food in the Last Year: Never true   Transportation Needs: No Transportation Needs (2022)    PRAPARE - Transportation    • Lack of Transportation (Medical): No    • Lack of Transportation (Non-Medical):  No   Physical Activity: Not on file   Stress: Not on file   Social Connections: Not on file   Intimate Partner Violence: Not on file   Housing Stability: Low Risk  (12/22/2022)    Housing Stability Vital Sign    • Unable to Pay for Housing in the Last Year: No    • Number of Places Lived in the Last Year: 1    • Unstable Housing in the Last Year: No       Review of Systems: All other systems were reviewed and were negative, otherwise please refer to HPI    Physical Exam: /74   Pulse 88   Temp (!) 97.3 °F (36.3 °C)   Resp 18   Ht 5' 4" (1.626 m)   Wt 69.9 kg (154 lb)   SpO2 95%   BMI 26.43 kg/m²     General Appearance:    Alert, cooperative, no distress, appears stated age   Head:    Normocephalic, without obvious abnormality, atraumatic   Eyes:    No scleral icterus           Mouth:  Mucosa moist   Neck:   Supple, symmetrical, trachea midline, no thyromegaly       Lungs:     Clear to auscultation bilaterally, respirations unlabored       Heart:    Regular rate and rhythm, S1 and S2 normal, no murmur, rub   or gallop     Abdomen:     Soft, non-tender, bowel sounds active all four quadrants,     no masses, no organomegaly   Genitalia:   deferred   Rectal:   deferred   Extremities:   Extremities normal,no cyanosis or edema       Skin:   Skin color, texture, turgor normal, no rashes or lesions       Neurologic:   Grossly intact, no focal deficit           Lab Results:   Recent Results (from the past 24 hour(s))   HS Troponin I 2hr    Collection Time: 07/06/23 12:19 PM   Result Value Ref Range    hs TnI 2hr 243 (H) "Refer to ACS Flowchart"- see link ng/L    Delta 2hr hsTnI -13 <20 ng/L   HS Troponin I 4hr    Collection Time: 07/06/23  5:25 PM   Result Value Ref Range    hs TnI 4hr 243 (H) "Refer to ACS Flowchart"- see link ng/L    Delta 4hr hsTnI -13 <20 ng/L   Protime-INR    Collection Time: 07/06/23  6:20 PM   Result Value Ref Range Protime 109.8 (H) 11.6 - 14.5 seconds    INR >15.00 (HH) 0.84 - 1.19   Lipid Panel with Direct LDL reflex    Collection Time: 07/07/23  4:32 AM   Result Value Ref Range    Cholesterol 79 See Comment mg/dL    Triglycerides 107 See Comment mg/dL    HDL, Direct 13 (L) >=50 mg/dL    LDL Calculated 45 0 - 100 mg/dL   Comprehensive metabolic panel    Collection Time: 07/07/23  4:32 AM   Result Value Ref Range    Sodium 135 135 - 147 mmol/L    Potassium 3.9 3.5 - 5.3 mmol/L    Chloride 105 96 - 108 mmol/L    CO2 23 21 - 32 mmol/L    ANION GAP 7 mmol/L    BUN 47 (H) 5 - 25 mg/dL    Creatinine 1.84 (H) 0.60 - 1.30 mg/dL    Glucose 100 65 - 140 mg/dL    Calcium 9.6 8.4 - 10.2 mg/dL    Corrected Calcium 10.3 (H) 8.3 - 10.1 mg/dL     (H) 13 - 39 U/L    ALT 71 (H) 7 - 52 U/L    Alkaline Phosphatase 80 34 - 104 U/L    Total Protein 5.0 (L) 6.4 - 8.4 g/dL    Albumin 3.1 (L) 3.5 - 5.0 g/dL    Total Bilirubin 2.46 (H) 0.20 - 1.00 mg/dL    eGFR 24 ml/min/1.73sq m   Magnesium    Collection Time: 07/07/23  4:32 AM   Result Value Ref Range    Magnesium 1.9 1.9 - 2.7 mg/dL   Protime-INR    Collection Time: 07/07/23  4:32 AM   Result Value Ref Range    Protime 115.9 (H) 11.6 - 14.5 seconds    INR >15.00 (HH) 0.84 - 1.19   CBC (With Platelets)    Collection Time: 07/07/23  4:32 AM   Result Value Ref Range    WBC 7.32 4.31 - 10.16 Thousand/uL    RBC 3.29 (L) 3.81 - 5.12 Million/uL    Hemoglobin 11.4 (L) 11.5 - 15.4 g/dL    Hematocrit 34.5 (L) 34.8 - 46.1 %     (H) 82 - 98 fL    MCH 34.7 (H) 26.8 - 34.3 pg    MCHC 33.0 31.4 - 37.4 g/dL    RDW 16.8 (H) 11.6 - 15.1 %    Platelets 856 (L) 380 - 390 Thousands/uL    MPV 11.9 8.9 - 12.7 fL   TSH, 3rd generation with Free T4 reflex    Collection Time: 07/07/23  4:32 AM   Result Value Ref Range    TSH 3RD GENERATON 3.176 0.450 - 4.500 uIU/mL   Echo complete w/ contrast if indicated    Collection Time: 07/07/23  8:01 AM   Result Value Ref Range    A4C EF 8 %    LVOT stroke volume 24.24 LVOT stroke volume index 13.10 ml/m2    LVOT Cardiac Index 1.04 l/min/m2    LVOT Cardiac Output 1.81 l/min    LVIDd 5.90 cm    LVIDS 5.70 cm    IVSd 1.40 cm    LVPWd 1.20 cm    FS 3 28 - 44    MV E' Tissue Velocity Septal 3 cm/s    MV E' Tissue Velocity Lateral 7 cm/s    E wave deceleration time 88 ms    MV Peak E Dejan 127 cm/s    MV Peak A Dejan 0.43 m/s    AV LVOT peak gradient 1 mmHg    LVOT peak VTI 7.72 cm    LVOT peak dejan 0.49 m/s    RVID d 3.2 cm    Tricuspid annular plane systolic excursion 2.57 cm    LA size 5.9 cm    LA length (A2C) 7.30 cm    LA volume (BP) 214 mL    RAA A4C 19 cm2    LVOT diameter 2.0 cm    LVOT mn grad 0.0 mmHg    MV mean gradient antegrade 2 mmHg    MV peak gradient antegrade 8 mmHg    MV VTI 28.08 cm    MV stenosis pressure 1/2 time 25 ms    MV valve area p 1/2 method 8.80     TR Peak Dejan 3.7 m/s    Triscuspid Valve Regurgitation Peak Gradient 53.0 mmHg    Ao root 2.60 cm    Tricuspid valve peak regurgitation velocity 3.65 m/s    Left ventricular stroke volume (2D) 9.00 mL    IVS 1.4 cm    LEFT VENTRICLE SYSTOLIC VOLUME (MOD BIPLANE) 2D 314 mL    LV DIASTOLIC VOLUME (MOD BIPLANE) 2D 171 mL    Left Atrium Area-systolic Four Chamber 03.2 cm2    Left Atrium Area-systolic Apical Two Chamber 47 cm2    LVSV, 2D 9 mL    LVOT area 3.14 cm2    MV valve area by continuity eq 0.86 cm2    Est. RA pres 8.0 mmHg    Right Ventricular Peak Systolic Pressure 45.18 mmHg    LV EF 20        Imaging Studies: MRI brain wo contrast    Result Date: 7/7/2023  Narrative: MRI BRAIN WITHOUT CONTRAST INDICATION: stroke. COMPARISON:   2/12/2022 TECHNIQUE:  Multiplanar, multisequence imaging of the brain was performed. IMAGE QUALITY:  Diagnostic. FINDINGS: BRAIN PARENCHYMA:  There is no discrete mass, mass effect or midline shift. There is no intracranial hemorrhage. There is no evidence of acute infarction and diffusion imaging is unremarkable. There are no white matter changes in the cerebral hemispheres. A few small chronic lacunar infarctions noted bilaterally in the inferior aspect of the cerebellum, right greater than left. VENTRICLES: Ventricles and extra-axial CSF spaces are prominent commensurate with the degree of volume loss. No hydrocephalus. No acute intraventricular hemorrhage. SELLA AND PITUITARY GLAND:  Normal. ORBITS: Bilateral lens implants noted. PARANASAL SINUSES:  Normal. VASCULATURE:  Evaluation of the major intracranial vasculature demonstrates appropriate flow voids. CALVARIUM AND SKULL BASE:  Normal. EXTRACRANIAL SOFT TISSUES:  Normal.     Impression: A few small chronic bilateral cerebellar lacunar infarctions are stable. No acute infarction, intracranial hemorrhage or mass effect. Workstation performed: [id number]     Echo complete w/ contrast if indicated    Result Date: 7/7/2023  Narrative: •  Left Ventricle: Left ventricular cavity size is mildly dilated. Wall thickness is mildly increased. The left ventricular ejection fraction is 20% by visual estimation. Systolic function is severely reduced. Although no diagnostic regional wall motion abnormality was identified, this possibility cannot be completely excluded on the basis of this study. Unable to assess diastolic function. Left atrial filling pressure is elevated. •  IVS: There is abnormal septal motion consistent with post-operative status. •  Right Ventricle: Systolic function is moderately to severely reduced. Abnormal tricuspid annular plane systolic excursion (TAPSE) < 1.7 cm. •  Left Atrium: The atrium is severely dilated (>48 mL/m2). •  Atrial Septum: The septum bows into the right atrium, suggesting increased left atrial pressure. •  Aortic Valve: The aortic valve is trileaflet. The leaflets are mildly thickened. The leaflets are moderately calcified. There is mildly reduced mobility. There is trace to mild regurgitation. •  Mitral Valve: There is a bileaflet mechanical mitral valve prosthesis.  The prosthetic valve is not well visualized but appears to be functioning normally. The prosthetic valve appears to be well-seated. Degree of valve regurgitation assessment limited due to mechanical valve artifact. There is at least mild regurgitation. There is no evidence of transvalvular regurgitation. There is no evidence of stenosis. The mitral valve mean gradient is 2mmHg which is unchanged from previous. •  Tricuspid Valve: There is moderate regurgitation. The right ventricular systolic pressure is moderately elevated. The estimated right ventricular systolic pressure is 70.28 mmHg. •  Pulmonic Valve: There is moderate regurgitation. •  Compared to previous echocardiogram, there has been a significant reduction in LV and RV systolic function. Mechanical mitral valve prosthesis appears unchanged. US right upper quadrant    Result Date: 7/6/2023  Narrative: RIGHT UPPER QUADRANT ULTRASOUND INDICATION:     elevated LFTs. COMPARISON: Abdominal ultrasound dated 7/26/2016, CT abdomen pelvis dated 7/6/2023 TECHNIQUE:   Real-time ultrasound of the right upper quadrant was performed with a curvilinear transducer with both volumetric sweeps and still imaging techniques. FINDINGS: PANCREAS:  Visualized portions of the pancreas are within normal limits. AORTA AND IVC:  Visualized portions are normal for patient age. LIVER: Size:  Within normal range. The liver measures 13.5 cm in the midclavicular line. Contour:  Surface contour is smooth. Parenchyma:  Echogenicity and echotexture are within normal limits. No liver mass identified. Limited imaging of the main portal vein shows it to be patent and hepatopetal. BILIARY: Patient has undergone cholecystectomy. No intrahepatic biliary dilatation. CBD measures 4.0 mm. No choledocholithiasis. KIDNEY: Right kidney measures 9.8 x 5.5 x 6.1 cm. Volume 172.0 mL Multiple cortical cysts identified, largest as follows: 7 x 8 x 5 mm in the lower pole ASCITES:   None. Impression: No acute findings.  Absent gallbladder. Trace right pleural fluid visualized. Workstation performed: [id number]     CT abdomen pelvis wo contrast    Result Date: 7/6/2023  Narrative: CT ABDOMEN AND PELVIS WITHOUT IV CONTRAST INDICATION:   elevated LFT's, ROB, weakness. COMPARISON:  None. TECHNIQUE:  CT examination of the abdomen and pelvis was performed without intravenous contrast. Multiplanar 2D reformatted images were created from the source data. This examination, like all CT scans performed in the [facility], was performed utilizing techniques to minimize radiation dose exposure, including the use of iterative reconstruction and automated exposure control. Radiation dose length product (DLP) for this visit:  482.53 mGy-cm Enteric contrast was not administered. FINDINGS: ABDOMEN LOWER CHEST: Small bilateral pleural effusions and bibasilar atelectasis. Severe cardiomegaly. Mitral valve replacement. LIVER/BILIARY TREE:  Unremarkable. GALLBLADDER: Gallbladder is surgically absent. SPLEEN:  Unremarkable. PANCREAS:  Unremarkable. ADRENAL GLANDS: Stable left adrenal thickening versus adenoma. KIDNEYS/URETERS:  Unremarkable. No hydronephrosis. STOMACH AND BOWEL: There is colonic diverticulosis without evidence of acute diverticulitis. APPENDIX:  No findings to suggest appendicitis. ABDOMINOPELVIC CAVITY: Small volume abdominopelvic ascites. No pneumoperitoneum. No lymphadenopathy. VESSELS: Atherosclerotic changes are present. No evidence of aneurysm. PELVIS REPRODUCTIVE ORGANS: Surgical changes of prior hysterectomy. URINARY BLADDER:  Unremarkable. ABDOMINAL WALL/INGUINAL REGIONS:  Unremarkable. OSSEOUS STRUCTURES: Stable sclerosis in the left posterior iliac bone, which again may represent Paget's disease. No acute fracture or destructive osseous lesion. Mild multilevel degenerative spondylosis. Impression: Small bilateral pleural effusions and small volume abdominopelvic ascites.  Otherwise, no acute abnormality in the abdomen or pelvis. Workstation performed: [id number]     CT head wo contrast    Result Date: 7/6/2023  Narrative: CT BRAIN - WITHOUT CONTRAST INDICATION:   Weakness, increasing drastically over the last few days. COMPARISON: CT head 3/14/2022. MRI brain 2/12/2022 TECHNIQUE:  CT examination of the brain was performed. Multiplanar 2D reformatted images were created from the source data. Radiation dose length product (DLP) for this visit:  845.88 mGy-cm . This examination, like all CT scans performed in the [facility], was performed utilizing techniques to minimize radiation dose exposure, including the use of iterative  reconstruction and automated exposure control. IMAGE QUALITY:  Diagnostic. FINDINGS: PARENCHYMA:  No intracranial mass, mass effect or midline shift. No CT signs of acute infarction. No acute parenchymal hemorrhage. Cerebellar lacunar infarcts, best seen on the MRI. VENTRICLES AND EXTRA-AXIAL SPACES:  Normal for the patient's age. VISUALIZED ORBITS: Normal visualized orbits. PARANASAL SINUSES: Normal visualized paranasal sinuses. CALVARIUM AND EXTRACRANIAL SOFT TISSUES:  Normal.     Impression: 1. No acute intracranial abnormality. 2.  Chronic cerebellar lacunar infarcts best seen on the prior MRI examination. Workstation performed: [id number]     FL barium swallow    Result Date: 6/14/2023  Narrative: BARIUM SWALLOW-ESOPHAGRAM INDICATION:   R13.10: Dysphagia, unspecified. History of Parkinson's disease. Worsening swallowing difficulties. 31 pound unintentional weight loss. COMPARISON: 5/2/2023 chest x-ray IMAGES: FLUOROSCOPY TIME:   3 MIN TECHNIQUE: The patient was given effervescent granules and barium by mouth and images of the esophagus were obtained. FINDINGS: The esophagus is normal in caliber. Esophageal dysmotility with to and fro movement of barium significantly worsens in the right anterior oblique (WIGGINS) position.  Unfortunately, cine images in the WIGGINS position were not captured due to technical factors. In this position, swallowed contrast remained in the proximal 1/2 of the esophagus. The contrast passed distally after the patient was asked to perform several dry swallows. No mucosal lesion, ulceration or evidence of fold thickening is seen. A barium pill was swallowed without difficulty. Gastroesophageal reflux was not observed. There is no hiatal hernia. Visualized stomach is within normal limits. Impression: Esophageal dysmotility. Workstation performed: [id number]       Assessment/Plan:   23-year-old female with elevated LFTs and presents with generalized weakness and was noted to have elevated BNP with history of heart failure.  -Ultrasound as well as CAT scan was unremarkable but did show small amount of ascites and pleural effusions and likely LFT elevation is related to congestive hepatopathy  -Would recommend to trend LFTs but they have improved significantly since admission  -Does have significant coagulopathy but is anticoagulated although patient reports that she has been taking this correctly, patient has been given several doses of vitamin K and now is 4.91 today upon repeat, continue to monitor PT/INR  -Will order hepatitis panel as well as iron studies and autoimmune work-up but suspect this is related to hepatic congestion  -No evidence of liver disease on imaging but does also have some thrombocytopenia which has just been present since admission  -Needs direct bilirubin because it appears that patient may have Gilbert's syndrome as it appears she has chronically elevated total bilirubin on her blood work    We will follow her course. Thank you for the consultation and case will be discussed with Dr. El Espino.

## 2023-07-07 NOTE — NURSING NOTE
While by monitor, noticed on telemetry that patient's rhythm converted from Afib to VT, back to Afib. 20 beats of VT noted. Cardiology David Jean Baptiste) notified face to face, and patient assessed by this RN. Patient reported sharp chest pain that just woke her from sleeping localized on the left side. EKG obtained and given to cardiology, VS taken, Troponin drawn. Primary RN Gen made aware of above. Patient reported chest pain decreasing as time passed. Patient comfortable at this time and educated to notify staff immediately if chest pain returns.

## 2023-07-07 NOTE — CASE MANAGEMENT
Case Management Assessment & Discharge Planning Note    Patient name Leticia De La Cruz  Location [address] 113/0 UNXTS 614-* MRN [id number]  : 1940 Date 2023       Current Admission Date: 2023  Current Admission Diagnosis:Generalized weakness   Patient Active Problem List    Diagnosis Date Noted   • Elevated LFTs 2023   • ROB (acute kidney injury) ([address]) 2023   • Supratherapeutic INR 2023   • Elevated troponin 2023   • Dysphagia    • Chronic diastolic heart failure ([address]) 2023   • History of hypokalemia 2023   • History of dehydration 2023   • History of left bundle branch block (LBBB) 2023   • Primary osteoarthritis of right knee 2023   • Stage 3a chronic kidney disease ([address]) 2023   • Edema of both lower extremities 2023   • Obstructive sleep apnea 2023   • Prerenal azotemia 2023   • Warfarin anticoagulation 2023   • Abnormal weight loss 2023   • Chronic fatigue 2023   • Impaired fasting glucose 2023   • Hypercalcemia 2023   • Stage 3b chronic kidney disease ([address]) 2023   • Knee pain 2022   • Generalized weakness 2022   • 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 2022   • Orthostatic hypotension 2021   • Other chronic pain 06/15/2021   • Cardiomyopathy, dilated, nonischemic ([address]) 2021   • Left bundle branch block (LBBB) 2021   • Asymptomatic PVCs 2021   • On continuous oral anticoagulation 2021   • Mixed dyslipidemia 2021   • High risk medication use 2021   • Other microscopic hematuria 2018   • Numbness and tingling in right hand    • Suprapatellar bursitis of right knee 2018   • H/O mitral valve replacement with mechanical valve 2018   • Atrial fibrillation, chronic ([address]) 2018   • Generalized osteoarthritis 2017   • Chronic right-sided low back pain without sciatica 10/23/2017   • Chronic combined systolic and diastolic heart failure ([address]) 02/23/2017   • Memory impairment 02/15/2017   • Seasonal allergies 12/16/2016   • Vitamin D insufficiency 03/31/2015   • Transient ischemic attack 02/25/2014   • Parkinson's disease ([address]) 12/16/2013   • Hypothyroidism 07/01/2013   • Venous insufficiency of both lower extremities 12/03/2012   • Dupuytren's contracture 09/04/2012   • Mitral valve disorder 09/04/2012      LOS (days): 1  Geometric Mean LOS (GMLOS) (days): 5.70  Days to GMLOS:4.6     OBJECTIVE:    Risk of Unplanned Readmission Score: 16.67         Current admission status: Inpatient       Preferred Pharmacy:   [address] [place] [address], [address] [place] [address] Drive [address] [place] [id number]  Phone: [phone] Fax: [address] [address]  Phone: [phone] Fax: [phone]    Primary Care Provider: Jai Burdick MD    Primary Insurance: MEDICARE  Secondary Insurance: BLUE CROSS    ASSESSMENT:  Brown Proxies    There are no active Health Care Proxies on file. Readmission Root Cause  30 Day Readmission: No    Patient Information  Admitted from[de-identified] Home  Mental Status: Alert  Assessment information provided by[de-identified] Spouse  Primary Caregiver: Spouse  Caregiver's Name[de-identified] Luana Lose  Caregiver's Relationship to Patient[de-identified] Family Member  Support Systems: Spouse/significant other  [facility] Residence: [address] [place] [address] do you live in?: [facility] entry access options.  Select all that apply.: Elevator  Type of Current Residence: Apartment  Floor Level: 2  Upon entering residence, is there a bedroom on the main floor (no further steps)?: Yes  Upon entering residence, is there a bathroom on the main floor (no further steps)?: Yes  In the last 12 months, was there a time when you were not able to pay the mortgage or rent on time?: No  In the last 12 months, how many places have you lived?: 1  In the last 12 months, was there a time when you did not have a steady place to sleep or slept in a shelter (including now)?: No  Homeless/housing insecurity resource given?: N/A  Living Arrangements: Lives w/ Spouse/significant other  Is patient a ?: No    Activities of Daily Living Prior to Admission  Functional Status: Assistance  Completes ADLs independently?: No  Level of ADL dependence: Assistance  Ambulates independently?: No  Level of ambulatory dependence: Assistance  Does patient use assisted devices?: Yes  Assisted Devices (DME) used: Straight Maryveneciaan Byligia, Shower Chair  Does patient currently own DME?: Yes  What DME does the patient currently own?: Shower Chair, [employer]  Does patient have a history of Outpatient Therapy (PT/OT)?: Yes  Does the patient have a history of Short-Term Rehab?: Yes ([place])  Does patient have a history of HHC?: Yes ([facility])  Does patient currently have [address]?: Yes (Spouse reports pt getting home PT/OT services through )    [facility]  Type of Current Home Care Services: Home OT, Home PT    Patient Information Continued  Income Source: Pension/jail  Does patient have prescription coverage?: Yes  Within the past 12 months, you worried that your food would run out before you got the money to buy more.: Never true  Within the past 12 months, the food you bought just didn't last and you didn't have money to get more.: Never true  Food insecurity resource given?: N/A  Does patient receive dialysis treatments?: No  Does patient have a history of substance abuse?: No  Does patient have a history of Mental Health Diagnosis?: No         Means of Transportation  Means of Transport to Appts[de-identified] Family transport  In the past 12 months, has lack of transportation kept you from medical appointments or from getting medications?: No  In the past 12 months, has lack of transportation kept you from meetings, work, or from getting things needed for daily living?: No  Was application for public transport provided?: N/A        DISCHARGE DETAILS:    Discharge planning discussed with[de-identified] patient's pouse  Freedom of Choice: Yes  Comments - Freedom of Choice: STR recommendations discussed with spouse. FOC discussed. Spouse would like to hold off on referrals to STR as he would like to get a better understanding of what's going on with pt medically. CM contacted family/caregiver?: Yes  Were Treatment Team discharge recommendations reviewed with patient/caregiver?: Yes  Did patient/caregiver verbalize understanding of patient care needs?: Yes  Were patient/caregiver advised of the risks associated with not following Treatment Team discharge recommendations?: Yes    Contacts  Patient Contacts: Lynn Malone  Relationship to Patient[de-identified] Family  Contact Method: In Person  Reason/Outcome: Referral, Discharge Planning, Continuity of [address] [place] [address]         Is the patient interested in [facility] at discharge?: No    DME Referral Provided  Referral made for DME?: No    Other Referral/Resources/Interventions Provided:  Referral Comments: Met with spouse who provided information for initial assessment. Pt and spouse live together in a 2nd floor apt with elevator access. Prior to admission, pt ambulated with a RW and requires assistance with ADLs. Spouse is pt's is pt's primary caregiver. He stated that pt's daughter is visiting from [facility] and will be staying for 3wks to help and pt's other daughter lives nearby and assists when needed but he is the sole caretaker. Pt was just discharged from [facility] mid June and went home with VNA. [facility] believes [facility] was with [facility]. Pt stated SN dc but is getting home therapy still but reported its with [facility]. Spouse normally drives pt to medical appointments.  Per spouse, pt was ambulating with a RW up until 1wk before hospitalization and now requiring much more assistance. Spouse wanted to hold off on STR referrals due to wanting more of an understanding of pt's medical condition. Discussed LTC placement but spouse does not feel pt would prefer that and ultimitely would prefer pt to return home with homecare servies. CM will follow for discharge planning needs.          Treatment Team Recommendation: Short Term Rehab  Discharge Destination Plan[de-identified] Short Term Rehab

## 2023-07-07 NOTE — PROGRESS NOTES
[address] [facility]  Progress Note  Name: Camilla Duran  MRN: [id number]  Unit/Bed#: 4 [place] 414-01 I Date of Admission: 7/6/2023   Date of Service: 7/7/2023 I Hospital Day: 1    Assessment/Plan   * Generalized weakness  Assessment & Plan  Patient presented to ED with months history of generalized weakness, worsening in past few weeks. Patient now unable to ambulate, usually can ambulate with walker. Family reports left sided facial droop in the morning along with speech difficulties.   CT head: No acute intracranial abnormality  · Possibly secondary to worsening of Parkinson's disease, will rule out acute stroke  · MRI brain negative  · ECHO w/ bubble study showed no PFO  · Monitor on telemetry  · Neuro checks  · PT/OT evaluation  · Neurology consulted    ROB (acute kidney injury) ([address])  Assessment & Plan  · Baseline cr appears around 1.0-1.2  · Cr on admission 2.15  · Improved to 1.84 this morning s/p IVF  · CT a/p shows no obstruction  · Monitor bmp in am    Elevated LFTs  Assessment & Plan    Tbili 3.20 on admission  · Patient denies abdominal pain or other GI symptoms  · Denies alcohol use or prior hepatitis  · Improving today  · CT a/p shows no acute pathology  · GI consulted    Elevated troponin  Assessment & Plan  · troponins 256 > 243 > 243  · EKG shows a fib HR 79  · Denies chest pain  · Cardiology consult  · 7/7 patient noted to have run of NSVT with active chest pain  · Started on bisoprolol by cardiology, troponins pending  · Repeat EKG no significant change    Supratherapeutic INR  Assessment & Plan  · INR 14.85 on admission, >15.0 this morning  · No signs of active bleeding  · CT head negative  · Hold warfarin  · Continue to monitor  · S/p 5 mg oral vitamin K on 7/6 and 5 mg IV vitamin K on 7/7  · Improved to 4.91  · Repeat INR at midnight  · Goal INR 2.5 - 3.5    Parkinson's disease ([address])  Assessment & Plan  · Follow with neurology outpatient  · Continue Sinemet and amantadine  · Gradual weakness possibly secondary to worsening of Parkinson's disease  · Neurology consulted    Hypothyroidism  Assessment & Plan  · Continue levothyroxine  · TSH WNL    Chronic combined systolic and diastolic heart failure (HCC)  Assessment & Plan  Wt Readings from Last 3 Encounters:   07/07/23 69.9 kg (154 lb)   06/20/23 68.2 kg (150 lb 6.4 oz)   06/07/23 67.6 kg (149 lb)     ECHO 4/2022: EF 64-83%, systolic function low/normal, left atrium severely dilated, mitral valve prosthesis functioning normally  · On torsemide 20 mg daily and spironolactone 12.5 mg daily  · Held on admission in setting of ROB  · Monitor Is/Os and daily weights  · Salt and fluid restriction  · Repeat ECHO 0/4/74: EF 53%, systolic function severely reduced  · S/p 20 mg IV lasix 7/7/23  · Started on bisprolol    Atrial fibrillation, chronic (HCC)  Assessment & Plan  · History of chronic a fib, EKG on admission showed controlled a fib  · On warfarin, INR 14 on admission  · Hold warfarin  · Monitor INR    H/O mitral valve replacement with mechanical valve  Assessment & Plan  · Hx mechanical valve replacement in 1990  · On warfarin, INR 14 on admission  · Goal INR 2.5-3.5  · Hold warfarin  · Continue to monitor INR       VTE Pharmacologic Prophylaxis:   Moderate Risk (Score 3-4) - Pharmacological DVT Prophylaxis Contraindicated. Sequential Compression Devices Ordered. Patient Centered Rounds: I performed bedside rounds with nursing staff today. Discussions with Specialists or Other Care Team Provider: cardiology, GI, rn, cm    Education and Discussions with Family / Patient: Updated  (daughter) at bedside.     Total Time Spent on Date of Encounter in care of patient: 45 minutes This time was spent on one or more of the following: performing physical exam; counseling and coordination of care; obtaining or reviewing history; documenting in the medical record; reviewing/ordering tests, medications or procedures; communicating with other healthcare professionals and discussing with patient's family/caregivers. Current Length of Stay: 1 day(s)  Current Patient Status: Inpatient   Certification Statement: The patient will continue to require additional inpatient hospital stay due to reduced EF, elevated INR, chest pain, elevated LFTs  Discharge Plan: Anticipate discharge in 48-72 hrs to discharge location to be determined pending rehab evaluations. Code Status: Level 3 - DNAR and DNI    Subjective:   Patient seen and examined at bedside this morning. She reported she felt better than yesterday does not feel short of breath. She still feels weak overall. She ate most of her breakfast with speech therapy. Denies current chest pain, shortness of breath, nausea, vomiting, abdominal pain. Objective:     Vitals:   Temp (24hrs), Av.4 °F (36.3 °C), Min:97.1 °F (36.2 °C), Max:98 °F (36.7 °C)    Temp:  [97.1 °F (36.2 °C)-98 °F (36.7 °C)] 97.3 °F (36.3 °C)  HR:  [65-92] 65  Resp:  [18-20] 18  BP: (105-118)/(64-74) 106/69  SpO2:  [94 %-100 %] 95 %  Body mass index is 26.43 kg/m². Input and Output Summary (last 24 hours): Intake/Output Summary (Last 24 hours) at 2023 1545  Last data filed at 2023 0805  Gross per 24 hour   Intake 1300 ml   Output 400 ml   Net 900 ml       Physical Exam:   Physical Exam  Vitals and nursing note reviewed. Constitutional:       General: She is not in acute distress. Appearance: She is well-developed. Cardiovascular:      Rate and Rhythm: Normal rate. Rhythm irregular. Heart sounds: Murmur heard. Pulmonary:      Effort: Pulmonary effort is normal. No respiratory distress. Breath sounds: Normal breath sounds. Abdominal:      Palpations: Abdomen is soft. Tenderness: There is no abdominal tenderness. Musculoskeletal:         General: No swelling. Right lower leg: Edema present. Left lower leg: Edema present.    Skin:     General: Skin is warm and dry. Capillary Refill: Capillary refill takes less than 2 seconds. Neurological:      Mental Status: She is alert. Motor: Weakness present. Psychiatric:         Mood and Affect: Mood normal.         Additional Data:     Labs:  Results from last 7 days   Lab Units 23  0432 23  1036   WBC Thousand/uL 7.32 7.19   HEMOGLOBIN g/dL 11.4* 11.7   HEMATOCRIT % 34.5* 35.8   PLATELETS Thousands/uL 100* 120*   NEUTROS PCT %  --  79*   LYMPHS PCT %  --  10*   MONOS PCT %  --  11   EOS PCT %  --  0     Results from last 7 days   Lab Units 23  0432   SODIUM mmol/L 135   POTASSIUM mmol/L 3.9   CHLORIDE mmol/L 105   CO2 mmol/L 23   BUN mg/dL 47*   CREATININE mg/dL 1.84*   ANION GAP mmol/L 7   CALCIUM mg/dL 9.6   ALBUMIN g/dL 3.1*   TOTAL BILIRUBIN mg/dL 2.46*   ALK PHOS U/L 80   ALT U/L 71*   AST U/L 155*   GLUCOSE RANDOM mg/dL 100     Results from last 7 days   Lab Units 23  1448   INR  4.91*     Results from last 7 days   Lab Units 23  1016   POC GLUCOSE mg/dl 142*               Lines/Drains:  Invasive Devices     Peripheral Intravenous Line  Duration           Peripheral IV 23 Distal;Left;Ventral (anterior) Forearm 1 day          Drain  Duration           External Urinary Catheter 1 day                  Telemetry:  Telemetry Orders (From admission, onward)             24 Hour Telemetry Monitoring  Continuous x 24 Hours (Telem)           Question:  Reason for 24 Hour Telemetry  Answer:  TIA/Suspected CVA/ Confirmed CVA                 Telemetry Reviewed: Atrial fibrillation.  HR averaging 70-80  Indication for Continued Telemetry Use: Arrthymias requiring medical therapy             Imaging: Reviewed radiology reports from this admission including: MRI brain and ECHO    Recent Cultures (last 7 days):         Last 24 Hours Medication List:   Current Facility-Administered Medications   Medication Dose Route Frequency Provider Last Rate   • amantadine  100 mg Oral BID Kelsey Soto PA-C     • bisoprolol  2.5 mg Oral Daily Cleora Kawasaki, PA-C     • carbidopa-levodopa  1 tablet Oral TID Kelsey Soto PA-C     • ferrous gluconate  324 mg Oral Daily Before Breakfast Kelsey Soto PA-C     • levothyroxine  50 mcg Oral Early Morning Kelsey Soto PA-C     • memantine  10 mg Oral Daily Kelsey Soto PA-C          Today, Patient Was Seen By: Kelsey Soto PA-C    **Please Note: This note may have been constructed using a voice recognition system. **

## 2023-07-07 NOTE — ASSESSMENT & PLAN NOTE
Patient presented to ED with months history of generalized weakness, worsening in past few weeks. Patient now unable to ambulate, usually can ambulate with walker. Family reports left sided facial droop in the morning along with speech difficulties.   CT head: No acute intracranial abnormality  · Possibly secondary to worsening of Parkinson's disease, will rule out acute stroke  · MRI brain negative  · ECHO w/ bubble study showed no PFO  · Monitor on telemetry  · Neuro checks  · PT/OT evaluation  · Neurology consulted

## 2023-07-07 NOTE — ASSESSMENT & PLAN NOTE
· Hx mechanical valve replacement in 1990  · On warfarin, INR 14 on admission  · Goal INR 2.5-3.5  · Hold warfarin  · Continue to monitor INR

## 2023-07-07 NOTE — TELEPHONE ENCOUNTER
Pl, advise pt -  I am sure hospital team is overseeing everything -  we do not go to the hospital for many years

## 2023-07-07 NOTE — PROGRESS NOTES
Progress Note - Cardiology   [facility]     Arcadio Rollins 79 y.o. female MRN: [id number]  : 1940  Unit/Bed#: 4 [place] [address]01 Encounter: [id number]    Assessment and Plan:   1. Acute on chronic combined systolic and diastolic heart failure.     - Presented on 23 for evaluation for worsening weakness. Patient does endorse shortness of breath with exertion. - 23 BNP: 2,023.   - Follow-up repeat TTE.  - 23 CT abdomen/pelvis without contrast: Small bilateral pleural effusions and small volume abdominopelvic ascites. Otherwise, no acute abnormality in the abdomen or pelvis. - 22 TTE: LVEF 50-55%. Systolic function is low normal.  Left atrium severely dilated. Atrial septum bows into right atrium. Trace to mild aortic valve regurgitation. Mechanical mitral valve prosthesis, appears to be functioning normally. Mild to moderate tricuspid valve regurgitation.  - Review of home medication notes patient is on Aldactone 12.5 mg daily as well as torsemide 20 mg 3 times a week (Monday/Wednesday/Friday). Aldactone and torsemide on hold in setting of ROB. - Will order one-time dose of Lasix 20 mg IV.  - Recommend against use of excessive IV fluids. - Will discuss with attending initiation of heart failure medications pending review of TTE.  - Strict I/Os. - Daily weights, standing weights.   - Continue low-sodium diet with 1800 mL fluid restriction.  - CHF education. 2. Elevated troponin.     - 23 hs troponin: 256 (0 hrs), 243 (2 hrs). - 23 EKG: Atrial fibrillation, ventricular rate 72 bpm.  Noted PVCs. Left bundle branch block. - Patient currently denies chest pain, palpitations, shortness of breath, lightheadedness, dizziness, nausea, or vomiting.    - Likely nonischemic myocardial injury secondary to acute on chronic systolic and diastolic heart failure.   - Follow-up repeat TTE.     3. Generalized weakness.     - Presented on 23 for evaluation for worsening weakness. Was instructed by Neurology to go to ED after patient's  called reporting left facial droop and weakness.  - Patient reports she has had progressive weakness over the past several months, states is greatly affecting her ability to ambulate, and states she has had frequent falls in recent weeks. -  7/06/23 CT head without contrast demonstrated no acute intracranial abnormality, chronic cerebellar lacunar infarcts.  - Pending MRI brain without contrast.  - Neurology recommendations noted.     4. Transaminitis.     - 7/06/23 AST: 304. - 7/06/23 ALT: 246. - Unclear etiology at this time. May be secondary to acute on chronic combined heart failure.     5. ROB.    - Creatinine appears to be between 1.0 and 1.2.   - 7/06/23 creatinine (on admission): 2.15.  - 7/07/23 creatinine: 1.84.  - Home medication of Aldactone and torsemide on hold in setting of ROB. - Care per primary team.     6. Supratherapeutic INR.    - 7/06/23 INR (on admission): 14.85.   - 7/07/23 INR: 15.  - Holding home medication of warfarin.  - Received 5 mg IV vitamin K on 7/07/23.  - Received one-time dose of vitamin K 5 mg oral on 7/06/23.   - Follow-up repeat INR.  - Care per primary team.     7. Chronic atrial fibrillation.     - Not currently on beta-blocker therapy, unclear why.   - 7/06/23 EKG: Atrial fibrillation, ventricular rate 72 bpm.  Noted PVCs. Left bundle branch block. - [id number]- VASc stroke risk score: 6 points, moderate-high risk. - Home medication of warfarin on hold in setting of supratherapeutic INR on admission.     8. Chronic left bundle branch block.     - Has been documented since 2017. - 7/06/23 EKG: Atrial fibrillation, ventricular rate 72 bpm.  Noted PVCs. Left bundle branch block.     9. Mitral valve replacement.     - s/p mitral valve replacement in 1990.   - 4/11/22 TTE: Mitral Valve: There is a mechanical mitral valve prosthesis.  The prosthetic valve appears to be functioning normally. The prosthetic valve appears to be well-seated.  Mean gradient is 3 mmHg and peak mitral velocity is 1.5 m/sec. There is trace regurgitation. There is no evidence of transvalvular regurgitation.  - Follow-up repeat TTE.     10. Hypothyroidism.     - Currently on levothyroxine 50 mcg daily. - 5/02/23 TSH: 2.559.      12. Parkinson's disease.       - Currently on Sinemet  mg 3 times daily, Namenda 10 mg daily, and Symmetrel 100 mg twice daily.  - Neurology consult recommendations noted.     Subjective / Objective:        No acute events. Patient reports she feels better since admission yesterday. She continues to report feeling fatigued. She currently denies chest pain, palpitations, shortness of breath, lightheadedness, dizziness, headache, nausea, or vomiting. Vitals: Blood pressure 115/74, pulse 88, temperature (!) 97.3 °F (36.3 °C), resp. rate 18, height 5' 4" (1.626 m), weight 69.9 kg (154 lb), SpO2 95 %. Vitals:    07/06/23 1433 07/07/23 0758   Weight: 69.9 kg (154 lb) 69.9 kg (154 lb)     Body mass index is 26.43 kg/m². BP Readings from Last 3 Encounters:   07/07/23 115/74   06/20/23 102/60   06/07/23 112/69     Orthostatic Blood Pressures    Flowsheet Row Most Recent Value   Blood Pressure 115/74 filed at 07/07/2023 0758        I/O       07/05 0701  07/06 0700 07/06 0701  07/07 0700 07/07 0701  07/08 0700    P. O.  120     I.V. (mL/kg)   1180 (16.9)    IV Piggyback  1000     Total Intake(mL/kg)  1120 (16) 1180 (16.9)    Urine (mL/kg/hr)  600     Total Output  600     Net  +520 +1180               Invasive Devices     Peripheral Intravenous Line  Duration           Peripheral IV 07/06/23 Distal;Left;Ventral (anterior) Forearm <1 day          Drain  Duration           External Urinary Catheter <1 day                  Intake/Output Summary (Last 24 hours) at 7/7/2023 0921  Last data filed at 7/7/2023 0805  Gross per 24 hour   Intake 2300 ml   Output 600 ml   Net 1700 ml         Physical Exam:   Physical Exam  Vitals reviewed. Constitutional:       General: She is not in acute distress. Cardiovascular:      Rate and Rhythm: Normal rate. Rhythm irregular. Pulses: Normal pulses. Heart sounds: Murmur heard. Pulmonary:      Effort: Pulmonary effort is normal. No respiratory distress. Breath sounds: Decreased breath sounds present. Abdominal:      General: Abdomen is flat. There is no distension. Palpations: Abdomen is soft. Tenderness: There is no abdominal tenderness. Musculoskeletal:      Right lower leg: Edema present. Left lower leg: Edema present. Skin:     General: Skin is warm and dry. Neurological:      Mental Status: She is alert. Medications/ Allergies:     Current Facility-Administered Medications   Medication Dose Route Frequency Provider Last Rate   • amantadine  100 mg Oral BID Florinda Jara PA-C     • carbidopa-levodopa  1 tablet Oral TID Florinda Jara PA-C     • ferrous gluconate  324 mg Oral Daily Before Breakfast Florinda Jara PA-C     • levothyroxine  50 mcg Oral Early Morning Florinda Jara PA-C     • memantine  10 mg Oral Daily Florinda Jara PA-C          Allergies   Allergen Reactions   • Artane [Trihexyphenidyl] Other (See Comments)     Felt "disconnected", "out if it", shaky. Did not feel right    • Glycopyrrolate      Nose bleeds   • Pollen Extract        VTE Pharmacologic Prophylaxis: none.      Labs:   Troponins:  Results from last 7 days   Lab Units 07/06/23  1725 07/06/23  1219   HSTNI D2 ng/L  --  -13   HSTNI D4 ng/L -13  --          CBC with diff:  Results from last 7 days   Lab Units 07/07/23  0432 07/06/23  1036   WBC Thousand/uL 7.32 7.19   HEMOGLOBIN g/dL 11.4* 11.7   HEMATOCRIT % 34.5* 35.8   MCV fL 105* 104*   PLATELETS Thousands/uL 100* 120*   RBC Million/uL 3.29* 3.45*   MCH pg 34.7* 33.9   MCHC g/dL 33.0 32.7   RDW % 16.8* 16.5*   MPV fL 11.9 11.9   NRBC AUTO /100 WBCs  --  0       CMP:  Results from last 7 days   Lab Units 07/07/23  0432 07/06/23  1036 07/06/23  0743   SODIUM mmol/L 135 136 137   POTASSIUM mmol/L 3.9 3.4* 3.8   CHLORIDE mmol/L 105 103 106   CO2 mmol/L 23 27 26   ANION GAP mmol/L 7 6 5   BUN mg/dL 47* 52* 48*   CREATININE mg/dL 1.84* 2.15* 2.33*   GLUCOSE FASTING mg/dL  --   --  101*   CALCIUM mg/dL 9.6 10.5* 10.7*   AST U/L 155* 304*  --    ALT U/L 71* 246*  --    ALK PHOS U/L 80 80  --    TOTAL PROTEIN g/dL 5.0* 5.6*  --    ALBUMIN g/dL 3.1* 3.5  --    TOTAL BILIRUBIN mg/dL 2.46* 3.20*  --    EGFR ml/min/1.73sq m 24 20 18       Magnesium:  Results from last 7 days   Lab Units 07/07/23  0432   MAGNESIUM mg/dL 1.9     Coags:  Results from last 7 days   Lab Units 07/07/23  0432 07/06/23  1820 07/06/23  1153 07/06/23  0743   PTT seconds  --   --  54*  --    INR  >15.00* >15.00* 14.85* 14.42*     TSH:  Results from last 7 days   Lab Units 07/07/23  0432   TSH 3RD GENERATON uIU/mL 3.176     No components found for: "TSH3"  Lipid Profile:  Results from last 7 days   Lab Units 07/07/23  0432   CHOLESTEROL mg/dL 79   TRIGLYCERIDES mg/dL 107   HDL mg/dL 13*   LDL CALC mg/dL 45     Hgb A1c:    NT-proBNP: No results for input(s): "NTBNP" in the last 72 hours. Imaging & Testing   I have personally reviewed pertinent reports. US right upper quadrant    Result Date: 7/6/2023    Impression: No acute findings. Absent gallbladder. Trace right pleural fluid visualized. CT abdomen pelvis wo contrast     Result Date: 7/6/2023     Impression: Small bilateral pleural effusions and small volume abdominopelvic ascites. Otherwise, no acute abnormality in the abdomen or pelvis.      CT head wo contrast     Result Date: 7/6/2023     Impression: 1. No acute intracranial abnormality. 2.  Chronic cerebellar lacunar infarcts best seen on the prior MRI examination.      FL barium swallow     Result Date: 6/14/2023     Impression: Esophageal dysmotility.        EKG / Monitor: Personally reviewed.       Telemetry reviewed: Currently atrial fibrillation, ventricular rate 79 bpm.    Cardiac testing:       Echo complete with contrast if indicated - 4/11/22    Left Ventricle Left ventricular cavity size is normal. Wall thickness is mildly increased. The left ventricular ejection fraction is 50 to 55 % by visual estimation. Systolic function is low normal.  Although no diagnostic regional wall motion abnormality was identified, this possibility cannot be completely excluded on the basis of this study. Unable to assess diastolic function. Interventricular Septum There is abnormal septal motion consistent with post-operative status. Right Ventricle Right ventricular cavity size is normal. Systolic function is normal. Wall thickness is normal.      Left Atrium The atrium is severely dilated. Right Atrium The atrium is normal in size. Atrial Septum The septum bows into the right atrium. Aortic Valve The aortic valve is trileaflet. The leaflets are mildly thickened. The leaflets are not calcified. The leaflets exhibit normal mobility. There is trace to mild regurgitation. There is no evidence of stenosis. Mitral Valve There is a mechanical mitral valve prosthesis. The prosthetic valve appears to be functioning normally. The prosthetic valve appears to be well-seated.  Mean gradient is 3 mmHg and peak mitral velocity is 1.5 m/sec. There is trace regurgitation. There is no evidence of transvalvular regurgitation. There is no evidence of stenosis. Tricuspid Valve There is mild to moderate regurgitation.  Pulmonary artery pressure 35 mmHg. There is no evidence of stenosis. Pulmonic Valve There is trace regurgitation. There is no evidence of stenosis. Ascending Aorta The aortic root is normal in size. IVC/SVC The inferior vena cava is normal in size. Respirophasic changes were normal.      Pericardium There is no pericardial effusion.  The pericardium is normal in appearance.               Results for orders placed during the hospital encounter of 19    Echo complete with contrast if indicated    Narrative  [address].  [place], [facility] [address] [place] [address]  [phone]    Transthoracic Echocardiogram  2D, M-mode, Doppler, and Color Doppler    Study date:  2019    Patient: Miguel Drew  MR number: [id number]  Account number: [de-identified]  : 1940  Age: 77 years  Gender: Female  Status: Outpatient  Location: Echo lab  Height: 64 in  Weight: 185.7 lb  BP: 94/ 63 mmHg    Indications: Cardiomyopathy    Diagnoses: I42.9 - Cardiomyopathy, unspecified    Sonographer:  Yumiko Haines RDCS  Primary Physician:  Abigail Cabrera MD  Referring Physician:  Houston Kingston MD  Group:  [facility]  Ordering Physician:  Houston Kingston MD  Interpreting Physician:  Iker Chen MD    SUMMARY    LEFT VENTRICLE:  Systolic function was at the lower limits of normal. Ejection fraction was estimated in the range of 50 % to 55 % to be 55 %. There were no regional wall motion abnormalities. Wall thickness was mildly increased. There was mild concentric hypertrophy. VENTRICULAR SEPTUM:  There was mild paradoxical motion. 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 LEFT ATRIUM:  The atrium was moderately to markedly dilated. MITRAL VALVE:  A mechanical prosthesis was present. It exhibited normal function and normal motion. There was no pannus formation, a normal-appearing sewing ring, and no rocking motion of the sewing ring. Mean gradient 4 mm of Hg and peak velocity 1.5  m/sec with trace to mild MR. There was trace regurgitation. AORTIC VALVE:  There was mild regurgitation. TRICUSPID VALVE:  There was mild regurgitation. Estimated peak PA pressure was 25 mmHg. HISTORY: PRIOR HISTORY: Mitral Valve Replacement (), A-Fib, Hypothyroidism    PROCEDURE: The procedure was performed in the echo lab. This was a routine study.  The transthoracic approach was used. The study included complete 2D imaging, M-mode, complete spectral Doppler, and color Doppler. The heart rate was 71 bpm,  at the start of the study. Echocardiographic views were limited due to lung interference. Image quality was adequate. LEFT VENTRICLE: Size was normal. Systolic function was at the lower limits of normal. Ejection fraction was estimated in the range of 50 % to 55 % to be 55 %. There were no regional wall motion abnormalities. Wall thickness was mildly  increased. There was mild concentric hypertrophy. DOPPLER: The study was not technically sufficient to allow evaluation of LV diastolic function. VENTRICULAR SEPTUM: There was mild paradoxical motion. 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 RIGHT VENTRICLE: The size was normal. Systolic function was low normal.    LEFT ATRIUM: The atrium was moderately to markedly dilated. RIGHT ATRIUM: Size was normal.    MITRAL VALVE: A mechanical prosthesis was present. It exhibited normal function and normal motion. There was no pannus formation, a normal-appearing sewing ring, and no rocking motion of the sewing ring. Mean gradient 4 mm of Hg and peak  velocity 1.5 m/sec with trace to mild MR. DOPPLER: There was no evidence for stenosis. There was trace regurgitation. AORTIC VALVE: The valve was trileaflet. Leaflets exhibited mildly increased thickness, mild calcification, and normal cuspal separation. DOPPLER: Transaortic velocity was within the normal range. There was no evidence for stenosis. There  was mild regurgitation. TRICUSPID VALVE: DOPPLER: There was mild regurgitation. Estimated peak PA pressure was 25 mmHg. PULMONIC VALVE: DOPPLER: There was no regurgitation. PERICARDIUM: There was no thickening or calcification. There was no pericardial effusion. AORTA: The root exhibited normal size. SYSTEMIC VEINS: IVC: The inferior vena cava was normal in size.  Respirophasic changes were normal.    SYSTEM MEASUREMENT TABLES    2D mode  AoR Diam 2D: 3 cm  LA Diam (2D): 5.4 cm  LA/Ao (2D): 1.8  FS (2D Teich): 27.3 %  IVSd (2D): 1.34 cm  LVDEV: 42.8 cmï¾³  LVEDV MOD BP: 85 cmï¾³  LVESV: 19.5 cmï¾³  LVIDd(2D): 3.26 cm  LVISd (2D): 2.37 cm  LVOT Area 2D: 3.14 cmï¾²  LVPWd (2D): 1.33 cm  SV (Teich): 23.3 cmï¾³    Apical four chamber  LVEF A4C: 50 %    Apical two chamber  LVEF A2C: 54 %    Unspecified Scan Mode  MANSOOR Cont Eq (Peak Dejan): 1.97 cmï¾²  LVOT Diam.: 2 cm  LVOT Vmax: 827 mm/s  LVOT Vmax; Mean: 827 mm/s  Peak Grad.; Mean: 3 mm[Hg]  MV Peak E Dejan.  Mean: 1330 mm/s  MVA (PHT): 3.01 cmï¾²  PHT: 73 ms  Max P mm[Hg]  V Max: 2420 mm/s  Vmax: 2140 mm/s  RA Area: 18.3 cmï¾²  RA Volume: 45.7 cmï¾³  TAPSE: 1.3 cm    [employer] Accredited Echocardiography Laboratory    Prepared and electronically signed by    Jose Stephens MD  Signed 2019 07:39:37          Rosalinda Arriaga PA-C

## 2023-07-07 NOTE — ASSESSMENT & PLAN NOTE
Tbili 3.20 on admission  · Patient denies abdominal pain or other GI symptoms  · Denies alcohol use or prior hepatitis  · Improving today  · CT a/p shows no acute pathology  · GI consulted

## 2023-07-07 NOTE — ASSESSMENT & PLAN NOTE
Wt Readings from Last 3 Encounters:   07/07/23 69.9 kg (154 lb)   06/20/23 68.2 kg (150 lb 6.4 oz)   06/07/23 67.6 kg (149 lb)     ECHO 4/2022: EF 37-83%, systolic function low/normal, left atrium severely dilated, mitral valve prosthesis functioning normally  · On torsemide 20 mg daily and spironolactone 12.5 mg daily  · Held on admission in setting of ROB  · Monitor Is/Os and daily weights  · Salt and fluid restriction  · Repeat ECHO 9/9/36: EF 66%, systolic function severely reduced  · S/p 20 mg IV lasix 7/7/23  · Started on bisprolol

## 2023-07-07 NOTE — QUICK NOTE
Notified by nursing staff at approximately 1411hrs of abnormal readings on telemetry monitor. Review of telemetry monitor notes patient with 20 beats of NSVT. Patient actively reporting chest pain at time of NSVT. Reports pain is localized to left breast, does not radiate. States pain is exacerbated by inspiration. She denies chest pain is associated with worsening shortness of breath, palpitations, lightheadedness, dizziness, nausea, vomiting or headache. Discussed with Dr. Chirag Patel will initiate bisoprolol 2.5 mg daily. Will obtain EKG and troponin. Continue telemetry.

## 2023-07-07 NOTE — PROGRESS NOTES
Neurology Consult Follow Up      Arcadio Rollins is a 79 y.o. female  [address] [facility],[address]    [id number]        Assessment/Recommendations:    1. Generalized weakness   2. Stroke like symptoms  3. Elevated troponins  4. Supra therapeutic INR  5. ROB  6. CHF  7. Parkinson's disease      Patient is slightly doing better. Her INR Patient is now 4.9 from 15. Her current condition is not due to PD but is from  multifactorial from multiple etiologies as mentioned above. She has very low EF, elevated bnp which would make her fatigued with ADLs. Her mri brain is negative for any acute process. Will defer management upto primary team, cardiology and GI. Resume sinemet at home dose. Amantadine was introduced in May for dyskinesia and fatigue. She had mentioned it was working well with her. Didn't find any adverse reaction of liver injury from this. Patient would benefit from rehab but family is not sure and have requested wheel chair to go home with. Total time of encounter:  30 min  More than 50% of the time was used in counseling and/or coordination of care  Extent of couseling and/or coordination of care    Chief Complaint:  Weakness   Subjective:     Patient is slightly getting better in terms of her energy level. There is no focal weakness noted today. Both daughters and  at bedside.      Past Medical History:   Diagnosis Date   • Anal fissure    • Arthritis     osteo joints   • Asthma with acute exacerbation    • Blepharitis    • Breast lump    • Chalazion    • CHF, chronic (HCC)    • Difficulty walking    • Disease of thyroid gland    • Drooling    • Dysphagia    • Fall 05/04/2018   • Fibromyalgia, primary    • Glaucoma     Normal pressure   • History of transfusion 1996   • Hordeolum externum    • Hx of transient ischemic attack (TIA)    • Hypophonia    • Infectious viral hepatitis     Hep C dx 1996    • Lyme carditis    • Murmur, cardiac    • Parkinsons (HCC)    • Rotator cuff tendinitis    • Seasonal allergies    • Urinary frequency      Social History     Socioeconomic History   • Marital status: /Civil Union     Spouse name: Not on file   • Number of children: Not on file   • Years of education: Not on file   • Highest education level: Not on file   Occupational History   • Not on file   Tobacco Use   • Smoking status: Former     Packs/day: 0.10     Years: 10.00     Total pack years: 1.00     Types: Cigarettes     Quit date: 5     Years since quittin.5   • Smokeless tobacco: Never   Vaping Use   • Vaping Use: Never used   Substance and Sexual Activity   • Alcohol use: Not Currently   • Drug use: No   • Sexual activity: Not Currently   Other Topics Concern   • Not on file   Social History Narrative   • Not on file     Social Determinants of Health     Financial Resource Strain: Low Risk  (11/3/2022)    Overall Financial Resource Strain (CARDIA)    • Difficulty of Paying Living Expenses: Not hard at all   Food Insecurity: No Food Insecurity (2023)    Hunger Vital Sign    • Worried About Running Out of Food in the Last Year: Never true    • Ran Out of Food in the Last Year: Never true   Transportation Needs: No Transportation Needs (2023)    PRAPARE - Transportation    • Lack of Transportation (Medical): No    • Lack of Transportation (Non-Medical):  No   Physical Activity: Not on file   Stress: Not on file   Social Connections: Not on file   Intimate Partner Violence: Not on file   Housing Stability: Low Risk  (2023)    Housing Stability Vital Sign    • Unable to Pay for Housing in the Last Year: No    • Number of Places Lived in the Last Year: 1    • Unstable Housing in the Last Year: No     Family History   Problem Relation Age of Onset   • Heart disease Mother         murmur Cardiomegaly age 64   • Pneumonia Father    • Heart disease Brother         mitrsl valve   • Parkinsonism Brother    • Arthritis Brother    • Lupus Daughter    • Diabetes Daughter    • Depression Daughter        ROS:  Please see HPI for positive symptoms. No fever, no chills, no weight change. Ocular: No drainage, no blurred vision. HEENT:  No sore throat, earache, or congestion. No neck pain. COR:  No chest pain. No palpitations. Lungs:  + sob, wheezing,  GI:  no  nausea, no vomiting, no diarrhea, no constipation, no anorexia. :  No dysuria, frequency, or urgency. No hematuria. No vaginal discharge or vaginal bleeding. Musculoskeletal:  No joint pain or swelling or edema. Skin:  No rash or itching. Psychiatric:  no anxiety, no depression. Endocrine:  No polyuria or polydipsia. Objective:  /69   Pulse 65   Temp (!) 97.3 °F (36.3 °C)   Resp 18   Ht 5' 4" (1.626 m)   Wt 69.9 kg (154 lb)   SpO2 95%   BMI 26.43 kg/m²     General: alert   Mental status: oriented x3  Attention: normal  Knowledge: fair  Language and Speech: normal  Cranial nerves: II-XII intact  Muscle tone: normal  Motor strength:  5/5 throughout  Sensory: normal to light touch, pin prick, vibration. Proprioception intact  Gait: deferred due to fatigue  Coordination: finger to nose and heel to toe normal  Reflexes: 2+ throughout  except as noted      Labs:      Lab Results   Component Value Date    WBC 7.32 07/07/2023    HGB 11.4 (L) 07/07/2023    HCT 34.5 (L) 07/07/2023     (H) 07/07/2023     (L) 07/07/2023     Lab Results   Component Value Date    HGBA1C 5.9 (H) 07/20/2020     Lab Results   Component Value Date    ALT 71 (H) 07/07/2023     (H) 07/07/2023    ALKPHOS 80 07/07/2023    BILITOT 1.6 (H) 12/07/2015     Lab Results   Component Value Date    GLUCOSE 91 12/07/2015    CALCIUM 9.6 07/07/2023     12/07/2015    K 3.9 07/07/2023    CO2 23 07/07/2023     07/07/2023    BUN 47 (H) 07/07/2023    CREATININE 1.84 (H) 07/07/2023         Review of reports and notes reveal:     MRI brain wo contrast    Result Date: 7/7/2023  A few small chronic bilateral cerebellar lacunar infarctions are stable. No acute infarction, intracranial hemorrhage or mass effect. Workstation performed: [id number]     US right upper quadrant    Result Date: 7/6/2023  No acute findings. Absent gallbladder. Trace right pleural fluid visualized. Workstation performed: [id number]     CT abdomen pelvis wo contrast    Result Date: 7/6/2023  Small bilateral pleural effusions and small volume abdominopelvic ascites. Otherwise, no acute abnormality in the abdomen or pelvis. Workstation performed: [id number]     CT head wo contrast    Result Date: 7/6/2023  1. No acute intracranial abnormality. 2.  Chronic cerebellar lacunar infarcts best seen on the prior MRI examination. Workstation performed: [id number]               Thank you for this consult.           Rachel Olmedo MD  [facility]  [address] [place] [address]  [phone]

## 2023-07-07 NOTE — ASSESSMENT & PLAN NOTE
· Baseline cr appears around 1.0-1.2  · Cr on admission 2.15  · Improved to 1.84 this morning s/p IVF  · CT a/p shows no obstruction  · Monitor bmp in am

## 2023-07-07 NOTE — PLAN OF CARE
Problem: NEUROSENSORY - ADULT  Goal: Achieves stable or improved neurological status  Description: INTERVENTIONS  - Monitor and report changes in neurological status  - Monitor vital signs such as temperature, blood pressure, glucose, and any other labs ordered   - Initiate measures to prevent increased intracranial pressure  - Monitor for seizure activity and implement precautions if appropriate      7/7/2023 0933 by Ivan Aguirre RN  Outcome: Progressing  7/7/2023 0930 by Ivan Aguirre RN  Outcome: Progressing

## 2023-07-07 NOTE — PLAN OF CARE
Problem: Potential for Falls  Goal: Patient will remain free of falls  Description: INTERVENTIONS:  - Educate patient/family on patient safety including physical limitations  - Instruct patient to call for assistance with activity   - Consult OT/PT to assist with strengthening/mobility   - Keep Call bell within reach  - Keep bed low and locked with side rails adjusted as appropriate  - Keep care items and personal belongings within reach  - Initiate and maintain comfort rounds  - Make Fall Risk Sign visible to staff  - Offer Toileting every 2  Hours, in advance of need  - Initiate/Maintain bed alarm  Problem: Prexisting or High Potential for Compromised Skin Integrity  Goal: Skin integrity is maintained or improved  Description: INTERVENTIONS:  - Identify patients at risk for skin breakdown  - Assess and monitor skin integrity  - Assess and monitor nutrition and hydration status  - Monitor labs   - Assess for incontinence   - Turn and reposition patient  - Assist with mobility/ambulation  - Relieve pressure over bony prominences  - Avoid friction and shearing  - Provide appropriate hygiene as needed including keeping skin clean and dry  - Evaluate need for skin moisturizer/barrier cream  - Collaborate with interdisciplinary team   - Patient/family teaching  - Consider wound care consult   Outcome: Progressing     Problem: MOBILITY - ADULT  Goal: Maintain or return to baseline ADL function  Description: INTERVENTIONS:  -  Assess patient's ability to carry out ADLs; assess patient's baseline for ADL function and identify physical deficits which impact ability to perform ADLs (bathing, care of mouth/teeth, toileting, grooming, dressing, etc.)  - Assess/evaluate cause of self-care deficits   - Assess range of motion  - Assess patient's mobility; develop plan if impaired  - Assess patient's need for assistive devices and provide as appropriate  - Encourage maximum independence but intervene and supervise when necessary  - Involve family in performance of ADLs  - Assess for home care needs following discharge   - Consider OT consult to assist with ADL evaluation and planning for discharge  - Provide patient education as appropriate  Outcome: Progressing  Goal: Maintains/Returns to pre admission functional level  Description: INTERVENTIONS:  - Perform BMAT or MOVE assessment daily.   - Set and communicate daily mobility goal to care team and patient/family/caregiver. - Collaborate with rehabilitation services on mobility goals if consulted  - Perform Range of Motion 3  times a day. - Reposition patient every 2  hours.   - Dangle patient 3 times a day  - Stand patient 3  times a day  - Out of bed to chair 3  times a day   - Out of bed for meals 3   Problem: PAIN - ADULT  Goal: Verbalizes/displays adequate comfort level or baseline comfort level  Description: Interventions:  - Encourage patient to monitor pain and request assistance  - Assess pain using appropriate pain scale  - Administer analgesics based on type and severity of pain and evaluate response  - Implement non-pharmacological measures as appropriate and evaluate response  - Consider cultural and social influences on pain and pain management  - Notify physician/advanced practitioner if interventions unsuccessful or patient reports new pain  Outcome: Progressing     Problem: NEUROSENSORY - ADULT  Goal: Achieves stable or improved neurological status  Description: INTERVENTIONS  - Monitor and report changes in neurological status  - Monitor vital signs such as temperature, blood pressure, glucose, and any other labs ordered   - Initiate measures to prevent increased intracranial pressure  - Monitor for seizure activity and implement precautions if appropriate      Outcome: Progressing     Problem: CARDIOVASCULAR - ADULT  Goal: Absence of cardiac dysrhythmias or at baseline rhythm  Description: INTERVENTIONS:  - Continuous cardiac monitoring, vital signs, obtain 12 lead EKG if ordered  - Administer antiarrhythmic and heart rate control medications as ordered  - Monitor electrolytes and administer replacement therapy as ordered  Outcome: Progressing     Problem: GASTROINTESTINAL - ADULT  Goal: Maintains adequate nutritional intake  Description: INTERVENTIONS:  - Monitor percentage of each meal consumed  - Identify factors contributing to decreased intake, treat as appropriate  - Assist with meals as needed  - Monitor I&O, weight, and lab values if indicated  - Obtain nutrition services referral as needed  Outcome: Progressing     Problem: SKIN/TISSUE INTEGRITY - ADULT  Goal: Skin Integrity remains intact(Skin Breakdown Prevention)  Description: Assess:  -Perform Rony assessment every shift  -Clean and moisturize skin every shift  -Inspect skin when repositioning, toileting, and assisting with ADLS  -Assess extremities for adequate circulation and sensation     Bed Management:  -Have minimal linens on bed & keep smooth, unwrinkled  -Change linens as needed when moist or perspiring  -Avoid sitting or lying in one position for more than 2 hours while in bed  -Keep HOB at 30 degrees     Toileting:  -Offer bedside commode  -Assess for incontinence every 2 hours  -Use incontinent care products after each incontinent episode such as protective barrier cream.    Activity:  -Mobilize patient 3 times a day  -Encourage activity and walks on unit  -Encourage or provide ROM exercises   -Turn and reposition patient every 2  Hours  -Use appropriate equipment to lift or move patient in bed  -Instruct/ Assist with weight shifting every 15 minutes when out of bed in chair  -Consider limitation of chair time 4 hour intervals    Skin Care:  -Avoid use of baby powder, tape, friction and shearing, hot water or constrictive clothing  -Relieve pressure over bony prominences using waffle cushion  -Do not massage red bony areas    Next Steps:  -Consider consults to  interdisciplinary teams such as pt/ot  Outcome: Progressing   times a day  - Out of bed for toileting  - Record patient progress and toleration of activity level   Outcome: Progressing     - Apply yellow socks and bracelet for high fall risk patients  - Consider moving patient to room near nurses station  Outcome: Progressing

## 2023-07-08 LAB
ALBUMIN SERPL BCP-MCNC: 3.3 G/DL (ref 3.5–5)
ALP SERPL-CCNC: 80 U/L (ref 34–104)
ALT SERPL W P-5'-P-CCNC: 114 U/L (ref 7–52)
ANA SER QL IA: NEGATIVE
ANION GAP SERPL CALCULATED.3IONS-SCNC: 9 MMOL/L
AST SERPL W P-5'-P-CCNC: 208 U/L (ref 13–39)
BILIRUB DIRECT SERPL-MCNC: 1.01 MG/DL (ref 0–0.2)
BILIRUB SERPL-MCNC: 3.54 MG/DL (ref 0.2–1)
BUN SERPL-MCNC: 50 MG/DL (ref 5–25)
CALCIUM SERPL-MCNC: 9.9 MG/DL (ref 8.4–10.2)
CHLORIDE SERPL-SCNC: 107 MMOL/L (ref 96–108)
CO2 SERPL-SCNC: 21 MMOL/L (ref 21–32)
CREAT SERPL-MCNC: 1.97 MG/DL (ref 0.6–1.3)
DME PARACHUTE DELIVERY DATE REQUESTED: NORMAL
DME PARACHUTE ITEM DESCRIPTION: NORMAL
DME PARACHUTE ORDER STATUS: NORMAL
DME PARACHUTE SUPPLIER NAME: NORMAL
DME PARACHUTE SUPPLIER PHONE: NORMAL
ERYTHROCYTE [DISTWIDTH] IN BLOOD BY AUTOMATED COUNT: 17.1 % (ref 11.6–15.1)
FERRITIN SERPL-MCNC: 64 NG/ML (ref 11–307)
GFR SERPL CREATININE-BSD FRML MDRD: 23 ML/MIN/1.73SQ M
GLUCOSE SERPL-MCNC: 107 MG/DL (ref 65–140)
HAV IGM SER QL: ABNORMAL
HBV CORE IGM SER QL: ABNORMAL
HBV SURFACE AG SER QL: ABNORMAL
HCT VFR BLD AUTO: 36 % (ref 34.8–46.1)
HCV AB SER QL: REACTIVE
HGB BLD-MCNC: 11.7 G/DL (ref 11.5–15.4)
INR PPP: 2.52 (ref 0.84–1.19)
INR PPP: 3.11 (ref 0.84–1.19)
IRON SATN MFR SERPL: 7 % (ref 15–50)
IRON SERPL-MCNC: 29 UG/DL (ref 50–170)
MCH RBC QN AUTO: 34.4 PG (ref 26.8–34.3)
MCHC RBC AUTO-ENTMCNC: 32.5 G/DL (ref 31.4–37.4)
MCV RBC AUTO: 106 FL (ref 82–98)
PLATELET # BLD AUTO: 107 THOUSANDS/UL (ref 149–390)
PMV BLD AUTO: 12.3 FL (ref 8.9–12.7)
POTASSIUM SERPL-SCNC: 4.3 MMOL/L (ref 3.5–5.3)
PROT SERPL-MCNC: 5.3 G/DL (ref 6.4–8.4)
PROTHROMBIN TIME: 27.2 SECONDS (ref 11.6–14.5)
PROTHROMBIN TIME: 32 SECONDS (ref 11.6–14.5)
RBC # BLD AUTO: 3.4 MILLION/UL (ref 3.81–5.12)
SODIUM SERPL-SCNC: 137 MMOL/L (ref 135–147)
TIBC SERPL-MCNC: 441 UG/DL (ref 250–450)
WBC # BLD AUTO: 8.29 THOUSAND/UL (ref 4.31–10.16)

## 2023-07-08 PROCEDURE — 80074 ACUTE HEPATITIS PANEL: CPT | Performed by: PHYSICIAN ASSISTANT

## 2023-07-08 PROCEDURE — 99233 SBSQ HOSP IP/OBS HIGH 50: CPT | Performed by: INTERNAL MEDICINE

## 2023-07-08 PROCEDURE — 86038 ANTINUCLEAR ANTIBODIES: CPT | Performed by: PHYSICIAN ASSISTANT

## 2023-07-08 PROCEDURE — 85610 PROTHROMBIN TIME: CPT

## 2023-07-08 PROCEDURE — 86015 ACTIN ANTIBODY EACH: CPT | Performed by: PHYSICIAN ASSISTANT

## 2023-07-08 PROCEDURE — 80048 BASIC METABOLIC PNL TOTAL CA: CPT

## 2023-07-08 PROCEDURE — 83540 ASSAY OF IRON: CPT | Performed by: PHYSICIAN ASSISTANT

## 2023-07-08 PROCEDURE — 83550 IRON BINDING TEST: CPT | Performed by: PHYSICIAN ASSISTANT

## 2023-07-08 PROCEDURE — 80076 HEPATIC FUNCTION PANEL: CPT | Performed by: PHYSICIAN ASSISTANT

## 2023-07-08 PROCEDURE — 85027 COMPLETE CBC AUTOMATED: CPT

## 2023-07-08 PROCEDURE — 86381 MITOCHONDRIAL ANTIBODY EACH: CPT | Performed by: PHYSICIAN ASSISTANT

## 2023-07-08 PROCEDURE — 82728 ASSAY OF FERRITIN: CPT | Performed by: PHYSICIAN ASSISTANT

## 2023-07-08 RX ORDER — DOPAMINE HYDROCHLORIDE 160 MG/100ML
1-20 INJECTION, SOLUTION INTRAVENOUS CONTINUOUS
Status: DISCONTINUED | OUTPATIENT
Start: 2023-07-08 | End: 2023-07-13 | Stop reason: HOSPADM

## 2023-07-08 RX ORDER — WARFARIN SODIUM 3 MG/1
3 TABLET ORAL
Status: DISCONTINUED | OUTPATIENT
Start: 2023-07-08 | End: 2023-07-13 | Stop reason: HOSPADM

## 2023-07-08 RX ORDER — SODIUM CHLORIDE 9 MG/ML
50 INJECTION, SOLUTION INTRAVENOUS CONTINUOUS
Status: DISCONTINUED | OUTPATIENT
Start: 2023-07-08 | End: 2023-07-08

## 2023-07-08 RX ORDER — CIPROFLOXACIN HYDROCHLORIDE 3.5 MG/ML
1 SOLUTION/ DROPS TOPICAL
Status: DISCONTINUED | OUTPATIENT
Start: 2023-07-08 | End: 2023-07-13 | Stop reason: HOSPADM

## 2023-07-08 RX ADMIN — CARBIDOPA AND LEVODOPA 1 TABLET: 25; 100 TABLET ORAL at 09:36

## 2023-07-08 RX ADMIN — WARFARIN SODIUM 3 MG: 3 TABLET ORAL at 17:00

## 2023-07-08 RX ADMIN — AMANTADINE HYDROCHLORIDE 100 MG: 100 CAPSULE ORAL at 09:34

## 2023-07-08 RX ADMIN — CARBIDOPA AND LEVODOPA 1 TABLET: 25; 100 TABLET ORAL at 20:35

## 2023-07-08 RX ADMIN — CIPROFLOXACIN 1 DROP: 3 SOLUTION OPHTHALMIC at 14:59

## 2023-07-08 RX ADMIN — AMANTADINE HYDROCHLORIDE 100 MG: 100 CAPSULE ORAL at 17:00

## 2023-07-08 RX ADMIN — LEVOTHYROXINE SODIUM 50 MCG: 50 TABLET ORAL at 06:07

## 2023-07-08 RX ADMIN — DOPAMINE HYDROCHLORIDE IN DEXTROSE 2.5 MCG/KG/MIN: 1.6 INJECTION, SOLUTION INTRAVENOUS at 14:58

## 2023-07-08 RX ADMIN — CARBIDOPA AND LEVODOPA 1 TABLET: 25; 100 TABLET ORAL at 16:54

## 2023-07-08 RX ADMIN — MEMANTINE 10 MG: 10 TABLET ORAL at 09:34

## 2023-07-08 RX ADMIN — FERROUS GLUCONATE 324 MG: 324 TABLET ORAL at 06:07

## 2023-07-08 RX ADMIN — CIPROFLOXACIN 1 DROP: 3 SOLUTION OPHTHALMIC at 17:00

## 2023-07-08 RX ADMIN — CIPROFLOXACIN 1 DROP: 3 SOLUTION OPHTHALMIC at 21:37

## 2023-07-08 NOTE — ASSESSMENT & PLAN NOTE
· Hx mechanical valve replacement in 1990  · On warfarin, INR 14 on admission  · Goal INR 2.5-3.5  · INR has been on hold.   Resume warfarin

## 2023-07-08 NOTE — PROGRESS NOTES
Teresa Mendoza  Progress Note  Name: Sri Andrade  MRN: [id number]  Unit/Bed#: 4 [place] 414-01 I Date of Admission: 7/6/2023   Date of Service: 7/8/2023 I Hospital Day: 2    Assessment/Plan   * Generalized weakness  Assessment & Plan  Patient presented to ED with months history of generalized weakness, worsening in past few weeks. Patient now unable to ambulate, usually can ambulate with walker. Family reports left sided facial droop in the morning along with speech difficulties. CT head: No acute intracranial abnormality  · Possibly secondary to worsening of Parkinson's disease, will rule out acute stroke  · MRI brain negative  · ECHO w/ bubble study showed no PFO  · Likely multifactorial from deconditioning, Parkinson's disease  · PT/OT evaluation  · Neurology consulted    Chronic combined systolic and diastolic heart failure (HCC)  Assessment & Plan  Wt Readings from Last 3 Encounters:   07/07/23 69.9 kg (154 lb)   06/20/23 68.2 kg (150 lb 6.4 oz)   06/07/23 67.6 kg (149 lb)     ECHO 4/2022: EF 12-52%, systolic function low/normal, left atrium severely dilated, mitral valve prosthesis functioning normally  · On torsemide 20 mg daily and spironolactone 12.5 mg daily  · Held on admission in setting of ROB  · Monitor Is/Os and daily weights  · Continue salt and fluid restriction  · Repeat ECHO 3/2/52: EF 25%, systolic function severely reduced  · S/p 20 mg IV lasix 7/7/23  · Patient was started on bisoprolol 2.5 mg p.o. daily. · Questionable benefit with inotropic support-discussed with cardiology    Supratherapeutic INR  Assessment & Plan  · INR 14.85 on admission, >15.0   · No signs of active bleeding  · CT head negative  · Coumadin has been on hold  · Continue to monitor  · S/p 5 mg oral vitamin K on 7/6 and 5 mg IV vitamin K on 7/7  · INR today is 2.51.   Will resume Coumadin    ROB (acute kidney injury) ([address])  Assessment & Plan  · Baseline cr appears around 1.0-1.2  · Cr on admission 2.15  · CT a/p shows no obstruction  · Likely secondary to cardiorenal syndrome in the setting of low EF  · Patient might benefit from inotropic support  Results from last 7 days   Lab Units 07/08/23  0539 07/07/23  0432 07/06/23  1036 07/06/23  0743   BUN mg/dL 50* 47* 52* 48*   CREATININE mg/dL 1.97* 1.84* 2.15* 2.33*   ·     Atrial fibrillation, chronic (HCC)  Assessment & Plan  · History of chronic a fib, EKG on admission showed controlled a fib  · On warfarin, INR 14 on admission  · Coumadin has been on hold which can be restarted. · Patient started on bisoprolol 2.5 mg p.o. daily    Elevated troponin  Assessment & Plan  · troponins 256 > 243 > 243  · EKG shows a fib HR 79  · Denies chest pain  · Cardiology consult  · 7/7 patient noted to have run of NSVT with active chest pain troponins continue to remain flat-  · Started on bisoprolol by cardiology  · Repeat EKG no significant change  · Patient will need ischemia work-up at some point    Elevated LFTs  Assessment & Plan    Tbili 3.20 on admission  · Patient denies abdominal pain or other GI symptoms  · Denies alcohol use or prior hepatitis  · CT a/p shows no acute pathology  · GI input appreciated  · Elevated LFTs likely secondary to hepatic congestion. · Follow-up acute hepatitis panel, autoimmune work-up    Parkinson's disease ([address])  Assessment & Plan  · Follow with neurology outpatient  · Continue Sinemet and amantadine  · Gradual weakness possibly secondary to worsening of Parkinson's disease/deconditioning  · Neurology input appreciated. Hypothyroidism  Assessment & Plan  · Continue levothyroxine  · TSH WNL    H/O mitral valve replacement with mechanical valve  Assessment & Plan  · Hx mechanical valve replacement in 1990  · On warfarin, INR 14 on admission  · Goal INR 2.5-3.5  · INR has been on hold.   Resume warfarin             VTE Pharmacologic Prophylaxis:   High Risk (Score >/= 5) - Pharmacological DVT Prophylaxis Ordered: warfarin (Coumadin). Sequential Compression Devices Ordered. Patient Centered Rounds: I performed bedside rounds with nursing staff today. Discussions with Specialists or Other Care Team Provider: cardiology    Education and Discussions with Family / Patient: Updated  ( and daughter) at bedside. Total Time Spent on Date of Encounter in care of patient: 45 minutes This time was spent on one or more of the following: performing physical exam; counseling and coordination of care; obtaining or reviewing history; documenting in the medical record; reviewing/ordering tests, medications or procedures; communicating with other healthcare professionals and discussing with patient's family/caregivers. Current Length of Stay: 2 day(s)  Current Patient Status: Inpatient   Certification Statement: The patient will continue to require additional inpatient hospital stay due to Cardiomyopathy, chronic congestive heart failure, atrial fibrillation  Discharge Plan: Anticipate discharge in 48-72 hrs to Pending course    Code Status: Level 3 - DNAR and DNI    Subjective:   Patient does report chest pain which is pleuritic in nature especially with deep breaths. Does report some shortness of breath. Denies any abdominal pain. Patient is having extreme fatigue and weakness and is not able to feed herself. Objective:     Vitals:   Temp (24hrs), Av.4 °F (36.3 °C), Min:97.2 °F (36.2 °C), Max:97.5 °F (36.4 °C)    Temp:  [97.2 °F (36.2 °C)-97.5 °F (36.4 °C)] 97.4 °F (36.3 °C)  HR:  [57-89] 69  Resp:  [16-18] 16  BP: (105-110)/(67-70) 110/67  SpO2:  [94 %-100 %] 98 %  Body mass index is 26.43 kg/m². Input and Output Summary (last 24 hours): Intake/Output Summary (Last 24 hours) at 2023 1314  Last data filed at 2023 1201  Gross per 24 hour   Intake --   Output 150 ml   Net -150 ml       Physical Exam:   Physical Exam  Constitutional:       Appearance: Normal appearance.    HENT:      Head: Normocephalic and atraumatic. Nose: Nose normal.      Mouth/Throat:      Mouth: Mucous membranes are moist.      Pharynx: Oropharynx is clear. Eyes:      Extraocular Movements: Extraocular movements intact. Pupils: Pupils are equal, round, and reactive to light. Cardiovascular:      Rate and Rhythm: Normal rate. Rhythm irregular. Pulmonary:      Effort: Pulmonary effort is normal.      Breath sounds: Normal breath sounds. Abdominal:      General: Bowel sounds are normal. There is no distension. Palpations: Abdomen is soft. Tenderness: There is no abdominal tenderness. Musculoskeletal:         General: No swelling. Cervical back: Normal range of motion and neck supple. Right lower leg: Edema present. Left lower leg: Edema present. Comments: Bilateral trace edema   Skin:     General: Skin is warm and dry. Comments: Multiple bruises on the chest wall   Neurological:      General: No focal deficit present. Mental Status: She is alert. Additional Data:     Labs:  Results from last 7 days   Lab Units 07/08/23  0539 07/07/23  0432 07/06/23  1036   WBC Thousand/uL 8.29   < > 7.19   HEMOGLOBIN g/dL 11.7   < > 11.7   HEMATOCRIT % 36.0   < > 35.8   PLATELETS Thousands/uL 107*   < > 120*   NEUTROS PCT %  --   --  79*   LYMPHS PCT %  --   --  10*   MONOS PCT %  --   --  11   EOS PCT %  --   --  0    < > = values in this interval not displayed.      Results from last 7 days   Lab Units 07/08/23  0539   SODIUM mmol/L 137   POTASSIUM mmol/L 4.3   CHLORIDE mmol/L 107   CO2 mmol/L 21   BUN mg/dL 50*   CREATININE mg/dL 1.97*   ANION GAP mmol/L 9   CALCIUM mg/dL 9.9   ALBUMIN g/dL 3.3*   TOTAL BILIRUBIN mg/dL 3.54*   ALK PHOS U/L 80   ALT U/L 114*   AST U/L 208*   GLUCOSE RANDOM mg/dL 107     Results from last 7 days   Lab Units 07/08/23  0539   INR  2.52*     Results from last 7 days   Lab Units 07/06/23  1016   POC GLUCOSE mg/dl 142* Lines/Drains:  Invasive Devices     Peripheral Intravenous Line  Duration           Peripheral IV 07/06/23 Distal;Left;Ventral (anterior) Forearm 2 days          Drain  Duration           External Urinary Catheter <1 day                  Telemetry:  Telemetry Orders (From admission, onward)             24 Hour Telemetry Monitoring  Continuous x 24 Hours (Telem)        Expiring   Question:  Reason for 24 Hour Telemetry  Answer:  TIA/Suspected CVA/ Confirmed CVA                 Telemetry Reviewed: Atrial fibrillation. HR averaging 65-90  Indication for Continued Telemetry Use: Arrthymias requiring medical therapy             Imaging: Reviewed radiology reports from this admission including: chest xray, abdominal/pelvic CT, CT head, ultrasound(s) and ECHO    Recent Cultures (last 7 days):         Last 24 Hours Medication List:   Current Facility-Administered Medications   Medication Dose Route Frequency Provider Last Rate   • amantadine  100 mg Oral BID Ambika Guadarrama PA-C     • bisoprolol  2.5 mg Oral Daily Marissa Vera PA-C     • carbidopa-levodopa  1 tablet Oral TID Ambika Guadarrama PA-C     • ciprofloxacin  1 drop Both Eyes Q4H While Awake Lakeisha Ta MD     • ferrous gluconate  324 mg Oral Daily Before Breakfast Ambika Guadarrama PA-C     • levothyroxine  50 mcg Oral Early Morning Ambika Guadarrama PA-C     • memantine  10 mg Oral Daily Ambika Guadarrama PA-C     • warfarin  3 mg Oral Daily (warfarin) Aggie Gorman MD          Today, Patient Was Seen By: Aggie Gorman MD    **Please Note: This note may have been constructed using a voice recognition system. **

## 2023-07-08 NOTE — CASE MANAGEMENT
Case Management Discharge Planning Note    Patient name Dory Mosley  Location [address] 992/7 XPHHE 325-* MRN [id number]  : 1940 Date 2023       Current Admission Date: 2023  Current Admission Diagnosis:Generalized weakness   Patient Active Problem List    Diagnosis Date Noted   • Elevated LFTs 2023   • ROB (acute kidney injury) ([address]) 2023   • Supratherapeutic INR 2023   • Elevated troponin 2023   • Dysphagia    • Chronic diastolic heart failure ([address]) 2023   • History of hypokalemia 2023   • History of dehydration 2023   • History of left bundle branch block (LBBB) 2023   • Primary osteoarthritis of right knee 2023   • Stage 3a chronic kidney disease ([address]) 2023   • Edema of both lower extremities 2023   • Obstructive sleep apnea 2023   • Prerenal azotemia 2023   • Warfarin anticoagulation 2023   • Abnormal weight loss 2023   • Chronic fatigue 2023   • Impaired fasting glucose 2023   • Hypercalcemia 2023   • Stage 3b chronic kidney disease ([address]) 2023   • Knee pain 2022   • Generalized weakness 2022   • 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 2022   • Orthostatic hypotension 2021   • Other chronic pain 06/15/2021   • Cardiomyopathy, dilated, nonischemic ([address]) 2021   • Left bundle branch block (LBBB) 2021   • Asymptomatic PVCs 2021   • On continuous oral anticoagulation 2021   • Mixed dyslipidemia 2021   • High risk medication use 2021   • Other microscopic hematuria 2018   • Numbness and tingling in right hand    • Suprapatellar bursitis of right knee 2018   • H/O mitral valve replacement with mechanical valve 2018   • Atrial fibrillation, chronic ([address]) 2018   • Generalized osteoarthritis 2017   • Chronic right-sided low back pain without sciatica 10/23/2017   • Chronic combined systolic and diastolic heart failure ([address]) 02/23/2017   • Memory impairment 02/15/2017   • Seasonal allergies 12/16/2016   • Vitamin D insufficiency 03/31/2015   • Transient ischemic attack 02/25/2014   • Parkinson's disease ([address]) 12/16/2013   • Hypothyroidism 07/01/2013   • Venous insufficiency of both lower extremities 12/03/2012   • Dupuytren's contracture 09/04/2012   • Mitral valve disorder 09/04/2012      LOS (days): 2  Geometric Mean LOS (GMLOS) (days): 5.70  Days to GMLOS:3.8     OBJECTIVE:  Risk of Unplanned Readmission Score: 14.88         Current admission status: Inpatient   Preferred Pharmacy:   [address] [place] [id number]  Phone: [phone] Fax: [address] [address]  Phone: [phone] Fax: [phone]    Primary Care Provider: Gagandeep Garner MD    Primary Insurance: MEDICARE  Secondary Insurance: BLUE CROSS    DISCHARGE DETAILS:    Discharge planning discussed with[de-identified]  Robin BANEGAS contacted family/caregiver?: Yes  Were Treatment Team discharge recommendations reviewed with patient/caregiver?: Yes  Did patient/caregiver verbalize understanding of patient care needs?: Yes  Were patient/caregiver advised of the risks associated with not following Treatment Team discharge recommendations?: Yes    Contacts  Patient Contacts: Selene Butt  Relationship to Patient[de-identified] Family  Contact Method: Phone  Phone Number: [phone]  Reason/Outcome: Referral, Discharge Planning, Continuity of [facility]          Is the patient interested in [address] at discharge?: Yes  [facility] requested[de-identified] Nursing, Occupational Therapy, Physical Therapy  HHA External Referral Reason (only applicable if external HHA name selected): Services not provided in network or near patient location  [facility] Provider[de-identified] PCP  Home Health Services Needed[de-identified] Evaluate Functional Status and Safety, Gait/ADL Training, Heart Failure Management, Strengthening/Theraputic Exercises to Improve Function  Homebound Criteria Met[de-identified] Requires the Assistance of Another Person for Safe Ambulation or to Leave the Home, Uses an Assist Device (i.e. cane, walker, etc)  Supporting Clincal Findings[de-identified] Fatigues Easliy in [employer], Bed Bound or Wheelchair Bound, Limited Endurance    DME Referral Provided  Referral made for DME?: Yes  DME referral completed for the following items[de-identified] Bedside Commode, Wheelchair  DME Supplier Name[de-identified] [employer] (Ordered on [place], scheduled for delivery to home)    Other Referral/Resources/Interventions Provided:  Interventions: HHC  Referral Comments: [facility] pending         Treatment Team Recommendation: Short Term Rehab (CM spoke to , refusing STR.)  Discharge Destination Plan[de-identified] Home with [address] [facility] (Referrals pending)

## 2023-07-08 NOTE — ASSESSMENT & PLAN NOTE
· Follow with neurology outpatient  · Continue Sinemet and amantadine  · Gradual weakness possibly secondary to worsening of Parkinson's disease/deconditioning  · Neurology input appreciated.

## 2023-07-08 NOTE — ASSESSMENT & PLAN NOTE
· History of chronic a fib, EKG on admission showed controlled a fib  · On warfarin, INR 14 on admission  · Coumadin has been on hold which can be restarted.   · Patient started on bisoprolol 2.5 mg p.o. daily

## 2023-07-08 NOTE — PLAN OF CARE
Problem: Potential for Falls  Goal: Patient will remain free of falls  Description: INTERVENTIONS:  - Educate patient/family on patient safety including physical limitations  - Instruct patient to call for assistance with activity   - Consult OT/PT to assist with strengthening/mobility   - Keep Call bell within reach  - Keep bed low and locked with side rails adjusted as appropriate  - Keep care items and personal belongings within reach  - Initiate and maintain comfort rounds  - Make Fall Risk Sign visible to staff  - Offer Toileting every 2 Hours, in advance of need  - Initiate/Maintain bed alarm  Problem: PAIN - ADULT  Goal: Verbalizes/displays adequate comfort level or baseline comfort level  Description: Interventions:  - Encourage patient to monitor pain and request assistance  - Assess pain using appropriate pain scale  - Administer analgesics based on type and severity of pain and evaluate response  - Implement non-pharmacological measures as appropriate and evaluate response  - Consider cultural and social influences on pain and pain management  - Notify physician/advanced practitioner if interventions unsuccessful or patient reports new pain  Outcome: Progressing     Problem: CARDIOVASCULAR - ADULT  Goal: Maintains optimal cardiac output and hemodynamic stability  Description: INTERVENTIONS:  - Monitor I/O, vital signs and rhythm  - Monitor for S/S and trends of decreased cardiac output  - Administer and titrate ordered vasoactive medications to optimize hemodynamic stability  - Assess quality of pulses, skin color and temperature  - Assess for signs of decreased coronary artery perfusion  - Instruct patient to report change in severity of symptoms  Outcome: Progressing  Goal: Absence of cardiac dysrhythmias or at baseline rhythm  Description: INTERVENTIONS:  - Continuous cardiac monitoring, vital signs, obtain 12 lead EKG if ordered  - Administer antiarrhythmic and heart rate control medications as ordered  - Monitor electrolytes and administer replacement therapy as ordered  Outcome: Progressing     Problem: CARDIOVASCULAR - ADULT  Goal: Absence of cardiac dysrhythmias or at baseline rhythm  Description: INTERVENTIONS:  - Continuous cardiac monitoring, vital signs, obtain 12 lead EKG if ordered  - Administer antiarrhythmic and heart rate control medications as ordered  - Monitor electrolytes and administer replacement therapy as ordered  Outcome: Progressing     Problem: SKIN/TISSUE INTEGRITY - ADULT  Goal: Skin Integrity remains intact(Skin Breakdown Prevention)  Description: Assess:  -Perform Rony assessment every shift  -Clean and moisturize skin every shift  -Inspect skin when repositioning, toileting, and assisting with ADLS  -Assess extremities for adequate circulation and sensation     Bed Management:  -Have minimal linens on bed & keep smooth, unwrinkled  -Change linens as needed when moist or perspiring  -Avoid sitting or lying in one position for more than 2  hours while in bed  -Keep HOB at 30 degrees     Toileting:  -Offer bedside commode  -Assess for incontinence every 2 hours  -Use incontinent care products after each incontinent episode such as barrier cream    Activity:  -Mobilize patient 2 times a day  -Encourage activity and walks on unit  -Encourage or provide ROM exercises   -Turn and reposition patient every 2  Hours  -Use appropriate equipment to lift or move patient in bed  -Instruct/ Assist with weight shifting every 15 minutes when out of bed in chair  -Consider limitation of chair time 4 hour intervals    Skin Care:  -Avoid use of baby powder, tape, friction and shearing, hot water or constrictive clothing  -Relieve pressure over bony prominences using waffle cushion  -Do not massage red bony areas    Next Steps:  -Consider consults to  interdisciplinary teams such as PT/OT  Outcome: Progressing     - Apply yellow socks and bracelet for high fall risk patients  - Consider moving patient to room near nurses station  Outcome: Progressing

## 2023-07-08 NOTE — ASSESSMENT & PLAN NOTE
· INR 14.85 on admission, >15.0   · No signs of active bleeding  · CT head negative  · Coumadin has been on hold  · Continue to monitor  · S/p 5 mg oral vitamin K on 7/6 and 5 mg IV vitamin K on 7/7  · INR today is 2.51.   Will resume Coumadin

## 2023-07-08 NOTE — ASSESSMENT & PLAN NOTE
Patient presented to ED with months history of generalized weakness, worsening in past few weeks. Patient now unable to ambulate, usually can ambulate with walker. Family reports left sided facial droop in the morning along with speech difficulties.   CT head: No acute intracranial abnormality  · Possibly secondary to worsening of Parkinson's disease, will rule out acute stroke  · MRI brain negative  · ECHO w/ bubble study showed no PFO  · Likely multifactorial from deconditioning, Parkinson's disease  · PT/OT evaluation  · Neurology consulted

## 2023-07-08 NOTE — ASSESSMENT & PLAN NOTE
Tbili 3.20 on admission  · Patient denies abdominal pain or other GI symptoms  · Denies alcohol use or prior hepatitis  · CT a/p shows no acute pathology  · GI input appreciated  · Elevated LFTs likely secondary to hepatic congestion.   · Follow-up acute hepatitis panel, autoimmune work-up

## 2023-07-08 NOTE — PROGRESS NOTES
Cardiology Inpatient Progress Note  [facility]   Maximo Claire  1940  [id number]  PCP: Jai Burdick MD  Date of Admission:  7/6/2023    Assessment/Plan:   Generalized weakness/speech difficulty/left facial droop in the setting of Parkinson's disease ruled out for acute stroke-echo 2D shows a severely reduced ejection fraction/biventricular heart failure-patient has a known history of a nonischemic cardiomyopathy. She was taken off of her heart failure medications secondary to symptoms. She states feeling poor on heart failure medications. Her mechanical mitral valve prosthesis is functioning normally. I had a discussion at bedside with the patient with the primary hospitalist-overall poor 1 year prognosis in the setting of poor functional status with severe biventricular heart failure. Recommend goals of care discussion. We can try palliative inotrope therapy to see if it improves her symptoms. Consideration to rechallenge her with heart failure medications such as Entresto. Patient would like to speak with her family. Supratherapeutic INR-INR to 2.5. Agree with restarting Coumadin    History of mechanical mitral valve replaced in September 19, 1990    Chronic atrial fibrillation    Acute on chronic kidney injury stage [id number] cardiorenal.  Monitor with diuresis    Non-MI troponin elevation in the setting of advanced heart failure-     History of benign positional vertigo    History of severe diarrhea on chronic intermittent diarrhea    Previous hepatitis C    History of frequent falls    Drug-induced insomnia    Chronic fatigue and malaise    Subjective:   She still feels severely fatigued. She feels as if she is going downhill    Objective:     Vitals: Blood pressure 104/59, pulse 70, temperature (!) 97.3 °F (36.3 °C), temperature source Oral, resp. rate 20, height 5' 4" (1.626 m), weight 69.9 kg (154 lb), SpO2 93 %.   Vitals:    07/06/23 1433 07/07/23 9257 Weight: 69.9 kg (154 lb) 69.9 kg (154 lb)     Body mass index is 26.43 kg/m². BP Readings from Last 3 Encounters:   07/08/23 104/59   06/20/23 102/60   06/07/23 112/69     Orthostatic Blood Pressures    Flowsheet Row Most Recent Value   Blood Pressure 104/59 filed at 07/08/2023 1513        I/O       07/06 0701  07/07 0700 07/07 0701  07/08 0700 07/08 0701  07/09 0700    P. O. 120      I.V. (mL/kg)  1180 (16.9)     IV Piggyback 1000      Total Intake(mL/kg) 1120 (16) 1180 (16.9)     Urine (mL/kg/hr) 600  150 (0.2)    Total Output 600  150    Net +520 +1180 -150           Unmeasured Urine Occurrence  2 x         Invasive Devices     Peripheral Intravenous Line  Duration           Peripheral IV 07/06/23 Distal;Left;Ventral (anterior) Forearm 2 days          Drain  Duration           External Urinary Catheter <1 day                  Intake/Output Summary (Last 24 hours) at 7/8/2023 1539  Last data filed at 7/8/2023 1201  Gross per 24 hour   Intake --   Output 150 ml   Net -150 ml     Physical Examination:   Physical Exam  Constitutional:       General: She is not in acute distress. Appearance: She is well-developed. She is ill-appearing. She is not diaphoretic. HENT:      Head: Normocephalic and atraumatic. Right Ear: External ear normal.      Left Ear: External ear normal.   Eyes:      General: No scleral icterus. Right eye: No discharge. Left eye: No discharge. Conjunctiva/sclera: Conjunctivae normal.      Pupils: Pupils are equal, round, and reactive to light. Neck:      Thyroid: No thyromegaly. Vascular: No JVD. Trachea: No tracheal deviation. Cardiovascular:      Rate and Rhythm: Normal rate. Rhythm irregular. Heart sounds: Murmur heard. No friction rub. Gallop present. Pulmonary:      Effort: No respiratory distress. Breath sounds: No stridor. No wheezing or rales. Chest:      Chest wall: No tenderness.    Abdominal:      General: Bowel sounds are normal. There is no distension. Palpations: Abdomen is soft. There is no mass. Tenderness: There is no abdominal tenderness. There is no guarding or rebound. Musculoskeletal:         General: Swelling present. No tenderness or deformity. Normal range of motion. Cervical back: Normal range of motion and neck supple. Skin:     Coloration: Skin is not pale. Findings: No erythema or rash. Neurological:      Mental Status: She is alert. Cranial Nerves: No cranial nerve deficit. Motor: Weakness present. No abnormal muscle tone. Coordination: Coordination abnormal.      Gait: Gait abnormal.      Deep Tendon Reflexes: Reflexes are normal and symmetric. Reflexes normal.   Psychiatric:         Mood and Affect: Mood is depressed. Behavior: Behavior is slowed. Thought Content: Thought content normal.         Cognition and Memory: Cognition is impaired. Memory is impaired. Judgment: Judgment normal.         Labs:   Lab Results:  I Have Reviewed All Lab Data Below:  CBC with diff:  Results from last 7 days   Lab Units 07/08/23  0539 07/07/23  0432 07/06/23  1036   WBC Thousand/uL 8.29 7.32 7.19   HEMOGLOBIN g/dL 11.7 11.4* 11.7   HEMATOCRIT % 36.0 34.5* 35.8   MCV fL 106* 105* 104*   PLATELETS Thousands/uL 107* 100* 120*   RBC Million/uL 3.40* 3.29* 3.45*   MCH pg 34.4* 34.7* 33.9   MCHC g/dL 32.5 33.0 32.7   RDW % 17.1* 16.8* 16.5*   MPV fL 12.3 11.9 11.9   NRBC AUTO /100 WBCs  --   --  0       CMP:  Results from last 7 days   Lab Units 07/08/23  0539 07/07/23  0432 07/06/23  1036 07/06/23  0743   SODIUM mmol/L 137 135 136 137   POTASSIUM mmol/L 4.3 3.9 3.4* 3.8   CHLORIDE mmol/L 107 105 103 106   CO2 mmol/L 21 23 27 26   ANION GAP mmol/L 9 7 6 5   BUN mg/dL 50* 47* 52* 48*   CREATININE mg/dL 1.97* 1.84* 2.15* 2.33*   GLUCOSE FASTING mg/dL  --   --   --  101*   CALCIUM mg/dL 9.9 9.6 10.5* 10.7*   AST U/L 208* 155* 304*  --    ALT U/L 114* 71* 246*  --    ALK PHOS U/L 80 80 80  --    TOTAL PROTEIN g/dL 5.3* 5.0* 5.6*  --    ALBUMIN g/dL 3.3* 3.1* 3.5  --    TOTAL BILIRUBIN mg/dL 3.54* 2.46* 3.20*  --    EGFR ml/min/1.73sq m      Magnesium:  Results from last 7 days   Lab Units 23  0432   MAGNESIUM mg/dL 1.9       Coags:  Results from last 7 days   Lab Units 23  0539 23  2335 23  1448 23  0432 23  1820 23  1153 23  0743   PTT seconds  --   --   --   --   --  54*  --    INR  2.52* 3.11* 4.91* >15.00* >15.00* 14.85* 14.42*     TSH:  Results from last 7 days   Lab Units 23  0432   TSH 3RD GENERATON uIU/mL 3.176     Lipid Profile:  Results from last 7 days   Lab Units 23  0432   CHOLESTEROL mg/dL 79   TRIGLYCERIDES mg/dL 107   HDL mg/dL 13*   LDL CALC mg/dL 45     Hgb A1c:    NT-proBNP: No results for input(s): "NTBNP" in the last 72 hours. Troponins:      Imaging & Testing   I have personally reviewed pertinent reports.       Cardiac Testing   Results for orders placed during the hospital encounter of 19    Echo complete with contrast if indicated    Narrative  [address] [place] [address]  [phone]    Transthoracic Echocardiogram  2D, M-mode, Doppler, and Color Doppler    Study date:  2019    Patient: Raul Castillo  MR number: [id number]  Account number: [de-identified]  : 1940  Age: 77 years  Gender: Female  Status: Outpatient  Location: Echo lab  Height: 64 in  Weight: 185.7 lb  BP: 94/ 63 mmHg    Indications: Cardiomyopathy    Diagnoses: I42.9 - Cardiomyopathy, unspecified    Sonographer:  Radhika Bourgeois RDCS  Primary Physician:  Margy Melendez MD  Referring Physician:  Ina Simons MD  Group:  [facility]  Ordering Physician:  Ina Simons MD  Interpreting Physician:  Isaiah Yu MD    SUMMARY    LEFT VENTRICLE:  Systolic function was at the lower limits of normal. Ejection fraction was estimated in the range of 50 % to 55 % to be 55 %. There were no regional wall motion abnormalities. Wall thickness was mildly increased. There was mild concentric hypertrophy. VENTRICULAR SEPTUM:  There was mild paradoxical motion. 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 LEFT ATRIUM:  The atrium was moderately to markedly dilated. MITRAL VALVE:  A mechanical prosthesis was present. It exhibited normal function and normal motion. There was no pannus formation, a normal-appearing sewing ring, and no rocking motion of the sewing ring. Mean gradient 4 mm of Hg and peak velocity 1.5  m/sec with trace to mild MR. There was trace regurgitation. AORTIC VALVE:  There was mild regurgitation. TRICUSPID VALVE:  There was mild regurgitation. Estimated peak PA pressure was 25 mmHg. HISTORY: PRIOR HISTORY: Mitral Valve Replacement (1990), A-Fib, Hypothyroidism    PROCEDURE: The procedure was performed in the echo lab. This was a routine study. The transthoracic approach was used. The study included complete 2D imaging, M-mode, complete spectral Doppler, and color Doppler. The heart rate was 71 bpm,  at the start of the study. Echocardiographic views were limited due to lung interference. Image quality was adequate. LEFT VENTRICLE: Size was normal. Systolic function was at the lower limits of normal. Ejection fraction was estimated in the range of 50 % to 55 % to be 55 %. There were no regional wall motion abnormalities. Wall thickness was mildly  increased. There was mild concentric hypertrophy. DOPPLER: The study was not technically sufficient to allow evaluation of LV diastolic function. VENTRICULAR SEPTUM: There was mild paradoxical motion. 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 RIGHT VENTRICLE: The size was normal. Systolic function was low normal.    LEFT ATRIUM: The atrium was moderately to markedly dilated.     RIGHT ATRIUM: Size was normal.    MITRAL VALVE: A mechanical prosthesis was present. It exhibited normal function and normal motion. There was no pannus formation, a normal-appearing sewing ring, and no rocking motion of the sewing ring. Mean gradient 4 mm of Hg and peak  velocity 1.5 m/sec with trace to mild MR. DOPPLER: There was no evidence for stenosis. There was trace regurgitation. AORTIC VALVE: The valve was trileaflet. Leaflets exhibited mildly increased thickness, mild calcification, and normal cuspal separation. DOPPLER: Transaortic velocity was within the normal range. There was no evidence for stenosis. There  was mild regurgitation. TRICUSPID VALVE: DOPPLER: There was mild regurgitation. Estimated peak PA pressure was 25 mmHg. PULMONIC VALVE: DOPPLER: There was no regurgitation. PERICARDIUM: There was no thickening or calcification. There was no pericardial effusion. AORTA: The root exhibited normal size. SYSTEMIC VEINS: IVC: The inferior vena cava was normal in size. Respirophasic changes were normal.    SYSTEM MEASUREMENT TABLES    2D mode  AoR Diam 2D: 3 cm  LA Diam (2D): 5.4 cm  LA/Ao (2D): 1.8  FS (2D Teich): 27.3 %  IVSd (2D): 1.34 cm  LVDEV: 42.8 cmï¾³  LVEDV MOD BP: 85 cmï¾³  LVESV: 19.5 cmï¾³  LVIDd(2D): 3.26 cm  LVISd (2D): 2.37 cm  LVOT Area 2D: 3.14 cmï¾²  LVPWd (2D): 1.33 cm  SV (Teich): 23.3 cmï¾³    Apical four chamber  LVEF A4C: 50 %    Apical two chamber  LVEF A2C: 54 %    Unspecified Scan Mode  MANSOOR Cont Eq (Peak Dejan): 1.97 cmï¾²  LVOT Diam.: 2 cm  LVOT Vmax: 827 mm/s  LVOT Vmax; Mean: 827 mm/s  Peak Grad.; Mean: 3 mm[Hg]  MV Peak E Dejan. Mean: 1330 mm/s  MVA (PHT): 3.01 cmï¾²  PHT: 73 ms  Max P mm[Hg]  V Max: 2420 mm/s  Vmax: 2140 mm/s  RA Area: 18.3 cmï¾²  RA Volume: 45.7 cmï¾³  TAPSE: 1.3 cm    [employer] Accredited Echocardiography Laboratory    Prepared and electronically signed by    Tracy Nelson MD  Signed 2019 07:39:37    EKG: Personally reviewed.       Counseling :  Counseling / Coordination of Care Time: 40min. Greater than 50% of total time spent on patient counseling and coordination of care. Code Status: Level 3 - DNAR and DNI  Collaboration of Care: Were Recommendations Directly Discussed with Primary Treatment Team? - Yes     Hugh Zuñiga MD [facility]    "This note has been constructed using a voice recognition system. Therefore there may be syntax, spelling, and/or grammatical errors.  Please call if you have any questions. "

## 2023-07-08 NOTE — ASSESSMENT & PLAN NOTE
· Baseline cr appears around 1.0-1.2  · Cr on admission 2.15  · CT a/p shows no obstruction  · Likely secondary to cardiorenal syndrome in the setting of low EF  · Patient might benefit from inotropic support  Results from last 7 days   Lab Units 07/08/23  0539 07/07/23  0432 07/06/23  1036 07/06/23  0743   BUN mg/dL 50* 47* 52* 48*   CREATININE mg/dL 1.97* 1.84* 2.15* 2.33*   ·

## 2023-07-08 NOTE — ASSESSMENT & PLAN NOTE
· troponins 256 > 243 > 243  · EKG shows a fib HR 79  · Denies chest pain  · Cardiology consult  · 7/7 patient noted to have run of NSVT with active chest pain troponins continue to remain flat-  · Started on bisoprolol by cardiology  · Repeat EKG no significant change  · Patient will need ischemia work-up at some point

## 2023-07-08 NOTE — ASSESSMENT & PLAN NOTE
Wt Readings from Last 3 Encounters:   07/07/23 69.9 kg (154 lb)   06/20/23 68.2 kg (150 lb 6.4 oz)   06/07/23 67.6 kg (149 lb)     ECHO 4/2022: EF 09-99%, systolic function low/normal, left atrium severely dilated, mitral valve prosthesis functioning normally  · On torsemide 20 mg daily and spironolactone 12.5 mg daily  · Held on admission in setting of ROB  · Monitor Is/Os and daily weights  · Continue salt and fluid restriction  · Repeat ECHO 9/0/58: EF 44%, systolic function severely reduced  · S/p 20 mg IV lasix 7/7/23  · Patient was started on bisoprolol 2.5 mg p.o. daily.   · Questionable benefit with inotropic support-discussed with cardiology

## 2023-07-09 LAB
ALBUMIN SERPL BCP-MCNC: 3.3 G/DL (ref 3.5–5)
ALP SERPL-CCNC: 83 U/L (ref 34–104)
ALT SERPL W P-5'-P-CCNC: 66 U/L (ref 7–52)
ANION GAP SERPL CALCULATED.3IONS-SCNC: 5 MMOL/L
AST SERPL W P-5'-P-CCNC: 101 U/L (ref 13–39)
BILIRUB SERPL-MCNC: 3.55 MG/DL (ref 0.2–1)
BUN SERPL-MCNC: 45 MG/DL (ref 5–25)
CALCIUM ALBUM COR SERPL-MCNC: 11.1 MG/DL (ref 8.3–10.1)
CALCIUM SERPL-MCNC: 10.5 MG/DL (ref 8.4–10.2)
CHLORIDE SERPL-SCNC: 108 MMOL/L (ref 96–108)
CO2 SERPL-SCNC: 25 MMOL/L (ref 21–32)
CREAT SERPL-MCNC: 1.7 MG/DL (ref 0.6–1.3)
EST. AVERAGE GLUCOSE BLD GHB EST-MCNC: 111 MG/DL
GFR SERPL CREATININE-BSD FRML MDRD: 27 ML/MIN/1.73SQ M
GLUCOSE SERPL-MCNC: 119 MG/DL (ref 65–140)
HBA1C MFR BLD: 5.5 %
INR PPP: 2.24 (ref 0.84–1.19)
POTASSIUM SERPL-SCNC: 4 MMOL/L (ref 3.5–5.3)
PROT SERPL-MCNC: 5.5 G/DL (ref 6.4–8.4)
PROTHROMBIN TIME: 24.9 SECONDS (ref 11.6–14.5)
SODIUM SERPL-SCNC: 138 MMOL/L (ref 135–147)

## 2023-07-09 PROCEDURE — 99233 SBSQ HOSP IP/OBS HIGH 50: CPT | Performed by: INTERNAL MEDICINE

## 2023-07-09 PROCEDURE — 99232 SBSQ HOSP IP/OBS MODERATE 35: CPT | Performed by: NURSE PRACTITIONER

## 2023-07-09 PROCEDURE — 80053 COMPREHEN METABOLIC PANEL: CPT | Performed by: INTERNAL MEDICINE

## 2023-07-09 PROCEDURE — 85610 PROTHROMBIN TIME: CPT | Performed by: INTERNAL MEDICINE

## 2023-07-09 RX ADMIN — CARBIDOPA AND LEVODOPA 1 TABLET: 25; 100 TABLET ORAL at 09:27

## 2023-07-09 RX ADMIN — FERROUS GLUCONATE 324 MG: 324 TABLET ORAL at 06:15

## 2023-07-09 RX ADMIN — LEVOTHYROXINE SODIUM 50 MCG: 50 TABLET ORAL at 05:36

## 2023-07-09 RX ADMIN — AMANTADINE HYDROCHLORIDE 100 MG: 100 CAPSULE ORAL at 09:28

## 2023-07-09 RX ADMIN — CIPROFLOXACIN 1 DROP: 3 SOLUTION OPHTHALMIC at 05:36

## 2023-07-09 RX ADMIN — CIPROFLOXACIN 1 DROP: 3 SOLUTION OPHTHALMIC at 21:11

## 2023-07-09 RX ADMIN — CIPROFLOXACIN 1 DROP: 3 SOLUTION OPHTHALMIC at 09:28

## 2023-07-09 RX ADMIN — DOPAMINE HYDROCHLORIDE IN DEXTROSE 2.5 MCG/KG/MIN: 1.6 INJECTION, SOLUTION INTRAVENOUS at 23:09

## 2023-07-09 RX ADMIN — MEMANTINE 10 MG: 10 TABLET ORAL at 09:27

## 2023-07-09 NOTE — ASSESSMENT & PLAN NOTE
· Hx mechanical valve replacement in 1990  · On warfarin, INR 14 on admission  · Goal INR 2.5-3.5  · Continue coumadin, daily INR

## 2023-07-09 NOTE — PROGRESS NOTES
[id number] [facility]  Progress Note  Name: Dionna Cohen  MRN: [id number]  Unit/Bed#: 4 [place] 414-01 I Date of Admission: 7/6/2023   Date of Service: 7/9/2023 I Hospital Day: 3    Assessment/Plan   * Generalized weakness  Assessment & Plan  Patient presented to ED with months history of generalized weakness, worsening in past few weeks. Patient now unable to ambulate, usually can ambulate with walker. Family reports left sided facial droop in the morning along with speech difficulties. CT head: No acute intracranial abnormality  · Possibly secondary to worsening of Parkinson's disease, will rule out acute stroke  · MRI brain negative  · ECHO w/ bubble study showed no PFO  · Likely multifactorial from deconditioning, Parkinson's disease  · PT/OT evaluation  · Neurology consulted    Elevated troponin  Assessment & Plan  · troponins 256 > 243 > 243  · EKG shows a fib HR 79  · Denies chest pain  · Cardiology consult  · 7/7 patient noted to have run of NSVT with active chest pain troponins continue to remain flat-  · Started on bisoprolol by cardiology  · Repeat EKG no significant change  · Patient will need ischemia work-up at some point    Supratherapeutic INR  Assessment & Plan  · INR 14.85 on admission, >15.0   · No signs of active bleeding  · CT head negative  · Coumadin has been on hold  · Continue to monitor  · S/p 5 mg oral vitamin K on 7/6 and 5 mg IV vitamin K on 7/7  · INR today is 2.51.  Continue Coumadin    ROB (acute kidney injury) ([address])  Assessment & Plan  · Baseline cr appears around 1.0-1.2  · Cr on admission 2.15  · CT a/p shows no obstruction  · Likely secondary to cardiorenal syndrome in the setting of low EF  · Patient might benefit from inotropic support  Results from last 7 days   Lab Units 07/09/23  0534 07/08/23  0539 07/07/23  0432 07/06/23  1036 07/06/23  0743   BUN mg/dL 45* 50* 47* 52* 48*   CREATININE mg/dL 1.70* 1.97* 1.84* 2.15* 2.33*       Elevated LFTs  Assessment & Plan    Tbili 3.20 on admission  · Patient denies abdominal pain or other GI symptoms  · Denies alcohol use or prior hepatitis  · CT a/p shows no acute pathology  · GI input appreciated  · Elevated LFTs likely secondary to hepatic congestion. · Follow-up acute hepatitis panel, autoimmune work-up    Parkinson's disease ([address])  Assessment & Plan  · Follow with neurology outpatient  · Continue Sinemet and amantadine  · Gradual weakness possibly secondary to worsening of Parkinson's disease/deconditioning  · Neurology input appreciated. Hypothyroidism  Assessment & Plan  · Continue levothyroxine  · TSH WNL    Chronic combined systolic and diastolic heart failure (HCC)  Assessment & Plan  Wt Readings from Last 3 Encounters:   07/07/23 69.9 kg (154 lb)   06/20/23 68.2 kg (150 lb 6.4 oz)   06/07/23 67.6 kg (149 lb)     ECHO 4/2022: EF 37-12%, systolic function low/normal, left atrium severely dilated, mitral valve prosthesis functioning normally  · On torsemide 20 mg daily and spironolactone 12.5 mg daily  · Held on admission in setting of ROB  · Monitor Is/Os and daily weights  · Continue salt and fluid restriction  · Repeat ECHO 8/5/04: EF 31%, systolic function severely reduced  · S/p 20 mg IV lasix 7/7/23  · Patient was started on bisoprolol 2.5 mg p.o. daily.   · Continue dopamine     Atrial fibrillation, chronic (HCC)  Assessment & Plan  · History of chronic a fib, EKG on admission showed controlled a fib  · On warfarin, INR 14 on admission  · Continue coumadin   · Patient started on bisoprolol 2.5 mg p.o. daily    H/O mitral valve replacement with mechanical valve  Assessment & Plan  · Hx mechanical valve replacement in 1990  · On warfarin, INR 14 on admission  · Goal INR 2.5-3.5  · Continue coumadin, daily INR            VTE Pharmacologic Prophylaxis:   Pharmacologic: Warfarin (Coumadin)  Mechanical VTE Prophylaxis in Place: Yes    Patient Centered Rounds: I have performed bedside rounds with nursing staff today. Discussions with Specialists or Other Care Team Provider: Yes  Education and Discussions with Family / Patient:Yes  Time Spent for Care: 15 minutes. More than 50% of total time spent on counseling and coordination of care as described above. Current Length of Stay: 3 day(s)  Current Patient Status: Inpatient     Discharge Plan: pending     Code Status: Level 3 - DNAR and DNI      Subjective:   Patient laying in bed, denies any discomfort. She is sleepy this morning. Objective:     Vitals:   Temp (24hrs), Av.4 °F (36.3 °C), Min:97.3 °F (36.3 °C), Max:97.8 °F (36.6 °C)    Temp:  [97.3 °F (36.3 °C)-97.8 °F (36.6 °C)] 97.3 °F (36.3 °C)  HR:  [63-77] 67  Resp:  [16-22] 18  BP: ()/(57-67) 98/57  SpO2:  [93 %-98 %] 97 %  Body mass index is 26.43 kg/m². Input and Output Summary (last 24 hours): Intake/Output Summary (Last 24 hours) at 2023 1047  Last data filed at 2023 0500  Gross per 24 hour   Intake 20.13 ml   Output 600 ml   Net -579.87 ml        Physical Exam:     Physical Exam  Vitals and nursing note reviewed. HENT:      Head: Normocephalic. Eyes:      Extraocular Movements: Extraocular movements intact. Cardiovascular:      Rate and Rhythm: Rhythm irregular. Heart sounds: Murmur heard. Pulmonary:      Effort: Pulmonary effort is normal.      Breath sounds: Normal breath sounds. Abdominal:      General: Abdomen is flat. Bowel sounds are normal.      Palpations: Abdomen is soft. Musculoskeletal:         General: No swelling. Normal range of motion. Skin:     General: Skin is warm and dry. Neurological:      General: No focal deficit present. Mental Status: She is alert. She is disoriented.          Additional Data:     Labs:    Results from last 7 days   Lab Units 23  0539 23  0432 23  1036   WBC Thousand/uL 8.29 7.32 7.19   HEMOGLOBIN g/dL 11.7 11.4* 11.7   HEMATOCRIT % 36.0 34.5* 35.8   PLATELETS Thousands/uL 107* 100* 120*   NEUTROS PCT %  --   --  79*     Results from last 7 days   Lab Units 07/09/23  0534 07/08/23  0539 07/07/23  0432   SODIUM mmol/L 138 137 135   POTASSIUM mmol/L 4.0 4.3 3.9   CHLORIDE mmol/L 108 107 105   CO2 mmol/L 25 21 23   BUN mg/dL 45* 50* 47*   CREATININE mg/dL 1.70* 1.97* 1.84*   CALCIUM mg/dL 10.5* 9.9 9.6   TOTAL BILIRUBIN mg/dL 3.55* 3.54* 2.46*   ALK PHOS U/L 83 80 80   ALT U/L 66* 114* 71*   AST U/L 101* 208* 155*     Results from last 7 days   Lab Units 07/09/23  0534 07/08/23  0539 07/07/23  2335   INR  2.24* 2.52* 3.11*         Lab Results   Component Value Date/Time    HGBA1C 5.9 (H) 07/20/2020 07:34 AM     Results from last 7 days   Lab Units 07/06/23  1016   POC GLUCOSE mg/dl 142*           * I Have Reviewed All Lab Data Listed Above. * Additional Pertinent Lab Tests Reviewed: [address] [place] [address] Admission Reviewed    Imaging:     MRI brain wo contrast   Final Result by Danelle Adames MD (07/07 1141)      A few small chronic bilateral cerebellar lacunar infarctions are stable. No acute infarction, intracranial hemorrhage or mass effect. Workstation performed: [id number]         US right upper quadrant   Final Result by Rosalva Chan DO (07/06 2106)      No acute findings. Absent gallbladder. Trace right pleural fluid visualized. Workstation performed: [id number]         CT abdomen pelvis wo contrast   Final Result by Sera Funes MD (07/06 1526)      Small bilateral pleural effusions and small volume abdominopelvic ascites. Otherwise, no acute abnormality in the abdomen or pelvis. Workstation performed: [id number]         CT head wo contrast   Final Result by Idalia Olivera MD (07/06 1218)      1. No acute intracranial abnormality. 2.  Chronic cerebellar lacunar infarcts best seen on the prior MRI examination.                Workstation performed: [id number]           Imaging Reports Reviewed by myself    Cultures:   Blood Culture:   Lab Results   Component Value Date    BLOODCX No Growth After 5 Days. 03/14/2022    BLOODCX No Growth After 5 Days. 03/14/2022     Urine Culture:   Lab Results   Component Value Date    URINECX 40,000-49,000 cfu/ml 01/09/2023    URINECX 20,000-29,000 cfu/ml 10/03/2022    URINECX 50,000-59,000 cfu/ml 08/24/2022     Sputum Culture: No components found for: "SPUTUMCX"  Wound Culture: No results found for: "WOUNDCULT"    Last 24 Hours Medication List:   Current Facility-Administered Medications   Medication Dose Route Frequency Provider Last Rate   • amantadine  100 mg Oral BID Ponciano Ahumada, PA-C     • bisoprolol  2.5 mg Oral Daily Minna Avilez PA-C     • carbidopa-levodopa  1 tablet Oral TID Ponciano Ahumada, PA-C     • ciprofloxacin  1 drop Both Eyes Q4H While Awake Lakeisha Ta MD     • DOPamine in dextrose 5%  1-20 mcg/kg/min Intravenous Continuous Elma Begum MD 2.5 mcg/kg/min (07/08/23 1830)   • ferrous gluconate  324 mg Oral Daily Before Breakfast Ponciano Ahumada, PA-C     • levothyroxine  50 mcg Oral Early Morning Ponciano Ahumada, PA-C     • memantine  10 mg Oral Daily Ponciano Ahumada, PA-C     • warfarin  3 mg Oral Daily (warfarin) Elma Begum MD          Today, Patient Was Seen By: STACY Méndez    ** Please Note: Dragon 360 Dictation voice to text software may have been used in the creation of this document.  **

## 2023-07-09 NOTE — ASSESSMENT & PLAN NOTE
· Baseline cr appears around 1.0-1.2  · Cr on admission 2.15  · CT a/p shows no obstruction  · Likely secondary to cardiorenal syndrome in the setting of low EF  · Patient might benefit from inotropic support  Results from last 7 days   Lab Units 07/09/23  0534 07/08/23  0539 07/07/23  0432 07/06/23  1036 07/06/23  0743   BUN mg/dL 45* 50* 47* 52* 48*   CREATININE mg/dL 1.70* 1.97* 1.84* 2.15* 2.33*

## 2023-07-09 NOTE — PROGRESS NOTES
Cardiology Inpatient Progress Note  [facility]   Thomas Stephens  1940  [id number]  PCP: Deborah Jensen MD  Date of Admission:  7/6/2023    Assessment/Plan:   Generalized weakness/patient difficulty/advanced Parkinson's disease/acute on chronic biventricular heart failure-I had a goals of care discussion with patient's daughter and  at bedside with the primary team.  She has an overall poor 1 year prognosis given her neurological status and biventricular heart dysfunction. She has had limited change in her clinical status on IV inotrope. Her family and patient would like to discuss hospice. History of mechanical valve replaced in September 19, 1990-INR is at 2.24. Target between 2.5 and 3.5    Chronic atrial fibrillation-INR is at 2.24    Acute on chronic kidney injury stage [id number] lower from 1.97-1.7    Non-MI troponin elevation in the setting of advanced heart failure    History of benign positional vertigo    History of severe diarrhea chronic intermittent diarrhea    Subjective:   She is still very lethargic and having trouble opening her eyes. She states having some benefit with dopamine infusion    Objective:     Vitals: Blood pressure 98/57, pulse 67, temperature (!) 97.3 °F (36.3 °C), resp. rate 18, height 5' 4" (1.626 m), weight 69.9 kg (154 lb), SpO2 97 %. Vitals:    07/06/23 1433 07/07/23 0758   Weight: 69.9 kg (154 lb) 69.9 kg (154 lb)     Body mass index is 26.43 kg/m². BP Readings from Last 3 Encounters:   07/09/23 98/57   06/20/23 102/60   06/07/23 112/69     Orthostatic Blood Pressures    Flowsheet Row Most Recent Value   Blood Pressure 98/57 filed at 07/09/2023 0824        I/O       07/07 0701  07/08 0700 07/08 0701  07/09 0700 07/09 0701  07/10 0700    P. O.       I.V. (mL/kg) 1180 (16.9) 20.1 (0.3)     IV Piggyback       Total Intake(mL/kg) 1180 (16.9) 20.1 (0.3)     Urine (mL/kg/hr)  600 (0.4)     Total Output  600     Net +1180 -579.9 Unmeasured Urine Occurrence 2 x 1 x         Invasive Devices     Peripheral Intravenous Line  Duration           Peripheral IV 07/06/23 Distal;Left;Ventral (anterior) Forearm 3 days          Drain  Duration           External Urinary Catheter 1 day                  Intake/Output Summary (Last 24 hours) at 7/9/2023 1519  Last data filed at 7/9/2023 0500  Gross per 24 hour   Intake 20.13 ml   Output 450 ml   Net -429.87 ml     Physical Examination:   Physical Exam  Constitutional:       General: She is not in acute distress. Appearance: She is well-developed. She is ill-appearing. She is not diaphoretic. HENT:      Head: Normocephalic and atraumatic. Right Ear: External ear normal.      Left Ear: External ear normal.   Eyes:      General: No scleral icterus. Right eye: No discharge. Left eye: No discharge. Conjunctiva/sclera: Conjunctivae normal.      Pupils: Pupils are equal, round, and reactive to light. Neck:      Thyroid: No thyromegaly. Vascular: No JVD. Trachea: No tracheal deviation. Cardiovascular:      Rate and Rhythm: Normal rate. Rhythm irregular. Heart sounds: Murmur heard. No friction rub. Gallop present. Pulmonary:      Effort: Pulmonary effort is normal. No respiratory distress. Breath sounds: Normal breath sounds. No stridor. No wheezing or rales. Chest:      Chest wall: No tenderness. Abdominal:      General: Bowel sounds are normal. There is no distension. Palpations: Abdomen is soft. There is no mass. Tenderness: There is no abdominal tenderness. There is no guarding or rebound. Musculoskeletal:         General: No tenderness or deformity. Normal range of motion. Cervical back: Normal range of motion and neck supple. Skin:     General: Skin is warm and dry. Coloration: Skin is not pale. Findings: No erythema or rash. Neurological:      Cranial Nerves: No cranial nerve deficit.       Motor: Weakness present. No abnormal muscle tone. Coordination: Coordination abnormal.      Gait: Gait abnormal.      Deep Tendon Reflexes: Reflexes are normal and symmetric. Reflexes normal.         Labs:   Lab Results:  I Have Reviewed All Lab Data Below:  CBC with diff:  Results from last 7 days   Lab Units 07/08/23  0539 07/07/23  0432 07/06/23  1036   WBC Thousand/uL 8.29 7.32 7.19   HEMOGLOBIN g/dL 11.7 11.4* 11.7   HEMATOCRIT % 36.0 34.5* 35.8   MCV fL 106* 105* 104*   PLATELETS Thousands/uL 107* 100* 120*   RBC Million/uL 3.40* 3.29* 3.45*   MCH pg 34.4* 34.7* 33.9   MCHC g/dL 32.5 33.0 32.7   RDW % 17.1* 16.8* 16.5*   MPV fL 12.3 11.9 11.9   NRBC AUTO /100 WBCs  --   --  0       CMP:  Results from last 7 days   Lab Units 07/09/23  0534 07/08/23  0539 07/07/23  0432 07/06/23  1036 07/06/23  0743   SODIUM mmol/L 138 137 135 136 137   POTASSIUM mmol/L 4.0 4.3 3.9 3.4* 3.8   CHLORIDE mmol/L 108 107 105 103 106   CO2 mmol/L 25 21 23 27 26   ANION GAP mmol/L 5 9 7 6 5   BUN mg/dL 45* 50* 47* 52* 48*   CREATININE mg/dL 1.70* 1.97* 1.84* 2.15* 2.33*   GLUCOSE FASTING mg/dL  --   --   --   --  101*   CALCIUM mg/dL 10.5* 9.9 9.6 10.5* 10.7*   AST U/L 101* 208* 155* 304*  --    ALT U/L 66* 114* 71* 246*  --    ALK PHOS U/L 83 80 80 80  --    TOTAL PROTEIN g/dL 5.5* 5.3* 5.0* 5.6*  --    ALBUMIN g/dL 3.3* 3.3* 3.1* 3.5  --    TOTAL BILIRUBIN mg/dL 3.55* 3.54* 2.46* 3.20*  --    EGFR ml/min/1.73sq m 27 23 24 20 18     Magnesium:  Results from last 7 days   Lab Units 07/07/23 0432   MAGNESIUM mg/dL 1.9       Coags:  Results from last 7 days   Lab Units 07/09/23  0534 07/08/23  0539 07/07/23  2335 07/07/23  1448 07/07/23  0432 07/06/23  1820 07/06/23  1153   PTT seconds  --   --   --   --   --   --  54*   INR  2.24* 2.52* 3.11* 4.91* >15.00* >15.00* 14.85*     TSH:  Results from last 7 days   Lab Units 07/07/23 0432   TSH 3RD GENERATON uIU/mL 3.176     Lipid Profile:  Results from last 7 days   Lab Units 07/07/23 0432 CHOLESTEROL mg/dL 79   TRIGLYCERIDES mg/dL 107   HDL mg/dL 13*   LDL CALC mg/dL 45     Hgb A1c:    NT-proBNP: No results for input(s): "NTBNP" in the last 72 hours. Troponins:      Imaging & Testing   I have personally reviewed pertinent reports. Cardiac Testing   Results for orders placed during the hospital encounter of 19    Echo complete with contrast if indicated    Narrative  [address] [place] [address].  [place] [address]  [phone]    Transthoracic Echocardiogram  2D, M-mode, Doppler, and Color Doppler    Study date:  2019    Patient: Sadie Menendez  MR number: [id number]  Account number: [de-identified]  : 1940  Age: 77 years  Gender: Female  Status: Outpatient  Location: Echo lab  Height: 64 in  Weight: 185.7 lb  BP: 94/ 63 mmHg    Indications: Cardiomyopathy    Diagnoses: I42.9 - Cardiomyopathy, unspecified    Sonographer:  [facility]ust[facility]  Primary Physician:  Juani Rodriguez MD  Referring Physician:  Isadora Timmons MD  Group:  [facility]  Ordering Physician:  Isadora Timmons MD  Interpreting Physician:  Megha Nicholson MD    SUMMARY    LEFT VENTRICLE:  Systolic function was at the lower limits of normal. Ejection fraction was estimated in the range of 50 % to 55 % to be 55 %. There were no regional wall motion abnormalities. Wall thickness was mildly increased. There was mild concentric hypertrophy. VENTRICULAR SEPTUM:  There was mild paradoxical motion. 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 LEFT ATRIUM:  The atrium was moderately to markedly dilated. MITRAL VALVE:  A mechanical prosthesis was present. It exhibited normal function and normal motion. There was no pannus formation, a normal-appearing sewing ring, and no rocking motion of the sewing ring. Mean gradient 4 mm of Hg and peak velocity 1.5  m/sec with trace to mild MR. There was trace regurgitation.     AORTIC VALVE:  There was mild regurgitation. TRICUSPID VALVE:  There was mild regurgitation. Estimated peak PA pressure was 25 mmHg. HISTORY: PRIOR HISTORY: Mitral Valve Replacement (1990), A-Fib, Hypothyroidism    PROCEDURE: The procedure was performed in the echo lab. This was a routine study. The transthoracic approach was used. The study included complete 2D imaging, M-mode, complete spectral Doppler, and color Doppler. The heart rate was 71 bpm,  at the start of the study. Echocardiographic views were limited due to lung interference. Image quality was adequate. LEFT VENTRICLE: Size was normal. Systolic function was at the lower limits of normal. Ejection fraction was estimated in the range of 50 % to 55 % to be 55 %. There were no regional wall motion abnormalities. Wall thickness was mildly  increased. There was mild concentric hypertrophy. DOPPLER: The study was not technically sufficient to allow evaluation of LV diastolic function. VENTRICULAR SEPTUM: There was mild paradoxical motion. 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 RIGHT VENTRICLE: The size was normal. Systolic function was low normal.    LEFT ATRIUM: The atrium was moderately to markedly dilated. RIGHT ATRIUM: Size was normal.    MITRAL VALVE: A mechanical prosthesis was present. It exhibited normal function and normal motion. There was no pannus formation, a normal-appearing sewing ring, and no rocking motion of the sewing ring. Mean gradient 4 mm of Hg and peak  velocity 1.5 m/sec with trace to mild MR. DOPPLER: There was no evidence for stenosis. There was trace regurgitation. AORTIC VALVE: The valve was trileaflet. Leaflets exhibited mildly increased thickness, mild calcification, and normal cuspal separation. DOPPLER: Transaortic velocity was within the normal range. There was no evidence for stenosis. There  was mild regurgitation. TRICUSPID VALVE: DOPPLER: There was mild regurgitation.  Estimated peak PA pressure was 25 mmHg. PULMONIC VALVE: DOPPLER: There was no regurgitation. PERICARDIUM: There was no thickening or calcification. There was no pericardial effusion. AORTA: The root exhibited normal size. SYSTEMIC VEINS: IVC: The inferior vena cava was normal in size. Respirophasic changes were normal.    SYSTEM MEASUREMENT TABLES    2D mode  AoR Diam 2D: 3 cm  LA Diam (2D): 5.4 cm  LA/Ao (2D): 1.8  FS (2D Teich): 27.3 %  IVSd (2D): 1.34 cm  LVDEV: 42.8 cmï¾³  LVEDV MOD BP: 85 cmï¾³  LVESV: 19.5 cmï¾³  LVIDd(2D): 3.26 cm  LVISd (2D): 2.37 cm  LVOT Area 2D: 3.14 cmï¾²  LVPWd (2D): 1.33 cm  SV (Teich): 23.3 cmï¾³    Apical four chamber  LVEF A4C: 50 %    Apical two chamber  LVEF A2C: 54 %    Unspecified Scan Mode  MANSOOR Cont Eq (Peak Dejan): 1.97 cmï¾²  LVOT Diam.: 2 cm  LVOT Vmax: 827 mm/s  LVOT Vmax; Mean: 827 mm/s  Peak Grad.; Mean: 3 mm[Hg]  MV Peak E Dejan. Mean: 1330 mm/s  MVA (PHT): 3.01 cmï¾²  PHT: 73 ms  Max P mm[Hg]  V Max: 2420 mm/s  Vmax: 2140 mm/s  RA Area: 18.3 cmï¾²  RA Volume: 45.7 cmï¾³  TAPSE: 1.3 cm    [employer] Accredited Echocardiography Laboratory    Prepared and electronically signed by    Sydnee Johnson MD  Signed 2019 07:39:37    EKG: Personally reviewed. Counseling :  Counseling / Coordination of Care Time: 40min. Greater than 50% of total time spent on patient counseling and coordination of care. Code Status: Level 3 - DNAR and DNI  Collaboration of Care: Were Recommendations Directly Discussed with Primary Treatment Team? - Yes     Enrique Nair MD [facility]    "This note has been constructed using a voice recognition system. Therefore there may be syntax, spelling, and/or grammatical errors.  Please call if you have any questions. "

## 2023-07-09 NOTE — PLAN OF CARE
Problem: Potential for Falls  Goal: Patient will remain free of falls  Description: INTERVENTIONS:  - Educate patient/family on patient safety including physical limitations  - Instruct patient to call for assistance with activity   - Consult OT/PT to assist with strengthening/mobility   - Keep Call bell within reach  - Keep bed low and locked with side rails adjusted as appropriate  - Keep care items and personal belongings within reach  - Initiate and maintain comfort rounds  - Make Fall Risk Sign visible to staff  - Offer Toileting every 2 Hours, in advance of need  - Initiate/Maintain bed alarm  Problem: Prexisting or High Potential for Compromised Skin Integrity  Goal: Skin integrity is maintained or improved  Description: INTERVENTIONS:  - Identify patients at risk for skin breakdown  - Assess and monitor skin integrity  - Assess and monitor nutrition and hydration status  - Monitor labs   - Assess for incontinence   - Turn and reposition patient  - Assist with mobility/ambulation  - Relieve pressure over bony prominences  - Avoid friction and shearing  - Provide appropriate hygiene as needed including keeping skin clean and dry  - Evaluate need for skin moisturizer/barrier cream  - Collaborate with interdisciplinary team   - Patient/family teaching  - Consider wound care consult   Outcome: Progressing     Problem: PAIN - ADULT  Goal: Verbalizes/displays adequate comfort level or baseline comfort level  Description: Interventions:  - Encourage patient to monitor pain and request assistance  - Assess pain using appropriate pain scale  - Administer analgesics based on type and severity of pain and evaluate response  - Implement non-pharmacological measures as appropriate and evaluate response  - Consider cultural and social influences on pain and pain management  - Notify physician/advanced practitioner if interventions unsuccessful or patient reports new pain  Outcome: Progressing     Problem: NEUROSENSORY - ADULT  Goal: Achieves stable or improved neurological status  Description: INTERVENTIONS  - Monitor and report changes in neurological status  - Monitor vital signs such as temperature, blood pressure, glucose, and any other labs ordered   - Initiate measures to prevent increased intracranial pressure  - Monitor for seizure activity and implement precautions if appropriate      Outcome: Progressing  Goal: Achieves maximal functionality and self care  Description: INTERVENTIONS  - Monitor swallowing and airway patency with patient fatigue and changes in neurological status  - Encourage and assist patient to increase activity and self care.    - Encourage visually impaired, hearing impaired and aphasic patients to use assistive/communication devices  Outcome: Progressing     Problem: CARDIOVASCULAR - ADULT  Goal: Maintains optimal cardiac output and hemodynamic stability  Description: INTERVENTIONS:  - Monitor I/O, vital signs and rhythm  - Monitor for S/S and trends of decreased cardiac output  - Administer and titrate ordered vasoactive medications to optimize hemodynamic stability  - Assess quality of pulses, skin color and temperature  - Assess for signs of decreased coronary artery perfusion  - Instruct patient to report change in severity of symptoms  Outcome: Progressing  Goal: Absence of cardiac dysrhythmias or at baseline rhythm  Description: INTERVENTIONS:  - Continuous cardiac monitoring, vital signs, obtain 12 lead EKG if ordered  - Administer antiarrhythmic and heart rate control medications as ordered  - Monitor electrolytes and administer replacement therapy as ordered  Outcome: Progressing     Problem: CARDIOVASCULAR - ADULT  Goal: Absence of cardiac dysrhythmias or at baseline rhythm  Description: INTERVENTIONS:  - Continuous cardiac monitoring, vital signs, obtain 12 lead EKG if ordered  - Administer antiarrhythmic and heart rate control medications as ordered  - Monitor electrolytes and administer replacement therapy as ordered  Outcome: Progressing     Problem: RESPIRATORY - ADULT  Goal: Achieves optimal ventilation and oxygenation  Description: INTERVENTIONS:  - Assess for changes in respiratory status  - Assess for changes in mentation and behavior  - Position to facilitate oxygenation and minimize respiratory effort  - Oxygen administered by appropriate delivery if ordered  - Initiate smoking cessation education as indicated  - Encourage broncho-pulmonary hygiene including cough, deep breathe, Incentive Spirometry  - Assess the need for suctioning and aspirate as needed  - Assess and instruct to report SOB or any respiratory difficulty  - Respiratory Therapy support as indicated  Outcome: Progressing     Problem: Potential for Aspiration  Goal: Non-ventilated patient's risk of aspiration is minimized  Description: Assess and monitor vital signs, respiratory status, and labs (WBC). Monitor for signs of aspiration (tachypnea, cough, rales, wheezing, cyanosis, fever). - Assess and monitor patient's ability to swallow. - Place patient up in chair to eat if possible. - HOB up at 90 degrees to eat if unable to get patient up into chair.  - Supervise patient during oral intake. - Instruct patient/ family to take small bites. - Instruct patient/ family to take small single sips when taking liquids. - Follow patient-specific strategies generated by speech pathologist.  Outcome: Progressing     Problem: Nutrition  Goal: Nutrition/Hydration status is improving  Description: Monitor and assess patient's nutrition/hydration status for malnutrition (ex- brittle hair, bruises, dry skin, pale skin and conjunctiva, muscle wasting, smooth red tongue, and disorientation). Collaborate with interdisciplinary team and initiate plan and interventions as ordered. Monitor patient's weight and dietary intake as ordered or per policy. Utilize nutrition screening tool and intervene per policy.  Determine patient's food preferences and provide high-protein, high-caloric foods as appropriate. - Assist patient with eating.  - Allow adequate time for meals.  - Encourage patient to take dietary supplement as ordered. - Collaborate with clinical nutritionist.  - Include patient/family/caregiver in decisions related to nutrition.   Outcome: Progressing     - Apply yellow socks and bracelet for high fall risk patients  - Consider moving patient to room near nurses station  Outcome: Progressing

## 2023-07-09 NOTE — ASSESSMENT & PLAN NOTE
· History of chronic a fib, EKG on admission showed controlled a fib  · On warfarin, INR 14 on admission  · Continue coumadin   · Patient started on bisoprolol 2.5 mg p.o. daily

## 2023-07-09 NOTE — PLAN OF CARE
Problem: Potential for Falls  Goal: Patient will remain free of falls  Description: INTERVENTIONS:  - Educate patient/family on patient safety including physical limitations  - Instruct patient to call for assistance with activity   - Consult OT/PT to assist with strengthening/mobility   - Keep Call bell within reach  - Keep bed low and locked with side rails adjusted as appropriate  - Keep care items and personal belongings within reach  - Initiate and maintain comfort rounds  - Make Fall Risk Sign visible to staff  - Offer Toileting every 2 Hours, in advance of need  - Initiate/Maintain bed/chair alarm  - Obtain necessary fall risk management equipment: bed/chair alarm  - Apply yellow socks and bracelet for high fall risk patients  - Consider moving patient to room near nurses station  7/9/2023 0758 by Mahesh Del Rio RN  Outcome: Progressing  7/9/2023 0757 by Mahesh Del Rio RN  Outcome: Progressing     Problem: MOBILITY - ADULT  Goal: Maintain or return to baseline ADL function  Description: INTERVENTIONS:  - Educate patient/family on patient safety including physical limitations  - Instruct patient to call for assistance with activity   - Consult OT/PT to assist with strengthening/mobility   - Keep Call bell within reach  - Keep bed low and locked with side rails adjusted as appropriate  - Keep care items and personal belongings within reach  - Initiate and maintain comfort rounds  - Make Fall Risk Sign visible to staff  - Offer Toileting every 2 Hours, in advance of need  - Initiate/Maintain bed/chair alarm  - Obtain necessary fall risk management equipment: bed/chair alarm  - Apply yellow socks and bracelet for high fall risk patients  - Consider moving patient to room near nurses station  7/9/2023 0758 by Mahesh Del Rio RN  Outcome: Progressing  7/9/2023 0757 by Mahesh Del Rio RN  Outcome: Progressing  Goal: Maintains/Returns to pre admission functional level  Description: INTERVENTIONS:  - Perform BMAT or MOVE assessment daily.   - Set and communicate daily mobility goal to care team and patient/family/caregiver. - Collaborate with rehabilitation services on mobility goals if consulted  - Perform Range of Motion 3 times a day. - Reposition patient every 3 hours.   - Dangle patient 3 times a day  - Stand patient 3 times a day  - Ambulate patient 3 times a day  - Out of bed to chair 3 times a day   - Out of bed for meals 3  Problem: Prexisting or High Potential for Compromised Skin Integrity  Goal: Skin integrity is maintained or improved  Description: INTERVENTIONS:  - Identify patients at risk for skin breakdown  - Assess and monitor skin integrity  - Assess and monitor nutrition and hydration status  - Monitor labs   - Assess for incontinence   - Turn and reposition patient  - Assist with mobility/ambulation  - Relieve pressure over bony prominences  - Avoid friction and shearing  - Provide appropriate hygiene as needed including keeping skin clean and dry  - Evaluate need for skin moisturizer/barrier cream  - Collaborate with interdisciplinary team   - Patient/family teaching  - Consider wound care consult   7/9/2023 0758 by Mahesh Del Rio RN  Outcome: Progressing  7/9/2023 0757 by Mahesh Del Rio RN  Outcome: Progressing     Problem: PAIN - ADULT  Goal: Verbalizes/displays adequate comfort level or baseline comfort level  Description: Interventions:  - Encourage patient to monitor pain and request assistance  - Assess pain using appropriate pain scale  - Administer analgesics based on type and severity of pain and evaluate response  - Implement non-pharmacological measures as appropriate and evaluate response  - Consider cultural and social influences on pain and pain management  - Notify physician/advanced practitioner if interventions unsuccessful or patient reports new pain  7/9/2023 0758 by Mahesh Del Rio RN  Outcome: Progressing  7/9/2023 0757 by Mahesh Del Rio RN  Outcome: Progressing     Problem: SAFETY ADULT  Goal: Patient will remain free of falls  Description: INTERVENTIONS:  - Educate patient/family on patient safety including physical limitations  - Instruct patient to call for assistance with activity   - Consult OT/PT to assist with strengthening/mobility   - Keep Call bell within reach  - Keep bed low and locked with side rails adjusted as appropriate  - Keep care items and personal belongings within reach  - Initiate and maintain comfort rounds  - Make Fall Risk Sign visible to staff  - Offer Toileting every 2 Hours, in advance of need  - Initiate/Maintain bed/chair alarm  - Obtain necessary fall risk management equipment: bed/chair alarm  - Apply yellow socks and bracelet for high fall risk patients  - Consider moving patient to room near nurses station  7/9/2023 0758 by Donald Bailey RN  Outcome: Progressing  7/9/2023 0757 by Donald Bailey RN  Outcome: Progressing  Goal: Maintain or return to baseline ADL function  Description: INTERVENTIONS:  - Educate patient/family on patient safety including physical limitations  - Instruct patient to call for assistance with activity   - Consult OT/PT to assist with strengthening/mobility   - Keep Call bell within reach  - Keep bed low and locked with side rails adjusted as appropriate  - Keep care items and personal belongings within reach  - Initiate and maintain comfort rounds  - Make Fall Risk Sign visible to staff  - Offer Toileting every 2 Hours, in advance of need  - Initiate/Maintain bed/chair alarm  - Obtain necessary fall risk management equipment: bed/chair alarm  - Apply yellow socks and bracelet for high fall risk patients  - Consider moving patient to room near nurses station  7/9/2023 0758 by Donald Bailey RN  Outcome: Progressing  7/9/2023 0757 by Donald Bailey RN  Outcome: Progressing  Goal: Maintains/Returns to pre admission functional level  Description: INTERVENTIONS:  - Perform BMAT or MOVE assessment daily.   - Set and communicate daily mobility goal to care team and patient/family/caregiver. - Collaborate with rehabilitation services on mobility goals if consulted  - Perform Range of Motion 3 times a day. - Reposition patient every 3 hours. - Dangle patient 3 times a day  - Stand patient 3 times a day  - Ambulate patient 3 times a day  - Out of bed to chair 3 times a day   - Out of bed for meals 3  Problem: NEUROSENSORY - ADULT  Goal: Achieves stable or improved neurological status  Description: INTERVENTIONS  - Monitor and report changes in neurological status  - Monitor vital signs such as temperature, blood pressure, glucose, and any other labs ordered   - Initiate measures to prevent increased intracranial pressure  - Monitor for seizure activity and implement precautions if appropriate      7/9/2023 0758 by Lola To RN  Outcome: Progressing  7/9/2023 0757 by Lola To RN  Outcome: Progressing  Goal: Achieves maximal functionality and self care  Description: INTERVENTIONS  - Monitor swallowing and airway patency with patient fatigue and changes in neurological status  - Encourage and assist patient to increase activity and self care.    - Encourage visually impaired, hearing impaired and aphasic patients to use assistive/communication devices  7/9/2023 0758 by Lola To RN  Outcome: Progressing  7/9/2023 0757 by Lola To RN  Outcome: Progressing     Problem: CARDIOVASCULAR - ADULT  Goal: Maintains optimal cardiac output and hemodynamic stability  Description: INTERVENTIONS:  - Monitor I/O, vital signs and rhythm  - Monitor for S/S and trends of decreased cardiac output  - Administer and titrate ordered vasoactive medications to optimize hemodynamic stability  - Assess quality of pulses, skin color and temperature  - Assess for signs of decreased coronary artery perfusion  - Instruct patient to report change in severity of symptoms  7/9/2023 0758 by Elva Mcdonnell Yvette Onofre RN  Outcome: Progressing  7/9/2023 0757 by Guicho Christensen RN  Outcome: Progressing  Goal: Absence of cardiac dysrhythmias or at baseline rhythm  Description: INTERVENTIONS:  - Continuous cardiac monitoring, vital signs, obtain 12 lead EKG if ordered  - Administer antiarrhythmic and heart rate control medications as ordered  - Monitor electrolytes and administer replacement therapy as ordered  7/9/2023 0758 by Guicho Christensen RN  Outcome: Progressing  7/9/2023 0757 by Guicho Christensen RN  Outcome: Progressing     Problem: RESPIRATORY - ADULT  Goal: Achieves optimal ventilation and oxygenation  Description: INTERVENTIONS:  - Assess for changes in respiratory status  - Assess for changes in mentation and behavior  - Position to facilitate oxygenation and minimize respiratory effort  - Oxygen administered by appropriate delivery if ordered  - Initiate smoking cessation education as indicated  - Encourage broncho-pulmonary hygiene including cough, deep breathe, Incentive Spirometry  - Assess the need for suctioning and aspirate as needed  - Assess and instruct to report SOB or any respiratory difficulty  - Respiratory Therapy support as indicated  7/9/2023 0758 by Guicho Christensen RN  Outcome: Progressing  7/9/2023 0757 by Guicho Christensen RN  Outcome: Progressing     Problem: GASTROINTESTINAL - ADULT  Goal: Maintains adequate nutritional intake  Description: INTERVENTIONS:  - Monitor percentage of each meal consumed  - Identify factors contributing to decreased intake, treat as appropriate  - Assist with meals as needed  - Monitor I&O, weight, and lab values if indicated  - Obtain nutrition services referral as needed  7/9/2023 0758 by Guicho Christensen RN  Outcome: Progressing  7/9/2023 0757 by Guicho Christensen RN  Outcome: Progressing     Problem: SKIN/TISSUE INTEGRITY - ADULT  Goal: Skin Integrity remains intact(Skin Breakdown Prevention)  Description: Assess:  -Perform Rony assessment every shift  -Clean and moisturize skin every shift  -Inspect skin when repositioning, toileting, and assisting with ADLS  -Assess under medical devices such as scd's every shift  -Assess extremities for adequate circulation and sensation     Bed Management:  -Have minimal linens on bed & keep smooth, unwrinkled  -Change linens as needed when moist or perspiring  -Avoid sitting or lying in one position for more than 2 hours while in bed  -Keep HOB at 45 degrees     Toileting:  -Offer bedside commode  -Assess for incontinence every 2 hours  -Use incontinent care products after each incontinent episode such as chucks    Activity:  -Mobilize patient 3 times a day  -Encourage activity and walks on unit  -Encourage or provide ROM exercises   -Turn and reposition patient every 2 Hours  -Use appropriate equipment to lift or move patient in bed  -Instruct/ Assist with weight shifting every 2 hours when out of bed in chair  -Consider limitation of chair time 2 hour intervals    Skin Care:  -Avoid use of baby powder, tape, friction and shearing, hot water or constrictive clothing  -Relieve pressure over bony prominences using waffle  -Do not massage red bony areas    Next Steps:  -Teach patient strategies to minimize risks such as turn and reposition   -Consider consults to  interdisciplinary teams such as pt/ot  7/9/2023 0758 by Sandra Mendoza RN  Outcome: Progressing  7/9/2023 0757 by Snadra Mendoza, RN  Outcome: Progressing     Problem: Neurological Deficit  Goal: Neurological status is stable or improving  Description: Interventions:  - Monitor and assess patient's level of consciousness, motor function, sensory function, and level of assistance needed for ADLs. - Monitor and report changes from baseline. Collaborate with interdisciplinary team to initiate plan and implement interventions as ordered. - Provide and maintain a safe environment. - Consider seizure precautions. - Consider fall precautions.   - Consider aspiration precautions. - Consider bleeding precautions. 7/9/2023 0758 by Martin Meneses RN  Outcome: Progressing  7/9/2023 0757 by Martin Meneses RN  Outcome: Progressing     Problem: Activity Intolerance/Impaired Mobility  Goal: Mobility/activity is maintained at optimum level for patient  Description: Interventions:  - Assess and monitor patient  barriers to mobility and need for assistive/adaptive devices. - Assess patient's emotional response to limitations. - Collaborate with interdisciplinary team and initiate plans and interventions as ordered. - Encourage independent activity per ability.  - Maintain proper body alignment. - Perform active/passive rom as tolerated/ordered. - Plan activities to conserve energy.  - Turn patient as appropriate  7/9/2023 0758 by Martin Meneses RN  Outcome: Progressing  7/9/2023 0757 by Martin Meneses RN  Outcome: Progressing     Problem: Communication Impairment  Goal: Ability to express needs and understand communication  Description: Assess patient's communication skills and ability to understand information. Patient will demonstrate use of effective communication techniques, alternative methods of communication and understanding even if not able to speak. - Encourage communication and provide alternate methods of communication as needed. - Collaborate with case management/ for discharge needs. - Include patient/family/caregiver in decisions related to communication. 7/9/2023 0758 by Martin Meneses RN  Outcome: Progressing  7/9/2023 0757 by Martin Meneses RN  Outcome: Progressing     Problem: Potential for Aspiration  Goal: Non-ventilated patient's risk of aspiration is minimized  Description: Assess and monitor vital signs, respiratory status, and labs (WBC). Monitor for signs of aspiration (tachypnea, cough, rales, wheezing, cyanosis, fever). - Assess and monitor patient's ability to swallow.   - Place patient up in chair to eat if possible. - HOB up at 90 degrees to eat if unable to get patient up into chair.  - Supervise patient during oral intake. - Instruct patient/ family to take small bites. - Instruct patient/ family to take small single sips when taking liquids. - Follow patient-specific strategies generated by speech pathologist.  7/9/2023 0758 by Bryson Irene RN  Outcome: Progressing  7/9/2023 0757 by Bryson Irene RN  Outcome: Progressing     Problem: Nutrition  Goal: Nutrition/Hydration status is improving  Description: Monitor and assess patient's nutrition/hydration status for malnutrition (ex- brittle hair, bruises, dry skin, pale skin and conjunctiva, muscle wasting, smooth red tongue, and disorientation). Collaborate with interdisciplinary team and initiate plan and interventions as ordered. Monitor patient's weight and dietary intake as ordered or per policy. Utilize nutrition screening tool and intervene per policy. Determine patient's food preferences and provide high-protein, high-caloric foods as appropriate. - Assist patient with eating.  - Allow adequate time for meals.  - Encourage patient to take dietary supplement as ordered. - Collaborate with clinical nutritionist.  - Include patient/family/caregiver in decisions related to nutrition. 7/9/2023 0758 by Bryson Irene RN  Outcome: Progressing  7/9/2023 0757 by Bryson Irene RN  Outcome: Progressing     Problem: Nutrition/Hydration-ADULT  Goal: Nutrient/Hydration intake appropriate for improving, restoring or maintaining nutritional needs  Description: Monitor and assess patient's nutrition/hydration status for malnutrition. Collaborate with interdisciplinary team and initiate plan and interventions as ordered. Monitor patient's weight and dietary intake as ordered or per policy. Utilize nutrition screening tool and intervene as necessary.  Determine patient's food preferences and provide high-protein, high-caloric foods as appropriate.      INTERVENTIONS:  - Monitor oral intake, urinary output, labs, and treatment plans  - Assess nutrition and hydration status and recommend course of action  - Evaluate amount of meals eaten  - Assist patient with eating if necessary   - Allow adequate time for meals  - Recommend/ encourage appropriate diets, oral nutritional supplements, and vitamin/mineral supplements  - Order, calculate, and assess calorie counts as needed  - Recommend, monitor, and adjust tube feedings and TPN/PPN based on assessed needs  - Assess need for intravenous fluids  - Provide specific nutrition/hydration education as appropriate  - Include patient/family/caregiver in decisions related to nutrition  7/9/2023 0758 by Erwin Broussard RN  Outcome: Progressing  7/9/2023 0757 by Erwin Broussard RN  Outcome: Progressing    times a day  - Out of bed for toileting  - Record patient progress and toleration of activity level   7/9/2023 0758 by Erwin Broussard RN  Outcome: Progressing  7/9/2023 0757 by Erwin Broussard RN  Outcome: Progressing    times a day  - Out of bed for toileting  - Record patient progress and toleration of activity level   7/9/2023 0758 by Erwin Broussard RN  Outcome: Progressing  7/9/2023 0757 by Erwin Broussard RN  Outcome: Progressing

## 2023-07-09 NOTE — ASSESSMENT & PLAN NOTE
· INR 14.85 on admission, >15.0   · No signs of active bleeding  · CT head negative  · Coumadin has been on hold  · Continue to monitor  · S/p 5 mg oral vitamin K on 7/6 and 5 mg IV vitamin K on 7/7  · INR today is 2.51.  Continue Coumadin

## 2023-07-09 NOTE — ASSESSMENT & PLAN NOTE
Wt Readings from Last 3 Encounters:   07/07/23 69.9 kg (154 lb)   06/20/23 68.2 kg (150 lb 6.4 oz)   06/07/23 67.6 kg (149 lb)     ECHO 4/2022: EF 09-51%, systolic function low/normal, left atrium severely dilated, mitral valve prosthesis functioning normally  · On torsemide 20 mg daily and spironolactone 12.5 mg daily  · Held on admission in setting of ROB  · Monitor Is/Os and daily weights  · Continue salt and fluid restriction  · Repeat ECHO 7/2/37: EF 58%, systolic function severely reduced  · S/p 20 mg IV lasix 7/7/23  · Patient was started on bisoprolol 2.5 mg p.o. daily.   · Continue dopamine

## 2023-07-10 PROBLEM — Z71.89 GOALS OF CARE, COUNSELING/DISCUSSION: Status: ACTIVE | Noted: 2023-07-10

## 2023-07-10 PROBLEM — R79.1 SUPRATHERAPEUTIC INR: Status: RESOLVED | Noted: 2023-07-06 | Resolved: 2023-07-10

## 2023-07-10 LAB
ACTIN IGG SERPL-ACNC: 2 UNITS (ref 0–19)
ALBUMIN SERPL BCP-MCNC: 3.1 G/DL (ref 3.5–5)
ALP SERPL-CCNC: 74 U/L (ref 34–104)
ALT SERPL W P-5'-P-CCNC: 146 U/L (ref 7–52)
ANION GAP SERPL CALCULATED.3IONS-SCNC: 4 MMOL/L
AST SERPL W P-5'-P-CCNC: 50 U/L (ref 13–39)
BASOPHILS # BLD AUTO: 0.03 THOUSANDS/ÂΜL (ref 0–0.1)
BASOPHILS NFR BLD AUTO: 1 % (ref 0–1)
BILIRUB SERPL-MCNC: 2.95 MG/DL (ref 0.2–1)
BUN SERPL-MCNC: 38 MG/DL (ref 5–25)
CALCIUM ALBUM COR SERPL-MCNC: 10.8 MG/DL (ref 8.3–10.1)
CALCIUM SERPL-MCNC: 10.1 MG/DL (ref 8.4–10.2)
CHLORIDE SERPL-SCNC: 111 MMOL/L (ref 96–108)
CO2 SERPL-SCNC: 26 MMOL/L (ref 21–32)
CREAT SERPL-MCNC: 1.45 MG/DL (ref 0.6–1.3)
EOSINOPHIL # BLD AUTO: 0.17 THOUSAND/ÂΜL (ref 0–0.61)
EOSINOPHIL NFR BLD AUTO: 3 % (ref 0–6)
ERYTHROCYTE [DISTWIDTH] IN BLOOD BY AUTOMATED COUNT: 16.8 % (ref 11.6–15.1)
GFR SERPL CREATININE-BSD FRML MDRD: 33 ML/MIN/1.73SQ M
GLUCOSE SERPL-MCNC: 96 MG/DL (ref 65–140)
HCT VFR BLD AUTO: 34.8 % (ref 34.8–46.1)
HGB BLD-MCNC: 11 G/DL (ref 11.5–15.4)
IMM GRANULOCYTES # BLD AUTO: 0.04 THOUSAND/UL (ref 0–0.2)
IMM GRANULOCYTES NFR BLD AUTO: 1 % (ref 0–2)
INR PPP: 2.73 (ref 0.84–1.19)
LYMPHOCYTES # BLD AUTO: 0.66 THOUSANDS/ÂΜL (ref 0.6–4.47)
LYMPHOCYTES NFR BLD AUTO: 10 % (ref 14–44)
MAGNESIUM SERPL-MCNC: 2.1 MG/DL (ref 1.9–2.7)
MCH RBC QN AUTO: 33.5 PG (ref 26.8–34.3)
MCHC RBC AUTO-ENTMCNC: 31.6 G/DL (ref 31.4–37.4)
MCV RBC AUTO: 106 FL (ref 82–98)
MITOCHONDRIA M2 IGG SER-ACNC: <20 UNITS (ref 0–20)
MONOCYTES # BLD AUTO: 0.85 THOUSAND/ÂΜL (ref 0.17–1.22)
MONOCYTES NFR BLD AUTO: 13 % (ref 4–12)
NEUTROPHILS # BLD AUTO: 4.59 THOUSANDS/ÂΜL (ref 1.85–7.62)
NEUTS SEG NFR BLD AUTO: 72 % (ref 43–75)
NRBC BLD AUTO-RTO: 0 /100 WBCS
PLATELET # BLD AUTO: 97 THOUSANDS/UL (ref 149–390)
PMV BLD AUTO: 11.7 FL (ref 8.9–12.7)
POTASSIUM SERPL-SCNC: 4.1 MMOL/L (ref 3.5–5.3)
PROT SERPL-MCNC: 5 G/DL (ref 6.4–8.4)
PROTHROMBIN TIME: 29 SECONDS (ref 11.6–14.5)
RBC # BLD AUTO: 3.28 MILLION/UL (ref 3.81–5.12)
SODIUM SERPL-SCNC: 141 MMOL/L (ref 135–147)
WBC # BLD AUTO: 6.34 THOUSAND/UL (ref 4.31–10.16)

## 2023-07-10 PROCEDURE — 99232 SBSQ HOSP IP/OBS MODERATE 35: CPT | Performed by: INTERNAL MEDICINE

## 2023-07-10 PROCEDURE — 83735 ASSAY OF MAGNESIUM: CPT | Performed by: NURSE PRACTITIONER

## 2023-07-10 PROCEDURE — 99233 SBSQ HOSP IP/OBS HIGH 50: CPT | Performed by: INTERNAL MEDICINE

## 2023-07-10 PROCEDURE — 85610 PROTHROMBIN TIME: CPT | Performed by: NURSE PRACTITIONER

## 2023-07-10 PROCEDURE — 85025 COMPLETE CBC W/AUTO DIFF WBC: CPT | Performed by: NURSE PRACTITIONER

## 2023-07-10 PROCEDURE — 80053 COMPREHEN METABOLIC PANEL: CPT | Performed by: NURSE PRACTITIONER

## 2023-07-10 RX ADMIN — CARBIDOPA AND LEVODOPA 1 TABLET: 25; 100 TABLET ORAL at 09:09

## 2023-07-10 RX ADMIN — CIPROFLOXACIN 1 DROP: 3 SOLUTION OPHTHALMIC at 17:33

## 2023-07-10 RX ADMIN — CARBIDOPA AND LEVODOPA 1 TABLET: 25; 100 TABLET ORAL at 15:56

## 2023-07-10 RX ADMIN — BISOPROLOL FUMARATE 2.5 MG: 5 TABLET ORAL at 09:09

## 2023-07-10 RX ADMIN — CARBIDOPA AND LEVODOPA 1 TABLET: 25; 100 TABLET ORAL at 21:37

## 2023-07-10 RX ADMIN — CIPROFLOXACIN 1 DROP: 3 SOLUTION OPHTHALMIC at 09:09

## 2023-07-10 RX ADMIN — CIPROFLOXACIN 1 DROP: 3 SOLUTION OPHTHALMIC at 21:37

## 2023-07-10 RX ADMIN — AMANTADINE HYDROCHLORIDE 100 MG: 100 CAPSULE ORAL at 17:33

## 2023-07-10 RX ADMIN — AMANTADINE HYDROCHLORIDE 100 MG: 100 CAPSULE ORAL at 09:09

## 2023-07-10 RX ADMIN — MEMANTINE 10 MG: 10 TABLET ORAL at 09:09

## 2023-07-10 RX ADMIN — CIPROFLOXACIN 1 DROP: 3 SOLUTION OPHTHALMIC at 05:38

## 2023-07-10 NOTE — ASSESSMENT & PLAN NOTE
· Cardiology discussed goals of care with the patient and family at bedside on 7/9. He expressed overall poor prognosis in the setting of biventricular CHF, poor functional status, and neurologic decline. · I again discussed CODE STATUS at length with cardiology PA, patient's , patient's 2 daughters, and patient at bedside. Patient's cognition is much improved today and she is alert and oriented. After lengthy discussion regarding her overall prognosis, and various plans of care, patient ultimately decided that she would not want to pursue aggressive medical care going forward. · A hospice consult will be obtained per family request.  · We will continue medical treatment for now in hopes of transition patient home with family in the near future.

## 2023-07-10 NOTE — ASSESSMENT & PLAN NOTE
Patient presented to ED with months history of generalized weakness, worsening in past few weeks. Patient now unable to ambulate, usually can ambulate with walker. Family reports left sided facial droop in the morning along with speech difficulties. · Neuro work-up has been unremarkable including CT of the head and MRI of the brain, echo with bubble study showed no PFO. Neurology does not feel her symptoms are solely attributable to progression of her Parkinson's. · Echo did show new drop in EF to 20%, symptoms from that standpoint are not improving with dopamine infusion  · Patient has gradual worsening of her overall performance status at home over the last several weeks and has overall poor prognosis in the setting of biventricular failure and Parkinson's.   · Ongoing goals of care discussions are occurring

## 2023-07-10 NOTE — PLAN OF CARE
Problem: Potential for Falls  Goal: Patient will remain free of falls  Description: INTERVENTIONS:  - Educate patient/family on patient safety including physical limitations  - Instruct patient to call for assistance with activity   - Consult OT/PT to assist with strengthening/mobility   - Keep Call bell within reach  - Keep bed low and locked with side rails adjusted as appropriate  - Keep care items and personal belongings within reach  - Initiate and maintain comfort rounds  - Make Fall Risk Sign visible to staff  - Offer Toileting every 2 Hours, in advance of need  - Initiate/Maintain bed/chair alarm  - Obtain necessary fall risk management equipment: bed/chair alarm  - Apply yellow socks and bracelet for high fall risk patients  - Consider moving patient to room near nurses station  Outcome: Progressing     Problem: MOBILITY - ADULT  Goal: Maintain or return to baseline ADL function  Description: INTERVENTIONS:  - Educate patient/family on patient safety including physical limitations  - Instruct patient to call for assistance with activity   - Consult OT/PT to assist with strengthening/mobility   - Keep Call bell within reach  - Keep bed low and locked with side rails adjusted as appropriate  - Keep care items and personal belongings within reach  - Initiate and maintain comfort rounds  - Make Fall Risk Sign visible to staff  - Offer Toileting every 2 Hours, in advance of need  - Initiate/Maintain bed/chair alarm  - Obtain necessary fall risk management equipment: bed/chair alarm  - Apply yellow socks and bracelet for high fall risk patients  - Consider moving patient to room near nurses station  Outcome: Progressing       Problem: Prexisting or High Potential for Compromised Skin Integrity  Goal: Skin integrity is maintained or improved  Description: INTERVENTIONS:  - Identify patients at risk for skin breakdown  - Assess and monitor skin integrity  - Assess and monitor nutrition and hydration status  - Monitor labs   - Assess for incontinence   - Turn and reposition patient  - Assist with mobility/ambulation  - Relieve pressure over bony prominences  - Avoid friction and shearing  - Provide appropriate hygiene as needed including keeping skin clean and dry  - Evaluate need for skin moisturizer/barrier cream  - Collaborate with interdisciplinary team   - Patient/family teaching  - Consider wound care consult   Outcome: Progressing     Problem: PAIN - ADULT  Goal: Verbalizes/displays adequate comfort level or baseline comfort level  Description: Interventions:  - Encourage patient to monitor pain and request assistance  - Assess pain using appropriate pain scale  - Administer analgesics based on type and severity of pain and evaluate response  - Implement non-pharmacological measures as appropriate and evaluate response  - Consider cultural and social influences on pain and pain management  - Notify physician/advanced practitioner if interventions unsuccessful or patient reports new pain  Outcome: Progressing     Problem: SAFETY ADULT  Goal: Patient will remain free of falls  Description: INTERVENTIONS:  - Educate patient/family on patient safety including physical limitations  - Instruct patient to call for assistance with activity   - Consult OT/PT to assist with strengthening/mobility   - Keep Call bell within reach  - Keep bed low and locked with side rails adjusted as appropriate  - Keep care items and personal belongings within reach  - Initiate and maintain comfort rounds  - Make Fall Risk Sign visible to staff  - Offer Toileting every 2 Hours, in advance of need  - Initiate/Maintain bed/chair alarm  - Obtain necessary fall risk management equipment: bed/chair alarm  - Apply yellow socks and bracelet for high fall risk patients  - Consider moving patient to room near nurses station  Outcome: Progressing  Goal: Maintain or return to baseline ADL function  Description: INTERVENTIONS:  - Educate patient/family on patient safety including physical limitations  - Instruct patient to call for assistance with activity   - Consult OT/PT to assist with strengthening/mobility   - Keep Call bell within reach  - Keep bed low and locked with side rails adjusted as appropriate  - Keep care items and personal belongings within reach  - Initiate and maintain comfort rounds  - Make Fall Risk Sign visible to staff  - Offer Toileting every 2 Hours, in advance of need  - Initiate/Maintain bed/chair alarm  - Obtain necessary fall risk management equipment: bed/chair alarm  - Apply yellow socks and bracelet for high fall risk patients  - Consider moving patient to room near nurses station  Outcome: Progressing  Goal: Maintains/Returns to pre admission functional level  Description: INTERVENTIONS:  - Perform BMAT or MOVE assessment daily.   - Set and communicate daily mobility goal to care team and patient/family/caregiver. - Collaborate with rehabilitation services on mobility goals if consulted  - Perform Range of Motion 2 times a day. - Reposition patient every 2 hours.   - Dangle patient 3 times a day  - Stand patient 3 times a day  - Ambulate patient 3 times a day  - Out of bed to chair 3 times a day   - Out of bed for meals 3 times a day  - Out of bed for toileting  - Record patient progress and toleration of activity level   Outcome: Progressing     Problem: NEUROSENSORY - ADULT  Goal: Achieves stable or improved neurological status  Description: INTERVENTIONS  - Monitor and report changes in neurological status  - Monitor vital signs such as temperature, blood pressure, glucose, and any other labs ordered   - Initiate measures to prevent increased intracranial pressure  - Monitor for seizure activity and implement precautions if appropriate      Outcome: Progressing  Goal: Achieves maximal functionality and self care  Description: INTERVENTIONS  - Monitor swallowing and airway patency with patient fatigue and changes in neurological status  - Encourage and assist patient to increase activity and self care.    - Encourage visually impaired, hearing impaired and aphasic patients to use assistive/communication devices  Outcome: Progressing     Problem: CARDIOVASCULAR - ADULT  Goal: Maintains optimal cardiac output and hemodynamic stability  Description: INTERVENTIONS:  - Monitor I/O, vital signs and rhythm  - Monitor for S/S and trends of decreased cardiac output  - Administer and titrate ordered vasoactive medications to optimize hemodynamic stability  - Assess quality of pulses, skin color and temperature  - Assess for signs of decreased coronary artery perfusion  - Instruct patient to report change in severity of symptoms  Outcome: Progressing  Goal: Absence of cardiac dysrhythmias or at baseline rhythm  Description: INTERVENTIONS:  - Continuous cardiac monitoring, vital signs, obtain 12 lead EKG if ordered  - Administer antiarrhythmic and heart rate control medications as ordered  - Monitor electrolytes and administer replacement therapy as ordered  Outcome: Progressing     Problem: RESPIRATORY - ADULT  Goal: Achieves optimal ventilation and oxygenation  Description: INTERVENTIONS:  - Assess for changes in respiratory status  - Assess for changes in mentation and behavior  - Position to facilitate oxygenation and minimize respiratory effort  - Oxygen administered by appropriate delivery if ordered  - Initiate smoking cessation education as indicated  - Encourage broncho-pulmonary hygiene including cough, deep breathe, Incentive Spirometry  - Assess the need for suctioning and aspirate as needed  - Assess and instruct to report SOB or any respiratory difficulty  - Respiratory Therapy support as indicated  Outcome: Progressing     Problem: GASTROINTESTINAL - ADULT  Goal: Maintains adequate nutritional intake  Description: INTERVENTIONS:  - Monitor percentage of each meal consumed  - Identify factors contributing to decreased intake, treat as appropriate  - Assist with meals as needed  - Monitor I&O, weight, and lab values if indicated  - Obtain nutrition services referral as needed  Outcome: Progressing     Problem: SKIN/TISSUE INTEGRITY - ADULT  Goal: Skin Integrity remains intact(Skin Breakdown Prevention)  Description: Assess:  -Perform Rony assessment every shift  -Clean and moisturize skin every shift  -Inspect skin when repositioning, toileting, and assisting with ADLS  -Assess under medical devices such as scd's every shift  -Assess extremities for adequate circulation and sensation     Bed Management:  -Have minimal linens on bed & keep smooth, unwrinkled  -Change linens as needed when moist or perspiring  -Avoid sitting or lying in one position for more than 2 hours while in bed  -Keep HOB at 45 degrees     Toileting:  -Offer bedside commode  -Assess for incontinence every 2 hours  -Use incontinent care products after each incontinent episode such as chucks    Activity:  -Mobilize patient 3 times a day  -Encourage activity and walks on unit  -Encourage or provide ROM exercises   -Turn and reposition patient every 2 Hours  -Use appropriate equipment to lift or move patient in bed  -Instruct/ Assist with weight shifting every 2 hours when out of bed in chair  -Consider limitation of chair time 2 hour intervals    Skin Care:  -Avoid use of baby powder, tape, friction and shearing, hot water or constrictive clothing  -Relieve pressure over bony prominences using waffle  -Do not massage red bony areas    Next Steps:  -Teach patient strategies to minimize risks such as turn and reposition   -Consider consults to  interdisciplinary teams such as pt/ot  Outcome: Progressing     Problem: Neurological Deficit  Goal: Neurological status is stable or improving  Description: Interventions:  - Monitor and assess patient's level of consciousness, motor function, sensory function, and level of assistance needed for ADLs.    - Monitor and report changes from baseline. Collaborate with interdisciplinary team to initiate plan and implement interventions as ordered. - Provide and maintain a safe environment. - Consider seizure precautions. - Consider fall precautions. - Consider aspiration precautions. - Consider bleeding precautions. Outcome: Progressing     Problem: Activity Intolerance/Impaired Mobility  Goal: Mobility/activity is maintained at optimum level for patient  Description: Interventions:  - Assess and monitor patient  barriers to mobility and need for assistive/adaptive devices. - Assess patient's emotional response to limitations. - Collaborate with interdisciplinary team and initiate plans and interventions as ordered. - Encourage independent activity per ability.  - Maintain proper body alignment. - Perform active/passive rom as tolerated/ordered. - Plan activities to conserve energy.  - Turn patient as appropriate  Outcome: Progressing     Problem: Communication Impairment  Goal: Ability to express needs and understand communication  Description: Assess patient's communication skills and ability to understand information. Patient will demonstrate use of effective communication techniques, alternative methods of communication and understanding even if not able to speak. - Encourage communication and provide alternate methods of communication as needed. - Collaborate with case management/ for discharge needs. - Include patient/family/caregiver in decisions related to communication. Outcome: Progressing     Problem: Potential for Aspiration  Goal: Non-ventilated patient's risk of aspiration is minimized  Description: Assess and monitor vital signs, respiratory status, and labs (WBC). Monitor for signs of aspiration (tachypnea, cough, rales, wheezing, cyanosis, fever). - Assess and monitor patient's ability to swallow. - Place patient up in chair to eat if possible.   - HOB up at 90 degrees to eat if unable to get patient up into chair.  - Supervise patient during oral intake. - Instruct patient/ family to take small bites. - Instruct patient/ family to take small single sips when taking liquids. - Follow patient-specific strategies generated by speech pathologist.  Outcome: Progressing     Problem: Nutrition  Goal: Nutrition/Hydration status is improving  Description: Monitor and assess patient's nutrition/hydration status for malnutrition (ex- brittle hair, bruises, dry skin, pale skin and conjunctiva, muscle wasting, smooth red tongue, and disorientation). Collaborate with interdisciplinary team and initiate plan and interventions as ordered. Monitor patient's weight and dietary intake as ordered or per policy. Utilize nutrition screening tool and intervene per policy. Determine patient's food preferences and provide high-protein, high-caloric foods as appropriate. - Assist patient with eating.  - Allow adequate time for meals.  - Encourage patient to take dietary supplement as ordered. - Collaborate with clinical nutritionist.  - Include patient/family/caregiver in decisions related to nutrition. Outcome: Progressing     Problem: Nutrition/Hydration-ADULT  Goal: Nutrient/Hydration intake appropriate for improving, restoring or maintaining nutritional needs  Description: Monitor and assess patient's nutrition/hydration status for malnutrition. Collaborate with interdisciplinary team and initiate plan and interventions as ordered. Monitor patient's weight and dietary intake as ordered or per policy. Utilize nutrition screening tool and intervene as necessary. Determine patient's food preferences and provide high-protein, high-caloric foods as appropriate.      INTERVENTIONS:  - Monitor oral intake, urinary output, labs, and treatment plans  - Assess nutrition and hydration status and recommend course of action  - Evaluate amount of meals eaten  - Assist patient with eating if necessary   - Allow adequate time for meals  - Recommend/ encourage appropriate diets, oral nutritional supplements, and vitamin/mineral supplements  - Order, calculate, and assess calorie counts as needed  - Recommend, monitor, and adjust tube feedings and TPN/PPN based on assessed needs  - Assess need for intravenous fluids  - Provide specific nutrition/hydration education as appropriate  - Include patient/family/caregiver in decisions related to nutrition  Outcome: Progressing

## 2023-07-10 NOTE — PROGRESS NOTES
[id number] [facility]  Progress Note  Name: Zeyad Siddiqi  MRN: [id number]  Unit/Bed#: [address] [place] [address] I Date of Admission: 7/6/2023   Date of Service: 7/10/2023 I Hospital Day: 4    Assessment/Plan   * Generalized weakness  Assessment & Plan  Patient presented to ED with months history of generalized weakness, worsening in past few weeks. Patient now unable to ambulate, usually can ambulate with walker. Family reports left sided facial droop in the morning along with speech difficulties. · Neuro work-up has been unremarkable including CT of the head and MRI of the brain, echo with bubble study showed no PFO. Neurology does not feel her symptoms are solely attributable to progression of her Parkinson's. · Echo did show new drop in EF to 20%, symptoms from that standpoint are not improving with dopamine infusion  · Patient has gradual worsening of her overall performance status at home over the last several weeks and has overall poor prognosis in the setting of biventricular failure and Parkinson's. · Ongoing goals of care discussions are occurring    Goals of care, counseling/discussion  Assessment & Plan  · Cardiology discussed goals of care with the patient and family at bedside on 7/9. He expressed overall poor prognosis in the setting of biventricular CHF, poor functional status, and neurologic decline. · I again discussed CODE STATUS at length with cardiology PA, patient's , patient's 2 daughters, and patient at bedside. Patient's cognition is much improved today and she is alert and oriented. After lengthy discussion regarding her overall prognosis, and various plans of care, patient ultimately decided that she would not want to pursue aggressive medical care going forward. · A hospice consult will be obtained per family request.  · We will continue medical treatment for now in hopes of transition patient home with family in the near future.     ROB (acute kidney injury) [facility])  Assessment & Plan  · Baseline cr appears around 1.0-1.2  · Cr on admission 2.15  · CT a/p shows no obstruction    · ROB secondary to cardiorenal syndrome  · Renal function is slowly improving with dopamine infusion    Chronic combined systolic and diastolic heart failure [facility])  Assessment & Plan  ECHO 4/2022: EF 21-95%, systolic function low/normal, left atrium severely dilated, mitral valve prosthesis functioning normally  Repeat echo 7/5/2023: EF of 20%, severe LA dilation, severe RV dysfunction    · Overall poor prognosis per cardiology. · Minimal improvement on dopamine infusion  · Diuretics remain on hold  · Monitor Is/Os and daily weights  · Continue salt and fluid restriction  · Patient was started on bisoprolol 2.5 mg p.o. daily. Elevated troponin  Assessment & Plan  · troponins 256 > 243 > 243  · EKG shows a fib HR 79  · Denies chest pain  · Cardiology consult  · 7/7 patient noted to have run of NSVT with active chest pain troponins continue to remain flat-  · Started on bisoprolol by cardiology  · Repeat EKG no significant change  · Patient will need ischemia work-up at some point    Elevated LFTs  Assessment & Plan  · Elevated transaminases and bilirubin on admission  · Thought to be secondary to hepatic congestion in the setting of biventricular CHF  · GI input appreciated  · Continue to monitor serial LFTs; things are improving  · Follow-up acute hepatitis panel and autoimmune work-up      Parkinson's disease ([address])  Assessment & Plan  · Follows with neurology outpatient  · Continue Sinemet and amantadine  · Gradual weakness possibly secondary to worsening of Parkinson's disease/deconditioning  · Neurology input appreciated.     Hypothyroidism  Assessment & Plan  · Continue levothyroxine  · TSH WNL    Atrial fibrillation, chronic (HCC)  Assessment & Plan  · History of chronic a fib, EKG on admission showed controlled a fib  · Continue Coumadin for stroke prophylaxis  · Patient started on bisoprolol 2.5 mg p.o. daily    H/O mitral valve replacement with mechanical valve  Assessment & Plan  · Hx mechanical valve replacement in   · On warfarin, INR 14 on admission  · Goal INR 2.5-3.5  · Continue coumadin, daily INR     VTE Pharmacologic Prophylaxis:   Moderate Risk (Score 3-4) - Pharmacological DVT Prophylaxis Ordered: warfarin (Coumadin). Patient Centered Rounds: I performed bedside rounds with nursing staff today. Discussions with Specialists or Other Care Team Provider: [facility]. Education and Discussions with Family / Patient: Updated  ( and daughter) at bedside. Total Time Spent on Date of Encounter in care of patient: 55 minutes This time was spent on one or more of the following: performing physical exam; counseling and coordination of care; obtaining or reviewing history; documenting in the medical record; reviewing/ordering tests, medications or procedures; communicating with other healthcare professionals and discussing with patient's family/caregivers. Current Length of Stay: 4 day(s)  Current Patient Status: Inpatient   Certification Statement: The patient will continue to require additional inpatient hospital stay due to inotrope support, dispo planning  Discharge Plan: To be determined    Code Status: Level 3 - DNAR and DNI    Subjective:   Patient seen and examined. No new complaints this morning. Remains fatigued and generally worn out. Denies any overt chest pain or shortness of breath. Objective:     Vitals:   Temp (24hrs), Av.6 °F (36.4 °C), Min:97.2 °F (36.2 °C), Max:97.9 °F (36.6 °C)    Temp:  [97.2 °F (36.2 °C)-97.9 °F (36.6 °C)] 97.7 °F (36.5 °C)  HR:  [57-90] 86  Resp:  [17-20] 19  BP: ()/(56-63) 112/63  SpO2:  [92 %-97 %] 97 %  Body mass index is 26.43 kg/m². Input and Output Summary (last 24 hours):      Intake/Output Summary (Last 24 hours) at 7/10/2023 1306  Last data filed at 7/10/2023 0724  Gross per 24 hour Intake 10 ml   Output 575 ml   Net -565 ml       PHYSICAL EXAM:    Vitals signs reviewed  Constitutional   Awake and cooperative. NAD. Chronically ill-appearing. Fatigued. Head/Neck   Normocephalic. Atraumatic. HEENT   No scleral icterus. EOMI. Heart  irregular. . No murmers. Mechanical click present. Lungs  clear bilaterally. Respirations unlabored. Decreased breath sounds at bases bilaterally. Abdomen   Soft. Nontender. Nondistended. Skin   Skin color normal. No rashes. Extremities   No deformities. No peripheral edema. Neuro   Alert and oriented. No new deficits. Psych   Mood stable.  Affect normal.         Additional Data:     Labs:  Results from last 7 days   Lab Units 07/10/23  0535   WBC Thousand/uL 6.34   HEMOGLOBIN g/dL 11.0*   HEMATOCRIT % 34.8   PLATELETS Thousands/uL 97*   NEUTROS PCT % 72   LYMPHS PCT % 10*   MONOS PCT % 13*   EOS PCT % 3     Results from last 7 days   Lab Units 07/10/23  0535   SODIUM mmol/L 141   POTASSIUM mmol/L 4.1   CHLORIDE mmol/L 111*   CO2 mmol/L 26   BUN mg/dL 38*   CREATININE mg/dL 1.45*   ANION GAP mmol/L 4   CALCIUM mg/dL 10.1   ALBUMIN g/dL 3.1*   TOTAL BILIRUBIN mg/dL 2.95*   ALK PHOS U/L 74   ALT U/L 146*   AST U/L 50*   GLUCOSE RANDOM mg/dL 96     Results from last 7 days   Lab Units 07/10/23  0535   INR  2.73*     Results from last 7 days   Lab Units 23  1016   POC GLUCOSE mg/dl 142*     Results from last 7 days   Lab Units 23  0432   HEMOGLOBIN A1C % 5.5           Lines/Drains:  Invasive Devices     Peripheral Intravenous Line  Duration           Peripheral IV 23 Distal;Left;Ventral (anterior) Forearm 4 days    Peripheral IV 07/10/23 Right;Ventral (anterior) Forearm <1 day          Drain  Duration           External Urinary Catheter 2 days                  Telemetry:  Telemetry Orders (From admission, onward)             24 Hour Telemetry Monitoring  Continuous x 24 Hours (Telem)           Question:  Reason for 24 Hour Telemetry  Answer:  TIA/Suspected CVA/ Confirmed CVA                   Imaging: Reviewed radiology reports from this admission including: ECHO    Recent Cultures (last 7 days):         Last 24 Hours Medication List:   Current Facility-Administered Medications   Medication Dose Route Frequency Provider Last Rate   • amantadine  100 mg Oral BID Mee Campoverde PA-C     • bisoprolol  2.5 mg Oral Daily Mary Zhang PA-C     • carbidopa-levodopa  1 tablet Oral TID Mee Campoverde PA-C     • ciprofloxacin  1 drop Both Eyes Q4H While Awake Lakeisha Ta MD     • DOPamine in dextrose 5%  1-20 mcg/kg/min Intravenous Continuous Kiersten Osborn MD 2.5 mcg/kg/min (07/09/23 1980)   • ferrous gluconate  324 mg Oral Daily Before Breakfast Mee Campoverde PA-C     • levothyroxine  50 mcg Oral Early Morning Mee Campoverde PA-C     • memantine  10 mg Oral Daily Mee Campoverde PA-C     • warfarin  3 mg Oral Daily (warfarin) Kiersten Osborn MD          Today, Patient Was Seen By: Alfa Bañuelos DO    **Please Note: This note may have been constructed using a voice recognition system. **

## 2023-07-10 NOTE — ASSESSMENT & PLAN NOTE
· Follows with neurology outpatient  · Continue Sinemet and amantadine  · Gradual weakness possibly secondary to worsening of Parkinson's disease/deconditioning  · Neurology input appreciated.

## 2023-07-10 NOTE — PROGRESS NOTES
Progress Note - Cardiology   [facility]     Greg Hus 79 y.o. female MRN: [id number]  : 1940  Unit/Bed#: [address] [place] [address] Encounter: [id number]    Assessment and Plan:   1. Acute on chronic combined systolic and diastolic heart failure.     - Presented on 23 for evaluation for worsening weakness. Patient does endorse shortness of breath with exertion. - 23 BNP: 2,023.   - 23 CT abdomen/pelvis without contrast: Small bilateral pleural effusions and small volume abdominopelvic ascites. Otherwise, no acute abnormality in the abdomen or pelvis. - 23 TTE: LVEF 20%. Left ventricular systolic function is severely reduced. Unable to assess diastolic function. Left atrial filling pressure is elevated. The right ventricular systolic function is moderately to severely reduced. Abnormal tricuspid annular plane systolic excursion (TAPSE) < 1.7 cm. Left Atrium: The atrium is severely dilated (>48 mL/m2). Atrial Septum: The septum bows into the right atrium, suggesting increased left atrial pressure. Aortic Valve: The aortic valve is trileaflet. The leaflets are mildly thickened. The leaflets are moderately calcified. There is mildly reduced mobility. There is trace to mild regurgitation. Tricuspid Valve: There is moderate regurgitation. The right ventricular systolic pressure is moderately elevated. The estimated right ventricular systolic pressure is 93.89 mmHg. Pulmonic Valve: There is moderate regurgitation.   - 22 TTE: LVEF 50-55%. Systolic function is low normal.  Left atrium severely dilated.  Atrial septum bows into right atrium.  Trace to mild aortic valve regurgitation.  Mechanical mitral valve prosthesis, appears to be functioning normally.  Mild to moderate tricuspid valve regurgitation.  Lisa Prophet of home medication notes patient is on Aldactone 12.5 mg daily as well as torsemide 20 mg 3 times a week (Monday/Wednesday/Friday).  Aldactone and torsemide on hold in setting of ROB. - Recommend against use of excessive IV fluids.   - Not currently on diuretics. Last dose of diuretics on 7/07/23.   - Will continue to hold diuretics in setting of hypotension. BP in past 24 hours notes SBP . - Currently on dopamine drip, 2.5 mcg/kg/min. - Strict I/Os. - Daily weights, standing weights.   - Continue low-sodium diet with 1800 mL fluid restriction.  - CHF education.     2. Elevated troponin.     - 7/06/23 hs troponin: 256 (0 hrs), 243 (2 hrs), 190 (4hrs). - 7/06/23 EKG: Atrial fibrillation, ventricular rate 72 bpm.  Noted PVCs.  Left bundle branch block. - Patient currently denies chest pain, palpitations, shortness of breath, lightheadedness, dizziness, nausea, or vomiting.    - Likely nonischemic myocardial injury secondary to acute on chronic systolic and diastolic heart failure. 3.  NSVT. - Episode of 20 beats of NSVT noted on 7/07/23 at 1411 hrs on telemetry.  - No further episodes noted. - Continue bisoprolol 2.5 mg daily.  - Monitor electrolytes. Replete potassium above 4 and magnesium above 2.  - Continue telemetry.     4. Generalized weakness.     - Presented on 7/06/23 for evaluation for worsening weakness. Was instructed by Neurology to go to ED after patient's  called reporting left facial droop and weakness.  - Patient reports she has had progressive weakness over the past several months, states is greatly affecting her ability to ambulate, and states she has had frequent falls in recent weeks. - 7/06/23 CT head without contrast demonstrated no acute intracranial abnormality, chronic cerebellar lacunar infarcts. - 7/07/23 MRI brain without contrast: a few small chronic bilateral cerebellar lacunar infarctions are stable. No acute infarction, intracranial hemorrhage or mass effect.  - Neurology recommendations noted.     5. Transaminitis.     - 7/06/23 AST: 304. - 7/06/23 FCD: 161. - Unclear etiology at this time.   May be secondary to acute on chronic combined heart failure. - 7/07/23 GI mentations noted.     6. ROB.    - Creatinine appears to be between 1.0 and 1.2.   - 7/06/23 creatinine (on admission): 2.15.  - 7/10/23 creatinine: 1.45.   - Creatinine currently downtrending.  - Home medication of Aldactone and torsemide on hold in setting of ROB. - Care per primary team.     7. Supratherapeutic INR.    - 7/06/23 INR (on admission): 14.85.   - 7/07/23 INR: 15.  - 7/10/23 INR: 2.73.   - Continue warfarin 3 mg daily.  - Care per primary team.     8. Chronic atrial fibrillation.     - 7/06/23 EKG: Atrial fibrillation, ventricular rate 72 bpm.  Noted PVCs.  Left bundle branch block. - Continue bisoprolol 2.5 mg daily.  - [id number]- VASc stroke risk score: 6 points, moderate-high risk. - Continue warfarin 3 mg daily.     9.  Chronic left bundle branch block.     - Has been documented since 2017. - 7/06/23 EKG: Atrial fibrillation, ventricular rate 72 bpm.  Noted PVCs.  Left bundle branch block.     10. Mitral valve replacement.     - s/p mitral valve replacement [id number].   - 4/11/22 TTE: Mitral Valve: There is a mechanical mitral valve prosthesis. The prosthetic valve appears to be functioning normally. The prosthetic valve appears to be well-seated.  Mean gradient is 3 mmHg and peak mitral velocity is 1.5 m/sec. There is trace regurgitation. There is no evidence of transvalvular regurgitation. - 7/07/23 TTE:     Mitral Valve The mitral valve was not well visualized. There is a bileaflet mechanical mitral valve prosthesis. The prosthetic valve is not well visualized but appears to be functioning normally. The prosthetic valve appears to be well-seated. Degree of valve regurgitation assessment limited due to mechanical valve artifact. There is at least mild regurgitation. There is no evidence of transvalvular regurgitation. There is no evidence of stenosis.  The mitral valve mean gradient is 2mmHg which is unchanged from previous. 11. Hypothyroidism.     - Currently on levothyroxine 50 mcg daily. - 5/02/23 TSH: 2.559.      12. Parkinson's disease.       - Currently on Sinemet  mg 3 times daily, Namenda 10 mg daily, and Symmetrel 100 mg twice daily.  - Neurology consult recommendations noted. Subjective / Objective:           Patient reports not feeling herself. States she feels significantly fatigued and has some difficulty with daily tasks, including feeding herself. She reports she still has some shortness of breath at rest.  She currently denies chest pain, palpitations, lightheadedness, dizziness, headache, nausea or vomiting. Vitals: Blood pressure 112/63, pulse 90, temperature 97.7 °F (36.5 °C), resp. rate 19, height 5' 4" (1.626 m), weight 69.9 kg (154 lb), SpO2 97 %. Vitals:    07/06/23 1433 07/07/23 0758   Weight: 69.9 kg (154 lb) 69.9 kg (154 lb)     Body mass index is 26.43 kg/m². BP Readings from Last 3 Encounters:   07/10/23 112/63   06/20/23 102/60   06/07/23 112/69     Orthostatic Blood Pressures    Flowsheet Row Most Recent Value   Blood Pressure 112/63 filed at 07/10/2023 0726        I/O       07/08 0701  07/09 0700 07/09 0701  07/10 0700 07/10 0701  07/11 0700    I.V. (mL/kg) 20.1 (0.3) 10 (0.1)     Total Intake(mL/kg) 20.1 (0.3) 10 (0.1)     Urine (mL/kg/hr) 600 (0.4) 300 (0.2) 275 (4.1)    Total Output 600 300 275    Net -579.9 -290 -275           Unmeasured Urine Occurrence 1 x          Invasive Devices     Peripheral Intravenous Line  Duration           Peripheral IV 07/06/23 Distal;Left;Ventral (anterior) Forearm 3 days    Peripheral IV 07/10/23 Right;Ventral (anterior) Forearm <1 day          Drain  Duration           External Urinary Catheter 1 day                  Intake/Output Summary (Last 24 hours) at 7/10/2023 075  Last data filed at 7/10/2023 0724  Gross per 24 hour   Intake 10 ml   Output 575 ml   Net -565 ml       Physical Exam:   Physical Exam  Vitals reviewed. Constitutional:       General: She is not in acute distress. Appearance: She is ill-appearing. Cardiovascular:      Rate and Rhythm: Normal rate. Rhythm irregular. Pulses: Normal pulses. Heart sounds: Murmur heard. Pulmonary:      Effort: Pulmonary effort is normal. No respiratory distress. Breath sounds: Decreased breath sounds present. Comments: Bilateral crackles heard at bases. Abdominal:      General: Abdomen is flat. There is no distension. Palpations: Abdomen is soft. Tenderness: There is no abdominal tenderness. Musculoskeletal:      Right lower leg: No edema. Left lower leg: No edema. Skin:     General: Skin is warm and dry. Neurological:      Mental Status: She is alert. Motor: Weakness present. Medications/ Allergies:     Current Facility-Administered Medications   Medication Dose Route Frequency Provider Last Rate   • amantadine  100 mg Oral BID Anaid Negron PA-C     • bisoprolol  2.5 mg Oral Daily Gretchen Otoole PA-C     • carbidopa-levodopa  1 tablet Oral TID Anaid Negron PA-C     • ciprofloxacin  1 drop Both Eyes Q4H While Awake Anika Nunez MD     • DOPamine in dextrose 5%  1-20 mcg/kg/min Intravenous Continuous Lakeisha Ta MD 2.5 mcg/kg/min (07/09/23 8407)   • ferrous gluconate  324 mg Oral Daily Before Breakfast Anaid Negron PA-C     • levothyroxine  50 mcg Oral Early Morning Anaid Negron PA-C     • memantine  10 mg Oral Daily Anaid Negron PA-C     • warfarin  3 mg Oral Daily (warfarin) Anika Nunez MD          Allergies   Allergen Reactions   • Artane [Trihexyphenidyl] Other (See Comments)     Felt "disconnected", "out if it", shaky.  Did not feel right    • Glycopyrrolate      Nose bleeds   • Pollen Extract        VTE Pharmacologic Prophylaxis:   Warfarin (Coumadin)    Labs:   Troponins:  Results from last 7 days   Lab Units 07/07/23 2012 07/07/23  1655   HSTNI D2 ng/L  --  8   HSTNI D4 ng/L 2  --          CBC with diff:  Results from last 7 days   Lab Units 07/10/23  0535 07/08/23  0539 07/07/23  0432 07/06/23  1036   WBC Thousand/uL 6.34 8.29 7.32 7.19   HEMOGLOBIN g/dL 11.0* 11.7 11.4* 11.7   HEMATOCRIT % 34.8 36.0 34.5* 35.8   MCV fL 106* 106* 105* 104*   PLATELETS Thousands/uL 97* 107* 100* 120*   RBC Million/uL 3.28* 3.40* 3.29* 3.45*   MCH pg 33.5 34.4* 34.7* 33.9   MCHC g/dL 31.6 32.5 33.0 32.7   RDW % 16.8* 17.1* 16.8* 16.5*   MPV fL 11.7 12.3 11.9 11.9   NRBC AUTO /100 WBCs 0  --   --  0       CMP:  Results from last 7 days   Lab Units 07/10/23  0535 07/09/23  0534 07/08/23  0539 07/07/23  0432 07/06/23  1036 07/06/23  0743   SODIUM mmol/L 141 138 137 135 136 137   POTASSIUM mmol/L 4.1 4.0 4.3 3.9 3.4* 3.8   CHLORIDE mmol/L 111* 108 107 105 103 106   CO2 mmol/L 26 25 21 23 27 26   ANION GAP mmol/L 4 5 9 7 6 5   BUN mg/dL 38* 45* 50* 47* 52* 48*   CREATININE mg/dL 1.45* 1.70* 1.97* 1.84* 2.15* 2.33*   GLUCOSE FASTING mg/dL  --   --   --   --   --  101*   CALCIUM mg/dL 10.1 10.5* 9.9 9.6 10.5* 10.7*   AST U/L 50* 101* 208* 155* 304*  --    ALT U/L 146* 66* 114* 71* 246*  --    ALK PHOS U/L 74 83 80 80 80  --    TOTAL PROTEIN g/dL 5.0* 5.5* 5.3* 5.0* 5.6*  --    ALBUMIN g/dL 3.1* 3.3* 3.3* 3.1* 3.5  --    TOTAL BILIRUBIN mg/dL 2.95* 3.55* 3.54* 2.46* 3.20*  --    EGFR ml/min/1.73sq m 33 27 23 24 20 18       Magnesium:  Results from last 7 days   Lab Units 07/10/23  0535 07/07/23  0432   MAGNESIUM mg/dL 2.1 1.9     Coags:  Results from last 7 days   Lab Units 07/10/23  0535 07/09/23  0534 07/08/23  0539 07/07/23  2335 07/07/23  1448 07/07/23  0432 07/06/23  1820 07/06/23  1153   PTT seconds  --   --   --   --   --   --   --  54*   INR  2.73* 2.24* 2.52* 3.11* 4.91* >15.00* >15.00* 14.85*     TSH:  Results from last 7 days   Lab Units 07/07/23  0432   TSH 3RD GENERATON uIU/mL 3.176     No components found for: "TSH3"  Lipid Profile:  Results from last 7 days   Lab Units 07/07/23  0432   CHOLESTEROL mg/dL 79 TRIGLYCERIDES mg/dL 107   HDL mg/dL 13*   LDL CALC mg/dL 45     Hgb A1c:  Results from last 7 days   Lab Units 07/07/23  0432   HEMOGLOBIN A1C % 5.5     NT-proBNP: No results for input(s): "NTBNP" in the last 72 hours. Imaging & Testing   I have personally reviewed pertinent reports. MRI brain wo contrast    Result Date: 7/7/2023    Impression: A few small chronic bilateral cerebellar lacunar infarctions are stable. No acute infarction, intracranial hemorrhage or mass effect. US right upper quadrant     Result Date: 7/6/2023     Impression: No acute findings. Absent gallbladder. Trace right pleural fluid visualized.      CT abdomen pelvis wo contrast     Result Date: 7/6/2023     Impression: Small bilateral pleural effusions and small volume abdominopelvic ascites. Otherwise, no acute abnormality in the abdomen or pelvis.      CT head wo contrast     Result Date: 7/6/2023     Impression: 1.  No acute intracranial abnormality. 2.  Chronic cerebellar lacunar infarcts best seen on the prior MRI examination.      FL barium swallow     Result Date: 6/14/2023     Impression: Esophageal dysmotility.        EKG / Monitor: Personally reviewed. Telemetry reviewed: Currently atrial fibrillation, ventricular rate 80 bpm.    Cardiac testing:     Echo complete w/ contrast if indicated    Result Date: 7/7/2023  Narrative: •  Left Ventricle: Left ventricular cavity size is mildly dilated. Wall thickness is mildly increased. The left ventricular ejection fraction is 20% by visual estimation. Systolic function is severely reduced. Although no diagnostic regional wall motion abnormality was identified, this possibility cannot be completely excluded on the basis of this study. Unable to assess diastolic function. Left atrial filling pressure is elevated. •  IVS: There is abnormal septal motion consistent with post-operative status. •  Right Ventricle: Systolic function is moderately to severely reduced.  Abnormal tricuspid annular plane systolic excursion (TAPSE) < 1.7 cm. •  Left Atrium: The atrium is severely dilated (>48 mL/m2). •  Atrial Septum: The septum bows into the right atrium, suggesting increased left atrial pressure. •  Aortic Valve: The aortic valve is trileaflet. The leaflets are mildly thickened. The leaflets are moderately calcified. There is mildly reduced mobility. There is trace to mild regurgitation. •  Mitral Valve: There is a bileaflet mechanical mitral valve prosthesis. The prosthetic valve is not well visualized but appears to be functioning normally. The prosthetic valve appears to be well-seated. Degree of valve regurgitation assessment limited due to mechanical valve artifact. There is at least mild regurgitation. There is no evidence of transvalvular regurgitation. There is no evidence of stenosis. The mitral valve mean gradient is 2mmHg which is unchanged from previous. •  Tricuspid Valve: There is moderate regurgitation. The right ventricular systolic pressure is moderately elevated. The estimated right ventricular systolic pressure is 92.69 mmHg. •  Pulmonic Valve: There is moderate regurgitation. •  Compared to previous echocardiogram, there has been a significant reduction in LV and RV systolic function. Mechanical mitral valve prosthesis appears unchanged. Echo complete with contrast if indicated - 4/11/22     Left Ventricle Left ventricular cavity size is normal. Wall thickness is mildly increased. The left ventricular ejection fraction is 50 to 55 % by visual estimation. Systolic function is low normal.  Although no diagnostic regional wall motion abnormality was identified, this possibility cannot be completely excluded on the basis of this study. Unable to assess diastolic function. Interventricular Septum There is abnormal septal motion consistent with post-operative status.       Right Ventricle Right ventricular cavity size is normal. Systolic function is normal. Wall thickness is normal.      Left Atrium The atrium is severely dilated. Right Atrium The atrium is normal in size. Atrial Septum The septum bows into the right atrium. Aortic Valve The aortic valve is trileaflet. The leaflets are mildly thickened. The leaflets are not calcified. The leaflets exhibit normal mobility. There is trace to mild regurgitation. There is no evidence of stenosis. Mitral Valve There is a mechanical mitral valve prosthesis. The prosthetic valve appears to be functioning normally. The prosthetic valve appears to be well-seated.  Mean gradient is 3 mmHg and peak mitral velocity is 1.5 m/sec. There is trace regurgitation. There is no evidence of transvalvular regurgitation. There is no evidence of stenosis. Tricuspid Valve There is mild to moderate regurgitation.  Pulmonary artery pressure 35 mmHg. There is no evidence of stenosis. Pulmonic Valve There is trace regurgitation. There is no evidence of stenosis. Ascending Aorta The aortic root is normal in size. IVC/SVC The inferior vena cava is normal in size. Respirophasic changes were normal.      Pericardium There is no pericardial effusion.  The pericardium is normal in appearance.         Results for orders placed during the hospital encounter of 19    Echo complete with contrast if indicated    Narrative  [address] [place] [address].  [address]  [phone]    Transthoracic Echocardiogram  2D, M-mode, Doppler, and Color Doppler    Study date:  2019    Patient: Jae Lan  MR number: [id number]  Account number: [de-identified]  : 1940  Age: 77 years  Gender: Female  Status: Outpatient  Location: Echo lab  Height: 64 in  Weight: 185.7 lb  BP: 94/ 63 mmHg    Indications: Cardiomyopathy    Diagnoses: I42.9 - Cardiomyopathy, unspecified    Sonographer:  Carmela Beckett RDCS  Primary Physician:  Carol Ann Erazo MD  Referring Physician:  Som Perez Ce Tinajero MD  Group:  [facility]  Ordering Physician:  Darleen Michaels MD  Interpreting Physician:  Moni Miner MD    SUMMARY    LEFT VENTRICLE:  Systolic function was at the lower limits of normal. Ejection fraction was estimated in the range of 50 % to 55 % to be 55 %. There were no regional wall motion abnormalities. Wall thickness was mildly increased. There was mild concentric hypertrophy. VENTRICULAR SEPTUM:  There was mild paradoxical motion. 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 LEFT ATRIUM:  The atrium was moderately to markedly dilated. MITRAL VALVE:  A mechanical prosthesis was present. It exhibited normal function and normal motion. There was no pannus formation, a normal-appearing sewing ring, and no rocking motion of the sewing ring. Mean gradient 4 mm of Hg and peak velocity 1.5  m/sec with trace to mild MR. There was trace regurgitation. AORTIC VALVE:  There was mild regurgitation. TRICUSPID VALVE:  There was mild regurgitation. Estimated peak PA pressure was 25 mmHg. HISTORY: PRIOR HISTORY: Mitral Valve Replacement (1990), A-Fib, Hypothyroidism    PROCEDURE: The procedure was performed in the echo lab. This was a routine study. The transthoracic approach was used. The study included complete 2D imaging, M-mode, complete spectral Doppler, and color Doppler. The heart rate was 71 bpm,  at the start of the study. Echocardiographic views were limited due to lung interference. Image quality was adequate. LEFT VENTRICLE: Size was normal. Systolic function was at the lower limits of normal. Ejection fraction was estimated in the range of 50 % to 55 % to be 55 %. There were no regional wall motion abnormalities. Wall thickness was mildly  increased. There was mild concentric hypertrophy. DOPPLER: The study was not technically sufficient to allow evaluation of LV diastolic function.     VENTRICULAR SEPTUM: There was mild paradoxical motion. 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 RIGHT VENTRICLE: The size was normal. Systolic function was low normal.    LEFT ATRIUM: The atrium was moderately to markedly dilated. RIGHT ATRIUM: Size was normal.    MITRAL VALVE: A mechanical prosthesis was present. It exhibited normal function and normal motion. There was no pannus formation, a normal-appearing sewing ring, and no rocking motion of the sewing ring. Mean gradient 4 mm of Hg and peak  velocity 1.5 m/sec with trace to mild MR. DOPPLER: There was no evidence for stenosis. There was trace regurgitation. AORTIC VALVE: The valve was trileaflet. Leaflets exhibited mildly increased thickness, mild calcification, and normal cuspal separation. DOPPLER: Transaortic velocity was within the normal range. There was no evidence for stenosis. There  was mild regurgitation. TRICUSPID VALVE: DOPPLER: There was mild regurgitation. Estimated peak PA pressure was 25 mmHg. PULMONIC VALVE: DOPPLER: There was no regurgitation. PERICARDIUM: There was no thickening or calcification. There was no pericardial effusion. AORTA: The root exhibited normal size. SYSTEMIC VEINS: IVC: The inferior vena cava was normal in size. Respirophasic changes were normal.    SYSTEM MEASUREMENT TABLES    2D mode  AoR Diam 2D: 3 cm  LA Diam (2D): 5.4 cm  LA/Ao (2D): 1.8  FS (2D Teich): 27.3 %  IVSd (2D): 1.34 cm  LVDEV: 42.8 cmï¾³  LVEDV MOD BP: 85 cmï¾³  LVESV: 19.5 cmï¾³  LVIDd(2D): 3.26 cm  LVISd (2D): 2.37 cm  LVOT Area 2D: 3.14 cmï¾²  LVPWd (2D): 1.33 cm  SV (Teich): 23.3 cmï¾³    Apical four chamber  LVEF A4C: 50 %    Apical two chamber  LVEF A2C: 54 %    Unspecified Scan Mode  MANSOOR Cont Eq (Peak Dejan): 1.97 cmï¾²  LVOT Diam.: 2 cm  LVOT Vmax: 827 mm/s  LVOT Vmax; Mean: 827 mm/s  Peak Grad.; Mean: 3 mm[Hg]  MV Peak E Dejan.  Mean: 1330 mm/s  MVA (PHT): 3.01 cmï¾²  PHT: 73 ms  Max P mm[Hg]  V Max: 2420 mm/s  Vmax: 2140 mm/s  RA Area: 18.3 cmï¾²  RA Volume: 45.7 cmï¾³  TAPSE: 1.3 cm    [facility]etal Commission Accredited Echocardiography Laboratory    Prepared and electronically signed by    Tammy Kelly MD  Signed 24-Jun-2019 07:39:37          June Barrera PA-C

## 2023-07-10 NOTE — ARC ADMISSION
Referral received for patient consideration of ARC placement for rehab. Will review with ARC physician and will update CM with determination when know. Patient denied for ARC as she would not tolerate the aggressive program. CM has been updated in [facility].

## 2023-07-10 NOTE — ASSESSMENT & PLAN NOTE
· Elevated transaminases and bilirubin on admission  · Thought to be secondary to hepatic congestion in the setting of biventricular CHF  · GI input appreciated  · Continue to monitor serial LFTs; things are improving  · Follow-up acute hepatitis panel and autoimmune work-up

## 2023-07-10 NOTE — PLAN OF CARE
Problem: Potential for Falls  Goal: Patient will remain free of falls  Description: INTERVENTIONS:  - Educate patient/family on patient safety including physical limitations  - Instruct patient to call for assistance with activity   - Consult OT/PT to assist with strengthening/mobility   - Keep Call bell within reach  - Keep bed low and locked with side rails adjusted as appropriate  - Keep care items and personal belongings within reach  - Initiate and maintain comfort rounds  - Make Fall Risk Sign visible to staff  - Offer Toileting every 2 Hours, in advance of need  - Initiate/Maintain bed/chair alarm  - Obtain necessary fall risk management equipment: bed/chair alarm  - Apply yellow socks and bracelet for high fall risk patients  - Consider moving patient to room near nurses station  7/10/2023 0728 by Martin Meneses RN  Outcome: Progressing  7/10/2023 0728 by Martin Meneses RN  Outcome: Progressing  7/10/2023 0728 by Martin Meneses RN  Outcome: Progressing     Problem: MOBILITY - ADULT  Goal: Maintain or return to baseline ADL function  Description: INTERVENTIONS:  - Educate patient/family on patient safety including physical limitations  - Instruct patient to call for assistance with activity   - Consult OT/PT to assist with strengthening/mobility   - Keep Call bell within reach  - Keep bed low and locked with side rails adjusted as appropriate  - Keep care items and personal belongings within reach  - Initiate and maintain comfort rounds  - Make Fall Risk Sign visible to staff  - Offer Toileting every 2 Hours, in advance of need  - Initiate/Maintain bed/chair alarm  - Obtain necessary fall risk management equipment: bed/chair alarm  - Apply yellow socks and bracelet for high fall risk patients  - Consider moving patient to room near nurses station  7/10/2023 0728 by Martin Meneses RN  Outcome: Progressing  7/10/2023 0728 by Martin Meneses RN  Outcome: Progressing  7/10/2023 0728 by Michael Castro Roldan Acosta RN  Outcome: Progressing  Goal: Maintains/Returns to pre admission functional level  Description: INTERVENTIONS:  - Perform BMAT or MOVE assessment daily.   - Set and communicate daily mobility goal to care team and patient/family/caregiver. - Collaborate with rehabilitation services on mobility goals if consulted  - Perform Range of Motion 3 times a day. - Reposition patient every 3 hours.   - Dangle patient 3 times a day  - Stand patient 3 times a day  - Ambulate patient 3 times a day  - Out of bed to chair 3 times a day   - Out of bed for meals 3 times a day  - Out of bed for toileting  - Record patient progress and toleration of activity level   7/10/2023 0728 by Erwin Broussard RN  Outcome: Progressing  7/10/2023 0728 by Erwin Broussard RN  Outcome: Progressing  7/10/2023 0728 by Erwin Broussard RN  Outcome: Progressing     Problem: Prexisting or High Potential for Compromised Skin Integrity  Goal: Skin integrity is maintained or improved  Description: INTERVENTIONS:  - Identify patients at risk for skin breakdown  - Assess and monitor skin integrity  - Assess and monitor nutrition and hydration status  - Monitor labs   - Assess for incontinence   - Turn and reposition patient  - Assist with mobility/ambulation  - Relieve pressure over bony prominences  - Avoid friction and shearing  - Provide appropriate hygiene as needed including keeping skin clean and dry  - Evaluate need for skin moisturizer/barrier cream  - Collaborate with interdisciplinary team   - Patient/family teaching  - Consider wound care consult   7/10/2023 0728 by Erwin Broussard RN  Outcome: Progressing  7/10/2023 0728 by Erwin Broussard RN  Outcome: Progressing  7/10/2023 0728 by Erwin Broussard RN  Outcome: Progressing     Problem: PAIN - ADULT  Goal: Verbalizes/displays adequate comfort level or baseline comfort level  Description: Interventions:  - Encourage patient to monitor pain and request assistance  - Assess pain using appropriate pain scale  - Administer analgesics based on type and severity of pain and evaluate response  - Implement non-pharmacological measures as appropriate and evaluate response  - Consider cultural and social influences on pain and pain management  - Notify physician/advanced practitioner if interventions unsuccessful or patient reports new pain  7/10/2023 0728 by Bouchra Guerra RN  Outcome: Progressing  7/10/2023 0728 by Bouchra Guerra RN  Outcome: Progressing  7/10/2023 0728 by Bouchra Guerra RN  Outcome: Progressing     Problem: SAFETY ADULT  Goal: Patient will remain free of falls  Description: INTERVENTIONS:  - Educate patient/family on patient safety including physical limitations  - Instruct patient to call for assistance with activity   - Consult OT/PT to assist with strengthening/mobility   - Keep Call bell within reach  - Keep bed low and locked with side rails adjusted as appropriate  - Keep care items and personal belongings within reach  - Initiate and maintain comfort rounds  - Make Fall Risk Sign visible to staff  - Offer Toileting every 2 Hours, in advance of need  - Initiate/Maintain bed/chair alarm  - Obtain necessary fall risk management equipment: bed/chair alarm  - Apply yellow socks and bracelet for high fall risk patients  - Consider moving patient to room near nurses station  7/10/2023 0728 by Bouchra Guerra RN  Outcome: Progressing  7/10/2023 0728 by Bouchra Guerra RN  Outcome: Progressing  7/10/2023 0728 by Bouchra Guerra RN  Outcome: Progressing  Goal: Maintain or return to baseline ADL function  Description: INTERVENTIONS:  - Educate patient/family on patient safety including physical limitations  - Instruct patient to call for assistance with activity   - Consult OT/PT to assist with strengthening/mobility   - Keep Call bell within reach  - Keep bed low and locked with side rails adjusted as appropriate  - Keep care items and personal belongings within reach  - Initiate and maintain comfort rounds  - Make Fall Risk Sign visible to staff  - Offer Toileting every 2 Hours, in advance of need  - Initiate/Maintain bed/chair alarm  - Obtain necessary fall risk management equipment: bed/chair alarm  - Apply yellow socks and bracelet for high fall risk patients  - Consider moving patient to room near nurses station  7/10/2023 0728 by Christopher Alvarez RN  Outcome: Progressing  7/10/2023 0728 by Christopher Alvarez RN  Outcome: Progressing  7/10/2023 0728 by Christopher Alvarez RN  Outcome: Progressing  Goal: Maintains/Returns to pre admission functional level  Description: INTERVENTIONS:  - Perform BMAT or MOVE assessment daily.   - Set and communicate daily mobility goal to care team and patient/family/caregiver. - Collaborate with rehabilitation services on mobility goals if consulted  - Perform Range of Motion 3 times a day. - Reposition patient every 3 hours.   - Dangle patient 3 times a day  - Stand patient 3 times a day  - Ambulate patient 3 times a day  - Out of bed to chair 3 times a day   - Out of bed for meals 3 times a day  - Out of bed for toileting  - Record patient progress and toleration of activity level   7/10/2023 0728 by Christopher Alvarez RN  Outcome: Progressing  7/10/2023 0728 by Christopher Alvarez RN  Outcome: Progressing  7/10/2023 0728 by Christopher Alvarez RN  Outcome: Progressing     Problem: NEUROSENSORY - ADULT  Goal: Achieves stable or improved neurological status  Description: INTERVENTIONS  - Monitor and report changes in neurological status  - Monitor vital signs such as temperature, blood pressure, glucose, and any other labs ordered   - Initiate measures to prevent increased intracranial pressure  - Monitor for seizure activity and implement precautions if appropriate      7/10/2023 0728 by Christopher Alvarez RN  Outcome: Progressing  7/10/2023 0728 by Christopher Alvarez RN  Outcome: Progressing  7/10/2023 0728 by Funmi Schuler Marian Nicholas RN  Outcome: Progressing  Goal: Achieves maximal functionality and self care  Description: INTERVENTIONS  - Monitor swallowing and airway patency with patient fatigue and changes in neurological status  - Encourage and assist patient to increase activity and self care.    - Encourage visually impaired, hearing impaired and aphasic patients to use assistive/communication devices  7/10/2023 0728 by Deborra Dakin, RN  Outcome: Progressing  7/10/2023 0728 by Deborra Dakin, RN  Outcome: Progressing  7/10/2023 0728 by Deborra Dakin, RN  Outcome: Progressing     Problem: CARDIOVASCULAR - ADULT  Goal: Maintains optimal cardiac output and hemodynamic stability  Description: INTERVENTIONS:  - Monitor I/O, vital signs and rhythm  - Monitor for S/S and trends of decreased cardiac output  - Administer and titrate ordered vasoactive medications to optimize hemodynamic stability  - Assess quality of pulses, skin color and temperature  - Assess for signs of decreased coronary artery perfusion  - Instruct patient to report change in severity of symptoms  7/10/2023 0728 by Deborra Dakin, RN  Outcome: Progressing  7/10/2023 0728 by Deborra Dakin, RN  Outcome: Progressing  7/10/2023 0728 by Deborra Dakin, RN  Outcome: Progressing  Goal: Absence of cardiac dysrhythmias or at baseline rhythm  Description: INTERVENTIONS:  - Continuous cardiac monitoring, vital signs, obtain 12 lead EKG if ordered  - Administer antiarrhythmic and heart rate control medications as ordered  - Monitor electrolytes and administer replacement therapy as ordered  7/10/2023 0728 by Deborra Dakin, RN  Outcome: Progressing  7/10/2023 0728 by Deborra Dakin, RN  Outcome: Progressing  7/10/2023 0728 by Deborra Dakin, RN  Outcome: Progressing     Problem: RESPIRATORY - ADULT  Goal: Achieves optimal ventilation and oxygenation  Description: INTERVENTIONS:  - Assess for changes in respiratory status  - Assess for changes in mentation and behavior  - Position to facilitate oxygenation and minimize respiratory effort  - Oxygen administered by appropriate delivery if ordered  - Initiate smoking cessation education as indicated  - Encourage broncho-pulmonary hygiene including cough, deep breathe, Incentive Spirometry  - Assess the need for suctioning and aspirate as needed  - Assess and instruct to report SOB or any respiratory difficulty  - Respiratory Therapy support as indicated  7/10/2023 0728 by Erwin Broussard RN  Outcome: Progressing  7/10/2023 0728 by Erwin Broussard RN  Outcome: Progressing  7/10/2023 0728 by Erwin Broussard RN  Outcome: Progressing     Problem: GASTROINTESTINAL - ADULT  Goal: Maintains adequate nutritional intake  Description: INTERVENTIONS:  - Monitor percentage of each meal consumed  - Identify factors contributing to decreased intake, treat as appropriate  - Assist with meals as needed  - Monitor I&O, weight, and lab values if indicated  - Obtain nutrition services referral as needed  7/10/2023 0728 by Erwin Broussard RN  Outcome: Progressing  7/10/2023 0728 by Erwin Broussard RN  Outcome: Progressing  7/10/2023 0728 by Erwin Broussard RN  Outcome: Progressing     Problem: SKIN/TISSUE INTEGRITY - ADULT  Goal: Skin Integrity remains intact(Skin Breakdown Prevention)  Description: Assess:  -Perform Rony assessment every shift  -Clean and moisturize skin every shift  -Inspect skin when repositioning, toileting, and assisting with ADLS  -Assess under medical devices such as scd's every shift  -Assess extremities for adequate circulation and sensation     Bed Management:  -Have minimal linens on bed & keep smooth, unwrinkled  -Change linens as needed when moist or perspiring  -Avoid sitting or lying in one position for more than 2 hours while in bed  -Keep HOB at 45 degrees     Toileting:  -Offer bedside commode  -Assess for incontinence every 2 hours  -Use incontinent care products after each incontinent episode such as chucks    Activity:  -Mobilize patient 3 times a day  -Encourage activity and walks on unit  -Encourage or provide ROM exercises   -Turn and reposition patient every 2 Hours  -Use appropriate equipment to lift or move patient in bed  -Instruct/ Assist with weight shifting every 2 hours when out of bed in chair  -Consider limitation of chair time 2 hour intervals    Skin Care:  -Avoid use of baby powder, tape, friction and shearing, hot water or constrictive clothing  -Relieve pressure over bony prominences using waffle  -Do not massage red bony areas    Next Steps:  -Teach patient strategies to minimize risks such as turn and reposition   -Consider consults to  interdisciplinary teams such as pt/ot  7/10/2023 0728 by Nicole Amezcua RN  Outcome: Progressing  7/10/2023 0728 by Nicole Amezcua RN  Outcome: Progressing  7/10/2023 0728 by Nicole Amezcua RN  Outcome: Progressing     Problem: Neurological Deficit  Goal: Neurological status is stable or improving  Description: Interventions:  - Monitor and assess patient's level of consciousness, motor function, sensory function, and level of assistance needed for ADLs. - Monitor and report changes from baseline. Collaborate with interdisciplinary team to initiate plan and implement interventions as ordered. - Provide and maintain a safe environment. - Consider seizure precautions. - Consider fall precautions. - Consider aspiration precautions. - Consider bleeding precautions. 7/10/2023 0728 by Nicole Amezcua RN  Outcome: Progressing  7/10/2023 0728 by Nicole Amezcua RN  Outcome: Progressing  7/10/2023 0728 by Nicole Amezcua RN  Outcome: Progressing     Problem: Activity Intolerance/Impaired Mobility  Goal: Mobility/activity is maintained at optimum level for patient  Description: Interventions:  - Assess and monitor patient  barriers to mobility and need for assistive/adaptive devices.   - Assess patient's emotional response to limitations. - Collaborate with interdisciplinary team and initiate plans and interventions as ordered. - Encourage independent activity per ability.  - Maintain proper body alignment. - Perform active/passive rom as tolerated/ordered. - Plan activities to conserve energy.  - Turn patient as appropriate  7/10/2023 0728 by Donald Bailey RN  Outcome: Progressing  7/10/2023 0728 by Donald Bailey RN  Outcome: Progressing  7/10/2023 0728 by Donald Bailey RN  Outcome: Progressing     Problem: Communication Impairment  Goal: Ability to express needs and understand communication  Description: Assess patient's communication skills and ability to understand information. Patient will demonstrate use of effective communication techniques, alternative methods of communication and understanding even if not able to speak. - Encourage communication and provide alternate methods of communication as needed. - Collaborate with case management/ for discharge needs. - Include patient/family/caregiver in decisions related to communication. 7/10/2023 0728 by Donald Bailey RN  Outcome: Progressing  7/10/2023 0728 by Donald Bailey RN  Outcome: Progressing  7/10/2023 0728 by Donald Bailey RN  Outcome: Progressing     Problem: Potential for Aspiration  Goal: Non-ventilated patient's risk of aspiration is minimized  Description: Assess and monitor vital signs, respiratory status, and labs (WBC). Monitor for signs of aspiration (tachypnea, cough, rales, wheezing, cyanosis, fever). - Assess and monitor patient's ability to swallow. - Place patient up in chair to eat if possible. - HOB up at 90 degrees to eat if unable to get patient up into chair.  - Supervise patient during oral intake. - Instruct patient/ family to take small bites. - Instruct patient/ family to take small single sips when taking liquids.   - Follow patient-specific strategies generated by speech pathologist.  7/10/2023 0728 by Donald Bailey RN  Outcome: Progressing  7/10/2023 0728 by Donald Bailey RN  Outcome: Progressing  7/10/2023 0728 by Donald Bailey RN  Outcome: Progressing     Problem: Nutrition  Goal: Nutrition/Hydration status is improving  Description: Monitor and assess patient's nutrition/hydration status for malnutrition (ex- brittle hair, bruises, dry skin, pale skin and conjunctiva, muscle wasting, smooth red tongue, and disorientation). Collaborate with interdisciplinary team and initiate plan and interventions as ordered. Monitor patient's weight and dietary intake as ordered or per policy. Utilize nutrition screening tool and intervene per policy. Determine patient's food preferences and provide high-protein, high-caloric foods as appropriate. - Assist patient with eating.  - Allow adequate time for meals.  - Encourage patient to take dietary supplement as ordered. - Collaborate with clinical nutritionist.  - Include patient/family/caregiver in decisions related to nutrition. 7/10/2023 0728 by Donald Bailey RN  Outcome: Progressing  7/10/2023 0728 by Donald Bailey RN  Outcome: Progressing  7/10/2023 0728 by Donald Bailey RN  Outcome: Progressing     Problem: Nutrition/Hydration-ADULT  Goal: Nutrient/Hydration intake appropriate for improving, restoring or maintaining nutritional needs  Description: Monitor and assess patient's nutrition/hydration status for malnutrition. Collaborate with interdisciplinary team and initiate plan and interventions as ordered. Monitor patient's weight and dietary intake as ordered or per policy. Utilize nutrition screening tool and intervene as necessary. Determine patient's food preferences and provide high-protein, high-caloric foods as appropriate.      INTERVENTIONS:  - Monitor oral intake, urinary output, labs, and treatment plans  - Assess nutrition and hydration status and recommend course of action  - Evaluate amount of meals eaten  - Assist patient with eating if necessary   - Allow adequate time for meals  - Recommend/ encourage appropriate diets, oral nutritional supplements, and vitamin/mineral supplements  - Order, calculate, and assess calorie counts as needed  - Recommend, monitor, and adjust tube feedings and TPN/PPN based on assessed needs  - Assess need for intravenous fluids  - Provide specific nutrition/hydration education as appropriate  - Include patient/family/caregiver in decisions related to nutrition  7/10/2023 0728 by Florinda Evans RN  Outcome: Progressing  7/10/2023 0728 by Florinda Evans RN  Outcome: Progressing  7/10/2023 0728 by Florinda Evans RN  Outcome: Progressing

## 2023-07-10 NOTE — ASSESSMENT & PLAN NOTE
· Baseline cr appears around 1.0-1.2  · Cr on admission 2.15  · CT a/p shows no obstruction    · ROB secondary to cardiorenal syndrome  · Renal function is slowly improving with dopamine infusion

## 2023-07-10 NOTE — ASSESSMENT & PLAN NOTE
· History of chronic a fib, EKG on admission showed controlled a fib  · Continue Coumadin for stroke prophylaxis  · Patient started on bisoprolol 2.5 mg p.o. daily

## 2023-07-10 NOTE — ASSESSMENT & PLAN NOTE
ECHO 4/2022: EF 29-05%, systolic function low/normal, left atrium severely dilated, mitral valve prosthesis functioning normally  Repeat echo 7/5/2023: EF of 20%, severe LA dilation, severe RV dysfunction    · Overall poor prognosis per cardiology. · Minimal improvement on dopamine infusion  · Diuretics remain on hold  · Monitor Is/Os and daily weights  · Continue salt and fluid restriction  · Patient was started on bisoprolol 2.5 mg p.o. daily.

## 2023-07-11 LAB
ANION GAP SERPL CALCULATED.3IONS-SCNC: 2 MMOL/L
ANION GAP SERPL CALCULATED.3IONS-SCNC: 2 MMOL/L
BUN SERPL-MCNC: 38 MG/DL (ref 5–25)
BUN SERPL-MCNC: 41 MG/DL (ref 5–25)
CALCIUM SERPL-MCNC: 10.4 MG/DL (ref 8.4–10.2)
CALCIUM SERPL-MCNC: 11 MG/DL (ref 8.4–10.2)
CHLORIDE SERPL-SCNC: 109 MMOL/L (ref 96–108)
CHLORIDE SERPL-SCNC: 110 MMOL/L (ref 96–108)
CO2 SERPL-SCNC: 27 MMOL/L (ref 21–32)
CO2 SERPL-SCNC: 27 MMOL/L (ref 21–32)
CREAT SERPL-MCNC: 1.53 MG/DL (ref 0.6–1.3)
CREAT SERPL-MCNC: 1.53 MG/DL (ref 0.6–1.3)
ERYTHROCYTE [DISTWIDTH] IN BLOOD BY AUTOMATED COUNT: 17.1 % (ref 11.6–15.1)
GFR SERPL CREATININE-BSD FRML MDRD: 31 ML/MIN/1.73SQ M
GFR SERPL CREATININE-BSD FRML MDRD: 31 ML/MIN/1.73SQ M
GLUCOSE SERPL-MCNC: 117 MG/DL (ref 65–140)
GLUCOSE SERPL-MCNC: 163 MG/DL (ref 65–140)
HCT VFR BLD AUTO: 36.9 % (ref 34.8–46.1)
HGB BLD-MCNC: 11.8 G/DL (ref 11.5–15.4)
MCH RBC QN AUTO: 34.1 PG (ref 26.8–34.3)
MCHC RBC AUTO-ENTMCNC: 32 G/DL (ref 31.4–37.4)
MCV RBC AUTO: 107 FL (ref 82–98)
PLATELET # BLD AUTO: 117 THOUSANDS/UL (ref 149–390)
PMV BLD AUTO: 11.7 FL (ref 8.9–12.7)
POTASSIUM SERPL-SCNC: 4.3 MMOL/L (ref 3.5–5.3)
POTASSIUM SERPL-SCNC: 5.4 MMOL/L (ref 3.5–5.3)
RBC # BLD AUTO: 3.46 MILLION/UL (ref 3.81–5.12)
SODIUM SERPL-SCNC: 138 MMOL/L (ref 135–147)
SODIUM SERPL-SCNC: 139 MMOL/L (ref 135–147)
WBC # BLD AUTO: 6.72 THOUSAND/UL (ref 4.31–10.16)

## 2023-07-11 PROCEDURE — 80048 BASIC METABOLIC PNL TOTAL CA: CPT | Performed by: PHYSICIAN ASSISTANT

## 2023-07-11 PROCEDURE — 87521 HEPATITIS C PROBE&RVRS TRNSC: CPT | Performed by: NURSE PRACTITIONER

## 2023-07-11 PROCEDURE — 99232 SBSQ HOSP IP/OBS MODERATE 35: CPT | Performed by: STUDENT IN AN ORGANIZED HEALTH CARE EDUCATION/TRAINING PROGRAM

## 2023-07-11 PROCEDURE — 85027 COMPLETE CBC AUTOMATED: CPT | Performed by: INTERNAL MEDICINE

## 2023-07-11 PROCEDURE — 80048 BASIC METABOLIC PNL TOTAL CA: CPT | Performed by: INTERNAL MEDICINE

## 2023-07-11 PROCEDURE — 87522 HEPATITIS C REVRS TRNSCRPJ: CPT | Performed by: NURSE PRACTITIONER

## 2023-07-11 PROCEDURE — 99232 SBSQ HOSP IP/OBS MODERATE 35: CPT | Performed by: INTERNAL MEDICINE

## 2023-07-11 RX ADMIN — CIPROFLOXACIN 1 DROP: 3 SOLUTION OPHTHALMIC at 21:38

## 2023-07-11 RX ADMIN — CARBIDOPA AND LEVODOPA 1 TABLET: 25; 100 TABLET ORAL at 15:59

## 2023-07-11 RX ADMIN — AMANTADINE HYDROCHLORIDE 100 MG: 100 CAPSULE ORAL at 08:36

## 2023-07-11 RX ADMIN — CARBIDOPA AND LEVODOPA 1 TABLET: 25; 100 TABLET ORAL at 08:36

## 2023-07-11 RX ADMIN — CARBIDOPA AND LEVODOPA 1 TABLET: 25; 100 TABLET ORAL at 21:38

## 2023-07-11 RX ADMIN — CIPROFLOXACIN 1 DROP: 3 SOLUTION OPHTHALMIC at 06:07

## 2023-07-11 RX ADMIN — AMANTADINE HYDROCHLORIDE 100 MG: 100 CAPSULE ORAL at 17:43

## 2023-07-11 RX ADMIN — MEMANTINE 10 MG: 10 TABLET ORAL at 08:36

## 2023-07-11 RX ADMIN — DOPAMINE HYDROCHLORIDE IN DEXTROSE 2.6 MCG/KG/MIN: 1.6 INJECTION, SOLUTION INTRAVENOUS at 11:46

## 2023-07-11 RX ADMIN — LEVOTHYROXINE SODIUM 50 MCG: 50 TABLET ORAL at 06:06

## 2023-07-11 RX ADMIN — FERROUS GLUCONATE 324 MG: 324 TABLET ORAL at 08:36

## 2023-07-11 NOTE — PROGRESS NOTES
Progress Note - Cardiology   [facility]     Marianne Seals 79 y.o. female MRN: [id number]  : 1940  Unit/Bed#: [place] [address]01 Encounter: [id number]    Assessment and Plan:   1. Acute on chronic combined systolic and diastolic heart failure.     - Presented on 23 for evaluation for worsening weakness. Patient does endorse shortness of breath with exertion. - 23 BNP: 2,023.   - 23 CT abdomen/pelvis without contrast: Small bilateral pleural effusions and small volume abdominopelvic ascites. Otherwise, no acute abnormality in the abdomen or pelvis. - 23 TTE: LVEF 20%. Left ventricular systolic function is severely reduced. Unable to assess diastolic function. Left atrial filling pressure is elevated. The right ventricular systolic function is moderately to severely reduced. Abnormal tricuspid annular plane systolic excursion (TAPSE) < 1.7 cm. Left Atrium: The atrium is severely dilated (>48 mL/m2). Atrial Septum: The septum bows into the right atrium, suggesting increased left atrial pressure. Aortic Valve: The aortic valve is trileaflet. The leaflets are mildly thickened. The leaflets are moderately calcified. There is mildly reduced mobility. There is trace to mild regurgitation. Tricuspid Valve: There is moderate regurgitation. The right ventricular systolic pressure is moderately elevated. The estimated right ventricular systolic pressure is 86.55 mmHg. Pulmonic Valve: There is moderate regurgitation.   - 22 TTE: LVEF 50-55%. Systolic function is low normal.  Left atrium severely dilated.  Atrial septum bows into right atrium.  Trace to mild aortic valve regurgitation.  Mechanical mitral valve prosthesis, appears to be functioning normally.  Mild to moderate tricuspid valve regurgitation.  Peg Day of home medication notes patient is on Aldactone 12.5 mg daily as well as torsemide 20 mg 3 times a week (Monday/Wednesday/Friday).  Aldactone and torsemide on hold in setting of ROB. - Recommend against use of excessive IV fluids.   - Not currently on diuretics. Last dose of diuretics on 7/07/23.   - Will continue to hold diuretics in setting of hypotension. BP in past 24 hours notes SBP . - Currently on dopamine drip, 2.5 mcg/kg/min. - Strict I/Os. - Daily weights, standing weights.   - Continue low-sodium diet with 1800 mL fluid restriction.  - CHF education.     2. Goals of care discussion.     - Family meeting held with primary team yesterday, 7/10/2023. Goals of care discussion was held on 7/09 with Dr. Rm Galvez (Cardiology). Further discussed goals of care on 7/10/2023. Patient reported she wanted to transition to hospice care, she no longer wanted aggressive medical treatment. Hospice evaluation pending. 3. Elevated troponin.     - 7/06/23 hs troponin: 256 (0 hrs), 243 (2 hrs), 190 (4hrs). - 7/06/23 EKG: Atrial fibrillation, ventricular rate 72 bpm.  Noted PVCs.  Left bundle branch block. - Patient currently denies chest pain, palpitations, shortness of breath, lightheadedness, dizziness, nausea, or vomiting.    - Likely nonischemic myocardial injury secondary to acute on chronic systolic and diastolic heart failure. 4.  NSVT. - Episode of 20 beats of NSVT noted on 7/07/23 at 1411 hrs on telemetry.  - No further episodes noted. - Continue bisoprolol 2.5 mg daily.  - Monitor electrolytes. Replete potassium above 4 and magnesium above 2.  - Continue telemetry.     5. Generalized weakness.     - Presented on 7/06/23 for evaluation for worsening weakness. Was instructed by Neurology to go to ED after patient's  called reporting left facial droop and weakness.  - Patient reports she has had progressive weakness over the past several months, states is greatly affecting her ability to ambulate, and states she has had frequent falls in recent weeks.   - 7/06/23 CT head without contrast demonstrated no acute intracranial abnormality, chronic cerebellar lacunar infarcts. - 7/07/23 MRI brain without contrast: a few small chronic bilateral cerebellar lacunar infarctions are stable. No acute infarction, intracranial hemorrhage or mass effect.  - Neurology recommendations noted.     6. Transaminitis.     - 7/06/23 (admission) AST: 304, ALT: 246.  - 7/10/23: AST - 50, . - Unclear etiology at this time. May be secondary to acute on chronic combined heart failure. - 7/07/23 GI mentations noted.     7. ROB.    - Creatinine appears to be between 1.0 and 1.2.   - 7/06/23 creatinine (on admission): 2.15.  - 7/11/23 creatinine: 1.53.   - Home medication of Aldactone and torsemide on hold in setting of ROB. - Care per primary team.     8. Supratherapeutic INR.    - 7/06/23 INR (on admission): 14.85.   - 7/07/23 INR: 15.  - 7/10/23 INR: 2.73.   - Continue warfarin 3 mg daily.  - Care per primary team.     9. Chronic atrial fibrillation.     - 7/06/23 EKG: Atrial fibrillation, ventricular rate 72 bpm.  Noted PVCs.  Left bundle branch block. - Continue bisoprolol 2.5 mg daily.  - [id number]- VASc stroke risk score: 6 points, moderate-high risk. - Continue warfarin 3 mg daily.     10.  Chronic left bundle branch block.     - Has been documented since 2017. - 7/06/23 EKG: Atrial fibrillation, ventricular rate 72 bpm.  Noted PVCs.  Left bundle branch block.     11. Mitral valve replacement.     - s/p mitral valve replacement FQ [id number].   - 4/11/22 TTE: Mitral Valve: There is a mechanical mitral valve prosthesis. The prosthetic valve appears to be functioning normally. The prosthetic valve appears to be well-seated.  Mean gradient is 3 mmHg and peak mitral velocity is 1.5 m/sec. There is trace regurgitation. There is no evidence of transvalvular regurgitation. - 7/07/23 TTE:     Mitral Valve The mitral valve was not well visualized. There is a bileaflet mechanical mitral valve prosthesis.  The prosthetic valve is not well visualized but appears to be functioning normally. The prosthetic valve appears to be well-seated. Degree of valve regurgitation assessment limited due to mechanical valve artifact. There is at least mild regurgitation. There is no evidence of transvalvular regurgitation. There is no evidence of stenosis. The mitral valve mean gradient is 2mmHg which is unchanged from previous. 12. Hypothyroidism.     - Currently on levothyroxine 50 mcg daily. - 5/02/23 TSH: 2.559.      13. Parkinson's disease.       - Currently on Sinemet  mg 3 times daily, Namenda 10 mg daily, and Symmetrel 100 mg twice daily.  - Neurology consult recommendations noted. Subjective / Objective:        Family meeting held with primary team yesterday, 7/10/2023. Goals of care discussion was held on 7/09 with Dr. Taylor Chang (Cardiology). Further discussed goals of care on 7/10/2023. Patient reported she wanted to transition to hospice care, she no longer wanted aggressive medical treatment. Hospice evaluation pending. Patient reports feeling unchanged, states she feels incredibly weak and continues to have shortness of breath at rest. She currently denies chest pain, palpitations, lightheadedness, dizziness, headache, nausea or vomiting. Vitals: Blood pressure 99/52, pulse 64, temperature 97.6 °F (36.4 °C), temperature source Oral, resp. rate 18, height 5' 4" (1.626 m), weight 69.7 kg (153 lb 9.6 oz), SpO2 100 %. Vitals:    07/07/23 0758 07/11/23 0600   Weight: 69.9 kg (154 lb) 69.7 kg (153 lb 9.6 oz)     Body mass index is 26.37 kg/m².   BP Readings from Last 3 Encounters:   07/11/23 99/52   06/20/23 102/60   06/07/23 112/69     Orthostatic Blood Pressures    Flowsheet Row Most Recent Value   Blood Pressure 99/52 filed at 07/11/2023 0753        I/O       07/08 0701  07/09 0700 07/09 0701  07/10 0700 07/10 0701 07/11 0700    I.V. (mL/kg) 20.1 (0.3) 10 (0.1)     Total Intake(mL/kg) 20.1 (0.3) 10 (0.1)     Urine (mL/kg/hr) 600 (0.4) 300 (0.2) 275 (4.1) Total Output 600 300 275    Net -579.9 -290 -275           Unmeasured Urine Occurrence 1 x          Invasive Devices     Peripheral Intravenous Line  Duration           Peripheral IV 07/10/23 Right;Ventral (anterior) Forearm 1 day          Drain  Duration           External Urinary Catheter 2 days                  Intake/Output Summary (Last 24 hours) at 7/11/2023 4741  Last data filed at 7/11/2023 3123  Gross per 24 hour   Intake --   Output 400 ml   Net -400 ml       Physical Exam:   Physical Exam  Vitals reviewed. Constitutional:       General: She is not in acute distress. Appearance: She is ill-appearing. Cardiovascular:      Rate and Rhythm: Normal rate. Rhythm irregular. Pulses: Normal pulses. Heart sounds: Murmur heard. Pulmonary:      Effort: Pulmonary effort is normal. No respiratory distress. Breath sounds: Decreased breath sounds present. Comments: Bilateral crackles heard at bases. Abdominal:      General: Abdomen is flat. There is no distension. Palpations: Abdomen is soft. Tenderness: There is no abdominal tenderness. Musculoskeletal:      Right lower leg: No edema. Left lower leg: No edema. Skin:     General: Skin is warm and dry. Neurological:      Mental Status: She is alert. Motor: Weakness present.        Medications/ Allergies:     Current Facility-Administered Medications   Medication Dose Route Frequency Provider Last Rate   • amantadine  100 mg Oral BID Anaid Negron PA-C     • bisoprolol  2.5 mg Oral Daily Gretchen Otoole PA-C     • carbidopa-levodopa  1 tablet Oral TID Anaid Negron PA-C     • ciprofloxacin  1 drop Both Eyes Q4H While Awake Lakeisha Ta MD     • DOPamine in dextrose 5%  1-20 mcg/kg/min Intravenous Continuous Lakeisha Ta MD 2.5 mcg/kg/min (07/09/23 2309)   • ferrous gluconate  324 mg Oral Daily Before Breakfast Anaid Negron PA-C     • levothyroxine  50 mcg Oral Early Morning Anaid Negron PA-C     • memantine 10 mg Oral Daily Ellie Bonner PA-C     • warfarin  3 mg Oral Daily (warfarin) Soraya Lowry MD          Allergies   Allergen Reactions   • Artane [Trihexyphenidyl] Other (See Comments)     Felt "disconnected", "out if it", shaky.  Did not feel right    • Glycopyrrolate      Nose bleeds   • Pollen Extract        VTE Pharmacologic Prophylaxis:   Warfarin (Coumadin)    Labs:   Troponins:  Results from last 7 days   Lab Units 07/07/23 2012 07/07/23  1655   HSTNI D2 ng/L  --  8   HSTNI D4 ng/L 2  --          CBC with diff:  Results from last 7 days   Lab Units 07/11/23  0609 07/10/23  0535 07/08/23  0539 07/07/23  0432 07/06/23  1036   WBC Thousand/uL 6.72 6.34 8.29 7.32 7.19   HEMOGLOBIN g/dL 11.8 11.0* 11.7 11.4* 11.7   HEMATOCRIT % 36.9 34.8 36.0 34.5* 35.8   MCV fL 107* 106* 106* 105* 104*   PLATELETS Thousands/uL 117* 97* 107* 100* 120*   RBC Million/uL 3.46* 3.28* 3.40* 3.29* 3.45*   MCH pg 34.1 33.5 34.4* 34.7* 33.9   MCHC g/dL 32.0 31.6 32.5 33.0 32.7   RDW % 17.1* 16.8* 17.1* 16.8* 16.5*   MPV fL 11.7 11.7 12.3 11.9 11.9   NRBC AUTO /100 WBCs  --  0  --   --  0       CMP:  Results from last 7 days   Lab Units 07/11/23  0609 07/10/23  0535 07/09/23  0534 07/08/23  0539 07/07/23  0432 07/06/23  1036 07/06/23  0743   SODIUM mmol/L 138 141 138 137 135 136 137   POTASSIUM mmol/L 5.4* 4.1 4.0 4.3 3.9 3.4* 3.8   CHLORIDE mmol/L 109* 111* 108 107 105 103 106   CO2 mmol/L 27 26 25 21 23 27 26   ANION GAP mmol/L 2 4 5 9 7 6 5   BUN mg/dL 41* 38* 45* 50* 47* 52* 48*   CREATININE mg/dL 1.53* 1.45* 1.70* 1.97* 1.84* 2.15* 2.33*   GLUCOSE FASTING mg/dL  --   --   --   --   --   --  101*   CALCIUM mg/dL 10.4* 10.1 10.5* 9.9 9.6 10.5* 10.7*   AST U/L  --  50* 101* 208* 155* 304*  --    ALT U/L  --  146* 66* 114* 71* 246*  --    ALK PHOS U/L  --  74 83 80 80 80  --    TOTAL PROTEIN g/dL  --  5.0* 5.5* 5.3* 5.0* 5.6*  --    ALBUMIN g/dL  --  3.1* 3.3* 3.3* 3.1* 3.5  --    TOTAL BILIRUBIN mg/dL  --  2.95* 3.55* 3.54* 2.46* 3.20*  --    EGFR ml/min/1.73sq m 31 33 27 23 24 20 18       Magnesium:  Results from last 7 days   Lab Units 07/10/23  0535 07/07/23  0432   MAGNESIUM mg/dL 2.1 1.9     Coags:  Results from last 7 days   Lab Units 07/10/23  0535 07/09/23  0534 07/08/23  0539 07/07/23  2335 07/07/23  1448 07/07/23  0432 07/06/23  1820 07/06/23  1153   PTT seconds  --   --   --   --   --   --   --  54*   INR  2.73* 2.24* 2.52* 3.11* 4.91* >15.00* >15.00* 14.85*     TSH:  Results from last 7 days   Lab Units 07/07/23  0432   TSH 3RD GENERATON uIU/mL 3.176     No components found for: "TSH3"  Lipid Profile:  Results from last 7 days   Lab Units 07/07/23  0432   CHOLESTEROL mg/dL 79   TRIGLYCERIDES mg/dL 107   HDL mg/dL 13*   LDL CALC mg/dL 45     Hgb A1c:  Results from last 7 days   Lab Units 07/07/23  0432   HEMOGLOBIN A1C % 5.5     NT-proBNP: No results for input(s): "NTBNP" in the last 72 hours. Imaging & Testing   I have personally reviewed pertinent reports. MRI brain wo contrast    Result Date: 7/7/2023    Impression: A few small chronic bilateral cerebellar lacunar infarctions are stable. No acute infarction, intracranial hemorrhage or mass effect. US right upper quadrant     Result Date: 7/6/2023     Impression: No acute findings. Absent gallbladder. Trace right pleural fluid visualized.      CT abdomen pelvis wo contrast     Result Date: 7/6/2023     Impression: Small bilateral pleural effusions and small volume abdominopelvic ascites. Otherwise, no acute abnormality in the abdomen or pelvis.      CT head wo contrast     Result Date: 7/6/2023     Impression: 1.  No acute intracranial abnormality. 2.  Chronic cerebellar lacunar infarcts best seen on the prior MRI examination.      FL barium swallow     Result Date: 6/14/2023     Impression: Esophageal dysmotility.        EKG / Monitor: Personally reviewed.       Telemetry reviewed: Currently atrial fibrillation, ventricular rate 65 bpm.     Cardiac testing:     Echo complete w/ contrast if indicated    Result Date: 7/7/2023  Narrative: •  Left Ventricle: Left ventricular cavity size is mildly dilated. Wall thickness is mildly increased. The left ventricular ejection fraction is 20% by visual estimation. Systolic function is severely reduced. Although no diagnostic regional wall motion abnormality was identified, this possibility cannot be completely excluded on the basis of this study. Unable to assess diastolic function. Left atrial filling pressure is elevated. •  IVS: There is abnormal septal motion consistent with post-operative status. •  Right Ventricle: Systolic function is moderately to severely reduced. Abnormal tricuspid annular plane systolic excursion (TAPSE) < 1.7 cm. •  Left Atrium: The atrium is severely dilated (>48 mL/m2). •  Atrial Septum: The septum bows into the right atrium, suggesting increased left atrial pressure. •  Aortic Valve: The aortic valve is trileaflet. The leaflets are mildly thickened. The leaflets are moderately calcified. There is mildly reduced mobility. There is trace to mild regurgitation. •  Mitral Valve: There is a bileaflet mechanical mitral valve prosthesis. The prosthetic valve is not well visualized but appears to be functioning normally. The prosthetic valve appears to be well-seated. Degree of valve regurgitation assessment limited due to mechanical valve artifact. There is at least mild regurgitation. There is no evidence of transvalvular regurgitation. There is no evidence of stenosis. The mitral valve mean gradient is 2mmHg which is unchanged from previous. •  Tricuspid Valve: There is moderate regurgitation. The right ventricular systolic pressure is moderately elevated. The estimated right ventricular systolic pressure is 84.79 mmHg. •  Pulmonic Valve: There is moderate regurgitation. •  Compared to previous echocardiogram, there has been a significant reduction in LV and RV systolic function.   Mechanical mitral valve prosthesis appears unchanged. Echo complete with contrast if indicated - 4/11/22     Left Ventricle Left ventricular cavity size is normal. Wall thickness is mildly increased. The left ventricular ejection fraction is 50 to 55 % by visual estimation. Systolic function is low normal.  Although no diagnostic regional wall motion abnormality was identified, this possibility cannot be completely excluded on the basis of this study. Unable to assess diastolic function. Interventricular Septum There is abnormal septal motion consistent with post-operative status. Right Ventricle Right ventricular cavity size is normal. Systolic function is normal. Wall thickness is normal.      Left Atrium The atrium is severely dilated. Right Atrium The atrium is normal in size. Atrial Septum The septum bows into the right atrium. Aortic Valve The aortic valve is trileaflet. The leaflets are mildly thickened. The leaflets are not calcified. The leaflets exhibit normal mobility. There is trace to mild regurgitation. There is no evidence of stenosis. Mitral Valve There is a mechanical mitral valve prosthesis. The prosthetic valve appears to be functioning normally. The prosthetic valve appears to be well-seated.  Mean gradient is 3 mmHg and peak mitral velocity is 1.5 m/sec. There is trace regurgitation. There is no evidence of transvalvular regurgitation. There is no evidence of stenosis. Tricuspid Valve There is mild to moderate regurgitation.  Pulmonary artery pressure 35 mmHg. There is no evidence of stenosis. Pulmonic Valve There is trace regurgitation. There is no evidence of stenosis. Ascending Aorta The aortic root is normal in size. IVC/SVC The inferior vena cava is normal in size. Respirophasic changes were normal.      Pericardium There is no pericardial effusion.  The pericardium is normal in appearance.         Results for orders placed during the hospital encounter of 19    Echo complete with contrast if indicated    Narrative  [address] [place] [address] [facility].  [place] [address] [address] [place] [address]  [phone]    Transthoracic Echocardiogram  2D, M-mode, Doppler, and Color Doppler    Study date:  2019    Patient: Miguel Drew  MR number: [id number]  Account number: [de-identified]  : 1940  Age: 77 years  Gender: Female  Status: Outpatient  Location: Echo lab  Height: 64 in  Weight: 185.7 lb  BP: 94/ 63 mmHg    Indications: Cardiomyopathy    Diagnoses: I42.9 - Cardiomyopathy, unspecified    Sonographer:  Yumiko Haines RDCS  Primary Physician:  Abigail Cabrera MD  Referring Physician:  Houston Kingston MD  Group:  [facility]  Ordering Physician:  Houston Kingston MD  Interpreting Physician:  Iker Chen MD    SUMMARY    LEFT VENTRICLE:  Systolic function was at the lower limits of normal. Ejection fraction was estimated in the range of 50 % to 55 % to be 55 %. There were no regional wall motion abnormalities. Wall thickness was mildly increased. There was mild concentric hypertrophy. VENTRICULAR SEPTUM:  There was mild paradoxical motion. 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 LEFT ATRIUM:  The atrium was moderately to markedly dilated. MITRAL VALVE:  A mechanical prosthesis was present. It exhibited normal function and normal motion. There was no pannus formation, a normal-appearing sewing ring, and no rocking motion of the sewing ring. Mean gradient 4 mm of Hg and peak velocity 1.5  m/sec with trace to mild MR. There was trace regurgitation. AORTIC VALVE:  There was mild regurgitation. TRICUSPID VALVE:  There was mild regurgitation. Estimated peak PA pressure was 25 mmHg. HISTORY: PRIOR HISTORY: Mitral Valve Replacement (), A-Fib, Hypothyroidism    PROCEDURE: The procedure was performed in the echo lab. This was a routine study. The transthoracic approach was used.  The study included complete 2D imaging, M-mode, complete spectral Doppler, and color Doppler. The heart rate was 71 bpm,  at the start of the study. Echocardiographic views were limited due to lung interference. Image quality was adequate. LEFT VENTRICLE: Size was normal. Systolic function was at the lower limits of normal. Ejection fraction was estimated in the range of 50 % to 55 % to be 55 %. There were no regional wall motion abnormalities. Wall thickness was mildly  increased. There was mild concentric hypertrophy. DOPPLER: The study was not technically sufficient to allow evaluation of LV diastolic function. VENTRICULAR SEPTUM: There was mild paradoxical motion. 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 RIGHT VENTRICLE: The size was normal. Systolic function was low normal.    LEFT ATRIUM: The atrium was moderately to markedly dilated. RIGHT ATRIUM: Size was normal.    MITRAL VALVE: A mechanical prosthesis was present. It exhibited normal function and normal motion. There was no pannus formation, a normal-appearing sewing ring, and no rocking motion of the sewing ring. Mean gradient 4 mm of Hg and peak  velocity 1.5 m/sec with trace to mild MR. DOPPLER: There was no evidence for stenosis. There was trace regurgitation. AORTIC VALVE: The valve was trileaflet. Leaflets exhibited mildly increased thickness, mild calcification, and normal cuspal separation. DOPPLER: Transaortic velocity was within the normal range. There was no evidence for stenosis. There  was mild regurgitation. TRICUSPID VALVE: DOPPLER: There was mild regurgitation. Estimated peak PA pressure was 25 mmHg. PULMONIC VALVE: DOPPLER: There was no regurgitation. PERICARDIUM: There was no thickening or calcification. There was no pericardial effusion. AORTA: The root exhibited normal size. SYSTEMIC VEINS: IVC: The inferior vena cava was normal in size.  Respirophasic changes were normal.    SYSTEM MEASUREMENT TABLES    2D mode  AoR Diam 2D: 3 cm  LA Diam (2D): 5.4 cm  LA/Ao (2D): 1.8  FS (2D Teich): 27.3 %  IVSd (2D): 1.34 cm  LVDEV: 42.8 cmï¾³  LVEDV MOD BP: 85 cmï¾³  LVESV: 19.5 cmï¾³  LVIDd(2D): 3.26 cm  LVISd (2D): 2.37 cm  LVOT Area 2D: 3.14 cmï¾²  LVPWd (2D): 1.33 cm  SV (Teich): 23.3 cmï¾³    Apical four chamber  LVEF A4C: 50 %    Apical two chamber  LVEF A2C: 54 %    Unspecified Scan Mode  MANSOOR Cont Eq (Peak Dejan): 1.97 cmï¾²  LVOT Diam.: 2 cm  LVOT Vmax: 827 mm/s  LVOT Vmax; Mean: 827 mm/s  Peak Grad.; Mean: 3 mm[Hg]  MV Peak E Dejan.  Mean: 1330 mm/s  MVA (PHT): 3.01 cmï¾²  PHT: 73 ms  Max P mm[Hg]  V Max: 2420 mm/s  Vmax: 2140 mm/s  RA Area: 18.3 cmï¾²  RA Volume: 45.7 cmï¾³  TAPSE: 1.3 cm    [employer] Accredited Echocardiography Laboratory    Prepared and electronically signed by    Saad Vazquez MD  Signed 2019 07:39:37          Dyan Bradshaw PA-C

## 2023-07-11 NOTE — PHYSICAL THERAPY NOTE
Attempted PT treatment however pt busy with nursing staff. Will follow.   Nel Campbell PT  [id number]     07/11/23 1500   Note Type   Note Type Cancelled Session   Cancel Reasons Other  (Pt busy with nursing staff.)

## 2023-07-11 NOTE — CASE MANAGEMENT
Case Management Progress Note    Patient name Elissa Og  Location [facility]-* MRN [id number]  : 1940 Date 2023       LOS (days): 5  Geometric Mean LOS (GMLOS) (days): 5.70  Days to GMLOS:0.9        OBJECTIVE:        Current admission status: Inpatient  Preferred Pharmacy:   [address] [place] [address] [id number] [facility], [address] [place], [place] [address] [address]  Phone: [phone] Fax: [phone] [facility], [place] [address]  Phone: [phone] Fax: [phone]    Primary Care Provider: Nisa Herr MD    Primary Insurance: MEDICARE  Secondary Insurance: BLUE CROSS    PROGRESS NOTE:        CM called patient's  Rei Seen to inform him that Rozetta Scheuermann from [facility] will be stopping by to see Renette Lefort between 10-11am. He plans to be at the patient's bedside for the visit.

## 2023-07-11 NOTE — PLAN OF CARE
Problem: Potential for Falls  Goal: Patient will remain free of falls  Description: INTERVENTIONS:  - Educate patient/family on patient safety including physical limitations  - Instruct patient to call for assistance with activity   - Consult OT/PT to assist with strengthening/mobility   - Keep Call bell within reach  - Keep bed low and locked with side rails adjusted as appropriate  - Keep care items and personal belongings within reach  - Initiate and maintain comfort rounds  - Make Fall Risk Sign visible to staff  - Offer Toileting every 2 Hours, in advance of need  - Initiate/Maintain bed/chair alarm  - Obtain necessary fall risk management equipment: bed/chair alarm  - Apply yellow socks and bracelet for high fall risk patients  - Consider moving patient to room near nurses station  Outcome: Progressing     Problem: MOBILITY - ADULT  Goal: Maintain or return to baseline ADL function  Description: INTERVENTIONS:  - Educate patient/family on patient safety including physical limitations  - Instruct patient to call for assistance with activity   - Consult OT/PT to assist with strengthening/mobility   - Keep Call bell within reach  - Keep bed low and locked with side rails adjusted as appropriate  - Keep care items and personal belongings within reach  - Initiate and maintain comfort rounds  - Make Fall Risk Sign visible to staff  - Offer Toileting every 2 Hours, in advance of need  - Initiate/Maintain bed/chair alarm  - Obtain necessary fall risk management equipment: bed/chair alarm  - Apply yellow socks and bracelet for high fall risk patients  - Consider moving patient to room near nurses station  Outcome: Progressing  Goal: Maintains/Returns to pre admission functional level  Description: INTERVENTIONS:  - Perform BMAT or MOVE assessment daily.   - Set and communicate daily mobility goal to care team and patient/family/caregiver.    - Collaborate with rehabilitation services on mobility goals if consulted  - Perform Range of Motion 3 times a day. - Reposition patient every 3 hours.   - Dangle patient 3 times a day  - Stand patient 3 times a day  - Ambulate patient 3 times a day  - Out of bed to chair 3 times a day   - Out of bed for meals 3 times a day  - Out of bed for toileting  - Record patient progress and toleration of activity level   Outcome: Progressing     Problem: Prexisting or High Potential for Compromised Skin Integrity  Goal: Skin integrity is maintained or improved  Description: INTERVENTIONS:  - Identify patients at risk for skin breakdown  - Assess and monitor skin integrity  - Assess and monitor nutrition and hydration status  - Monitor labs   - Assess for incontinence   - Turn and reposition patient  - Assist with mobility/ambulation  - Relieve pressure over bony prominences  - Avoid friction and shearing  - Provide appropriate hygiene as needed including keeping skin clean and dry  - Evaluate need for skin moisturizer/barrier cream  - Collaborate with interdisciplinary team   - Patient/family teaching  - Consider wound care consult   Outcome: Progressing     Problem: PAIN - ADULT  Goal: Verbalizes/displays adequate comfort level or baseline comfort level  Description: Interventions:  - Encourage patient to monitor pain and request assistance  - Assess pain using appropriate pain scale  - Administer analgesics based on type and severity of pain and evaluate response  - Implement non-pharmacological measures as appropriate and evaluate response  - Consider cultural and social influences on pain and pain management  - Notify physician/advanced practitioner if interventions unsuccessful or patient reports new pain  Outcome: Progressing     Problem: SAFETY ADULT  Goal: Patient will remain free of falls  Description: INTERVENTIONS:  - Educate patient/family on patient safety including physical limitations  - Instruct patient to call for assistance with activity   - Consult OT/PT to assist with strengthening/mobility   - Keep Call bell within reach  - Keep bed low and locked with side rails adjusted as appropriate  - Keep care items and personal belongings within reach  - Initiate and maintain comfort rounds  - Make Fall Risk Sign visible to staff  - Offer Toileting every 2 Hours, in advance of need  - Initiate/Maintain bed/chair alarm  - Obtain necessary fall risk management equipment: bed/chair alarm  - Apply yellow socks and bracelet for high fall risk patients  - Consider moving patient to room near nurses station  Outcome: Progressing  Goal: Maintain or return to baseline ADL function  Description: INTERVENTIONS:  - Educate patient/family on patient safety including physical limitations  - Instruct patient to call for assistance with activity   - Consult OT/PT to assist with strengthening/mobility   - Keep Call bell within reach  - Keep bed low and locked with side rails adjusted as appropriate  - Keep care items and personal belongings within reach  - Initiate and maintain comfort rounds  - Make Fall Risk Sign visible to staff  - Offer Toileting every 2 Hours, in advance of need  - Initiate/Maintain bed/chair alarm  - Obtain necessary fall risk management equipment: bed/chair alarm  - Apply yellow socks and bracelet for high fall risk patients  - Consider moving patient to room near nurses station  Outcome: Progressing  Goal: Maintains/Returns to pre admission functional level  Description: INTERVENTIONS:  - Perform BMAT or MOVE assessment daily.   - Set and communicate daily mobility goal to care team and patient/family/caregiver. - Collaborate with rehabilitation services on mobility goals if consulted  - Perform Range of Motion 3 times a day. - Reposition patient every 3 hours.   - Dangle patient 3 times a day  - Stand patient 3 times a day  - Ambulate patient 3 times a day  - Out of bed to chair 3 times a day   - Out of bed for meals 3 times a day  - Out of bed for toileting  - Record patient progress and toleration of activity level   Outcome: Progressing     Problem: NEUROSENSORY - ADULT  Goal: Achieves stable or improved neurological status  Description: INTERVENTIONS  - Monitor and report changes in neurological status  - Monitor vital signs such as temperature, blood pressure, glucose, and any other labs ordered   - Initiate measures to prevent increased intracranial pressure  - Monitor for seizure activity and implement precautions if appropriate      Outcome: Progressing  Goal: Achieves maximal functionality and self care  Description: INTERVENTIONS  - Monitor swallowing and airway patency with patient fatigue and changes in neurological status  - Encourage and assist patient to increase activity and self care.    - Encourage visually impaired, hearing impaired and aphasic patients to use assistive/communication devices  Outcome: Progressing     Problem: CARDIOVASCULAR - ADULT  Goal: Maintains optimal cardiac output and hemodynamic stability  Description: INTERVENTIONS:  - Monitor I/O, vital signs and rhythm  - Monitor for S/S and trends of decreased cardiac output  - Administer and titrate ordered vasoactive medications to optimize hemodynamic stability  - Assess quality of pulses, skin color and temperature  - Assess for signs of decreased coronary artery perfusion  - Instruct patient to report change in severity of symptoms  Outcome: Progressing  Goal: Absence of cardiac dysrhythmias or at baseline rhythm  Description: INTERVENTIONS:  - Continuous cardiac monitoring, vital signs, obtain 12 lead EKG if ordered  - Administer antiarrhythmic and heart rate control medications as ordered  - Monitor electrolytes and administer replacement therapy as ordered  Outcome: Progressing     Problem: RESPIRATORY - ADULT  Goal: Achieves optimal ventilation and oxygenation  Description: INTERVENTIONS:  - Assess for changes in respiratory status  - Assess for changes in mentation and behavior  - Position to facilitate oxygenation and minimize respiratory effort  - Oxygen administered by appropriate delivery if ordered  - Initiate smoking cessation education as indicated  - Encourage broncho-pulmonary hygiene including cough, deep breathe, Incentive Spirometry  - Assess the need for suctioning and aspirate as needed  - Assess and instruct to report SOB or any respiratory difficulty  - Respiratory Therapy support as indicated  Outcome: Progressing     Problem: GASTROINTESTINAL - ADULT  Goal: Maintains adequate nutritional intake  Description: INTERVENTIONS:  - Monitor percentage of each meal consumed  - Identify factors contributing to decreased intake, treat as appropriate  - Assist with meals as needed  - Monitor I&O, weight, and lab values if indicated  - Obtain nutrition services referral as needed  Outcome: Progressing     Problem: SKIN/TISSUE INTEGRITY - ADULT  Goal: Skin Integrity remains intact(Skin Breakdown Prevention)  Description: Assess:  -Perform Rony assessment every shift  -Clean and moisturize skin every shift  -Inspect skin when repositioning, toileting, and assisting with ADLS  -Assess under medical devices such as scd's every shift  -Assess extremities for adequate circulation and sensation     Bed Management:  -Have minimal linens on bed & keep smooth, unwrinkled  -Change linens as needed when moist or perspiring  -Avoid sitting or lying in one position for more than 2 hours while in bed  -Keep HOB at 45 degrees     Toileting:  -Offer bedside commode  -Assess for incontinence every 2 hours  -Use incontinent care products after each incontinent episode such as chucks    Activity:  -Mobilize patient 3 times a day  -Encourage activity and walks on unit  -Encourage or provide ROM exercises   -Turn and reposition patient every 2 Hours  -Use appropriate equipment to lift or move patient in bed  -Instruct/ Assist with weight shifting every 2 hours when out of bed in chair  -Consider limitation of chair time 2 hour intervals    Skin Care:  -Avoid use of baby powder, tape, friction and shearing, hot water or constrictive clothing  -Relieve pressure over bony prominences using waffle  -Do not massage red bony areas    Next Steps:  -Teach patient strategies to minimize risks such as turn and reposition   -Consider consults to  interdisciplinary teams such as pt/ot  Outcome: Progressing     Problem: Neurological Deficit  Goal: Neurological status is stable or improving  Description: Interventions:  - Monitor and assess patient's level of consciousness, motor function, sensory function, and level of assistance needed for ADLs. - Monitor and report changes from baseline. Collaborate with interdisciplinary team to initiate plan and implement interventions as ordered. - Provide and maintain a safe environment. - Consider seizure precautions. - Consider fall precautions. - Consider aspiration precautions. - Consider bleeding precautions. Outcome: Progressing     Problem: Activity Intolerance/Impaired Mobility  Goal: Mobility/activity is maintained at optimum level for patient  Description: Interventions:  - Assess and monitor patient  barriers to mobility and need for assistive/adaptive devices. - Assess patient's emotional response to limitations. - Collaborate with interdisciplinary team and initiate plans and interventions as ordered. - Encourage independent activity per ability.  - Maintain proper body alignment. - Perform active/passive rom as tolerated/ordered. - Plan activities to conserve energy.  - Turn patient as appropriate  Outcome: Progressing     Problem: Communication Impairment  Goal: Ability to express needs and understand communication  Description: Assess patient's communication skills and ability to understand information. Patient will demonstrate use of effective communication techniques, alternative methods of communication and understanding even if not able to speak.      - Encourage communication and provide alternate methods of communication as needed. - Collaborate with case management/ for discharge needs. - Include patient/family/caregiver in decisions related to communication. Outcome: Progressing     Problem: Potential for Aspiration  Goal: Non-ventilated patient's risk of aspiration is minimized  Description: Assess and monitor vital signs, respiratory status, and labs (WBC). Monitor for signs of aspiration (tachypnea, cough, rales, wheezing, cyanosis, fever). - Assess and monitor patient's ability to swallow. - Place patient up in chair to eat if possible. - HOB up at 90 degrees to eat if unable to get patient up into chair.  - Supervise patient during oral intake. - Instruct patient/ family to take small bites. - Instruct patient/ family to take small single sips when taking liquids. - Follow patient-specific strategies generated by speech pathologist.  Outcome: Progressing     Problem: Nutrition  Goal: Nutrition/Hydration status is improving  Description: Monitor and assess patient's nutrition/hydration status for malnutrition (ex- brittle hair, bruises, dry skin, pale skin and conjunctiva, muscle wasting, smooth red tongue, and disorientation). Collaborate with interdisciplinary team and initiate plan and interventions as ordered. Monitor patient's weight and dietary intake as ordered or per policy. Utilize nutrition screening tool and intervene per policy. Determine patient's food preferences and provide high-protein, high-caloric foods as appropriate. - Assist patient with eating.  - Allow adequate time for meals.  - Encourage patient to take dietary supplement as ordered. - Collaborate with clinical nutritionist.  - Include patient/family/caregiver in decisions related to nutrition.   Outcome: Progressing     Problem: Nutrition/Hydration-ADULT  Goal: Nutrient/Hydration intake appropriate for improving, restoring or maintaining nutritional needs  Description: Monitor and assess patient's nutrition/hydration status for malnutrition. Collaborate with interdisciplinary team and initiate plan and interventions as ordered. Monitor patient's weight and dietary intake as ordered or per policy. Utilize nutrition screening tool and intervene as necessary. Determine patient's food preferences and provide high-protein, high-caloric foods as appropriate.      INTERVENTIONS:  - Monitor oral intake, urinary output, labs, and treatment plans  - Assess nutrition and hydration status and recommend course of action  - Evaluate amount of meals eaten  - Assist patient with eating if necessary   - Allow adequate time for meals  - Recommend/ encourage appropriate diets, oral nutritional supplements, and vitamin/mineral supplements  - Order, calculate, and assess calorie counts as needed  - Recommend, monitor, and adjust tube feedings and TPN/PPN based on assessed needs  - Assess need for intravenous fluids  - Provide specific nutrition/hydration education as appropriate  - Include patient/family/caregiver in decisions related to nutrition  Outcome: Progressing

## 2023-07-11 NOTE — CASE MANAGEMENT
Case Management Progress Note    Patient name Arcadio Rollins  Location [facility]-* MRN [id number]  : 1940 Date 2023       LOS (days): 5  Geometric Mean LOS (GMLOS) (days): 5.70  Days to GMLOS:0.6        OBJECTIVE:        Current admission status: Inpatient  Preferred Pharmacy:   [id number] [address] [address]  Phone: [phone] Fax: [phone], [address] [address]  Phone: [phone] Fax: [phone]    Primary Care Provider: Shawn Interiano MD    Primary Insurance: MEDICARE  Secondary Insurance: BLUE CROSS    PROGRESS NOTE:      CM tried six times to fax clinical information to [facility] facility at the request of patient's family (issues with fax going through). CM was able to confirm a second fax number for the facility to get paperwork to go through for review. CM called and LM for Jamila Ventura at [facility] with update on patient's decision to receive hospice at a facility and let her know she would confirm tomorrow morning the the facility choice.

## 2023-07-11 NOTE — PROGRESS NOTES
[facility]  Progress Note  Name: Walt Martinez  MRN: [id number]  Unit/Bed#: 4 [place] 414-01 I Date of Admission: 7/6/2023   Date of Service: 7/11/2023 I Hospital Day: 5    Assessment/Plan   * Generalized weakness  Assessment & Plan  Patient presented to ED with months history of generalized weakness, worsening in past few weeks. Patient now unable to ambulate, usually can ambulate with walker. Family reports left sided facial droop in the morning along with speech difficulties. · Neuro work-up has been unremarkable including CT of the head and MRI of the brain, echo with bubble study showed no PFO. Neurology does not feel her symptoms are solely attributable to progression of her Parkinson's. · Echo did show new drop in EF to 20%, symptoms from that standpoint are not improving with dopamine infusion  · Patient has gradual worsening of her overall performance status at home over the last several weeks and has overall poor prognosis in the setting of biventricular failure and Parkinson's. · Ongoing goals of care discussions are occurring    Goals of care, counseling/discussion  Assessment & Plan  · Cardiology discussed goals of care with the patient and family at bedside on 7/9. He expressed overall poor prognosis in the setting of biventricular CHF, poor functional status, and neurologic decline. · I again discussed CODE STATUS at length with cardiology PA, patient's , patient's 2 daughters, and patient at bedside. Patient's cognition is much improved today and she is alert and oriented. After lengthy discussion regarding her overall prognosis, and various plans of care, patient ultimately decided that she would not want to pursue aggressive medical care going forward.   · A hospice consult will be obtained per family request.  · Hospice consulted  · Most likely plan for discharge tomorrow as case management is working with hospice for safe dispo               VTE Pharmacologic Prophylaxis:   comfort measures    Patient Centered Rounds: I performed bedside rounds with nursing staff today. Discussions with Specialists or Other Care Team Provider: case management    Education and Discussions with Family / Patient: Updated  () at bedside. Total Time Spent on Date of Encounter in care of patient: 45 minutes This time was spent on one or more of the following: performing physical exam; counseling and coordination of care; obtaining or reviewing history; documenting in the medical record; reviewing/ordering tests, medications or procedures; communicating with other healthcare professionals and discussing with patient's family/caregivers. Current Length of Stay: 5 day(s)  Current Patient Status: Inpatient   Certification Statement: The patient will continue to require additional inpatient hospital stay due to pending safe dispo  Discharge Plan: Anticipate discharge tomorrow to home vs hospice    Code Status: Level 3 - DNAR and DNI    Subjective:   Patient seen examined at bedside. Patient currently has no acute complaints. States that she is happy and grateful with all the care she has received. Objective:     Vitals:   Temp (24hrs), Av.2 °F (36.2 °C), Min:96.7 °F (35.9 °C), Max:97.6 °F (36.4 °C)    Temp:  [96.7 °F (35.9 °C)-97.6 °F (36.4 °C)] 96.7 °F (35.9 °C)  HR:  [57-71] 69  Resp:  [18] 18  BP: ()/(52-65) 92/52  SpO2:  [97 %-100 %] 100 %  Body mass index is 26.37 kg/m². Input and Output Summary (last 24 hours): Intake/Output Summary (Last 24 hours) at 2023 7544  Last data filed at 2023 0620  Gross per 24 hour   Intake --   Output 400 ml   Net -400 ml       Physical Exam:   Physical Exam  Vitals and nursing note reviewed. Constitutional:       General: She is not in acute distress. Appearance: She is well-developed. She is ill-appearing. HENT:      Head: Normocephalic and atraumatic.    Eyes: Conjunctiva/sclera: Conjunctivae normal.   Cardiovascular:      Rate and Rhythm: Normal rate. Rhythm irregular. Heart sounds: No murmur heard. Pulmonary:      Effort: Pulmonary effort is normal. No respiratory distress. Breath sounds: Normal breath sounds. Abdominal:      Palpations: Abdomen is soft. Tenderness: There is no abdominal tenderness. Musculoskeletal:         General: No swelling. Cervical back: Neck supple. Skin:     General: Skin is warm and dry. Capillary Refill: Capillary refill takes less than 2 seconds. Neurological:      Mental Status: She is alert. Psychiatric:         Mood and Affect: Mood normal.          Additional Data:     Labs:  Results from last 7 days   Lab Units 07/11/23  0609 07/10/23  0535   WBC Thousand/uL 6.72 6.34   HEMOGLOBIN g/dL 11.8 11.0*   HEMATOCRIT % 36.9 34.8   PLATELETS Thousands/uL 117* 97*   NEUTROS PCT %  --  72   LYMPHS PCT %  --  10*   MONOS PCT %  --  13*   EOS PCT %  --  3     Results from last 7 days   Lab Units 07/11/23  1033 07/11/23  0609 07/10/23  0535   SODIUM mmol/L 139   < > 141   POTASSIUM mmol/L 4.3   < > 4.1   CHLORIDE mmol/L 110*   < > 111*   CO2 mmol/L 27   < > 26   BUN mg/dL 38*   < > 38*   CREATININE mg/dL 1.53*   < > 1.45*   ANION GAP mmol/L 2   < > 4   CALCIUM mg/dL 11.0*   < > 10.1   ALBUMIN g/dL  --   --  3.1*   TOTAL BILIRUBIN mg/dL  --   --  2.95*   ALK PHOS U/L  --   --  74   ALT U/L  --   --  146*   AST U/L  --   --  50*   GLUCOSE RANDOM mg/dL 163*   < > 96    < > = values in this interval not displayed.      Results from last 7 days   Lab Units 07/10/23  0535   INR  2.73*     Results from last 7 days   Lab Units 07/06/23  1016   POC GLUCOSE mg/dl 142*     Results from last 7 days   Lab Units 07/07/23  0432   HEMOGLOBIN A1C % 5.5           Lines/Drains:  Invasive Devices     Peripheral Intravenous Line  Duration           Peripheral IV 07/10/23 Right;Ventral (anterior) Forearm 1 day          Drain  Duration External Urinary Catheter 3 days                Imaging: Reviewed radiology reports from this admission including: MRI brain    Recent Cultures (last 7 days):         Last 24 Hours Medication List:   Current Facility-Administered Medications   Medication Dose Route Frequency Provider Last Rate   • amantadine  100 mg Oral BID Jonnathan Nash PA-C     • bisoprolol  2.5 mg Oral Daily June Barrera PA-C     • carbidopa-levodopa  1 tablet Oral TID Jonnathan Nash PA-C     • ciprofloxacin  1 drop Both Eyes Q4H While Awake Bobbi Sloan MD     • DOPamine in dextrose 5%  1-20 mcg/kg/min Intravenous Continuous Bobbi Sloan MD 2.6 mcg/kg/min (07/11/23 1146)   • ferrous gluconate  324 mg Oral Daily Before Breakfast Jonnathan Nash PA-C     • levothyroxine  50 mcg Oral Early Morning Jonnathan Nash PA-C     • memantine  10 mg Oral Daily Jonnathan Nash PA-C     • warfarin  3 mg Oral Daily (warfarin) Bobbi Sloan MD          Today, Patient Was Seen By: Stanley Devine DO    **Please Note: This note may have been constructed using a voice recognition system. **

## 2023-07-11 NOTE — ASSESSMENT & PLAN NOTE
· Cardiology discussed goals of care with the patient and family at bedside on 7/9. He expressed overall poor prognosis in the setting of biventricular CHF, poor functional status, and neurologic decline. · I again discussed CODE STATUS at length with cardiology PA, patient's , patient's 2 daughters, and patient at bedside. Patient's cognition is much improved today and she is alert and oriented. After lengthy discussion regarding her overall prognosis, and various plans of care, patient ultimately decided that she would not want to pursue aggressive medical care going forward.   · A hospice consult will be obtained per family request.  · Hospice consulted  · Most likely plan for discharge tomorrow as case management is working with hospice for safe dispo

## 2023-07-11 NOTE — CASE MANAGEMENT
Case Management Discharge Planning Note    Patient name Poornima Gabriel  Location [address] [id number]-* MRN [id number]  : 1940 Date 2023       Current Admission Date: 2023  Current Admission Diagnosis:Generalized weakness   Patient Active Problem List    Diagnosis Date Noted   • Goals of care, counseling/discussion 07/10/2023   • Elevated LFTs 2023   • ROB (acute kidney injury) ([address]) 2023   • Elevated troponin 2023   • Dysphagia    • Chronic diastolic heart failure ([address]) 2023   • History of hypokalemia 2023   • History of dehydration 2023   • History of left bundle branch block (LBBB) 2023   • Primary osteoarthritis of right knee 2023   • Stage 3a chronic kidney disease ([address]) 2023   • Edema of both lower extremities 2023   • Obstructive sleep apnea 2023   • Prerenal azotemia 2023   • Warfarin anticoagulation 2023   • Abnormal weight loss 2023   • Chronic fatigue 2023   • Impaired fasting glucose 2023   • Hypercalcemia 2023   • Stage 3b chronic kidney disease ([address]) 2023   • Knee pain 2022   • Generalized weakness 2022   • 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 2022   • Orthostatic hypotension 2021   • Other chronic pain 06/15/2021   • Cardiomyopathy, dilated, nonischemic ([address]) 2021   • Left bundle branch block (LBBB) 2021   • Asymptomatic PVCs 2021   • On continuous oral anticoagulation 2021   • Mixed dyslipidemia 2021   • High risk medication use 2021   • Other microscopic hematuria 2018   • Numbness and tingling in right hand    • Suprapatellar bursitis of right knee 2018   • H/O mitral valve replacement with mechanical valve 2018   • Atrial fibrillation, chronic ([address]) 2018   • Generalized osteoarthritis 2017   • Chronic right-sided low back pain without sciatica 10/23/2017   • Chronic combined systolic and diastolic heart failure ([address]) 02/23/2017   • Memory impairment 02/15/2017   • Seasonal allergies 12/16/2016   • Vitamin D insufficiency 03/31/2015   • Transient ischemic attack 02/25/2014   • Parkinson's disease ([address]) 12/16/2013   • Hypothyroidism 07/01/2013   • Venous insufficiency of both lower extremities 12/03/2012   • Dupuytren's contracture 09/04/2012   • Mitral valve disorder 09/04/2012      LOS (days): 5  Geometric Mean LOS (GMLOS) (days): 5.70  Days to GMLOS:0.6     OBJECTIVE:  Risk of Unplanned Readmission Score: 16.84   Current admission status: Inpatient   Preferred Pharmacy:   [facility], [address] [place] [id number]  Phone: [phone] Fax: [address] [address]  Phone: [phone] Fax: [phone]    Primary Care Provider: Kana Salas MD    Primary Insurance: MEDICARE  Secondary Insurance: BLUE CROSS    DISCHARGE DETAILS:    Discharge planning discussed with[de-identified] , Patient, Daughters  Lane of Choice: Yes  Comments - Freedom of Choice: Patient and family's choice is to go on hospice at a facility. Patient and family prefer [facility] before any other SNF's. CM contacted family/caregiver?: Yes  Were Treatment Team discharge recommendations reviewed with patient/caregiver?: Yes  Did patient/caregiver verbalize understanding of patient care needs?: Yes  Were patient/caregiver advised of the risks associated with not following Treatment Team discharge recommendations?: Yes    Contacts  Patient Contacts: Ren Nicole  Relationship to Patient[de-identified] Family  Contact Method:  In Person  Phone Number: [phone]  Reason/Outcome: Referral, Discharge Planning, [address]         Is the patient interested in [facility] at discharge?: No         Other Referral/Resources/Interventions Provided:  Interventions: Hospice  Referral Comments: Referals sent for Hospice and for SNF's in [place]. CM contacted Rolando Sweeney by phone and faxed clinical info for review.          Treatment Team Recommendation: Hospice  Discharge Destination Plan[de-identified] Hospice

## 2023-07-12 PROBLEM — R79.89 ELEVATED TROPONIN: Status: RESOLVED | Noted: 2023-01-01 | Resolved: 2023-01-01

## 2023-07-12 PROBLEM — R79.89 ELEVATED LFTS: Status: RESOLVED | Noted: 2023-07-06 | Resolved: 2023-07-12

## 2023-07-12 PROBLEM — R77.8 ELEVATED TROPONIN: Status: RESOLVED | Noted: 2023-07-06 | Resolved: 2023-07-12

## 2023-07-12 LAB
GLUCOSE SERPL-MCNC: 142 MG/DL (ref 65–140)
HCV RNA SERPL NAA+PROBE-ACNC: NOT DETECTED K[IU]/ML

## 2023-07-12 PROCEDURE — 82948 REAGENT STRIP/BLOOD GLUCOSE: CPT

## 2023-07-12 PROCEDURE — 99232 SBSQ HOSP IP/OBS MODERATE 35: CPT | Performed by: STUDENT IN AN ORGANIZED HEALTH CARE EDUCATION/TRAINING PROGRAM

## 2023-07-12 PROCEDURE — 99232 SBSQ HOSP IP/OBS MODERATE 35: CPT | Performed by: INTERNAL MEDICINE

## 2023-07-12 RX ADMIN — FERROUS GLUCONATE 324 MG: 324 TABLET ORAL at 08:00

## 2023-07-12 RX ADMIN — CARBIDOPA AND LEVODOPA 1 TABLET: 25; 100 TABLET ORAL at 16:42

## 2023-07-12 RX ADMIN — AMANTADINE HYDROCHLORIDE 100 MG: 100 CAPSULE ORAL at 08:30

## 2023-07-12 RX ADMIN — CARBIDOPA AND LEVODOPA 1 TABLET: 25; 100 TABLET ORAL at 08:28

## 2023-07-12 RX ADMIN — WARFARIN SODIUM 3 MG: 3 TABLET ORAL at 17:21

## 2023-07-12 RX ADMIN — CIPROFLOXACIN 1 DROP: 3 SOLUTION OPHTHALMIC at 17:17

## 2023-07-12 RX ADMIN — CARBIDOPA AND LEVODOPA 1 TABLET: 25; 100 TABLET ORAL at 21:06

## 2023-07-12 RX ADMIN — CIPROFLOXACIN 1 DROP: 3 SOLUTION OPHTHALMIC at 10:47

## 2023-07-12 RX ADMIN — LEVOTHYROXINE SODIUM 50 MCG: 50 TABLET ORAL at 05:03

## 2023-07-12 RX ADMIN — CIPROFLOXACIN 1 DROP: 3 SOLUTION OPHTHALMIC at 21:07

## 2023-07-12 RX ADMIN — CIPROFLOXACIN 1 DROP: 3 SOLUTION OPHTHALMIC at 13:42

## 2023-07-12 RX ADMIN — MEMANTINE 10 MG: 10 TABLET ORAL at 08:28

## 2023-07-12 RX ADMIN — AMANTADINE HYDROCHLORIDE 100 MG: 100 CAPSULE ORAL at 17:17

## 2023-07-12 NOTE — ASSESSMENT & PLAN NOTE
· Baseline cr appears around 1.0-1.2  · Cr on admission 2.15  · CT a/p shows no obstruction    · ROB secondary to cardiorenal syndrome  · On dopamine infusion

## 2023-07-12 NOTE — ASSESSMENT & PLAN NOTE
· Cardiology discussed goals of care with the patient and family at bedside on 7/9. He expressed overall poor prognosis in the setting of biventricular CHF, poor functional status, and neurologic decline. · Dr. Stacey Espinal again discussed CODE STATUS at length with cardiology PA, patient's , patient's 2 daughters, and patient at bedside. Patient's cognition is much improved today and she is alert and oriented. After lengthy discussion regarding her overall prognosis, and various plans of care, patient ultimately decided that she would not want to pursue aggressive medical care going forward.   · A hospice consult will be obtained per family request.  · Hospice consulted  · D/C to [facility] in [address] [place] [address] Rd

## 2023-07-12 NOTE — PROGRESS NOTES
Progress Note - Cardiology   [facility]     Jae Lan 79 y.o. female MRN: [id number]  : 1940  Unit/Bed#: 4 Tony 414-01 Encounter: [id number]    Assessment and Plan:   1. Acute on chronic combined systolic and diastolic heart failure.     - Presented on 23 for evaluation for worsening weakness. Patient does endorse shortness of breath with exertion. - 23 BNP: 2,023.   - 23 CT abdomen/pelvis without contrast: Small bilateral pleural effusions and small volume abdominopelvic ascites. Otherwise, no acute abnormality in the abdomen or pelvis. - 23 TTE: LVEF 20%. Left ventricular systolic function is severely reduced. Unable to assess diastolic function. Left atrial filling pressure is elevated. The right ventricular systolic function is moderately to severely reduced. Abnormal tricuspid annular plane systolic excursion (TAPSE) < 1.7 cm. Left Atrium: The atrium is severely dilated (>48 mL/m2). Atrial Septum: The septum bows into the right atrium, suggesting increased left atrial pressure. Aortic Valve: The aortic valve is trileaflet. The leaflets are mildly thickened. The leaflets are moderately calcified. There is mildly reduced mobility. There is trace to mild regurgitation. Tricuspid Valve: There is moderate regurgitation. The right ventricular systolic pressure is moderately elevated. The estimated right ventricular systolic pressure is 55.34 mmHg. Pulmonic Valve: There is moderate regurgitation.   - 22 TTE: LVEF 50-55%. Systolic function is low normal.  Left atrium severely dilated.  Atrial septum bows into right atrium.  Trace to mild aortic valve regurgitation.  Mechanical mitral valve prosthesis, appears to be functioning normally.  Mild to moderate tricuspid valve regurgitation.  Jane Núñez of home medication notes patient is on Aldactone 12.5 mg daily as well as torsemide 20 mg 3 times a week (Monday/Wednesday/Friday).  Aldactone and torsemide on hold in setting of ROB. - Recommend against use of excessive IV fluids.   - Not currently on diuretics. Last dose of diuretics on 7/07/23.   - Will continue to hold diuretics in setting of soft BP.  - Currently on dopamine drip, 2.5 mcg/kg/min. - Strict I/Os. - Daily weights, standing weights.   - Continue low-sodium diet with 1800 mL fluid restriction.  - CHF education.  - No further cardiology work-up at this time. Patient and family have opted for hospice care at this time.     2. Goals of care discussion.     - Family meeting held with primary team yesterday, 7/10/2023. Goals of care discussion was held on 7/09 with Dr. No Grider (Cardiology). Further discussed goals of care on 7/10/2023. Patient reported she wanted to transition to hospice care, she no longer wanted aggressive medical treatment. Hospice evaluation pending. 3. Elevated troponin.     - 7/06/23 hs troponin: 256 (0 hrs), 243 (2 hrs), 190 (4hrs). - 7/06/23 EKG: Atrial fibrillation, ventricular rate 72 bpm.  Noted PVCs.  Left bundle branch block. - Patient currently denies chest pain, palpitations, shortness of breath, lightheadedness, dizziness, nausea, or vomiting.    - Likely nonischemic myocardial injury secondary to acute on chronic systolic and diastolic heart failure. 4.  NSVT. - Episode of 20 beats of NSVT noted on 7/07/23 at 1411 hrs on telemetry.  - No further episodes noted. - Continue bisoprolol 2.5 mg daily.  - Monitor electrolytes. Replete potassium above 4 and magnesium above 2.     5. Generalized weakness.     - Presented on 7/06/23 for evaluation for worsening weakness. Was instructed by Neurology to go to ED after patient's  called reporting left facial droop and weakness.  - Patient reports she has had progressive weakness over the past several months, states is greatly affecting her ability to ambulate, and states she has had frequent falls in recent weeks.   - 7/06/23 CT head without contrast demonstrated no acute intracranial abnormality, chronic cerebellar lacunar infarcts. - 7/07/23 MRI brain without contrast: a few small chronic bilateral cerebellar lacunar infarctions are stable. No acute infarction, intracranial hemorrhage or mass effect.  - Neurology recommendations noted.     6. Transaminitis.     - 7/06/23 (admission) AST: 304, ALT: 246.  - 7/10/23: AST - 50, . - Unclear etiology at this time. May be secondary to acute on chronic combined heart failure. - 7/07/23 GI recommendations noted.     7. ROB.    - Creatinine appears to be between 1.0 and 1.2.   - 7/06/23 creatinine (on admission): 2.15.  - 7/11/23 creatinine: 1.53.   - Home medication of Aldactone and torsemide on hold in setting of ROB. - Care per primary team.     8. Supratherapeutic INR.    - 7/06/23 INR (on admission): 14.85.   - 7/07/23 INR: 15.  - 7/10/23 INR: 2.73.   - Continue warfarin 3 mg daily.  - Care per primary team.     9. Chronic atrial fibrillation.     - 7/06/23 EKG: Atrial fibrillation, ventricular rate 72 bpm.  Noted PVCs.  Left bundle branch block. - Continue bisoprolol 2.5 mg daily.  - [id number]- VASc stroke risk score: 6 points, moderate-high risk. - Continue warfarin 3 mg daily.     10.  Chronic left bundle branch block.     - Has been documented since 2017. - 7/06/23 EKG: Atrial fibrillation, ventricular rate 72 bpm.  Noted PVCs.  Left bundle branch block.     11. Mitral valve replacement.     - s/p mitral valve replacement [id number].   - 4/11/22 TTE: Mitral Valve: There is a mechanical mitral valve prosthesis. The prosthetic valve appears to be functioning normally. The prosthetic valve appears to be well-seated.  Mean gradient is 3 mmHg and peak mitral velocity is 1.5 m/sec. There is trace regurgitation. There is no evidence of transvalvular regurgitation. - 7/07/23 TTE:     Mitral Valve The mitral valve was not well visualized. There is a bileaflet mechanical mitral valve prosthesis.  The prosthetic valve is not well visualized but appears to be functioning normally. The prosthetic valve appears to be well-seated. Degree of valve regurgitation assessment limited due to mechanical valve artifact. There is at least mild regurgitation. There is no evidence of transvalvular regurgitation. There is no evidence of stenosis. The mitral valve mean gradient is 2mmHg which is unchanged from previous. 12. Hypothyroidism.     - Currently on levothyroxine 50 mcg daily. - 5/02/23 TSH: 2.559.      13. Parkinson's disease.       - Currently on Sinemet  mg 3 times daily, Namenda 10 mg daily, and Symmetrel 100 mg twice daily.  - Neurology consult recommendations noted. Subjective / Objective:        No acute events. Pending hospice placement. She currently denies chest pain, palpitations, lightheadedness, dizziness, headache, nausea or vomiting. Vitals: Blood pressure 99/57, pulse 63, temperature (!) 97.3 °F (36.3 °C), resp. rate 20, height 5' 4" (1.626 m), weight 68.7 kg (151 lb 6.4 oz), SpO2 100 %. Vitals:    07/11/23 0600 07/12/23 0600   Weight: 69.7 kg (153 lb 9.6 oz) 68.7 kg (151 lb 6.4 oz)     Body mass index is 25.99 kg/m².   BP Readings from Last 3 Encounters:   07/12/23 99/57   06/20/23 102/60   06/07/23 112/69     Orthostatic Blood Pressures    Flowsheet Row Most Recent Value   Blood Pressure 99/57 filed at 07/12/2023 0735        I/O       07/08 0701 07/09 0700 07/09 0701  07/10 0700 07/10 0701 07/11 0700    I.V. (mL/kg) 20.1 (0.3) 10 (0.1)     Total Intake(mL/kg) 20.1 (0.3) 10 (0.1)     Urine (mL/kg/hr) 600 (0.4) 300 (0.2) 275 (4.1)    Total Output 600 300 275    Net -579.9 -290 -275           Unmeasured Urine Occurrence 1 x          Invasive Devices     Peripheral Intravenous Line  Duration           Peripheral IV 07/10/23 Right;Ventral (anterior) Forearm 2 days          Drain  Duration           External Urinary Catheter 3 days                  Intake/Output Summary (Last 24 hours) at 7/12/2023 [address] [place] [address] filed at 7/11/2023 1930  Gross per 24 hour   Intake --   Output 600 ml   Net -600 ml       Physical Exam:   Physical Exam  Vitals reviewed. Constitutional:       General: She is not in acute distress. Appearance: She is ill-appearing. Cardiovascular:      Rate and Rhythm: Normal rate. Rhythm irregular. Pulses: Normal pulses. Heart sounds: Murmur heard. Pulmonary:      Effort: Pulmonary effort is normal. No respiratory distress. Breath sounds: Decreased breath sounds present. Comments: Bilateral crackles heard at bases. Abdominal:      General: Abdomen is flat. There is no distension. Palpations: Abdomen is soft. Tenderness: There is no abdominal tenderness. Musculoskeletal:      Right lower leg: No edema. Left lower leg: No edema. Skin:     General: Skin is warm and dry. Neurological:      Mental Status: She is alert. Motor: Weakness present. Medications/ Allergies:     Current Facility-Administered Medications   Medication Dose Route Frequency Provider Last Rate   • amantadine  100 mg Oral BID Qamar Junior PA-C     • bisoprolol  2.5 mg Oral Daily Rosalinda Arriaga PA-C     • carbidopa-levodopa  1 tablet Oral TID Qamar Junior PA-C     • ciprofloxacin  1 drop Both Eyes Q4H While Awake Lakeisha Ta MD     • DOPamine in dextrose 5%  1-20 mcg/kg/min Intravenous Continuous Lakeisha Ta MD 2.5 mcg/kg/min (07/11/23 1905)   • ferrous gluconate  324 mg Oral Daily Before Breakfast Qamar Junior PA-C     • levothyroxine  50 mcg Oral Early Morning Qamar Junior PA-C     • memantine  10 mg Oral Daily Qamar Junior PA-C     • warfarin  3 mg Oral Daily (warfarin) Rex Monique MD          Allergies   Allergen Reactions   • Artane [Trihexyphenidyl] Other (See Comments)     Felt "disconnected", "out if it", shaky.  Did not feel right    • Glycopyrrolate      Nose bleeds   • Pollen Extract        VTE Pharmacologic Prophylaxis:   Warfarin (Coumadin)    Labs:   Troponins:  Results from last 7 days   Lab Units 07/07/23 2012 07/07/23  1655   HSTNI D2 ng/L  --  8   HSTNI D4 ng/L 2  --          CBC with diff:  Results from last 7 days   Lab Units 07/11/23  0609 07/10/23  0535 07/08/23  0539 07/07/23  0432 07/06/23  1036   WBC Thousand/uL 6.72 6.34 8.29 7.32 7.19   HEMOGLOBIN g/dL 11.8 11.0* 11.7 11.4* 11.7   HEMATOCRIT % 36.9 34.8 36.0 34.5* 35.8   MCV fL 107* 106* 106* 105* 104*   PLATELETS Thousands/uL 117* 97* 107* 100* 120*   RBC Million/uL 3.46* 3.28* 3.40* 3.29* 3.45*   MCH pg 34.1 33.5 34.4* 34.7* 33.9   MCHC g/dL 32.0 31.6 32.5 33.0 32.7   RDW % 17.1* 16.8* 17.1* 16.8* 16.5*   MPV fL 11.7 11.7 12.3 11.9 11.9   NRBC AUTO /100 WBCs  --  0  --   --  0       CMP:  Results from last 7 days   Lab Units 07/11/23  1033 07/11/23  0609 07/10/23  0535 07/09/23  0534 07/08/23  0539 07/07/23  0432 07/06/23  1036 07/06/23  0743   SODIUM mmol/L 139 138 141 138 137 135 136 137   POTASSIUM mmol/L 4.3 5.4* 4.1 4.0 4.3 3.9 3.4* 3.8   CHLORIDE mmol/L 110* 109* 111* 108 107 105 103 106   CO2 mmol/L 27 27 26 25 21 23 27 26   ANION GAP mmol/L 2 2 4 5 9 7 6 5   BUN mg/dL 38* 41* 38* 45* 50* 47* 52* 48*   CREATININE mg/dL 1.53* 1.53* 1.45* 1.70* 1.97* 1.84* 2.15* 2.33*   GLUCOSE FASTING mg/dL  --   --   --   --   --   --   --  101*   CALCIUM mg/dL 11.0* 10.4* 10.1 10.5* 9.9 9.6 10.5* 10.7*   AST U/L  --   --  50* 101* 208* 155* 304*  --    ALT U/L  --   --  146* 66* 114* 71* 246*  --    ALK PHOS U/L  --   --  74 83 80 80 80  --    TOTAL PROTEIN g/dL  --   --  5.0* 5.5* 5.3* 5.0* 5.6*  --    ALBUMIN g/dL  --   --  3.1* 3.3* 3.3* 3.1* 3.5  --    TOTAL BILIRUBIN mg/dL  --   --  2.95* 3.55* 3.54* 2.46* 3.20*  --    EGFR ml/min/1.73sq m 31 31 33 27 23 24 20 18       Magnesium:  Results from last 7 days   Lab Units 07/10/23  0535 07/07/23  0432   MAGNESIUM mg/dL 2.1 1.9     Coags:  Results from last 7 days   Lab Units 07/10/23  0535 07/09/23  0534 07/08/23  0539 07/07/23  2338 07/07/23  1448 07/07/23  0432 07/06/23  1820 07/06/23  1153   PTT seconds  --   --   --   --   --   --   --  54*   INR  2.73* 2.24* 2.52* 3.11* 4.91* >15.00* >15.00* 14.85*     TSH:  Results from last 7 days   Lab Units 07/07/23 0432   TSH 3RD GENERATON uIU/mL 3.176     No components found for: "TSH3"  Lipid Profile:  Results from last 7 days   Lab Units 07/07/23 0432   CHOLESTEROL mg/dL 79   TRIGLYCERIDES mg/dL 107   HDL mg/dL 13*   LDL CALC mg/dL 45     Hgb A1c:  Results from last 7 days   Lab Units 07/07/23 0432   HEMOGLOBIN A1C % 5.5     NT-proBNP: No results for input(s): "NTBNP" in the last 72 hours. Imaging & Testing   I have personally reviewed pertinent reports. MRI brain wo contrast    Result Date: 7/7/2023    Impression: A few small chronic bilateral cerebellar lacunar infarctions are stable. No acute infarction, intracranial hemorrhage or mass effect. US right upper quadrant     Result Date: 7/6/2023     Impression: No acute findings. Absent gallbladder. Trace right pleural fluid visualized.      CT abdomen pelvis wo contrast     Result Date: 7/6/2023     Impression: Small bilateral pleural effusions and small volume abdominopelvic ascites. Otherwise, no acute abnormality in the abdomen or pelvis.      CT head wo contrast     Result Date: 7/6/2023     Impression: 1.  No acute intracranial abnormality. 2.  Chronic cerebellar lacunar infarcts best seen on the prior MRI examination.      FL barium swallow     Result Date: 6/14/2023     Impression: Esophageal dysmotility.        EKG / Monitor: Personally reviewed. Telemetry reviewed: Currently atrial fibrillation, ventricular rate 61 bpm.  No events noted. Cardiac testing:     Echo complete w/ contrast if indicated    Result Date: 7/7/2023  Narrative: •  Left Ventricle: Left ventricular cavity size is mildly dilated. Wall thickness is mildly increased. The left ventricular ejection fraction is 20% by visual estimation.  Systolic function is severely reduced. Although no diagnostic regional wall motion abnormality was identified, this possibility cannot be completely excluded on the basis of this study. Unable to assess diastolic function. Left atrial filling pressure is elevated. •  IVS: There is abnormal septal motion consistent with post-operative status. •  Right Ventricle: Systolic function is moderately to severely reduced. Abnormal tricuspid annular plane systolic excursion (TAPSE) < 1.7 cm. •  Left Atrium: The atrium is severely dilated (>48 mL/m2). •  Atrial Septum: The septum bows into the right atrium, suggesting increased left atrial pressure. •  Aortic Valve: The aortic valve is trileaflet. The leaflets are mildly thickened. The leaflets are moderately calcified. There is mildly reduced mobility. There is trace to mild regurgitation. •  Mitral Valve: There is a bileaflet mechanical mitral valve prosthesis. The prosthetic valve is not well visualized but appears to be functioning normally. The prosthetic valve appears to be well-seated. Degree of valve regurgitation assessment limited due to mechanical valve artifact. There is at least mild regurgitation. There is no evidence of transvalvular regurgitation. There is no evidence of stenosis. The mitral valve mean gradient is 2mmHg which is unchanged from previous. •  Tricuspid Valve: There is moderate regurgitation. The right ventricular systolic pressure is moderately elevated. The estimated right ventricular systolic pressure is 47.96 mmHg. •  Pulmonic Valve: There is moderate regurgitation. •  Compared to previous echocardiogram, there has been a significant reduction in LV and RV systolic function. Mechanical mitral valve prosthesis appears unchanged. Echo complete with contrast if indicated - 4/11/22     Left Ventricle Left ventricular cavity size is normal. Wall thickness is mildly increased. The left ventricular ejection fraction is 50 to 55 % by visual estimation.  Systolic function is low normal.  Although no diagnostic regional wall motion abnormality was identified, this possibility cannot be completely excluded on the basis of this study. Unable to assess diastolic function. Interventricular Septum There is abnormal septal motion consistent with post-operative status. Right Ventricle Right ventricular cavity size is normal. Systolic function is normal. Wall thickness is normal.      Left Atrium The atrium is severely dilated. Right Atrium The atrium is normal in size. Atrial Septum The septum bows into the right atrium. Aortic Valve The aortic valve is trileaflet. The leaflets are mildly thickened. The leaflets are not calcified. The leaflets exhibit normal mobility. There is trace to mild regurgitation. There is no evidence of stenosis. Mitral Valve There is a mechanical mitral valve prosthesis. The prosthetic valve appears to be functioning normally. The prosthetic valve appears to be well-seated.  Mean gradient is 3 mmHg and peak mitral velocity is 1.5 m/sec. There is trace regurgitation. There is no evidence of transvalvular regurgitation. There is no evidence of stenosis. Tricuspid Valve There is mild to moderate regurgitation.  Pulmonary artery pressure 35 mmHg. There is no evidence of stenosis. Pulmonic Valve There is trace regurgitation. There is no evidence of stenosis. Ascending Aorta The aortic root is normal in size. IVC/SVC The inferior vena cava is normal in size. Respirophasic changes were normal.      Pericardium There is no pericardial effusion.  The pericardium is normal in appearance.         Results for orders placed during the hospital encounter of 06/22/19    Echo complete with contrast if indicated    [address]  [phone]    Transthoracic Echocardiogram  2D, M-mode, Doppler, and Color Doppler    Study date:  22-Jun-2019    Patient: Greg Hsu  MR number: [id number]  Account number: [de-identified]  : 1940  Age: 77 years  Gender: Female  Status: Outpatient  Location: Echo lab  Height: 64 in  Weight: 185.7 lb  BP: 94/ 63 mmHg    Indications: Cardiomyopathy    Diagnoses: I42.9 - Cardiomyopathy, unspecified    Sonographer:  Kianna Zapata RDCS  Primary Physician:  Bere Christina MD  Referring Physician:  Arron Murry MD  Group:  [facility]  Ordering Physician:  Arron Murry MD  Interpreting Physician:  Naveen Gaffney MD    SUMMARY    LEFT VENTRICLE:  Systolic function was at the lower limits of normal. Ejection fraction was estimated in the range of 50 % to 55 % to be 55 %. There were no regional wall motion abnormalities. Wall thickness was mildly increased. There was mild concentric hypertrophy. VENTRICULAR SEPTUM:  There was mild paradoxical motion. 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 LEFT ATRIUM:  The atrium was moderately to markedly dilated. MITRAL VALVE:  A mechanical prosthesis was present. It exhibited normal function and normal motion. There was no pannus formation, a normal-appearing sewing ring, and no rocking motion of the sewing ring. Mean gradient 4 mm of Hg and peak velocity 1.5  m/sec with trace to mild MR. There was trace regurgitation. AORTIC VALVE:  There was mild regurgitation. TRICUSPID VALVE:  There was mild regurgitation. Estimated peak PA pressure was 25 mmHg. HISTORY: PRIOR HISTORY: Mitral Valve Replacement (), A-Fib, Hypothyroidism    PROCEDURE: The procedure was performed in the echo lab. This was a routine study. The transthoracic approach was used. The study included complete 2D imaging, M-mode, complete spectral Doppler, and color Doppler. The heart rate was 71 bpm,  at the start of the study. Echocardiographic views were limited due to lung interference. Image quality was adequate.     LEFT VENTRICLE: Size was normal. Systolic function was at the lower limits of normal. Ejection fraction was estimated in the range of 50 % to 55 % to be 55 %. There were no regional wall motion abnormalities. Wall thickness was mildly  increased. There was mild concentric hypertrophy. DOPPLER: The study was not technically sufficient to allow evaluation of LV diastolic function. VENTRICULAR SEPTUM: There was mild paradoxical motion. 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 RIGHT VENTRICLE: The size was normal. Systolic function was low normal.    LEFT ATRIUM: The atrium was moderately to markedly dilated. RIGHT ATRIUM: Size was normal.    MITRAL VALVE: A mechanical prosthesis was present. It exhibited normal function and normal motion. There was no pannus formation, a normal-appearing sewing ring, and no rocking motion of the sewing ring. Mean gradient 4 mm of Hg and peak  velocity 1.5 m/sec with trace to mild MR. DOPPLER: There was no evidence for stenosis. There was trace regurgitation. AORTIC VALVE: The valve was trileaflet. Leaflets exhibited mildly increased thickness, mild calcification, and normal cuspal separation. DOPPLER: Transaortic velocity was within the normal range. There was no evidence for stenosis. There  was mild regurgitation. TRICUSPID VALVE: DOPPLER: There was mild regurgitation. Estimated peak PA pressure was 25 mmHg. PULMONIC VALVE: DOPPLER: There was no regurgitation. PERICARDIUM: There was no thickening or calcification. There was no pericardial effusion. AORTA: The root exhibited normal size. SYSTEMIC VEINS: IVC: The inferior vena cava was normal in size.  Respirophasic changes were normal.    SYSTEM MEASUREMENT TABLES    2D mode  AoR Diam 2D: 3 cm  LA Diam (2D): 5.4 cm  LA/Ao (2D): 1.8  FS (2D Teich): 27.3 %  IVSd (2D): 1.34 cm  LVDEV: 42.8 cmï¾³  LVEDV MOD BP: 85 cmï¾³  LVESV: 19.5 cmï¾³  LVIDd(2D): 3.26 cm  LVISd (2D): 2.37 cm  LVOT Area 2D: 3.14 cmï¾²  LVPWd (2D): 1.33 cm  SV (Teich): 23.3 cmï¾³    Apical four chamber  LVEF A4C: 50 %    Apical two chamber  LVEF A2C: 54 %    Unspecified Scan Mode  MANSOOR Cont Eq (Peak Dejan): 1.97 cmï¾²  LVOT Diam.: 2 cm  LVOT Vmax: 827 mm/s  LVOT Vmax; Mean: 827 mm/s  Peak Grad.; Mean: 3 mm[Hg]  MV Peak E Dejan.  Mean: 1330 mm/s  MVA (PHT): 3.01 cmï¾²  PHT: 73 ms  Max P mm[Hg]  V Max: 2420 mm/s  Vmax: 2140 mm/s  RA Area: 18.3 cmï¾²  RA Volume: 45.7 cmï¾³  TAPSE: 1.3 cm    [employer] Accredited Echocardiography Laboratory    Prepared and electronically signed by    Simon Mortimer, MD  Signed 2019 07:39:37          No Lyon PA-C

## 2023-07-12 NOTE — ASSESSMENT & PLAN NOTE
Patient presented to ED with months history of generalized weakness, worsening in past few weeks. Patient now unable to ambulate, usually can ambulate with walker. Family reports left sided facial droop in the morning along with speech difficulties. · Neuro work-up has been unremarkable including CT of the head and MRI of the brain, echo with bubble study showed no PFO. Neurology does not feel her symptoms are solely attributable to progression of her Parkinson's. · Echo did show new drop in EF to 20%, symptoms from that standpoint are not improving with dopamine infusion  · Patient has gradual worsening of her overall performance status at home over the last several weeks and has overall poor prognosis in the setting of biventricular failure and Parkinson's.   · Goals of care discussions - hospice

## 2023-07-12 NOTE — ASSESSMENT & PLAN NOTE
· Elevated transaminases and bilirubin on admission  · Thought to be secondary to hepatic congestion in the setting of biventricular CHF  · GI input appreciated  · Continue to monitor serial LFTs; things are improving

## 2023-07-12 NOTE — CASE MANAGEMENT
Case Management Progress Note    Patient name Grace Frias  Location [facility] MRN [id number]  : 1940 Date 2023       LOS (days): 6  Geometric Mean LOS (GMLOS) (days): 5.70  Days to GMLOS:-0.2        OBJECTIVE:        Current admission status: Inpatient  Preferred Pharmacy:   [id number], [address] [address]  Phone: [phone] Fax: 1540 [facility], [address] [address]  Phone: [phone] Fax: [phone]    Primary Care Provider: Jorge Alberto Austin MD    Primary Insurance: MEDICARE  Secondary Insurance: BLUE CROSS    PROGRESS NOTE:      CM spoke with family via phone this morning, family confirmed that patient was not found to be appropriate for the [facility] facility. Family and patient made CM aware they prefer [facility] called Gaylene Dandy at [facility] to confirm availability at the facility and was made aware that nothing is available as of this morning, however she did put the patient on the waiting list. Patient is first on the waiting list and will get the next available bed. CM visited patient and family bedside this morning and also presented list of available accepting facilities. Family expressed continued interest only in [facility] facility but will review list incase it's necessary.

## 2023-07-12 NOTE — ASSESSMENT & PLAN NOTE
ECHO 4/2022: EF 83-60%, systolic function low/normal, left atrium severely dilated, mitral valve prosthesis functioning normally  Repeat echo 7/5/2023: EF of 20%, severe LA dilation, severe RV dysfunction    · Overall poor prognosis per cardiology. · Minimal improvement on dopamine infusion  · Diuretics remain on hold  · Monitor Is/Os and daily weights  · Continue salt and fluid restriction  · Patient was started on bisoprolol 2.5 mg p.o. daily.   · On dopamine gtt  · Being discharged to hospice

## 2023-07-12 NOTE — PROGRESS NOTES
[id number] [facility]  Progress Note  Name: Ashley Guo  MRN: [id number]  Unit/Bed#: 4 [place] 414-01 I Date of Admission: 7/6/2023   Date of Service: 7/12/2023 I Hospital Day: 6    Assessment/Plan   * Generalized weakness  Assessment & Plan  Patient presented to ED with months history of generalized weakness, worsening in past few weeks. Patient now unable to ambulate, usually can ambulate with walker. Family reports left sided facial droop in the morning along with speech difficulties. · Neuro work-up has been unremarkable including CT of the head and MRI of the brain, echo with bubble study showed no PFO. Neurology does not feel her symptoms are solely attributable to progression of her Parkinson's. · Echo did show new drop in EF to 20%, symptoms from that standpoint are not improving with dopamine infusion  · Patient has gradual worsening of her overall performance status at home over the last several weeks and has overall poor prognosis in the setting of biventricular failure and Parkinson's. · Ongoing goals of care discussions are occurring    Goals of care, counseling/discussion  Assessment & Plan  · Cardiology discussed goals of care with the patient and family at bedside on 7/9. He expressed overall poor prognosis in the setting of biventricular CHF, poor functional status, and neurologic decline. · I again discussed CODE STATUS at length with cardiology PA, patient's , patient's 2 daughters, and patient at bedside. Patient's cognition is much improved today and she is alert and oriented. After lengthy discussion regarding her overall prognosis, and various plans of care, patient ultimately decided that she would not want to pursue aggressive medical care going forward.   · A hospice consult will be obtained per family request.  · Hospice consulted  · Most likely plan for discharge tomorrow as case management is working with hospice for safe dispo    ROB (acute kidney injury) [facility])  Assessment & Plan  · Baseline cr appears around 1.0-1.2  · Cr on admission 2.15  · CT a/p shows no obstruction    · ROB secondary to cardiorenal syndrome  · Renal function is slowly improving with dopamine infusion    Parkinson's disease ([address])  Assessment & Plan  · Follows with neurology outpatient  · Continue Sinemet and amantadine  · Gradual weakness possibly secondary to worsening of Parkinson's disease/deconditioning  · Neurology input appreciated. Hypothyroidism  Assessment & Plan  · Continue levothyroxine  · TSH WNL    Chronic combined systolic and diastolic heart failure ([address])  Assessment & Plan  ECHO 4/2022: EF 01-63%, systolic function low/normal, left atrium severely dilated, mitral valve prosthesis functioning normally  Repeat echo 7/5/2023: EF of 20%, severe LA dilation, severe RV dysfunction    · Overall poor prognosis per cardiology. · Minimal improvement on dopamine infusion  · Diuretics remain on hold  · Monitor Is/Os and daily weights  · Continue salt and fluid restriction  · Patient was started on bisoprolol 2.5 mg p.o. daily. Atrial fibrillation, chronic (HCC)  Assessment & Plan  · History of chronic a fib, EKG on admission showed controlled a fib  · Continue Coumadin for stroke prophylaxis  · Patient started on bisoprolol 2.5 mg p.o. daily    Elevated LFTs-resolved as of 7/12/2023  Assessment & Plan  · Elevated transaminases and bilirubin on admission  · Thought to be secondary to hepatic congestion in the setting of biventricular CHF  · GI input appreciated  · Continue to monitor serial LFTs; things are improving             VTE Pharmacologic Prophylaxis:   comfort measures    Patient Centered Rounds: I performed bedside rounds with nursing staff today. Discussions with Specialists or Other Care Team Provider: case management    Education and Discussions with Family / Patient: Updated  ( and daughter) at bedside.     Total Time Spent on Date of Encounter in care of patient: 45 minutes This time was spent on one or more of the following: performing physical exam; counseling and coordination of care; obtaining or reviewing history; documenting in the medical record; reviewing/ordering tests, medications or procedures; communicating with other healthcare professionals and discussing with patient's family/caregivers. Current Length of Stay: 6 day(s)  Current Patient Status: Inpatient   Certification Statement: The patient will continue to require additional inpatient hospital stay due to pending safe dispo  Discharge Plan: Anticipate discharge tomorrow to facility    Code Status: Level 3 - DNAR and DNI    Subjective:   Patient seen examined at bedside. Patient currently has no acute complaints. States that she is doing well and family planning on visitign facility later in the afternoon. Objective:     Vitals:   Temp (24hrs), Av.2 °F (36.2 °C), Min:96.6 °F (35.9 °C), Max:98 °F (36.7 °C)    Temp:  [96.6 °F (35.9 °C)-98 °F (36.7 °C)] 98 °F (36.7 °C)  HR:  [59-69] 68  Resp:  [18-20] 19  BP: ()/(52-58) 99/57  SpO2:  [99 %-100 %] 100 %  Body mass index is 25.99 kg/m². Input and Output Summary (last 24 hours): Intake/Output Summary (Last 24 hours) at 2023 1108  Last data filed at 2023 1001  Gross per 24 hour   Intake 210 ml   Output 600 ml   Net -390 ml       Physical Exam:   Physical Exam  Vitals and nursing note reviewed. Constitutional:       General: She is not in acute distress. Appearance: She is well-developed. She is ill-appearing. HENT:      Head: Normocephalic and atraumatic. Eyes:      Conjunctiva/sclera: Conjunctivae normal.   Cardiovascular:      Rate and Rhythm: Normal rate. Rhythm irregular. Heart sounds: No murmur heard. Pulmonary:      Effort: Pulmonary effort is normal. No respiratory distress. Breath sounds: Normal breath sounds. Abdominal:      Palpations: Abdomen is soft. Tenderness:  There is no abdominal tenderness. Musculoskeletal:         General: No swelling. Cervical back: Neck supple. Skin:     General: Skin is warm and dry. Capillary Refill: Capillary refill takes less than 2 seconds. Neurological:      Mental Status: She is alert. Psychiatric:         Mood and Affect: Mood normal.          Additional Data:     Labs:  Results from last 7 days   Lab Units 07/11/23  0609 07/10/23  0535   WBC Thousand/uL 6.72 6.34   HEMOGLOBIN g/dL 11.8 11.0*   HEMATOCRIT % 36.9 34.8   PLATELETS Thousands/uL 117* 97*   NEUTROS PCT %  --  72   LYMPHS PCT %  --  10*   MONOS PCT %  --  13*   EOS PCT %  --  3     Results from last 7 days   Lab Units 07/11/23  1033 07/11/23  0609 07/10/23  0535   SODIUM mmol/L 139   < > 141   POTASSIUM mmol/L 4.3   < > 4.1   CHLORIDE mmol/L 110*   < > 111*   CO2 mmol/L 27   < > 26   BUN mg/dL 38*   < > 38*   CREATININE mg/dL 1.53*   < > 1.45*   ANION GAP mmol/L 2   < > 4   CALCIUM mg/dL 11.0*   < > 10.1   ALBUMIN g/dL  --   --  3.1*   TOTAL BILIRUBIN mg/dL  --   --  2.95*   ALK PHOS U/L  --   --  74   ALT U/L  --   --  146*   AST U/L  --   --  50*   GLUCOSE RANDOM mg/dL 163*   < > 96    < > = values in this interval not displayed.      Results from last 7 days   Lab Units 07/10/23  0535   INR  2.73*     Results from last 7 days   Lab Units 07/06/23  1016   POC GLUCOSE mg/dl 142*     Results from last 7 days   Lab Units 07/07/23  0432   HEMOGLOBIN A1C % 5.5           Lines/Drains:  Invasive Devices     Peripheral Intravenous Line  Duration           Peripheral IV 07/10/23 Right;Ventral (anterior) Forearm 2 days          Drain  Duration           External Urinary Catheter <1 day                Imaging: Reviewed radiology reports from this admission including: MRI brain    Recent Cultures (last 7 days):         Last 24 Hours Medication List:   Current Facility-Administered Medications   Medication Dose Route Frequency Provider Last Rate   • amantadine  100 mg Oral BID Deangelo Ozuna Lauren Warner PA-C     • bisoprolol  2.5 mg Oral Daily Jacy Guadarrama PA-C     • carbidopa-levodopa  1 tablet Oral TID Zack Rome PA-C     • ciprofloxacin  1 drop Both Eyes Q4H While Awake Raymundo Grant MD     • DOPamine in dextrose 5%  1-20 mcg/kg/min Intravenous Continuous Lakeisha Ta MD 2.5 mcg/kg/min (07/11/23 1905)   • ferrous gluconate  324 mg Oral Daily Before Breakfast Zack Rome PA-C     • levothyroxine  50 mcg Oral Early Morning Zack Rome PA-C     • memantine  10 mg Oral Daily Zack Rome PA-C     • warfarin  3 mg Oral Daily (warfarin) Raymundo Grant MD          Today, Patient Was Seen By: Ambar Fields DO    **Please Note: This note may have been constructed using a voice recognition system. **

## 2023-07-13 ENCOUNTER — TELEPHONE (OUTPATIENT)
Dept: FAMILY MEDICINE CLINIC | Facility: CLINIC | Age: 83
End: 2023-07-13

## 2023-07-13 VITALS
HEIGHT: 64 IN | OXYGEN SATURATION: 98 % | RESPIRATION RATE: 19 BRPM | HEART RATE: 61 BPM | DIASTOLIC BLOOD PRESSURE: 59 MMHG | SYSTOLIC BLOOD PRESSURE: 102 MMHG | BODY MASS INDEX: 26.32 KG/M2 | TEMPERATURE: 98 F | WEIGHT: 154.2 LBS

## 2023-07-13 PROBLEM — I48.20 ATRIAL FIBRILLATION, CHRONIC (HCC): Status: RESOLVED | Noted: 2018-01-20 | Resolved: 2023-07-13

## 2023-07-13 PROBLEM — Z95.2 H/O MITRAL VALVE REPLACEMENT WITH MECHANICAL VALVE: Status: RESOLVED | Noted: 2018-01-20 | Resolved: 2023-07-13

## 2023-07-13 PROBLEM — N17.9 AKI (ACUTE KIDNEY INJURY) (HCC): Status: RESOLVED | Noted: 2023-07-06 | Resolved: 2023-07-13

## 2023-07-13 PROCEDURE — 99232 SBSQ HOSP IP/OBS MODERATE 35: CPT | Performed by: INTERNAL MEDICINE

## 2023-07-13 PROCEDURE — 99239 HOSP IP/OBS DSCHRG MGMT >30: CPT | Performed by: STUDENT IN AN ORGANIZED HEALTH CARE EDUCATION/TRAINING PROGRAM

## 2023-07-13 RX ORDER — MORPHINE SULFATE 20 MG/5ML
2.5 SOLUTION ORAL EVERY 2 HOUR PRN
Qty: 50 ML | Refills: 0 | Status: SHIPPED | OUTPATIENT
Start: 2023-07-13 | End: 2023-07-23

## 2023-07-13 RX ORDER — MORPHINE SULFATE 20 MG/5ML
2.5 SOLUTION ORAL EVERY 2 HOUR PRN
Qty: 50 ML | Refills: 0 | Status: SHIPPED | OUTPATIENT
Start: 2023-07-13 | End: 2023-07-13 | Stop reason: SDUPTHER

## 2023-07-13 RX ORDER — LORAZEPAM 2 MG/ML
1 CONCENTRATE ORAL EVERY 8 HOURS PRN
Qty: 7.5 ML | Refills: 0 | Status: SHIPPED | OUTPATIENT
Start: 2023-07-13 | End: 2023-07-18

## 2023-07-13 RX ADMIN — CIPROFLOXACIN 1 DROP: 3 SOLUTION OPHTHALMIC at 09:03

## 2023-07-13 RX ADMIN — FERROUS GLUCONATE 324 MG: 324 TABLET ORAL at 06:09

## 2023-07-13 RX ADMIN — MEMANTINE 10 MG: 10 TABLET ORAL at 08:45

## 2023-07-13 RX ADMIN — CIPROFLOXACIN 1 DROP: 3 SOLUTION OPHTHALMIC at 05:30

## 2023-07-13 RX ADMIN — CARBIDOPA AND LEVODOPA 1 TABLET: 25; 100 TABLET ORAL at 08:45

## 2023-07-13 RX ADMIN — AMANTADINE HYDROCHLORIDE 100 MG: 100 CAPSULE ORAL at 08:52

## 2023-07-13 RX ADMIN — LEVOTHYROXINE SODIUM 50 MCG: 50 TABLET ORAL at 05:30

## 2023-07-13 RX ADMIN — DOPAMINE HYDROCHLORIDE IN DEXTROSE 2.52 MCG/KG/MIN: 1.6 INJECTION, SOLUTION INTRAVENOUS at 03:29

## 2023-07-13 NOTE — CASE MANAGEMENT
Case Management Discharge Planning Note    Patient name Poornima Gabriel  Location [facility] 511/1 [facility]G 838-* MRN [id number]  : 1940 Date 2023       Current Admission Date: 2023  Current Admission Diagnosis:Generalized weakness   Patient Active Problem List    Diagnosis Date Noted   • Goals of care, counseling/discussion 07/10/2023   • Dysphagia    • Chronic diastolic heart failure ([address]) 2023   • History of hypokalemia 2023   • History of dehydration 2023   • History of left bundle branch block (LBBB) 2023   • Primary osteoarthritis of right knee 2023   • Stage 3a chronic kidney disease ([address]) 2023   • Edema of both lower extremities 2023   • Obstructive sleep apnea 2023   • Prerenal azotemia 2023   • Warfarin anticoagulation 2023   • Abnormal weight loss 2023   • Chronic fatigue 2023   • Impaired fasting glucose 2023   • Hypercalcemia 2023   • Stage 3b chronic kidney disease ([address]) 2023   • Knee pain 2022   • Generalized weakness 2022   • 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 2022   • Orthostatic hypotension 2021   • Other chronic pain 06/15/2021   • Cardiomyopathy, dilated, nonischemic ([address]) 2021   • Left bundle branch block (LBBB) 2021   • Asymptomatic PVCs 2021   • On continuous oral anticoagulation 2021   • Mixed dyslipidemia 2021   • High risk medication use 2021   • Other microscopic hematuria 2018   • Numbness and tingling in right hand    • Suprapatellar bursitis of right knee 2018   • Generalized osteoarthritis 2017   • Chronic right-sided low back pain without sciatica 10/23/2017   • Chronic combined systolic and diastolic heart failure ([address]) 2017   • Memory impairment 02/15/2017   • Seasonal allergies 2016   • Vitamin D insufficiency 2015   • Transient ischemic attack 2014   • Parkinson's disease ([address]) 12/16/2013   • Venous insufficiency of both lower extremities 12/03/2012   • Dupuytren's contracture 09/04/2012   • Mitral valve disorder 09/04/2012      LOS (days): 7  Geometric Mean LOS (GMLOS) (days): 5.70  Days to GMLOS:-1.3     OBJECTIVE:  Risk of Unplanned Readmission Score: 15.52         Current admission status: Inpatient   Preferred Pharmacy:   [id number] #[id number] Supriya Beltran, [facility] [address] [address]  Phone: [phone] Fax: [facility]40 [facility], [place] [address]  Phone: [phone] Fax: [phone]    Primary Care Provider: Axel Jones MD    Primary Insurance: MEDICARE  Secondary Insurance: BLUE CROSS    DISCHARGE DETAILS:    Discharge planning discussed with[de-identified]  and Patient  Freedom of Choice: Yes  Comments - Freedom of Choice: Patient and family's choice for hospice is [facility] in [address]  [facility] contacted family/caregiver?: Yes  Were Treatment Team discharge recommendations reviewed with patient/caregiver?: Yes  Did patient/caregiver verbalize understanding of patient care needs?: Yes  Were patient/caregiver advised of the risks associated with not following Treatment Team discharge recommendations?: Yes    Contacts  Patient Contacts: Major Levi  Relationship to Patient[de-identified] Family  Contact Method: In Person  Phone Number: [phone]  Reason/Outcome: Referral, Discharge Planning, [address]         Is the patient interested in [facility] at discharge?: No    DME Referral Provided  Referral made for DME?: No    Other Referral/Resources/Interventions Provided:  Interventions: Hospice         Treatment Team Recommendation: Hospice  Discharge Destination Plan[de-identified] Hospice  Transport at Discharge : [facility]     Number/Name of Dispatcher: Nazia Esqueda by Kristal and Unit #):  Elisabeth  ETA of Transport (Date): 07/13/23  ETA of Transport (Time): 1330  IMM Given (Date):: 07/13/23 (IMM presented and reviewed with patient's  on the phone.  verbalized understanding and copy was provided. Copy was placed in scan bin.)      CM met with patient and patient's  bedside this morning to inform them that the [address] [place] [address] facility has an available bed. CM advised them that I will confirm a pickup time for transportation. CM requested [facility] transportation in [address] [place] [address], time confirmed with [facility] transportation for 1:30pm. BLS form placed in folder in patient's chart. Patient, patient's family, physician, and nursing made aware.

## 2023-07-13 NOTE — DISCHARGE SUMMARY
[address] [facility]  Discharge- Sigrid Sale 1940, 79 y.o. female MRN: [id number]  Unit/Bed#: 913 [facility] 414-01 Encounter: [id number]  Primary Care Provider: Terri Fritz MD   Date and time admitted to hospital: 7/6/2023 10:10 AM    * Generalized weakness  Assessment & Plan  Patient presented to ED with months history of generalized weakness, worsening in past few weeks. Patient now unable to ambulate, usually can ambulate with walker. Family reports left sided facial droop in the morning along with speech difficulties. · Neuro work-up has been unremarkable including CT of the head and MRI of the brain, echo with bubble study showed no PFO. Neurology does not feel her symptoms are solely attributable to progression of her Parkinson's. · Echo did show new drop in EF to 20%, symptoms from that standpoint are not improving with dopamine infusion  · Patient has gradual worsening of her overall performance status at home over the last several weeks and has overall poor prognosis in the setting of biventricular failure and Parkinson's. · Goals of care discussions - hospice    Goals of care, counseling/discussion  Assessment & Plan  · Cardiology discussed goals of care with the patient and family at bedside on 7/9. He expressed overall poor prognosis in the setting of biventricular CHF, poor functional status, and neurologic decline. · Dr. Anjali Graves again discussed CODE STATUS at length with cardiology PA, patient's , patient's 2 daughters, and patient at bedside. Patient's cognition is much improved today and she is alert and oriented. After lengthy discussion regarding her overall prognosis, and various plans of care, patient ultimately decided that she would not want to pursue aggressive medical care going forward.   · A hospice consult will be obtained per family request.  · Hospice consulted  · D/C to [facility] in [address]    Parkinson's disease [facility])  Assessment & Plan  · Follows with neurology outpatient  · Continue Sinemet and amantadine  · Gradual weakness possibly secondary to worsening of Parkinson's disease/deconditioning  · Neurology input appreciated. Chronic combined systolic and diastolic heart failure [facility])  Assessment & Plan  ECHO 4/2022: EF 59-34%, systolic function low/normal, left atrium severely dilated, mitral valve prosthesis functioning normally  Repeat echo 7/5/2023: EF of 20%, severe LA dilation, severe RV dysfunction    · Overall poor prognosis per cardiology. · Minimal improvement on dopamine infusion  · Diuretics remain on hold  · Monitor Is/Os and daily weights  · Continue salt and fluid restriction  · Patient was started on bisoprolol 2.5 mg p.o. daily.   · On dopamine gtt  · Being discharged to hospice      ROB (acute kidney injury) (HCC)-resolved as of 7/13/2023  Assessment & Plan  · Baseline cr appears around 1.0-1.2  · Cr on admission 2.15  · CT a/p shows no obstruction    · ROB secondary to cardiorenal syndrome  · On dopamine infusion    Elevated LFTs-resolved as of 7/12/2023  Assessment & Plan  · Elevated transaminases and bilirubin on admission  · Thought to be secondary to hepatic congestion in the setting of biventricular CHF  · GI input appreciated  · Continue to monitor serial LFTs; things are improving    Hypothyroidism-resolved as of 7/13/2023  Assessment & Plan  · Continue levothyroxine  · TSH WNL    Atrial fibrillation, chronic (HCC)-resolved as of 7/13/2023  Assessment & Plan  · History of chronic a fib, EKG on admission showed controlled a fib  · Continue Coumadin for stroke prophylaxis  · Patient started on bisoprolol 2.5 mg p.o. daily        Medical Problems     Resolved Problems  Date Reviewed: 7/13/2023          Resolved    H/O mitral valve replacement with mechanical valve 7/13/2023     Resolved by  Joey Signs, DO    Atrial fibrillation, chronic ([address]) 7/13/2023     Resolved by  Seagrove Signs, DO    Hypothyroidism 7/13/2023     Resolved by Rusty Acuña,     Elevated LFTs 7/12/2023     Resolved by  Thurdick Eth, DO    ROB (acute kidney injury) ([address]) 7/13/2023     Resolved by  Thurdick Eth, DO    Supratherapeutic INR 7/10/2023     Resolved by  Karligael Duran, DO    Elevated troponin 7/12/2023     Resolved by  Steve Kauffman, DO        Discharging Physician / Practitioner: Steve Kauffman DO  PCP: Ryne Green MD  Admission Date:   Admission Orders (From admission, onward)     Ordered        07/06/23 1255  [address]  Once                      Discharge Date: 07/13/23    Consultations During Hospital Stay:  · Cardiology  · Gi  · Neurology    Procedures Performed:   · n/a    Significant Findings / Test Results:   · EF of 20% on echo  · Progression of parkinsons    Incidental Findings:   · See above   · I reviewed the above mentioned incidental findings with the patient and/or family and they expressed understanding. Test Results Pending at Discharge (will require follow up):   · none     Outpatient Tests Requested:  · none    Complications:  n/a    Reason for Admission: Generalized weakness    Hospital Course:   Vanessa Michelle is a 79 y.o. female patient who originally presented to the hospital on 7/6/2023 due to generalized weakness. Initially thought to have been secondary to progression of Parkinson's disease but neurology not completely convinced. Work-up during hospitalization revealed a drop in ejection fraction to 20% with severe biventricular congestive heart failure requiring dopamine drip. Cardiology has been consulted. Patient had some improvement with dopamine drip but overall had a poor prognosis. Goals of care conversations were had and patient along with family ultimately elected to pursue comfort measures. Hospice was consulted and patient is being discharged to outpatient hospice facility. Please see above list of diagnoses and related plan for additional information.      Condition at Discharge: terminal    Discharge Day Visit / Exam:   Subjective: Patient seen examined at bedside. Currently offers no acute complaints states that she is feeling better on the dopamine drip. Vitals: Blood Pressure: 102/59 (07/13/23 1156)  Pulse: 61 (07/13/23 1156)  Temperature: 98 °F (36.7 °C) (07/13/23 0800)  Temp Source: Oral (07/13/23 0246)  Respirations: 19 (07/13/23 0800)  Height: 5' 4" (162.6 cm) (07/07/23 0758)  Weight - Scale: 69.9 kg (154 lb 3.2 oz) (07/13/23 0551)  SpO2: 98 % (07/13/23 1156)  Exam:   Physical Exam  Vitals and nursing note reviewed. Constitutional:       General: She is not in acute distress. Appearance: She is well-developed. She is ill-appearing. HENT:      Head: Normocephalic and atraumatic. Eyes:      Conjunctiva/sclera: Conjunctivae normal.   Cardiovascular:      Rate and Rhythm: Normal rate. Rhythm irregular. Heart sounds: No murmur heard. Pulmonary:      Effort: Pulmonary effort is normal. No respiratory distress. Breath sounds: Normal breath sounds. Abdominal:      Palpations: Abdomen is soft. Tenderness: There is no abdominal tenderness. Musculoskeletal:      Cervical back: Neck supple. Right lower leg: Edema present. Left lower leg: Edema present. Skin:     General: Skin is warm and dry. Capillary Refill: Capillary refill takes less than 2 seconds. Findings: Bruising present. Neurological:      Mental Status: She is alert. Mental status is at baseline. Psychiatric:         Mood and Affect: Mood normal.          Discussion with Family: Updated  () at bedside. Discharge instructions/Information to patient and family:   See after visit summary for information provided to patient and family. Provisions for Follow-Up Care:  See after visit summary for information related to follow-up care and any pertinent home health orders.        Disposition:   Other: [address] [place] [address] for Hospice in [facility] [place]    Planned Readmission: no     Discharge Statement:  I spent 45 minutes discharging the patient. This time was spent on the day of discharge. I had direct contact with the patient on the day of discharge. Greater than 50% of the total time was spent examining patient, answering all patient questions, arranging and discussing plan of care with patient as well as directly providing post-discharge instructions. Additional time then spent on discharge activities. Discharge Medications:  See after visit summary for reconciled discharge medications provided to patient and/or family.       **Please Note: This note may have been constructed using a voice recognition system**

## 2023-07-13 NOTE — PLAN OF CARE
Problem: Potential for Falls  Goal: Patient will remain free of falls  Description: INTERVENTIONS:  - Educate patient/family on patient safety including physical limitations  - Instruct patient to call for assistance with activity   - Consult OT/PT to assist with strengthening/mobility   - Keep Call bell within reach  - Keep bed low and locked with side rails adjusted as appropriate  - Keep care items and personal belongings within reach  - Initiate and maintain comfort rounds  - Make Fall Risk Sign visible to staff  - Offer Toileting every 2 Hours, in advance of need  - Initiate/Maintain bed/chair alarm  - Obtain necessary fall risk management equipment: bed/chair alarm  - Apply yellow socks and bracelet for high fall risk patients  - Consider moving patient to room near nurses station  Outcome: Progressing     Problem: MOBILITY - ADULT  Goal: Maintains/Returns to pre admission functional level  Description: INTERVENTIONS:  - Perform BMAT or MOVE assessment daily.   - Set and communicate daily mobility goal to care team and patient/family/caregiver. - Collaborate with rehabilitation services on mobility goals if consulted  - Perform Range of Motion 3 times a day. - Reposition patient every 3 hours.   - Dangle patient 3 times a day  - Stand patient 3 times a day  - Ambulate patient 3 times a day  - Out of bed to chair 3 times a day   - Out of bed for meals 3 times a day  - Out of bed for toileting  - Record patient progress and toleration of activity level   Outcome: Progressing     Problem: MOBILITY - ADULT  Goal: Maintain or return to baseline ADL function  Description: INTERVENTIONS:  - Educate patient/family on patient safety including physical limitations  - Instruct patient to call for assistance with activity   - Consult OT/PT to assist with strengthening/mobility   - Keep Call bell within reach  - Keep bed low and locked with side rails adjusted as appropriate  - Keep care items and personal belongings within reach  - Initiate and maintain comfort rounds  - Make Fall Risk Sign visible to staff  - Offer Toileting every 2 Hours, in advance of need  - Initiate/Maintain bed/chair alarm  - Obtain necessary fall risk management equipment: bed/chair alarm  - Apply yellow socks and bracelet for high fall risk patients  - Consider moving patient to room near nurses station  Outcome: Progressing     Problem: Prexisting or High Potential for Compromised Skin Integrity  Goal: Skin integrity is maintained or improved  Description: INTERVENTIONS:  - Identify patients at risk for skin breakdown  - Assess and monitor skin integrity  - Assess and monitor nutrition and hydration status  - Monitor labs   - Assess for incontinence   - Turn and reposition patient  - Assist with mobility/ambulation  - Relieve pressure over bony prominences  - Avoid friction and shearing  - Provide appropriate hygiene as needed including keeping skin clean and dry  - Evaluate need for skin moisturizer/barrier cream  - Collaborate with interdisciplinary team   - Patient/family teaching  - Consider wound care consult   Outcome: Progressing     Problem: PAIN - ADULT  Goal: Verbalizes/displays adequate comfort level or baseline comfort level  Description: Interventions:  - Encourage patient to monitor pain and request assistance  - Assess pain using appropriate pain scale  - Administer analgesics based on type and severity of pain and evaluate response  - Implement non-pharmacological measures as appropriate and evaluate response  - Consider cultural and social influences on pain and pain management  - Notify physician/advanced practitioner if interventions unsuccessful or patient reports new pain  Outcome: Progressing     Problem: SAFETY ADULT  Goal: Patient will remain free of falls  Description: INTERVENTIONS:  - Educate patient/family on patient safety including physical limitations  - Instruct patient to call for assistance with activity   - Consult OT/PT to assist with strengthening/mobility   - Keep Call bell within reach  - Keep bed low and locked with side rails adjusted as appropriate  - Keep care items and personal belongings within reach  - Initiate and maintain comfort rounds  - Make Fall Risk Sign visible to staff  - Offer Toileting every 2 Hours, in advance of need  - Initiate/Maintain bed/chair alarm  - Obtain necessary fall risk management equipment: bed/chair alarm  - Apply yellow socks and bracelet for high fall risk patients  - Consider moving patient to room near nurses station  Outcome: Progressing     Problem: SAFETY ADULT  Goal: Maintains/Returns to pre admission functional level  Description: INTERVENTIONS:  - Perform BMAT or MOVE assessment daily.   - Set and communicate daily mobility goal to care team and patient/family/caregiver. - Collaborate with rehabilitation services on mobility goals if consulted  - Perform Range of Motion 3 times a day. - Reposition patient every 3 hours.   - Dangle patient 3 times a day  - Stand patient 3 times a day  - Ambulate patient 3 times a day  - Out of bed to chair 3 times a day   - Out of bed for meals 3 times a day  - Out of bed for toileting  - Record patient progress and toleration of activity level   Outcome: Progressing     Problem: NEUROSENSORY - ADULT  Goal: Achieves stable or improved neurological status  Description: INTERVENTIONS  - Monitor and report changes in neurological status  - Monitor vital signs such as temperature, blood pressure, glucose, and any other labs ordered   - Initiate measures to prevent increased intracranial pressure  - Monitor for seizure activity and implement precautions if appropriate      Outcome: Progressing     Problem: NEUROSENSORY - ADULT  Goal: Achieves maximal functionality and self care  Description: INTERVENTIONS  - Monitor swallowing and airway patency with patient fatigue and changes in neurological status  - Encourage and assist patient to increase activity and self care.    - Encourage visually impaired, hearing impaired and aphasic patients to use assistive/communication devices  Outcome: Progressing     Problem: CARDIOVASCULAR - ADULT  Goal: Maintains optimal cardiac output and hemodynamic stability  Description: INTERVENTIONS:  - Monitor I/O, vital signs and rhythm  - Monitor for S/S and trends of decreased cardiac output  - Administer and titrate ordered vasoactive medications to optimize hemodynamic stability  - Assess quality of pulses, skin color and temperature  - Assess for signs of decreased coronary artery perfusion  - Instruct patient to report change in severity of symptoms  Outcome: Progressing     Problem: CARDIOVASCULAR - ADULT  Goal: Absence of cardiac dysrhythmias or at baseline rhythm  Description: INTERVENTIONS:  - Continuous cardiac monitoring, vital signs, obtain 12 lead EKG if ordered  - Administer antiarrhythmic and heart rate control medications as ordered  - Monitor electrolytes and administer replacement therapy as ordered  Outcome: Progressing     Problem: RESPIRATORY - ADULT  Goal: Achieves optimal ventilation and oxygenation  Description: INTERVENTIONS:  - Assess for changes in respiratory status  - Assess for changes in mentation and behavior  - Position to facilitate oxygenation and minimize respiratory effort  - Oxygen administered by appropriate delivery if ordered  - Initiate smoking cessation education as indicated  - Encourage broncho-pulmonary hygiene including cough, deep breathe, Incentive Spirometry  - Assess the need for suctioning and aspirate as needed  - Assess and instruct to report SOB or any respiratory difficulty  - Respiratory Therapy support as indicated  Outcome: Progressing     Problem: GASTROINTESTINAL - ADULT  Goal: Maintains adequate nutritional intake  Description: INTERVENTIONS:  - Monitor percentage of each meal consumed  - Identify factors contributing to decreased intake, treat as appropriate  - Assist with meals as needed  - Monitor I&O, weight, and lab values if indicated  - Obtain nutrition services referral as needed  Outcome: Progressing     Problem: SKIN/TISSUE INTEGRITY - ADULT  Goal: Skin Integrity remains intact(Skin Breakdown Prevention)  Description: Assess:  -Perform Rony assessment every shift  -Clean and moisturize skin every shift  -Inspect skin when repositioning, toileting, and assisting with ADLS  -Assess under medical devices such as scd's every shift  -Assess extremities for adequate circulation and sensation     Bed Management:  -Have minimal linens on bed & keep smooth, unwrinkled  -Change linens as needed when moist or perspiring  -Avoid sitting or lying in one position for more than 2 hours while in bed  -Keep HOB at 45 degrees     Toileting:  -Offer bedside commode  -Assess for incontinence every 2 hours  -Use incontinent care products after each incontinent episode such as chucks    Activity:  -Mobilize patient 3 times a day  -Encourage activity and walks on unit  -Encourage or provide ROM exercises   -Turn and reposition patient every 2 Hours  -Use appropriate equipment to lift or move patient in bed  -Instruct/ Assist with weight shifting every 2 hours when out of bed in chair  -Consider limitation of chair time 2 hour intervals    Skin Care:  -Avoid use of baby powder, tape, friction and shearing, hot water or constrictive clothing  -Relieve pressure over bony prominences using waffle  -Do not massage red bony areas    Next Steps:  -Teach patient strategies to minimize risks such as turn and reposition   -Consider consults to  interdisciplinary teams such as pt/ot  Outcome: Progressing     Problem: Neurological Deficit  Goal: Neurological status is stable or improving  Description: Interventions:  - Monitor and assess patient's level of consciousness, motor function, sensory function, and level of assistance needed for ADLs. - Monitor and report changes from baseline. Collaborate with interdisciplinary team to initiate plan and implement interventions as ordered. - Provide and maintain a safe environment. - Consider seizure precautions. - Consider fall precautions. - Consider aspiration precautions. - Consider bleeding precautions. Outcome: Progressing     Problem: Activity Intolerance/Impaired Mobility  Goal: Mobility/activity is maintained at optimum level for patient  Description: Interventions:  - Assess and monitor patient  barriers to mobility and need for assistive/adaptive devices. - Assess patient's emotional response to limitations. - Collaborate with interdisciplinary team and initiate plans and interventions as ordered. - Encourage independent activity per ability.  - Maintain proper body alignment. - Perform active/passive rom as tolerated/ordered. - Plan activities to conserve energy.  - Turn patient as appropriate  Outcome: Progressing     Problem: Communication Impairment  Goal: Ability to express needs and understand communication  Description: Assess patient's communication skills and ability to understand information. Patient will demonstrate use of effective communication techniques, alternative methods of communication and understanding even if not able to speak. - Encourage communication and provide alternate methods of communication as needed. - Collaborate with case management/ for discharge needs. - Include patient/family/caregiver in decisions related to communication. Outcome: Progressing     Problem: Potential for Aspiration  Goal: Non-ventilated patient's risk of aspiration is minimized  Description: Assess and monitor vital signs, respiratory status, and labs (WBC). Monitor for signs of aspiration (tachypnea, cough, rales, wheezing, cyanosis, fever). - Assess and monitor patient's ability to swallow. - Place patient up in chair to eat if possible.   - HOB up at 90 degrees to eat if unable to get patient up into chair.  - Supervise patient during oral intake. - Instruct patient/ family to take small bites. - Instruct patient/ family to take small single sips when taking liquids. - Follow patient-specific strategies generated by speech pathologist.  Outcome: Progressing     Problem: Nutrition  Goal: Nutrition/Hydration status is improving  Description: Monitor and assess patient's nutrition/hydration status for malnutrition (ex- brittle hair, bruises, dry skin, pale skin and conjunctiva, muscle wasting, smooth red tongue, and disorientation). Collaborate with interdisciplinary team and initiate plan and interventions as ordered. Monitor patient's weight and dietary intake as ordered or per policy. Utilize nutrition screening tool and intervene per policy. Determine patient's food preferences and provide high-protein, high-caloric foods as appropriate. - Assist patient with eating.  - Allow adequate time for meals.  - Encourage patient to take dietary supplement as ordered. - Collaborate with clinical nutritionist.  - Include patient/family/caregiver in decisions related to nutrition. Outcome: Progressing     Problem: Nutrition/Hydration-ADULT  Goal: Nutrient/Hydration intake appropriate for improving, restoring or maintaining nutritional needs  Description: Monitor and assess patient's nutrition/hydration status for malnutrition. Collaborate with interdisciplinary team and initiate plan and interventions as ordered. Monitor patient's weight and dietary intake as ordered or per policy. Utilize nutrition screening tool and intervene as necessary. Determine patient's food preferences and provide high-protein, high-caloric foods as appropriate.      INTERVENTIONS:  - Monitor oral intake, urinary output, labs, and treatment plans  - Assess nutrition and hydration status and recommend course of action  - Evaluate amount of meals eaten  - Assist patient with eating if necessary   - Allow adequate time for meals  - Recommend/ encourage appropriate diets, oral nutritional supplements, and vitamin/mineral supplements  - Order, calculate, and assess calorie counts as needed  - Recommend, monitor, and adjust tube feedings and TPN/PPN based on assessed needs  - Assess need for intravenous fluids  - Provide specific nutrition/hydration education as appropriate  - Include patient/family/caregiver in decisions related to nutrition  Outcome: Progressing

## 2023-07-13 NOTE — CASE MANAGEMENT
Case Management Discharge Planning Note    Patient name Wilner Brown  Location [address] [facility] 593/8 SWMPJ 159-* MRN [id number]  : 1940 Date 2023       Current Admission Date: 2023  Current Admission Diagnosis:Generalized weakness   Patient Active Problem List    Diagnosis Date Noted   • Goals of care, counseling/discussion 07/10/2023   • Dysphagia    • Chronic diastolic heart failure ([address]) 2023   • History of hypokalemia 2023   • History of dehydration 2023   • History of left bundle branch block (LBBB) 2023   • Primary osteoarthritis of right knee 2023   • Stage 3a chronic kidney disease ([address]) 2023   • Edema of both lower extremities 2023   • Obstructive sleep apnea 2023   • Prerenal azotemia 2023   • Warfarin anticoagulation 2023   • Abnormal weight loss 2023   • Chronic fatigue 2023   • Impaired fasting glucose 2023   • Hypercalcemia 2023   • Stage 3b chronic kidney disease ([address]) 2023   • Knee pain 2022   • Generalized weakness 2022   • 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 2022   • Orthostatic hypotension 2021   • Other chronic pain 06/15/2021   • Cardiomyopathy, dilated, nonischemic ([address]) 2021   • Left bundle branch block (LBBB) 2021   • Asymptomatic PVCs 2021   • On continuous oral anticoagulation 2021   • Mixed dyslipidemia 2021   • High risk medication use 2021   • Other microscopic hematuria 2018   • Numbness and tingling in right hand    • Suprapatellar bursitis of right knee 2018   • Generalized osteoarthritis 2017   • Chronic right-sided low back pain without sciatica 10/23/2017   • Chronic combined systolic and diastolic heart failure ([address]) 2017   • Memory impairment 02/15/2017   • Seasonal allergies 2016   • Vitamin D insufficiency 2015   • Transient ischemic attack 2014   • Parkinson's disease ([address]) 12/16/2013   • Venous insufficiency of both lower extremities 12/03/2012   • Dupuytren's contracture 09/04/2012   • Mitral valve disorder 09/04/2012      LOS (days): 7  Geometric Mean LOS (GMLOS) (days): 5.70  Days to GMLOS:-1.3     OBJECTIVE:  Risk of Unplanned Readmission Score: 15.52         Current admission status: Inpatient   Preferred Pharmacy:   [address] [place] [address] #[id number] [address] [address]  Phone: [phone] Fax: [phone] [facility], [place] [address]  Phone: [phone] Fax: [phone]    Primary Care Provider: Deborah Jensen MD    Primary Insurance: MEDICARE  Secondary Insurance: BLUE CROSS    DISCHARGE DETAILS:  IMM Given (Date):: 07/13/23 (IMM presented and reviewed with patient's  on the phone.  verbalized understanding and copy was provided. Copy was placed in scan bin.)     IMM reviewed with patient's caregiver, patient's caregiver agrees with discharge determination.

## 2023-07-13 NOTE — TELEPHONE ENCOUNTER
Intake nurse from [facility] called to say patient is being  Discharged today  from [facility] to inpatient hospice at [facility]. Wants to know if you will manage inpatient hospice for patient.

## 2023-07-13 NOTE — [place] UNIVERSAL TRANSFER FORM
[place] UNIVERSAL TRANSFER FORM  (ALL ITEMS MUST BE COMPLETED)    1. TRANSFER FROM: [address]: [place]  In [facility]   2. DATE OF TRANSFER: 7/13/2023                        TIME OF TRANSFER:  1.30pm    3. PATIENT NAME: Say Young,        YOB: 1940                             GENDER: female    4. LANGUAGE:   English    5. PHYSICIAN NAME:  Tawana Carrier, DO                   PHONE: [phone]    6. CODE STATUS: Level 3 - DNAR and DNI        Out of Hospital DNR Attached:     7. :                                      :  Extended Emergency Contact Information  Primary Emergency Contact: Robin Turcios  Address: [address] [place] [address], [address] [facility] [place] [address] [facility] Phone: [phone]  Mobile Phone: [phone]  Relation: Spouse  Secondary Emergency Contact: Simba Domínguez  Home Phone: [phone]  Work Phone: [phone]  Mobile Phone: [phone]  Relation: Daughter           [address] Representative/Proxy:             Legal Guardian:               NAME OF:           HEALTH CARE REPRESENTATIVE/PROXY:                                         OR           LEGAL GUARDIAN, IF NOT :                                               PHONE:  (Day)           (Night)                        (Cell)    8. REASON FOR TRANSFER: (Must include brief medical history and recent changes in physical function or cognition.)  Hospice care            V/S: /59   Pulse 61   Temp 98 °F (36.7 °C)   Resp 19   Ht 5' 4" (1.626 m)   Wt 69.9 kg (154 lb 3.2 oz)   SpO2 98%   BMI 26.47 kg/m²           PAIN: none    9. PRIMARY DIAGNOSIS: Generalized weakness      Secondary Diagnosis:         Pacemaker:       Internal Defib:           Mental Health Diagnosis (if Applicable):    10. RESTRAINTS:  None    11. RESPIRATORY NEEDS: 2 L    12. ISOLATION/PRECAUTION:  None    13.  ALLERGY: Artane [trihexyphenidyl], Glycopyrrolate, and Pollen extract    14. SENSORY:           15. SKIN CONDITION:     16. DIET:  Cardiac, thin Liquid    17. IV ACCESS:  NOne    18. PERSONAL ITEMS SENT WITH PATIENT:     23. ATTACHED DOCUMENTS: MUST ATTACH CURRENT MEDICATION INFORMATION     20. AT RISK ALERTS:        HARM TO:     21. WEIGHT BEARING STATUS:         Left Leg:         Right Le. MENTAL STATUS: Alert, oriented to person , place . Forgetful at times. 23. FUNCTION:        Walk:         Transfer: Max assist x 2        Toilet:         Feed:     24. IMMUNIZATIONS/SCREENING:     Immunization History   Administered Date(s) Administered   • COVID-19 PFIZER VACCINE 0.3 ML IM 2021, 2021, 10/16/2021   • H1N1, All Formulations 10/26/2009   • Influenza Split High Dose Preservative Free IM 2014, 10/08/2015, 2016, 2017   • Influenza, high dose seasonal 0.7 mL 09/10/2018, 10/02/2019, 10/01/2020, 2021, 2022   • Influenza, seasonal, injectable 10/04/2005, 10/31/2006, 10/25/2007, 2008, 2009, 2010, 2012, 10/01/2013   • Pneumococcal Conjugate 13-Valent 2016   • Pneumococcal Polysaccharide PPV23 2007, 2014   • Tdap 2008       25. BOWEL: continent    26. BLADDER: incontinent     27.  SENDING FACILITY CONTACT:                  Title:        Unit:         Phone:           [address] (if known):        Title:        Unit:         Phone:         FORM PREFILLED BY (if applicable)       Title:       Unit:        Phone:         FORM COMPLETED BY Abbey Tanner      Title:       Phone:

## 2023-07-13 NOTE — NURSING NOTE
The patient discharged to [facility] . The nurse to nurse report provided to the nurse Ashia Cruz. AVS including ativan and morphine scripts sent with the the transport.

## 2023-07-13 NOTE — PROGRESS NOTES
Progress Note - Cardiology   [facility]     Thomas Stephens 79 y.o. female MRN: [id number]  : 1940  Unit/Bed#: [facility] [address] Encounter: [id number]    Assessment and Plan:   1. Acute on chronic combined systolic and diastolic heart failure.     - Presented on 23 for evaluation for worsening weakness. Patient does endorse shortness of breath with exertion. - 23 BNP: 2,023.   - 23 CT abdomen/pelvis without contrast: Small bilateral pleural effusions and small volume abdominopelvic ascites. Otherwise, no acute abnormality in the abdomen or pelvis. - 23 TTE: LVEF 20%. Left ventricular systolic function is severely reduced. Unable to assess diastolic function. Left atrial filling pressure is elevated. The right ventricular systolic function is moderately to severely reduced. Abnormal tricuspid annular plane systolic excursion (TAPSE) < 1.7 cm. Left Atrium: The atrium is severely dilated (>48 mL/m2). Atrial Septum: The septum bows into the right atrium, suggesting increased left atrial pressure. Aortic Valve: The aortic valve is trileaflet. The leaflets are mildly thickened. The leaflets are moderately calcified. There is mildly reduced mobility. There is trace to mild regurgitation. Tricuspid Valve: There is moderate regurgitation. The right ventricular systolic pressure is moderately elevated. The estimated right ventricular systolic pressure is 35.49 mmHg. Pulmonic Valve: There is moderate regurgitation.   - 22 TTE: LVEF 50-55%. Systolic function is low normal.  Left atrium severely dilated.  Atrial septum bows into right atrium.  Trace to mild aortic valve regurgitation.  Mechanical mitral valve prosthesis, appears to be functioning normally.  Mild to moderate tricuspid valve regurgitation.  Sanjuana Crews of home medication notes patient is on Aldactone 12.5 mg daily as well as torsemide 20 mg 3 times a week (Monday/Wednesday/Friday).  Aldactone and torsemide on hold in setting of ROB. - Recommend against use of excessive IV fluids.   - Not currently on diuretics. Last dose of diuretics on 7/07/23.   - Will continue to hold diuretics in setting of soft BP.  - Currently on dopamine drip, 2.5 mcg/kg/min. - Strict I/Os. - Daily weights, standing weights.   - Continue low-sodium diet with 1800 mL fluid restriction.  - CHF education.  - No further cardiology work-up at this time. Patient and family have opted for hospice care at this time. We will continue dopamine drip until discharge to hospice.     2. Goals of care discussion.     - Family meeting held with primary team yesterday, 7/10/2023. Goals of care discussion was held on 7/09 with Dr. Gee Sim (Cardiology). Further discussed goals of care on 7/10/2023. Patient reported she wanted to transition to hospice care, she no longer wanted aggressive medical treatment. Hospice evaluation pending. 3. Elevated troponin.     - 7/06/23 hs troponin: 256 (0 hrs), 243 (2 hrs), 190 (4hrs). - 7/06/23 EKG: Atrial fibrillation, ventricular rate 72 bpm.  Noted PVCs.  Left bundle branch block. - Patient currently denies chest pain, palpitations, shortness of breath, lightheadedness, dizziness, nausea, or vomiting.    - Likely nonischemic myocardial injury secondary to acute on chronic systolic and diastolic heart failure. 4.  NSVT. - Episode of 20 beats of NSVT noted on 7/07/23 at 1411 hrs on telemetry.  - No further episodes noted. - Continue bisoprolol 2.5 mg daily.  - Monitor electrolytes. Replete potassium above 4 and magnesium above 2.     5. Generalized weakness.     - Presented on 7/06/23 for evaluation for worsening weakness.  Was instructed by Neurology to go to ED after patient's  called reporting left facial droop and weakness.  - Patient reports she has had progressive weakness over the past several months, states is greatly affecting her ability to ambulate, and states she has had frequent falls in recent weeks. - 7/06/23 CT head without contrast demonstrated no acute intracranial abnormality, chronic cerebellar lacunar infarcts. - 7/07/23 MRI brain without contrast: a few small chronic bilateral cerebellar lacunar infarctions are stable. No acute infarction, intracranial hemorrhage or mass effect.  - Neurology recommendations noted.     6. Transaminitis.     - 7/06/23 (admission) AST: 304, ALT: 246.  - 7/10/23: AST - 50, . - Unclear etiology at this time. May be secondary to acute on chronic combined heart failure. - 7/07/23 GI recommendations noted.     7. ROB.    - Creatinine appears to be between 1.0 and 1.2.   - 7/06/23 creatinine (on admission): 2.15.  - 7/11/23 creatinine: 1.53.   - No further last since 7/11/23. - Home medication of Aldactone and torsemide on hold in setting of ROB. - Care per primary team.     8. Supratherapeutic INR.    - 7/06/23 INR (on admission): 14.85.   - 7/07/23 INR: 15.  - 7/10/23 INR: 2.73.   - Continue warfarin 3 mg daily.  - Care per primary team.     9. Chronic atrial fibrillation.     - 7/06/23 EKG: Atrial fibrillation, ventricular rate 72 bpm.  Noted PVCs.  Left bundle branch block. - Continue bisoprolol 2.5 mg daily.  - [id number]- VASc stroke risk score: 6 points, moderate-high risk. - Continue warfarin 3 mg daily.     10.  Chronic left bundle branch block.     - Has been documented since 2017. - 7/06/23 EKG: Atrial fibrillation, ventricular rate 72 bpm.  Noted PVCs.  Left bundle branch block.     11. Mitral valve replacement.     - s/p mitral valve replacement QO [id number].   - 4/11/22 TTE: Mitral Valve: There is a mechanical mitral valve prosthesis. The prosthetic valve appears to be functioning normally. The prosthetic valve appears to be well-seated.  Mean gradient is 3 mmHg and peak mitral velocity is 1.5 m/sec. There is trace regurgitation. There is no evidence of transvalvular regurgitation.   - 7/07/23 TTE:     Mitral Valve The mitral valve was not well visualized. There is a bileaflet mechanical mitral valve prosthesis. The prosthetic valve is not well visualized but appears to be functioning normally. The prosthetic valve appears to be well-seated. Degree of valve regurgitation assessment limited due to mechanical valve artifact. There is at least mild regurgitation. There is no evidence of transvalvular regurgitation. There is no evidence of stenosis. The mitral valve mean gradient is 2mmHg which is unchanged from previous. 12. Hypothyroidism.     - Currently on levothyroxine 50 mcg daily. - 5/02/23 TSH: 2.559.      13. Parkinson's disease.       - Currently on Sinemet  mg 3 times daily, Namenda 10 mg daily, and Symmetrel 100 mg twice daily.  - Neurology consult recommendations noted. Subjective / Objective:        No acute events. Pending hospice placement. Patient reports some shortness of breath fowhen laying flat. She currently denies chest pain, palpitations, lightheadedness, dizziness, headache, nausea or vomiting. Vitals: Blood pressure 99/53, pulse 67, temperature 97.5 °F (36.4 °C), temperature source Oral, resp. rate 18, height 5' 4" (1.626 m), weight 69.9 kg (154 lb 3.2 oz), SpO2 95 %. Vitals:    07/12/23 0600 07/13/23 0551   Weight: 68.7 kg (151 lb 6.4 oz) 69.9 kg (154 lb 3.2 oz)     Body mass index is 26.47 kg/m².   BP Readings from Last 3 Encounters:   07/13/23 99/53   06/20/23 102/60   06/07/23 112/69     Orthostatic Blood Pressures    Flowsheet Row Most Recent Value   Blood Pressure 99/53 filed at 07/13/2023 0246        I/O       07/08 0701  07/09 0700 07/09 0701  07/10 0700 07/10 0701  07/11 0700    I.V. (mL/kg) 20.1 (0.3) 10 (0.1)     Total Intake(mL/kg) 20.1 (0.3) 10 (0.1)     Urine (mL/kg/hr) 600 (0.4) 300 (0.2) 275 (4.1)    Total Output 600 300 275    Net -579.9 -290 -275           Unmeasured Urine Occurrence 1 x          Invasive Devices     Peripheral Intravenous Line  Duration           Peripheral IV 07/10/23 Right;Ventral (anterior) Forearm 3 days          Drain  Duration           External Urinary Catheter <1 day                  Intake/Output Summary (Last 24 hours) at 7/13/2023 0710  Last data filed at 7/12/2023 2030  Gross per 24 hour   Intake 450 ml   Output 800 ml   Net -350 ml       Physical Exam:   Physical Exam  Vitals reviewed. Constitutional:       General: She is not in acute distress. Appearance: She is ill-appearing. Cardiovascular:      Rate and Rhythm: Normal rate. Rhythm irregular. Pulses: Normal pulses. Heart sounds: Murmur heard. Pulmonary:      Effort: Pulmonary effort is normal. No respiratory distress. Breath sounds: Decreased breath sounds present. Comments: Bilateral crackles heard at bases. Abdominal:      General: Abdomen is flat. There is no distension. Palpations: Abdomen is soft. Tenderness: There is no abdominal tenderness. Musculoskeletal:      Right lower leg: No edema. Left lower leg: No edema. Skin:     General: Skin is warm and dry. Neurological:      Mental Status: She is alert. Motor: Weakness present.        Medications/ Allergies:     Current Facility-Administered Medications   Medication Dose Route Frequency Provider Last Rate   • amantadine  100 mg Oral BID Sancho Kelly PA-C     • bisoprolol  2.5 mg Oral Daily Shubham Cook PA-C     • carbidopa-levodopa  1 tablet Oral TID Sancho Kelly PA-C     • ciprofloxacin  1 drop Both Eyes Q4H While Awake Lakeisha Ta MD     • DOPamine in dextrose 5%  1-20 mcg/kg/min Intravenous Continuous Lakeisha Ta MD 2.518 mcg/kg/min (07/13/23 0329)   • ferrous gluconate  324 mg Oral Daily Before Breakfast Sancho Kelly PA-C     • levothyroxine  50 mcg Oral Early Morning Sancho Kelly PA-C     • memantine  10 mg Oral Daily Sancho Kelly PA-C     • warfarin  3 mg Oral Daily (warfarin) Rosemarie Padilla MD          Allergies   Allergen Reactions   • Artane [Trihexyphenidyl] Other (See Comments)     Felt "disconnected", "out if it", shaky.  Did not feel right    • Glycopyrrolate      Nose bleeds   • Pollen Extract        VTE Pharmacologic Prophylaxis:   Warfarin (Coumadin)    Labs:   Troponins:  Results from last 7 days   Lab Units 07/07/23 2012 07/07/23  1655   HSTNI D2 ng/L  --  8   HSTNI D4 ng/L 2  --          CBC with diff:  Results from last 7 days   Lab Units 07/11/23  0609 07/10/23  0535 07/08/23  0539 07/07/23  0432 07/06/23  1036   WBC Thousand/uL 6.72 6.34 8.29 7.32 7.19   HEMOGLOBIN g/dL 11.8 11.0* 11.7 11.4* 11.7   HEMATOCRIT % 36.9 34.8 36.0 34.5* 35.8   MCV fL 107* 106* 106* 105* 104*   PLATELETS Thousands/uL 117* 97* 107* 100* 120*   RBC Million/uL 3.46* 3.28* 3.40* 3.29* 3.45*   MCH pg 34.1 33.5 34.4* 34.7* 33.9   MCHC g/dL 32.0 31.6 32.5 33.0 32.7   RDW % 17.1* 16.8* 17.1* 16.8* 16.5*   MPV fL 11.7 11.7 12.3 11.9 11.9   NRBC AUTO /100 WBCs  --  0  --   --  0       CMP:  Results from last 7 days   Lab Units 07/11/23  1033 07/11/23  0609 07/10/23  0535 07/09/23  0534 07/08/23  0539 07/07/23  0432 07/06/23  1036 07/06/23  0743   SODIUM mmol/L 139 138 141 138 137 135 136 137   POTASSIUM mmol/L 4.3 5.4* 4.1 4.0 4.3 3.9 3.4* 3.8   CHLORIDE mmol/L 110* 109* 111* 108 107 105 103 106   CO2 mmol/L 27 27 26 25 21 23 27 26   ANION GAP mmol/L 2 2 4 5 9 7 6 5   BUN mg/dL 38* 41* 38* 45* 50* 47* 52* 48*   CREATININE mg/dL 1.53* 1.53* 1.45* 1.70* 1.97* 1.84* 2.15* 2.33*   GLUCOSE FASTING mg/dL  --   --   --   --   --   --   --  101*   CALCIUM mg/dL 11.0* 10.4* 10.1 10.5* 9.9 9.6 10.5* 10.7*   AST U/L  --   --  50* 101* 208* 155* 304*  --    ALT U/L  --   --  146* 66* 114* 71* 246*  --    ALK PHOS U/L  --   --  74 83 80 80 80  --    TOTAL PROTEIN g/dL  --   --  5.0* 5.5* 5.3* 5.0* 5.6*  --    ALBUMIN g/dL  --   --  3.1* 3.3* 3.3* 3.1* 3.5  --    TOTAL BILIRUBIN mg/dL  --   --  2.95* 3.55* 3.54* 2.46* 3.20*  --    EGFR ml/min/1.73sq m 31 31 33 27 23 24 20 18       Magnesium:  Results from last 7 days   Lab Units 07/10/23  0535 07/07/23  0432   MAGNESIUM mg/dL 2.1 1.9     Coags:  Results from last 7 days   Lab Units 07/10/23  0535 07/09/23  0534 07/08/23  0539 07/07/23  2335 07/07/23  1448 07/07/23  0432 07/06/23  1820 07/06/23  1153   PTT seconds  --   --   --   --   --   --   --  54*   INR  2.73* 2.24* 2.52* 3.11* 4.91* >15.00* >15.00* 14.85*     TSH:  Results from last 7 days   Lab Units 07/07/23  0432   TSH 3RD GENERATON uIU/mL 3.176     No components found for: "TSH3"  Lipid Profile:  Results from last 7 days   Lab Units 07/07/23  0432   CHOLESTEROL mg/dL 79   TRIGLYCERIDES mg/dL 107   HDL mg/dL 13*   LDL CALC mg/dL 45     Hgb A1c:  Results from last 7 days   Lab Units 07/07/23  0432   HEMOGLOBIN A1C % 5.5     NT-proBNP: No results for input(s): "NTBNP" in the last 72 hours. Imaging & Testing   I have personally reviewed pertinent reports. MRI brain wo contrast    Result Date: 7/7/2023    Impression: A few small chronic bilateral cerebellar lacunar infarctions are stable. No acute infarction, intracranial hemorrhage or mass effect. US right upper quadrant     Result Date: 7/6/2023     Impression: No acute findings. Absent gallbladder. Trace right pleural fluid visualized.      CT abdomen pelvis wo contrast     Result Date: 7/6/2023     Impression: Small bilateral pleural effusions and small volume abdominopelvic ascites. Otherwise, no acute abnormality in the abdomen or pelvis.      CT head wo contrast     Result Date: 7/6/2023     Impression: 1.  No acute intracranial abnormality. 2.  Chronic cerebellar lacunar infarcts best seen on the prior MRI examination.      FL barium swallow     Result Date: 6/14/2023     Impression: Esophageal dysmotility.        EKG / Monitor: Personally reviewed. Telemetry reviewed: Currently atrial fibrillation, ventricular rate 61 bpm.  No events noted.     Cardiac testing:     Echo complete w/ contrast if indicated    Result Date: 7/7/2023  Narrative: • Left Ventricle: Left ventricular cavity size is mildly dilated. Wall thickness is mildly increased. The left ventricular ejection fraction is 20% by visual estimation. Systolic function is severely reduced. Although no diagnostic regional wall motion abnormality was identified, this possibility cannot be completely excluded on the basis of this study. Unable to assess diastolic function. Left atrial filling pressure is elevated. •  IVS: There is abnormal septal motion consistent with post-operative status. •  Right Ventricle: Systolic function is moderately to severely reduced. Abnormal tricuspid annular plane systolic excursion (TAPSE) < 1.7 cm. •  Left Atrium: The atrium is severely dilated (>48 mL/m2). •  Atrial Septum: The septum bows into the right atrium, suggesting increased left atrial pressure. •  Aortic Valve: The aortic valve is trileaflet. The leaflets are mildly thickened. The leaflets are moderately calcified. There is mildly reduced mobility. There is trace to mild regurgitation. •  Mitral Valve: There is a bileaflet mechanical mitral valve prosthesis. The prosthetic valve is not well visualized but appears to be functioning normally. The prosthetic valve appears to be well-seated. Degree of valve regurgitation assessment limited due to mechanical valve artifact. There is at least mild regurgitation. There is no evidence of transvalvular regurgitation. There is no evidence of stenosis. The mitral valve mean gradient is 2mmHg which is unchanged from previous. •  Tricuspid Valve: There is moderate regurgitation. The right ventricular systolic pressure is moderately elevated. The estimated right ventricular systolic pressure is 97.93 mmHg. •  Pulmonic Valve: There is moderate regurgitation. •  Compared to previous echocardiogram, there has been a significant reduction in LV and RV systolic function. Mechanical mitral valve prosthesis appears unchanged.        Echo complete with contrast if indicated - 4/11/22     Left Ventricle Left ventricular cavity size is normal. Wall thickness is mildly increased. The left ventricular ejection fraction is 50 to 55 % by visual estimation. Systolic function is low normal.  Although no diagnostic regional wall motion abnormality was identified, this possibility cannot be completely excluded on the basis of this study. Unable to assess diastolic function. Interventricular Septum There is abnormal septal motion consistent with post-operative status. Right Ventricle Right ventricular cavity size is normal. Systolic function is normal. Wall thickness is normal.      Left Atrium The atrium is severely dilated. Right Atrium The atrium is normal in size. Atrial Septum The septum bows into the right atrium. Aortic Valve The aortic valve is trileaflet. The leaflets are mildly thickened. The leaflets are not calcified. The leaflets exhibit normal mobility. There is trace to mild regurgitation. There is no evidence of stenosis. Mitral Valve There is a mechanical mitral valve prosthesis. The prosthetic valve appears to be functioning normally. The prosthetic valve appears to be well-seated.  Mean gradient is 3 mmHg and peak mitral velocity is 1.5 m/sec. There is trace regurgitation. There is no evidence of transvalvular regurgitation. There is no evidence of stenosis. Tricuspid Valve There is mild to moderate regurgitation.  Pulmonary artery pressure 35 mmHg. There is no evidence of stenosis. Pulmonic Valve There is trace regurgitation. There is no evidence of stenosis. Ascending Aorta The aortic root is normal in size. IVC/SVC The inferior vena cava is normal in size. Respirophasic changes were normal.      Pericardium There is no pericardial effusion.  The pericardium is normal in appearance.         Results for orders placed during the hospital encounter of 06/22/19    Echo complete with contrast if indicated    Narrative  [facility] [address], [address]  [phone]    Transthoracic Echocardiogram  2D, M-mode, Doppler, and Color Doppler    Study date:  2019    Patient: Ralph Chávez  MR number: [id number]  Account number: [de-identified]  : 1940  Age: 77 years  Gender: Female  Status: Outpatient  Location: Echo lab  Height: 64 in  Weight: 185.7 lb  BP: 94/ 63 mmHg    Indications: Cardiomyopathy    Diagnoses: I42.9 - Cardiomyopathy, unspecified    Sonographer:  Kwame Garcia RDCS  Primary Physician:  Maira Donahue MD  Referring Physician:  Kirt Figueroa MD  Group:  [facility]  Ordering Physician:  Kirt Figueroa MD  Interpreting Physician:  Jose Stephens MD    SUMMARY    LEFT VENTRICLE:  Systolic function was at the lower limits of normal. Ejection fraction was estimated in the range of 50 % to 55 % to be 55 %. There were no regional wall motion abnormalities. Wall thickness was mildly increased. There was mild concentric hypertrophy. VENTRICULAR SEPTUM:  There was mild paradoxical motion. 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 LEFT ATRIUM:  The atrium was moderately to markedly dilated. MITRAL VALVE:  A mechanical prosthesis was present. It exhibited normal function and normal motion. There was no pannus formation, a normal-appearing sewing ring, and no rocking motion of the sewing ring. Mean gradient 4 mm of Hg and peak velocity 1.5  m/sec with trace to mild MR. There was trace regurgitation. AORTIC VALVE:  There was mild regurgitation. TRICUSPID VALVE:  There was mild regurgitation. Estimated peak PA pressure was 25 mmHg. HISTORY: PRIOR HISTORY: Mitral Valve Replacement (), A-Fib, Hypothyroidism    PROCEDURE: The procedure was performed in the echo lab. This was a routine study. The transthoracic approach was used.  The study included complete 2D imaging, M-mode, complete spectral Doppler, and color Doppler. The heart rate was 71 bpm,  at the start of the study. Echocardiographic views were limited due to lung interference. Image quality was adequate. LEFT VENTRICLE: Size was normal. Systolic function was at the lower limits of normal. Ejection fraction was estimated in the range of 50 % to 55 % to be 55 %. There were no regional wall motion abnormalities. Wall thickness was mildly  increased. There was mild concentric hypertrophy. DOPPLER: The study was not technically sufficient to allow evaluation of LV diastolic function. VENTRICULAR SEPTUM: There was mild paradoxical motion. 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 RIGHT VENTRICLE: The size was normal. Systolic function was low normal.    LEFT ATRIUM: The atrium was moderately to markedly dilated. RIGHT ATRIUM: Size was normal.    MITRAL VALVE: A mechanical prosthesis was present. It exhibited normal function and normal motion. There was no pannus formation, a normal-appearing sewing ring, and no rocking motion of the sewing ring. Mean gradient 4 mm of Hg and peak  velocity 1.5 m/sec with trace to mild MR. DOPPLER: There was no evidence for stenosis. There was trace regurgitation. AORTIC VALVE: The valve was trileaflet. Leaflets exhibited mildly increased thickness, mild calcification, and normal cuspal separation. DOPPLER: Transaortic velocity was within the normal range. There was no evidence for stenosis. There  was mild regurgitation. TRICUSPID VALVE: DOPPLER: There was mild regurgitation. Estimated peak PA pressure was 25 mmHg. PULMONIC VALVE: DOPPLER: There was no regurgitation. PERICARDIUM: There was no thickening or calcification. There was no pericardial effusion. AORTA: The root exhibited normal size. SYSTEMIC VEINS: IVC: The inferior vena cava was normal in size.  Respirophasic changes were normal.    SYSTEM MEASUREMENT TABLES    2D mode  AoR Diam 2D: 3 cm  LA Diam (2D): 5.4 cm  LA/Ao (2D): 1.8  FS (2D Theador Ridgel): 27.3 %  IVSd (2D): 1.34 cm  LVDEV: 42.8 cmï¾³  LVEDV MOD BP: 85 cmï¾³  LVESV: 19.5 cmï¾³  LVIDd(2D): 3.26 cm  LVISd (2D): 2.37 cm  LVOT Area 2D: 3.14 cmï¾²  LVPWd (2D): 1.33 cm  SV (Teich): 23.3 cmï¾³    Apical four chamber  LVEF A4C: 50 %    Apical two chamber  LVEF A2C: 54 %    Unspecified Scan Mode  MANSOOR Cont Eq (Peak Dejan): 1.97 cmï¾²  LVOT Diam.: 2 cm  LVOT Vmax: 827 mm/s  LVOT Vmax; Mean: 827 mm/s  Peak Grad.; Mean: 3 mm[Hg]  MV Peak E Dejan.  Mean: 1330 mm/s  MVA (PHT): 3.01 cmï¾²  PHT: 73 ms  Max P mm[Hg]  V Max: 2420 mm/s  Vmax: 2140 mm/s  RA Area: 18.3 cmï¾²  RA Volume: 45.7 cmï¾³  TAPSE: 1.3 cm    [employer] Accredited Echocardiography Laboratory    Prepared and electronically signed by    Bing Ferris MD  Signed 2019 07:39:37          Alpa Alfaro PA-C

## 2023-07-14 ENCOUNTER — TRANSITIONAL CARE MANAGEMENT (OUTPATIENT)
Dept: FAMILY MEDICINE CLINIC | Facility: CLINIC | Age: 83
End: 2023-07-14

## 2023-07-14 LAB
ATRIAL RATE: 105 BPM
QRS AXIS: 110 DEGREES
QRSD INTERVAL: 174 MS
QT INTERVAL: 446 MS
QTC INTERVAL: 514 MS
T WAVE AXIS: 266 DEGREES
VENTRICULAR RATE: 80 BPM

## 2023-07-17 ENCOUNTER — TELEPHONE (OUTPATIENT)
Dept: CARDIOLOGY CLINIC | Facility: CLINIC | Age: 83
End: 2023-07-17

## 2023-07-17 ENCOUNTER — TELEPHONE (OUTPATIENT)
Dept: FAMILY MEDICINE CLINIC | Facility: CLINIC | Age: 83
End: 2023-07-17

## 2023-07-19 ENCOUNTER — DOCUMENTATION (OUTPATIENT)
Dept: CARDIOLOGY CLINIC | Facility: CLINIC | Age: 83
End: 2023-07-19

## 2023-07-19 NOTE — PROGRESS NOTES
Spoke to  Dudley Rizzo at [facility], who is enroute to [facility] in [facility] to be with his wife, who is not expected to survive beyond today. The patient was recently hospitalized at [address] [place] [address] with end-stage cardiac disease and chose to use hospice for comfort care and was lucid enough to participate in that decision. I expressed my sympathy for him and great affection for the patient, his wife. Marianne Sandoval.   No Moralez MD

## 2023-07-31 ENCOUNTER — TELEPHONE (OUTPATIENT)
Dept: CARDIOLOGY CLINIC | Facility: CLINIC | Age: 83
End: 2023-07-31

## (undated) DEVICE — GLOVE EXAM NON-STRL NTRL PLUS LRG PF

## (undated) DEVICE — "MAJ-901 WATER CONTAINER SET CV-160/140": Brand: WATER CONTAINER

## (undated) DEVICE — SMALL NEEDLE COUNTER NEST

## (undated) DEVICE — SOLIDIFIER FLUID WASTE CONTROL 1500ML

## (undated) DEVICE — GLOVE SRG BIOGEL 7.5

## (undated) DEVICE — 1200CC GUARDIAN II: Brand: GUARDIAN

## (undated) DEVICE — BRUSH ENDO CLEANING DBL-HEADER

## (undated) DEVICE — PLASTIC ADHESIVE BANDAGE: Brand: CURITY

## (undated) DEVICE — TUBING BUBBLE CLEAR 5MM X 100 FT NS

## (undated) DEVICE — TUBING AUX CHANNEL

## (undated) DEVICE — NEEDLE SPINAL 25G X 3.5 IN QUINCKE

## (undated) DEVICE — "MH-438 A/W VLVE F/140 EVIS-140": Brand: AIR/WATER VALVE

## (undated) DEVICE — GAUZE SPONGES,16 PLY: Brand: CURITY

## (undated) DEVICE — DISPOSABLE BIOPSY VALVE MAJ-1555: Brand: SINGLE USE BIOPSY VALVE (STERILE)

## (undated) DEVICE — LUBRICANT SURGILUBE TUBE 4 OZ  FLIP TOP

## (undated) DEVICE — NEEDLE BLUNT 18 G X 1 1/2 W FILTER

## (undated) DEVICE — RADIOLOGY STERILE LABELS: Brand: CENTURION

## (undated) DEVICE — TOWEL SET X-RAY

## (undated) DEVICE — AIRLIFE™  ADULT CUSHION NASAL CANNULA WITH 7 FOOT (2.1 M) CRUSH-RESISTANT OXYGEN TUBING, AND U/CONNECT-IT ADAPTER: Brand: AIRLIFE™

## (undated) DEVICE — WIPES BABY PAMPERS SENSITIVE 36/PK

## (undated) DEVICE — SINGLE-USE BIOPSY FORCEPS: Brand: RADIAL JAW 4

## (undated) DEVICE — CHLORAPREP HI-LITE 10.5ML ORANGE

## (undated) DEVICE — MEDI-VAC YANKAUER SUCTION HANDLE: Brand: CARDINAL HEALTH

## (undated) DEVICE — TRAY EPIDURAL PERIFIX 20GA X 3.5IN TUOHY 8ML

## (undated) DEVICE — SYRINGE 5ML LL